# Patient Record
Sex: FEMALE | Race: WHITE | Employment: FULL TIME | URBAN - METROPOLITAN AREA
[De-identification: names, ages, dates, MRNs, and addresses within clinical notes are randomized per-mention and may not be internally consistent; named-entity substitution may affect disease eponyms.]

---

## 2017-01-03 ENCOUNTER — TELEPHONE (OUTPATIENT)
Dept: PRIMARY CARE CLINIC | Age: 32
End: 2017-01-03

## 2017-01-03 DIAGNOSIS — I89.0 LYMPHEDEMA: Primary | ICD-10-CM

## 2017-01-04 ENCOUNTER — OFFICE VISIT (OUTPATIENT)
Dept: OBGYN | Age: 32
End: 2017-01-04

## 2017-01-04 VITALS
WEIGHT: 293 LBS | DIASTOLIC BLOOD PRESSURE: 78 MMHG | HEIGHT: 65 IN | SYSTOLIC BLOOD PRESSURE: 124 MMHG | BODY MASS INDEX: 48.82 KG/M2

## 2017-01-04 DIAGNOSIS — Z01.419 NORMAL GYNECOLOGIC EXAMINATION: Primary | ICD-10-CM

## 2017-01-04 DIAGNOSIS — N92.1 MENORRHAGIA WITH IRREGULAR CYCLE: ICD-10-CM

## 2017-01-04 PROCEDURE — 99385 PREV VISIT NEW AGE 18-39: CPT | Performed by: NURSE PRACTITIONER

## 2017-01-04 RX ORDER — LEVONORGESTREL / ETHINYL ESTRADIOL AND ETHINYL ESTRADIOL 150-30(84)
1 KIT ORAL DAILY
Qty: 91 TABLET | Refills: 4 | Status: SHIPPED | OUTPATIENT
Start: 2017-01-04 | End: 2018-02-19 | Stop reason: SDUPTHER

## 2017-01-05 LAB — PAP SMEAR: NORMAL

## 2017-01-06 ENCOUNTER — OFFICE VISIT (OUTPATIENT)
Dept: PRIMARY CARE CLINIC | Age: 32
End: 2017-01-06

## 2017-01-06 VITALS
DIASTOLIC BLOOD PRESSURE: 82 MMHG | RESPIRATION RATE: 16 BRPM | TEMPERATURE: 97.8 F | HEART RATE: 57 BPM | SYSTOLIC BLOOD PRESSURE: 116 MMHG | OXYGEN SATURATION: 97 % | BODY MASS INDEX: 48.82 KG/M2 | HEIGHT: 65 IN | WEIGHT: 293 LBS

## 2017-01-06 DIAGNOSIS — Z13.220 NEED FOR LIPID SCREENING: ICD-10-CM

## 2017-01-06 DIAGNOSIS — N20.0 RENAL LITHIASIS: ICD-10-CM

## 2017-01-06 DIAGNOSIS — R53.83 FATIGUE, UNSPECIFIED TYPE: ICD-10-CM

## 2017-01-06 DIAGNOSIS — Z87.442 HISTORY OF KIDNEY STONES: Primary | ICD-10-CM

## 2017-01-06 DIAGNOSIS — N30.00 ACUTE CYSTITIS WITHOUT HEMATURIA: ICD-10-CM

## 2017-01-06 LAB
ALBUMIN SERPL-MCNC: 4.6 G/DL (ref 3.9–4.9)
ALP BLD-CCNC: 76 U/L (ref 40–130)
ALT SERPL-CCNC: 94 U/L (ref 0–33)
ANION GAP SERPL CALCULATED.3IONS-SCNC: 16 MEQ/L (ref 7–13)
AST SERPL-CCNC: 55 U/L (ref 0–35)
BASOPHILS ABSOLUTE: 0.1 K/UL (ref 0–0.2)
BASOPHILS RELATIVE PERCENT: 0.9 %
BILIRUB SERPL-MCNC: 0.8 MG/DL (ref 0–1.2)
BILIRUBIN, POC: NORMAL
BLOOD URINE, POC: NORMAL
BUN BLDV-MCNC: 12 MG/DL (ref 6–20)
CALCIUM SERPL-MCNC: 9.3 MG/DL (ref 8.6–10.2)
CHLORIDE BLD-SCNC: 100 MEQ/L (ref 98–107)
CHOLESTEROL, TOTAL: 279 MG/DL (ref 0–199)
CLARITY, POC: NORMAL
CO2: 22 MEQ/L (ref 22–29)
COLOR, POC: YELLOW
CREAT SERPL-MCNC: 0.53 MG/DL (ref 0.5–0.9)
EOSINOPHILS ABSOLUTE: 0.1 K/UL (ref 0–0.7)
EOSINOPHILS RELATIVE PERCENT: 1.8 %
GFR AFRICAN AMERICAN: >60
GFR NON-AFRICAN AMERICAN: >60
GLOBULIN: 2.6 G/DL (ref 2.3–3.5)
GLUCOSE BLD-MCNC: 78 MG/DL (ref 74–109)
GLUCOSE URINE, POC: NORMAL
HCT VFR BLD CALC: 47 % (ref 37–47)
HDLC SERPL-MCNC: 67 MG/DL (ref 40–59)
HEMOGLOBIN: 16.1 G/DL (ref 12–16)
KETONES, POC: NORMAL
LDL CHOLESTEROL CALCULATED: 185 MG/DL (ref 0–129)
LEUKOCYTE EST, POC: NORMAL
LYMPHOCYTES ABSOLUTE: 2.9 K/UL (ref 1–4.8)
LYMPHOCYTES RELATIVE PERCENT: 38.9 %
MCH RBC QN AUTO: 29.9 PG (ref 27–31.3)
MCHC RBC AUTO-ENTMCNC: 34.2 % (ref 33–37)
MCV RBC AUTO: 87.4 FL (ref 82–100)
MONOCYTES ABSOLUTE: 0.5 K/UL (ref 0.2–0.8)
MONOCYTES RELATIVE PERCENT: 7.3 %
NEUTROPHILS ABSOLUTE: 3.8 K/UL (ref 1.4–6.5)
NEUTROPHILS RELATIVE PERCENT: 51.1 %
NITRITE, POC: NORMAL
PDW BLD-RTO: 12.7 % (ref 11.5–14.5)
PH, POC: 6
PLATELET # BLD: 287 K/UL (ref 130–400)
POTASSIUM SERPL-SCNC: 4.1 MEQ/L (ref 3.5–5.1)
PROTEIN, POC: NORMAL
RBC # BLD: 5.38 M/UL (ref 4.2–5.4)
SODIUM BLD-SCNC: 138 MEQ/L (ref 132–144)
SPECIFIC GRAVITY, POC: 1.03
T4 FREE: 1.07 NG/DL (ref 0.93–1.7)
T4 TOTAL: 6.4 UG/DL (ref 4.5–11.7)
TOTAL PROTEIN: 7.2 G/DL (ref 6.4–8.1)
TRIGL SERPL-MCNC: 134 MG/DL (ref 0–200)
TSH SERPL DL<=0.05 MIU/L-ACNC: 5.23 UIU/ML (ref 0.27–4.2)
UROBILINOGEN, POC: NORMAL
WBC # BLD: 7.4 K/UL (ref 4.8–10.8)

## 2017-01-06 PROCEDURE — 81003 URINALYSIS AUTO W/O SCOPE: CPT | Performed by: FAMILY MEDICINE

## 2017-01-06 PROCEDURE — 96372 THER/PROPH/DIAG INJ SC/IM: CPT | Performed by: FAMILY MEDICINE

## 2017-01-06 PROCEDURE — 99214 OFFICE O/P EST MOD 30 MIN: CPT | Performed by: FAMILY MEDICINE

## 2017-01-06 RX ORDER — HYDROCODONE BITARTRATE AND ACETAMINOPHEN 5; 325 MG/1; MG/1
1 TABLET ORAL EVERY 6 HOURS PRN
Qty: 20 TABLET | Refills: 0 | Status: SHIPPED | OUTPATIENT
Start: 2017-01-06 | End: 2017-03-31

## 2017-01-06 RX ORDER — CIPROFLOXACIN 500 MG/1
TABLET, FILM COATED ORAL
Qty: 20 TABLET | Refills: 0 | Status: SHIPPED | OUTPATIENT
Start: 2017-01-06 | End: 2017-01-16

## 2017-01-06 RX ORDER — CEFTRIAXONE 1 G/1
1 INJECTION, POWDER, FOR SOLUTION INTRAMUSCULAR; INTRAVENOUS ONCE
Status: COMPLETED | OUTPATIENT
Start: 2017-01-06 | End: 2017-01-06

## 2017-01-06 RX ADMIN — CEFTRIAXONE 1 G: 1 INJECTION, POWDER, FOR SOLUTION INTRAMUSCULAR; INTRAVENOUS at 14:52

## 2017-01-06 ASSESSMENT — PATIENT HEALTH QUESTIONNAIRE - PHQ9
SUM OF ALL RESPONSES TO PHQ9 QUESTIONS 1 & 2: 2
1. LITTLE INTEREST OR PLEASURE IN DOING THINGS: 1
SUM OF ALL RESPONSES TO PHQ QUESTIONS 1-9: 2
2. FEELING DOWN, DEPRESSED OR HOPELESS: 1

## 2017-01-06 ASSESSMENT — ENCOUNTER SYMPTOMS
VOMITING: 0
NAUSEA: 1
ABDOMINAL PAIN: 1

## 2017-01-16 ENCOUNTER — OFFICE VISIT (OUTPATIENT)
Dept: PRIMARY CARE CLINIC | Age: 32
End: 2017-01-16

## 2017-01-16 VITALS
HEART RATE: 80 BPM | WEIGHT: 293 LBS | RESPIRATION RATE: 18 BRPM | HEIGHT: 65 IN | TEMPERATURE: 97.7 F | DIASTOLIC BLOOD PRESSURE: 88 MMHG | BODY MASS INDEX: 48.82 KG/M2 | SYSTOLIC BLOOD PRESSURE: 140 MMHG

## 2017-01-16 DIAGNOSIS — E03.9 HYPOTHYROIDISM, UNSPECIFIED TYPE: ICD-10-CM

## 2017-01-16 DIAGNOSIS — G47.00 INSOMNIA, UNSPECIFIED TYPE: ICD-10-CM

## 2017-01-16 DIAGNOSIS — F41.9 ANXIETY: Primary | ICD-10-CM

## 2017-01-16 DIAGNOSIS — F32.A DEPRESSION, UNSPECIFIED DEPRESSION TYPE: ICD-10-CM

## 2017-01-16 DIAGNOSIS — R53.83 FATIGUE, UNSPECIFIED TYPE: ICD-10-CM

## 2017-01-16 PROCEDURE — 99213 OFFICE O/P EST LOW 20 MIN: CPT | Performed by: FAMILY MEDICINE

## 2017-01-16 RX ORDER — DESVENLAFAXINE 50 MG/1
50 TABLET, EXTENDED RELEASE ORAL DAILY
Qty: 14 TABLET | Refills: 3 | Status: SHIPPED | OUTPATIENT
Start: 2017-01-16 | End: 2017-02-13 | Stop reason: SDUPTHER

## 2017-01-16 RX ORDER — DESVENLAFAXINE 50 MG/1
50 TABLET, EXTENDED RELEASE ORAL DAILY
Qty: 30 TABLET | Refills: 11 | Status: SHIPPED | OUTPATIENT
Start: 2017-01-16 | End: 2017-03-31 | Stop reason: SDUPTHER

## 2017-01-16 RX ORDER — LEVOTHYROXINE SODIUM 75 MCG
75 TABLET ORAL DAILY
Qty: 90 TABLET | Refills: 3 | Status: SHIPPED | OUTPATIENT
Start: 2017-01-16 | End: 2018-05-14 | Stop reason: SDUPTHER

## 2017-01-16 ASSESSMENT — ENCOUNTER SYMPTOMS
CONSTIPATION: 0
COLOR CHANGE: 0
STRIDOR: 0
DIARRHEA: 0
PHOTOPHOBIA: 0
ABDOMINAL PAIN: 0
EYE REDNESS: 0
CHOKING: 0
APNEA: 0
NAUSEA: 0
EYE PAIN: 0
EYE DISCHARGE: 0
CHEST TIGHTNESS: 0
WHEEZING: 0
FACIAL SWELLING: 0

## 2017-01-23 DIAGNOSIS — Z01.419 NORMAL GYNECOLOGIC EXAMINATION: ICD-10-CM

## 2017-02-13 ENCOUNTER — OFFICE VISIT (OUTPATIENT)
Dept: PRIMARY CARE CLINIC | Age: 32
End: 2017-02-13

## 2017-02-13 VITALS
TEMPERATURE: 97.9 F | HEIGHT: 65 IN | HEART RATE: 60 BPM | SYSTOLIC BLOOD PRESSURE: 132 MMHG | WEIGHT: 293 LBS | RESPIRATION RATE: 18 BRPM | BODY MASS INDEX: 48.82 KG/M2 | DIASTOLIC BLOOD PRESSURE: 88 MMHG

## 2017-02-13 DIAGNOSIS — F32.A DEPRESSION, UNSPECIFIED DEPRESSION TYPE: ICD-10-CM

## 2017-02-13 DIAGNOSIS — R53.83 FATIGUE, UNSPECIFIED TYPE: ICD-10-CM

## 2017-02-13 DIAGNOSIS — E03.9 HYPOTHYROIDISM, UNSPECIFIED TYPE: Primary | ICD-10-CM

## 2017-02-13 DIAGNOSIS — F41.9 ANXIETY: ICD-10-CM

## 2017-02-13 PROCEDURE — 99213 OFFICE O/P EST LOW 20 MIN: CPT | Performed by: FAMILY MEDICINE

## 2017-03-22 ENCOUNTER — NURSE ONLY (OUTPATIENT)
Dept: PRIMARY CARE CLINIC | Age: 32
End: 2017-03-22

## 2017-03-22 DIAGNOSIS — R11.0 NAUSEA: Primary | ICD-10-CM

## 2017-03-22 PROCEDURE — 96372 THER/PROPH/DIAG INJ SC/IM: CPT | Performed by: FAMILY MEDICINE

## 2017-03-22 RX ORDER — ELETRIPTAN HYDROBROMIDE 40 MG/1
40 TABLET, FILM COATED ORAL
Qty: 6 TABLET | Refills: 3 | Status: SHIPPED | OUTPATIENT
Start: 2017-03-22 | End: 2017-03-31 | Stop reason: ALTCHOICE

## 2017-03-22 RX ORDER — PROMETHAZINE HYDROCHLORIDE 25 MG/ML
25 INJECTION, SOLUTION INTRAMUSCULAR; INTRAVENOUS ONCE
Status: COMPLETED | OUTPATIENT
Start: 2017-03-22 | End: 2017-03-22

## 2017-03-22 RX ADMIN — PROMETHAZINE HYDROCHLORIDE 25 MG: 25 INJECTION, SOLUTION INTRAMUSCULAR; INTRAVENOUS at 11:09

## 2017-03-28 DIAGNOSIS — E03.9 HYPOTHYROIDISM, UNSPECIFIED TYPE: ICD-10-CM

## 2017-03-28 LAB
T4 FREE: 1.12 NG/DL (ref 0.93–1.7)
T4 TOTAL: 6.8 UG/DL (ref 4.5–11.7)
TSH SERPL DL<=0.05 MIU/L-ACNC: 3.44 UIU/ML (ref 0.27–4.2)

## 2017-03-31 ENCOUNTER — OFFICE VISIT (OUTPATIENT)
Dept: PRIMARY CARE CLINIC | Age: 32
End: 2017-03-31

## 2017-03-31 VITALS
HEIGHT: 65 IN | DIASTOLIC BLOOD PRESSURE: 88 MMHG | WEIGHT: 293 LBS | HEART RATE: 60 BPM | BODY MASS INDEX: 48.82 KG/M2 | TEMPERATURE: 97.8 F | RESPIRATION RATE: 12 BRPM | SYSTOLIC BLOOD PRESSURE: 120 MMHG

## 2017-03-31 DIAGNOSIS — E03.9 HYPOTHYROIDISM, UNSPECIFIED TYPE: Primary | ICD-10-CM

## 2017-03-31 DIAGNOSIS — G43.909 MIGRAINE WITHOUT STATUS MIGRAINOSUS, NOT INTRACTABLE, UNSPECIFIED MIGRAINE TYPE: ICD-10-CM

## 2017-03-31 DIAGNOSIS — F32.A DEPRESSION, UNSPECIFIED DEPRESSION TYPE: ICD-10-CM

## 2017-03-31 DIAGNOSIS — F41.9 ANXIETY: ICD-10-CM

## 2017-03-31 PROCEDURE — 99214 OFFICE O/P EST MOD 30 MIN: CPT | Performed by: FAMILY MEDICINE

## 2017-03-31 RX ORDER — DESVENLAFAXINE 50 MG/1
50 TABLET, EXTENDED RELEASE ORAL DAILY
Qty: 90 TABLET | Refills: 1 | Status: SHIPPED | OUTPATIENT
Start: 2017-03-31 | End: 2017-10-17

## 2017-03-31 RX ORDER — TOPIRAMATE 50 MG/1
50 TABLET, FILM COATED ORAL 2 TIMES DAILY
Qty: 60 TABLET | Refills: 3 | Status: SHIPPED | OUTPATIENT
Start: 2017-03-31 | End: 2018-04-03 | Stop reason: DRUGHIGH

## 2017-03-31 RX ORDER — SUMATRIPTAN 100 MG/1
100 TABLET, FILM COATED ORAL
Qty: 12 TABLET | Refills: 3 | Status: SHIPPED | OUTPATIENT
Start: 2017-03-31 | End: 2019-02-25

## 2017-03-31 RX ORDER — DESVENLAFAXINE 50 MG/1
50 TABLET, EXTENDED RELEASE ORAL DAILY
Qty: 30 TABLET | Refills: 11 | Status: SHIPPED | OUTPATIENT
Start: 2017-03-31 | End: 2017-04-27

## 2017-03-31 ASSESSMENT — ENCOUNTER SYMPTOMS
NAUSEA: 0
SWOLLEN GLANDS: 0
STRIDOR: 0
EYE DISCHARGE: 0
VISUAL CHANGE: 0
CHEST TIGHTNESS: 0
FACIAL SWEATING: 0
EYE WATERING: 0
COUGH: 0
RHINORRHEA: 0
EYE PAIN: 0
DIARRHEA: 0
CONSTIPATION: 0
WHEEZING: 0
BLURRED VISION: 0
CHOKING: 0
SINUS PRESSURE: 0
SORE THROAT: 0
ABDOMINAL PAIN: 0
VOMITING: 0
COLOR CHANGE: 0
BACK PAIN: 0
PHOTOPHOBIA: 0
APNEA: 0
EYE REDNESS: 0
FACIAL SWELLING: 0
SCALP TENDERNESS: 0

## 2017-04-27 ENCOUNTER — OFFICE VISIT (OUTPATIENT)
Dept: PRIMARY CARE CLINIC | Age: 32
End: 2017-04-27

## 2017-04-27 VITALS
BODY MASS INDEX: 48.82 KG/M2 | HEART RATE: 76 BPM | WEIGHT: 293 LBS | SYSTOLIC BLOOD PRESSURE: 124 MMHG | HEIGHT: 65 IN | DIASTOLIC BLOOD PRESSURE: 86 MMHG | TEMPERATURE: 98.1 F | RESPIRATION RATE: 16 BRPM

## 2017-04-27 DIAGNOSIS — F32.A DEPRESSION, UNSPECIFIED DEPRESSION TYPE: ICD-10-CM

## 2017-04-27 DIAGNOSIS — R53.83 FATIGUE, UNSPECIFIED TYPE: ICD-10-CM

## 2017-04-27 DIAGNOSIS — R51.9 HEADACHE, UNSPECIFIED HEADACHE TYPE: Primary | ICD-10-CM

## 2017-04-27 PROCEDURE — 99213 OFFICE O/P EST LOW 20 MIN: CPT | Performed by: FAMILY MEDICINE

## 2017-04-27 RX ORDER — TOPIRAMATE 25 MG/1
25 TABLET ORAL 2 TIMES DAILY
Qty: 60 TABLET | Refills: 11 | Status: SHIPPED | OUTPATIENT
Start: 2017-04-27 | End: 2018-04-03 | Stop reason: DRUGHIGH

## 2017-04-27 ASSESSMENT — ENCOUNTER SYMPTOMS
NAUSEA: 1
SHORTNESS OF BREATH: 0
ABDOMINAL PAIN: 0
RHINORRHEA: 0

## 2017-08-22 ENCOUNTER — OFFICE VISIT (OUTPATIENT)
Dept: PRIMARY CARE CLINIC | Age: 32
End: 2017-08-22

## 2017-08-22 VITALS
WEIGHT: 293 LBS | SYSTOLIC BLOOD PRESSURE: 126 MMHG | RESPIRATION RATE: 18 BRPM | DIASTOLIC BLOOD PRESSURE: 80 MMHG | BODY MASS INDEX: 48.82 KG/M2 | TEMPERATURE: 97.4 F | HEIGHT: 65 IN | HEART RATE: 64 BPM

## 2017-08-22 DIAGNOSIS — R51.9 HEADACHE, UNSPECIFIED HEADACHE TYPE: ICD-10-CM

## 2017-08-22 DIAGNOSIS — F32.A DEPRESSION, UNSPECIFIED DEPRESSION TYPE: Primary | ICD-10-CM

## 2017-08-22 DIAGNOSIS — R63.4 WEIGHT LOSS: ICD-10-CM

## 2017-08-22 DIAGNOSIS — E03.9 HYPOTHYROIDISM, UNSPECIFIED TYPE: ICD-10-CM

## 2017-08-22 PROCEDURE — 99214 OFFICE O/P EST MOD 30 MIN: CPT | Performed by: FAMILY MEDICINE

## 2017-08-22 RX ORDER — DESVENLAFAXINE 100 MG/1
100 TABLET, EXTENDED RELEASE ORAL DAILY
Qty: 30 TABLET | Refills: 3 | Status: SHIPPED | OUTPATIENT
Start: 2017-08-22 | End: 2017-08-22 | Stop reason: DRUGHIGH

## 2017-08-22 RX ORDER — PHENTERMINE HYDROCHLORIDE 37.5 MG/1
37.5 TABLET ORAL
Qty: 30 TABLET | Refills: 0 | Status: SHIPPED | OUTPATIENT
Start: 2017-08-22 | End: 2017-09-19 | Stop reason: SDUPTHER

## 2017-08-22 RX ORDER — DESVENLAFAXINE 100 MG/1
100 TABLET, EXTENDED RELEASE ORAL DAILY
Qty: 30 TABLET | Refills: 3 | Status: SHIPPED | OUTPATIENT
Start: 2017-08-22 | End: 2018-09-24 | Stop reason: SDUPTHER

## 2017-08-22 RX ORDER — DESVENLAFAXINE 100 MG/1
100 TABLET, EXTENDED RELEASE ORAL DAILY
Qty: 30 TABLET | Refills: 3 | Status: SHIPPED | OUTPATIENT
Start: 2017-08-22 | End: 2017-08-22 | Stop reason: SDUPTHER

## 2017-08-22 ASSESSMENT — ENCOUNTER SYMPTOMS
NAUSEA: 0
PHOTOPHOBIA: 0
COUGH: 0
SORE THROAT: 0
SCALP TENDERNESS: 0
FACIAL SWEATING: 0
RHINORRHEA: 0
VISUAL CHANGE: 0
VOMITING: 0
BACK PAIN: 0
SINUS PRESSURE: 0
BLURRED VISION: 0
EYE REDNESS: 0
EYE WATERING: 0
EYE PAIN: 0
SWOLLEN GLANDS: 0
ABDOMINAL PAIN: 0

## 2017-09-19 ENCOUNTER — OFFICE VISIT (OUTPATIENT)
Dept: PRIMARY CARE CLINIC | Age: 32
End: 2017-09-19

## 2017-09-19 VITALS
WEIGHT: 283 LBS | SYSTOLIC BLOOD PRESSURE: 130 MMHG | RESPIRATION RATE: 14 BRPM | DIASTOLIC BLOOD PRESSURE: 82 MMHG | BODY MASS INDEX: 47.15 KG/M2 | HEART RATE: 66 BPM | TEMPERATURE: 98 F | HEIGHT: 65 IN

## 2017-09-19 DIAGNOSIS — E03.9 HYPOTHYROIDISM, UNSPECIFIED TYPE: Primary | ICD-10-CM

## 2017-09-19 DIAGNOSIS — R63.4 WEIGHT LOSS: ICD-10-CM

## 2017-09-19 PROCEDURE — 99213 OFFICE O/P EST LOW 20 MIN: CPT | Performed by: FAMILY MEDICINE

## 2017-09-19 RX ORDER — PHENTERMINE HYDROCHLORIDE 37.5 MG/1
37.5 TABLET ORAL
Qty: 30 TABLET | Refills: 0 | Status: SHIPPED | OUTPATIENT
Start: 2017-09-19 | End: 2017-10-17 | Stop reason: SDUPTHER

## 2017-09-19 ASSESSMENT — ENCOUNTER SYMPTOMS
EYE DISCHARGE: 0
EYE PAIN: 0
EYE REDNESS: 0
APNEA: 0
WHEEZING: 0
CONSTIPATION: 0
PHOTOPHOBIA: 0
ABDOMINAL PAIN: 0
NAUSEA: 0
STRIDOR: 0
COLOR CHANGE: 0
FACIAL SWELLING: 0
CHOKING: 0
DIARRHEA: 0
CHEST TIGHTNESS: 0

## 2017-10-17 ENCOUNTER — OFFICE VISIT (OUTPATIENT)
Dept: PRIMARY CARE CLINIC | Age: 32
End: 2017-10-17

## 2017-10-17 VITALS
RESPIRATION RATE: 16 BRPM | HEIGHT: 65 IN | TEMPERATURE: 98.2 F | HEART RATE: 68 BPM | DIASTOLIC BLOOD PRESSURE: 72 MMHG | BODY MASS INDEX: 46.32 KG/M2 | SYSTOLIC BLOOD PRESSURE: 108 MMHG | WEIGHT: 278 LBS

## 2017-10-17 DIAGNOSIS — R53.83 FATIGUE, UNSPECIFIED TYPE: ICD-10-CM

## 2017-10-17 DIAGNOSIS — R63.4 WEIGHT LOSS: ICD-10-CM

## 2017-10-17 DIAGNOSIS — E03.9 HYPOTHYROIDISM, UNSPECIFIED TYPE: Primary | ICD-10-CM

## 2017-10-17 PROCEDURE — 99213 OFFICE O/P EST LOW 20 MIN: CPT | Performed by: FAMILY MEDICINE

## 2017-10-17 RX ORDER — PHENTERMINE HYDROCHLORIDE 37.5 MG/1
37.5 TABLET ORAL
Qty: 30 TABLET | Refills: 0 | Status: SHIPPED | OUTPATIENT
Start: 2017-10-17 | End: 2017-11-16

## 2017-10-17 ASSESSMENT — ENCOUNTER SYMPTOMS
STRIDOR: 0
CHOKING: 0
DIARRHEA: 0
EYE PAIN: 0
PHOTOPHOBIA: 0
ABDOMINAL PAIN: 0
WHEEZING: 0
APNEA: 0
COLOR CHANGE: 0
FACIAL SWELLING: 0
EYE DISCHARGE: 0
NAUSEA: 0
CONSTIPATION: 0
EYE REDNESS: 0
CHEST TIGHTNESS: 0

## 2017-10-17 NOTE — PROGRESS NOTES
Gastrointestinal: Negative for abdominal pain, constipation, diarrhea and nausea. Endocrine: Negative for cold intolerance, heat intolerance, polydipsia and polyphagia. Genitourinary: Negative for dysuria and frequency. Musculoskeletal: Negative for gait problem, joint swelling, neck pain and neck stiffness. Skin: Negative for color change, pallor, rash and wound. Allergic/Immunologic: Negative for immunocompromised state. Neurological: Negative for tremors, syncope, facial asymmetry, speech difficulty and headaches. Psychiatric/Behavioral: Negative for confusion and hallucinations. The patient is not nervous/anxious and is not hyperactive. Objective:   /72 (Site: Left Arm, Position: Sitting, Cuff Size: Large Adult) Comment: taken by Dr. Nani Segura  Pulse 68   Temp 98.2 °F (36.8 °C) (Oral)   Resp 16   Ht 5' 5\" (1.651 m)   Wt 278 lb (126.1 kg)   LMP 09/11/2017   BMI 46.26 kg/m²     Physical Exam   Constitutional: She is oriented to person, place, and time. She appears well-developed and well-nourished. HENT:   Head: Normocephalic and atraumatic. Nose: Nose normal.   Eyes: Conjunctivae and EOM are normal. Pupils are equal, round, and reactive to light. No scleral icterus. Neck: Normal range of motion. Neck supple. Cardiovascular: Normal rate, regular rhythm and normal heart sounds. Exam reveals no gallop and no friction rub. No murmur heard. Pulmonary/Chest: Effort normal and breath sounds normal. No respiratory distress. She has no wheezes. She has no rales. She exhibits no tenderness. Neurological: She is alert and oriented to person, place, and time. Skin: Skin is warm and dry. No rash noted. No erythema. No pallor. Psychiatric: She has a normal mood and affect. Her behavior is normal. Judgment normal.   Nursing note and vitals reviewed. Assessment:     1. Hypothyroidism, unspecified type     2. Weight loss     3.  Fatigue, improved         Plan:      No

## 2017-11-15 ENCOUNTER — OFFICE VISIT (OUTPATIENT)
Dept: PRIMARY CARE CLINIC | Age: 32
End: 2017-11-15

## 2017-11-15 VITALS
RESPIRATION RATE: 14 BRPM | WEIGHT: 273 LBS | SYSTOLIC BLOOD PRESSURE: 126 MMHG | BODY MASS INDEX: 45.48 KG/M2 | HEIGHT: 65 IN | DIASTOLIC BLOOD PRESSURE: 88 MMHG | HEART RATE: 64 BPM | TEMPERATURE: 97.8 F

## 2017-11-15 DIAGNOSIS — F41.9 ANXIETY: ICD-10-CM

## 2017-11-15 DIAGNOSIS — F43.29 STRESS AND ADJUSTMENT REACTION: ICD-10-CM

## 2017-11-15 DIAGNOSIS — R63.4 WEIGHT LOSS: ICD-10-CM

## 2017-11-15 DIAGNOSIS — R51.9 NONINTRACTABLE HEADACHE, UNSPECIFIED CHRONICITY PATTERN, UNSPECIFIED HEADACHE TYPE: Primary | ICD-10-CM

## 2017-11-15 PROCEDURE — 99213 OFFICE O/P EST LOW 20 MIN: CPT | Performed by: FAMILY MEDICINE

## 2017-11-15 RX ORDER — ALPRAZOLAM 0.25 MG/1
0.25 TABLET ORAL NIGHTLY PRN
Qty: 30 TABLET | Refills: 1 | Status: SHIPPED | OUTPATIENT
Start: 2017-11-15 | End: 2017-12-15

## 2017-11-15 ASSESSMENT — ENCOUNTER SYMPTOMS
DIARRHEA: 0
GASTROINTESTINAL NEGATIVE: 1
CONSTIPATION: 0
EYE REDNESS: 0
EYE ITCHING: 0
PHOTOPHOBIA: 0
BACK PAIN: 0
WHEEZING: 0
RESPIRATORY NEGATIVE: 1
EYES NEGATIVE: 1
SHORTNESS OF BREATH: 0
ABDOMINAL PAIN: 0

## 2017-11-15 NOTE — PROGRESS NOTES
Right 2013    SHOULDER ARTHROSCOPY Right 2016     Family History   Problem Relation Age of Onset    High Blood Pressure Father     High Cholesterol Father     Heart Disease Father     Cervical Cancer Sister      Social History     Social History    Marital status:      Spouse name: N/A    Number of children: N/A    Years of education: N/A     Social History Main Topics    Smoking status: Never Smoker    Smokeless tobacco: Never Used    Alcohol use Yes    Drug use: No    Sexual activity: Yes     Other Topics Concern    None     Social History Narrative    None     Allergies   Allergen Reactions    Bee Venom Shortness Of Breath    Diphenhydramine      arythmia    Nsaids Hives    Procaine Swelling       Review of Systems   Constitutional: Negative. Negative for activity change, appetite change and fatigue. HENT: Negative. Eyes: Negative. Negative for photophobia, redness and itching. Respiratory: Negative. Negative for shortness of breath and wheezing. Cardiovascular: Negative. Gastrointestinal: Negative. Negative for abdominal pain, constipation and diarrhea. Genitourinary: Negative. Negative for hematuria, pelvic pain and urgency. Musculoskeletal: Negative. Negative for back pain. Skin: Negative. Neurological: Positive for headaches. Psychiatric/Behavioral: Negative. Negative for agitation and behavioral problems. The patient is not nervous/anxious. Objective:  Vitals:    11/15/17 1042   BP: 126/88   Site: Right Arm   Position: Sitting   Cuff Size: Large Adult   Pulse: 64   Resp: 14   Temp: 97.8 °F (36.6 °C)   TempSrc: Oral   Weight: 273 lb (123.8 kg)   Height: 5' 5\" (1.651 m)       Physical Exam   Constitutional: She is oriented to person, place, and time. She appears well-developed and well-nourished. HENT:   Head: Normocephalic and atraumatic.    Right Ear: External ear normal.   Left Ear: External ear normal.   Nose: Nose normal.   Mouth/Throat: Oropharynx is clear and moist.   Eyes: Conjunctivae and EOM are normal. Pupils are equal, round, and reactive to light. Right eye exhibits no discharge. Left eye exhibits no discharge. No scleral icterus. Neck: Normal range of motion. Neck supple. No thyromegaly present. Cardiovascular: Normal rate, regular rhythm, normal heart sounds and intact distal pulses. Exam reveals no gallop and no friction rub. No murmur heard. Pulmonary/Chest: Effort normal and breath sounds normal. No respiratory distress. She has no wheezes. She has no rales. She exhibits no tenderness. Abdominal: Soft. Bowel sounds are normal. She exhibits no distension and no mass. There is no tenderness. There is no rebound and no guarding. Lymphadenopathy:     She has no cervical adenopathy. Neurological: She is alert and oriented to person, place, and time. Skin: Skin is warm and dry. Psychiatric: She has a normal mood and affect. Her behavior is normal. Judgment and thought content normal.   Nursing note and vitals reviewed. Assessment/Plan  Dante Maxwell was seen today for weight loss and follow-up from hospital.    Diagnoses and all orders for this visit:    Nonintractable headache, unspecified chronicity pattern, unspecified headache type    Weight loss    Anxiety, genesight being performed today    Stress and adjustment reaction    Other orders  -     ALPRAZolam (XANAX) 0.25 MG tablet; Take 1 tablet by mouth nightly as needed for Sleep or Anxiety  Use cautiously. [facility] Due   Topic Date Due    HIV screen  11/21/2002       Controlled Substances Monitoring: Attestation: The Prescription Monitoring Report for this patient was reviewed today. Blayne Smith DO)  Documentation: No signs of potential drug abuse or diversion identified. (Blayne Smith DO)    No Follow-up on file.     Cliff Mares DO

## 2018-01-09 RX ORDER — DESVENLAFAXINE 100 MG/1
100 TABLET, EXTENDED RELEASE ORAL DAILY
Qty: 30 TABLET | Refills: 5 | Status: SHIPPED | OUTPATIENT
Start: 2018-01-09 | End: 2018-06-19

## 2018-01-11 ENCOUNTER — OFFICE VISIT (OUTPATIENT)
Dept: PRIMARY CARE CLINIC | Age: 33
End: 2018-01-11

## 2018-01-11 VITALS
TEMPERATURE: 97.6 F | SYSTOLIC BLOOD PRESSURE: 130 MMHG | RESPIRATION RATE: 14 BRPM | OXYGEN SATURATION: 99 % | BODY MASS INDEX: 44.98 KG/M2 | DIASTOLIC BLOOD PRESSURE: 64 MMHG | HEIGHT: 65 IN | HEART RATE: 78 BPM | WEIGHT: 270 LBS

## 2018-01-11 DIAGNOSIS — R53.83 FATIGUE, UNSPECIFIED TYPE: ICD-10-CM

## 2018-01-11 DIAGNOSIS — R51.9 NONINTRACTABLE HEADACHE, UNSPECIFIED CHRONICITY PATTERN, UNSPECIFIED HEADACHE TYPE: ICD-10-CM

## 2018-01-11 DIAGNOSIS — R68.89 FLU-LIKE SYMPTOMS: Primary | ICD-10-CM

## 2018-01-11 DIAGNOSIS — M79.10 MYALGIA: ICD-10-CM

## 2018-01-11 LAB
INFLUENZA A ANTIBODY: NORMAL
INFLUENZA B ANTIBODY: NORMAL

## 2018-01-11 PROCEDURE — 99213 OFFICE O/P EST LOW 20 MIN: CPT | Performed by: FAMILY MEDICINE

## 2018-01-11 PROCEDURE — 87804 INFLUENZA ASSAY W/OPTIC: CPT | Performed by: FAMILY MEDICINE

## 2018-01-11 RX ORDER — FEXOFENADINE HCL 180 MG/1
180 TABLET ORAL
COMMUNITY
Start: 2015-05-21

## 2018-01-11 RX ORDER — AZITHROMYCIN 250 MG/1
TABLET, FILM COATED ORAL
Qty: 1 PACKET | Refills: 0 | Status: SHIPPED | OUTPATIENT
Start: 2018-01-11 | End: 2018-06-19

## 2018-01-11 ASSESSMENT — ENCOUNTER SYMPTOMS
VOMITING: 0
DIARRHEA: 0
FLU SYMPTOMS: 1
COUGH: 0
CHEST TIGHTNESS: 0
EYE REDNESS: 0
EYE DISCHARGE: 0
COLOR CHANGE: 0
VISUAL CHANGE: 0
SORE THROAT: 0
ABDOMINAL PAIN: 0
EYE PAIN: 0
RHINORRHEA: 1
APNEA: 0
CHOKING: 0
CONSTIPATION: 0
NAUSEA: 0
CHANGE IN BOWEL HABIT: 0
FACIAL SWELLING: 0
WHEEZING: 0
STRIDOR: 0
PHOTOPHOBIA: 0
SWOLLEN GLANDS: 0

## 2018-01-11 ASSESSMENT — PATIENT HEALTH QUESTIONNAIRE - PHQ9
1. LITTLE INTEREST OR PLEASURE IN DOING THINGS: 0
SUM OF ALL RESPONSES TO PHQ QUESTIONS 1-9: 0
SUM OF ALL RESPONSES TO PHQ9 QUESTIONS 1 & 2: 0
2. FEELING DOWN, DEPRESSED OR HOPELESS: 0

## 2018-01-11 NOTE — PROGRESS NOTES
swelling or tenderness. Tympanic membrane is not injected and not bulging. No hemotympanum. Left Ear: Hearing, tympanic membrane and external ear normal. No drainage, swelling or tenderness. Tympanic membrane is not injected and not bulging. No hemotympanum. Nose: No mucosal edema, rhinorrhea, sinus tenderness or nasal deformity. No epistaxis. Right sinus exhibits no maxillary sinus tenderness. Left sinus exhibits no maxillary sinus tenderness. Mouth/Throat: Uvula is midline and mucous membranes are normal. No oropharyngeal exudate or posterior oropharyngeal erythema. Eyes: Conjunctivae and EOM are normal. Pupils are equal, round, and reactive to light. Right eye exhibits no discharge. Left eye exhibits no discharge. Neck: Trachea normal. No JVD present. No thyroid mass and no thyromegaly present. Cardiovascular: Normal rate, regular rhythm, normal heart sounds and intact distal pulses. Exam reveals no gallop and no friction rub. No murmur heard. Pulmonary/Chest: Effort normal. No respiratory distress. She has no wheezes. She has no rales. She exhibits no tenderness. Lymphadenopathy:     She has no cervical adenopathy. Neurological: She is alert and oriented to person, place, and time. Coordination normal.   Skin: Skin is warm and dry. She is not diaphoretic. Psychiatric: She has a normal mood and affect. Her behavior is normal. Judgment and thought content normal.   Nursing note and vitals reviewed. Assessment:     1. Flu-like symptoms  POCT Influenza A/B   2. Myalgia     3. Nonintractable headache, unspecified chronicity pattern, unspecified headache type     4. Fatigue, unspecified type         Plan:      Orders Placed This Encounter   Procedures    POCT Influenza A/B     Orders Placed This Encounter   Medications    azithromycin (ZITHROMAX) 250 MG tablet     Sig: Take 2 tabs (500 mg) on Day 1, and take 1 tab (250 mg) on days 2 through 5.      Dispense:  1 packet     Refill:  0

## 2018-03-22 ENCOUNTER — OFFICE VISIT (OUTPATIENT)
Dept: PRIMARY CARE CLINIC | Age: 33
End: 2018-03-22
Payer: COMMERCIAL

## 2018-03-22 VITALS
HEART RATE: 59 BPM | BODY MASS INDEX: 45.48 KG/M2 | DIASTOLIC BLOOD PRESSURE: 78 MMHG | WEIGHT: 273 LBS | SYSTOLIC BLOOD PRESSURE: 122 MMHG | TEMPERATURE: 97.9 F | HEIGHT: 65 IN | RESPIRATION RATE: 16 BRPM | OXYGEN SATURATION: 99 %

## 2018-03-22 DIAGNOSIS — R11.0 NAUSEA: ICD-10-CM

## 2018-03-22 DIAGNOSIS — G43.809 OTHER MIGRAINE WITHOUT STATUS MIGRAINOSUS, NOT INTRACTABLE: Primary | ICD-10-CM

## 2018-03-22 PROBLEM — G43.909 MIGRAINE: Status: ACTIVE | Noted: 2018-03-22

## 2018-03-22 PROCEDURE — 96372 THER/PROPH/DIAG INJ SC/IM: CPT | Performed by: FAMILY MEDICINE

## 2018-03-22 PROCEDURE — 99213 OFFICE O/P EST LOW 20 MIN: CPT | Performed by: FAMILY MEDICINE

## 2018-03-22 RX ORDER — KETOROLAC TROMETHAMINE 30 MG/ML
60 INJECTION, SOLUTION INTRAMUSCULAR; INTRAVENOUS ONCE
Status: COMPLETED | OUTPATIENT
Start: 2018-03-22 | End: 2018-03-22

## 2018-03-22 RX ORDER — PROMETHAZINE HYDROCHLORIDE 25 MG/ML
6.25 INJECTION, SOLUTION INTRAMUSCULAR; INTRAVENOUS ONCE
Status: COMPLETED | OUTPATIENT
Start: 2018-03-22 | End: 2018-03-22

## 2018-03-22 RX ORDER — KETOROLAC TROMETHAMINE 10 MG/1
10 TABLET, FILM COATED ORAL EVERY 6 HOURS PRN
Qty: 20 TABLET | Refills: 0 | Status: ON HOLD | OUTPATIENT
Start: 2018-03-22 | End: 2019-01-28

## 2018-03-22 RX ORDER — ONDANSETRON 4 MG/1
4 TABLET, FILM COATED ORAL EVERY 4 HOURS PRN
Qty: 30 TABLET | Refills: 1 | Status: SHIPPED | OUTPATIENT
Start: 2018-03-22 | End: 2019-01-02

## 2018-03-22 RX ORDER — PROMETHAZINE HYDROCHLORIDE 25 MG/ML
6.25 INJECTION, SOLUTION INTRAMUSCULAR; INTRAVENOUS EVERY 6 HOURS PRN
Status: DISCONTINUED | OUTPATIENT
Start: 2018-03-22 | End: 2019-01-28 | Stop reason: HOSPADM

## 2018-03-22 RX ADMIN — KETOROLAC TROMETHAMINE 60 MG: 30 INJECTION, SOLUTION INTRAMUSCULAR; INTRAVENOUS at 12:25

## 2018-03-22 RX ADMIN — PROMETHAZINE HYDROCHLORIDE 6.25 MG: 25 INJECTION, SOLUTION INTRAMUSCULAR; INTRAVENOUS at 12:27

## 2018-03-22 ASSESSMENT — ENCOUNTER SYMPTOMS
EYE REDNESS: 0
EYE WATERING: 0
PHOTOPHOBIA: 0
SINUS PRESSURE: 0
FACIAL SWEATING: 0
SORE THROAT: 0
SWOLLEN GLANDS: 0
COUGH: 0
EYE PAIN: 0
BLURRED VISION: 0
VISUAL CHANGE: 0
SCALP TENDERNESS: 0
VOMITING: 0
BACK PAIN: 0
NAUSEA: 1
ABDOMINAL PAIN: 0
RHINORRHEA: 0

## 2018-03-22 NOTE — PROGRESS NOTES
Subjective:      Patient ID: Kenroy Murray is a 29 y.o. female who presents today for:  Chief Complaint   Patient presents with    Migraine     x 5 days. Pt is here for migraines, nausea, dizziness, sound sensitivity. , insomnia (4 hours), hot and cold sensitivity. Pt is using cold compresses, and OTC meds to give some relief. Migraine    This is a new problem. The current episode started in the past 7 days. The problem occurs constantly. The problem has been unchanged. The quality of the pain is described as aching. The pain is severe. Associated symptoms include dizziness, insomnia, nausea and phonophobia. Pertinent negatives include no abdominal pain, abnormal behavior, anorexia, back pain, blurred vision, coughing, drainage, ear pain, eye pain, eye redness, eye watering, facial sweating, fever, hearing loss, loss of balance, muscle aches, neck pain, numbness, photophobia, rhinorrhea, scalp tenderness, seizures, sinus pressure, sore throat, swollen glands, tingling, tinnitus, visual change, vomiting, weakness or weight loss. The symptoms are aggravated by bright light. She has tried cold packs and NSAIDs (Topamax, Imitrex) for the symptoms. The treatment provided no relief. Her past medical history is significant for migraine headaches.        Past Medical History:   Diagnosis Date    Anxiety 2010    Chronic urticaria 2013    Depression 2010    Depression 1/16/2017    Headache 4/27/2017    Hypothyroidism 1/16/2017    Lymphedema 1/3/2017    Migraine 3/22/2018     Past Surgical History:   Procedure Laterality Date    APPENDECTOMY  2007    CHOLECYSTECTOMY  2012    CYSTOSCOPY  2012    SHOULDER ARTHROSCOPY Right 2013    SHOULDER ARTHROSCOPY Right 2016     Family History   Problem Relation Age of Onset    High Blood Pressure Father     High Cholesterol Father     Heart Disease Father     Cervical Cancer Sister      Social History     Social History    Marital status:      Spouse name: EMILIA Kelly   , am scribing for and in the presence of [place] Agness Life, DO. Electronically signed by :  Stella Lopez DO, personally performed the services described in this documentation, as scribed by Sruthi Mcleod CMA   in my presence, and it is both accurate and complete.  Electronically signed by: Dacia Kennedy DO    3/22/18 11:44 AM    Dacia Kennedy DO

## 2018-04-03 ENCOUNTER — OFFICE VISIT (OUTPATIENT)
Dept: PRIMARY CARE CLINIC | Age: 33
End: 2018-04-03
Payer: COMMERCIAL

## 2018-04-03 VITALS
SYSTOLIC BLOOD PRESSURE: 102 MMHG | DIASTOLIC BLOOD PRESSURE: 60 MMHG | HEART RATE: 55 BPM | BODY MASS INDEX: 45.5 KG/M2 | RESPIRATION RATE: 16 BRPM | TEMPERATURE: 97.4 F | WEIGHT: 273.1 LBS | HEIGHT: 65 IN | OXYGEN SATURATION: 98 %

## 2018-04-03 DIAGNOSIS — R53.83 FATIGUE, UNSPECIFIED TYPE: ICD-10-CM

## 2018-04-03 DIAGNOSIS — G43.909 MIGRAINE WITHOUT STATUS MIGRAINOSUS, NOT INTRACTABLE, UNSPECIFIED MIGRAINE TYPE: ICD-10-CM

## 2018-04-03 DIAGNOSIS — G43.809 OTHER MIGRAINE WITHOUT STATUS MIGRAINOSUS, NOT INTRACTABLE: Primary | ICD-10-CM

## 2018-04-03 PROCEDURE — 99213 OFFICE O/P EST LOW 20 MIN: CPT | Performed by: FAMILY MEDICINE

## 2018-04-03 RX ORDER — TOPIRAMATE 100 MG/1
100 TABLET, FILM COATED ORAL 2 TIMES DAILY
Qty: 60 TABLET | Refills: 3 | Status: SHIPPED | OUTPATIENT
Start: 2018-04-03 | End: 2019-02-10 | Stop reason: SDUPTHER

## 2018-04-03 RX ORDER — BUTALBITAL, ACETAMINOPHEN AND CAFFEINE 50; 325; 40 MG/1; MG/1; MG/1
1 TABLET ORAL EVERY 6 HOURS PRN
Qty: 30 TABLET | Refills: 1 | Status: ON HOLD | OUTPATIENT
Start: 2018-04-03 | End: 2019-01-28 | Stop reason: HOSPADM

## 2018-04-03 ASSESSMENT — ENCOUNTER SYMPTOMS
BACK PAIN: 1
APNEA: 0
SINUS PRESSURE: 0
NAUSEA: 1
SWOLLEN GLANDS: 0
RHINORRHEA: 0
COUGH: 0
CHOKING: 0
SCALP TENDERNESS: 0
ABDOMINAL PAIN: 0
VOMITING: 0
BLURRED VISION: 0
EYE REDNESS: 0
DIARRHEA: 0
CONSTIPATION: 0
EYE DISCHARGE: 0
EYE WATERING: 0
WHEEZING: 0
CHEST TIGHTNESS: 0
COLOR CHANGE: 0
FACIAL SWELLING: 0
EYE PAIN: 0
STRIDOR: 0
VISUAL CHANGE: 0
SORE THROAT: 0
PHOTOPHOBIA: 1
FACIAL SWEATING: 0

## 2018-04-05 ENCOUNTER — PATIENT MESSAGE (OUTPATIENT)
Dept: PRIMARY CARE CLINIC | Age: 33
End: 2018-04-05

## 2018-04-06 RX ORDER — ZOLPIDEM TARTRATE 5 MG/1
5 TABLET ORAL NIGHTLY PRN
Qty: 6 TABLET | Refills: 0 | Status: SHIPPED | OUTPATIENT
Start: 2018-04-06 | End: 2018-04-13

## 2018-04-17 ENCOUNTER — OFFICE VISIT (OUTPATIENT)
Dept: OBGYN CLINIC | Age: 33
End: 2018-04-17
Payer: COMMERCIAL

## 2018-04-17 VITALS
SYSTOLIC BLOOD PRESSURE: 128 MMHG | BODY MASS INDEX: 45.98 KG/M2 | HEIGHT: 65 IN | WEIGHT: 276 LBS | DIASTOLIC BLOOD PRESSURE: 86 MMHG

## 2018-04-17 DIAGNOSIS — Z00.00 WELL WOMAN EXAM WITHOUT GYNECOLOGICAL EXAM: Primary | ICD-10-CM

## 2018-04-17 PROCEDURE — 99395 PREV VISIT EST AGE 18-39: CPT | Performed by: NURSE PRACTITIONER

## 2018-05-14 RX ORDER — LEVOTHYROXINE SODIUM 0.07 MG/1
TABLET ORAL
Qty: 90 TABLET | Refills: 1 | Status: SHIPPED | OUTPATIENT
Start: 2018-05-14 | End: 2018-12-13 | Stop reason: SDUPTHER

## 2018-06-15 ENCOUNTER — PATIENT MESSAGE (OUTPATIENT)
Dept: PRIMARY CARE CLINIC | Age: 33
End: 2018-06-15

## 2018-06-19 ENCOUNTER — OFFICE VISIT (OUTPATIENT)
Dept: PRIMARY CARE CLINIC | Age: 33
End: 2018-06-19
Payer: COMMERCIAL

## 2018-06-19 VITALS
DIASTOLIC BLOOD PRESSURE: 78 MMHG | HEART RATE: 56 BPM | TEMPERATURE: 98.2 F | BODY MASS INDEX: 42.8 KG/M2 | WEIGHT: 272.7 LBS | SYSTOLIC BLOOD PRESSURE: 122 MMHG | OXYGEN SATURATION: 98 % | HEIGHT: 67 IN

## 2018-06-19 DIAGNOSIS — F41.9 ANXIETY: ICD-10-CM

## 2018-06-19 DIAGNOSIS — F43.10 PTSD (POST-TRAUMATIC STRESS DISORDER): Primary | ICD-10-CM

## 2018-06-19 DIAGNOSIS — R44.3 HALLUCINATIONS: ICD-10-CM

## 2018-06-19 PROCEDURE — 99214 OFFICE O/P EST MOD 30 MIN: CPT | Performed by: FAMILY MEDICINE

## 2018-06-19 RX ORDER — LORAZEPAM 0.5 MG/1
0.5 TABLET ORAL EVERY 6 HOURS PRN
Qty: 20 TABLET | Refills: 0 | Status: SHIPPED | OUTPATIENT
Start: 2018-06-19 | End: 2018-06-25

## 2018-06-19 ASSESSMENT — ENCOUNTER SYMPTOMS
COLOR CHANGE: 0
APNEA: 0
CONSTIPATION: 0
EYE DISCHARGE: 0
PHOTOPHOBIA: 0
CHOKING: 0
EYE PAIN: 0
WHEEZING: 0
STRIDOR: 0
ABDOMINAL PAIN: 0
CHEST TIGHTNESS: 0
DIARRHEA: 0
NAUSEA: 0
FACIAL SWELLING: 0
EYE REDNESS: 0

## 2018-06-25 ENCOUNTER — OFFICE VISIT (OUTPATIENT)
Dept: PRIMARY CARE CLINIC | Age: 33
End: 2018-06-25
Payer: COMMERCIAL

## 2018-06-25 VITALS
HEART RATE: 64 BPM | TEMPERATURE: 98.5 F | BODY MASS INDEX: 42.06 KG/M2 | RESPIRATION RATE: 16 BRPM | HEIGHT: 67 IN | SYSTOLIC BLOOD PRESSURE: 110 MMHG | DIASTOLIC BLOOD PRESSURE: 68 MMHG | WEIGHT: 268 LBS

## 2018-06-25 DIAGNOSIS — R45.84 ANHEDONIA: ICD-10-CM

## 2018-06-25 DIAGNOSIS — G47.00 INSOMNIA, UNSPECIFIED TYPE: ICD-10-CM

## 2018-06-25 DIAGNOSIS — F43.10 PTSD (POST-TRAUMATIC STRESS DISORDER): Primary | ICD-10-CM

## 2018-06-25 DIAGNOSIS — F41.9 ANXIETY: ICD-10-CM

## 2018-06-25 DIAGNOSIS — R44.3 HALLUCINATIONS: ICD-10-CM

## 2018-06-25 PROCEDURE — 99213 OFFICE O/P EST LOW 20 MIN: CPT | Performed by: FAMILY MEDICINE

## 2018-06-25 ASSESSMENT — ENCOUNTER SYMPTOMS
COLOR CHANGE: 0
NAUSEA: 0
WHEEZING: 0
DIARRHEA: 0
PHOTOPHOBIA: 0
EYE REDNESS: 0
EYE DISCHARGE: 0
ABDOMINAL PAIN: 0
FACIAL SWELLING: 0
EYE PAIN: 0
CONSTIPATION: 0
CHEST TIGHTNESS: 0
STRIDOR: 0
CHOKING: 0
APNEA: 0

## 2018-06-28 ENCOUNTER — TELEPHONE (OUTPATIENT)
Dept: PRIMARY CARE CLINIC | Age: 33
End: 2018-06-28

## 2018-07-02 ENCOUNTER — TELEPHONE (OUTPATIENT)
Dept: PRIMARY CARE CLINIC | Age: 33
End: 2018-07-02

## 2018-07-02 NOTE — TELEPHONE ENCOUNTER
Patient called and confirmed that Stormy Russo did not receive the FMLA documents. Patient is requesting if she can be called back.

## 2018-09-24 RX ORDER — DESVENLAFAXINE 100 MG/1
100 TABLET, EXTENDED RELEASE ORAL DAILY
Qty: 30 TABLET | Refills: 3 | Status: SHIPPED | OUTPATIENT
Start: 2018-09-24 | End: 2019-02-10 | Stop reason: SDUPTHER

## 2018-09-24 NOTE — TELEPHONE ENCOUNTER
From: Hailey Estrella  Sent: 9/24/2018 5:58 PM EDT  Subject: Medication Renewal Request    Hailey Estrella would like a refill of the following medications:     desvenlafaxine succinate (PRISTIQ) 100 MG TB24 extended release tablet Rise Gone, DO]   [facility] Comment[facility]. Please let me know if I need to request from different provider.      Preferred pharmacy: [facility] [address], [place] [address]

## 2018-10-29 ENCOUNTER — OFFICE VISIT (OUTPATIENT)
Dept: PRIMARY CARE CLINIC | Age: 33
End: 2018-10-29
Payer: COMMERCIAL

## 2018-10-29 VITALS
SYSTOLIC BLOOD PRESSURE: 120 MMHG | HEART RATE: 58 BPM | RESPIRATION RATE: 14 BRPM | WEIGHT: 273.3 LBS | DIASTOLIC BLOOD PRESSURE: 62 MMHG | OXYGEN SATURATION: 98 % | BODY MASS INDEX: 42.9 KG/M2 | TEMPERATURE: 98.4 F | HEIGHT: 67 IN

## 2018-10-29 DIAGNOSIS — R19.7 DIARRHEA, UNSPECIFIED TYPE: ICD-10-CM

## 2018-10-29 DIAGNOSIS — R05.9 COUGH: ICD-10-CM

## 2018-10-29 DIAGNOSIS — J01.00 ACUTE NON-RECURRENT MAXILLARY SINUSITIS: Primary | ICD-10-CM

## 2018-10-29 DIAGNOSIS — H69.83 DYSFUNCTION OF BOTH EUSTACHIAN TUBES: ICD-10-CM

## 2018-10-29 DIAGNOSIS — J04.0 LARYNGITIS: ICD-10-CM

## 2018-10-29 LAB — S PYO AG THROAT QL: NORMAL

## 2018-10-29 PROCEDURE — 87880 STREP A ASSAY W/OPTIC: CPT | Performed by: FAMILY MEDICINE

## 2018-10-29 PROCEDURE — 99213 OFFICE O/P EST LOW 20 MIN: CPT | Performed by: FAMILY MEDICINE

## 2018-10-29 RX ORDER — BREXPIPRAZOLE 0.5 MG/1
0.5 TABLET ORAL EVERY OTHER DAY
COMMUNITY
Start: 2018-10-23

## 2018-10-29 RX ORDER — PRAZOSIN HYDROCHLORIDE 5 MG/1
CAPSULE ORAL
Refills: 1 | COMMUNITY
Start: 2018-08-02

## 2018-10-29 RX ORDER — LORAZEPAM 0.5 MG/1
TABLET ORAL
Refills: 1 | COMMUNITY
Start: 2018-09-18

## 2018-10-29 RX ORDER — CEFUROXIME AXETIL 500 MG/1
500 TABLET ORAL 2 TIMES DAILY
Qty: 20 TABLET | Refills: 0 | Status: SHIPPED | OUTPATIENT
Start: 2018-10-29 | End: 2018-11-08

## 2018-10-29 RX ORDER — PROMETHAZINE HYDROCHLORIDE AND CODEINE PHOSPHATE 6.25; 1 MG/5ML; MG/5ML
5 SYRUP ORAL 4 TIMES DAILY PRN
Qty: 120 ML | Refills: 1 | Status: SHIPPED | OUTPATIENT
Start: 2018-10-29 | End: 2018-10-29

## 2018-10-29 RX ORDER — BENZONATATE 100 MG/1
100 CAPSULE ORAL 3 TIMES DAILY PRN
Qty: 60 CAPSULE | Refills: 3 | Status: CANCELLED | OUTPATIENT
Start: 2018-10-29 | End: 2018-11-05

## 2018-10-29 RX ORDER — PROMETHAZINE HYDROCHLORIDE AND CODEINE PHOSPHATE 6.25; 1 MG/5ML; MG/5ML
5 SYRUP ORAL 4 TIMES DAILY PRN
Qty: 118 ML | Refills: 0 | Status: SHIPPED | OUTPATIENT
Start: 2018-10-29 | End: 2018-11-05

## 2018-10-29 RX ORDER — TRAZODONE HYDROCHLORIDE 50 MG/1
TABLET ORAL
Refills: 1 | COMMUNITY
Start: 2018-09-18

## 2018-10-29 ASSESSMENT — ENCOUNTER SYMPTOMS
COLOR CHANGE: 0
CRAMPS: 1
VOICE CHANGE: 1
STRIDOR: 0
SORE THROAT: 1
HEMOPTYSIS: 0
APNEA: 0
HEMATOCHEZIA: 0
COUGH: 1
BELCHING: 0
RHINORRHEA: 0
FLATUS: 0
SINUS PRESSURE: 1
EYE PAIN: 0
EYE REDNESS: 0
CHOKING: 0
VOMITING: 0
NAUSEA: 0
WHEEZING: 0
FACIAL SWELLING: 0
CHEST TIGHTNESS: 0
CONSTIPATION: 0
SHORTNESS OF BREATH: 0
ABDOMINAL PAIN: 0
PHOTOPHOBIA: 0
EYE DISCHARGE: 0
DIARRHEA: 1
HEARTBURN: 0

## 2018-10-29 NOTE — LETTER
[address] [address]  Phone: [phone]  Fax: [phone], DO        October 29, 2018     Patient: Jude Head   YOB: 1985   Date of Visit: 10/29/2018       To Whom It May Concern: It is my medical opinion that Jude Head may return to work on 10/31/18 and may be excused for missing 10/29-10/30 due to her being contagious. If you have any questions or concerns, please don't hesitate to call.     Sincerely,      [facility], DO

## 2018-11-15 ENCOUNTER — OFFICE VISIT (OUTPATIENT)
Dept: PRIMARY CARE CLINIC | Age: 33
End: 2018-11-15
Payer: COMMERCIAL

## 2018-11-15 VITALS
TEMPERATURE: 97.8 F | DIASTOLIC BLOOD PRESSURE: 62 MMHG | HEART RATE: 78 BPM | WEIGHT: 274 LBS | SYSTOLIC BLOOD PRESSURE: 120 MMHG | RESPIRATION RATE: 16 BRPM | BODY MASS INDEX: 43 KG/M2 | OXYGEN SATURATION: 98 % | HEIGHT: 67 IN

## 2018-11-15 DIAGNOSIS — N63.10 BREAST MASS, RIGHT: Primary | ICD-10-CM

## 2018-11-15 DIAGNOSIS — Z87.898: ICD-10-CM

## 2018-11-15 PROCEDURE — 99213 OFFICE O/P EST LOW 20 MIN: CPT | Performed by: FAMILY MEDICINE

## 2018-11-15 RX ORDER — CEPHALEXIN 500 MG/1
500 CAPSULE ORAL 3 TIMES DAILY
Qty: 30 CAPSULE | Refills: 0 | Status: SHIPPED | OUTPATIENT
Start: 2018-11-15 | End: 2018-11-25

## 2018-11-15 ASSESSMENT — ENCOUNTER SYMPTOMS
CHOKING: 0
CONSTIPATION: 0
DIARRHEA: 0
STRIDOR: 0
FACIAL SWELLING: 0
CHEST TIGHTNESS: 0
APNEA: 0
PHOTOPHOBIA: 0
COLOR CHANGE: 0
ABDOMINAL PAIN: 0
EYE PAIN: 0
WHEEZING: 0
NAUSEA: 0
EYE REDNESS: 0
EYE DISCHARGE: 0

## 2018-11-23 ENCOUNTER — HOSPITAL ENCOUNTER (OUTPATIENT)
Dept: ULTRASOUND IMAGING | Age: 33
Discharge: HOME | End: 2018-11-25
Payer: COMMERCIAL

## 2018-11-23 ENCOUNTER — HOSPITAL ENCOUNTER (OUTPATIENT)
Dept: WOMENS IMAGING | Age: 33
Discharge: HOME | End: 2018-11-25
Payer: COMMERCIAL

## 2018-11-23 DIAGNOSIS — N63.10 BREAST MASS, RIGHT: ICD-10-CM

## 2018-11-23 PROCEDURE — 76642 ULTRASOUND BREAST LIMITED: CPT

## 2018-11-23 PROCEDURE — 77066 DX MAMMO INCL CAD BI: CPT

## 2018-11-28 ENCOUNTER — OFFICE VISIT (OUTPATIENT)
Dept: PRIMARY CARE CLINIC | Age: 33
End: 2018-11-28
Payer: COMMERCIAL

## 2018-11-28 VITALS
RESPIRATION RATE: 16 BRPM | DIASTOLIC BLOOD PRESSURE: 68 MMHG | HEART RATE: 71 BPM | TEMPERATURE: 98.1 F | HEIGHT: 67 IN | SYSTOLIC BLOOD PRESSURE: 112 MMHG | BODY MASS INDEX: 43.63 KG/M2 | OXYGEN SATURATION: 99 % | WEIGHT: 278 LBS

## 2018-11-28 DIAGNOSIS — N63.10 BREAST MASS, RIGHT: ICD-10-CM

## 2018-11-28 DIAGNOSIS — M79.621 AXILLARY TENDERNESS, RIGHT: ICD-10-CM

## 2018-11-28 DIAGNOSIS — N61.0 MASTITIS: Primary | ICD-10-CM

## 2018-11-28 DIAGNOSIS — Z87.898: ICD-10-CM

## 2018-11-28 PROCEDURE — 99213 OFFICE O/P EST LOW 20 MIN: CPT | Performed by: FAMILY MEDICINE

## 2018-11-28 RX ORDER — AMOXICILLIN 875 MG/1
875 TABLET, COATED ORAL 2 TIMES DAILY
Qty: 28 TABLET | Refills: 0 | Status: SHIPPED | OUTPATIENT
Start: 2018-11-28 | End: 2018-12-12

## 2018-11-28 ASSESSMENT — ENCOUNTER SYMPTOMS
ABDOMINAL PAIN: 0
EYE REDNESS: 0
COLOR CHANGE: 0
CHOKING: 0
FACIAL SWELLING: 0
NAUSEA: 0
WHEEZING: 0
DIARRHEA: 0
APNEA: 0
EYE DISCHARGE: 0
CHEST TIGHTNESS: 0
STRIDOR: 0
EYE PAIN: 0
CONSTIPATION: 0
PHOTOPHOBIA: 0

## 2018-11-28 NOTE — PROGRESS NOTES
Systems   Constitutional: Negative for activity change, appetite change, chills, diaphoresis and fever. HENT: Negative for congestion, ear discharge, ear pain, facial swelling, hearing loss and mouth sores. Eyes: Negative for photophobia, pain, discharge and redness. Respiratory: Negative for apnea, choking, chest tightness, wheezing and stridor. Cardiovascular: Negative for chest pain, palpitations and leg swelling. Gastrointestinal: Negative for abdominal pain, constipation, diarrhea and nausea. Endocrine: Negative for cold intolerance, heat intolerance, polydipsia and polyphagia. Genitourinary: Negative for dysuria and frequency. Musculoskeletal: Negative for gait problem, joint swelling, neck pain and neck stiffness. Skin: Negative for color change, pallor, rash and wound. Allergic/Immunologic: Negative for immunocompromised state. Neurological: Negative for tremors, syncope, facial asymmetry, speech difficulty and headaches. Psychiatric/Behavioral: Negative for confusion and hallucinations. The patient is not nervous/anxious and is not hyperactive. Objective:   /68   Pulse 71   Temp 98.1 °F (36.7 °C) (Oral)   Resp 16   Ht 5' 7\" (1.702 m)   Wt 278 lb (126.1 kg)   LMP 09/16/2018   SpO2 99%   BMI 43.54 kg/m²     Physical Exam   Constitutional: She is oriented to person, place, and time. She appears well-developed and well-nourished. HENT:   Head: Normocephalic and atraumatic. Right Ear: External ear normal.   Left Ear: External ear normal.   Eyes: Pupils are equal, round, and reactive to light. Conjunctivae and EOM are normal.   Neck: Normal range of motion. Neck supple. Pulmonary/Chest: There is breast swelling. Genitourinary: There is breast tenderness. Lymphadenopathy:     She has no axillary adenopathy. Tenderness in right axilla   Neurological: She is alert and oriented to person, place, and time. Skin: Skin is warm and dry.    Psychiatric: She

## 2018-12-13 RX ORDER — LEVOTHYROXINE SODIUM 0.07 MG/1
TABLET ORAL
Qty: 90 TABLET | Refills: 1 | Status: SHIPPED | OUTPATIENT
Start: 2018-12-13

## 2018-12-20 ENCOUNTER — TELEPHONE (OUTPATIENT)
Dept: OBGYN CLINIC | Age: 33
End: 2018-12-20

## 2018-12-20 ENCOUNTER — OFFICE VISIT (OUTPATIENT)
Dept: PRIMARY CARE CLINIC | Age: 33
End: 2018-12-20
Payer: COMMERCIAL

## 2018-12-20 VITALS
WEIGHT: 278 LBS | HEART RATE: 64 BPM | BODY MASS INDEX: 43.63 KG/M2 | RESPIRATION RATE: 16 BRPM | DIASTOLIC BLOOD PRESSURE: 82 MMHG | TEMPERATURE: 98.1 F | OXYGEN SATURATION: 99 % | SYSTOLIC BLOOD PRESSURE: 122 MMHG | HEIGHT: 67 IN

## 2018-12-20 DIAGNOSIS — R10.31 RIGHT LOWER QUADRANT ABDOMINAL PAIN: ICD-10-CM

## 2018-12-20 DIAGNOSIS — R10.9 RIGHT FLANK PAIN: ICD-10-CM

## 2018-12-20 DIAGNOSIS — N83.201 RIGHT OVARIAN CYST: ICD-10-CM

## 2018-12-20 DIAGNOSIS — N39.0 URINARY TRACT INFECTION WITH HEMATURIA, SITE UNSPECIFIED: Primary | ICD-10-CM

## 2018-12-20 DIAGNOSIS — R31.9 URINARY TRACT INFECTION WITH HEMATURIA, SITE UNSPECIFIED: Primary | ICD-10-CM

## 2018-12-20 PROCEDURE — 99214 OFFICE O/P EST MOD 30 MIN: CPT | Performed by: FAMILY MEDICINE

## 2018-12-20 RX ORDER — PHENAZOPYRIDINE HYDROCHLORIDE 100 MG/1
100 TABLET, FILM COATED ORAL 3 TIMES DAILY PRN
Qty: 30 TABLET | Refills: 0 | Status: ON HOLD | OUTPATIENT
Start: 2018-12-20 | End: 2019-01-26

## 2018-12-20 RX ORDER — OXYCODONE HYDROCHLORIDE AND ACETAMINOPHEN 5; 325 MG/1; MG/1
1 TABLET ORAL EVERY 6 HOURS PRN
Qty: 20 TABLET | Refills: 0 | Status: SHIPPED | OUTPATIENT
Start: 2018-12-20 | End: 2018-12-24 | Stop reason: SDUPTHER

## 2018-12-20 RX ORDER — CIPROFLOXACIN 500 MG/1
500 TABLET, FILM COATED ORAL 2 TIMES DAILY
Qty: 20 TABLET | Refills: 0 | Status: SHIPPED | OUTPATIENT
Start: 2018-12-20 | End: 2018-12-30

## 2018-12-20 RX ORDER — TRAZODONE HYDROCHLORIDE 100 MG/1
100 TABLET ORAL
COMMUNITY
End: 2019-02-01 | Stop reason: DRUGHIGH

## 2018-12-20 ASSESSMENT — ENCOUNTER SYMPTOMS
EYE DISCHARGE: 0
DIARRHEA: 0
ABDOMINAL PAIN: 1
WHEEZING: 0
BACK PAIN: 1
STRIDOR: 0
FACIAL SWELLING: 0
NAUSEA: 0
COLOR CHANGE: 0
VOMITING: 0
PHOTOPHOBIA: 0
CONSTIPATION: 0
EYE REDNESS: 0
BELCHING: 0
CHEST TIGHTNESS: 0
APNEA: 0
HEMATOCHEZIA: 0
CHOKING: 0
EYE PAIN: 0
FLATUS: 0

## 2018-12-20 NOTE — PROGRESS NOTES
Subjective:      Patient ID: Carol Walsh is a 34 y.o. female who presents today for:  Chief Complaint   Patient presents with    Abdominal Pain     Pt was seen in the [address] ER last night. Pt states she was told she has a cyst on her right ovary and was prescribed Traamadol but has gotten no relief from it. Abdominal Pain   This is a new problem. The current episode started yesterday. The onset quality is sudden. The problem occurs constantly. The problem has been unchanged. The pain is located in the RLQ and right flank. The pain is at a severity of 5/10. The pain is moderate. The quality of the pain is aching. Pertinent negatives include no anorexia, arthralgias, belching, constipation, diarrhea, dysuria, fever, flatus, frequency, headaches, hematochezia, hematuria, melena, myalgias, nausea, vomiting or weight loss. Treatments tried: tramadol. The treatment provided no relief. States that she called Dr. Mendoza Webb office & they couldn't get her in until 1/7/19 which is why she scheduled to see you today. The urinalysis & radiology tests that were done at [address] [place] [address] yesterday 12/19/18 were reviewed today.     Past Medical History:   Diagnosis Date    Anxiety 2010    Chronic urticaria 2013    Depression 2010    Depression 1/16/2017    H/O fibrocystic disease of breast 11/15/2018    Headache 4/27/2017    Hypothyroidism 1/16/2017    Lymphedema 1/3/2017    Migraine 3/22/2018     Past Surgical History:   Procedure Laterality Date    APPENDECTOMY  2007    CHOLECYSTECTOMY  2012    CYSTOSCOPY  2012    SHOULDER ARTHROSCOPY Right 2013    SHOULDER ARTHROSCOPY Right 2016     Family History   Problem Relation Age of Onset    High Blood Pressure Father     High Cholesterol Father     Heart Disease Father     Cervical Cancer Sister      Social History     Social History    Marital status:      Spouse name: N/A    Number of children: N/A    Years of education: N/A     Occupational

## 2018-12-24 ENCOUNTER — OFFICE VISIT (OUTPATIENT)
Dept: PRIMARY CARE CLINIC | Age: 33
End: 2018-12-24
Payer: COMMERCIAL

## 2018-12-24 VITALS
DIASTOLIC BLOOD PRESSURE: 69 MMHG | WEIGHT: 279 LBS | RESPIRATION RATE: 14 BRPM | HEART RATE: 60 BPM | TEMPERATURE: 97.9 F | OXYGEN SATURATION: 99 % | BODY MASS INDEX: 43.79 KG/M2 | SYSTOLIC BLOOD PRESSURE: 110 MMHG | HEIGHT: 67 IN

## 2018-12-24 DIAGNOSIS — R31.9 URINARY TRACT INFECTION WITH HEMATURIA, SITE UNSPECIFIED: ICD-10-CM

## 2018-12-24 DIAGNOSIS — R10.9 RIGHT FLANK PAIN: ICD-10-CM

## 2018-12-24 DIAGNOSIS — M54.50 ACUTE RIGHT-SIDED LOW BACK PAIN WITHOUT SCIATICA: ICD-10-CM

## 2018-12-24 DIAGNOSIS — N39.0 URINARY TRACT INFECTION WITH HEMATURIA, SITE UNSPECIFIED: Primary | ICD-10-CM

## 2018-12-24 DIAGNOSIS — N39.0 URINARY TRACT INFECTION WITH HEMATURIA, SITE UNSPECIFIED: ICD-10-CM

## 2018-12-24 DIAGNOSIS — R10.31 RIGHT LOWER QUADRANT ABDOMINAL PAIN: ICD-10-CM

## 2018-12-24 DIAGNOSIS — R31.9 URINARY TRACT INFECTION WITH HEMATURIA, SITE UNSPECIFIED: Primary | ICD-10-CM

## 2018-12-24 LAB
BACTERIA: NEGATIVE /HPF
BILIRUBIN URINE: ABNORMAL
BLOOD, URINE: ABNORMAL
CLARITY: CLEAR
COLOR: ABNORMAL
EPITHELIAL CELLS, UA: NORMAL /HPF (ref 0–5)
GLUCOSE URINE: NEGATIVE MG/DL
HYALINE CASTS: NORMAL /HPF (ref 0–5)
KETONES, URINE: NEGATIVE MG/DL
LEUKOCYTE ESTERASE, URINE: ABNORMAL
NITRITE, URINE: POSITIVE
PH UA: 6 (ref 5–9)
PROTEIN UA: NEGATIVE MG/DL
RBC UA: NORMAL /HPF (ref 0–5)
SPECIFIC GRAVITY UA: 1.02 (ref 1–1.03)
UROBILINOGEN, URINE: 1 E.U./DL
WBC UA: NORMAL /HPF (ref 0–5)

## 2018-12-24 PROCEDURE — 99214 OFFICE O/P EST MOD 30 MIN: CPT | Performed by: INTERNAL MEDICINE

## 2018-12-24 RX ORDER — SULFAMETHOXAZOLE AND TRIMETHOPRIM 800; 160 MG/1; MG/1
1 TABLET ORAL 2 TIMES DAILY
Qty: 20 TABLET | Refills: 0 | Status: SHIPPED | OUTPATIENT
Start: 2018-12-24 | End: 2019-01-03

## 2018-12-24 RX ORDER — OXYCODONE HYDROCHLORIDE AND ACETAMINOPHEN 5; 325 MG/1; MG/1
1 TABLET ORAL EVERY 6 HOURS PRN
Qty: 20 TABLET | Refills: 0 | Status: SHIPPED | OUTPATIENT
Start: 2018-12-24 | End: 2018-12-29

## 2018-12-26 LAB — URINE CULTURE, ROUTINE: NORMAL

## 2018-12-29 ASSESSMENT — ENCOUNTER SYMPTOMS
FACIAL SWELLING: 0
BLOOD IN STOOL: 0
NAUSEA: 1
ABDOMINAL DISTENTION: 0
BACK PAIN: 1
APNEA: 0
PHOTOPHOBIA: 0
CHOKING: 0
ANAL BLEEDING: 0
CONSTIPATION: 0
ABDOMINAL PAIN: 1
DIARRHEA: 0

## 2019-01-02 ENCOUNTER — OFFICE VISIT (OUTPATIENT)
Dept: PRIMARY CARE CLINIC | Age: 34
End: 2019-01-02
Payer: COMMERCIAL

## 2019-01-02 ENCOUNTER — HOSPITAL ENCOUNTER (EMERGENCY)
Age: 34
Discharge: HOME | End: 2019-01-02
Attending: EMERGENCY MEDICINE
Payer: COMMERCIAL

## 2019-01-02 ENCOUNTER — APPOINTMENT (OUTPATIENT)
Dept: CT IMAGING | Age: 34
End: 2019-01-02
Payer: COMMERCIAL

## 2019-01-02 VITALS
RESPIRATION RATE: 18 BRPM | OXYGEN SATURATION: 99 % | HEART RATE: 85 BPM | SYSTOLIC BLOOD PRESSURE: 145 MMHG | TEMPERATURE: 98.4 F | DIASTOLIC BLOOD PRESSURE: 76 MMHG

## 2019-01-02 VITALS
DIASTOLIC BLOOD PRESSURE: 88 MMHG | OXYGEN SATURATION: 99 % | SYSTOLIC BLOOD PRESSURE: 120 MMHG | BODY MASS INDEX: 46.48 KG/M2 | HEART RATE: 67 BPM | RESPIRATION RATE: 14 BRPM | TEMPERATURE: 97.7 F | HEIGHT: 65 IN | WEIGHT: 279 LBS

## 2019-01-02 DIAGNOSIS — R10.9 FLANK PAIN: Primary | ICD-10-CM

## 2019-01-02 DIAGNOSIS — R10.9 FLANK PAIN: ICD-10-CM

## 2019-01-02 DIAGNOSIS — R31.9 HEMATURIA, UNSPECIFIED TYPE: ICD-10-CM

## 2019-01-02 DIAGNOSIS — R10.31 RIGHT LOWER QUADRANT ABDOMINAL PAIN: ICD-10-CM

## 2019-01-02 DIAGNOSIS — R10.9 RIGHT FLANK PAIN: Primary | ICD-10-CM

## 2019-01-02 DIAGNOSIS — M54.9 COSTOVERTEBRAL ANGLE TENDERNESS: ICD-10-CM

## 2019-01-02 LAB
ALBUMIN SERPL-MCNC: 3.8 G/DL (ref 3.9–4.9)
ALP BLD-CCNC: 61 U/L (ref 40–130)
ALT SERPL-CCNC: 59 U/L (ref 0–33)
ANION GAP SERPL CALCULATED.3IONS-SCNC: 11 MEQ/L (ref 7–13)
AST SERPL-CCNC: 28 U/L (ref 0–35)
BACTERIA: NORMAL /HPF
BASOPHILS ABSOLUTE: 0 K/UL (ref 0–0.2)
BASOPHILS RELATIVE PERCENT: 0.2 %
BILIRUB SERPL-MCNC: 0.4 MG/DL (ref 0–1.2)
BILIRUBIN URINE: ABNORMAL
BILIRUBIN, POC: ABNORMAL
BLOOD URINE, POC: ABNORMAL
BLOOD, URINE: ABNORMAL
BUN BLDV-MCNC: 10 MG/DL (ref 6–20)
CALCIUM SERPL-MCNC: 8.4 MG/DL (ref 8.6–10.2)
CHLORIDE BLD-SCNC: 107 MEQ/L (ref 98–107)
CLARITY, POC: CLEAR
CLARITY: CLEAR
CO2: 23 MEQ/L (ref 22–29)
COLOR, POC: ABNORMAL
COLOR: ABNORMAL
CREAT SERPL-MCNC: 0.75 MG/DL (ref 0.5–0.9)
EOSINOPHILS ABSOLUTE: 0 K/UL (ref 0–0.7)
EOSINOPHILS RELATIVE PERCENT: 0 %
EPITHELIAL CELLS, UA: NORMAL /HPF
GFR AFRICAN AMERICAN: >60
GFR NON-AFRICAN AMERICAN: >60
GLOBULIN: 3 G/DL (ref 2.3–3.5)
GLUCOSE BLD-MCNC: 119 MG/DL (ref 74–109)
GLUCOSE URINE, POC: ABNORMAL
GLUCOSE URINE: 100 MG/DL
HCT VFR BLD CALC: 41.6 % (ref 37–47)
HEMOGLOBIN: 14.2 G/DL (ref 12–16)
KETONES, POC: ABNORMAL
KETONES, URINE: ABNORMAL MG/DL
LEUKOCYTE EST, POC: ABNORMAL
LEUKOCYTE ESTERASE, URINE: ABNORMAL
LIPASE: 38 U/L (ref 13–60)
LYMPHOCYTES ABSOLUTE: 2.6 K/UL (ref 1–4.8)
LYMPHOCYTES RELATIVE PERCENT: 30.2 %
MAGNESIUM: 2.1 MG/DL (ref 1.7–2.3)
MCH RBC QN AUTO: 30.2 PG (ref 27–31.3)
MCHC RBC AUTO-ENTMCNC: 34.2 % (ref 33–37)
MCV RBC AUTO: 88.3 FL (ref 82–100)
MONOCYTES ABSOLUTE: 0.5 K/UL (ref 0.2–0.8)
MONOCYTES RELATIVE PERCENT: 5.9 %
NEUTROPHILS ABSOLUTE: 5.4 K/UL (ref 1.4–6.5)
NEUTROPHILS RELATIVE PERCENT: 63.7 %
NITRITE, POC: ABNORMAL
NITRITE, URINE: POSITIVE
PDW BLD-RTO: 12.5 % (ref 11.5–14.5)
PH UA: 7 (ref 5–9)
PH, POC: 8
PLATELET # BLD: 304 K/UL (ref 130–400)
POTASSIUM SERPL-SCNC: 3.7 MEQ/L (ref 3.5–5.1)
PROTEIN UA: 30 MG/DL
PROTEIN, POC: ABNORMAL
RBC # BLD: 4.71 M/UL (ref 4.2–5.4)
RBC UA: NORMAL /HPF (ref 0–2)
SODIUM BLD-SCNC: 141 MEQ/L (ref 132–144)
SPECIFIC GRAVITY UA: 1.02 (ref 1–1.03)
SPECIFIC GRAVITY, POC: 1.01
TOTAL PROTEIN: 6.8 G/DL (ref 6.4–8.1)
URINE REFLEX TO CULTURE: YES
UROBILINOGEN, POC: ABNORMAL
UROBILINOGEN, URINE: 2 E.U./DL
WBC # BLD: 8.5 K/UL (ref 4.8–10.8)
WBC UA: NORMAL /HPF (ref 0–5)

## 2019-01-02 PROCEDURE — 81001 URINALYSIS AUTO W/SCOPE: CPT

## 2019-01-02 PROCEDURE — 96366 THER/PROPH/DIAG IV INF ADDON: CPT

## 2019-01-02 PROCEDURE — 96365 THER/PROPH/DIAG IV INF INIT: CPT

## 2019-01-02 PROCEDURE — 2580000003 HC RX 258: Performed by: EMERGENCY MEDICINE

## 2019-01-02 PROCEDURE — 74176 CT ABD & PELVIS W/O CONTRAST: CPT

## 2019-01-02 PROCEDURE — 96376 TX/PRO/DX INJ SAME DRUG ADON: CPT

## 2019-01-02 PROCEDURE — 96375 TX/PRO/DX INJ NEW DRUG ADDON: CPT

## 2019-01-02 PROCEDURE — 83690 ASSAY OF LIPASE: CPT

## 2019-01-02 PROCEDURE — 83735 ASSAY OF MAGNESIUM: CPT

## 2019-01-02 PROCEDURE — 36415 COLL VENOUS BLD VENIPUNCTURE: CPT

## 2019-01-02 PROCEDURE — 80053 COMPREHEN METABOLIC PANEL: CPT

## 2019-01-02 PROCEDURE — 87086 URINE CULTURE/COLONY COUNT: CPT

## 2019-01-02 PROCEDURE — 6360000002 HC RX W HCPCS: Performed by: EMERGENCY MEDICINE

## 2019-01-02 PROCEDURE — 99284 EMERGENCY DEPT VISIT MOD MDM: CPT

## 2019-01-02 PROCEDURE — 99214 OFFICE O/P EST MOD 30 MIN: CPT | Performed by: FAMILY MEDICINE

## 2019-01-02 PROCEDURE — 81003 URINALYSIS AUTO W/O SCOPE: CPT | Performed by: FAMILY MEDICINE

## 2019-01-02 PROCEDURE — 85025 COMPLETE CBC W/AUTO DIFF WBC: CPT

## 2019-01-02 RX ORDER — LEVOFLOXACIN 500 MG/1
500 TABLET, FILM COATED ORAL DAILY
Qty: 10 TABLET | Refills: 0 | Status: SHIPPED | OUTPATIENT
Start: 2019-01-02 | End: 2019-01-12

## 2019-01-02 RX ORDER — 0.9 % SODIUM CHLORIDE 0.9 %
1000 INTRAVENOUS SOLUTION INTRAVENOUS ONCE
Status: COMPLETED | OUTPATIENT
Start: 2019-01-02 | End: 2019-01-02

## 2019-01-02 RX ORDER — LEVOFLOXACIN 5 MG/ML
750 INJECTION, SOLUTION INTRAVENOUS ONCE
Status: COMPLETED | OUTPATIENT
Start: 2019-01-02 | End: 2019-01-02

## 2019-01-02 RX ORDER — ONDANSETRON 2 MG/ML
4 INJECTION INTRAMUSCULAR; INTRAVENOUS ONCE
Status: COMPLETED | OUTPATIENT
Start: 2019-01-02 | End: 2019-01-02

## 2019-01-02 RX ORDER — ONDANSETRON 4 MG/1
4 TABLET, FILM COATED ORAL EVERY 8 HOURS PRN
Qty: 20 TABLET | Refills: 0 | Status: SHIPPED | OUTPATIENT
Start: 2019-01-02 | End: 2019-01-10

## 2019-01-02 RX ORDER — OXYCODONE HYDROCHLORIDE AND ACETAMINOPHEN 5; 325 MG/1; MG/1
1-2 TABLET ORAL EVERY 6 HOURS PRN
Qty: 10 TABLET | Refills: 0 | Status: SHIPPED | OUTPATIENT
Start: 2019-01-02 | End: 2019-01-05

## 2019-01-02 RX ADMIN — HYDROMORPHONE HYDROCHLORIDE 1 MG: 1 INJECTION, SOLUTION INTRAMUSCULAR; INTRAVENOUS; SUBCUTANEOUS at 05:23

## 2019-01-02 RX ADMIN — SODIUM CHLORIDE 1000 ML: 9 INJECTION, SOLUTION INTRAVENOUS at 03:34

## 2019-01-02 RX ADMIN — ONDANSETRON 4 MG: 2 INJECTION INTRAMUSCULAR; INTRAVENOUS at 03:34

## 2019-01-02 RX ADMIN — HYDROMORPHONE HYDROCHLORIDE 1 MG: 1 INJECTION, SOLUTION INTRAMUSCULAR; INTRAVENOUS; SUBCUTANEOUS at 03:34

## 2019-01-02 RX ADMIN — LEVOFLOXACIN 750 MG: 5 INJECTION, SOLUTION INTRAVENOUS at 03:36

## 2019-01-02 ASSESSMENT — PAIN DESCRIPTION - PAIN TYPE
TYPE: ACUTE PAIN
TYPE: ACUTE PAIN

## 2019-01-02 ASSESSMENT — ENCOUNTER SYMPTOMS
SINUS PRESSURE: 0
PHOTOPHOBIA: 0
BOWEL INCONTINENCE: 0
ABDOMINAL PAIN: 0
ABDOMINAL DISTENTION: 0
COUGH: 0
COLOR CHANGE: 0
VOMITING: 0
NAUSEA: 1
APNEA: 0
EYE PAIN: 0
BACK PAIN: 0
ABDOMINAL PAIN: 0
DIARRHEA: 0
RHINORRHEA: 0
SHORTNESS OF BREATH: 0
CONSTIPATION: 0
WHEEZING: 0
SORE THROAT: 0

## 2019-01-02 ASSESSMENT — PAIN DESCRIPTION - ONSET: ONSET: ON-GOING

## 2019-01-02 ASSESSMENT — PAIN SCALES - GENERAL
PAINLEVEL_OUTOF10: 6
PAINLEVEL_OUTOF10: 3
PAINLEVEL_OUTOF10: 7

## 2019-01-02 ASSESSMENT — PAIN DESCRIPTION - ORIENTATION: ORIENTATION: RIGHT

## 2019-01-02 ASSESSMENT — PAIN DESCRIPTION - PROGRESSION
CLINICAL_PROGRESSION: GRADUALLY IMPROVING
CLINICAL_PROGRESSION: GRADUALLY WORSENING

## 2019-01-02 ASSESSMENT — PAIN DESCRIPTION - LOCATION
LOCATION: FLANK
LOCATION: FLANK

## 2019-01-02 ASSESSMENT — PAIN DESCRIPTION - DESCRIPTORS: DESCRIPTORS: SHARP

## 2019-01-02 ASSESSMENT — PAIN DESCRIPTION - FREQUENCY: FREQUENCY: INTERMITTENT

## 2019-01-03 LAB — URINE CULTURE, ROUTINE: NORMAL

## 2019-01-04 LAB — URINE CULTURE, ROUTINE: NORMAL

## 2019-01-08 ENCOUNTER — HOSPITAL ENCOUNTER (EMERGENCY)
Age: 34
Discharge: HOME | End: 2019-01-09
Attending: EMERGENCY MEDICINE
Payer: COMMERCIAL

## 2019-01-08 ENCOUNTER — APPOINTMENT (OUTPATIENT)
Dept: CT IMAGING | Age: 34
End: 2019-01-08
Payer: COMMERCIAL

## 2019-01-08 DIAGNOSIS — N30.00 ACUTE CYSTITIS WITHOUT HEMATURIA: Primary | ICD-10-CM

## 2019-01-08 LAB
BACTERIA: ABNORMAL /HPF
BILIRUBIN URINE: NEGATIVE
BLOOD, URINE: NORMAL
CLARITY: CLEAR
COLOR: YELLOW
EPITHELIAL CELLS, UA: ABNORMAL /HPF
GLUCOSE URINE: NEGATIVE MG/DL
HCG(URINE) PREGNANCY TEST: NEGATIVE
KETONES, URINE: NEGATIVE MG/DL
LEUKOCYTE ESTERASE, URINE: NORMAL
NITRITE, URINE: NEGATIVE
PH UA: 7.5 (ref 5–9)
PROTEIN UA: NEGATIVE MG/DL
RBC UA: ABNORMAL /HPF (ref 0–2)
SPECIFIC GRAVITY UA: 1.02 (ref 1–1.03)
URINE REFLEX TO CULTURE: YES
UROBILINOGEN, URINE: 0.2 E.U./DL
WBC UA: ABNORMAL /HPF (ref 0–5)

## 2019-01-08 PROCEDURE — 74176 CT ABD & PELVIS W/O CONTRAST: CPT

## 2019-01-08 PROCEDURE — 81001 URINALYSIS AUTO W/SCOPE: CPT

## 2019-01-08 PROCEDURE — 6360000002 HC RX W HCPCS: Performed by: EMERGENCY MEDICINE

## 2019-01-08 PROCEDURE — 87086 URINE CULTURE/COLONY COUNT: CPT

## 2019-01-08 PROCEDURE — 84703 CHORIONIC GONADOTROPIN ASSAY: CPT

## 2019-01-08 PROCEDURE — 99284 EMERGENCY DEPT VISIT MOD MDM: CPT

## 2019-01-08 PROCEDURE — 96372 THER/PROPH/DIAG INJ SC/IM: CPT

## 2019-01-08 RX ORDER — KETOROLAC TROMETHAMINE 30 MG/ML
60 INJECTION, SOLUTION INTRAMUSCULAR; INTRAVENOUS ONCE
Status: COMPLETED | OUTPATIENT
Start: 2019-01-08 | End: 2019-01-08

## 2019-01-08 RX ORDER — ONDANSETRON 4 MG/1
4 TABLET, ORALLY DISINTEGRATING ORAL ONCE
Status: COMPLETED | OUTPATIENT
Start: 2019-01-08 | End: 2019-01-08

## 2019-01-08 RX ADMIN — ONDANSETRON 4 MG: 4 TABLET, ORALLY DISINTEGRATING ORAL at 23:49

## 2019-01-08 RX ADMIN — KETOROLAC TROMETHAMINE 60 MG: 30 INJECTION, SOLUTION INTRAMUSCULAR at 23:30

## 2019-01-08 ASSESSMENT — PAIN DESCRIPTION - FREQUENCY: FREQUENCY: CONTINUOUS

## 2019-01-08 ASSESSMENT — PAIN DESCRIPTION - ONSET: ONSET: ON-GOING

## 2019-01-08 ASSESSMENT — PAIN SCALES - GENERAL: PAINLEVEL_OUTOF10: 7

## 2019-01-08 ASSESSMENT — PAIN DESCRIPTION - PROGRESSION: CLINICAL_PROGRESSION: NOT CHANGED

## 2019-01-08 ASSESSMENT — PAIN DESCRIPTION - DESCRIPTORS: DESCRIPTORS: SHARP

## 2019-01-08 ASSESSMENT — PAIN DESCRIPTION - LOCATION: LOCATION: FLANK

## 2019-01-08 ASSESSMENT — PAIN DESCRIPTION - PAIN TYPE: TYPE: ACUTE PAIN

## 2019-01-08 ASSESSMENT — PAIN DESCRIPTION - ORIENTATION: ORIENTATION: RIGHT

## 2019-01-09 VITALS
HEART RATE: 70 BPM | WEIGHT: 260 LBS | RESPIRATION RATE: 17 BRPM | TEMPERATURE: 98.4 F | OXYGEN SATURATION: 97 % | BODY MASS INDEX: 43.32 KG/M2 | DIASTOLIC BLOOD PRESSURE: 69 MMHG | SYSTOLIC BLOOD PRESSURE: 145 MMHG | HEIGHT: 65 IN

## 2019-01-09 PROCEDURE — 96372 THER/PROPH/DIAG INJ SC/IM: CPT

## 2019-01-09 PROCEDURE — 6360000002 HC RX W HCPCS: Performed by: EMERGENCY MEDICINE

## 2019-01-09 RX ORDER — ONDANSETRON 4 MG/1
4 TABLET, FILM COATED ORAL EVERY 8 HOURS PRN
Qty: 20 TABLET | Refills: 0 | Status: SHIPPED | OUTPATIENT
Start: 2019-01-09

## 2019-01-09 RX ORDER — TAMSULOSIN HYDROCHLORIDE 0.4 MG/1
0.4 CAPSULE ORAL DAILY
Qty: 10 CAPSULE | Refills: 0 | Status: ON HOLD | OUTPATIENT
Start: 2019-01-09 | End: 2019-01-26

## 2019-01-09 RX ADMIN — HYDROMORPHONE HYDROCHLORIDE 1 MG: 1 INJECTION, SOLUTION INTRAMUSCULAR; INTRAVENOUS; SUBCUTANEOUS at 00:14

## 2019-01-09 ASSESSMENT — ENCOUNTER SYMPTOMS
ABDOMINAL DISTENTION: 0
SINUS PRESSURE: 0
DIARRHEA: 0
CONSTIPATION: 0
SORE THROAT: 0
NAUSEA: 1
PHOTOPHOBIA: 0
COUGH: 0
COLOR CHANGE: 0
BACK PAIN: 0
VOMITING: 0
SHORTNESS OF BREATH: 0
EYE PAIN: 0
APNEA: 0
ABDOMINAL PAIN: 0
RHINORRHEA: 0
WHEEZING: 0

## 2019-01-10 ENCOUNTER — OFFICE VISIT (OUTPATIENT)
Dept: UROLOGY | Age: 34
End: 2019-01-10
Payer: COMMERCIAL

## 2019-01-10 VITALS
HEIGHT: 65 IN | DIASTOLIC BLOOD PRESSURE: 70 MMHG | SYSTOLIC BLOOD PRESSURE: 120 MMHG | WEIGHT: 260 LBS | HEART RATE: 83 BPM | BODY MASS INDEX: 43.32 KG/M2

## 2019-01-10 DIAGNOSIS — R31.29 MICROHEMATURIA: ICD-10-CM

## 2019-01-10 DIAGNOSIS — R10.9 RIGHT FLANK PAIN: Primary | ICD-10-CM

## 2019-01-10 LAB
BILIRUBIN, POC: ABNORMAL
BLOOD URINE, POC: ABNORMAL
CLARITY, POC: CLEAR
COLOR, POC: YELLOW
GLUCOSE URINE, POC: ABNORMAL
KETONES, POC: ABNORMAL
LEUKOCYTE EST, POC: ABNORMAL
NITRITE, POC: ABNORMAL
PH, POC: 8.5
PROTEIN, POC: ABNORMAL
SPECIFIC GRAVITY, POC: 1.02
URINE CULTURE, ROUTINE: NORMAL
UROBILINOGEN, POC: 1

## 2019-01-10 PROCEDURE — 99243 OFF/OP CNSLTJ NEW/EST LOW 30: CPT | Performed by: UROLOGY

## 2019-01-10 PROCEDURE — 81003 URINALYSIS AUTO W/O SCOPE: CPT | Performed by: UROLOGY

## 2019-01-10 ASSESSMENT — ENCOUNTER SYMPTOMS
VOMITING: 0
SHORTNESS OF BREATH: 0
RESPIRATORY NEGATIVE: 1
ABDOMINAL DISTENTION: 0
ABDOMINAL PAIN: 0
NAUSEA: 1
BACK PAIN: 1
DIARRHEA: 0

## 2019-01-18 ENCOUNTER — HOSPITAL ENCOUNTER (OUTPATIENT)
Dept: CT IMAGING | Age: 34
Discharge: HOME | End: 2019-01-20
Payer: COMMERCIAL

## 2019-01-18 ENCOUNTER — TELEPHONE (OUTPATIENT)
Dept: UROLOGY | Age: 34
End: 2019-01-18

## 2019-01-18 ENCOUNTER — HOSPITAL ENCOUNTER (OUTPATIENT)
Dept: GENERAL RADIOLOGY | Age: 34
Discharge: HOME | End: 2019-01-20
Payer: COMMERCIAL

## 2019-01-18 VITALS — DIASTOLIC BLOOD PRESSURE: 78 MMHG | SYSTOLIC BLOOD PRESSURE: 147 MMHG | HEART RATE: 65 BPM

## 2019-01-18 DIAGNOSIS — R10.9 RIGHT FLANK PAIN: ICD-10-CM

## 2019-01-18 DIAGNOSIS — R31.29 MICROHEMATURIA: ICD-10-CM

## 2019-01-18 DIAGNOSIS — R10.9 FLANK PAIN: ICD-10-CM

## 2019-01-18 PROCEDURE — 74178 CT ABD&PLV WO CNTR FLWD CNTR: CPT

## 2019-01-18 PROCEDURE — 6360000004 HC RX CONTRAST MEDICATION: Performed by: UROLOGY

## 2019-01-18 PROCEDURE — 74018 RADEX ABDOMEN 1 VIEW: CPT

## 2019-01-18 RX ADMIN — IOPAMIDOL 100 ML: 612 INJECTION, SOLUTION INTRAVENOUS at 11:27

## 2019-01-23 ENCOUNTER — OFFICE VISIT (OUTPATIENT)
Dept: PRIMARY CARE CLINIC | Age: 34
End: 2019-01-23
Payer: COMMERCIAL

## 2019-01-23 VITALS
SYSTOLIC BLOOD PRESSURE: 112 MMHG | HEIGHT: 65 IN | DIASTOLIC BLOOD PRESSURE: 76 MMHG | HEART RATE: 65 BPM | RESPIRATION RATE: 14 BRPM | WEIGHT: 278.7 LBS | OXYGEN SATURATION: 97 % | TEMPERATURE: 97.1 F | BODY MASS INDEX: 46.44 KG/M2

## 2019-01-23 DIAGNOSIS — R82.81 PYURIA: ICD-10-CM

## 2019-01-23 DIAGNOSIS — R10.9 FLANK PAIN: Primary | ICD-10-CM

## 2019-01-23 DIAGNOSIS — R31.9 HEMATURIA, UNSPECIFIED TYPE: ICD-10-CM

## 2019-01-23 LAB
BILIRUBIN, POC: ABNORMAL
BLOOD URINE, POC: ABNORMAL
CLARITY, POC: CLEAR
COLOR, POC: ABNORMAL
GLUCOSE URINE, POC: ABNORMAL
KETONES, POC: ABNORMAL
LEUKOCYTE EST, POC: ABNORMAL
NITRITE, POC: ABNORMAL
PH, POC: 6
PROTEIN, POC: ABNORMAL
SPECIFIC GRAVITY, POC: 1.03
UROBILINOGEN, POC: ABNORMAL

## 2019-01-23 PROCEDURE — 81003 URINALYSIS AUTO W/O SCOPE: CPT | Performed by: FAMILY MEDICINE

## 2019-01-23 PROCEDURE — 99213 OFFICE O/P EST LOW 20 MIN: CPT | Performed by: FAMILY MEDICINE

## 2019-01-23 RX ORDER — SULFAMETHOXAZOLE AND TRIMETHOPRIM 800; 160 MG/1; MG/1
1 TABLET ORAL 2 TIMES DAILY
Qty: 20 TABLET | Refills: 0 | Status: ON HOLD | OUTPATIENT
Start: 2019-01-23 | End: 2019-01-28 | Stop reason: HOSPADM

## 2019-01-23 ASSESSMENT — ENCOUNTER SYMPTOMS
EYE DISCHARGE: 0
COLOR CHANGE: 0
NAUSEA: 0
CHEST TIGHTNESS: 0
STRIDOR: 0
PHOTOPHOBIA: 0
CHOKING: 0
DIARRHEA: 0
BOWEL INCONTINENCE: 0
APNEA: 0
WHEEZING: 0
EYE REDNESS: 0
ABDOMINAL PAIN: 0
FACIAL SWELLING: 0
EYE PAIN: 0
CONSTIPATION: 0

## 2019-01-25 ENCOUNTER — TELEPHONE (OUTPATIENT)
Dept: LAB | Age: 34
End: 2019-01-25

## 2019-01-25 LAB — URINE CULTURE, ROUTINE: NORMAL

## 2019-01-25 RX ORDER — PHENAZOPYRIDINE HYDROCHLORIDE 100 MG/1
100 TABLET, FILM COATED ORAL 3 TIMES DAILY PRN
Qty: 60 TABLET | Refills: 3 | Status: SHIPPED | OUTPATIENT
Start: 2019-01-25 | End: 2020-01-25

## 2019-01-26 ENCOUNTER — HOSPITAL ENCOUNTER (EMERGENCY)
Age: 34
Discharge: ANOTHER HOSPITAL | End: 2019-01-26
Attending: EMERGENCY MEDICINE
Payer: COMMERCIAL

## 2019-01-26 ENCOUNTER — HOSPITAL ENCOUNTER (INPATIENT)
Age: 34
LOS: 3 days | Discharge: HOME | DRG: 392 | End: 2019-01-29
Attending: INTERNAL MEDICINE | Admitting: INTERNAL MEDICINE
Payer: COMMERCIAL

## 2019-01-26 VITALS
DIASTOLIC BLOOD PRESSURE: 84 MMHG | SYSTOLIC BLOOD PRESSURE: 140 MMHG | RESPIRATION RATE: 16 BRPM | HEIGHT: 65 IN | OXYGEN SATURATION: 99 % | BODY MASS INDEX: 43.32 KG/M2 | HEART RATE: 90 BPM | WEIGHT: 260 LBS | TEMPERATURE: 98.6 F

## 2019-01-26 DIAGNOSIS — R10.9 FLANK PAIN: Primary | ICD-10-CM

## 2019-01-26 DIAGNOSIS — R10.31 RIGHT LOWER QUADRANT ABDOMINAL PAIN: ICD-10-CM

## 2019-01-26 DIAGNOSIS — R10.9 RIGHT FLANK PAIN: ICD-10-CM

## 2019-01-26 DIAGNOSIS — R52 VISCERAL PAIN: ICD-10-CM

## 2019-01-26 DIAGNOSIS — N39.0 URINARY TRACT INFECTION WITH HEMATURIA, SITE UNSPECIFIED: ICD-10-CM

## 2019-01-26 DIAGNOSIS — M79.10 MUSCLE PAIN: ICD-10-CM

## 2019-01-26 DIAGNOSIS — G43.809 OTHER MIGRAINE WITHOUT STATUS MIGRAINOSUS, NOT INTRACTABLE: ICD-10-CM

## 2019-01-26 DIAGNOSIS — R10.9 INTRACTABLE ABDOMINAL PAIN: ICD-10-CM

## 2019-01-26 DIAGNOSIS — R31.9 URINARY TRACT INFECTION WITH HEMATURIA, SITE UNSPECIFIED: ICD-10-CM

## 2019-01-26 LAB
ANION GAP SERPL CALCULATED.3IONS-SCNC: 13 MEQ/L (ref 7–13)
BACTERIA: ABNORMAL /HPF
BASOPHILS ABSOLUTE: 0 K/UL (ref 0–0.2)
BASOPHILS RELATIVE PERCENT: 0.3 %
BILIRUBIN URINE: ABNORMAL
BLOOD, URINE: ABNORMAL
BUN BLDV-MCNC: 11 MG/DL (ref 6–20)
CALCIUM SERPL-MCNC: 9 MG/DL (ref 8.6–10.2)
CHLORIDE BLD-SCNC: 104 MEQ/L (ref 98–107)
CLARITY: CLEAR
CO2: 24 MEQ/L (ref 22–29)
COLOR: ABNORMAL
CREAT SERPL-MCNC: 0.85 MG/DL (ref 0.5–0.9)
EOSINOPHILS ABSOLUTE: 0 K/UL (ref 0–0.7)
EOSINOPHILS RELATIVE PERCENT: 0 %
EPITHELIAL CELLS, UA: ABNORMAL /HPF
GFR AFRICAN AMERICAN: >60
GFR NON-AFRICAN AMERICAN: >60
GLUCOSE BLD-MCNC: 100 MG/DL (ref 74–109)
GLUCOSE URINE: ABNORMAL MG/DL
HCG(URINE) PREGNANCY TEST: NEGATIVE
HCT VFR BLD CALC: 43.9 % (ref 37–47)
HEMOGLOBIN: 15.1 G/DL (ref 12–16)
KETONES, URINE: ABNORMAL MG/DL
LEUKOCYTE ESTERASE, URINE: ABNORMAL
LIPASE: 34 U/L (ref 13–60)
LYMPHOCYTES ABSOLUTE: 3 K/UL (ref 1–4.8)
LYMPHOCYTES RELATIVE PERCENT: 38.4 %
MCH RBC QN AUTO: 30.2 PG (ref 27–31.3)
MCHC RBC AUTO-ENTMCNC: 34.3 % (ref 33–37)
MCV RBC AUTO: 87.8 FL (ref 82–100)
MONOCYTES ABSOLUTE: 0.4 K/UL (ref 0.2–0.8)
MONOCYTES RELATIVE PERCENT: 5.6 %
NEUTROPHILS ABSOLUTE: 4.4 K/UL (ref 1.4–6.5)
NEUTROPHILS RELATIVE PERCENT: 55.7 %
NITRITE, URINE: ABNORMAL
PDW BLD-RTO: 12.3 % (ref 11.5–14.5)
PH UA: ABNORMAL (ref 5–9)
PLATELET # BLD: 284 K/UL (ref 130–400)
POTASSIUM SERPL-SCNC: 3.7 MEQ/L (ref 3.5–5.1)
PROTEIN UA: ABNORMAL MG/DL
RBC # BLD: 5 M/UL (ref 4.2–5.4)
RBC UA: ABNORMAL /HPF (ref 0–2)
SODIUM BLD-SCNC: 141 MEQ/L (ref 132–144)
SPECIFIC GRAVITY UA: >=1.03 (ref 1–1.03)
URIC ACID, SERUM: 5.6 MG/DL (ref 2.4–5.7)
URINE REFLEX TO CULTURE: YES
UROBILINOGEN, URINE: ABNORMAL E.U./DL
WBC # BLD: 7.9 K/UL (ref 4.8–10.8)
WBC UA: ABNORMAL /HPF (ref 0–5)

## 2019-01-26 PROCEDURE — 6370000000 HC RX 637 (ALT 250 FOR IP): Performed by: INTERNAL MEDICINE

## 2019-01-26 PROCEDURE — 6370000000 HC RX 637 (ALT 250 FOR IP): Performed by: SPECIALIST

## 2019-01-26 PROCEDURE — 96375 TX/PRO/DX INJ NEW DRUG ADDON: CPT

## 2019-01-26 PROCEDURE — 6360000002 HC RX W HCPCS: Performed by: EMERGENCY MEDICINE

## 2019-01-26 PROCEDURE — 96376 TX/PRO/DX INJ SAME DRUG ADON: CPT

## 2019-01-26 PROCEDURE — 1210000000 HC MED SURG R&B

## 2019-01-26 PROCEDURE — 6370000000 HC RX 637 (ALT 250 FOR IP): Performed by: PHYSICIAN ASSISTANT

## 2019-01-26 PROCEDURE — 6370000000 HC RX 637 (ALT 250 FOR IP): Performed by: EMERGENCY MEDICINE

## 2019-01-26 PROCEDURE — 84703 CHORIONIC GONADOTROPIN ASSAY: CPT

## 2019-01-26 PROCEDURE — 99284 EMERGENCY DEPT VISIT MOD MDM: CPT

## 2019-01-26 PROCEDURE — 6360000002 HC RX W HCPCS: Performed by: INTERNAL MEDICINE

## 2019-01-26 PROCEDURE — 84550 ASSAY OF BLOOD/URIC ACID: CPT

## 2019-01-26 PROCEDURE — 80048 BASIC METABOLIC PNL TOTAL CA: CPT

## 2019-01-26 PROCEDURE — 2580000003 HC RX 258: Performed by: EMERGENCY MEDICINE

## 2019-01-26 PROCEDURE — 87086 URINE CULTURE/COLONY COUNT: CPT

## 2019-01-26 PROCEDURE — 85025 COMPLETE CBC W/AUTO DIFF WBC: CPT

## 2019-01-26 PROCEDURE — 83690 ASSAY OF LIPASE: CPT

## 2019-01-26 PROCEDURE — 96374 THER/PROPH/DIAG INJ IV PUSH: CPT

## 2019-01-26 PROCEDURE — 36415 COLL VENOUS BLD VENIPUNCTURE: CPT

## 2019-01-26 PROCEDURE — 81001 URINALYSIS AUTO W/SCOPE: CPT

## 2019-01-26 PROCEDURE — 2580000003 HC RX 258: Performed by: INTERNAL MEDICINE

## 2019-01-26 RX ORDER — CETIRIZINE HYDROCHLORIDE 10 MG/1
10 TABLET ORAL DAILY
Status: DISCONTINUED | OUTPATIENT
Start: 2019-01-26 | End: 2019-01-29 | Stop reason: HOSPADM

## 2019-01-26 RX ORDER — SODIUM CHLORIDE 0.9 % (FLUSH) 0.9 %
10 SYRINGE (ML) INJECTION PRN
Status: DISCONTINUED | OUTPATIENT
Start: 2019-01-26 | End: 2019-01-29 | Stop reason: HOSPADM

## 2019-01-26 RX ORDER — SULFAMETHOXAZOLE AND TRIMETHOPRIM 800; 160 MG/1; MG/1
1 TABLET ORAL 2 TIMES DAILY
Status: DISCONTINUED | OUTPATIENT
Start: 2019-01-26 | End: 2019-01-29 | Stop reason: HOSPADM

## 2019-01-26 RX ORDER — LEVOTHYROXINE SODIUM 0.07 MG/1
75 TABLET ORAL DAILY
Status: DISCONTINUED | OUTPATIENT
Start: 2019-01-26 | End: 2019-01-29 | Stop reason: HOSPADM

## 2019-01-26 RX ORDER — HYDROCODONE BITARTRATE AND ACETAMINOPHEN 7.5; 325 MG/1; MG/1
1 TABLET ORAL EVERY 6 HOURS PRN
Qty: 12 TABLET | Refills: 0 | Status: ON HOLD | OUTPATIENT
Start: 2019-01-26 | End: 2019-01-28 | Stop reason: HOSPADM

## 2019-01-26 RX ORDER — KETOROLAC TROMETHAMINE 30 MG/ML
30 INJECTION, SOLUTION INTRAMUSCULAR; INTRAVENOUS EVERY 6 HOURS PRN
Status: DISCONTINUED | OUTPATIENT
Start: 2019-01-26 | End: 2019-01-29 | Stop reason: HOSPADM

## 2019-01-26 RX ORDER — TOPIRAMATE 100 MG/1
100 TABLET, FILM COATED ORAL 2 TIMES DAILY
Status: DISCONTINUED | OUTPATIENT
Start: 2019-01-26 | End: 2019-01-29 | Stop reason: HOSPADM

## 2019-01-26 RX ORDER — OXYCODONE HYDROCHLORIDE AND ACETAMINOPHEN 5; 325 MG/1; MG/1
1 TABLET ORAL EVERY 4 HOURS PRN
Status: ON HOLD | COMMUNITY
End: 2019-01-28 | Stop reason: HOSPADM

## 2019-01-26 RX ORDER — GABAPENTIN 300 MG/1
300 CAPSULE ORAL 3 TIMES DAILY PRN
Status: DISCONTINUED | OUTPATIENT
Start: 2019-01-26 | End: 2019-01-29 | Stop reason: HOSPADM

## 2019-01-26 RX ORDER — HYDROCODONE BITARTRATE AND ACETAMINOPHEN 5; 325 MG/1; MG/1
2 TABLET ORAL ONCE
Status: COMPLETED | OUTPATIENT
Start: 2019-01-26 | End: 2019-01-26

## 2019-01-26 RX ORDER — OXYCODONE HYDROCHLORIDE AND ACETAMINOPHEN 5; 325 MG/1; MG/1
1 TABLET ORAL EVERY 6 HOURS PRN
Status: DISCONTINUED | OUTPATIENT
Start: 2019-01-26 | End: 2019-01-27

## 2019-01-26 RX ORDER — TIZANIDINE 4 MG/1
4 TABLET ORAL 3 TIMES DAILY PRN
Status: DISCONTINUED | OUTPATIENT
Start: 2019-01-26 | End: 2019-01-29 | Stop reason: HOSPADM

## 2019-01-26 RX ORDER — ONDANSETRON 2 MG/ML
4 INJECTION INTRAMUSCULAR; INTRAVENOUS EVERY 6 HOURS PRN
Status: DISCONTINUED | OUTPATIENT
Start: 2019-01-26 | End: 2019-01-29 | Stop reason: HOSPADM

## 2019-01-26 RX ORDER — SODIUM CHLORIDE 9 MG/ML
INJECTION, SOLUTION INTRAVENOUS CONTINUOUS
Status: DISCONTINUED | OUTPATIENT
Start: 2019-01-26 | End: 2019-01-29 | Stop reason: HOSPADM

## 2019-01-26 RX ORDER — 0.9 % SODIUM CHLORIDE 0.9 %
1000 INTRAVENOUS SOLUTION INTRAVENOUS ONCE
Status: COMPLETED | OUTPATIENT
Start: 2019-01-26 | End: 2019-01-26

## 2019-01-26 RX ORDER — DESVENLAFAXINE 50 MG/1
100 TABLET, EXTENDED RELEASE ORAL DAILY
Status: DISCONTINUED | OUTPATIENT
Start: 2019-01-26 | End: 2019-01-29 | Stop reason: HOSPADM

## 2019-01-26 RX ORDER — TRAZODONE HYDROCHLORIDE 50 MG/1
50 TABLET ORAL NIGHTLY
Status: DISCONTINUED | OUTPATIENT
Start: 2019-01-26 | End: 2019-01-29 | Stop reason: HOSPADM

## 2019-01-26 RX ORDER — MORPHINE SULFATE 4 MG/ML
4 INJECTION, SOLUTION INTRAMUSCULAR; INTRAVENOUS
Status: COMPLETED | OUTPATIENT
Start: 2019-01-26 | End: 2019-01-26

## 2019-01-26 RX ORDER — ACETAMINOPHEN 325 MG/1
650 TABLET ORAL EVERY 4 HOURS PRN
Status: DISCONTINUED | OUTPATIENT
Start: 2019-01-26 | End: 2019-01-29 | Stop reason: HOSPADM

## 2019-01-26 RX ORDER — KETOROLAC TROMETHAMINE 30 MG/ML
30 INJECTION, SOLUTION INTRAMUSCULAR; INTRAVENOUS ONCE
Status: COMPLETED | OUTPATIENT
Start: 2019-01-26 | End: 2019-01-26

## 2019-01-26 RX ORDER — SODIUM CHLORIDE 0.9 % (FLUSH) 0.9 %
10 SYRINGE (ML) INJECTION EVERY 12 HOURS SCHEDULED
Status: DISCONTINUED | OUTPATIENT
Start: 2019-01-26 | End: 2019-01-29 | Stop reason: HOSPADM

## 2019-01-26 RX ORDER — DOCUSATE SODIUM 100 MG/1
100 CAPSULE, LIQUID FILLED ORAL DAILY
Status: DISCONTINUED | OUTPATIENT
Start: 2019-01-26 | End: 2019-01-29 | Stop reason: HOSPADM

## 2019-01-26 RX ORDER — PRAZOSIN HYDROCHLORIDE 5 MG/1
5 CAPSULE ORAL NIGHTLY
Status: DISCONTINUED | OUTPATIENT
Start: 2019-01-26 | End: 2019-01-29 | Stop reason: HOSPADM

## 2019-01-26 RX ADMIN — OXYCODONE AND ACETAMINOPHEN 1 TABLET: 5; 325 TABLET ORAL at 17:14

## 2019-01-26 RX ADMIN — TOPIRAMATE 100 MG: 100 TABLET, FILM COATED ORAL at 20:20

## 2019-01-26 RX ADMIN — MORPHINE SULFATE 4 MG: 4 INJECTION INTRAVENOUS at 02:46

## 2019-01-26 RX ADMIN — KETOROLAC TROMETHAMINE 30 MG: 30 INJECTION, SOLUTION INTRAMUSCULAR at 02:02

## 2019-01-26 RX ADMIN — TIZANIDINE 4 MG: 4 TABLET ORAL at 17:55

## 2019-01-26 RX ADMIN — SODIUM CHLORIDE: 9 INJECTION, SOLUTION INTRAVENOUS at 09:54

## 2019-01-26 RX ADMIN — SODIUM CHLORIDE 1000 ML: 9 INJECTION, SOLUTION INTRAVENOUS at 02:02

## 2019-01-26 RX ADMIN — KETOROLAC TROMETHAMINE 30 MG: 30 INJECTION, SOLUTION INTRAMUSCULAR; INTRAVENOUS at 08:32

## 2019-01-26 RX ADMIN — ONDANSETRON 4 MG: 2 INJECTION INTRAMUSCULAR; INTRAVENOUS at 08:21

## 2019-01-26 RX ADMIN — DESVENLAFAXINE SUCCINATE 100 MG: 50 TABLET, FILM COATED, EXTENDED RELEASE ORAL at 15:10

## 2019-01-26 RX ADMIN — TIZANIDINE 4 MG: 4 TABLET ORAL at 09:54

## 2019-01-26 RX ADMIN — HYDROCODONE BITARTRATE AND ACETAMINOPHEN 2 TABLET: 5; 325 TABLET ORAL at 03:38

## 2019-01-26 RX ADMIN — DOCUSATE SODIUM 100 MG: 100 CAPSULE, LIQUID FILLED ORAL at 12:11

## 2019-01-26 RX ADMIN — BREXPIPRAZOLE 0.5 MG: 1 TABLET ORAL at 15:10

## 2019-01-26 RX ADMIN — TOPIRAMATE 100 MG: 100 TABLET, FILM COATED ORAL at 12:11

## 2019-01-26 RX ADMIN — OXYCODONE AND ACETAMINOPHEN 1 TABLET: 5; 325 TABLET ORAL at 23:43

## 2019-01-26 RX ADMIN — ACETAMINOPHEN 650 MG: 325 TABLET ORAL at 16:59

## 2019-01-26 RX ADMIN — GABAPENTIN 300 MG: 300 CAPSULE ORAL at 17:55

## 2019-01-26 RX ADMIN — KETOROLAC TROMETHAMINE 30 MG: 30 INJECTION, SOLUTION INTRAMUSCULAR; INTRAVENOUS at 14:42

## 2019-01-26 RX ADMIN — GABAPENTIN 300 MG: 300 CAPSULE ORAL at 09:54

## 2019-01-26 RX ADMIN — MORPHINE SULFATE 4 MG: 4 INJECTION INTRAVENOUS at 02:02

## 2019-01-26 RX ADMIN — ACETAMINOPHEN 650 MG: 325 TABLET ORAL at 08:21

## 2019-01-26 RX ADMIN — HYDROMORPHONE HYDROCHLORIDE 1 MG: 1 INJECTION, SOLUTION INTRAMUSCULAR; INTRAVENOUS; SUBCUTANEOUS at 04:15

## 2019-01-26 RX ADMIN — CETIRIZINE HYDROCHLORIDE 10 MG: 10 TABLET, FILM COATED ORAL at 12:11

## 2019-01-26 RX ADMIN — SULFAMETHOXAZOLE AND TRIMETHOPRIM 1 TABLET: 800; 160 TABLET ORAL at 12:12

## 2019-01-26 RX ADMIN — SULFAMETHOXAZOLE AND TRIMETHOPRIM 1 TABLET: 800; 160 TABLET ORAL at 20:20

## 2019-01-26 RX ADMIN — ONDANSETRON 4 MG: 2 INJECTION INTRAMUSCULAR; INTRAVENOUS at 19:13

## 2019-01-26 ASSESSMENT — PAIN SCALES - GENERAL
PAINLEVEL_OUTOF10: 6
PAINLEVEL_OUTOF10: 7
PAINLEVEL_OUTOF10: 5
PAINLEVEL_OUTOF10: 8
PAINLEVEL_OUTOF10: 8
PAINLEVEL_OUTOF10: 9
PAINLEVEL_OUTOF10: 8
PAINLEVEL_OUTOF10: 6
PAINLEVEL_OUTOF10: 8
PAINLEVEL_OUTOF10: 9
PAINLEVEL_OUTOF10: 8
PAINLEVEL_OUTOF10: 7
PAINLEVEL_OUTOF10: 8
PAINLEVEL_OUTOF10: 8

## 2019-01-26 ASSESSMENT — PAIN DESCRIPTION - PAIN TYPE
TYPE: ACUTE PAIN

## 2019-01-26 ASSESSMENT — PAIN DESCRIPTION - ORIENTATION
ORIENTATION: RIGHT

## 2019-01-26 ASSESSMENT — PAIN DESCRIPTION - ONSET
ONSET: ON-GOING

## 2019-01-26 ASSESSMENT — PAIN DESCRIPTION - DESCRIPTORS
DESCRIPTORS: DULL
DESCRIPTORS: ACHING;CONSTANT;STABBING
DESCRIPTORS: SHARP
DESCRIPTORS: CONSTANT;STABBING

## 2019-01-26 ASSESSMENT — PAIN DESCRIPTION - FREQUENCY
FREQUENCY: CONTINUOUS
FREQUENCY: INTERMITTENT
FREQUENCY: CONTINUOUS

## 2019-01-26 ASSESSMENT — PAIN DESCRIPTION - LOCATION
LOCATION: BACK;FLANK
LOCATION: FLANK

## 2019-01-26 ASSESSMENT — ENCOUNTER SYMPTOMS
ALLERGIC/IMMUNOLOGIC NEGATIVE: 1
EYES NEGATIVE: 1
RESPIRATORY NEGATIVE: 1
GASTROINTESTINAL NEGATIVE: 1

## 2019-01-26 ASSESSMENT — PAIN DESCRIPTION - PROGRESSION
CLINICAL_PROGRESSION: NOT CHANGED
CLINICAL_PROGRESSION: GRADUALLY IMPROVING
CLINICAL_PROGRESSION: NOT CHANGED

## 2019-01-27 LAB
ALBUMIN SERPL-MCNC: 3.2 G/DL (ref 3.9–4.9)
ALP BLD-CCNC: 55 U/L (ref 40–130)
ALT SERPL-CCNC: 47 U/L (ref 0–33)
ANION GAP SERPL CALCULATED.3IONS-SCNC: 10 MEQ/L (ref 7–13)
AST SERPL-CCNC: 25 U/L (ref 0–35)
BILIRUB SERPL-MCNC: 0.5 MG/DL (ref 0–1.2)
BUN BLDV-MCNC: 11 MG/DL (ref 6–20)
C-REACTIVE PROTEIN: 5.7 MG/L (ref 0–5)
CALCIUM SERPL-MCNC: 8 MG/DL (ref 8.6–10.2)
CHLORIDE BLD-SCNC: 108 MEQ/L (ref 98–107)
CO2: 20 MEQ/L (ref 22–29)
CREAT SERPL-MCNC: 1 MG/DL (ref 0.5–0.9)
GFR AFRICAN AMERICAN: >60
GFR NON-AFRICAN AMERICAN: >60
GLOBULIN: 2.5 G/DL (ref 2.3–3.5)
GLUCOSE BLD-MCNC: 78 MG/DL (ref 74–109)
HCT VFR BLD CALC: 38.4 % (ref 37–47)
HEMOGLOBIN: 13.2 G/DL (ref 12–16)
MCH RBC QN AUTO: 30.8 PG (ref 27–31.3)
MCHC RBC AUTO-ENTMCNC: 34.3 % (ref 33–37)
MCV RBC AUTO: 89.9 FL (ref 82–100)
PDW BLD-RTO: 12.7 % (ref 11.5–14.5)
PLATELET # BLD: 250 K/UL (ref 130–400)
POTASSIUM REFLEX MAGNESIUM: 4.1 MEQ/L (ref 3.5–5.1)
RBC # BLD: 4.28 M/UL (ref 4.2–5.4)
SEDIMENTATION RATE, ERYTHROCYTE: 8 MM (ref 0–20)
SODIUM BLD-SCNC: 138 MEQ/L (ref 132–144)
TOTAL PROTEIN: 5.7 G/DL (ref 6.4–8.1)
WBC # BLD: 7.6 K/UL (ref 4.8–10.8)

## 2019-01-27 PROCEDURE — 6360000002 HC RX W HCPCS

## 2019-01-27 PROCEDURE — 85027 COMPLETE CBC AUTOMATED: CPT

## 2019-01-27 PROCEDURE — 6360000002 HC RX W HCPCS: Performed by: INTERNAL MEDICINE

## 2019-01-27 PROCEDURE — 85652 RBC SED RATE AUTOMATED: CPT

## 2019-01-27 PROCEDURE — 6370000000 HC RX 637 (ALT 250 FOR IP): Performed by: SPECIALIST

## 2019-01-27 PROCEDURE — 86140 C-REACTIVE PROTEIN: CPT

## 2019-01-27 PROCEDURE — 6370000000 HC RX 637 (ALT 250 FOR IP): Performed by: PHYSICIAN ASSISTANT

## 2019-01-27 PROCEDURE — 36415 COLL VENOUS BLD VENIPUNCTURE: CPT

## 2019-01-27 PROCEDURE — 1210000000 HC MED SURG R&B

## 2019-01-27 PROCEDURE — 80053 COMPREHEN METABOLIC PANEL: CPT

## 2019-01-27 PROCEDURE — 6370000000 HC RX 637 (ALT 250 FOR IP): Performed by: INTERNAL MEDICINE

## 2019-01-27 RX ORDER — MORPHINE SULFATE 4 MG/ML
INJECTION, SOLUTION INTRAMUSCULAR; INTRAVENOUS
Status: COMPLETED
Start: 2019-01-27 | End: 2019-01-27

## 2019-01-27 RX ORDER — OXYCODONE HYDROCHLORIDE AND ACETAMINOPHEN 5; 325 MG/1; MG/1
1 TABLET ORAL EVERY 4 HOURS PRN
Status: DISCONTINUED | OUTPATIENT
Start: 2019-01-27 | End: 2019-01-29 | Stop reason: HOSPADM

## 2019-01-27 RX ORDER — MORPHINE SULFATE 4 MG/ML
4 INJECTION, SOLUTION INTRAMUSCULAR; INTRAVENOUS ONCE
Status: COMPLETED | OUTPATIENT
Start: 2019-01-27 | End: 2019-01-27

## 2019-01-27 RX ADMIN — DOCUSATE SODIUM 100 MG: 100 CAPSULE, LIQUID FILLED ORAL at 08:45

## 2019-01-27 RX ADMIN — ONDANSETRON 4 MG: 2 INJECTION INTRAMUSCULAR; INTRAVENOUS at 02:07

## 2019-01-27 RX ADMIN — SULFAMETHOXAZOLE AND TRIMETHOPRIM 1 TABLET: 800; 160 TABLET ORAL at 20:40

## 2019-01-27 RX ADMIN — CETIRIZINE HYDROCHLORIDE 10 MG: 10 TABLET, FILM COATED ORAL at 08:45

## 2019-01-27 RX ADMIN — MORPHINE SULFATE 4 MG: 4 INJECTION, SOLUTION INTRAMUSCULAR; INTRAVENOUS at 10:32

## 2019-01-27 RX ADMIN — GABAPENTIN 300 MG: 300 CAPSULE ORAL at 02:07

## 2019-01-27 RX ADMIN — LEVOTHYROXINE SODIUM 75 MCG: 75 TABLET ORAL at 08:45

## 2019-01-27 RX ADMIN — KETOROLAC TROMETHAMINE 30 MG: 30 INJECTION, SOLUTION INTRAMUSCULAR; INTRAVENOUS at 12:48

## 2019-01-27 RX ADMIN — TIZANIDINE 4 MG: 4 TABLET ORAL at 22:27

## 2019-01-27 RX ADMIN — PRAZOSIN HYDROCHLORIDE 5 MG: 5 CAPSULE ORAL at 20:40

## 2019-01-27 RX ADMIN — GABAPENTIN 300 MG: 300 CAPSULE ORAL at 22:27

## 2019-01-27 RX ADMIN — ACETAMINOPHEN 650 MG: 325 TABLET ORAL at 08:45

## 2019-01-27 RX ADMIN — TIZANIDINE 4 MG: 4 TABLET ORAL at 02:07

## 2019-01-27 RX ADMIN — TIZANIDINE 4 MG: 4 TABLET ORAL at 10:11

## 2019-01-27 RX ADMIN — TOPIRAMATE 100 MG: 100 TABLET, FILM COATED ORAL at 08:45

## 2019-01-27 RX ADMIN — ONDANSETRON 4 MG: 2 INJECTION INTRAMUSCULAR; INTRAVENOUS at 15:29

## 2019-01-27 RX ADMIN — ONDANSETRON 4 MG: 2 INJECTION INTRAMUSCULAR; INTRAVENOUS at 08:48

## 2019-01-27 RX ADMIN — GABAPENTIN 300 MG: 300 CAPSULE ORAL at 10:11

## 2019-01-27 RX ADMIN — KETOROLAC TROMETHAMINE 30 MG: 30 INJECTION, SOLUTION INTRAMUSCULAR; INTRAVENOUS at 19:17

## 2019-01-27 RX ADMIN — OXYCODONE AND ACETAMINOPHEN 1 TABLET: 5; 325 TABLET ORAL at 19:17

## 2019-01-27 RX ADMIN — DESVENLAFAXINE SUCCINATE 100 MG: 50 TABLET, FILM COATED, EXTENDED RELEASE ORAL at 08:45

## 2019-01-27 RX ADMIN — TOPIRAMATE 100 MG: 100 TABLET, FILM COATED ORAL at 20:40

## 2019-01-27 RX ADMIN — SULFAMETHOXAZOLE AND TRIMETHOPRIM 1 TABLET: 800; 160 TABLET ORAL at 08:45

## 2019-01-27 RX ADMIN — OXYCODONE AND ACETAMINOPHEN 1 TABLET: 5; 325 TABLET ORAL at 06:47

## 2019-01-27 RX ADMIN — KETOROLAC TROMETHAMINE 30 MG: 30 INJECTION, SOLUTION INTRAMUSCULAR; INTRAVENOUS at 00:10

## 2019-01-27 RX ADMIN — OXYCODONE AND ACETAMINOPHEN 1 TABLET: 5; 325 TABLET ORAL at 12:48

## 2019-01-27 RX ADMIN — KETOROLAC TROMETHAMINE 30 MG: 30 INJECTION, SOLUTION INTRAMUSCULAR; INTRAVENOUS at 06:47

## 2019-01-27 RX ADMIN — OXYCODONE AND ACETAMINOPHEN 1 TABLET: 5; 325 TABLET ORAL at 23:54

## 2019-01-27 ASSESSMENT — PAIN SCALES - GENERAL
PAINLEVEL_OUTOF10: 8
PAINLEVEL_OUTOF10: 9
PAINLEVEL_OUTOF10: 5
PAINLEVEL_OUTOF10: 9
PAINLEVEL_OUTOF10: 7
PAINLEVEL_OUTOF10: 8
PAINLEVEL_OUTOF10: 8

## 2019-01-27 ASSESSMENT — PAIN DESCRIPTION - FREQUENCY: FREQUENCY: INTERMITTENT

## 2019-01-27 ASSESSMENT — PAIN DESCRIPTION - LOCATION: LOCATION: FLANK

## 2019-01-27 ASSESSMENT — PAIN DESCRIPTION - DESCRIPTORS: DESCRIPTORS: STABBING

## 2019-01-27 ASSESSMENT — PAIN DESCRIPTION - PAIN TYPE: TYPE: ACUTE PAIN

## 2019-01-27 ASSESSMENT — PAIN DESCRIPTION - ORIENTATION: ORIENTATION: RIGHT

## 2019-01-28 ENCOUNTER — APPOINTMENT (OUTPATIENT)
Dept: ULTRASOUND IMAGING | Age: 34
DRG: 392 | End: 2019-01-28
Attending: INTERNAL MEDICINE
Payer: COMMERCIAL

## 2019-01-28 PROBLEM — R52: Status: ACTIVE | Noted: 2019-01-28

## 2019-01-28 PROBLEM — R10.9 FLANK PAIN: Status: ACTIVE | Noted: 2019-01-28

## 2019-01-28 PROBLEM — M79.10 MUSCLE PAIN: Status: ACTIVE | Noted: 2019-01-28

## 2019-01-28 PROBLEM — R10.9 RIGHT FLANK PAIN: Status: ACTIVE | Noted: 2019-01-28

## 2019-01-28 PROCEDURE — 6370000000 HC RX 637 (ALT 250 FOR IP): Performed by: PHYSICIAN ASSISTANT

## 2019-01-28 PROCEDURE — 6370000000 HC RX 637 (ALT 250 FOR IP): Performed by: SPECIALIST

## 2019-01-28 PROCEDURE — 6360000002 HC RX W HCPCS: Performed by: INTERNAL MEDICINE

## 2019-01-28 PROCEDURE — 76856 US EXAM PELVIC COMPLETE: CPT

## 2019-01-28 PROCEDURE — 76830 TRANSVAGINAL US NON-OB: CPT

## 2019-01-28 PROCEDURE — 2580000003 HC RX 258: Performed by: PHYSICIAN ASSISTANT

## 2019-01-28 PROCEDURE — 6360000002 HC RX W HCPCS: Performed by: ANESTHESIOLOGY

## 2019-01-28 PROCEDURE — 1210000000 HC MED SURG R&B

## 2019-01-28 RX ORDER — METHOCARBAMOL 750 MG/1
750 TABLET, FILM COATED ORAL 3 TIMES DAILY PRN
Qty: 30 TABLET | Refills: 0 | Status: SHIPPED | OUTPATIENT
Start: 2019-01-28 | End: 2019-02-01

## 2019-01-28 RX ORDER — OXYCODONE HYDROCHLORIDE AND ACETAMINOPHEN 5; 325 MG/1; MG/1
1 TABLET ORAL 2 TIMES DAILY PRN
Qty: 10 TABLET | Refills: 0 | Status: SHIPPED | OUTPATIENT
Start: 2019-01-28 | End: 2019-02-01

## 2019-01-28 RX ORDER — KETOROLAC TROMETHAMINE 10 MG/1
10 TABLET, FILM COATED ORAL EVERY 6 HOURS PRN
Qty: 20 TABLET | Refills: 0 | Status: SHIPPED | OUTPATIENT
Start: 2019-01-28 | End: 2019-02-13

## 2019-01-28 RX ORDER — ACETAMINOPHEN 325 MG/1
650 TABLET ORAL EVERY 4 HOURS PRN
Qty: 120 TABLET | Refills: 3 | Status: SHIPPED | OUTPATIENT
Start: 2019-01-28 | End: 2019-02-13

## 2019-01-28 RX ORDER — MORPHINE SULFATE 2 MG/ML
2 INJECTION, SOLUTION INTRAMUSCULAR; INTRAVENOUS ONCE
Status: COMPLETED | OUTPATIENT
Start: 2019-01-28 | End: 2019-01-28

## 2019-01-28 RX ADMIN — SULFAMETHOXAZOLE AND TRIMETHOPRIM 1 TABLET: 800; 160 TABLET ORAL at 08:36

## 2019-01-28 RX ADMIN — OXYCODONE AND ACETAMINOPHEN 1 TABLET: 5; 325 TABLET ORAL at 08:36

## 2019-01-28 RX ADMIN — DESVENLAFAXINE SUCCINATE 100 MG: 50 TABLET, FILM COATED, EXTENDED RELEASE ORAL at 08:36

## 2019-01-28 RX ADMIN — SULFAMETHOXAZOLE AND TRIMETHOPRIM 1 TABLET: 800; 160 TABLET ORAL at 20:21

## 2019-01-28 RX ADMIN — OXYCODONE AND ACETAMINOPHEN 1 TABLET: 5; 325 TABLET ORAL at 20:22

## 2019-01-28 RX ADMIN — OXYCODONE AND ACETAMINOPHEN 1 TABLET: 5; 325 TABLET ORAL at 13:18

## 2019-01-28 RX ADMIN — TOPIRAMATE 100 MG: 100 TABLET, FILM COATED ORAL at 20:21

## 2019-01-28 RX ADMIN — Medication 10 ML: at 10:48

## 2019-01-28 RX ADMIN — BREXPIPRAZOLE 0.5 MG: 1 TABLET ORAL at 08:35

## 2019-01-28 RX ADMIN — LEVOTHYROXINE SODIUM 75 MCG: 75 TABLET ORAL at 06:50

## 2019-01-28 RX ADMIN — TOPIRAMATE 100 MG: 100 TABLET, FILM COATED ORAL at 08:36

## 2019-01-28 RX ADMIN — ONDANSETRON 4 MG: 2 INJECTION INTRAMUSCULAR; INTRAVENOUS at 06:48

## 2019-01-28 RX ADMIN — MORPHINE SULFATE 2 MG: 2 INJECTION, SOLUTION INTRAMUSCULAR; INTRAVENOUS at 10:31

## 2019-01-28 RX ADMIN — KETOROLAC TROMETHAMINE 30 MG: 30 INJECTION, SOLUTION INTRAMUSCULAR; INTRAVENOUS at 03:27

## 2019-01-28 RX ADMIN — CETIRIZINE HYDROCHLORIDE 10 MG: 10 TABLET, FILM COATED ORAL at 08:36

## 2019-01-28 RX ADMIN — OXYCODONE AND ACETAMINOPHEN 1 TABLET: 5; 325 TABLET ORAL at 17:21

## 2019-01-28 RX ADMIN — Medication 10 ML: at 20:23

## 2019-01-28 RX ADMIN — TIZANIDINE 4 MG: 4 TABLET ORAL at 07:57

## 2019-01-28 RX ADMIN — OXYCODONE AND ACETAMINOPHEN 1 TABLET: 5; 325 TABLET ORAL at 04:38

## 2019-01-28 RX ADMIN — DOCUSATE SODIUM 100 MG: 100 CAPSULE, LIQUID FILLED ORAL at 08:37

## 2019-01-28 RX ADMIN — KETOROLAC TROMETHAMINE 30 MG: 30 INJECTION, SOLUTION INTRAMUSCULAR; INTRAVENOUS at 16:16

## 2019-01-28 RX ADMIN — ONDANSETRON 4 MG: 2 INJECTION INTRAMUSCULAR; INTRAVENOUS at 23:44

## 2019-01-28 RX ADMIN — PRAZOSIN HYDROCHLORIDE 5 MG: 5 CAPSULE ORAL at 22:58

## 2019-01-28 RX ADMIN — ONDANSETRON 4 MG: 2 INJECTION INTRAMUSCULAR; INTRAVENOUS at 14:47

## 2019-01-28 ASSESSMENT — PAIN SCALES - GENERAL
PAINLEVEL_OUTOF10: 7
PAINLEVEL_OUTOF10: 8
PAINLEVEL_OUTOF10: 6
PAINLEVEL_OUTOF10: 10
PAINLEVEL_OUTOF10: 8
PAINLEVEL_OUTOF10: 9
PAINLEVEL_OUTOF10: 10
PAINLEVEL_OUTOF10: 8

## 2019-01-28 ASSESSMENT — ENCOUNTER SYMPTOMS
BACK PAIN: 0
ALLERGIC/IMMUNOLOGIC NEGATIVE: 1
ANAL BLEEDING: 0
NAUSEA: 1
ABDOMINAL DISTENTION: 1
RESPIRATORY NEGATIVE: 1
EYES NEGATIVE: 1
DIARRHEA: 0
ABDOMINAL PAIN: 1
CONSTIPATION: 0
RECTAL PAIN: 0
BLOOD IN STOOL: 0
VOMITING: 0

## 2019-01-29 ENCOUNTER — TELEPHONE (OUTPATIENT)
Dept: OBGYN CLINIC | Age: 34
End: 2019-01-29

## 2019-01-29 VITALS
SYSTOLIC BLOOD PRESSURE: 118 MMHG | BODY MASS INDEX: 43.32 KG/M2 | WEIGHT: 260 LBS | HEART RATE: 76 BPM | DIASTOLIC BLOOD PRESSURE: 48 MMHG | TEMPERATURE: 97.7 F | HEIGHT: 65 IN | RESPIRATION RATE: 16 BRPM | OXYGEN SATURATION: 98 %

## 2019-01-29 LAB — URINE CULTURE, ROUTINE: NORMAL

## 2019-01-29 PROCEDURE — 6370000000 HC RX 637 (ALT 250 FOR IP): Performed by: SPECIALIST

## 2019-01-29 PROCEDURE — 6370000000 HC RX 637 (ALT 250 FOR IP): Performed by: PHYSICIAN ASSISTANT

## 2019-01-29 PROCEDURE — 6360000002 HC RX W HCPCS: Performed by: INTERNAL MEDICINE

## 2019-01-29 PROCEDURE — 2580000003 HC RX 258: Performed by: PHYSICIAN ASSISTANT

## 2019-01-29 RX ADMIN — LEVOTHYROXINE SODIUM 75 MCG: 75 TABLET ORAL at 06:23

## 2019-01-29 RX ADMIN — OXYCODONE AND ACETAMINOPHEN 1 TABLET: 5; 325 TABLET ORAL at 00:40

## 2019-01-29 RX ADMIN — KETOROLAC TROMETHAMINE 30 MG: 30 INJECTION, SOLUTION INTRAMUSCULAR; INTRAVENOUS at 02:59

## 2019-01-29 RX ADMIN — SULFAMETHOXAZOLE AND TRIMETHOPRIM 1 TABLET: 800; 160 TABLET ORAL at 09:21

## 2019-01-29 RX ADMIN — ONDANSETRON 4 MG: 2 INJECTION INTRAMUSCULAR; INTRAVENOUS at 09:29

## 2019-01-29 RX ADMIN — KETOROLAC TROMETHAMINE 30 MG: 30 INJECTION, SOLUTION INTRAMUSCULAR; INTRAVENOUS at 09:21

## 2019-01-29 RX ADMIN — DESVENLAFAXINE SUCCINATE 100 MG: 50 TABLET, FILM COATED, EXTENDED RELEASE ORAL at 09:21

## 2019-01-29 RX ADMIN — Medication 10 ML: at 09:22

## 2019-01-29 RX ADMIN — CETIRIZINE HYDROCHLORIDE 10 MG: 10 TABLET, FILM COATED ORAL at 09:22

## 2019-01-29 RX ADMIN — OXYCODONE AND ACETAMINOPHEN 1 TABLET: 5; 325 TABLET ORAL at 04:34

## 2019-01-29 RX ADMIN — OXYCODONE AND ACETAMINOPHEN 1 TABLET: 5; 325 TABLET ORAL at 09:22

## 2019-01-29 RX ADMIN — DOCUSATE SODIUM 100 MG: 100 CAPSULE, LIQUID FILLED ORAL at 09:21

## 2019-01-29 RX ADMIN — TOPIRAMATE 100 MG: 100 TABLET, FILM COATED ORAL at 09:21

## 2019-01-29 ASSESSMENT — PAIN SCALES - GENERAL
PAINLEVEL_OUTOF10: 9
PAINLEVEL_OUTOF10: 8
PAINLEVEL_OUTOF10: 9
PAINLEVEL_OUTOF10: 8
PAINLEVEL_OUTOF10: 9

## 2019-01-30 ENCOUNTER — OFFICE VISIT (OUTPATIENT)
Dept: OBGYN CLINIC | Age: 34
End: 2019-01-30
Payer: COMMERCIAL

## 2019-01-30 VITALS
WEIGHT: 279.2 LBS | SYSTOLIC BLOOD PRESSURE: 128 MMHG | TEMPERATURE: 97.7 F | DIASTOLIC BLOOD PRESSURE: 78 MMHG | BODY MASS INDEX: 46.46 KG/M2

## 2019-01-30 DIAGNOSIS — R10.9 RIGHT FLANK PAIN: Primary | ICD-10-CM

## 2019-01-30 DIAGNOSIS — R31.9 URINARY TRACT INFECTION WITH HEMATURIA, SITE UNSPECIFIED: ICD-10-CM

## 2019-01-30 DIAGNOSIS — N39.0 URINARY TRACT INFECTION WITH HEMATURIA, SITE UNSPECIFIED: ICD-10-CM

## 2019-01-30 DIAGNOSIS — R10.2 VAGINAL PAIN: ICD-10-CM

## 2019-01-30 DIAGNOSIS — R10.31 RLQ ABDOMINAL PAIN: ICD-10-CM

## 2019-01-30 PROCEDURE — 99214 OFFICE O/P EST MOD 30 MIN: CPT | Performed by: OBSTETRICS & GYNECOLOGY

## 2019-01-30 ASSESSMENT — ENCOUNTER SYMPTOMS
RECTAL PAIN: 0
EYES NEGATIVE: 1
BLOOD IN STOOL: 0
RESPIRATORY NEGATIVE: 1
VOMITING: 0
DIARRHEA: 0
NAUSEA: 0
CONSTIPATION: 0
ABDOMINAL PAIN: 0
ANAL BLEEDING: 0
ABDOMINAL DISTENTION: 0
ALLERGIC/IMMUNOLOGIC NEGATIVE: 1

## 2019-02-01 ENCOUNTER — OFFICE VISIT (OUTPATIENT)
Dept: PRIMARY CARE CLINIC | Age: 34
End: 2019-02-01
Payer: COMMERCIAL

## 2019-02-01 VITALS
OXYGEN SATURATION: 98 % | HEIGHT: 65 IN | BODY MASS INDEX: 46.15 KG/M2 | WEIGHT: 277 LBS | HEART RATE: 62 BPM | DIASTOLIC BLOOD PRESSURE: 84 MMHG | SYSTOLIC BLOOD PRESSURE: 118 MMHG | TEMPERATURE: 98.3 F

## 2019-02-01 DIAGNOSIS — R52 VISCERAL PAIN: ICD-10-CM

## 2019-02-01 DIAGNOSIS — R10.9 RIGHT FLANK PAIN: Primary | ICD-10-CM

## 2019-02-01 PROCEDURE — 99213 OFFICE O/P EST LOW 20 MIN: CPT | Performed by: FAMILY MEDICINE

## 2019-02-01 RX ORDER — TAMSULOSIN HYDROCHLORIDE 0.4 MG/1
0.4 CAPSULE ORAL NIGHTLY
Qty: 30 CAPSULE | Refills: 3 | Status: SHIPPED | OUTPATIENT
Start: 2019-02-01

## 2019-02-01 ASSESSMENT — ENCOUNTER SYMPTOMS
NAUSEA: 0
APNEA: 0
STRIDOR: 0
EYE REDNESS: 0
FACIAL SWELLING: 0
CHOKING: 0
EYE DISCHARGE: 0
ABDOMINAL PAIN: 0
COLOR CHANGE: 0
EYE PAIN: 0
DIARRHEA: 0
CHEST TIGHTNESS: 0
PHOTOPHOBIA: 0
WHEEZING: 0
CONSTIPATION: 0

## 2019-02-01 ASSESSMENT — PATIENT HEALTH QUESTIONNAIRE - PHQ9
SUM OF ALL RESPONSES TO PHQ QUESTIONS 1-9: 0
2. FEELING DOWN, DEPRESSED OR HOPELESS: 0
1. LITTLE INTEREST OR PLEASURE IN DOING THINGS: 0
SUM OF ALL RESPONSES TO PHQ QUESTIONS 1-9: 0
SUM OF ALL RESPONSES TO PHQ9 QUESTIONS 1 & 2: 0

## 2019-02-04 ENCOUNTER — HOSPITAL ENCOUNTER (OUTPATIENT)
Age: 34
Setting detail: OUTPATIENT SURGERY
Discharge: HOME | End: 2019-02-04
Attending: UROLOGY | Admitting: UROLOGY
Payer: COMMERCIAL

## 2019-02-04 ENCOUNTER — ANESTHESIA (OUTPATIENT)
Dept: OPERATING ROOM | Age: 34
End: 2019-02-04
Payer: COMMERCIAL

## 2019-02-04 ENCOUNTER — ANESTHESIA EVENT (OUTPATIENT)
Dept: OPERATING ROOM | Age: 34
End: 2019-02-04
Payer: COMMERCIAL

## 2019-02-04 VITALS
DIASTOLIC BLOOD PRESSURE: 56 MMHG | OXYGEN SATURATION: 97 % | RESPIRATION RATE: 22 BRPM | SYSTOLIC BLOOD PRESSURE: 115 MMHG

## 2019-02-04 VITALS
OXYGEN SATURATION: 99 % | TEMPERATURE: 97.9 F | BODY MASS INDEX: 43.32 KG/M2 | WEIGHT: 260 LBS | SYSTOLIC BLOOD PRESSURE: 118 MMHG | RESPIRATION RATE: 16 BRPM | HEIGHT: 65 IN | HEART RATE: 70 BPM | DIASTOLIC BLOOD PRESSURE: 59 MMHG

## 2019-02-04 LAB
HCG, URINE, POC: NORMAL
Lab: NORMAL
NEGATIVE QC PASS/FAIL: NORMAL
POSITIVE QC PASS/FAIL: NORMAL

## 2019-02-04 PROCEDURE — 52000 CYSTOURETHROSCOPY: CPT | Performed by: UROLOGY

## 2019-02-04 PROCEDURE — 2580000003 HC RX 258: Performed by: UROLOGY

## 2019-02-04 PROCEDURE — 6360000002 HC RX W HCPCS: Performed by: NURSE ANESTHETIST, CERTIFIED REGISTERED

## 2019-02-04 PROCEDURE — 3700000000 HC ANESTHESIA ATTENDED CARE: Performed by: UROLOGY

## 2019-02-04 PROCEDURE — 2580000003 HC RX 258: Performed by: NURSE PRACTITIONER

## 2019-02-04 PROCEDURE — 3700000001 HC ADD 15 MINUTES (ANESTHESIA): Performed by: UROLOGY

## 2019-02-04 PROCEDURE — 6370000000 HC RX 637 (ALT 250 FOR IP): Performed by: UROLOGY

## 2019-02-04 PROCEDURE — 2500000003 HC RX 250 WO HCPCS: Performed by: NURSE PRACTITIONER

## 2019-02-04 PROCEDURE — 2709999900 HC NON-CHARGEABLE SUPPLY: Performed by: UROLOGY

## 2019-02-04 PROCEDURE — 6370000000 HC RX 637 (ALT 250 FOR IP): Performed by: ANESTHESIOLOGY

## 2019-02-04 PROCEDURE — 3600000014 HC SURGERY LEVEL 4 ADDTL 15MIN: Performed by: UROLOGY

## 2019-02-04 PROCEDURE — 2500000003 HC RX 250 WO HCPCS: Performed by: NURSE ANESTHETIST, CERTIFIED REGISTERED

## 2019-02-04 PROCEDURE — 7100000010 HC PHASE II RECOVERY - FIRST 15 MIN: Performed by: UROLOGY

## 2019-02-04 PROCEDURE — 3600000004 HC SURGERY LEVEL 4 BASE: Performed by: UROLOGY

## 2019-02-04 PROCEDURE — 6360000002 HC RX W HCPCS: Performed by: UROLOGY

## 2019-02-04 RX ORDER — SODIUM CHLORIDE 0.9 % (FLUSH) 0.9 %
10 SYRINGE (ML) INJECTION PRN
Status: DISCONTINUED | OUTPATIENT
Start: 2019-02-04 | End: 2019-02-04 | Stop reason: HOSPADM

## 2019-02-04 RX ORDER — HYDROCODONE BITARTRATE AND ACETAMINOPHEN 5; 325 MG/1; MG/1
2 TABLET ORAL PRN
Status: COMPLETED | OUTPATIENT
Start: 2019-02-04 | End: 2019-02-04

## 2019-02-04 RX ORDER — ONDANSETRON 2 MG/ML
4 INJECTION INTRAMUSCULAR; INTRAVENOUS
Status: DISCONTINUED | OUTPATIENT
Start: 2019-02-04 | End: 2019-02-04 | Stop reason: HOSPADM

## 2019-02-04 RX ORDER — HYDROCODONE BITARTRATE AND ACETAMINOPHEN 5; 325 MG/1; MG/1
1 TABLET ORAL PRN
Status: COMPLETED | OUTPATIENT
Start: 2019-02-04 | End: 2019-02-04

## 2019-02-04 RX ORDER — METOCLOPRAMIDE HYDROCHLORIDE 5 MG/ML
10 INJECTION INTRAMUSCULAR; INTRAVENOUS
Status: DISCONTINUED | OUTPATIENT
Start: 2019-02-04 | End: 2019-02-04 | Stop reason: HOSPADM

## 2019-02-04 RX ORDER — MAGNESIUM HYDROXIDE 1200 MG/15ML
LIQUID ORAL PRN
Status: DISCONTINUED | OUTPATIENT
Start: 2019-02-04 | End: 2019-02-04 | Stop reason: HOSPADM

## 2019-02-04 RX ORDER — SODIUM CHLORIDE, SODIUM LACTATE, POTASSIUM CHLORIDE, CALCIUM CHLORIDE 600; 310; 30; 20 MG/100ML; MG/100ML; MG/100ML; MG/100ML
INJECTION, SOLUTION INTRAVENOUS CONTINUOUS
Status: DISCONTINUED | OUTPATIENT
Start: 2019-02-04 | End: 2019-02-04 | Stop reason: HOSPADM

## 2019-02-04 RX ORDER — LIDOCAINE HYDROCHLORIDE 10 MG/ML
1 INJECTION, SOLUTION EPIDURAL; INFILTRATION; INTRACAUDAL; PERINEURAL
Status: COMPLETED | OUTPATIENT
Start: 2019-02-04 | End: 2019-02-04

## 2019-02-04 RX ORDER — SODIUM CHLORIDE 0.9 % (FLUSH) 0.9 %
10 SYRINGE (ML) INJECTION EVERY 12 HOURS SCHEDULED
Status: DISCONTINUED | OUTPATIENT
Start: 2019-02-04 | End: 2019-02-04 | Stop reason: HOSPADM

## 2019-02-04 RX ORDER — PROPOFOL 10 MG/ML
INJECTION, EMULSION INTRAVENOUS PRN
Status: DISCONTINUED | OUTPATIENT
Start: 2019-02-04 | End: 2019-02-04 | Stop reason: SDUPTHER

## 2019-02-04 RX ORDER — ONDANSETRON 2 MG/ML
4 INJECTION INTRAMUSCULAR; INTRAVENOUS EVERY 6 HOURS PRN
Status: CANCELLED | OUTPATIENT
Start: 2019-02-04

## 2019-02-04 RX ORDER — MEPERIDINE HYDROCHLORIDE 25 MG/ML
12.5 INJECTION INTRAMUSCULAR; INTRAVENOUS; SUBCUTANEOUS EVERY 5 MIN PRN
Status: DISCONTINUED | OUTPATIENT
Start: 2019-02-04 | End: 2019-02-04 | Stop reason: HOSPADM

## 2019-02-04 RX ORDER — LIDOCAINE HYDROCHLORIDE 10 MG/ML
1 INJECTION, SOLUTION EPIDURAL; INFILTRATION; INTRACAUDAL; PERINEURAL
Status: DISCONTINUED | OUTPATIENT
Start: 2019-02-04 | End: 2019-02-04 | Stop reason: HOSPADM

## 2019-02-04 RX ORDER — ACETAMINOPHEN 325 MG/1
650 TABLET ORAL EVERY 4 HOURS PRN
Status: CANCELLED | OUTPATIENT
Start: 2019-02-04

## 2019-02-04 RX ORDER — CIPROFLOXACIN 2 MG/ML
400 INJECTION, SOLUTION INTRAVENOUS ONCE
Status: COMPLETED | OUTPATIENT
Start: 2019-02-04 | End: 2019-02-04

## 2019-02-04 RX ORDER — SODIUM CHLORIDE 0.9 % (FLUSH) 0.9 %
10 SYRINGE (ML) INJECTION EVERY 12 HOURS SCHEDULED
Status: CANCELLED | OUTPATIENT
Start: 2019-02-04

## 2019-02-04 RX ORDER — LIDOCAINE HYDROCHLORIDE 20 MG/ML
INJECTION, SOLUTION INFILTRATION; PERINEURAL PRN
Status: DISCONTINUED | OUTPATIENT
Start: 2019-02-04 | End: 2019-02-04 | Stop reason: SDUPTHER

## 2019-02-04 RX ORDER — SODIUM CHLORIDE 0.9 % (FLUSH) 0.9 %
10 SYRINGE (ML) INJECTION PRN
Status: CANCELLED | OUTPATIENT
Start: 2019-02-04

## 2019-02-04 RX ORDER — FENTANYL CITRATE 50 UG/ML
50 INJECTION, SOLUTION INTRAMUSCULAR; INTRAVENOUS EVERY 10 MIN PRN
Status: DISCONTINUED | OUTPATIENT
Start: 2019-02-04 | End: 2019-02-04 | Stop reason: HOSPADM

## 2019-02-04 RX ORDER — CHLORHEXIDINE GLUCONATE 4 G/100ML
SOLUTION TOPICAL PRN
Status: DISCONTINUED | OUTPATIENT
Start: 2019-02-04 | End: 2019-02-04 | Stop reason: HOSPADM

## 2019-02-04 RX ORDER — MIDAZOLAM HYDROCHLORIDE 1 MG/ML
INJECTION INTRAMUSCULAR; INTRAVENOUS PRN
Status: DISCONTINUED | OUTPATIENT
Start: 2019-02-04 | End: 2019-02-04 | Stop reason: SDUPTHER

## 2019-02-04 RX ADMIN — SODIUM CHLORIDE, POTASSIUM CHLORIDE, SODIUM LACTATE AND CALCIUM CHLORIDE: 600; 310; 30; 20 INJECTION, SOLUTION INTRAVENOUS at 06:36

## 2019-02-04 RX ADMIN — PROPOFOL 40 MG: 10 INJECTION, EMULSION INTRAVENOUS at 07:32

## 2019-02-04 RX ADMIN — LIDOCAINE HYDROCHLORIDE 1 ML: 10 INJECTION, SOLUTION EPIDURAL; INFILTRATION; INTRACAUDAL; PERINEURAL at 06:36

## 2019-02-04 RX ADMIN — LIDOCAINE HYDROCHLORIDE 60 MG: 20 INJECTION, SOLUTION INFILTRATION; PERINEURAL at 07:26

## 2019-02-04 RX ADMIN — CIPROFLOXACIN 400 MG: 2 INJECTION, SOLUTION INTRAVENOUS at 07:31

## 2019-02-04 RX ADMIN — MIDAZOLAM HYDROCHLORIDE 2 MG: 1 INJECTION, SOLUTION INTRAMUSCULAR; INTRAVENOUS at 07:26

## 2019-02-04 RX ADMIN — PROPOFOL 60 MG: 10 INJECTION, EMULSION INTRAVENOUS at 07:30

## 2019-02-04 RX ADMIN — HYDROCODONE BITARTRATE AND ACETAMINOPHEN 2 TABLET: 5; 325 TABLET ORAL at 08:01

## 2019-02-04 ASSESSMENT — PAIN SCALES - GENERAL
PAINLEVEL_OUTOF10: 4
PAINLEVEL_OUTOF10: 4

## 2019-02-04 ASSESSMENT — PULMONARY FUNCTION TESTS
PIF_VALUE: 1
PIF_VALUE: 1
PIF_VALUE: 0
PIF_VALUE: 1
PIF_VALUE: 0
PIF_VALUE: 1
PIF_VALUE: 0
PIF_VALUE: 1

## 2019-02-04 ASSESSMENT — PAIN DESCRIPTION - DESCRIPTORS: DESCRIPTORS: STABBING

## 2019-02-04 ASSESSMENT — PAIN DESCRIPTION - LOCATION: LOCATION: FLANK

## 2019-02-04 ASSESSMENT — PAIN - FUNCTIONAL ASSESSMENT: PAIN_FUNCTIONAL_ASSESSMENT: 0-10

## 2019-02-04 ASSESSMENT — PAIN DESCRIPTION - PAIN TYPE: TYPE: CHRONIC PAIN

## 2019-02-05 DIAGNOSIS — R10.31 RLQ ABDOMINAL PAIN: ICD-10-CM

## 2019-02-05 DIAGNOSIS — N39.0 URINARY TRACT INFECTION WITH HEMATURIA, SITE UNSPECIFIED: ICD-10-CM

## 2019-02-05 DIAGNOSIS — R31.9 URINARY TRACT INFECTION WITH HEMATURIA, SITE UNSPECIFIED: ICD-10-CM

## 2019-02-05 DIAGNOSIS — R10.9 RIGHT FLANK PAIN: ICD-10-CM

## 2019-02-10 ENCOUNTER — PATIENT MESSAGE (OUTPATIENT)
Dept: OBGYN CLINIC | Age: 34
End: 2019-02-10

## 2019-02-10 DIAGNOSIS — G43.809 OTHER MIGRAINE WITHOUT STATUS MIGRAINOSUS, NOT INTRACTABLE: ICD-10-CM

## 2019-02-10 DIAGNOSIS — G43.909 MIGRAINE WITHOUT STATUS MIGRAINOSUS, NOT INTRACTABLE, UNSPECIFIED MIGRAINE TYPE: ICD-10-CM

## 2019-02-12 RX ORDER — LEVONORGESTREL / ETHINYL ESTRADIOL AND ETHINYL ESTRADIOL 150-30(84)
KIT ORAL
Qty: 91 TABLET | Refills: 0 | Status: SHIPPED | OUTPATIENT
Start: 2019-02-12 | End: 2019-06-03 | Stop reason: SDUPTHER

## 2019-02-12 RX ORDER — TINIDAZOLE 500 MG/1
2 TABLET ORAL
Qty: 8 TABLET | Refills: 0 | OUTPATIENT
Start: 2019-02-12 | End: 2019-02-14

## 2019-02-13 ENCOUNTER — OFFICE VISIT (OUTPATIENT)
Dept: PRIMARY CARE CLINIC | Age: 34
End: 2019-02-13
Payer: COMMERCIAL

## 2019-02-13 VITALS
HEART RATE: 92 BPM | OXYGEN SATURATION: 98 % | DIASTOLIC BLOOD PRESSURE: 74 MMHG | BODY MASS INDEX: 46.65 KG/M2 | WEIGHT: 280 LBS | SYSTOLIC BLOOD PRESSURE: 124 MMHG | HEIGHT: 65 IN | RESPIRATION RATE: 18 BRPM | TEMPERATURE: 99 F

## 2019-02-13 DIAGNOSIS — R10.9 FLANK PAIN: Primary | ICD-10-CM

## 2019-02-13 DIAGNOSIS — R10.9 RIGHT FLANK PAIN: ICD-10-CM

## 2019-02-13 DIAGNOSIS — R10.31 RIGHT LOWER QUADRANT ABDOMINAL PAIN: ICD-10-CM

## 2019-02-13 DIAGNOSIS — K58.0 IRRITABLE BOWEL SYNDROME WITH DIARRHEA: ICD-10-CM

## 2019-02-13 LAB
BILIRUBIN, POC: NORMAL
BLOOD URINE, POC: NORMAL
CLARITY, POC: CLEAR
COLOR, POC: YELLOW
GLUCOSE URINE, POC: NORMAL
KETONES, POC: NORMAL
LEUKOCYTE EST, POC: NORMAL
NITRITE, POC: NORMAL
PH, POC: 6
PROTEIN, POC: NORMAL
SPECIFIC GRAVITY, POC: 1.02
UROBILINOGEN, POC: NORMAL

## 2019-02-13 PROCEDURE — 99214 OFFICE O/P EST MOD 30 MIN: CPT | Performed by: FAMILY MEDICINE

## 2019-02-13 PROCEDURE — 81003 URINALYSIS AUTO W/O SCOPE: CPT | Performed by: FAMILY MEDICINE

## 2019-02-13 RX ORDER — LIDOCAINE 50 MG/G
1 PATCH TOPICAL EVERY 24 HOURS
Qty: 15 PATCH | Refills: 3 | Status: SHIPPED | OUTPATIENT
Start: 2019-02-13

## 2019-02-13 RX ORDER — URINARY ANTISEPTIC ANTISPASMODIC 81.6; 40.8; 10.8; .12 MG/1; MG/1; MG/1; MG/1
TABLET ORAL
Refills: 0 | COMMUNITY
Start: 2019-02-04

## 2019-02-13 ASSESSMENT — ENCOUNTER SYMPTOMS
PHOTOPHOBIA: 0
NAUSEA: 1
WHEEZING: 0
CHEST TIGHTNESS: 0
STRIDOR: 0
DIARRHEA: 1
FACIAL SWELLING: 0
ABDOMINAL PAIN: 0
EYE REDNESS: 0
COLOR CHANGE: 0
VOMITING: 1
BOWEL INCONTINENCE: 0
APNEA: 0
EYE DISCHARGE: 0
CHOKING: 0
CONSTIPATION: 0
EYE PAIN: 0

## 2019-02-15 LAB — URINE CULTURE, ROUTINE: NORMAL

## 2019-02-18 DIAGNOSIS — G43.809 OTHER MIGRAINE WITHOUT STATUS MIGRAINOSUS, NOT INTRACTABLE: ICD-10-CM

## 2019-02-18 DIAGNOSIS — G43.909 MIGRAINE WITHOUT STATUS MIGRAINOSUS, NOT INTRACTABLE, UNSPECIFIED MIGRAINE TYPE: ICD-10-CM

## 2019-02-18 RX ORDER — TOPIRAMATE 100 MG/1
TABLET, FILM COATED ORAL
Qty: 60 TABLET | Refills: 5 | Status: SHIPPED | OUTPATIENT
Start: 2019-02-18

## 2019-02-18 RX ORDER — DESVENLAFAXINE 100 MG/1
100 TABLET, EXTENDED RELEASE ORAL DAILY
Qty: 30 TABLET | Refills: 5 | Status: SHIPPED | OUTPATIENT
Start: 2019-02-18

## 2019-02-18 RX ORDER — TOPIRAMATE 100 MG/1
100 TABLET, FILM COATED ORAL 2 TIMES DAILY
Qty: 60 TABLET | Refills: 5 | Status: SHIPPED | OUTPATIENT
Start: 2019-02-18

## 2019-02-25 ENCOUNTER — HOSPITAL ENCOUNTER (EMERGENCY)
Age: 34
Discharge: HOME | End: 2019-02-25
Attending: EMERGENCY MEDICINE
Payer: COMMERCIAL

## 2019-02-25 VITALS
SYSTOLIC BLOOD PRESSURE: 110 MMHG | HEART RATE: 60 BPM | DIASTOLIC BLOOD PRESSURE: 59 MMHG | OXYGEN SATURATION: 98 % | TEMPERATURE: 98 F | BODY MASS INDEX: 41.65 KG/M2 | HEIGHT: 65 IN | WEIGHT: 250 LBS | RESPIRATION RATE: 16 BRPM

## 2019-02-25 DIAGNOSIS — R10.9 FLANK PAIN: Primary | ICD-10-CM

## 2019-02-25 LAB
BACTERIA: ABNORMAL /HPF
BILIRUBIN URINE: ABNORMAL
BLOOD, URINE: NEGATIVE
CLARITY: ABNORMAL
COLOR: ABNORMAL
EPITHELIAL CELLS, UA: ABNORMAL /HPF
GLUCOSE URINE: ABNORMAL MG/DL
KETONES, URINE: ABNORMAL MG/DL
LEUKOCYTE ESTERASE, URINE: ABNORMAL
NITRITE, URINE: ABNORMAL
PH UA: 6 (ref 5–9)
PROTEIN UA: NEGATIVE MG/DL
RBC UA: ABNORMAL /HPF (ref 0–2)
SPECIFIC GRAVITY UA: 1.02 (ref 1–1.03)
URINE REFLEX TO CULTURE: YES
URINE TYPE: ABNORMAL
UROBILINOGEN, URINE: ABNORMAL E.U./DL
WBC UA: ABNORMAL /HPF (ref 0–5)

## 2019-02-25 PROCEDURE — 87086 URINE CULTURE/COLONY COUNT: CPT

## 2019-02-25 PROCEDURE — 81001 URINALYSIS AUTO W/SCOPE: CPT

## 2019-02-25 PROCEDURE — 99284 EMERGENCY DEPT VISIT MOD MDM: CPT

## 2019-02-25 PROCEDURE — 6370000000 HC RX 637 (ALT 250 FOR IP): Performed by: EMERGENCY MEDICINE

## 2019-02-25 RX ORDER — ONDANSETRON 4 MG/1
4 TABLET, ORALLY DISINTEGRATING ORAL ONCE
Status: COMPLETED | OUTPATIENT
Start: 2019-02-25 | End: 2019-02-25

## 2019-02-25 RX ORDER — OXYCODONE HCL 20 MG/1
20 TABLET, FILM COATED, EXTENDED RELEASE ORAL ONCE
Status: COMPLETED | OUTPATIENT
Start: 2019-02-25 | End: 2019-02-25

## 2019-02-25 RX ORDER — HYDROMORPHONE HYDROCHLORIDE 2 MG/1
1 TABLET ORAL ONCE
Status: DISCONTINUED | OUTPATIENT
Start: 2019-02-25 | End: 2019-02-25

## 2019-02-25 RX ADMIN — OXYCODONE HYDROCHLORIDE 20 MG: 20 TABLET, FILM COATED, EXTENDED RELEASE ORAL at 05:56

## 2019-02-25 RX ADMIN — ONDANSETRON 4 MG: 4 TABLET, ORALLY DISINTEGRATING ORAL at 06:01

## 2019-02-25 ASSESSMENT — PAIN DESCRIPTION - LOCATION
LOCATION: FLANK
LOCATION: FLANK

## 2019-02-25 ASSESSMENT — PAIN DESCRIPTION - PROGRESSION: CLINICAL_PROGRESSION: GRADUALLY IMPROVING

## 2019-02-25 ASSESSMENT — PAIN SCALES - GENERAL
PAINLEVEL_OUTOF10: 7
PAINLEVEL_OUTOF10: 8
PAINLEVEL_OUTOF10: 8

## 2019-02-25 ASSESSMENT — ENCOUNTER SYMPTOMS
VOMITING: 0
NAUSEA: 1

## 2019-02-25 ASSESSMENT — PAIN DESCRIPTION - DESCRIPTORS
DESCRIPTORS: SHARP;THROBBING
DESCRIPTORS: THROBBING

## 2019-02-25 ASSESSMENT — PAIN DESCRIPTION - PAIN TYPE
TYPE: ACUTE PAIN
TYPE: ACUTE PAIN

## 2019-02-25 ASSESSMENT — PAIN DESCRIPTION - ORIENTATION
ORIENTATION: RIGHT
ORIENTATION: RIGHT

## 2019-02-26 LAB — URINE CULTURE, ROUTINE: NORMAL

## 2019-02-27 ENCOUNTER — OFFICE VISIT (OUTPATIENT)
Dept: PRIMARY CARE CLINIC | Age: 34
End: 2019-02-27
Payer: COMMERCIAL

## 2019-02-27 VITALS
SYSTOLIC BLOOD PRESSURE: 122 MMHG | TEMPERATURE: 98.9 F | BODY MASS INDEX: 46.32 KG/M2 | HEIGHT: 65 IN | OXYGEN SATURATION: 98 % | HEART RATE: 67 BPM | WEIGHT: 278 LBS | DIASTOLIC BLOOD PRESSURE: 74 MMHG | RESPIRATION RATE: 18 BRPM

## 2019-02-27 DIAGNOSIS — R82.81 PYURIA: Primary | ICD-10-CM

## 2019-02-27 DIAGNOSIS — R10.9 RIGHT FLANK PAIN: ICD-10-CM

## 2019-02-27 PROCEDURE — 99214 OFFICE O/P EST MOD 30 MIN: CPT | Performed by: FAMILY MEDICINE

## 2019-02-27 RX ORDER — SULFAMETHOXAZOLE AND TRIMETHOPRIM 800; 160 MG/1; MG/1
1 TABLET ORAL 2 TIMES DAILY
Qty: 42 TABLET | Refills: 1 | Status: SHIPPED | OUTPATIENT
Start: 2019-02-27 | End: 2019-03-20

## 2019-02-27 RX ORDER — PRAZOSIN HYDROCHLORIDE 5 MG/1
CAPSULE ORAL
COMMUNITY
Start: 2018-08-02 | End: 2019-02-27

## 2019-02-27 RX ORDER — LIDOCAINE 50 MG/G
PATCH TOPICAL
COMMUNITY
Start: 2019-02-13 | End: 2019-02-27

## 2019-02-27 RX ORDER — OXYCODONE HYDROCHLORIDE AND ACETAMINOPHEN 5; 325 MG/1; MG/1
TABLET ORAL
COMMUNITY
Start: 2019-01-29

## 2019-02-27 RX ORDER — PHENAZOPYRIDINE HYDROCHLORIDE 100 MG/1
100 TABLET, FILM COATED ORAL
COMMUNITY
Start: 2019-01-25 | End: 2019-02-27

## 2019-02-27 ASSESSMENT — ENCOUNTER SYMPTOMS
COLOR CHANGE: 0
WHEEZING: 0
NAUSEA: 0
PHOTOPHOBIA: 0
CHEST TIGHTNESS: 0
EYE PAIN: 0
STRIDOR: 0
DIARRHEA: 0
EYE REDNESS: 0
APNEA: 0
CONSTIPATION: 0
FACIAL SWELLING: 0
CHOKING: 0
EYE DISCHARGE: 0
BOWEL INCONTINENCE: 0
ABDOMINAL PAIN: 0

## 2019-03-19 ENCOUNTER — TELEPHONE (OUTPATIENT)
Dept: PRIMARY CARE CLINIC | Age: 34
End: 2019-03-19

## 2019-05-28 RX ORDER — LEVONORGESTREL / ETHINYL ESTRADIOL AND ETHINYL ESTRADIOL 150-30(84)
KIT ORAL
Qty: 91 TABLET | Refills: 0 | OUTPATIENT
Start: 2019-05-28

## 2019-06-03 RX ORDER — LEVONORGESTREL / ETHINYL ESTRADIOL AND ETHINYL ESTRADIOL 150-30(84)
KIT ORAL
Qty: 91 TABLET | Refills: 0 | Status: SHIPPED | OUTPATIENT
Start: 2019-06-03

## 2019-06-03 NOTE — TELEPHONE ENCOUNTER
Pt had an appt 1-30-19 and said that she forgot to ask for a refill on her b/c  Ashlyna, to be sent to [place] [phone]

## 2019-12-28 LAB
FOLATE: 6.2 NG/ML
TSH SERPL DL<=0.05 MIU/L-ACNC: 0.42 MIU/L (ref 0.44–3.9)
VITAMIN B-12: 233 PG/ML (ref 211–911)
VITAMIN D 25-HYDROXY: 10 NG/ML

## 2020-03-25 PROBLEM — R52: Status: RESOLVED | Noted: 2019-01-28 | Resolved: 2020-03-24

## 2020-07-16 ENCOUNTER — HOSPITAL ENCOUNTER (EMERGENCY)
Age: 35
Discharge: HOME | End: 2020-07-16
Payer: COMMERCIAL

## 2020-07-16 VITALS
HEART RATE: 60 BPM | DIASTOLIC BLOOD PRESSURE: 75 MMHG | TEMPERATURE: 98.1 F | OXYGEN SATURATION: 98 % | SYSTOLIC BLOOD PRESSURE: 150 MMHG | RESPIRATION RATE: 16 BRPM

## 2020-07-16 VITALS — BODY MASS INDEX: 51.3 KG/M2 | BODY MASS INDEX: 43.2 KG/M2
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
## 2020-07-16 DIAGNOSIS — Z79.899: ICD-10-CM

## 2020-07-16 DIAGNOSIS — S20.211A: Primary | ICD-10-CM

## 2020-07-16 DIAGNOSIS — E66.9: ICD-10-CM

## 2020-07-16 DIAGNOSIS — F43.10: ICD-10-CM

## 2020-07-16 DIAGNOSIS — S46.212A: ICD-10-CM
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
## 2020-07-16 PROCEDURE — 71101 X-RAY EXAM UNILAT RIBS/CHEST: CPT

## 2020-07-16 PROCEDURE — 71120 X-RAY EXAM BREASTBONE 2/>VWS: CPT

## 2020-07-16 PROCEDURE — 99284 EMERGENCY DEPT VISIT MOD MDM: CPT

## 2023-05-30 ENCOUNTER — DOCUMENTATION (OUTPATIENT)
Dept: PRIMARY CARE | Facility: CLINIC | Age: 38
End: 2023-05-30
Payer: COMMERCIAL

## 2023-05-30 RX ORDER — ALBUTEROL SULFATE 90 UG/1
2 AEROSOL, METERED RESPIRATORY (INHALATION) EVERY 4 HOURS PRN
Status: ON HOLD | COMMUNITY
End: 2024-04-03 | Stop reason: ALTCHOICE

## 2023-05-30 RX ORDER — EPINEPHRINE 0.3 MG/.3ML
0.3 INJECTION SUBCUTANEOUS DAILY PRN
COMMUNITY
Start: 2022-08-03 | End: 2023-10-23 | Stop reason: SDUPTHER

## 2023-05-30 RX ORDER — ALBUTEROL SULFATE 0.83 MG/ML
3 SOLUTION RESPIRATORY (INHALATION) EVERY 4 HOURS PRN
COMMUNITY
Start: 2022-09-13

## 2023-05-30 RX ORDER — PREDNISONE 20 MG/1
TABLET ORAL
COMMUNITY
Start: 2023-05-26 | End: 2023-06-22 | Stop reason: ALTCHOICE

## 2023-05-30 RX ORDER — NAPROXEN SODIUM 220 MG/1
81 TABLET, FILM COATED ORAL DAILY
COMMUNITY

## 2023-05-30 RX ORDER — DESVENLAFAXINE 100 MG/1
1 TABLET, EXTENDED RELEASE ORAL DAILY
COMMUNITY
End: 2023-11-27

## 2023-05-30 RX ORDER — LEVOTHYROXINE SODIUM 100 UG/1
1 TABLET ORAL DAILY
COMMUNITY
Start: 2019-11-13

## 2023-05-30 RX ORDER — BUTALBITAL, ACETAMINOPHEN AND CAFFEINE 50; 325; 40 MG/1; MG/1; MG/1
1 TABLET ORAL DAILY
COMMUNITY
End: 2023-08-09 | Stop reason: SDUPTHER

## 2023-05-30 NOTE — PROGRESS NOTES
Discharge Facility: [facility]  Discharge Diagnosis: Asthma exacerbation; Elevated troponin secondary to demand ischemia from epinephrine and asthma exacerbation.  Admission Date: 24-May-2023  Discharge Date:  26-May-2023    PCP Appointment Date: 6/7/2023  Specialist Appointment Date: 6/5/2023 with cardiology Dr. Warren  [facility] Encounter and Summary: Linked   See discharge assessment below for further details  Engagement  Call Start Time: 1456 (5/30/2023  3:02 PM)    Medications  Medications reviewed with patient/caregiver?: Yes (5/30/2023  3:02 PM)  Is the patient having any side effects they believe may be caused by any medication additions or changes?: No (5/30/2023  3:02 PM)  Does the patient have all medications ordered at discharge?: Yes (5/30/2023  3:02 PM)  Care Management Interventions: No intervention needed (5/30/2023  3:02 PM)  Prescription Comments: see med list-new medication, prednisone (5/30/2023  3:02 PM)  Is the patient taking all medications as directed (includes completed medication regime)?: Yes (5/30/2023  3:02 PM)    Appointments  Does the patient have a primary care provider?: Yes (5/30/2023  3:02 PM)  Care Management Interventions: Verified appointment date/time/provider (assisted in arranging follow up with PCP) (5/30/2023  3:02 PM)  Has the patient kept scheduled appointments due by today?: Yes (5/30/2023  3:02 PM)  Care Management Interventions: Advised patient to keep appointment (5/30/2023  3:02 PM)    Self Management  What is the home health agency?: denies need (5/30/2023  3:02 PM)    Patient Teaching  Does the patient have access to their discharge instructions?: Yes (5/30/2023  3:02 PM)  Care Management Interventions: Reviewed instructions with patient (5/30/2023  3:02 PM)  What is the patient's perception of their health status since discharge?: Returned to baseline/stable (5/30/2023  3:02 PM)  Is the patient/caregiver able to teach back the hierarchy of who to  call/visit for symptoms/problems? PCP, Specialist, Home Health nurse, Urgent Care, ED, 911: Yes (5/30/2023  3:02 PM)

## 2023-06-02 DIAGNOSIS — J45.909 ASTHMA, UNSPECIFIED ASTHMA SEVERITY, UNSPECIFIED WHETHER COMPLICATED, UNSPECIFIED WHETHER PERSISTENT (HHS-HCC): Primary | ICD-10-CM

## 2023-06-08 ENCOUNTER — DOCUMENTATION (OUTPATIENT)
Dept: PRIMARY CARE | Facility: CLINIC | Age: 38
End: 2023-06-08
Payer: COMMERCIAL

## 2023-06-09 ENCOUNTER — PATIENT OUTREACH (OUTPATIENT)
Dept: PRIMARY CARE | Facility: CLINIC | Age: 38
End: 2023-06-09
Payer: COMMERCIAL

## 2023-06-09 NOTE — PROGRESS NOTES
Discharge Facility: [facility]  Discharge Diagnosis: Asthma exacerbation  Admission Date: 06-Jun-2023  Discharge Date:  07-Jun-2023    PCP Appointment Date: TBD  Specialist Appointment Date: TBD  Hospital Encounter and Summary: Linked  Unable to reach patient during two business days after discharge despite at least two attempts made.

## 2023-06-15 ENCOUNTER — OFFICE VISIT (OUTPATIENT)
Dept: PRIMARY CARE | Facility: CLINIC | Age: 38
End: 2023-06-15
Payer: COMMERCIAL

## 2023-06-15 VITALS
BODY MASS INDEX: 50.02 KG/M2 | HEART RATE: 80 BPM | OXYGEN SATURATION: 98 % | RESPIRATION RATE: 14 BRPM | SYSTOLIC BLOOD PRESSURE: 118 MMHG | DIASTOLIC BLOOD PRESSURE: 60 MMHG | WEIGHT: 293 LBS | HEIGHT: 64 IN

## 2023-06-15 DIAGNOSIS — J45.909 ASTHMA, UNSPECIFIED ASTHMA SEVERITY, UNSPECIFIED WHETHER COMPLICATED, UNSPECIFIED WHETHER PERSISTENT (HHS-HCC): ICD-10-CM

## 2023-06-15 PROCEDURE — 99495 TRANSJ CARE MGMT MOD F2F 14D: CPT | Performed by: FAMILY MEDICINE

## 2023-06-15 RX ORDER — PREDNISONE 10 MG/1
TABLET ORAL
Qty: 20 TABLET | Refills: 0 | Status: SHIPPED | OUTPATIENT
Start: 2023-06-15 | End: 2023-06-22 | Stop reason: ALTCHOICE

## 2023-06-15 RX ORDER — LEVONORGESTREL / ETHINYL ESTRADIOL AND ETHINYL ESTRADIOL 150-30(84)
1 KIT ORAL DAILY
COMMUNITY
End: 2023-12-07 | Stop reason: HOSPADM

## 2023-06-15 ASSESSMENT — ENCOUNTER SYMPTOMS
GASTROINTESTINAL NEGATIVE: 1
ENDOCRINE NEGATIVE: 1
CARDIOVASCULAR NEGATIVE: 1
ALLERGIC/IMMUNOLOGIC NEGATIVE: 1
NEUROLOGICAL NEGATIVE: 1
CONSTITUTIONAL NEGATIVE: 1
RESPIRATORY NEGATIVE: 1
MUSCULOSKELETAL NEGATIVE: 1
PSYCHIATRIC NEGATIVE: 1
HEMATOLOGIC/LYMPHATIC NEGATIVE: 1
EYES NEGATIVE: 1

## 2023-06-15 NOTE — PROGRESS NOTES
"Subjective   Patient ID: Sarahi Haley is a 37 y.o. female who presents for a follow up from the ER    [facility] follow up from [facility] she was there twice once at the end of May 2023 and June 2023 for exacerbation of asthma. Pt states she is feeling some better. Pt is currently on trelegy. Pt is currently seeing pulmonology     Review of Systems   Constitutional: Negative.    HENT: Negative.     Eyes: Negative.    Respiratory: Negative.     Cardiovascular: Negative.    Gastrointestinal: Negative.    Endocrine: Negative.    Genitourinary: Negative.    Musculoskeletal: Negative.    Skin: Negative.    Allergic/Immunologic: Negative.    Neurological: Negative.    Hematological: Negative.    Psychiatric/Behavioral: Negative.         Objective   /60 (BP Location: Left arm, Patient Position: Sitting, BP Cuff Size: Large adult)   Pulse 80   Resp 14   Ht 1.626 m (5' 4\")   Wt 145 kg (319 lb)   SpO2 98%   BMI 54.76 kg/m²     Physical Exam CONSTITUTIONAL- NAD, Pt is A & O x3, Vital signs reviewed per chart.  General Appearance- normal , good hygiene,talks easily  EYES-PERRLA conjunctiva and lids- normal  EARS/NOSE-TM's normal, head normocephalic and atraumatic, naso pharynx-no nasal discharge, no trismus,  oropharynx- normal without exudate  NECK- supple, FROM, without mass  thyroid- NT, normal size, no nodule noted  LYMPH- WNL  CV- RRR without murmur, gallop, or other abnormality.  PULM- CTA bilaterally  Respiratory effort- normal respiratory effort , no retractions or nasal flaring  ABDOMEN- normoactive BS's , soft , NT, no hepatosplenomegaly palpated, or masses. No pulsatile masses noted  extremities no edema,NT  SKIN- no abnormal skin lesions on inspection or palpation  neuro- no focal deficits  PSYCH- pleasant, normal judgement and insight     Assessment/Plan   Problem List Items Addressed This Visit    None       Scribe Attestation  By signing my name below, I, Mirta Reyes MA  , Scribe   attest that " this documentation has been prepared under the direction and in the presence of Paul Matthew DO.

## 2023-06-22 ENCOUNTER — TELEMEDICINE (OUTPATIENT)
Dept: PHARMACY | Facility: HOSPITAL | Age: 38
End: 2023-06-22
Payer: COMMERCIAL

## 2023-06-22 DIAGNOSIS — J45.909 ASTHMA, UNSPECIFIED ASTHMA SEVERITY, UNSPECIFIED WHETHER COMPLICATED, UNSPECIFIED WHETHER PERSISTENT (HHS-HCC): Primary | ICD-10-CM
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
## 2023-06-23 NOTE — PROGRESS NOTES
I reviewed the progress note and agree with the resident’s findings and plans as written. Case discussed with resident.    Devonte Dinero, DayanaD

## 2023-06-28 ENCOUNTER — DOCUMENTATION (OUTPATIENT)
Dept: PRIMARY CARE | Facility: CLINIC | Age: 38
End: 2023-06-28
Payer: COMMERCIAL

## 2023-06-28 ENCOUNTER — PATIENT OUTREACH (OUTPATIENT)
Dept: PRIMARY CARE | Facility: CLINIC | Age: 38
End: 2023-06-28
Payer: COMMERCIAL

## 2023-06-28 RX ORDER — MINERAL OIL
180 ENEMA (ML) RECTAL
COMMUNITY
Start: 2015-05-21

## 2023-06-28 RX ORDER — PREDNISONE 20 MG/1
2 TABLET ORAL DAILY
COMMUNITY
End: 2023-08-09 | Stop reason: ALTCHOICE

## 2023-06-28 NOTE — PROGRESS NOTES
"Discharge Facility: [facility]  Discharge Diagnosis: Asthma exacerbation  Admission Date: 25-Jun-2023  Discharge Date:  27-Jun-2023    PCP Appointment Date: TBD-patient states she will call to arrange an appt at a later time with PCP  Specialist Appointment Date: TBD-patient states she plans on calling pulmonologist this afternoon to arrange follow up appt  Hospital Encounter and Summary: Linked   See discharge assessment below for further details  Engagement  Call Start Time: 1125 (6/28/2023 11:28 AM)    Medications  Medications reviewed with patient/caregiver?: Yes (6/28/2023 11:28 AM)  Is the patient having any side effects they believe may be caused by any medication additions or changes?: No (6/28/2023 11:28 AM)  Does the patient have all medications ordered at discharge?: Yes (6/28/2023 11:28 AM)  Care Management Interventions: No intervention needed (6/28/2023 11:28 AM)  Prescription Comments: see med list (new med\" prednisone) (6/28/2023 11:28 AM)    Appointments  Does the patient have a primary care provider?: Yes (6/28/2023 11:28 AM)  Care Management Interventions: Verified appointment date/time/provider (6/28/2023 11:28 AM)  Care Management Interventions: Educated on importance of keeping appointment; Advised to reschedule appointment (6/28/2023 11:28 AM)    Self Management  What is the home health agency?: denies need (6/28/2023 11:28 AM)    Patient Teaching  Does the patient have access to their discharge instructions?: Yes (6/28/2023 11:28 AM)  Care Management Interventions: Reviewed instructions with patient (6/28/2023 11:28 AM)  What is the patient's perception of their health status since discharge?: Returned to baseline/stable (6/28/2023 11:28 AM)  Is the patient/caregiver able to teach back the hierarchy of who to call/visit for symptoms/problems? PCP, Specialist, Home Health nurse, Urgent Care, ED, 911: Yes (6/28/2023 11:28 AM)    Wrap Up  Wrap Up Additional Comments: Patient states " she thought the first few asthma exacerbations she has had the last few months were due to pollen count and the air quality. 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 States the patient had been a hoarder with mold/cat feces-hair/etc. (6/28/2023 11:28 AM)

## 2023-07-07 ENCOUNTER — PATIENT MESSAGE (OUTPATIENT)
Dept: PRIMARY CARE | Facility: CLINIC | Age: 38
End: 2023-07-07
Payer: COMMERCIAL

## 2023-07-07 DIAGNOSIS — R53.83 OTHER FATIGUE: ICD-10-CM

## 2023-07-07 DIAGNOSIS — Z00.00 ANNUAL PHYSICAL EXAM: ICD-10-CM

## 2023-07-07 NOTE — TELEPHONE ENCOUNTER
From: Sarahi Haley  To: Paul Matthew DO  Sent: 7/7/2023 10:02 AM EDT  Subject: Bloodwork     Hello,   I have an appointment scheduled on 7/26. Could I please have fasting bloodwork ordered to check my cholesterol and thyroid levels? Thank you!

## 2023-07-10 ENCOUNTER — LAB (OUTPATIENT)
Dept: LAB | Facility: LAB | Age: 38
End: 2023-07-10
Payer: COMMERCIAL

## 2023-07-10 DIAGNOSIS — Z00.00 ANNUAL PHYSICAL EXAM: ICD-10-CM

## 2023-07-10 DIAGNOSIS — R53.83 OTHER FATIGUE: ICD-10-CM

## 2023-07-10 LAB
ALANINE AMINOTRANSFERASE (SGPT) (U/L) IN SER/PLAS: 42 U/L (ref 7–45)
ALBUMIN (G/DL) IN SER/PLAS: 4.1 G/DL (ref 3.4–5)
ALKALINE PHOSPHATASE (U/L) IN SER/PLAS: 79 U/L (ref 33–110)
ANION GAP IN SER/PLAS: 13 MMOL/L (ref 10–20)
ASPARTATE AMINOTRANSFERASE (SGOT) (U/L) IN SER/PLAS: 30 U/L (ref 9–39)
BILIRUBIN TOTAL (MG/DL) IN SER/PLAS: 1.2 MG/DL (ref 0–1.2)
CALCIUM (MG/DL) IN SER/PLAS: 9 MG/DL (ref 8.6–10.3)
CARBON DIOXIDE, TOTAL (MMOL/L) IN SER/PLAS: 26 MMOL/L (ref 21–32)
CHLORIDE (MMOL/L) IN SER/PLAS: 104 MMOL/L (ref 98–107)
CHOLESTEROL (MG/DL) IN SER/PLAS: 299 MG/DL (ref 0–199)
CHOLESTEROL IN HDL (MG/DL) IN SER/PLAS: 49.4 MG/DL
CHOLESTEROL/HDL RATIO: 6.1
CREATININE (MG/DL) IN SER/PLAS: 0.86 MG/DL (ref 0.5–1.05)
ERYTHROCYTE DISTRIBUTION WIDTH (RATIO) BY AUTOMATED COUNT: 12.5 % (ref 11.5–14.5)
ERYTHROCYTE MEAN CORPUSCULAR HEMOGLOBIN CONCENTRATION (G/DL) BY AUTOMATED: 34.8 G/DL (ref 32–36)
ERYTHROCYTE MEAN CORPUSCULAR VOLUME (FL) BY AUTOMATED COUNT: 87 FL (ref 80–100)
ERYTHROCYTES (10*6/UL) IN BLOOD BY AUTOMATED COUNT: 4.72 X10E12/L (ref 4–5.2)
GFR FEMALE: 90 ML/MIN/1.73M2
GLUCOSE (MG/DL) IN SER/PLAS: 97 MG/DL (ref 74–99)
HEMATOCRIT (%) IN BLOOD BY AUTOMATED COUNT: 41.1 % (ref 36–46)
HEMOGLOBIN (G/DL) IN BLOOD: 14.3 G/DL (ref 12–16)
LDL: 215 MG/DL (ref 0–99)
LEUKOCYTES (10*3/UL) IN BLOOD BY AUTOMATED COUNT: 7 X10E9/L (ref 4.4–11.3)
PLATELETS (10*3/UL) IN BLOOD AUTOMATED COUNT: 345 X10E9/L (ref 150–450)
POTASSIUM (MMOL/L) IN SER/PLAS: 3.7 MMOL/L (ref 3.5–5.3)
PROTEIN TOTAL: 6.5 G/DL (ref 6.4–8.2)
SODIUM (MMOL/L) IN SER/PLAS: 139 MMOL/L (ref 136–145)
THYROTROPIN (MIU/L) IN SER/PLAS BY DETECTION LIMIT <= 0.05 MIU/L: 2.66 MIU/L (ref 0.44–3.98)
THYROXINE (T4) (UG/DL) IN SER/PLAS: 6.5 UG/DL (ref 4.5–11.1)
THYROXINE (T4) FREE (NG/DL) IN SER/PLAS: 0.68 NG/DL (ref 0.61–1.12)
TRIGLYCERIDE (MG/DL) IN SER/PLAS: 173 MG/DL (ref 0–149)
UREA NITROGEN (MG/DL) IN SER/PLAS: 8 MG/DL (ref 6–23)
VLDL: 35 MG/DL (ref 0–40)

## 2023-07-10 PROCEDURE — 80061 LIPID PANEL: CPT

## 2023-07-10 PROCEDURE — 80053 COMPREHEN METABOLIC PANEL: CPT

## 2023-07-10 PROCEDURE — 84443 ASSAY THYROID STIM HORMONE: CPT

## 2023-07-10 PROCEDURE — 84439 ASSAY OF FREE THYROXINE: CPT

## 2023-07-10 PROCEDURE — 85027 COMPLETE CBC AUTOMATED: CPT

## 2023-07-10 PROCEDURE — 36415 COLL VENOUS BLD VENIPUNCTURE: CPT

## 2023-07-13 ENCOUNTER — DOCUMENTATION (OUTPATIENT)
Dept: PRIMARY CARE | Facility: CLINIC | Age: 38
End: 2023-07-13
Payer: COMMERCIAL

## 2023-07-14 ENCOUNTER — PATIENT OUTREACH (OUTPATIENT)
Dept: PRIMARY CARE | Facility: CLINIC | Age: 38
End: 2023-07-14
Payer: COMMERCIAL

## 2023-07-14 NOTE — PROGRESS NOTES
Discharge Facility: [facility]  Discharge Diagnosis: Asthma exacerbation  Admission Date: 11-Jul-2023  Discharge Date:  12-Jul-2023    PCP Appointment Date: 7/26/2023  Specialist Appointment Date: TBD  Hospital Encounter and Summary:  not available at this time   Unable to reach patient during two business days after discharge despite at least two attempts made.

## 2023-07-28 ENCOUNTER — PATIENT OUTREACH (OUTPATIENT)
Dept: PRIMARY CARE | Facility: CLINIC | Age: 38
End: 2023-07-28
Payer: COMMERCIAL

## 2023-07-28 NOTE — PROGRESS NOTES
Call regarding appt. with PCP on 7/26/2023 after hospitalization.  Patient states she is unable to talk at this time. Verbalized that she would call the office if any issues arose.

## 2023-07-31 ENCOUNTER — DOCUMENTATION (OUTPATIENT)
Dept: PRIMARY CARE | Facility: CLINIC | Age: 38
End: 2023-07-31
Payer: COMMERCIAL

## 2023-07-31 RX ORDER — NORETHINDRONE 0.35 MG
1 KIT ORAL DAILY
COMMUNITY
Start: 2023-06-15 | End: 2024-01-01 | Stop reason: HOSPADM

## 2023-07-31 RX ORDER — BUDESONIDE 0.5 MG/2ML
0.5 INHALANT ORAL
COMMUNITY
Start: 2023-07-26

## 2023-07-31 NOTE — PROGRESS NOTES
Discharge Facility: [facility] ([place], [place])  Discharge Diagnosis: Asthma exacerbation  Admission Date: 7/29/2023  Discharge Date: 7/30/2023    PCP Appointment Date:   Specialist Appointment Date:   Hospital Encounter and Summary: not available at this time  See discharge assessment below for further details  Engagement  Call Start Time: 1449 (7/31/2023  2:56 PM)    Medications  Medications reviewed with patient/caregiver?: Yes (new meds discussed) (7/31/2023  2:56 PM)  Is the patient having any side effects they believe may be caused by any medication additions or changes?: No (7/31/2023  2:56 PM)  Does the patient have all medications ordered at discharge?: Yes (7/31/2023  2:56 PM)  Prescription Comments: see med list (prednisone) (7/31/2023  2:56 PM)  Is the patient taking all medications as directed (includes completed medication regime)?: Yes (7/31/2023  2:56 PM)  Care Management Interventions: Provided patient education (7/31/2023  2:56 PM)    Appointments  Does the patient have a primary care provider?: Yes (7/31/2023  2:56 PM)  Care Management Interventions: Verified appointment date/time/provider (7/31/2023  2:56 PM)  Has the patient kept scheduled appointments due by today?: Yes (7/31/2023  2:56 PM)  Care Management Interventions: Advised patient to keep appointment (7/31/2023  2:56 PM)    Self Management  What is the home health agency?: denies need (7/31/2023  2:56 PM)    Patient Teaching  Does the patient have access to their discharge instructions?: Yes (7/31/2023  2:56 PM)  Care Management Interventions: Reviewed instructions with patient (7/31/2023  2:56 PM)  What is the patient's perception of their health status since discharge?: Returned to baseline/stable (7/31/2023  2:56 PM)  Is the patient/caregiver able to teach back the hierarchy of who to call/visit for symptoms/problems? PCP, Specialist, Home Health nurse, Urgent Care, ED, 911: Yes (7/31/2023  2:56 PM)  Patient/Caregiver Education Comments:  Patient states she is feeling much better now. Had an appt with Dr. Juarez and had some medications changed around but was back in ER on Saturday before she could start taking them. States she will need a referral to an allergist as pulmonolgy told her that she would need to see one. Would also like a doctor recommendation from Dr. Matthew. (7/31/2023  2:56 PM)

## 2023-08-09 ENCOUNTER — OFFICE VISIT (OUTPATIENT)
Dept: PRIMARY CARE | Facility: CLINIC | Age: 38
End: 2023-08-09
Payer: COMMERCIAL

## 2023-08-09 VITALS
RESPIRATION RATE: 14 BRPM | WEIGHT: 293 LBS | HEART RATE: 78 BPM | DIASTOLIC BLOOD PRESSURE: 62 MMHG | OXYGEN SATURATION: 98 % | SYSTOLIC BLOOD PRESSURE: 124 MMHG | BODY MASS INDEX: 48.82 KG/M2 | HEIGHT: 65 IN

## 2023-08-09 DIAGNOSIS — J45.909 ASTHMA, UNSPECIFIED ASTHMA SEVERITY, UNSPECIFIED WHETHER COMPLICATED, UNSPECIFIED WHETHER PERSISTENT (HHS-HCC): Primary | ICD-10-CM

## 2023-08-09 DIAGNOSIS — G43.809 OTHER MIGRAINE WITHOUT STATUS MIGRAINOSUS, NOT INTRACTABLE: ICD-10-CM

## 2023-08-09 DIAGNOSIS — F41.9 ANXIETY: ICD-10-CM

## 2023-08-09 PROCEDURE — 99495 TRANSJ CARE MGMT MOD F2F 14D: CPT | Performed by: FAMILY MEDICINE

## 2023-08-09 RX ORDER — BUTALBITAL, ACETAMINOPHEN AND CAFFEINE 50; 325; 40 MG/1; MG/1; MG/1
1 TABLET ORAL DAILY
Qty: 90 TABLET | Refills: 3 | Status: SHIPPED | OUTPATIENT
Start: 2023-08-09

## 2023-08-09 RX ORDER — MONTELUKAST SODIUM 10 MG/1
10 TABLET ORAL NIGHTLY
Qty: 30 TABLET | Refills: 5 | Status: SHIPPED | OUTPATIENT
Start: 2023-08-09 | End: 2024-06-09

## 2023-08-09 RX ORDER — BUSPIRONE HYDROCHLORIDE 15 MG/1
7.5-15 TABLET ORAL 2 TIMES DAILY
Qty: 60 TABLET | Refills: 11 | Status: SHIPPED | OUTPATIENT
Start: 2023-08-09 | End: 2024-01-01 | Stop reason: HOSPADM

## 2023-08-09 NOTE — PROGRESS NOTES
Subjective   Patient ID: Sarahi Haley is a 37 y.o. female who presents for Hospital Follow-up.    HPI Pt was admitted at 07/29/-0731/2023 at [facility] for her Asthma. Pt was given breo and spiriva in the hospital. She is using pulmicort nebulizer that is helping. Pt states she is feeling better but not 100%. She has just finished sterids. Pt has PFT scheduled for next week with pulmonology as well as blood work.     12 Systems have been reviewed as follows.  Constitutional: Fever, weight gain, weight loss, appetite change, night sweats, fatigue, chills.  Eyes : blurry, double vision, vision, loss, tearing, redness, pain, sensitivity to light, glaucoma.  Ears: nose, mouth, and throat: Hearing loss, ringing in the ears, ear pain, nasal congestion, nasal drainage, nosebleeds, mouth, throat, irritation tooth problem.  Cardiovascular: chest pain, pressure, heart racing, palpitations, sweating, leg swelling, high or low blood pressure  Pulmonary: Cough, yellow or green sputum, blood and sputum, shortness of breath, wheezing  Gastrointestinal: Nausea, vomiting, diarrhea, constipation, pain, blood in stool, or vomitus, heartburn, difficulty swallowing  Genitourinary: incontinence, abnormal bleeding, abnormal discharge, urinary frequency, urinary hesitancy, pain, impotence sexual problem, infection, urinary retention  Musculoskeletal: Pain, stiffness, joint, redness or warmth, arthritis, back pain, weakness, muscle wasting, sprain or fracture  Neuro: Weight weakness, dizziness, change in voice, change in taste change in vision, change in hearing, loss, or change of sensation, trouble walking, balance problems coordination problems, shaking, speech problem  Endocrine: cold or heat intolerance, blood sugar problem, weight gain or loss missed periods hot flashes, sweats, change in body hair, change in libido, increased thirst, increased urination  Heme/lymph: Swelling, bleeding, problem anemia, bruising, enlarged lymph  "nodes  Allergic/immunologic: H. plus nasal drip, watery itchy eyes, nasal drainage, immunosuppressed  The above were reviewed and noted negative except as noted in HPI and Problem List.     Objective   /62 (BP Location: Left arm, Patient Position: Sitting, BP Cuff Size: Adult)   Pulse 78   Resp 14   Ht 1.651 m (5' 5\")   Wt 147 kg (323 lb)   LMP 07/18/2023   SpO2 98%   BMI 53.75 kg/m²     Physical Exam CONSTITUTIONAL- NAD, Pt is A & O x3, Vital signs reviewed per chart.  General Appearance- normal , good hygiene,talks easily  EYES-PERRLA conjunctiva and lids- normal  EARS/NOSE-TM's normal, head normocephalic and atraumatic, naso pharynx-no nasal discharge, no trismus,  oropharynx- normal without exudate  NECK- supple, FROM, without mass  thyroid- NT, normal size, no nodule noted  LYMPH- WNL  CV- RRR without murmur, gallop, or other abnormality.  PULM- CTA bilaterally  Respiratory effort- normal respiratory effort , no retractions or nasal flaring  ABDOMEN- normoactive BS's , soft , NT, no hepatosplenomegaly palpated, or masses. No pulsatile masses noted  extremities no edema,NT  SKIN- no abnormal skin lesions on inspection or palpation  neuro- no focal deficits  PSYCH- pleasant, normal judgement and insight     Assessment/Plan   Problem List Items Addressed This Visit       Asthma - Primary     Other Visit Diagnoses       Other migraine without status migrainosus, not intractable                 Scribe Attestation  By signing my name below, I, Mirta eRyes MA  , Scribe   attest that this documentation has been prepared under the direction and in the presence of Paul Matthew DO.  "

## 2023-08-11 ENCOUNTER — PATIENT OUTREACH (OUTPATIENT)
Dept: PRIMARY CARE | Facility: CLINIC | Age: 38
End: 2023-08-11
Payer: COMMERCIAL

## 2023-08-11 NOTE — PROGRESS NOTES
Unable to reach patient for call back after patient's follow up appointment with PCP.   ESTELITAM with call back number for patient to call if needed   If no voicemail available call attempts x 2 were made to contact the patient to assist with any questions or concerns patient may have.

## 2023-08-15 LAB
ALLERGEN ANIMAL: CAT DANDER IGE (KU/L): <0.1 KU/L
ALLERGEN ANIMAL: DOG DANDER IGE (KU/L): <0.1 KU/L
ALLERGEN GRASS: BERMUDA GRASS (CYNODON DACTYLON) IGE (KU/L): <0.1 KU/L
ALLERGEN GRASS: JOHNSON GRASS (SORGHUM HALEPENSE) IGE (KU/L): <0.1 KU/L
ALLERGEN GRASS: MEADOW GRASS, KENTUCKY BLUE (POA PRATENSIS )IGE (KU/L): <0.1 KU/L
ALLERGEN GRASS: TIMOTHY GRASS (PHLEUM PRATENSE) IGE (KU/L): <0.1 KU/L
ALLERGEN INSECT: COCKROACH IGE: 0.35 KU/L
ALLERGEN MICROORGANISM: ALTERNARIA ALTERNATA IGE (KU/L): <0.1 KU/L
ALLERGEN MICROORGANISM: ASPERGILLUS FUMIGATUS IGE (KU/L): <0.1 KU/L
ALLERGEN MICROORGANISM: CLADOSPORIUM HERBARUM IGE (KU/L): <0.1 KU/L
ALLERGEN MICROORGANISM: PENICILLIUM CHRYSOGENUM (P. NOTATUM) IGE (KU/L): <0.1 KU/L
ALLERGEN MITE: DERMATOPHAGOIDES FARINAE (HOUSE DUST MITE) IGE (KU/L): <0.1 KU/L
ALLERGEN MITE: DERMATOPHAGOIDES PTERONYSSINUS (HOUSE DUST MITE) IGE (KU/L): <0.1 KU/L
ALLERGEN TREE: BOX-ELDER (ACER NEGUNDO) IGE (KU/L): <0.1 KU/L
ALLERGEN TREE: COMMON SILVER BIRCH (BETULA VERRUCOSA) IGE (KU/L): <0.1 KU/L
ALLERGEN TREE: COTTONWOOD (POPULUS DELTOIDES) IGE (KU/L): <0.1 KU/L
ALLERGEN TREE: ELM (ULMUS AMERICANA) IGE (KU/L): <0.1 KU/L
ALLERGEN TREE: MAPLE LEAF SYCAMORE, LONDON PLANE IGE (KU/L): <0.1 KU/L
ALLERGEN TREE: MOUNTAIN JUNIPER (JUNIPERUS SABINOIDES) IGE (KU/L): <0.1 KU/L
ALLERGEN TREE: MULBERRY (MORUS ALBA) IGE (KU/L): <0.1 KU/L
ALLERGEN TREE: OAK (QUERCUS ALBA) IGE (KU/L): <0.1 KU/L
ALLERGEN TREE: PECAN, HICKORY (CARYA PECAN) IGE (KU/L): <0.1 KU/L
ALLERGEN TREE: WALNUT IGE: <0.1 KU/L
ALLERGEN TREE: WHITE ASH (FRAXINUS AMERICANA) IGE (KU/L): <0.1 KU/L
ALLERGEN WEED: COMMON PIGWEED (AMARANTHUS RETROFLEXUS) IGE (KU/L): <0.1 KU/L
ALLERGEN WEED: COMMON RAGWEED (AMB. ARTEMISIIFOLIA/A. ELATIOR) IGE (KU/L): <0.1 KU/L
ALLERGEN WEED: GOOSEFOOT, LAMB'S QUARTERS (CHENOPODIUM ALBUM) IGE (KU/L): <0.1 KU/L
ALLERGEN WEED: PLANTAIN (ENGLISH), RIBWORT (PLANTAGO LANCEOLATA) IGE (KU/L): <0.1 KU/L
ALLERGEN WEED: PRICKLY SALTWORT/RUSSIAN THISTLE (SALSOLA KALI) IGE (KU/L): <0.1 KU/L
ALLERGEN WEED: SHEEP SORREL (RUMEX ACETOSELLA) IGE (KU/L): <0.1 KU/L
IMMUNOCAP IGE: 67 KU/L (ref 0–214)
IMMUNOCAP INTERPRETATION: ABNORMAL

## 2023-08-22 ENCOUNTER — DOCUMENTATION (OUTPATIENT)
Dept: PRIMARY CARE | Facility: CLINIC | Age: 38
End: 2023-08-22
Payer: COMMERCIAL

## 2023-08-23 NOTE — PROGRESS NOTES
Discharge Facility: [facility]  Discharge Diagnosis: Asthma exacerbation  Admission Date: 8/20/2023  Discharge Date: 8/21/2023    PCP Appointment Date: TBD-office tasked to outreach patient   Specialist Appointment Date: TBD  Hospital Encounter and Summary: not available at this time   Unable to reach patient during two business days after discharge despite at least two attempts made.

## 2023-09-06 ENCOUNTER — PATIENT OUTREACH (OUTPATIENT)
Dept: PRIMARY CARE | Facility: CLINIC | Age: 38
End: 2023-09-06
Payer: COMMERCIAL

## 2023-09-06 NOTE — PROGRESS NOTES
Call regarding after hospitalization.  At time of outreach call the patient feels as if their condition has returned to baseline since her hospitalization. No c/o SOB or difficulty breathing. Does state she sent in a message to her PCP today regarding her Buspar as she is finding it to be ineffective in controlling her anxiety and she is having trouble sleeping on the medication. Confirmed that I do see patient's note in her chart for PCP to review.

## 2023-09-16 LAB
CHLAMYDIA TRACH., AMPLIFIED: NEGATIVE
N. GONORRHEA, AMPLIFIED: NEGATIVE
TRICHOMONAS VAGINALIS: NEGATIVE

## 2023-10-02 ENCOUNTER — APPOINTMENT (OUTPATIENT)
Dept: PRIMARY CARE | Facility: CLINIC | Age: 38
End: 2023-10-02
Payer: COMMERCIAL

## 2023-10-10 PROBLEM — J98.01 ACUTE BRONCHOSPASM: Status: ACTIVE | Noted: 2023-10-10

## 2023-10-10 PROBLEM — R63.4 WEIGHT LOSS: Status: ACTIVE | Noted: 2017-08-22

## 2023-10-10 PROBLEM — R31.9 HEMATURIA: Status: ACTIVE | Noted: 2023-10-10

## 2023-10-10 PROBLEM — R06.02 SHORTNESS OF BREATH ON EXERTION: Status: ACTIVE | Noted: 2023-10-10

## 2023-10-10 PROBLEM — R55 SYNCOPE: Status: ACTIVE | Noted: 2020-01-17

## 2023-10-10 PROBLEM — G47.8 UNREFRESHED BY SLEEP: Status: ACTIVE | Noted: 2023-10-10

## 2023-10-10 PROBLEM — R31.29 MICROHEMATURIA: Status: ACTIVE | Noted: 2023-10-10

## 2023-10-10 PROBLEM — J96.01 ACUTE RESPIRATORY FAILURE WITH HYPOXEMIA (MULTI): Status: ACTIVE | Noted: 2023-10-10

## 2023-10-10 PROBLEM — E03.9 HYPOTHYROIDISM, ADULT: Status: ACTIVE | Noted: 2023-10-10

## 2023-10-10 PROBLEM — R09.02 HYPOXIA: Status: ACTIVE | Noted: 2023-10-10

## 2023-10-10 PROBLEM — N94.6 DYSMENORRHEA: Status: ACTIVE | Noted: 2023-10-10

## 2023-10-10 PROBLEM — S93.609A FOOT SPRAIN: Status: ACTIVE | Noted: 2023-10-10

## 2023-10-10 PROBLEM — E87.6 HYPOKALEMIA: Status: ACTIVE | Noted: 2023-10-10

## 2023-10-10 PROBLEM — I89.0 LYMPHEDEMA: Status: ACTIVE | Noted: 2017-01-03

## 2023-10-10 PROBLEM — I80.292: Status: ACTIVE | Noted: 2023-10-10

## 2023-10-10 PROBLEM — G43.909 MIGRAINE: Status: ACTIVE | Noted: 2023-10-10

## 2023-10-10 PROBLEM — R42 VERTIGO: Status: ACTIVE | Noted: 2023-10-10

## 2023-10-10 PROBLEM — H10.9 CONJUNCTIVITIS: Status: ACTIVE | Noted: 2023-10-10

## 2023-10-10 PROBLEM — F41.9 ANXIETY AND DEPRESSION: Status: ACTIVE | Noted: 2023-10-10

## 2023-10-10 PROBLEM — R06.83 SNORING: Status: ACTIVE | Noted: 2023-10-10

## 2023-10-10 PROBLEM — H52.222 REGULAR ASTIGMATISM OF LEFT EYE: Status: ACTIVE | Noted: 2017-08-09

## 2023-10-10 PROBLEM — J44.9 COPD (CHRONIC OBSTRUCTIVE PULMONARY DISEASE) (MULTI): Status: ACTIVE | Noted: 2023-10-10

## 2023-10-10 PROBLEM — S39.012A LUMBOSACRAL STRAIN: Status: ACTIVE | Noted: 2023-10-10

## 2023-10-10 PROBLEM — R10.2 PELVIC PAIN IN FEMALE: Status: ACTIVE | Noted: 2023-10-10

## 2023-10-10 PROBLEM — F41.0 PANIC ATTACK: Status: ACTIVE | Noted: 2023-10-10

## 2023-10-10 PROBLEM — B37.0 ORAL THRUSH: Status: ACTIVE | Noted: 2023-10-10

## 2023-10-10 PROBLEM — G40.A09 ABSENCE SEIZURE (MULTI): Status: ACTIVE | Noted: 2023-10-10

## 2023-10-10 PROBLEM — I80.00 SUPERFICIAL THROMBOPHLEBITIS OF LOWER EXTREMITY: Status: ACTIVE | Noted: 2023-10-10

## 2023-10-10 PROBLEM — R00.2 PALPITATIONS: Status: ACTIVE | Noted: 2023-10-10

## 2023-10-10 PROBLEM — F43.10 PTSD (POST-TRAUMATIC STRESS DISORDER): Status: ACTIVE | Noted: 2023-10-10

## 2023-10-10 PROBLEM — R74.8 HIGH LEVEL OF CARDIAC MARKER: Status: ACTIVE | Noted: 2023-10-10

## 2023-10-10 PROBLEM — F32.A ANXIETY AND DEPRESSION: Status: ACTIVE | Noted: 2023-10-10

## 2023-10-10 PROBLEM — E55.9 VITAMIN D DEFICIENCY: Status: ACTIVE | Noted: 2023-10-10

## 2023-10-10 PROBLEM — K21.9 GERD (GASTROESOPHAGEAL REFLUX DISEASE): Status: ACTIVE | Noted: 2023-10-10

## 2023-10-10 PROBLEM — M84.30XA STRESS FRACTURE: Status: ACTIVE | Noted: 2023-10-10

## 2023-10-10 PROBLEM — G40.209: Status: ACTIVE | Noted: 2023-10-10

## 2023-10-10 PROBLEM — H69.93 DYSFUNCTION OF BOTH EUSTACHIAN TUBES: Status: ACTIVE | Noted: 2023-10-10

## 2023-10-10 PROBLEM — J45.901 ACUTE ASTHMA EXACERBATION (HHS-HCC): Status: ACTIVE | Noted: 2023-10-10

## 2023-10-10 PROBLEM — J45.909 RAD (REACTIVE AIRWAY DISEASE) (HHS-HCC): Status: ACTIVE | Noted: 2023-10-10

## 2023-10-10 PROBLEM — S89.92XA LEFT KNEE INJURY: Status: ACTIVE | Noted: 2023-10-10

## 2023-10-10 PROBLEM — G43.711 INTRACTABLE CHRONIC MIGRAINE WITHOUT AURA AND WITH STATUS MIGRAINOSUS: Status: ACTIVE | Noted: 2023-10-10

## 2023-10-10 PROBLEM — E66.01 MORBID OBESITY (MULTI): Status: ACTIVE | Noted: 2023-10-10

## 2023-10-10 PROBLEM — M79.10 MUSCLE PAIN: Status: ACTIVE | Noted: 2019-01-28

## 2023-10-10 PROBLEM — N92.6 MISSED MENSES: Status: ACTIVE | Noted: 2023-10-10

## 2023-10-10 PROBLEM — R53.83 FATIGUE: Status: ACTIVE | Noted: 2023-10-10

## 2023-10-10 PROBLEM — R87.612 LOW GRADE SQUAMOUS INTRAEPITHELIAL LESION (LGSIL) ON PAPANICOLAOU SMEAR OF CERVIX: Status: ACTIVE | Noted: 2023-10-10

## 2023-10-10 PROBLEM — M54.12 CERVICAL RADICULOPATHY, ACUTE: Status: ACTIVE | Noted: 2023-10-10

## 2023-10-10 PROBLEM — R51.9 CEPHALGIA: Status: ACTIVE | Noted: 2023-10-10

## 2023-10-10 PROBLEM — D72.829 LEUKOCYTOSIS: Status: ACTIVE | Noted: 2023-10-10

## 2023-10-10 PROBLEM — H65.00 ACUTE SEROUS OTITIS MEDIA: Status: ACTIVE | Noted: 2023-10-10

## 2023-10-10 PROBLEM — R10.31 RIGHT LOWER QUADRANT ABDOMINAL PAIN: Status: ACTIVE | Noted: 2018-12-20

## 2023-10-10 PROBLEM — N30.00 CYSTITIS, ACUTE: Status: ACTIVE | Noted: 2023-10-10

## 2023-10-10 PROBLEM — R52: Status: ACTIVE | Noted: 2019-01-28

## 2023-10-10 PROBLEM — R10.9 RIGHT FLANK PAIN: Status: ACTIVE | Noted: 2023-10-10

## 2023-10-10 PROBLEM — I51.7 LEFT VENTRICULAR HYPERTROPHY: Status: ACTIVE | Noted: 2023-10-10

## 2023-10-10 PROBLEM — I80.00 SUPERFICIAL PHLEBITIS OF LEG: Status: ACTIVE | Noted: 2023-10-10

## 2023-10-10 PROBLEM — R41.82 ALTERED MENTAL STATUS: Status: ACTIVE | Noted: 2023-10-10

## 2023-10-10 RX ORDER — RIZATRIPTAN BENZOATE 10 MG/1
10 TABLET ORAL AS NEEDED
COMMUNITY

## 2023-10-10 RX ORDER — SUMATRIPTAN SUCCINATE 100 MG/1
100 TABLET ORAL ONCE AS NEEDED
COMMUNITY

## 2023-10-10 RX ORDER — LEVOTHYROXINE SODIUM 75 UG/1
1 TABLET ORAL DAILY
Status: ON HOLD | COMMUNITY
Start: 2017-05-30 | End: 2023-10-15 | Stop reason: ALTCHOICE

## 2023-10-10 RX ORDER — DESVENLAFAXINE 50 MG/1
50 TABLET, EXTENDED RELEASE ORAL DAILY
COMMUNITY
End: 2024-04-29

## 2023-10-10 RX ORDER — PREDNISONE 20 MG/1
20 TABLET ORAL
Status: ON HOLD | COMMUNITY
Start: 2023-08-21 | End: 2023-10-15 | Stop reason: ALTCHOICE

## 2023-10-11 ENCOUNTER — APPOINTMENT (OUTPATIENT)
Dept: OBSTETRICS AND GYNECOLOGY | Facility: CLINIC | Age: 38
End: 2023-10-11
Payer: COMMERCIAL

## 2023-10-15 ENCOUNTER — APPOINTMENT (OUTPATIENT)
Dept: RADIOLOGY | Facility: HOSPITAL | Age: 38
End: 2023-10-15
Payer: COMMERCIAL

## 2023-10-15 ENCOUNTER — HOSPITAL ENCOUNTER (OUTPATIENT)
Facility: HOSPITAL | Age: 38
Setting detail: OBSERVATION
LOS: 1 days | Discharge: HOME | End: 2023-10-17
Attending: STUDENT IN AN ORGANIZED HEALTH CARE EDUCATION/TRAINING PROGRAM | Admitting: HOSPITALIST
Payer: COMMERCIAL

## 2023-10-15 DIAGNOSIS — J45.41 ASTHMA IN ADULT, MODERATE PERSISTENT, WITH ACUTE EXACERBATION (HHS-HCC): ICD-10-CM

## 2023-10-15 DIAGNOSIS — J45.41 MODERATE PERSISTENT ASTHMA WITH EXACERBATION (HHS-HCC): Primary | ICD-10-CM

## 2023-10-15 LAB
ALBUMIN SERPL BCP-MCNC: 4.1 G/DL (ref 3.4–5)
ALP SERPL-CCNC: 66 U/L (ref 33–110)
ALT SERPL W P-5'-P-CCNC: 39 U/L (ref 7–45)
ANION GAP SERPL CALC-SCNC: 15 MMOL/L (ref 10–20)
APTT PPP: 32 SECONDS (ref 27–38)
AST SERPL W P-5'-P-CCNC: 30 U/L (ref 9–39)
B-HCG SERPL-ACNC: <2 MIU/ML
BASOPHILS # BLD AUTO: 0.01 X10*3/UL (ref 0–0.1)
BASOPHILS NFR BLD AUTO: 0.1 %
BILIRUB SERPL-MCNC: 0.7 MG/DL (ref 0–1.2)
BNP SERPL-MCNC: 42 PG/ML (ref 0–99)
BUN SERPL-MCNC: 9 MG/DL (ref 6–23)
CALCIUM SERPL-MCNC: 8.9 MG/DL (ref 8.6–10.3)
CARDIAC TROPONIN I PNL SERPL HS: 3 NG/L (ref 0–13)
CARDIAC TROPONIN I PNL SERPL HS: <3 NG/L (ref 0–13)
CHLORIDE SERPL-SCNC: 103 MMOL/L (ref 98–107)
CO2 SERPL-SCNC: 22 MMOL/L (ref 21–32)
CREAT SERPL-MCNC: 0.89 MG/DL (ref 0.5–1.05)
EOSINOPHIL # BLD AUTO: 0 X10*3/UL (ref 0–0.7)
EOSINOPHIL NFR BLD AUTO: 0 %
ERYTHROCYTE [DISTWIDTH] IN BLOOD BY AUTOMATED COUNT: 12.1 % (ref 11.5–14.5)
EST. AVERAGE GLUCOSE BLD GHB EST-MCNC: 88 MG/DL
GFR SERPL CREATININE-BSD FRML MDRD: 87 ML/MIN/1.73M*2
GLUCOSE BLD MANUAL STRIP-MCNC: 185 MG/DL (ref 74–99)
GLUCOSE BLD MANUAL STRIP-MCNC: 190 MG/DL (ref 74–99)
GLUCOSE BLD MANUAL STRIP-MCNC: 194 MG/DL (ref 74–99)
GLUCOSE SERPL-MCNC: 113 MG/DL (ref 74–99)
HBA1C MFR BLD: 4.7 %
HCT VFR BLD AUTO: 41.1 % (ref 36–46)
HGB BLD-MCNC: 14.4 G/DL (ref 12–16)
IMM GRANULOCYTES # BLD AUTO: 0.03 X10*3/UL (ref 0–0.7)
IMM GRANULOCYTES NFR BLD AUTO: 0.3 % (ref 0–0.9)
INR PPP: 1 (ref 0.9–1.1)
LYMPHOCYTES # BLD AUTO: 3.42 X10*3/UL (ref 1.2–4.8)
LYMPHOCYTES NFR BLD AUTO: 30.8 %
MAGNESIUM SERPL-MCNC: 2.02 MG/DL (ref 1.6–2.4)
MCH RBC QN AUTO: 30.2 PG (ref 26–34)
MCHC RBC AUTO-ENTMCNC: 35 G/DL (ref 32–36)
MCV RBC AUTO: 86 FL (ref 80–100)
MONOCYTES # BLD AUTO: 0.91 X10*3/UL (ref 0.1–1)
MONOCYTES NFR BLD AUTO: 8.2 %
NEUTROPHILS # BLD AUTO: 6.73 X10*3/UL (ref 1.2–7.7)
NEUTROPHILS NFR BLD AUTO: 60.6 %
NRBC BLD-RTO: 0 /100 WBCS (ref 0–0)
PLATELET # BLD AUTO: 369 X10*3/UL (ref 150–450)
PMV BLD AUTO: 9.3 FL (ref 7.5–11.5)
POTASSIUM SERPL-SCNC: 3.3 MMOL/L (ref 3.5–5.3)
PROT SERPL-MCNC: 7 G/DL (ref 6.4–8.2)
PROTHROMBIN TIME: 11.3 SECONDS (ref 9.8–12.8)
RBC # BLD AUTO: 4.77 X10*6/UL (ref 4–5.2)
SARS-COV-2 RNA RESP QL NAA+PROBE: NOT DETECTED
SODIUM SERPL-SCNC: 137 MMOL/L (ref 136–145)
WBC # BLD AUTO: 11.1 X10*3/UL (ref 4.4–11.3)

## 2023-10-15 PROCEDURE — 84132 ASSAY OF SERUM POTASSIUM: CPT

## 2023-10-15 PROCEDURE — 96365 THER/PROPH/DIAG IV INF INIT: CPT | Mod: 59

## 2023-10-15 PROCEDURE — 2500000004 HC RX 250 GENERAL PHARMACY W/ HCPCS (ALT 636 FOR OP/ED)

## 2023-10-15 PROCEDURE — 99285 EMERGENCY DEPT VISIT HI MDM: CPT | Performed by: STUDENT IN AN ORGANIZED HEALTH CARE EDUCATION/TRAINING PROGRAM

## 2023-10-15 PROCEDURE — 87635 SARS-COV-2 COVID-19 AMP PRB: CPT | Performed by: PHYSICIAN ASSISTANT

## 2023-10-15 PROCEDURE — 94640 AIRWAY INHALATION TREATMENT: CPT

## 2023-10-15 PROCEDURE — S4993 CONTRACEPTIVE PILLS FOR BC: HCPCS | Performed by: HOSPITALIST

## 2023-10-15 PROCEDURE — 96376 TX/PRO/DX INJ SAME DRUG ADON: CPT

## 2023-10-15 PROCEDURE — 71275 CT ANGIOGRAPHY CHEST: CPT | Performed by: RADIOLOGY

## 2023-10-15 PROCEDURE — G0378 HOSPITAL OBSERVATION PER HR: HCPCS

## 2023-10-15 PROCEDURE — 96375 TX/PRO/DX INJ NEW DRUG ADDON: CPT | Mod: 59

## 2023-10-15 PROCEDURE — 80053 COMPREHEN METABOLIC PANEL: CPT

## 2023-10-15 PROCEDURE — 85025 COMPLETE CBC W/AUTO DIFF WBC: CPT

## 2023-10-15 PROCEDURE — 85730 THROMBOPLASTIN TIME PARTIAL: CPT

## 2023-10-15 PROCEDURE — 83735 ASSAY OF MAGNESIUM: CPT

## 2023-10-15 PROCEDURE — 83880 ASSAY OF NATRIURETIC PEPTIDE: CPT

## 2023-10-15 PROCEDURE — 2500000004 HC RX 250 GENERAL PHARMACY W/ HCPCS (ALT 636 FOR OP/ED): Performed by: PHYSICIAN ASSISTANT

## 2023-10-15 PROCEDURE — 2500000001 HC RX 250 WO HCPCS SELF ADMINISTERED DRUGS (ALT 637 FOR MEDICARE OP): Performed by: HOSPITALIST

## 2023-10-15 PROCEDURE — 76937 US GUIDE VASCULAR ACCESS: CPT

## 2023-10-15 PROCEDURE — 96372 THER/PROPH/DIAG INJ SC/IM: CPT | Performed by: HOSPITALIST

## 2023-10-15 PROCEDURE — 36415 COLL VENOUS BLD VENIPUNCTURE: CPT

## 2023-10-15 PROCEDURE — 2500000004 HC RX 250 GENERAL PHARMACY W/ HCPCS (ALT 636 FOR OP/ED): Performed by: HOSPITALIST

## 2023-10-15 PROCEDURE — 83036 HEMOGLOBIN GLYCOSYLATED A1C: CPT | Mod: CMCLAB,ELYLAB | Performed by: HOSPITALIST

## 2023-10-15 PROCEDURE — 2500000002 HC RX 250 W HCPCS SELF ADMINISTERED DRUGS (ALT 637 FOR MEDICARE OP, ALT 636 FOR OP/ED): Performed by: HOSPITALIST

## 2023-10-15 PROCEDURE — 2550000001 HC RX 255 CONTRASTS: Performed by: EMERGENCY MEDICINE

## 2023-10-15 PROCEDURE — 82947 ASSAY GLUCOSE BLOOD QUANT: CPT | Mod: 91

## 2023-10-15 PROCEDURE — 84702 CHORIONIC GONADOTROPIN TEST: CPT

## 2023-10-15 PROCEDURE — 84484 ASSAY OF TROPONIN QUANT: CPT

## 2023-10-15 PROCEDURE — 71275 CT ANGIOGRAPHY CHEST: CPT

## 2023-10-15 PROCEDURE — 36600 WITHDRAWAL OF ARTERIAL BLOOD: CPT

## 2023-10-15 RX ORDER — LEVOTHYROXINE SODIUM 100 UG/1
100 TABLET ORAL DAILY
Status: DISCONTINUED | OUTPATIENT
Start: 2023-10-15 | End: 2023-10-17 | Stop reason: HOSPADM

## 2023-10-15 RX ORDER — NORETHINDRONE 0.35 MG/1
1 TABLET ORAL DAILY
Status: DISCONTINUED | OUTPATIENT
Start: 2023-10-15 | End: 2023-10-17 | Stop reason: HOSPADM

## 2023-10-15 RX ORDER — BUTALBITAL, ACETAMINOPHEN AND CAFFEINE 50; 325; 40 MG/1; MG/1; MG/1
1 TABLET ORAL DAILY
Status: DISCONTINUED | OUTPATIENT
Start: 2023-10-15 | End: 2023-10-17 | Stop reason: HOSPADM

## 2023-10-15 RX ORDER — ONDANSETRON HYDROCHLORIDE 2 MG/ML
4 INJECTION, SOLUTION INTRAVENOUS EVERY 6 HOURS PRN
Status: DISCONTINUED | OUTPATIENT
Start: 2023-10-15 | End: 2023-10-17 | Stop reason: HOSPADM

## 2023-10-15 RX ORDER — MORPHINE SULFATE 4 MG/ML
4 INJECTION, SOLUTION INTRAMUSCULAR; INTRAVENOUS ONCE
Status: COMPLETED | OUTPATIENT
Start: 2023-10-15 | End: 2023-10-15

## 2023-10-15 RX ORDER — INSULIN LISPRO 100 [IU]/ML
0-5 INJECTION, SOLUTION INTRAVENOUS; SUBCUTANEOUS
Status: DISCONTINUED | OUTPATIENT
Start: 2023-10-15 | End: 2023-10-16

## 2023-10-15 RX ORDER — DESVENLAFAXINE 50 MG/1
50 TABLET, EXTENDED RELEASE ORAL DAILY
Status: DISCONTINUED | OUTPATIENT
Start: 2023-10-15 | End: 2023-10-17 | Stop reason: HOSPADM

## 2023-10-15 RX ORDER — LEVONORGESTREL / ETHINYL ESTRADIOL AND ETHINYL ESTRADIOL 150-30(84)
1 KIT ORAL DAILY
Status: DISCONTINUED | OUTPATIENT
Start: 2023-10-15 | End: 2023-10-15

## 2023-10-15 RX ORDER — NAPROXEN SODIUM 220 MG/1
81 TABLET, FILM COATED ORAL DAILY
Status: DISCONTINUED | OUTPATIENT
Start: 2023-10-15 | End: 2023-10-17 | Stop reason: HOSPADM

## 2023-10-15 RX ORDER — DEXTROSE MONOHYDRATE 100 MG/ML
0.3 INJECTION, SOLUTION INTRAVENOUS ONCE AS NEEDED
Status: DISCONTINUED | OUTPATIENT
Start: 2023-10-15 | End: 2023-10-17 | Stop reason: HOSPADM

## 2023-10-15 RX ORDER — MAGNESIUM SULFATE HEPTAHYDRATE 40 MG/ML
2 INJECTION, SOLUTION INTRAVENOUS ONCE
Status: COMPLETED | OUTPATIENT
Start: 2023-10-15 | End: 2023-10-15

## 2023-10-15 RX ORDER — POTASSIUM CHLORIDE 1.5 G/1.58G
40 POWDER, FOR SOLUTION ORAL ONCE
Status: COMPLETED | OUTPATIENT
Start: 2023-10-15 | End: 2023-10-15

## 2023-10-15 RX ORDER — DESVENLAFAXINE 50 MG/1
100 TABLET, EXTENDED RELEASE ORAL DAILY
Status: DISCONTINUED | OUTPATIENT
Start: 2023-10-15 | End: 2023-10-17 | Stop reason: HOSPADM

## 2023-10-15 RX ORDER — ONDANSETRON HYDROCHLORIDE 2 MG/ML
4 INJECTION, SOLUTION INTRAVENOUS ONCE
Status: COMPLETED | OUTPATIENT
Start: 2023-10-15 | End: 2023-10-15

## 2023-10-15 RX ORDER — BUDESONIDE 0.5 MG/2ML
0.5 INHALANT ORAL
Status: DISCONTINUED | OUTPATIENT
Start: 2023-10-15 | End: 2023-10-17 | Stop reason: HOSPADM

## 2023-10-15 RX ORDER — ENOXAPARIN SODIUM 100 MG/ML
40 INJECTION SUBCUTANEOUS EVERY 12 HOURS SCHEDULED
Status: DISCONTINUED | OUTPATIENT
Start: 2023-10-15 | End: 2023-10-17 | Stop reason: HOSPADM

## 2023-10-15 RX ORDER — DEXTROSE 50 % IN WATER (D50W) INTRAVENOUS SYRINGE
25
Status: DISCONTINUED | OUTPATIENT
Start: 2023-10-15 | End: 2023-10-17 | Stop reason: HOSPADM

## 2023-10-15 RX ORDER — BUTALBITAL, ACETAMINOPHEN AND CAFFEINE 50; 325; 40 MG/1; MG/1; MG/1
1 TABLET ORAL EVERY 4 HOURS PRN
Status: DISCONTINUED | OUTPATIENT
Start: 2023-10-15 | End: 2023-10-17 | Stop reason: HOSPADM

## 2023-10-15 RX ORDER — MONTELUKAST SODIUM 10 MG/1
10 TABLET ORAL NIGHTLY
Status: DISCONTINUED | OUTPATIENT
Start: 2023-10-15 | End: 2023-10-17 | Stop reason: HOSPADM

## 2023-10-15 RX ORDER — GALCANEZUMAB 120 MG/ML
120 INJECTION, SOLUTION SUBCUTANEOUS
COMMUNITY

## 2023-10-15 RX ORDER — SUMATRIPTAN SUCCINATE 25 MG/1
50 TABLET ORAL 3 TIMES DAILY PRN
Status: DISCONTINUED | OUTPATIENT
Start: 2023-10-15 | End: 2023-10-17 | Stop reason: HOSPADM

## 2023-10-15 RX ORDER — IPRATROPIUM BROMIDE AND ALBUTEROL SULFATE 2.5; .5 MG/3ML; MG/3ML
3 SOLUTION RESPIRATORY (INHALATION)
Status: DISCONTINUED | OUTPATIENT
Start: 2023-10-15 | End: 2023-10-16

## 2023-10-15 RX ORDER — BUTALBITAL, ACETAMINOPHEN AND CAFFEINE 300; 40; 50 MG/1; MG/1; MG/1
1 CAPSULE ORAL EVERY 4 HOURS PRN
Status: DISCONTINUED | OUTPATIENT
Start: 2023-10-15 | End: 2023-10-15

## 2023-10-15 RX ADMIN — BUDESONIDE 0.5 MG: 0.5 INHALANT ORAL at 21:24

## 2023-10-15 RX ADMIN — ONDANSETRON 4 MG: 2 INJECTION INTRAMUSCULAR; INTRAVENOUS at 11:52

## 2023-10-15 RX ADMIN — POTASSIUM CHLORIDE 40 MEQ: 1.5 POWDER, FOR SOLUTION ORAL at 15:25

## 2023-10-15 RX ADMIN — METHYLPREDNISOLONE SODIUM SUCCINATE 125 MG: 125 INJECTION, POWDER, FOR SOLUTION INTRAMUSCULAR; INTRAVENOUS at 06:32

## 2023-10-15 RX ADMIN — IPRATROPIUM BROMIDE AND ALBUTEROL SULFATE 3 ML: 2.5; .5 SOLUTION RESPIRATORY (INHALATION) at 16:35

## 2023-10-15 RX ADMIN — SODIUM CHLORIDE 500 ML: 9 INJECTION, SOLUTION INTRAVENOUS at 06:40

## 2023-10-15 RX ADMIN — ASPIRIN 81 MG: 81 TABLET, CHEWABLE ORAL at 13:19

## 2023-10-15 RX ADMIN — METHYLPREDNISOLONE SODIUM SUCCINATE 40 MG: 40 INJECTION, POWDER, FOR SOLUTION INTRAMUSCULAR; INTRAVENOUS at 12:26

## 2023-10-15 RX ADMIN — DESVENLAFAXINE 50 MG: 50 TABLET, FILM COATED, EXTENDED RELEASE ORAL at 14:04

## 2023-10-15 RX ADMIN — ONDANSETRON 4 MG: 2 INJECTION INTRAMUSCULAR; INTRAVENOUS at 07:13

## 2023-10-15 RX ADMIN — MORPHINE SULFATE 4 MG: 4 INJECTION, SOLUTION INTRAMUSCULAR; INTRAVENOUS at 07:12

## 2023-10-15 RX ADMIN — MONTELUKAST SODIUM 10 MG: 10 TABLET, FILM COATED ORAL at 21:09

## 2023-10-15 RX ADMIN — IPRATROPIUM BROMIDE AND ALBUTEROL SULFATE 3 ML: 2.5; .5 SOLUTION RESPIRATORY (INHALATION) at 21:24

## 2023-10-15 RX ADMIN — NORETHINDRONE 0.35 MG: KIT at 21:10

## 2023-10-15 RX ADMIN — METHYLPREDNISOLONE SODIUM SUCCINATE 40 MG: 40 INJECTION, POWDER, FOR SOLUTION INTRAMUSCULAR; INTRAVENOUS at 17:52

## 2023-10-15 RX ADMIN — BUTALBITAL, ACETAMINOPHEN, AND CAFFEINE 1 TABLET: 50; 325; 40 TABLET, COATED ORAL at 14:03

## 2023-10-15 RX ADMIN — ENOXAPARIN SODIUM 40 MG: 40 INJECTION SUBCUTANEOUS at 21:09

## 2023-10-15 RX ADMIN — MAGNESIUM SULFATE HEPTAHYDRATE 2 G: 40 INJECTION, SOLUTION INTRAVENOUS at 06:34

## 2023-10-15 RX ADMIN — BUTALBITAL, ACETAMINOPHEN, AND CAFFEINE 1 TABLET: 50; 325; 40 TABLET, COATED ORAL at 21:10

## 2023-10-15 RX ADMIN — IOHEXOL 75 ML: 350 INJECTION, SOLUTION INTRAVENOUS at 08:13

## 2023-10-15 RX ADMIN — SUMATRIPTAN SUCCINATE 50 MG: 25 TABLET ORAL at 12:25

## 2023-10-15 RX ADMIN — LEVOTHYROXINE SODIUM 100 MCG: 0.1 TABLET ORAL at 12:26

## 2023-10-15 RX ADMIN — ENOXAPARIN SODIUM 40 MG: 40 INJECTION SUBCUTANEOUS at 11:44

## 2023-10-15 RX ADMIN — DESVENLAFAXINE 100 MG: 50 TABLET, FILM COATED, EXTENDED RELEASE ORAL at 14:05

## 2023-10-15 SDOH — SOCIAL STABILITY: SOCIAL INSECURITY: ABUSE: ADULT

## 2023-10-15 SDOH — SOCIAL STABILITY: SOCIAL INSECURITY: DO YOU FEEL UNSAFE GOING BACK TO THE PLACE WHERE YOU ARE LIVING?: NO

## 2023-10-15 SDOH — SOCIAL STABILITY: SOCIAL INSECURITY: ARE YOU OR HAVE YOU BEEN THREATENED OR ABUSED PHYSICALLY, EMOTIONALLY, OR SEXUALLY BY ANYONE?: NO

## 2023-10-15 SDOH — SOCIAL STABILITY: SOCIAL INSECURITY: HAVE YOU HAD THOUGHTS OF HARMING ANYONE ELSE?: NO

## 2023-10-15 SDOH — SOCIAL STABILITY: SOCIAL INSECURITY: ARE THERE ANY APPARENT SIGNS OF INJURIES/BEHAVIORS THAT COULD BE RELATED TO ABUSE/NEGLECT?: NO

## 2023-10-15 SDOH — SOCIAL STABILITY: SOCIAL INSECURITY: HAS ANYONE EVER THREATENED TO HURT YOUR FAMILY OR YOUR PETS?: NO

## 2023-10-15 SDOH — SOCIAL STABILITY: SOCIAL INSECURITY: DO YOU FEEL ANYONE HAS EXPLOITED OR TAKEN ADVANTAGE OF YOU FINANCIALLY OR OF YOUR PERSONAL PROPERTY?: NO

## 2023-10-15 SDOH — SOCIAL STABILITY: SOCIAL INSECURITY: DOES ANYONE TRY TO KEEP YOU FROM HAVING/CONTACTING OTHER FRIENDS OR DOING THINGS OUTSIDE YOUR HOME?: NO

## 2023-10-15 ASSESSMENT — COGNITIVE AND FUNCTIONAL STATUS - GENERAL
DAILY ACTIVITIY SCORE: 24
PATIENT BASELINE BEDBOUND: NO
MOBILITY SCORE: 24
DAILY ACTIVITIY SCORE: 24
MOBILITY SCORE: 24
DAILY ACTIVITIY SCORE: 24
MOBILITY SCORE: 24

## 2023-10-15 ASSESSMENT — LIFESTYLE VARIABLES
HOW MANY STANDARD DRINKS CONTAINING ALCOHOL DO YOU HAVE ON A TYPICAL DAY: 1 OR 2
HAVE YOU EVER FELT YOU SHOULD CUT DOWN ON YOUR DRINKING: NO
REASON UNABLE TO ASSESS: NO
AUDIT TOTAL SCORE: 0
HOW OFTEN DURING THE LAST YEAR HAVE YOU HAD A FEELING OF GUILT OR REMORSE AFTER DRINKING: NEVER
EVER FELT BAD OR GUILTY ABOUT YOUR DRINKING: NO
HAVE YOU OR SOMEONE ELSE BEEN INJURED AS A RESULT OF YOUR DRINKING: NO
HOW OFTEN DURING THE LAST YEAR HAVE YOU BEEN UNABLE TO REMEMBER WHAT HAPPENED THE NIGHT BEFORE BECAUSE YOU HAD BEEN DRINKING: NEVER
HOW OFTEN DO YOU HAVE A DRINK CONTAINING ALCOHOL: 2-4 TIMES A MONTH
AUDIT-C TOTAL SCORE: 3
HOW OFTEN DO YOU HAVE 6 OR MORE DRINKS ON ONE OCCASION: LESS THAN MONTHLY
PRESCIPTION_ABUSE_PAST_12_MONTHS: NO
HOW OFTEN DURING THE LAST YEAR HAVE YOU FAILED TO DO WHAT WAS NORMALLY EXPECTED FROM YOU BECAUSE OF DRINKING: NEVER
EVER HAD A DRINK FIRST THING IN THE MORNING TO STEADY YOUR NERVES TO GET RID OF A HANGOVER: NO
HAVE PEOPLE ANNOYED YOU BY CRITICIZING YOUR DRINKING: NO
AUDIT-C TOTAL SCORE: 3
SKIP TO QUESTIONS 9-10: 0
HOW OFTEN DURING THE LAST YEAR HAVE YOU NEEDED AN ALCOHOLIC DRINK FIRST THING IN THE MORNING TO GET YOURSELF GOING AFTER A NIGHT OF HEAVY DRINKING: NEVER
HOW OFTEN DURING THE LAST YEAR HAVE YOU FOUND THAT YOU WERE NOT ABLE TO STOP DRINKING ONCE YOU HAD STARTED: NEVER
SUBSTANCE_ABUSE_PAST_12_MONTHS: NO

## 2023-10-15 ASSESSMENT — ACTIVITIES OF DAILY LIVING (ADL)
TOILETING: INDEPENDENT
GROOMING: INDEPENDENT
FEEDING YOURSELF: INDEPENDENT
PATIENT'S MEMORY ADEQUATE TO SAFELY COMPLETE DAILY ACTIVITIES?: YES
BATHING: INDEPENDENT
DRESSING YOURSELF: INDEPENDENT
HEARING - RIGHT EAR: FUNCTIONAL
LACK_OF_TRANSPORTATION: NO
HEARING - LEFT EAR: FUNCTIONAL
ADEQUATE_TO_COMPLETE_ADL: YES
WALKS IN HOME: INDEPENDENT
JUDGMENT_ADEQUATE_SAFELY_COMPLETE_DAILY_ACTIVITIES: YES

## 2023-10-15 ASSESSMENT — ENCOUNTER SYMPTOMS
SHORTNESS OF BREATH: 1
GASTROINTESTINAL NEGATIVE: 1
EYES NEGATIVE: 1
MUSCULOSKELETAL NEGATIVE: 1
CARDIOVASCULAR NEGATIVE: 1
HEMATOLOGIC/LYMPHATIC NEGATIVE: 1
ENDOCRINE NEGATIVE: 1
ALLERGIC/IMMUNOLOGIC NEGATIVE: 1
CONSTITUTIONAL NEGATIVE: 1
NEUROLOGICAL NEGATIVE: 1
PSYCHIATRIC NEGATIVE: 1

## 2023-10-15 ASSESSMENT — PAIN DESCRIPTION - FREQUENCY: FREQUENCY: CONSTANT/CONTINUOUS

## 2023-10-15 ASSESSMENT — PAIN DESCRIPTION - DESCRIPTORS
DESCRIPTORS: HEADACHE;THROBBING
DESCRIPTORS: HEADACHE
DESCRIPTORS: ACHING
DESCRIPTORS: HEADACHE

## 2023-10-15 ASSESSMENT — PAIN SCALES - GENERAL
PAINLEVEL_OUTOF10: 8
PAINLEVEL_OUTOF10: 8
PAINLEVEL_OUTOF10: 4
PAINLEVEL_OUTOF10: 6
PAINLEVEL_OUTOF10: 7
PAINLEVEL_OUTOF10: 7
PAINLEVEL_OUTOF10: 8

## 2023-10-15 ASSESSMENT — PAIN DESCRIPTION - LOCATION: LOCATION: HEAD

## 2023-10-15 ASSESSMENT — COLUMBIA-SUICIDE SEVERITY RATING SCALE - C-SSRS
2. HAVE YOU ACTUALLY HAD ANY THOUGHTS OF KILLING YOURSELF?: NO
6. HAVE YOU EVER DONE ANYTHING, STARTED TO DO ANYTHING, OR PREPARED TO DO ANYTHING TO END YOUR LIFE?: NO
6. HAVE YOU EVER DONE ANYTHING, STARTED TO DO ANYTHING, OR PREPARED TO DO ANYTHING TO END YOUR LIFE?: NO
1. IN THE PAST MONTH, HAVE YOU WISHED YOU WERE DEAD OR WISHED YOU COULD GO TO SLEEP AND NOT WAKE UP?: NO
1. IN THE PAST MONTH, HAVE YOU WISHED YOU WERE DEAD OR WISHED YOU COULD GO TO SLEEP AND NOT WAKE UP?: NO

## 2023-10-15 ASSESSMENT — PAIN - FUNCTIONAL ASSESSMENT
PAIN_FUNCTIONAL_ASSESSMENT: 0-10

## 2023-10-15 ASSESSMENT — PATIENT HEALTH QUESTIONNAIRE - PHQ9
SUM OF ALL RESPONSES TO PHQ9 QUESTIONS 1 & 2: 0
1. LITTLE INTEREST OR PLEASURE IN DOING THINGS: NOT AT ALL
2. FEELING DOWN, DEPRESSED OR HOPELESS: NOT AT ALL

## 2023-10-15 ASSESSMENT — PAIN DESCRIPTION - ONSET: ONSET: AWAKENED FROM SLEEP

## 2023-10-15 ASSESSMENT — PAIN DESCRIPTION - PAIN TYPE: TYPE: ACUTE PAIN

## 2023-10-15 NOTE — H&P
History Of Present Illness  Saraih Haley is a 37 y.o. female presenting with acute asthma exacerbation.  She initially presented to the emergency department complaining of chest tightness and shortness of breath.  She was given subcutaneous epinephrine and 2 DuoNeb treatments by EMS.  She was then given magnesium, Solu-Medrol and additional DuoNeb treatments in the emergency department.  CTA was negative for PE.  She had negative troponins and EKG revealed sinus tachycardia.  On exam, she denies any current chest pain or shortness of breath.  Lungs are clear to auscultation bilaterally.  She states that these episodes started back in May after moving to a new home and has been admitted to the hospital several times since May.  She is followed by pulmonology (Dr. Santiago at [facility]) and recently saw an allergist for IgE testing. She is admitted for observation.     Past Medical History  Past Medical History:   Diagnosis Date    Acute cystitis without hematuria 08/08/2019    Acute cystitis without hematuria    Encounter for initial prescription of contraceptive pills 11/01/2019    Encounter for initial prescription of contraceptive pills    Nausea 02/05/2021    Nausea in adult    Other general symptoms and signs 12/05/2019    Forgetfulness    Parageusia 04/06/2020    Taste perversion    Pelvic and perineal pain     Pelvic pain in female    Personal history of other diseases of the respiratory system 04/06/2020    History of sinusitis    Personal history of other specified conditions 07/01/2020    History of vomiting    Personal history of other specified conditions 01/26/2021    History of headache    Personal history of other specified conditions 02/01/2021    History of diarrhea    Personal history of thrombophlebitis 06/03/2019    History of phlebitis    Personal history of urinary (tract) infections 01/16/2020    History of urinary tract infection       Surgical History  Past Surgical History:   Procedure  Laterality Date    OTHER SURGICAL HISTORY  02/08/2019    Appendectomy    OTHER SURGICAL HISTORY  02/08/2019    Cholecystectomy    OTHER SURGICAL HISTORY  02/08/2019    Cystoscopy    OTHER SURGICAL HISTORY  02/08/2019    Ureteroscopy    OTHER SURGICAL HISTORY  02/08/2019    Shoulder surgery        Social History  She reports that she has never smoked. She has never used smokeless tobacco. She reports that she does not use drugs. No history on file for alcohol use.    Family History  Family History   Problem Relation Name Age of Onset    Hypertension Father      Other (Pulmonary Valve Stenosis) Sister      Heart disease Maternal Grandmother      Diabetes Other Grandparent     Lung cancer Other Grandparent     Anxiety disorder Sibling          Allergies  Bee venom protein (honey bee), Diphenhydramine, Nsaids (non-steroidal anti-inflammatory drug), Procaine, and Ketorolac    Review of Systems   Constitutional: Negative.    HENT: Negative.     Eyes: Negative.    Respiratory:  Positive for shortness of breath.    Cardiovascular: Negative.    Gastrointestinal: Negative.    Endocrine: Negative.    Genitourinary: Negative.    Musculoskeletal: Negative.    Skin: Negative.    Allergic/Immunologic: Negative.    Neurological: Negative.    Hematological: Negative.    Psychiatric/Behavioral: Negative.         OBJECTIVE:      Physical Exam  Constitutional:       Appearance: She is obese.   HENT:      Head: Normocephalic and atraumatic.      Mouth/Throat:      Mouth: Mucous membranes are moist.      Pharynx: Oropharynx is clear.   Eyes:      Extraocular Movements: Extraocular movements intact.      Pupils: Pupils are equal, round, and reactive to light.   Cardiovascular:      Rate and Rhythm: Normal rate and regular rhythm.      Pulses: Normal pulses.      Heart sounds: Normal heart sounds.   Pulmonary:      Effort: Pulmonary effort is normal.      Breath sounds: Normal breath sounds.   Abdominal:      General: Bowel sounds are  normal.      Palpations: Abdomen is soft.   Musculoskeletal:         General: Normal range of motion.      Cervical back: Normal range of motion and neck supple.   Skin:     General: Skin is warm and dry.      Capillary Refill: Capillary refill takes less than 2 seconds.   Neurological:      General: No focal deficit present.      Mental Status: She is alert and oriented to person, place, and time.   Psychiatric:         Mood and Affect: Mood normal.         Behavior: Behavior normal.            Last Recorded Vitals  Blood pressure 145/62, pulse 84, temperature 36.2 °C (97.2 °F), temperature source Temporal, resp. rate 16, last menstrual period 10/01/2023, SpO2 96 %.    Relevant Results  Scheduled medications  aspirin, 81 mg, oral, Daily  budesonide, 0.5 mg, nebulization, BID  butalbital-acetaminophen-caff, 1 tablet, oral, Daily  desvenlafaxine, 100 mg, oral, Daily  desvenlafaxine, 50 mg, oral, Daily  enoxaparin, 40 mg, subcutaneous, q12h SERGIO  ipratropium-albuteroL, 3 mL, nebulization, q4h  levothyroxine, 100 mcg, oral, Daily  methylPREDNISolone sodium succinate (PF), 40 mg, intravenous, q6h  montelukast, 10 mg, oral, Nightly  norethindrone, 1 tablet, oral, Daily      Continuous medications     PRN medications  PRN medications: butalbital-acetaminophen-caff, ondansetron, SUMAtriptan    Results for orders placed or performed during the hospital encounter of 10/15/23 (from the past 24 hour(s))   Blood Gas Arterial Full Panel   Result Value Ref Range    POCT pH, Arterial 7.51 (H) 7.38 - 7.42 pH    POCT pCO2, Arterial 29 (L) 38 - 42 mm Hg    POCT pO2, Arterial 144 (H) 85 - 95 mm Hg    POCT SO2, Arterial 100 94 - 100 %    POCT Oxy Hemoglobin, Arterial 97.7 94.0 - 98.0 %    POCT Hematocrit Calculated, Arterial 45.0 36.0 - 46.0 %    POCT Sodium, Arterial 135 (L) 136 - 145 mmol/L    POCT Potassium, Arterial 3.2 (L) 3.5 - 5.3 mmol/L    POCT Chloride, Arterial 104 98 - 107 mmol/L    POCT Ionized Calcium, Arterial 1.09 (L)  1.10 - 1.33 mmol/L    POCT Glucose, Arterial 130 (H) 74 - 99 mg/dL    POCT Lactate, Arterial 1.4 0.4 - 2.0 mmol/L    POCT Base Excess, Arterial 1.2 -2.0 - 3.0 mmol/L    POCT HCO3 Calculated, Arterial 23.1 22.0 - 26.0 mmol/L    POCT Hemoglobin, Arterial 15.0 12.0 - 16.0 g/dL    POCT Anion Gap, Arterial 11 10 - 25 mmo/L    Patient Temperature      FiO2 28 %   CBC and Auto Differential   Result Value Ref Range    WBC 11.1 4.4 - 11.3 x10*3/uL    nRBC 0.0 0.0 - 0.0 /100 WBCs    RBC 4.77 4.00 - 5.20 x10*6/uL    Hemoglobin 14.4 12.0 - 16.0 g/dL    Hematocrit 41.1 36.0 - 46.0 %    MCV 86 80 - 100 fL    MCH 30.2 26.0 - 34.0 pg    MCHC 35.0 32.0 - 36.0 g/dL    RDW 12.1 11.5 - 14.5 %    Platelets 369 150 - 450 x10*3/uL    MPV 9.3 7.5 - 11.5 fL    Neutrophils % 60.6 40.0 - 80.0 %    Immature Granulocytes %, Automated 0.3 0.0 - 0.9 %    Lymphocytes % 30.8 13.0 - 44.0 %    Monocytes % 8.2 2.0 - 10.0 %    Eosinophils % 0.0 0.0 - 6.0 %    Basophils % 0.1 0.0 - 2.0 %    Neutrophils Absolute 6.73 1.20 - 7.70 x10*3/uL    Immature Granulocytes Absolute, Automated 0.03 0.00 - 0.70 x10*3/uL    Lymphocytes Absolute 3.42 1.20 - 4.80 x10*3/uL    Monocytes Absolute 0.91 0.10 - 1.00 x10*3/uL    Eosinophils Absolute 0.00 0.00 - 0.70 x10*3/uL    Basophils Absolute 0.01 0.00 - 0.10 x10*3/uL   Comprehensive metabolic panel   Result Value Ref Range    Glucose 113 (H) 74 - 99 mg/dL    Sodium 137 136 - 145 mmol/L    Potassium 3.3 (L) 3.5 - 5.3 mmol/L    Chloride 103 98 - 107 mmol/L    Bicarbonate 22 21 - 32 mmol/L    Anion Gap 15 10 - 20 mmol/L    Urea Nitrogen 9 6 - 23 mg/dL    Creatinine 0.89 0.50 - 1.05 mg/dL    eGFR 87 >60 mL/min/1.73m*2    Calcium 8.9 8.6 - 10.3 mg/dL    Albumin 4.1 3.4 - 5.0 g/dL    Alkaline Phosphatase 66 33 - 110 U/L    Total Protein 7.0 6.4 - 8.2 g/dL    AST 30 9 - 39 U/L    Bilirubin, Total 0.7 0.0 - 1.2 mg/dL    ALT 39 7 - 45 U/L   B-Type Natriuretic Peptide   Result Value Ref Range    BNP 42 0 - 99 pg/mL   Coagulation  Screen   Result Value Ref Range    Protime 11.3 9.8 - 12.8 seconds    INR 1.0 0.9 - 1.1    aPTT 32 27 - 38 seconds   Troponin I, High Sensitivity, Initial   Result Value Ref Range    Troponin I, High Sensitivity 3 0 - 13 ng/L   hCG, quantitative, pregnancy   Result Value Ref Range    HCG, Beta-Quantitative <2 <5 mIU/mL   Magnesium   Result Value Ref Range    Magnesium 2.02 1.60 - 2.40 mg/dL   Troponin, High Sensitivity, 1 Hour   Result Value Ref Range    Troponin I, High Sensitivity <3 0 - 13 ng/L   SARS-CoV-2 RT PCR   Result Value Ref Range    Coronavirus 2019, PCR Not Detected Not Detected      CT angio chest for pulmonary embolism    Result Date: 10/15/2023  Interpreted By:  Torri Pablo, STUDY: CT ANGIO CHEST FOR PULMONARY EMBOLISM;  10/15/2023 8:12 am   INDICATION: Signs/Symptoms:r/o PE.   COMPARISON: Chest radiograph 09/27/2023, chest CT 05/24/2023   ACCESSION NUMBER(S): [id number]   ORDERING CLINICIAN: MARCUS FLORES   TECHNIQUE: Helical data acquisition of the chest was obtained 75 mL Omnipaque 350. Images were reformatted in axial, coronal, and sagittal planes. 3D MIP images were generated and reviewed.   FINDINGS: LUNGS and AIRWAYS: There is no infiltrate or pleural fluid.   There are benign calcified granulomas.   STERLING AND MEDIASTINUM: There is no hilar or mediastinal adenopathy.     HEART and VESSELS: There is no thoracic aortic aneurysm or dissection.   There is good opacification of the pulmonary arterial system with no embolism.   No coronary artery calcifications are seen. The study is not optimized for evaluation of coronary arteries.   The cardiac chambers are not enlarged.   No evidence of pericardial effusion.   UPPER ABDOMEN: The visualized subdiaphragmatic structures demonstrate no remarkable findings. Status post cholecystectomy.   CHEST WALL and OSSEOUS STRUCTURES: There are no suspicious osseous lesions. Multilevel degenerative changes are present       1. No pulmonary embolism. 2. The  lungs are clear.   MACRO: None   Signed by: Torri Pablo 10/15/2023 8:31 AM Dictation workstation:   [id number]    XR chest 1 view    Result Date: 9/27/2023  Interpreted By:  NATIVIDAD STARKEY  MRN: [id number] Patient Name: GABI ROJAS  STUDY: CHEST 1 VIEW;  9/27/2023 5:27 am  INDICATION: sob .  COMPARISON: 08/20/2023  ACCESSION NUMBER(S): [id number]  ORDERING CLINICIAN: KENNETH ROSALES  FINDINGS: AP radiograph of the chest was provided.    CARDIOMEDIASTINAL SILHOUETTE: Cardiomediastinal silhouette is normal in size and configuration.  LUNGS: No focal consolidation, pleural effusion or sizable pneumothorax seen.  ABDOMEN: No remarkable upper abdominal findings.  BONES: No acute osseous changes.      1.  No focal consolidation.    MACRO: None        ASSESSMENT & PLAN      Asthma exacerbation  -DuoNeb treatments every 4 hours  -40 mg IV Solu-Medrol every 6 hours  -Continue Singulair  -Peak flow testing    Obesity    Hypothyroidism  -Continue Synthroid    Depression  -Continue home meds    5.  Migraine headaches  -Continue Fioricet    DVT prophylaxis: UNRULY CampuzanoN, RN  Student Nurse Practitioner

## 2023-10-15 NOTE — CARE PLAN
The patient's goals for the shift include      The clinical goals for the shift include improved resp status, decreased sob .

## 2023-10-15 NOTE — PROGRESS NOTES
Emergency Medicine Transition of Care Note.    I received Sarahi Haley in signout from Dr. Johnson.  Please see the previous ED provider note for all HPI, PE and MDM up to the time of signout at 0 600. This is in addition to the primary record.    In brief Sarahi Haley is an 37 y.o. female presenting for   Chief Complaint   Patient presents with    Shortness of Breath     At the time of signout we were awaiting: Treatment and reevaluation    Diagnoses as of 10/15/23 0858   Moderate persistent asthma with exacerbation       Medical Decision Making  HPI:  37-year-old female asthmatic 2-day history of asthma exacerbation using nebs at home brought in by EMS after getting nebs and epinephrine with only minor improvement  Differential diagnosis: Asthma exacerbation, pulmonary embolus    Pertinent physical exam: Significant decrease in breath sounds bilaterally no accessory muscle use    Laboratory results:  Labs Reviewed   COMPREHENSIVE METABOLIC PANEL - Abnormal       Result Value    Glucose 113 (*)     Sodium 137      Potassium 3.3 (*)     Chloride 103      Bicarbonate 22      Anion Gap 15      Urea Nitrogen 9      Creatinine 0.89      eGFR 87      Calcium 8.9      Albumin 4.1      Alkaline Phosphatase 66      Total Protein 7.0      AST 30      Bilirubin, Total 0.7      ALT 39     BLOOD GAS ARTERIAL FULL PANEL - Abnormal    POCT pH, Arterial 7.51 (*)     POCT pCO2, Arterial 29 (*)     POCT pO2, Arterial 144 (*)     POCT SO2, Arterial 100      POCT Oxy Hemoglobin, Arterial 97.7      POCT Hematocrit Calculated, Arterial 45.0      POCT Sodium, Arterial 135 (*)     POCT Potassium, Arterial 3.2 (*)     POCT Chloride, Arterial 104      POCT Ionized Calcium, Arterial 1.09 (*)     POCT Glucose, Arterial 130 (*)     POCT Lactate, Arterial 1.4      POCT Base Excess, Arterial 1.2      POCT HCO3 Calculated, Arterial 23.1      POCT Hemoglobin, Arterial 15.0      POCT Anion Gap, Arterial 11      Patient Temperature        FiO2  28     B-TYPE NATRIURETIC PEPTIDE - Normal    BNP 42      Narrative:        <100 pg/mL - Heart failure unlikely  100-299 pg/mL - Intermediate probability of acute heart                  failure exacerbation. Correlate with clinical                  context and patient history.    >=300 pg/mL - Heart Failure likely. Correlate with clinical                  context and patient history.    BNP testing is performed using different testing methodology at [facility] than at other [facility]. Direct result comparisons should only be made within the same method.      COAGULATION SCREEN - Normal    Protime 11.3      INR 1.0      aPTT 32      Narrative:     The APTT is no longer used for monitoring Unfractionated Heparin Therapy. For monitoring Heparin Therapy, use the Heparin Assay.   SERIAL TROPONIN-INITIAL - Normal    Troponin I, High Sensitivity 3      Narrative:     Less than 99th percentile of normal range cutoff-  Female and children under 18 years old <14 ng/L; Male <21 ng/L: Negative  Repeat testing should be performed if clinically indicated.     Female and children under 18 years old 14-50 ng/L; Male 21-50 ng/L:  Consistent with possible cardiac damage and possible increased clinical   risk. Serial measurements may help to assess extent of myocardial damage.     >50 ng/L: Consistent with cardiac damage, increased clinical risk and  myocardial infarction. Serial measurements may help assess extent of   myocardial damage.      NOTE: Children less than 1 year old may have higher baseline troponin   levels and results should be interpreted in conjunction with the overall   clinical context.     NOTE: Troponin I testing is performed using a different   testing methodology at [facility] than at other   [facility]. Direct result comparisons should only   be made within the same method.   HUMAN CHORIONIC GONADOTROPIN, SERUM QUANTITATIVE - Normal    HCG, Beta-Quantitative <2       Narrative:      Total HCG measurement is performed using the [employer] Access   Immunoassay which detects intact HCG and free beta HCG subunit.    This test is not indicated for use as a tumor marker.   HCG testing is performed using a different test methodology at [facility] than other [facility]. Direct result comparison   should only be made within the same method.       MAGNESIUM - Normal    Magnesium 2.02     SERIAL TROPONIN, 1 HOUR - Normal    Troponin I, High Sensitivity <3      Narrative:     Less than 99th percentile of normal range cutoff-  Female and children under 18 years old <14 ng/L; Male <21 ng/L: Negative  Repeat testing should be performed if clinically indicated.     Female and children under 18 years old 14-50 ng/L; Male 21-50 ng/L:  Consistent with possible cardiac damage and possible increased clinical   risk. Serial measurements may help to assess extent of myocardial damage.     >50 ng/L: Consistent with cardiac damage, increased clinical risk and  myocardial infarction. Serial measurements may help assess extent of   myocardial damage.      NOTE: Children less than 1 year old may have higher baseline troponin   levels and results should be interpreted in conjunction with the overall   clinical context.     NOTE: Troponin I testing is performed using a different   testing methodology at [facility] than at other   [facility]. Direct result comparisons should only   be made within the same method.   TROPONIN SERIES- (INITIAL, 1 HR)    Narrative:     The following orders were created for panel order Troponin Series, (0, 1 HR).  Procedure                               Abnormality         Status                     ---------                               -----------         ------                     Troponin I, High Sensiti...[[id number]]  Normal              Final result               Troponin, High Sensitivi...[[id number]]  Normal              Final  result                 Please view results for these tests on the individual orders.   CBC WITH AUTO DIFFERENTIAL    WBC 11.1      nRBC 0.0      RBC 4.77      Hemoglobin 14.4      Hematocrit 41.1      MCV 86      MCH 30.2      MCHC 35.0      RDW 12.1      Platelets 369      MPV 9.3      Neutrophils % 60.6      Immature Granulocytes %, Automated 0.3      Lymphocytes % 30.8      Monocytes % 8.2      Eosinophils % 0.0      Basophils % 0.1      Neutrophils Absolute 6.73      Immature Granulocytes Absolute, Automated 0.03      Lymphocytes Absolute 3.42      Monocytes Absolute 0.91      Eosinophils Absolute 0.00      Basophils Absolute 0.01     SARS-COV-2 PCR, SYMPTOMATIC         Radiologic results:   CT angio chest for pulmonary embolism   Final Result   1. No pulmonary embolism.   2. The lungs are clear.        MACRO:   None        Signed by: Torri Pablo 10/15/2023 8:31 AM   Dictation workstation:   [id number]             EKG results: Please see procedure note    ED course and treatment: Following administration of Solu-Medrol and magnesium patient still demonstrating difficulty breathing requesting admission.  Patient will have COVID test done prior to admission    Diagnosis: Acute asthma exacerbation    Disposition: Medical admission    Social determinants of health: None    *This documentation is completed using the Dragon Dictation system. There may be spelling and/or grammatical errors that were not corrected prior to final submission. I apologize for any inconvenience.*          Final diagnoses:   None           Procedure  Procedures    Familia Young PA-C

## 2023-10-15 NOTE — ED PROVIDER NOTES
HPI   Chief Complaint   Patient presents with    Shortness of Breath       History provided by: Patient and EMS    Limitations to history: None    CC: Chest tightness, shortness of breath    HPI: 37-year-old female presents emergency department to be evaluated for chest tightness and shortness of breath.  She says that the shortness of breath woke her up from her sleep.  She has a history of CAD she has a history of substantial asthma.  She received multiple breathing treatments and subcutaneous epinephrine in route by EMS, states that is not helping.  She does report a dry cough.  She denies history of ACS, she is never required stent placement or bypass.  She denies history of heart failure.  Denies pain or swelling in the extremities.  She denies history of PEs and denies hemoptysis however she did return from a vacation that required a plane flight.  She denies fever and chills.  Denies weakness or fatigue.  Denies recent illnesses or exposure to sick contacts.  Denies all other systemic symptoms.    ROS: Negative unless mentioned in HPI    Social Hx: Denies tobacco, alcohol, drug use    Medical Hx: Medical history sent in for asthma.  Allergy to bees and NSAIDs.    Surgical HX: Denies                          No data recorded                Patient History   Past Medical History:   Diagnosis Date    Acute cystitis without hematuria 08/08/2019    Acute cystitis without hematuria    Encounter for initial prescription of contraceptive pills 11/01/2019    Encounter for initial prescription of contraceptive pills    Nausea 02/05/2021    Nausea in adult    Other general symptoms and signs 12/05/2019    Forgetfulness    Parageusia 04/06/2020    Taste perversion    Pelvic and perineal pain     Pelvic pain in female    Personal history of other diseases of the respiratory system 04/06/2020    History of sinusitis    Personal history of other specified conditions 07/01/2020    History of vomiting    Personal history of  other specified conditions 01/26/2021    History of headache    Personal history of other specified conditions 02/01/2021    History of diarrhea    Personal history of thrombophlebitis 06/03/2019    History of phlebitis    Personal history of urinary (tract) infections 01/16/2020    History of urinary tract infection     Past Surgical History:   Procedure Laterality Date    OTHER SURGICAL HISTORY  02/08/2019    Appendectomy    OTHER SURGICAL HISTORY  02/08/2019    Cholecystectomy    OTHER SURGICAL HISTORY  02/08/2019    Cystoscopy    OTHER SURGICAL HISTORY  02/08/2019    Ureteroscopy    OTHER SURGICAL HISTORY  02/08/2019    Shoulder surgery     Family History   Problem Relation Name Age of Onset    Hypertension Father      Other (Pulmonary Valve Stenosis) Sister      Heart disease Maternal Grandmother      Diabetes Other Grandparent     Lung cancer Other Grandparent     Anxiety disorder Sibling       Social History     Tobacco Use    Smoking status: Never    Smokeless tobacco: Never   Vaping Use    Vaping Use: Never used   Substance Use Topics    Alcohol use: Not on file     Comment: social    Drug use: Never       Physical Exam   ED Triage Vitals   Temp Pulse Resp BP   -- -- -- --      SpO2 Temp src Heart Rate Source Patient Position   -- -- -- --      BP Location FiO2 (%)     -- --       Physical Exam    ED Course & MDM      Physical exam:    Constitutional: Patient is obese, and well-developed.  In moderate respiratory distress.  Oriented to person, place, time, and situation.    HEENT: Head is normocephalic, atraumatic. Patient's airway is patent.  Tympanic membranes are clear bilaterally.  Nasal mucosa clear.  Mouth with normal mucosa.  Throat is not erythematous and there are no oropharyngeal exudates, uvula is midline.  No obvious facial deformities.    Eyes: Clear bilaterally.  Pupils are equal round and reactive to light and accommodation.  Extraocular movements intact.      Cardiac: Tachycardic, regular  rhythm.  Heart sounds S1, S2.  No murmurs, rubs, or gallops.  PMI nondisplaced.  No JVD.    Respiratory: Increased respiratory rate and effort.  Breath sounds are diminished, especially with expiration.  No obvious wheezing.  No stridor.  She is speaking in short sentences.    Gastrointestinal: Abdomen is soft, nondistended, and nontender.  There are no obvious deformities.  No rebound tenderness or guarding.  Bowel sounds are normal active.    Genitourinary: No CVA or flank tenderness.    Musculoskeletal: No reproducible tenderness.  No peripheral edema or erythema.  No calf tenderness.  Negative Homans' sign.  No obvious skin or bony deformities.  Patient has equal range of motion in all extremities and no strength deficiencies.  No muscle or joint tenderness. No back or neck tenderness.  Capillary refill less than 3 seconds.  Strong peripheral pulses.  No sensory deficits.    Neurological: Patient is alert and oriented.  No focal deficits.  5/5 strength in all extremities.  Cranial nerves II through XII intact. GCS15.     Skin: Skin is normal color for race and is warm, dry, and intact.  No evidence of trauma.  No lesions, rashes, bruising, jaundice, or masses.    Psych: Appropriate mood and affect.  No apparent risk to self or others.    Heme/lymph: No adenopathy, lymphadenopathy, or splenomegaly    Physical exam is otherwise negative unless stated above or in history of present illness.    Medical Decision Making    Patient updated on plan for lab testing, IV insertion, radiology imaging, and medications to be administered while in the ER (if indicated). Patient updated on expected wait times for testing and results. Patient provided my name and told to ask any staff member for questions or concerns if they should arise. Electronic medical record reviewed.     MDM    Upon initial assessment, patient appears to be uncomfortable and is in moderate respiratory distress.    Patient presented to the emergency  department with the chief complaint of shortness of breath with chest pain. Increased respiratory rate and effort.  Breath sounds are diminished, especially with expiration.  No obvious wheezing.  No stridor.  She is speaking in short sentences.  No muffled heart sounds, JVD, murmur.  No peripheral edema or erythema.  On arrival to the emergency department, vital signs were significant for tachycardia and tachypnea.    Work-up initially including basic blood work, EKG and troponins, CTA to rule out a PE given her respiratory distress with recent immobilization, magnesium, coagulation screen, BNP, and chest x-ray.  Patient will also have an ABG performed.  A stat this patient my attending, agreeable plan of care.  Patient's EKG was performed at 5:25 AM and interpreted by me and my attending.  Is a sinus tachycardia 102 bpm.  No ST elevation or depression.  No prolonged QT.  Patient was given IV Solu-Medrol IV magnesium, she is currently having a breathing treatment from EMS.  Also give her half liter of IV normal saline since there is no contraindications to fluids.      This patient was signed out to Александр Young PA-C at approximately 6 AM.  We discussed the patient's case including history and physical exam.  Discussed medications given as well as lab and imaging modality selected and respective results.  Agreeable plan of care.  Awaiting lab and imaging modality selected with disposition.    This note was dictated using a speech recognition program.  While an attempt was made at proof-reading to minimize errors, minor errors in transcription may be present    Procedure  Procedures     Chuck Johnson PA-C  10/15/23 0542

## 2023-10-15 NOTE — PROGRESS NOTES
Emergency Medicine Transition of Care Note.    I received Sarahi Haley in signout from Dr. Johnson.  Please see the previous ED provider note for all HPI, PE and MDM up to the time of signout at 6a. This is in addition to the primary record.    In brief Sarahi Haley is an 37 y.o. female presenting for   Chief Complaint   Patient presents with    Shortness of Breath     At the time of signout we were awaiting: Results from medication treatments and CAT scan    Diagnoses as of 10/15/23 0933   Moderate persistent asthma with exacerbation       Medical Decision Making      Final diagnoses:   [J45.41] Moderate persistent asthma with exacerbation           Procedure  Procedures    Familia Young PA-C Sarahi Haley is a 37 y.o. female on day 1 of admission presenting with Moderate persistent asthma with exacerbation.    Subjective   Acute asthma exacerbation       Objective     Physical Exam reexamination of this patient at 830 demonstrates patient still demonstrating some respiratory distress breath sounds are decreased bilaterally.  Plan is for medical admission    Last Recorded Vitals  Blood pressure 145/62, pulse 84, temperature 36.2 °C (97.2 °F), temperature source Temporal, resp. rate 16, last menstrual period 10/01/2023, SpO2 96 %.  Intake/Output last 3 Shifts:  No intake/output data recorded.    Relevant Results                             Assessment/Plan   Principal Problem:    Moderate persistent asthma with exacerbation  Active Problems:    Asthma in adult, moderate persistent, with acute exacerbation    Medical admission       I spent 30 minutes in the professional and overall care of this patient.      Familia Young PA-C

## 2023-10-15 NOTE — CARE PLAN
The patient's goals for the shift include a reduction in headache pain to a rating of 6 or less.    The clinical goals for the shift include improved resp status, decreased sob.    Over the shift, the patient did make progress toward the following goals.

## 2023-10-15 NOTE — CARE PLAN
"The patient's goals for the shift include  \"to not feel like crap\"    The clinical goals for the shift include improved resp status, decreased sob      "

## 2023-10-16 LAB
ANION GAP BLDA CALCULATED.4IONS-SCNC: 11 MMO/L (ref 10–25)
ANION GAP SERPL CALC-SCNC: 11 MMOL/L (ref 10–20)
BASE EXCESS BLDA CALC-SCNC: 1.2 MMOL/L (ref -2–3)
BODY TEMPERATURE: ABNORMAL
BUN SERPL-MCNC: 11 MG/DL (ref 6–23)
CA-I BLDA-SCNC: 1.09 MMOL/L (ref 1.1–1.33)
CALCIUM SERPL-MCNC: 9.1 MG/DL (ref 8.6–10.3)
CHLORIDE BLDA-SCNC: 104 MMOL/L (ref 98–107)
CHLORIDE SERPL-SCNC: 104 MMOL/L (ref 98–107)
CO2 SERPL-SCNC: 23 MMOL/L (ref 21–32)
CREAT SERPL-MCNC: 0.84 MG/DL (ref 0.5–1.05)
ERYTHROCYTE [DISTWIDTH] IN BLOOD BY AUTOMATED COUNT: 12.4 % (ref 11.5–14.5)
GFR SERPL CREATININE-BSD FRML MDRD: >90 ML/MIN/1.73M*2
GLUCOSE BLD MANUAL STRIP-MCNC: 124 MG/DL (ref 74–99)
GLUCOSE BLD MANUAL STRIP-MCNC: 147 MG/DL (ref 74–99)
GLUCOSE BLD MANUAL STRIP-MCNC: 157 MG/DL (ref 74–99)
GLUCOSE BLD MANUAL STRIP-MCNC: 195 MG/DL (ref 74–99)
GLUCOSE BLDA-MCNC: 130 MG/DL (ref 74–99)
GLUCOSE SERPL-MCNC: 136 MG/DL (ref 74–99)
HCO3 BLDA-SCNC: 23.1 MMOL/L (ref 22–26)
HCT VFR BLD AUTO: 38.4 % (ref 36–46)
HCT VFR BLD EST: 45 % (ref 36–46)
HGB BLD-MCNC: 13.4 G/DL (ref 12–16)
HGB BLDA-MCNC: 15 G/DL (ref 12–16)
HOLD SPECIMEN: NORMAL
INHALED O2 CONCENTRATION: 28 %
LACTATE BLDA-SCNC: 1.4 MMOL/L (ref 0.4–2)
MCH RBC QN AUTO: 30 PG (ref 26–34)
MCHC RBC AUTO-ENTMCNC: 34.9 G/DL (ref 32–36)
MCV RBC AUTO: 86 FL (ref 80–100)
NRBC BLD-RTO: 0 /100 WBCS (ref 0–0)
OXYHGB MFR BLDA: 97.7 % (ref 94–98)
PCO2 BLDA: 29 MM HG (ref 38–42)
PH BLDA: 7.51 PH (ref 7.38–7.42)
PLATELET # BLD AUTO: 334 X10*3/UL (ref 150–450)
PMV BLD AUTO: 9 FL (ref 7.5–11.5)
PO2 BLDA: 144 MM HG (ref 85–95)
POTASSIUM BLDA-SCNC: 3.2 MMOL/L (ref 3.5–5.3)
POTASSIUM SERPL-SCNC: 4.2 MMOL/L (ref 3.5–5.3)
RBC # BLD AUTO: 4.47 X10*6/UL (ref 4–5.2)
SAO2 % BLDA: 100 % (ref 94–100)
SODIUM BLDA-SCNC: 135 MMOL/L (ref 136–145)
SODIUM SERPL-SCNC: 134 MMOL/L (ref 136–145)
WBC # BLD AUTO: 18.3 X10*3/UL (ref 4.4–11.3)

## 2023-10-16 PROCEDURE — 85027 COMPLETE CBC AUTOMATED: CPT | Performed by: HOSPITALIST

## 2023-10-16 PROCEDURE — 2500000004 HC RX 250 GENERAL PHARMACY W/ HCPCS (ALT 636 FOR OP/ED): Performed by: INTERNAL MEDICINE

## 2023-10-16 PROCEDURE — S4993 CONTRACEPTIVE PILLS FOR BC: HCPCS | Performed by: HOSPITALIST

## 2023-10-16 PROCEDURE — 80048 BASIC METABOLIC PNL TOTAL CA: CPT | Performed by: HOSPITALIST

## 2023-10-16 PROCEDURE — 36415 COLL VENOUS BLD VENIPUNCTURE: CPT | Performed by: HOSPITALIST

## 2023-10-16 PROCEDURE — 96372 THER/PROPH/DIAG INJ SC/IM: CPT | Performed by: HOSPITALIST

## 2023-10-16 PROCEDURE — 2500000002 HC RX 250 W HCPCS SELF ADMINISTERED DRUGS (ALT 637 FOR MEDICARE OP, ALT 636 FOR OP/ED): Performed by: HOSPITALIST

## 2023-10-16 PROCEDURE — 96376 TX/PRO/DX INJ SAME DRUG ADON: CPT

## 2023-10-16 PROCEDURE — G0378 HOSPITAL OBSERVATION PER HR: HCPCS

## 2023-10-16 PROCEDURE — 99232 SBSQ HOSP IP/OBS MODERATE 35: CPT | Performed by: HOSPITALIST

## 2023-10-16 PROCEDURE — 82947 ASSAY GLUCOSE BLOOD QUANT: CPT

## 2023-10-16 PROCEDURE — 94640 AIRWAY INHALATION TREATMENT: CPT

## 2023-10-16 PROCEDURE — 2500000001 HC RX 250 WO HCPCS SELF ADMINISTERED DRUGS (ALT 637 FOR MEDICARE OP): Performed by: HOSPITALIST

## 2023-10-16 PROCEDURE — 2500000004 HC RX 250 GENERAL PHARMACY W/ HCPCS (ALT 636 FOR OP/ED): Performed by: HOSPITALIST

## 2023-10-16 RX ORDER — IPRATROPIUM BROMIDE AND ALBUTEROL SULFATE 2.5; .5 MG/3ML; MG/3ML
3 SOLUTION RESPIRATORY (INHALATION) 3 TIMES DAILY
Status: DISCONTINUED | OUTPATIENT
Start: 2023-10-16 | End: 2023-10-17 | Stop reason: HOSPADM

## 2023-10-16 RX ORDER — MORPHINE SULFATE 2 MG/ML
2 INJECTION, SOLUTION INTRAMUSCULAR; INTRAVENOUS ONCE
Status: COMPLETED | OUTPATIENT
Start: 2023-10-16 | End: 2023-10-16

## 2023-10-16 RX ORDER — PREDNISONE 20 MG/1
TABLET ORAL
Qty: 11 TABLET | Refills: 0 | Status: SHIPPED | OUTPATIENT
Start: 2023-10-16 | End: 2023-10-25

## 2023-10-16 RX ADMIN — BUTALBITAL, ACETAMINOPHEN, AND CAFFEINE 1 TABLET: 50; 325; 40 TABLET, COATED ORAL at 22:12

## 2023-10-16 RX ADMIN — METHYLPREDNISOLONE SODIUM SUCCINATE 40 MG: 40 INJECTION, POWDER, FOR SOLUTION INTRAMUSCULAR; INTRAVENOUS at 00:44

## 2023-10-16 RX ADMIN — METHYLPREDNISOLONE SODIUM SUCCINATE 40 MG: 40 INJECTION, POWDER, FOR SOLUTION INTRAMUSCULAR; INTRAVENOUS at 12:06

## 2023-10-16 RX ADMIN — MONTELUKAST SODIUM 10 MG: 10 TABLET, FILM COATED ORAL at 22:11

## 2023-10-16 RX ADMIN — MORPHINE SULFATE 2 MG: 2 INJECTION, SOLUTION INTRAMUSCULAR; INTRAVENOUS at 05:02

## 2023-10-16 RX ADMIN — ONDANSETRON 4 MG: 2 INJECTION INTRAMUSCULAR; INTRAVENOUS at 02:59

## 2023-10-16 RX ADMIN — BUDESONIDE 0.5 MG: 0.5 INHALANT ORAL at 19:49

## 2023-10-16 RX ADMIN — Medication 12.5 MG: at 05:02

## 2023-10-16 RX ADMIN — DESVENLAFAXINE 100 MG: 50 TABLET, FILM COATED, EXTENDED RELEASE ORAL at 09:07

## 2023-10-16 RX ADMIN — ONDANSETRON 4 MG: 2 INJECTION INTRAMUSCULAR; INTRAVENOUS at 22:12

## 2023-10-16 RX ADMIN — ASPIRIN 81 MG: 81 TABLET, CHEWABLE ORAL at 09:05

## 2023-10-16 RX ADMIN — ENOXAPARIN SODIUM 40 MG: 40 INJECTION SUBCUTANEOUS at 22:11

## 2023-10-16 RX ADMIN — BUDESONIDE 0.5 MG: 0.5 INHALANT ORAL at 08:27

## 2023-10-16 RX ADMIN — NORETHINDRONE 0.35 MG: KIT at 09:10

## 2023-10-16 RX ADMIN — METHYLPREDNISOLONE SODIUM SUCCINATE 40 MG: 40 INJECTION, POWDER, FOR SOLUTION INTRAMUSCULAR; INTRAVENOUS at 05:02

## 2023-10-16 RX ADMIN — METHYLPREDNISOLONE SODIUM SUCCINATE 40 MG: 40 INJECTION, POWDER, FOR SOLUTION INTRAMUSCULAR; INTRAVENOUS at 17:25

## 2023-10-16 RX ADMIN — BUTALBITAL, ACETAMINOPHEN, AND CAFFEINE 1 TABLET: 50; 325; 40 TABLET, COATED ORAL at 09:04

## 2023-10-16 RX ADMIN — DESVENLAFAXINE 50 MG: 50 TABLET, FILM COATED, EXTENDED RELEASE ORAL at 09:09

## 2023-10-16 RX ADMIN — ENOXAPARIN SODIUM 40 MG: 40 INJECTION SUBCUTANEOUS at 09:03

## 2023-10-16 RX ADMIN — IPRATROPIUM BROMIDE AND ALBUTEROL SULFATE 3 ML: .5; 3 SOLUTION RESPIRATORY (INHALATION) at 19:49

## 2023-10-16 RX ADMIN — BUTALBITAL, ACETAMINOPHEN, AND CAFFEINE 1 TABLET: 50; 325; 40 TABLET, COATED ORAL at 02:59

## 2023-10-16 RX ADMIN — IPRATROPIUM BROMIDE AND ALBUTEROL SULFATE 3 ML: .5; 3 SOLUTION RESPIRATORY (INHALATION) at 08:27

## 2023-10-16 RX ADMIN — LEVOTHYROXINE SODIUM 100 MCG: 0.1 TABLET ORAL at 05:02

## 2023-10-16 ASSESSMENT — COGNITIVE AND FUNCTIONAL STATUS - GENERAL
DAILY ACTIVITIY SCORE: 24
MOBILITY SCORE: 24

## 2023-10-16 ASSESSMENT — PAIN SCALES - GENERAL
PAINLEVEL_OUTOF10: 8

## 2023-10-16 NOTE — PROGRESS NOTES
"PROGRESS NOTE    ASSESSMENT AND PLAN:   Severe persistent asthma exacerbation  -P/W worsening shortness of breath, received epinephrine and multiple rounds of nebulizers  -Today overall feels better, shortness of breath has improved off oxygen  -Decrease Solu-Medrol to every 8 hour, continue nebulizer treatments  -Etiology of her persistent asthma exacerbations most likely related to environmental factors in her house    2.  Hyperglycemia  Most likely related to steroid, hemoglobin A1c 4.7    3.  Morbid obesity    4.  Anxiety  Continue home medications          SUBJECTIVE:   Admit Date: 10/15/2023    Interval History: Has been ambulating in the hallways, would like to wait till tomorrow to be discharged, family is changing all the filters in her house.      OBJECTIVE:   Vitals: /71   Pulse 78   Temp 36.2 °C (97.2 °F)   Resp 16   Ht 1.651 m (5' 5\")   Wt 145 kg (319 lb 14.2 oz)   LMP 10/01/2023 (Approximate)   SpO2 94%    L/min   BMI 53.23 kg/m²    Wt Readings from Last 3 Encounters:   10/15/23 145 kg (319 lb 14.2 oz)   08/09/23 147 kg (323 lb)   07/26/23 147 kg (325 lb)      24HR INTAKE/OUTPUT:    Intake/Output Summary (Last 24 hours) at 10/16/2023 1601  Last data filed at 10/15/2023 2228  Gross per 24 hour   Intake 400 ml   Output --   Net 400 ml       PHYSICAL EXAM:   GENERAL: Laying in bed, does not appear to be in any distress.   HEENT: HEAD: Normocephalic atraumatic.  Neck: Supple.  Eyes: Pupils are reactive to direct light.   CVS: S1, S2 heard. Regular rate and rhythm  LUNGS: Clear to auscultate bilaterally. No wheezing or rhonchi appreciated.  ABDOMEN: Soft, nontender to palpate. Positive bowel sounds. No guarding or rebound appreciated.  NEUROLOGICAL: No focal neurological deficits appreciated. Cranial nerves are grossly intact.  EXTREMITIES: No edema appreciated.  SKIN:  Grossly intact, warm and dry.      LABS/IMAGING AND MEDICATIONS:   Scheduled Meds:aspirin, 81 mg, oral, " "Daily  budesonide, 0.5 mg, nebulization, BID  butalbital-acetaminophen-caff, 1 tablet, oral, Daily  desvenlafaxine, 100 mg, oral, Daily  desvenlafaxine, 50 mg, oral, Daily  enoxaparin, 40 mg, subcutaneous, q12h SERGIO  ipratropium-albuteroL, 3 mL, nebulization, TID  levothyroxine, 100 mcg, oral, Daily  methylPREDNISolone sodium succinate (PF), 40 mg, intravenous, q6h  montelukast, 10 mg, oral, Nightly  norethindrone, 1 tablet, oral, Daily      PRN Meds:PRN medications: butalbital-acetaminophen-caff, dextrose 10 % in water (D10W), dextrose, glucagon, ondansetron, promethazine, SUMAtriptan    No lab exists for component: \"CBC\"   No lab exists for component: \"CMP\"   No lab exists for component: \"TROPONIN\"  Results from last 7 days   Lab Units 10/15/23  0644   BNP pg/mL 42     Results from last 7 days   Lab Units 10/15/23  0644   INR  1.0     No lab exists for component: \"LIPIDS\"       No lab exists for component: \"URINALYSIS\"          BMP:  Results from last 7 days   Lab Units 10/16/23  0610 10/15/23  0644   SODIUM mmol/L 134* 137   POTASSIUM mmol/L 4.2 3.3*   CHLORIDE mmol/L 104 103   CO2 mmol/L 23 22   BUN mg/dL 11 9   CREATININE mg/dL 0.84 0.89       CBC:  Results from last 7 days   Lab Units 10/16/23  0610 10/15/23  0644   WBC AUTO x10*3/uL 18.3* 11.1   RBC AUTO x10*6/uL 4.47 4.77   HEMOGLOBIN g/dL 13.4 14.4   HEMATOCRIT % 38.4 41.1   MCV fL 86 86   MCH pg 30.0 30.2   MCHC g/dL 34.9 35.0   RDW % 12.4 12.1   PLATELETS AUTO x10*3/uL 334 369   MPV fL 9.0 9.3       Cardiac Enzymes:   Results from last 7 days   Lab Units 10/15/23  0748 10/15/23  0644   TROPHS ng/L <3 3         Hepatic Function Panel:  Results from last 7 days   Lab Units 10/15/23  0644   ALK PHOS U/L 66   ALT U/L 39   AST U/L 30   PROTEIN TOTAL g/dL 7.0   BILIRUBIN TOTAL mg/dL 0.7       Magnesium:  Results from last 7 days   Lab Units 10/15/23  0644   MAGNESIUM mg/dL 2.02       INR:  Results from last 7 days   Lab Units 10/15/23  0644   PROTIME seconds " 11.3   INR  1.0         CMP:  Results from last 7 days   Lab Units 10/16/23  0610 10/15/23  0644   SODIUM mmol/L 134* 137   POTASSIUM mmol/L 4.2 3.3*   CHLORIDE mmol/L 104 103   CO2 mmol/L 23 22   BUN mg/dL 11 9   CREATININE mg/dL 0.84 0.89   GLUCOSE mg/dL 136* 113*   CALCIUM mg/dL 9.1 8.9   PROTEIN TOTAL g/dL  --  7.0   BILIRUBIN TOTAL mg/dL  --  0.7   ALK PHOS U/L  --  66   AST U/L  --  30   ALT U/L  --  39       Am

## 2023-10-17 VITALS
TEMPERATURE: 97.2 F | HEIGHT: 65 IN | SYSTOLIC BLOOD PRESSURE: 120 MMHG | WEIGHT: 293 LBS | OXYGEN SATURATION: 95 % | BODY MASS INDEX: 48.82 KG/M2 | RESPIRATION RATE: 16 BRPM | HEART RATE: 62 BPM | DIASTOLIC BLOOD PRESSURE: 63 MMHG

## 2023-10-17 LAB
ANION GAP SERPL CALC-SCNC: 11 MMOL/L (ref 10–20)
BUN SERPL-MCNC: 19 MG/DL (ref 6–23)
CALCIUM SERPL-MCNC: 9.1 MG/DL (ref 8.6–10.3)
CHLORIDE SERPL-SCNC: 103 MMOL/L (ref 98–107)
CO2 SERPL-SCNC: 25 MMOL/L (ref 21–32)
CREAT SERPL-MCNC: 0.96 MG/DL (ref 0.5–1.05)
ERYTHROCYTE [DISTWIDTH] IN BLOOD BY AUTOMATED COUNT: 12.3 % (ref 11.5–14.5)
GFR SERPL CREATININE-BSD FRML MDRD: 79 ML/MIN/1.73M*2
GLUCOSE BLD MANUAL STRIP-MCNC: 105 MG/DL (ref 74–99)
GLUCOSE BLD MANUAL STRIP-MCNC: 150 MG/DL (ref 74–99)
GLUCOSE SERPL-MCNC: 105 MG/DL (ref 74–99)
HCT VFR BLD AUTO: 39.1 % (ref 36–46)
HGB BLD-MCNC: 13.4 G/DL (ref 12–16)
HOLD SPECIMEN: NORMAL
MAGNESIUM SERPL-MCNC: 2.05 MG/DL (ref 1.6–2.4)
MCH RBC QN AUTO: 30 PG (ref 26–34)
MCHC RBC AUTO-ENTMCNC: 34.3 G/DL (ref 32–36)
MCV RBC AUTO: 88 FL (ref 80–100)
NRBC BLD-RTO: 0 /100 WBCS (ref 0–0)
PLATELET # BLD AUTO: 344 X10*3/UL (ref 150–450)
PMV BLD AUTO: 9.2 FL (ref 7.5–11.5)
POTASSIUM SERPL-SCNC: 4 MMOL/L (ref 3.5–5.3)
RBC # BLD AUTO: 4.47 X10*6/UL (ref 4–5.2)
SODIUM SERPL-SCNC: 135 MMOL/L (ref 136–145)
WBC # BLD AUTO: 18.9 X10*3/UL (ref 4.4–11.3)

## 2023-10-17 PROCEDURE — 83735 ASSAY OF MAGNESIUM: CPT | Performed by: HOSPITALIST

## 2023-10-17 PROCEDURE — 99239 HOSP IP/OBS DSCHRG MGMT >30: CPT | Performed by: INTERNAL MEDICINE

## 2023-10-17 PROCEDURE — 82947 ASSAY GLUCOSE BLOOD QUANT: CPT

## 2023-10-17 PROCEDURE — 96375 TX/PRO/DX INJ NEW DRUG ADDON: CPT

## 2023-10-17 PROCEDURE — G0378 HOSPITAL OBSERVATION PER HR: HCPCS

## 2023-10-17 PROCEDURE — 2500000004 HC RX 250 GENERAL PHARMACY W/ HCPCS (ALT 636 FOR OP/ED): Performed by: INTERNAL MEDICINE

## 2023-10-17 PROCEDURE — 85027 COMPLETE CBC AUTOMATED: CPT | Performed by: HOSPITALIST

## 2023-10-17 PROCEDURE — 2500000002 HC RX 250 W HCPCS SELF ADMINISTERED DRUGS (ALT 637 FOR MEDICARE OP, ALT 636 FOR OP/ED): Performed by: HOSPITALIST

## 2023-10-17 PROCEDURE — S4993 CONTRACEPTIVE PILLS FOR BC: HCPCS | Performed by: HOSPITALIST

## 2023-10-17 PROCEDURE — 2500000004 HC RX 250 GENERAL PHARMACY W/ HCPCS (ALT 636 FOR OP/ED): Performed by: HOSPITALIST

## 2023-10-17 PROCEDURE — 96376 TX/PRO/DX INJ SAME DRUG ADON: CPT

## 2023-10-17 PROCEDURE — 96372 THER/PROPH/DIAG INJ SC/IM: CPT | Performed by: HOSPITALIST

## 2023-10-17 PROCEDURE — 2500000001 HC RX 250 WO HCPCS SELF ADMINISTERED DRUGS (ALT 637 FOR MEDICARE OP): Performed by: HOSPITALIST

## 2023-10-17 PROCEDURE — 36415 COLL VENOUS BLD VENIPUNCTURE: CPT | Performed by: HOSPITALIST

## 2023-10-17 PROCEDURE — 82947 ASSAY GLUCOSE BLOOD QUANT: CPT | Mod: 91

## 2023-10-17 PROCEDURE — 94640 AIRWAY INHALATION TREATMENT: CPT

## 2023-10-17 PROCEDURE — 80048 BASIC METABOLIC PNL TOTAL CA: CPT | Performed by: HOSPITALIST

## 2023-10-17 RX ADMIN — METHYLPREDNISOLONE SODIUM SUCCINATE 40 MG: 40 INJECTION, POWDER, FOR SOLUTION INTRAMUSCULAR; INTRAVENOUS at 00:57

## 2023-10-17 RX ADMIN — ASPIRIN 81 MG: 81 TABLET, CHEWABLE ORAL at 08:59

## 2023-10-17 RX ADMIN — SUMATRIPTAN SUCCINATE 50 MG: 25 TABLET ORAL at 04:25

## 2023-10-17 RX ADMIN — Medication 12.5 MG: at 05:15

## 2023-10-17 RX ADMIN — BUTALBITAL, ACETAMINOPHEN, AND CAFFEINE 1 TABLET: 50; 325; 40 TABLET, COATED ORAL at 03:29

## 2023-10-17 RX ADMIN — METHYLPREDNISOLONE SODIUM SUCCINATE 40 MG: 40 INJECTION, POWDER, FOR SOLUTION INTRAMUSCULAR; INTRAVENOUS at 07:47

## 2023-10-17 RX ADMIN — ENOXAPARIN SODIUM 40 MG: 40 INJECTION SUBCUTANEOUS at 09:00

## 2023-10-17 RX ADMIN — DESVENLAFAXINE 50 MG: 50 TABLET, FILM COATED, EXTENDED RELEASE ORAL at 08:59

## 2023-10-17 RX ADMIN — BUDESONIDE 0.5 MG: 0.5 INHALANT ORAL at 07:01

## 2023-10-17 RX ADMIN — NORETHINDRONE 0.35 MG: KIT at 09:00

## 2023-10-17 RX ADMIN — DESVENLAFAXINE 100 MG: 50 TABLET, FILM COATED, EXTENDED RELEASE ORAL at 09:00

## 2023-10-17 RX ADMIN — ONDANSETRON 4 MG: 2 INJECTION INTRAMUSCULAR; INTRAVENOUS at 03:29

## 2023-10-17 RX ADMIN — IPRATROPIUM BROMIDE AND ALBUTEROL SULFATE 3 ML: .5; 3 SOLUTION RESPIRATORY (INHALATION) at 07:01

## 2023-10-17 RX ADMIN — PROMETHAZINE HYDROCHLORIDE 12.5 MG: 25 INJECTION INTRAMUSCULAR; INTRAVENOUS at 05:06

## 2023-10-17 ASSESSMENT — COGNITIVE AND FUNCTIONAL STATUS - GENERAL
MOBILITY SCORE: 24
DAILY ACTIVITIY SCORE: 24

## 2023-10-17 ASSESSMENT — PAIN SCALES - GENERAL: PAINLEVEL_OUTOF10: 8

## 2023-10-17 NOTE — DISCHARGE SUMMARY
Discharge Diagnosis  Moderate persistent asthma with exacerbation    Issues Requiring Follow-Up  Frequent asthma exacerbation recommend follow-up with pulmonology as an outpatient.    Discharge Meds     Your medication list        START taking these medications        Instructions Last Dose Given Next Dose Due   predniSONE 20 mg tablet  Commonly known as: Deltasone  Start taking on: October 16, 2023      Take 2 tablets (40 mg) by mouth once daily for 3 days, THEN 1 tablet (20 mg) once daily for 3 days, THEN 0.5 tablets (10 mg) once daily for 3 days.              CONTINUE taking these medications        Instructions Last Dose Given Next Dose Due   albuterol 2.5 mg /3 mL (0.083 %) nebulizer solution           albuterol 90 mcg/actuation inhaler           aspirin 81 mg chewable tablet           budesonide 0.5 mg/2 mL nebulizer solution  Commonly known as: Pulmicort           busPIRone 15 mg tablet  Commonly known as: Buspar      Take 0.5-1 tablets (7.5-15 mg) by mouth 2 times a day.       butalbital-acetaminophen-caff -40 mg tablet      Take 1 tablet by mouth early in the morning.. 1 tab(s) orally once a day, As Needed       Deblitane 0.35 mg tablet  Generic drug: norethindrone           desvenlafaxine 50 mg 24 hr tablet  Commonly known as: Pristiq           desvenlafaxine 100 mg 24 hr tablet  Commonly known as: Pristiq           Emgality Syringe 120 mg/mL prefilled syringe  Generic drug: galcanezumab           EPINEPHrine 0.3 mg/0.3 mL injection syringe  Commonly known as: Epipen           fexofenadine 180 mg tablet  Commonly known as: Allegra           L norgest/e.estradioL-e.estrad 0.15 mg-30 mcg (84)/10 mcg (7) tablets,dose pack,3 month tablet  Commonly known as: Seasonique           levothyroxine 100 mcg tablet  Commonly known as: Synthroid, Levoxyl           montelukast 10 mg tablet  Commonly known as: Singulair      Take 1 tablet (10 mg) by mouth once daily at bedtime.       rizatriptan 10 mg  tablet  Commonly known as: Maxalt           SUMAtriptan 100 mg tablet  Commonly known as: Imitrex           TRELEGY ELLIPTA INHL                     Where to Get Your Medications        These medications were sent to [id number] - [place], [place] - [address]  [address] [place] [address]      Phone: [phone]   predniSONE 20 mg tablet         Test Results Pending At Discharge  Pending Labs       Order Current Status    Extra Tubes Preliminary result    PST Top Preliminary result    SST TOP Preliminary result            Hospital Course   37 y.o. female presenting with acute asthma exacerbation.  She initially presented to the emergency department complaining of chest tightness and shortness of breath.  She was given subcutaneous epinephrine and 2 DuoNeb treatments by EMS.  She was then given magnesium, Solu-Medrol and additional DuoNeb treatments in the emergency department.  CTA was negative for PE.  Patient seen multiple rounds of nebulizers and with treatment she overall improved.  Solu-Medrol eventually decreased.  The patient now on room air and is feeling back to her baseline.  The patient stated that her  is replacing carpets at home.  Recommend outpatient follow-up with pulmonology given frequency of asthma exacerbations.  Patient is otherwise stable for discharge with outpatient follow-up    Pertinent Physical Exam At Time of Discharge  Physical Exam  Constitutional:       Appearance: Normal appearance.   HENT:      Head: Normocephalic and atraumatic.      Nose: Nose normal.      Mouth/Throat:      Mouth: Mucous membranes are dry.   Eyes:      Extraocular Movements: Extraocular movements intact.      Conjunctiva/sclera: Conjunctivae normal.   Cardiovascular:      Rate and Rhythm: Normal rate and regular rhythm.      Pulses: Normal pulses.      Heart sounds: Normal heart sounds.   Pulmonary:      Effort: Pulmonary effort is normal.      Breath sounds: Normal  breath sounds.   Abdominal:      General: Bowel sounds are normal.      Palpations: Abdomen is soft.   Musculoskeletal:         General: Normal range of motion.      Cervical back: Normal range of motion and neck supple.   Skin:     General: Skin is warm and dry.   Neurological:      General: No focal deficit present.      Mental Status: She is alert and oriented to person, place, and time. Mental status is at baseline.   Psychiatric:         Mood and Affect: Mood normal.         Behavior: Behavior normal.         Thought Content: Thought content normal.         Judgment: Judgment normal.         Outpatient Follow-Up  Future Appointments   Date Time Provider Department Center   11/29/2023 10:00 AM Edin Galdamez MD [id number] [place]         Guerrero Lebron MD

## 2023-10-17 NOTE — PROGRESS NOTES
Music Therapy Note    Sarahi Haley was referred by Pain rounds by MT    Therapy Session  Referral Type: Pain rounds  Visit Type: New visit  Session Start Time: 1350  Session End Time: 1353  Intervention Delivery: In-person  Conflict of Service: None  Family Present for Session: None     Pre-assessment  Unable to Assess Reason: Outcomes not assessed  Pain Score: 5 - Moderate pain  Stress Level (0-10): 6  Anxiety Level (0-10): 6  Mood/Affect: Calm (Pleasant, soft spoken)  Verbalized Emotional State: Anxiety         Treatment/Interventions  Music Therapy Interventions: Assessment    Post-assessment  Unable to Assess Reason: Did not provide expressive therapy intervention  Pain Score: 8  Total Session Time (min): 3 minutes    Narrative  Assessment Detail: PT sitting up in chair, soft spoken and pleasant with MT. PT shared they were having pain due to a headache but were managing well at this time and planned to go to sleep.  Intervention: MT provided education and assessment of MT services to help reduce pain perception and reduce stress and anxiety.  Follow-up: MT to f/u next day if applicable.    Education Documentation  Relaxation, taught by Suzanna Vigil at 10/17/2023  3:59 PM.  Learner: Patient  Readiness: Acceptance  Method: Explanation  Response: Verbalizes Understanding    Focal Points, taught by Suzanna Vigil at 10/17/2023  3:59 PM.  Learner: Patient  Readiness: Acceptance  Method: Explanation  Response: Verbalizes Understanding    Pain Management, taught by Suzanna Vigil at 10/17/2023  3:59 PM.  Learner: Patient  Readiness: Acceptance  Method: Explanation  Response: Verbalizes Understanding

## 2023-10-17 NOTE — PROGRESS NOTES
Music Therapy Note    Sarahi Haley was referred by Pain rounds by MT    Therapy Session  Referral Type: Pain rounds  Visit Type: New visit  Session Start Time: 1350  Session End Time: 1353  Intervention Delivery: In-person  Conflict of Service: None  Family Present for Session: None     Pre-assessment  Unable to Assess Reason: Outcomes not assessed  Pain Score: 5 - Moderate pain  Stress Level (0-10): 6  Anxiety Level (0-10): 6  Mood/Affect: Calm (Pleasant, soft spoken)  Verbalized Emotional State: Anxiety         Treatment/Interventions  Music Therapy Interventions: Assessment    Post-assessment  Unable to Assess Reason: Did not provide expressive therapy intervention  Pain Score: 8  Total Session Time (min): 3 minutes    Narrative  Assessment Detail: PT sitting up in chair, soft spoken and pleasant with MT. PT shared they were having pain due to a headache but were managing well at this time and planned to go to sleep.  Intervention: MT provided education and assessment of MT services to help reduce pain perception and reduce stress and anxiety.  Follow-up: MT to f/u next day if applicable.    Education Documentation  Relaxation, taught by Suzanan Vigil at 10/17/2023  3:59 PM.  Learner: Patient  Readiness: Acceptance  Method: Explanation  Response: Verbalizes Understanding    Focal Points, taught by Suzanna Vigil at 10/17/2023  3:59 PM.  Learner: Patient  Readiness: Acceptance  Method: Explanation  Response: Verbalizes Understanding    Pain Management, taught by Suzanna Vigil at 10/17/2023  3:59 PM.  Learner: Patient  Readiness: Acceptance  Method: Explanation  Response: Verbalizes Understanding          Expressive Therapies Note

## 2023-10-18 ENCOUNTER — PATIENT OUTREACH (OUTPATIENT)
Dept: PRIMARY CARE | Facility: CLINIC | Age: 38
End: 2023-10-18
Payer: COMMERCIAL

## 2023-10-18 NOTE — PROGRESS NOTES
Discharge Facility:  [facility]  Discharge Diagnosis:  asthma  Admission Date:  10/15/23  Discharge Date: 10/17/23    PCP Appointment Date:  10/23/23    Specialist Appointment Date:   Pulmonology TBD    Hospital Encounter and Summary: Linked   See discharge assessment below for further details     Engagement  Call Start Time: 1152 (10/18/2023 11:52 AM)    Medications  Medications reviewed with patient/caregiver?: Yes (10/18/2023 11:52 AM)  Is the patient having any side effects they believe may be caused by any medication additions or changes?: No (10/18/2023 11:52 AM)  Does the patient have all medications ordered at discharge?: Yes (10/18/2023 11:52 AM)  Is the patient taking all medications as directed (includes completed medication regime)?: Yes (10/18/2023 11:52 AM)  Medication Comments: reviewed new, changed, and discontinued med.  steroid is new (10/18/2023 11:52 AM)    Self Management  Has home health visited the patient within 72 hours of discharge?: Not applicable (10/18/2023 11:52 AM)    Patient Teaching  What is the patient's perception of their health status since discharge?: Same (10/18/2023 11:52 AM)  Is the patient/caregiver able to teach back the hierarchy of who to call/visit for symptoms/problems? PCP, Specialist, Home Health nurse, Urgent Care, ED, 911: Yes (10/18/2023 11:52 AM)  Patient/Caregiver Education Comments: spoke with rina.  states feels about the same.  has all meds.  PCP appt scheduled.  no questions or concerns at this time. (10/18/2023 11:52 AM)

## 2023-10-23 ENCOUNTER — OFFICE VISIT (OUTPATIENT)
Dept: PRIMARY CARE | Facility: CLINIC | Age: 38
End: 2023-10-23
Payer: COMMERCIAL

## 2023-10-23 VITALS
SYSTOLIC BLOOD PRESSURE: 124 MMHG | HEART RATE: 77 BPM | BODY MASS INDEX: 48.82 KG/M2 | HEIGHT: 65 IN | WEIGHT: 293 LBS | DIASTOLIC BLOOD PRESSURE: 68 MMHG

## 2023-10-23 DIAGNOSIS — J96.01 ACUTE RESPIRATORY FAILURE WITH HYPOXIA (MULTI): ICD-10-CM

## 2023-10-23 DIAGNOSIS — T78.2XXS ACUTE ANAPHYLAXIS, SEQUELA: ICD-10-CM

## 2023-10-23 DIAGNOSIS — G40.209 SEIZURE DISORDER, COMPLEX PARTIAL, WITHOUT INTRACTABLE EPILEPSY (MULTI): ICD-10-CM

## 2023-10-23 PROBLEM — B37.0 ORAL THRUSH: Status: RESOLVED | Noted: 2023-10-10 | Resolved: 2023-10-23

## 2023-10-23 PROBLEM — G43.711 INTRACTABLE CHRONIC MIGRAINE WITHOUT AURA AND WITH STATUS MIGRAINOSUS: Status: RESOLVED | Noted: 2023-10-10 | Resolved: 2023-10-23

## 2023-10-23 PROBLEM — S93.609A FOOT SPRAIN: Status: RESOLVED | Noted: 2023-10-10 | Resolved: 2023-10-23

## 2023-10-23 PROBLEM — R10.31 RIGHT LOWER QUADRANT ABDOMINAL PAIN: Status: RESOLVED | Noted: 2018-12-20 | Resolved: 2023-10-23

## 2023-10-23 PROBLEM — H65.00 ACUTE SEROUS OTITIS MEDIA: Status: RESOLVED | Noted: 2023-10-10 | Resolved: 2023-10-23

## 2023-10-23 PROBLEM — N92.6 MISSED MENSES: Status: RESOLVED | Noted: 2023-10-10 | Resolved: 2023-10-23

## 2023-10-23 PROBLEM — J44.9 COPD (CHRONIC OBSTRUCTIVE PULMONARY DISEASE) (MULTI): Status: RESOLVED | Noted: 2023-10-10 | Resolved: 2023-10-23

## 2023-10-23 PROBLEM — R10.2 PELVIC PAIN IN FEMALE: Status: RESOLVED | Noted: 2023-10-10 | Resolved: 2023-10-23

## 2023-10-23 PROBLEM — H10.9 CONJUNCTIVITIS: Status: RESOLVED | Noted: 2023-10-10 | Resolved: 2023-10-23

## 2023-10-23 PROBLEM — R10.9 RIGHT FLANK PAIN: Status: RESOLVED | Noted: 2023-10-10 | Resolved: 2023-10-23

## 2023-10-23 PROCEDURE — 99495 TRANSJ CARE MGMT MOD F2F 14D: CPT | Performed by: FAMILY MEDICINE

## 2023-10-23 RX ORDER — EPINEPHRINE 0.3 MG/.3ML
0.3 INJECTION SUBCUTANEOUS DAILY PRN
Qty: 2 EACH | Refills: 3 | Status: SHIPPED | OUTPATIENT
Start: 2023-10-23

## 2023-10-23 NOTE — PROGRESS NOTES
Subjective   Patient ID: Sarahi Haley is a 37 y.o. female who presents for hospitial follow up.    HPI   Pt was admitted at [facility] for asthma, she was admitted from 10/15/2023-10/17/2023. Pt had blood work done through . Pt also had a CT of her chest on 10/15/2023.     Review of Systems  12 Systems have been reviewed as follows.  Constitutional: Fever, weight gain, weight loss, appetite change, night sweats, fatigue, chills.  Eyes : blurry, double vision, vision, loss, tearing, redness, pain, sensitivity to light, glaucoma.  Ears: nose, mouth, and throat: Hearing loss, ringing in the ears, ear pain, nasal congestion, nasal drainage, nosebleeds, mouth, throat, irritation tooth problem.  Cardiovascular: chest pain, pressure, heart racing, palpitations, sweating, leg swelling, high or low blood pressure  Pulmonary: Cough, yellow or green sputum, blood and sputum, shortness of breath, wheezing  Gastrointestinal: Nausea, vomiting, diarrhea, constipation, pain, blood in stool, or vomitus, heartburn, difficulty swallowing  Genitourinary: incontinence, abnormal bleeding, abnormal discharge, urinary frequency, urinary hesitancy, pain, impotence sexual problem, infection, urinary retention  Musculoskeletal: Pain, stiffness, joint, redness or warmth, arthritis, back pain, weakness, muscle wasting, sprain or fracture  Neuro: Weight weakness, dizziness, change in voice, change in taste change in vision, change in hearing, loss, or change of sensation, trouble walking, balance problems coordination problems, shaking, speech problem  Endocrine: cold or heat intolerance, blood sugar problem, weight gain or loss missed periods hot flashes, sweats, change in body hair, change in libido, increased thirst, increased urination  Heme/lymph: Swelling, bleeding, problem anemia, bruising, enlarged lymph nodes  Allergic/immunologic: H. plus nasal drip, watery itchy eyes, nasal drainage, immunosuppressed  The above were reviewed and  "noted negative except as noted in HPI and Problem List.    Objective   /68 (BP Location: Left arm, Patient Position: Sitting, BP Cuff Size: Adult)   Pulse 77   Ht 1.651 m (5' 5\")   Wt 145 kg (319 lb)   LMP 10/01/2023 (Approximate)   BMI 53.08 kg/m²     Physical Exam  CONSTITUTIONAL- NAD, Pt is A & O x3, Vital signs reviewed per chart.  General Appearance- normal , good hygiene,talks easily  EYES-PERRLA conjunctiva and lids- normal  EARS/NOSE-TM's normal, head normocephalic and atraumatic, naso pharynx-no nasal discharge, no trismus,  oropharynx- normal without exudate  NECK- supple, FROM, without mass  thyroid- NT, normal size, no nodule noted  LYMPH- WNL  CV- RRR without murmur, gallop, or other abnormality.  PULM- CTA bilaterally  Respiratory effort- normal respiratory effort , no retractions or nasal flaring  ABDOMEN- normoactive BS's , soft , NT, no hepatosplenomegaly palpated, or masses. No pulsatile masses noted  extremities no edema,NT  SKIN- no abnormal skin lesions on inspection or palpation  neuro- no focal deficits  PSYCH- pleasant, normal judgement and insight    Assessment/Plan   Problem List Items Addressed This Visit             ICD-10-CM    Seizure disorder, complex partial, without intractable epilepsy (CMS/HCC) G40.209     Other Visit Diagnoses         Codes    Acute respiratory failure with hypoxia (CMS/HCC)     J96.01             Scribe Attestation  By signing my name below, ISania RMA , Alva   attest that this documentation has been prepared under the direction and in the presence of Paul Matthew DO.    Provider Attestation - Scribe documentation    All medical record entries made by the Scribe were at my direction and personally dictated by me. I have reviewed the chart and agree that the record accurately reflects my personal performance of the history, physical exam, discussion and plan.    "

## 2023-10-24 ENCOUNTER — PATIENT OUTREACH (OUTPATIENT)
Dept: PRIMARY CARE | Facility: CLINIC | Age: 38
End: 2023-10-24
Payer: COMMERCIAL

## 2023-11-09 ENCOUNTER — HOSPITAL ENCOUNTER (OUTPATIENT)
Dept: CARDIOLOGY | Facility: HOSPITAL | Age: 38
Discharge: HOME | End: 2023-11-09
Payer: COMMERCIAL

## 2023-11-09 PROCEDURE — 93005 ELECTROCARDIOGRAM TRACING: CPT

## 2023-11-10 ENCOUNTER — HOSPITAL ENCOUNTER (EMERGENCY)
Facility: HOSPITAL | Age: 38
Discharge: HOME | End: 2023-11-10
Attending: STUDENT IN AN ORGANIZED HEALTH CARE EDUCATION/TRAINING PROGRAM
Payer: COMMERCIAL

## 2023-11-10 VITALS
HEIGHT: 65 IN | RESPIRATION RATE: 18 BRPM | SYSTOLIC BLOOD PRESSURE: 183 MMHG | WEIGHT: 293 LBS | DIASTOLIC BLOOD PRESSURE: 72 MMHG | BODY MASS INDEX: 48.82 KG/M2 | HEART RATE: 88 BPM | OXYGEN SATURATION: 98 %

## 2023-11-10 DIAGNOSIS — U07.1 COVID-19: ICD-10-CM

## 2023-11-10 DIAGNOSIS — J45.901 SEVERE ASTHMA WITH EXACERBATION, UNSPECIFIED WHETHER PERSISTENT (HHS-HCC): Primary | ICD-10-CM

## 2023-11-10 LAB
ALBUMIN SERPL BCP-MCNC: 4.1 G/DL (ref 3.4–5)
ALP SERPL-CCNC: 83 U/L (ref 33–110)
ALT SERPL W P-5'-P-CCNC: 51 U/L (ref 7–45)
ANION GAP SERPL CALC-SCNC: 16 MMOL/L (ref 10–20)
AST SERPL W P-5'-P-CCNC: 30 U/L (ref 9–39)
BASOPHILS # BLD AUTO: 0.02 X10*3/UL (ref 0–0.1)
BASOPHILS NFR BLD AUTO: 0.2 %
BILIRUB SERPL-MCNC: 0.7 MG/DL (ref 0–1.2)
BUN SERPL-MCNC: 8 MG/DL (ref 6–23)
CALCIUM SERPL-MCNC: 8.6 MG/DL (ref 8.6–10.3)
CHLORIDE SERPL-SCNC: 102 MMOL/L (ref 98–107)
CO2 SERPL-SCNC: 23 MMOL/L (ref 21–32)
CREAT SERPL-MCNC: 0.94 MG/DL (ref 0.5–1.05)
EOSINOPHIL # BLD AUTO: 0.13 X10*3/UL (ref 0–0.7)
EOSINOPHIL NFR BLD AUTO: 1.5 %
ERYTHROCYTE [DISTWIDTH] IN BLOOD BY AUTOMATED COUNT: 12.3 % (ref 11.5–14.5)
FLUAV RNA RESP QL NAA+PROBE: NOT DETECTED
FLUBV RNA RESP QL NAA+PROBE: NOT DETECTED
GFR SERPL CREATININE-BSD FRML MDRD: 81 ML/MIN/1.73M*2
GLUCOSE SERPL-MCNC: 136 MG/DL (ref 74–99)
HCT VFR BLD AUTO: 42.6 % (ref 36–46)
HGB BLD-MCNC: 14.8 G/DL (ref 12–16)
IMM GRANULOCYTES # BLD AUTO: 0.02 X10*3/UL (ref 0–0.7)
IMM GRANULOCYTES NFR BLD AUTO: 0.2 % (ref 0–0.9)
LYMPHOCYTES # BLD AUTO: 4.52 X10*3/UL (ref 1.2–4.8)
LYMPHOCYTES NFR BLD AUTO: 52 %
MCH RBC QN AUTO: 29.7 PG (ref 26–34)
MCHC RBC AUTO-ENTMCNC: 34.7 G/DL (ref 32–36)
MCV RBC AUTO: 86 FL (ref 80–100)
MONOCYTES # BLD AUTO: 0.74 X10*3/UL (ref 0.1–1)
MONOCYTES NFR BLD AUTO: 8.5 %
NEUTROPHILS # BLD AUTO: 3.26 X10*3/UL (ref 1.2–7.7)
NEUTROPHILS NFR BLD AUTO: 37.6 %
NRBC BLD-RTO: 0 /100 WBCS (ref 0–0)
PLATELET # BLD AUTO: 424 X10*3/UL (ref 150–450)
POTASSIUM SERPL-SCNC: 2.8 MMOL/L (ref 3.5–5.3)
PROT SERPL-MCNC: 7.1 G/DL (ref 6.4–8.2)
RBC # BLD AUTO: 4.98 X10*6/UL (ref 4–5.2)
SARS-COV-2 RNA RESP QL NAA+PROBE: DETECTED
SODIUM SERPL-SCNC: 138 MMOL/L (ref 136–145)
WBC # BLD AUTO: 8.7 X10*3/UL (ref 4.4–11.3)

## 2023-11-10 PROCEDURE — 85025 COMPLETE CBC W/AUTO DIFF WBC: CPT | Performed by: NURSE PRACTITIONER

## 2023-11-10 PROCEDURE — 94640 AIRWAY INHALATION TREATMENT: CPT

## 2023-11-10 PROCEDURE — 96365 THER/PROPH/DIAG IV INF INIT: CPT | Performed by: STUDENT IN AN ORGANIZED HEALTH CARE EDUCATION/TRAINING PROGRAM

## 2023-11-10 PROCEDURE — 2500000004 HC RX 250 GENERAL PHARMACY W/ HCPCS (ALT 636 FOR OP/ED)

## 2023-11-10 PROCEDURE — 87636 SARSCOV2 & INF A&B AMP PRB: CPT | Performed by: NURSE PRACTITIONER

## 2023-11-10 PROCEDURE — 99285 EMERGENCY DEPT VISIT HI MDM: CPT | Mod: 25 | Performed by: STUDENT IN AN ORGANIZED HEALTH CARE EDUCATION/TRAINING PROGRAM

## 2023-11-10 PROCEDURE — 36415 COLL VENOUS BLD VENIPUNCTURE: CPT | Performed by: NURSE PRACTITIONER

## 2023-11-10 PROCEDURE — 2500000002 HC RX 250 W HCPCS SELF ADMINISTERED DRUGS (ALT 637 FOR MEDICARE OP, ALT 636 FOR OP/ED)

## 2023-11-10 PROCEDURE — 2500000004 HC RX 250 GENERAL PHARMACY W/ HCPCS (ALT 636 FOR OP/ED): Performed by: NURSE PRACTITIONER

## 2023-11-10 PROCEDURE — 80053 COMPREHEN METABOLIC PANEL: CPT | Performed by: NURSE PRACTITIONER

## 2023-11-10 PROCEDURE — 96375 TX/PRO/DX INJ NEW DRUG ADDON: CPT | Performed by: STUDENT IN AN ORGANIZED HEALTH CARE EDUCATION/TRAINING PROGRAM

## 2023-11-10 PROCEDURE — 2500000004 HC RX 250 GENERAL PHARMACY W/ HCPCS (ALT 636 FOR OP/ED): Performed by: STUDENT IN AN ORGANIZED HEALTH CARE EDUCATION/TRAINING PROGRAM

## 2023-11-10 PROCEDURE — 99284 EMERGENCY DEPT VISIT MOD MDM: CPT | Mod: 25 | Performed by: STUDENT IN AN ORGANIZED HEALTH CARE EDUCATION/TRAINING PROGRAM

## 2023-11-10 RX ORDER — POTASSIUM CHLORIDE 20 MEQ/1
40 TABLET, EXTENDED RELEASE ORAL ONCE
Status: COMPLETED | OUTPATIENT
Start: 2023-11-10 | End: 2023-11-10

## 2023-11-10 RX ORDER — ALBUTEROL SULFATE 0.83 MG/ML
SOLUTION RESPIRATORY (INHALATION)
Status: COMPLETED
Start: 2023-11-10 | End: 2023-11-10

## 2023-11-10 RX ORDER — ONDANSETRON HYDROCHLORIDE 2 MG/ML
4 INJECTION, SOLUTION INTRAVENOUS ONCE
Status: COMPLETED | OUTPATIENT
Start: 2023-11-10 | End: 2023-11-10

## 2023-11-10 RX ORDER — MAGNESIUM SULFATE HEPTAHYDRATE 40 MG/ML
INJECTION, SOLUTION INTRAVENOUS
Status: COMPLETED
Start: 2023-11-10 | End: 2023-11-10

## 2023-11-10 RX ORDER — IPRATROPIUM BROMIDE AND ALBUTEROL SULFATE 2.5; .5 MG/3ML; MG/3ML
3 SOLUTION RESPIRATORY (INHALATION) ONCE
Status: COMPLETED | OUTPATIENT
Start: 2023-11-10 | End: 2023-11-10

## 2023-11-10 RX ORDER — ALBUTEROL SULFATE 0.83 MG/ML
5 SOLUTION RESPIRATORY (INHALATION) ONCE
Status: COMPLETED | OUTPATIENT
Start: 2023-11-10 | End: 2023-11-10

## 2023-11-10 RX ORDER — POTASSIUM CHLORIDE 20 MEQ/1
40 TABLET, EXTENDED RELEASE ORAL DAILY
Status: DISCONTINUED | OUTPATIENT
Start: 2023-11-10 | End: 2023-11-10

## 2023-11-10 RX ORDER — IPRATROPIUM BROMIDE AND ALBUTEROL SULFATE 2.5; .5 MG/3ML; MG/3ML
SOLUTION RESPIRATORY (INHALATION)
Status: COMPLETED
Start: 2023-11-10 | End: 2023-11-10

## 2023-11-10 RX ORDER — MAGNESIUM SULFATE HEPTAHYDRATE 40 MG/ML
2 INJECTION, SOLUTION INTRAVENOUS ONCE
Status: COMPLETED | OUTPATIENT
Start: 2023-11-10 | End: 2023-11-10

## 2023-11-10 RX ORDER — MORPHINE SULFATE 4 MG/ML
4 INJECTION, SOLUTION INTRAMUSCULAR; INTRAVENOUS ONCE
Status: COMPLETED | OUTPATIENT
Start: 2023-11-10 | End: 2023-11-10

## 2023-11-10 RX ADMIN — ALBUTEROL SULFATE 5 MG: 2.5 SOLUTION RESPIRATORY (INHALATION) at 03:59

## 2023-11-10 RX ADMIN — MORPHINE SULFATE 4 MG: 4 INJECTION, SOLUTION INTRAMUSCULAR; INTRAVENOUS at 04:54

## 2023-11-10 RX ADMIN — ALBUTEROL SULFATE 5 MG: 0.83 SOLUTION RESPIRATORY (INHALATION) at 03:59

## 2023-11-10 RX ADMIN — IPRATROPIUM BROMIDE AND ALBUTEROL SULFATE 3 ML: 2.5; .5 SOLUTION RESPIRATORY (INHALATION) at 03:59

## 2023-11-10 RX ADMIN — MAGNESIUM SULFATE HEPTAHYDRATE 2 G: 40 INJECTION, SOLUTION INTRAVENOUS at 04:07

## 2023-11-10 RX ADMIN — ONDANSETRON 4 MG: 2 INJECTION INTRAMUSCULAR; INTRAVENOUS at 04:24

## 2023-11-10 RX ADMIN — POTASSIUM CHLORIDE 40 MEQ: 1500 TABLET, EXTENDED RELEASE ORAL at 05:42

## 2023-11-10 ASSESSMENT — COLUMBIA-SUICIDE SEVERITY RATING SCALE - C-SSRS
1. IN THE PAST MONTH, HAVE YOU WISHED YOU WERE DEAD OR WISHED YOU COULD GO TO SLEEP AND NOT WAKE UP?: NO
2. HAVE YOU ACTUALLY HAD ANY THOUGHTS OF KILLING YOURSELF?: NO
6. HAVE YOU EVER DONE ANYTHING, STARTED TO DO ANYTHING, OR PREPARED TO DO ANYTHING TO END YOUR LIFE?: NO

## 2023-11-10 ASSESSMENT — LIFESTYLE VARIABLES
HAVE PEOPLE ANNOYED YOU BY CRITICIZING YOUR DRINKING: NO
REASON UNABLE TO ASSESS: YES
EVER HAD A DRINK FIRST THING IN THE MORNING TO STEADY YOUR NERVES TO GET RID OF A HANGOVER: NO
EVER FELT BAD OR GUILTY ABOUT YOUR DRINKING: NO
HAVE YOU EVER FELT YOU SHOULD CUT DOWN ON YOUR DRINKING: NO

## 2023-11-10 NOTE — ED PROVIDER NOTES
HPI   No chief complaint on file.      37-year-old female presents emergency department, patient has history of asthma, states for the last few months she is having severe flareups of her asthma, pulmonologist suspected environmental.  Patient describes exacerbation of asthma that started this evening.  She did multiple breathing treatments at home of both DuoNebs and plain albuterol.  Called EMS for assistance, they also provided her DuoNeb treatment +125 mg of IV Solu-Medrol and 0.5 mg of IM epinephrine.  States some improvement of her shortness of breath after the interventions.    Patient denies any recent illness or known exposures      History provided by:  Patient and EMS personnel   used: No                        No data recorded                Patient History   Past Medical History:   Diagnosis Date    Acute cystitis without hematuria 08/08/2019    Acute cystitis without hematuria    Encounter for initial prescription of contraceptive pills 11/01/2019    Encounter for initial prescription of contraceptive pills    Nausea 02/05/2021    Nausea in adult    Other general symptoms and signs 12/05/2019    Forgetfulness    Parageusia 04/06/2020    Taste perversion    Pelvic and perineal pain     Pelvic pain in female    Personal history of other diseases of the respiratory system 04/06/2020    History of sinusitis    Personal history of other specified conditions 07/01/2020    History of vomiting    Personal history of other specified conditions 01/26/2021    History of headache    Personal history of other specified conditions 02/01/2021    History of diarrhea    Personal history of thrombophlebitis 06/03/2019    History of phlebitis    Personal history of urinary (tract) infections 01/16/2020    History of urinary tract infection     Past Surgical History:   Procedure Laterality Date    OTHER SURGICAL HISTORY  02/08/2019    Appendectomy    OTHER SURGICAL HISTORY  02/08/2019     Cholecystectomy    OTHER SURGICAL HISTORY  02/08/2019    Cystoscopy    OTHER SURGICAL HISTORY  02/08/2019    Ureteroscopy    OTHER SURGICAL HISTORY  02/08/2019    Shoulder surgery     Family History   Problem Relation Name Age of Onset    Hypertension Father      Other (Pulmonary Valve Stenosis) Sister      Heart disease Maternal Grandmother      Diabetes Other Grandparent     Lung cancer Other Grandparent     Anxiety disorder Sibling       Social History     Tobacco Use    Smoking status: Never    Smokeless tobacco: Never   Vaping Use    Vaping Use: Never used   Substance Use Topics    Alcohol use: Yes     Comment: social    Drug use: Never       Physical Exam   ED Triage Vitals   Temp Pulse Resp BP   -- -- -- --      SpO2 Temp src Heart Rate Source Patient Position   -- -- -- --      BP Location FiO2 (%)     -- --       Physical Exam  Physical Exam:  Constitutional: Vitals noted, no distress. Afebrile.   Cardiovascular: Regular, rate, rhythm, no murmur.   Pulmonary: Lungs moderately diminished throughout with increased respiratory effort, moderate distress  Gastrointestinal: Soft, nonsurgical. Nontender. No peritoneal signs. Normoactive bowel sounds.   Musculoskeletal: No peripheral edema. Negative Homans bilaterally, no cords.   Skin: No rash.   Neuro: No focal neurologic deficits, NIH score of 0.    ED Course & MDM   Diagnoses as of 11/10/23 0535   Severe asthma with exacerbation, unspecified whether persistent   COVID-19   EKG was obtained at 3:52 AM with ventricular rate of 106, as interpreted me, shows sinus tachycardia with normal axis normal interval, unremarkable ST and T wave patterns, no evidence of acute ischemia or other acute findings.    Patient was tested for COVID-19 and influenza, found to be COVID-19 positive.  CBC unremarkable metabolic panel did show hypokalemia, was repleted orally.    Had received Solu-Medrol and 0.5 mg of IM epinephrine by squad as well as breathing treatments.  Additionally  was given 2 g of magnesium here, continued on breathing treatments.  Patient did complain of a headache and requested morphine.    She was observed in the department, felt much improved.    Discussed continuing on prednisone at home, patient states she has plenty available to her.  I did recommend she take 60 mg x 2 days, 40 mg x 2 days, 20 mg x 2 days.  She will follow-up with primary care, return with any worsening symptoms or additional concerns.    Medical Decision Making      Procedure  Procedures     WENDI Tilley  11/10/23 0536       WENDI Tilley  11/10/23 0536

## 2023-11-15 ENCOUNTER — PATIENT OUTREACH (OUTPATIENT)
Dept: PRIMARY CARE | Facility: CLINIC | Age: 38
End: 2023-11-15
Payer: COMMERCIAL

## 2023-11-20 ENCOUNTER — E-VISIT (OUTPATIENT)
Dept: PRIMARY CARE | Facility: CLINIC | Age: 38
End: 2023-11-20
Payer: COMMERCIAL

## 2023-11-20 DIAGNOSIS — F41.9 ANXIETY: ICD-10-CM

## 2023-11-20 RX ORDER — HYDROXYZINE HYDROCHLORIDE 25 MG/1
25 TABLET, FILM COATED ORAL 3 TIMES DAILY PRN
Qty: 90 TABLET | Refills: 2 | Status: SHIPPED | OUTPATIENT
Start: 2023-11-20

## 2023-11-23 DIAGNOSIS — F32.9 MAJOR DEPRESSIVE DISORDER, SINGLE EPISODE, UNSPECIFIED: ICD-10-CM

## 2023-11-27 RX ORDER — DESVENLAFAXINE 100 MG/1
100 TABLET, EXTENDED RELEASE ORAL DAILY
Qty: 90 TABLET | Refills: 3 | Status: SHIPPED | OUTPATIENT
Start: 2023-11-27

## 2023-11-29 ENCOUNTER — APPOINTMENT (OUTPATIENT)
Dept: OBSTETRICS AND GYNECOLOGY | Facility: CLINIC | Age: 38
End: 2023-11-29
Payer: COMMERCIAL

## 2023-12-05 ENCOUNTER — APPOINTMENT (OUTPATIENT)
Dept: RADIOLOGY | Facility: HOSPITAL | Age: 38
DRG: 202 | End: 2023-12-05
Payer: COMMERCIAL

## 2023-12-05 ENCOUNTER — HOSPITAL ENCOUNTER (INPATIENT)
Facility: HOSPITAL | Age: 38
LOS: 2 days | Discharge: HOME | DRG: 202 | End: 2023-12-07
Attending: EMERGENCY MEDICINE | Admitting: STUDENT IN AN ORGANIZED HEALTH CARE EDUCATION/TRAINING PROGRAM
Payer: COMMERCIAL

## 2023-12-05 DIAGNOSIS — J45.901 MODERATE ASTHMA WITH ACUTE EXACERBATION, UNSPECIFIED WHETHER PERSISTENT (HHS-HCC): ICD-10-CM

## 2023-12-05 DIAGNOSIS — J45.41 MODERATE PERSISTENT ASTHMA WITH EXACERBATION (HHS-HCC): Primary | ICD-10-CM

## 2023-12-05 LAB
ALBUMIN SERPL BCP-MCNC: 4.2 G/DL (ref 3.4–5)
ALP SERPL-CCNC: 91 U/L (ref 33–110)
ALT SERPL W P-5'-P-CCNC: 62 U/L (ref 7–45)
ANION GAP BLDA CALCULATED.4IONS-SCNC: 17 MMO/L (ref 10–25)
ANION GAP SERPL CALC-SCNC: 14 MMOL/L (ref 10–20)
APPARATUS: ABNORMAL
ARTERIAL PATENCY WRIST A: POSITIVE
AST SERPL W P-5'-P-CCNC: 47 U/L (ref 9–39)
BASE EXCESS BLDA CALC-SCNC: -3.4 MMOL/L (ref -2–3)
BASE EXCESS BLDA CALC-SCNC: -6.2 MMOL/L (ref -2–3)
BASOPHILS # BLD AUTO: 0.02 X10*3/UL (ref 0–0.1)
BASOPHILS NFR BLD AUTO: 0.2 %
BILIRUB SERPL-MCNC: 0.5 MG/DL (ref 0–1.2)
BODY TEMPERATURE: ABNORMAL
BODY TEMPERATURE: ABNORMAL
BUN SERPL-MCNC: 11 MG/DL (ref 6–23)
CA-I BLDA-SCNC: 1.16 MMOL/L (ref 1.1–1.33)
CALCIUM SERPL-MCNC: 9 MG/DL (ref 8.6–10.3)
CHLORIDE BLDA-SCNC: 103 MMOL/L (ref 98–107)
CHLORIDE SERPL-SCNC: 102 MMOL/L (ref 98–107)
CO2 SERPL-SCNC: 23 MMOL/L (ref 21–32)
CREAT SERPL-MCNC: 0.9 MG/DL (ref 0.5–1.05)
CRITICAL CALL TIME: 1025
CRITICAL CALLED BY: ABNORMAL
CRITICAL CALLED TO: ABNORMAL
CRITICAL READ BACK: ABNORMAL
EOSINOPHIL # BLD AUTO: 0.01 X10*3/UL (ref 0–0.7)
EOSINOPHIL NFR BLD AUTO: 0.1 %
ERYTHROCYTE [DISTWIDTH] IN BLOOD BY AUTOMATED COUNT: 12.8 % (ref 11.5–14.5)
GFR SERPL CREATININE-BSD FRML MDRD: 85 ML/MIN/1.73M*2
GLUCOSE BLDA-MCNC: 289 MG/DL (ref 74–99)
GLUCOSE SERPL-MCNC: 177 MG/DL (ref 74–99)
HCO3 BLDA-SCNC: 18.1 MMOL/L (ref 22–26)
HCO3 BLDA-SCNC: 20.3 MMOL/L (ref 22–26)
HCT VFR BLD AUTO: 43.8 % (ref 36–46)
HCT VFR BLD EST: 45 % (ref 36–46)
HGB BLD-MCNC: 15.2 G/DL (ref 12–16)
HGB BLDA-MCNC: 14.9 G/DL (ref 12–16)
IMM GRANULOCYTES # BLD AUTO: 0.02 X10*3/UL (ref 0–0.7)
IMM GRANULOCYTES NFR BLD AUTO: 0.2 % (ref 0–0.9)
INHALED O2 CONCENTRATION: 21 %
INHALED O2 CONCENTRATION: 28 %
LACTATE BLDA-SCNC: 4.3 MMOL/L (ref 0.4–2)
LYMPHOCYTES # BLD AUTO: 3.57 X10*3/UL (ref 1.2–4.8)
LYMPHOCYTES NFR BLD AUTO: 40.3 %
MCH RBC QN AUTO: 30.2 PG (ref 26–34)
MCHC RBC AUTO-ENTMCNC: 34.7 G/DL (ref 32–36)
MCV RBC AUTO: 87 FL (ref 80–100)
MONOCYTES # BLD AUTO: 0.64 X10*3/UL (ref 0.1–1)
MONOCYTES NFR BLD AUTO: 7.2 %
NEUTROPHILS # BLD AUTO: 4.59 X10*3/UL (ref 1.2–7.7)
NEUTROPHILS NFR BLD AUTO: 52 %
NRBC BLD-RTO: 0 /100 WBCS (ref 0–0)
OXYHGB MFR BLDA: 96.2 % (ref 94–98)
OXYHGB MFR BLDA: 97.4 % (ref 94–98)
PCO2 BLDA: 32 MM HG (ref 38–42)
PCO2 BLDA: 32 MM HG (ref 38–42)
PH BLDA: 7.36 PH (ref 7.38–7.42)
PH BLDA: 7.41 PH (ref 7.38–7.42)
PLATELET # BLD AUTO: 384 X10*3/UL (ref 150–450)
PO2 BLDA: 101 MM HG (ref 85–95)
PO2 BLDA: 108 MM HG (ref 85–95)
POTASSIUM BLDA-SCNC: 3.6 MMOL/L (ref 3.5–5.3)
POTASSIUM SERPL-SCNC: 3.4 MMOL/L (ref 3.5–5.3)
PROT SERPL-MCNC: 7.2 G/DL (ref 6.4–8.2)
RBC # BLD AUTO: 5.04 X10*6/UL (ref 4–5.2)
SAO2 % BLDA: 100 % (ref 94–100)
SAO2 % BLDA: 100 % (ref 94–100)
SODIUM BLDA-SCNC: 134 MMOL/L (ref 136–145)
SODIUM SERPL-SCNC: 136 MMOL/L (ref 136–145)
SPECIMEN DRAWN FROM PATIENT: ABNORMAL
WBC # BLD AUTO: 8.9 X10*3/UL (ref 4.4–11.3)

## 2023-12-05 PROCEDURE — 94640 AIRWAY INHALATION TREATMENT: CPT

## 2023-12-05 PROCEDURE — 2500000002 HC RX 250 W HCPCS SELF ADMINISTERED DRUGS (ALT 637 FOR MEDICARE OP, ALT 636 FOR OP/ED): Performed by: PHYSICIAN ASSISTANT

## 2023-12-05 PROCEDURE — 2500000004 HC RX 250 GENERAL PHARMACY W/ HCPCS (ALT 636 FOR OP/ED)

## 2023-12-05 PROCEDURE — 2500000002 HC RX 250 W HCPCS SELF ADMINISTERED DRUGS (ALT 637 FOR MEDICARE OP, ALT 636 FOR OP/ED)

## 2023-12-05 PROCEDURE — 2500000002 HC RX 250 W HCPCS SELF ADMINISTERED DRUGS (ALT 637 FOR MEDICARE OP, ALT 636 FOR OP/ED): Performed by: STUDENT IN AN ORGANIZED HEALTH CARE EDUCATION/TRAINING PROGRAM

## 2023-12-05 PROCEDURE — 99222 1ST HOSP IP/OBS MODERATE 55: CPT | Performed by: NURSE PRACTITIONER

## 2023-12-05 PROCEDURE — 2500000004 HC RX 250 GENERAL PHARMACY W/ HCPCS (ALT 636 FOR OP/ED): Performed by: NURSE PRACTITIONER

## 2023-12-05 PROCEDURE — 36415 COLL VENOUS BLD VENIPUNCTURE: CPT | Performed by: PHYSICIAN ASSISTANT

## 2023-12-05 PROCEDURE — 71045 X-RAY EXAM CHEST 1 VIEW: CPT | Performed by: RADIOLOGY

## 2023-12-05 PROCEDURE — 2020000001 HC ICU ROOM DAILY

## 2023-12-05 PROCEDURE — 96375 TX/PRO/DX INJ NEW DRUG ADDON: CPT

## 2023-12-05 PROCEDURE — 2500000002 HC RX 250 W HCPCS SELF ADMINISTERED DRUGS (ALT 637 FOR MEDICARE OP, ALT 636 FOR OP/ED): Performed by: NURSE PRACTITIONER

## 2023-12-05 PROCEDURE — 71045 X-RAY EXAM CHEST 1 VIEW: CPT | Mod: FY

## 2023-12-05 PROCEDURE — 82805 BLOOD GASES W/O2 SATURATION: CPT | Performed by: PHYSICIAN ASSISTANT

## 2023-12-05 PROCEDURE — 96372 THER/PROPH/DIAG INJ SC/IM: CPT

## 2023-12-05 PROCEDURE — 2500000001 HC RX 250 WO HCPCS SELF ADMINISTERED DRUGS (ALT 637 FOR MEDICARE OP): Performed by: EMERGENCY MEDICINE

## 2023-12-05 PROCEDURE — 2500000002 HC RX 250 W HCPCS SELF ADMINISTERED DRUGS (ALT 637 FOR MEDICARE OP, ALT 636 FOR OP/ED): Performed by: EMERGENCY MEDICINE

## 2023-12-05 PROCEDURE — 99285 EMERGENCY DEPT VISIT HI MDM: CPT | Performed by: EMERGENCY MEDICINE

## 2023-12-05 PROCEDURE — 2500000001 HC RX 250 WO HCPCS SELF ADMINISTERED DRUGS (ALT 637 FOR MEDICARE OP): Performed by: STUDENT IN AN ORGANIZED HEALTH CARE EDUCATION/TRAINING PROGRAM

## 2023-12-05 PROCEDURE — 5A09357 ASSISTANCE WITH RESPIRATORY VENTILATION, LESS THAN 24 CONSECUTIVE HOURS, CONTINUOUS POSITIVE AIRWAY PRESSURE: ICD-10-PCS | Performed by: PHYSICIAN ASSISTANT

## 2023-12-05 PROCEDURE — 2500000004 HC RX 250 GENERAL PHARMACY W/ HCPCS (ALT 636 FOR OP/ED): Performed by: STUDENT IN AN ORGANIZED HEALTH CARE EDUCATION/TRAINING PROGRAM

## 2023-12-05 PROCEDURE — 96376 TX/PRO/DX INJ SAME DRUG ADON: CPT

## 2023-12-05 PROCEDURE — 94660 CPAP INITIATION&MGMT: CPT

## 2023-12-05 PROCEDURE — 2500000004 HC RX 250 GENERAL PHARMACY W/ HCPCS (ALT 636 FOR OP/ED): Performed by: PHYSICIAN ASSISTANT

## 2023-12-05 PROCEDURE — 85025 COMPLETE CBC W/AUTO DIFF WBC: CPT | Performed by: PHYSICIAN ASSISTANT

## 2023-12-05 PROCEDURE — 80053 COMPREHEN METABOLIC PANEL: CPT | Performed by: PHYSICIAN ASSISTANT

## 2023-12-05 PROCEDURE — 2500000004 HC RX 250 GENERAL PHARMACY W/ HCPCS (ALT 636 FOR OP/ED): Performed by: EMERGENCY MEDICINE

## 2023-12-05 PROCEDURE — 96365 THER/PROPH/DIAG IV INF INIT: CPT | Mod: 59

## 2023-12-05 PROCEDURE — 99291 CRITICAL CARE FIRST HOUR: CPT

## 2023-12-05 PROCEDURE — 94760 N-INVAS EAR/PLS OXIMETRY 1: CPT

## 2023-12-05 PROCEDURE — 2500000001 HC RX 250 WO HCPCS SELF ADMINISTERED DRUGS (ALT 637 FOR MEDICARE OP): Performed by: PHYSICIAN ASSISTANT

## 2023-12-05 PROCEDURE — 36600 WITHDRAWAL OF ARTERIAL BLOOD: CPT

## 2023-12-05 PROCEDURE — 2500000001 HC RX 250 WO HCPCS SELF ADMINISTERED DRUGS (ALT 637 FOR MEDICARE OP): Performed by: NURSE PRACTITIONER

## 2023-12-05 PROCEDURE — 84295 ASSAY OF SERUM SODIUM: CPT | Performed by: STUDENT IN AN ORGANIZED HEALTH CARE EDUCATION/TRAINING PROGRAM

## 2023-12-05 PROCEDURE — 96372 THER/PROPH/DIAG INJ SC/IM: CPT | Performed by: EMERGENCY MEDICINE

## 2023-12-05 RX ORDER — LEVOTHYROXINE SODIUM 100 UG/1
100 TABLET ORAL DAILY
Status: DISCONTINUED | OUTPATIENT
Start: 2023-12-05 | End: 2023-12-07 | Stop reason: HOSPADM

## 2023-12-05 RX ORDER — IPRATROPIUM BROMIDE AND ALBUTEROL SULFATE 2.5; .5 MG/3ML; MG/3ML
3 SOLUTION RESPIRATORY (INHALATION)
Status: DISCONTINUED | OUTPATIENT
Start: 2023-12-05 | End: 2023-12-07 | Stop reason: HOSPADM

## 2023-12-05 RX ORDER — SUMATRIPTAN SUCCINATE 25 MG/1
100 TABLET ORAL AS NEEDED
Status: DISCONTINUED | OUTPATIENT
Start: 2023-12-05 | End: 2023-12-07 | Stop reason: HOSPADM

## 2023-12-05 RX ORDER — ACETAMINOPHEN 325 MG/1
650 TABLET ORAL EVERY 4 HOURS PRN
Status: DISCONTINUED | OUTPATIENT
Start: 2023-12-05 | End: 2023-12-07 | Stop reason: HOSPADM

## 2023-12-05 RX ORDER — LORATADINE 10 MG/1
10 TABLET ORAL DAILY
Status: DISCONTINUED | OUTPATIENT
Start: 2023-12-05 | End: 2023-12-07 | Stop reason: HOSPADM

## 2023-12-05 RX ORDER — DESVENLAFAXINE 50 MG/1
100 TABLET, EXTENDED RELEASE ORAL DAILY
Status: DISCONTINUED | OUTPATIENT
Start: 2023-12-05 | End: 2023-12-07 | Stop reason: HOSPADM

## 2023-12-05 RX ORDER — ENOXAPARIN SODIUM 100 MG/ML
40 INJECTION SUBCUTANEOUS EVERY 12 HOURS SCHEDULED
Status: DISCONTINUED | OUTPATIENT
Start: 2023-12-05 | End: 2023-12-07 | Stop reason: HOSPADM

## 2023-12-05 RX ORDER — IPRATROPIUM BROMIDE AND ALBUTEROL SULFATE 2.5; .5 MG/3ML; MG/3ML
SOLUTION RESPIRATORY (INHALATION)
Status: DISPENSED
Start: 2023-12-05 | End: 2023-12-05

## 2023-12-05 RX ORDER — BUTALBITAL, ACETAMINOPHEN AND CAFFEINE 50; 325; 40 MG/1; MG/1; MG/1
1 TABLET ORAL DAILY
Status: DISCONTINUED | OUTPATIENT
Start: 2023-12-05 | End: 2023-12-05

## 2023-12-05 RX ORDER — ALBUTEROL SULFATE 90 UG/1
2 AEROSOL, METERED RESPIRATORY (INHALATION) EVERY 6 HOURS PRN
Status: DISCONTINUED | OUTPATIENT
Start: 2023-12-05 | End: 2023-12-05

## 2023-12-05 RX ORDER — HYDROXYZINE HYDROCHLORIDE 25 MG/1
25 TABLET, FILM COATED ORAL 3 TIMES DAILY PRN
Status: DISCONTINUED | OUTPATIENT
Start: 2023-12-05 | End: 2023-12-07 | Stop reason: HOSPADM

## 2023-12-05 RX ORDER — ACETAMINOPHEN 160 MG/5ML
650 SOLUTION ORAL EVERY 4 HOURS PRN
Status: DISCONTINUED | OUTPATIENT
Start: 2023-12-05 | End: 2023-12-07 | Stop reason: HOSPADM

## 2023-12-05 RX ORDER — ONDANSETRON HYDROCHLORIDE 2 MG/ML
4 INJECTION, SOLUTION INTRAVENOUS ONCE
Status: COMPLETED | OUTPATIENT
Start: 2023-12-05 | End: 2023-12-05

## 2023-12-05 RX ORDER — EPINEPHRINE 0.3 MG/.3ML
0.3 INJECTION SUBCUTANEOUS DAILY PRN
Status: DISCONTINUED | OUTPATIENT
Start: 2023-12-05 | End: 2023-12-05

## 2023-12-05 RX ORDER — MONTELUKAST SODIUM 10 MG/1
10 TABLET ORAL NIGHTLY
Status: DISCONTINUED | OUTPATIENT
Start: 2023-12-05 | End: 2023-12-07 | Stop reason: HOSPADM

## 2023-12-05 RX ORDER — DESVENLAFAXINE 50 MG/1
50 TABLET, EXTENDED RELEASE ORAL DAILY
Status: DISCONTINUED | OUTPATIENT
Start: 2023-12-05 | End: 2023-12-07 | Stop reason: HOSPADM

## 2023-12-05 RX ORDER — POTASSIUM CHLORIDE 20 MEQ/1
40 TABLET, EXTENDED RELEASE ORAL ONCE
Status: COMPLETED | OUTPATIENT
Start: 2023-12-05 | End: 2023-12-05

## 2023-12-05 RX ORDER — BUTALBITAL, ACETAMINOPHEN AND CAFFEINE 50; 325; 40 MG/1; MG/1; MG/1
1 TABLET ORAL ONCE
Status: COMPLETED | OUTPATIENT
Start: 2023-12-05 | End: 2023-12-05

## 2023-12-05 RX ORDER — FLUTICASONE FUROATE, UMECLIDINIUM BROMIDE AND VILANTEROL TRIFENATATE 100; 62.5; 25 UG/1; UG/1; UG/1
2 POWDER RESPIRATORY (INHALATION) DAILY
COMMUNITY

## 2023-12-05 RX ORDER — OXYCODONE HYDROCHLORIDE 5 MG/1
5 TABLET ORAL ONCE
Status: COMPLETED | OUTPATIENT
Start: 2023-12-05 | End: 2023-12-05

## 2023-12-05 RX ORDER — FLUTICASONE FUROATE AND VILANTEROL 100; 25 UG/1; UG/1
1 POWDER RESPIRATORY (INHALATION)
Status: DISCONTINUED | OUTPATIENT
Start: 2023-12-05 | End: 2023-12-07 | Stop reason: HOSPADM

## 2023-12-05 RX ORDER — ALBUTEROL SULFATE 0.83 MG/ML
3 SOLUTION RESPIRATORY (INHALATION) EVERY 4 HOURS PRN
Status: DISCONTINUED | OUTPATIENT
Start: 2023-12-05 | End: 2023-12-05

## 2023-12-05 RX ORDER — IPRATROPIUM BROMIDE AND ALBUTEROL SULFATE 2.5; .5 MG/3ML; MG/3ML
3 SOLUTION RESPIRATORY (INHALATION) ONCE
Status: COMPLETED | OUTPATIENT
Start: 2023-12-05 | End: 2023-12-05

## 2023-12-05 RX ORDER — ONDANSETRON 4 MG/1
4 TABLET, FILM COATED ORAL EVERY 8 HOURS PRN
Status: DISCONTINUED | OUTPATIENT
Start: 2023-12-05 | End: 2023-12-07 | Stop reason: HOSPADM

## 2023-12-05 RX ORDER — NAPROXEN SODIUM 220 MG/1
81 TABLET, FILM COATED ORAL DAILY
Status: DISCONTINUED | OUTPATIENT
Start: 2023-12-05 | End: 2023-12-07 | Stop reason: HOSPADM

## 2023-12-05 RX ORDER — TALC
3 POWDER (GRAM) TOPICAL DAILY
Status: DISCONTINUED | OUTPATIENT
Start: 2023-12-05 | End: 2023-12-07 | Stop reason: HOSPADM

## 2023-12-05 RX ORDER — LORAZEPAM 2 MG/ML
2 INJECTION INTRAMUSCULAR ONCE
Status: COMPLETED | OUTPATIENT
Start: 2023-12-05 | End: 2023-12-05

## 2023-12-05 RX ORDER — DESVENLAFAXINE 50 MG/1
100 TABLET, EXTENDED RELEASE ORAL DAILY
Status: DISCONTINUED | OUTPATIENT
Start: 2023-12-05 | End: 2023-12-05

## 2023-12-05 RX ORDER — NORETHINDRONE 0.35 MG/1
1 TABLET ORAL DAILY
Status: DISCONTINUED | OUTPATIENT
Start: 2023-12-05 | End: 2023-12-07 | Stop reason: HOSPADM

## 2023-12-05 RX ORDER — SUMATRIPTAN SUCCINATE 25 MG/1
50 TABLET ORAL ONCE
Status: COMPLETED | OUTPATIENT
Start: 2023-12-05 | End: 2023-12-05

## 2023-12-05 RX ORDER — IPRATROPIUM BROMIDE AND ALBUTEROL SULFATE 2.5; .5 MG/3ML; MG/3ML
SOLUTION RESPIRATORY (INHALATION)
Status: DISCONTINUED
Start: 2023-12-05 | End: 2023-12-05 | Stop reason: WASHOUT

## 2023-12-05 RX ORDER — BUDESONIDE 0.5 MG/2ML
0.5 INHALANT ORAL
Status: DISCONTINUED | OUTPATIENT
Start: 2023-12-05 | End: 2023-12-07 | Stop reason: HOSPADM

## 2023-12-05 RX ORDER — DESVENLAFAXINE 50 MG/1
50 TABLET, EXTENDED RELEASE ORAL DAILY
Status: DISCONTINUED | OUTPATIENT
Start: 2023-12-05 | End: 2023-12-05

## 2023-12-05 RX ORDER — ENOXAPARIN SODIUM 100 MG/ML
40 INJECTION SUBCUTANEOUS EVERY 12 HOURS SCHEDULED
Status: DISCONTINUED | OUTPATIENT
Start: 2023-12-05 | End: 2023-12-05

## 2023-12-05 RX ORDER — ALBUTEROL SULFATE 0.83 MG/ML
SOLUTION RESPIRATORY (INHALATION)
Status: COMPLETED
Start: 2023-12-05 | End: 2023-12-05

## 2023-12-05 RX ORDER — ONDANSETRON HYDROCHLORIDE 2 MG/ML
4 INJECTION, SOLUTION INTRAVENOUS EVERY 8 HOURS PRN
Status: DISCONTINUED | OUTPATIENT
Start: 2023-12-05 | End: 2023-12-07 | Stop reason: HOSPADM

## 2023-12-05 RX ORDER — ACETAMINOPHEN 325 MG/1
650 TABLET ORAL ONCE
Status: COMPLETED | OUTPATIENT
Start: 2023-12-05 | End: 2023-12-05

## 2023-12-05 RX ORDER — BUTALBITAL, ACETAMINOPHEN AND CAFFEINE 50; 325; 40 MG/1; MG/1; MG/1
1 TABLET ORAL DAILY PRN
Status: DISCONTINUED | OUTPATIENT
Start: 2023-12-05 | End: 2023-12-07 | Stop reason: HOSPADM

## 2023-12-05 RX ORDER — OXYCODONE AND ACETAMINOPHEN 5; 325 MG/1; MG/1
1 TABLET ORAL ONCE
Status: COMPLETED | OUTPATIENT
Start: 2023-12-05 | End: 2023-12-05

## 2023-12-05 RX ORDER — ALBUTEROL SULFATE 0.83 MG/ML
3 SOLUTION RESPIRATORY (INHALATION) EVERY 2 HOUR PRN
Status: DISCONTINUED | OUTPATIENT
Start: 2023-12-05 | End: 2023-12-07 | Stop reason: HOSPADM

## 2023-12-05 RX ORDER — METOCLOPRAMIDE 10 MG/1
1 TABLET ORAL EVERY 8 HOURS PRN
COMMUNITY
End: 2024-05-07 | Stop reason: SDUPTHER

## 2023-12-05 RX ORDER — DOCUSATE SODIUM 100 MG/1
100 CAPSULE, LIQUID FILLED ORAL 2 TIMES DAILY
Status: DISCONTINUED | OUTPATIENT
Start: 2023-12-05 | End: 2023-12-07 | Stop reason: HOSPADM

## 2023-12-05 RX ORDER — EPINEPHRINE 1 MG/ML
0.5 INJECTION INTRAMUSCULAR; INTRAVENOUS; SUBCUTANEOUS ONCE
Status: COMPLETED | OUTPATIENT
Start: 2023-12-05 | End: 2023-12-05

## 2023-12-05 RX ORDER — ALBUTEROL SULFATE 0.83 MG/ML
2.5 SOLUTION RESPIRATORY (INHALATION) ONCE
Status: COMPLETED | OUTPATIENT
Start: 2023-12-05 | End: 2023-12-05

## 2023-12-05 RX ORDER — BUSPIRONE HYDROCHLORIDE 5 MG/1
10 TABLET ORAL 2 TIMES DAILY
Status: DISCONTINUED | OUTPATIENT
Start: 2023-12-05 | End: 2023-12-07 | Stop reason: HOSPADM

## 2023-12-05 RX ORDER — ACETAMINOPHEN 650 MG/1
650 SUPPOSITORY RECTAL EVERY 4 HOURS PRN
Status: DISCONTINUED | OUTPATIENT
Start: 2023-12-05 | End: 2023-12-07 | Stop reason: HOSPADM

## 2023-12-05 RX ORDER — METOCLOPRAMIDE 10 MG/1
5 TABLET ORAL EVERY 8 HOURS PRN
Status: DISCONTINUED | OUTPATIENT
Start: 2023-12-05 | End: 2023-12-05

## 2023-12-05 RX ORDER — MAGNESIUM SULFATE HEPTAHYDRATE 40 MG/ML
2 INJECTION, SOLUTION INTRAVENOUS ONCE
Status: COMPLETED | OUTPATIENT
Start: 2023-12-05 | End: 2023-12-05

## 2023-12-05 RX ADMIN — HYDROXYZINE HYDROCHLORIDE 25 MG: 25 TABLET ORAL at 20:48

## 2023-12-05 RX ADMIN — FLUTICASONE FUROATE AND VILANTEROL TRIFENATATE 1 PUFF: 100; 25 POWDER RESPIRATORY (INHALATION) at 17:47

## 2023-12-05 RX ADMIN — LORAZEPAM 2 MG: 2 INJECTION INTRAMUSCULAR; INTRAVENOUS at 10:30

## 2023-12-05 RX ADMIN — ENOXAPARIN SODIUM 40 MG: 40 INJECTION SUBCUTANEOUS at 20:48

## 2023-12-05 RX ADMIN — OXYCODONE HYDROCHLORIDE AND ACETAMINOPHEN 1 TABLET: 5; 325 TABLET ORAL at 04:39

## 2023-12-05 RX ADMIN — ALBUTEROL SULFATE 2.5 MG: 0.83 SOLUTION RESPIRATORY (INHALATION) at 01:20

## 2023-12-05 RX ADMIN — BUTALBITAL, ACETAMINOPHEN, AND CAFFEINE 1 TABLET: 50; 325; 40 TABLET ORAL at 09:37

## 2023-12-05 RX ADMIN — METHYLPREDNISOLONE SODIUM SUCCINATE 60 MG: 125 INJECTION, POWDER, FOR SOLUTION INTRAMUSCULAR; INTRAVENOUS at 10:30

## 2023-12-05 RX ADMIN — POTASSIUM CHLORIDE 40 MEQ: 1500 TABLET, EXTENDED RELEASE ORAL at 20:48

## 2023-12-05 RX ADMIN — ONDANSETRON 4 MG: 2 INJECTION INTRAMUSCULAR; INTRAVENOUS at 02:38

## 2023-12-05 RX ADMIN — LORATADINE 10 MG: 10 TABLET ORAL at 15:56

## 2023-12-05 RX ADMIN — ONDANSETRON 4 MG: 2 INJECTION INTRAMUSCULAR; INTRAVENOUS at 15:54

## 2023-12-05 RX ADMIN — METHYLPREDNISOLONE SODIUM SUCCINATE 40 MG: 40 INJECTION, POWDER, FOR SOLUTION INTRAMUSCULAR; INTRAVENOUS at 22:31

## 2023-12-05 RX ADMIN — MAGNESIUM SULFATE HEPTAHYDRATE 2 G: 40 INJECTION, SOLUTION INTRAVENOUS at 01:32

## 2023-12-05 RX ADMIN — ACETAMINOPHEN 650 MG: 325 TABLET ORAL at 20:43

## 2023-12-05 RX ADMIN — ENOXAPARIN SODIUM 40 MG: 40 INJECTION SUBCUTANEOUS at 09:38

## 2023-12-05 RX ADMIN — BUTALBITAL, ACETAMINOPHEN, AND CAFFEINE 1 TABLET: 50; 325; 40 TABLET ORAL at 15:56

## 2023-12-05 RX ADMIN — OXYCODONE HYDROCHLORIDE 5 MG: 5 TABLET ORAL at 18:24

## 2023-12-05 RX ADMIN — ONDANSETRON 4 MG: 2 INJECTION INTRAMUSCULAR; INTRAVENOUS at 04:42

## 2023-12-05 RX ADMIN — BUDESONIDE 0.5 MG: 0.5 INHALANT ORAL at 19:55

## 2023-12-05 RX ADMIN — ACETAMINOPHEN 650 MG: 325 TABLET ORAL at 02:39

## 2023-12-05 RX ADMIN — IPRATROPIUM BROMIDE AND ALBUTEROL SULFATE 3 ML: 2.5; .5 SOLUTION RESPIRATORY (INHALATION) at 01:13

## 2023-12-05 RX ADMIN — IPRATROPIUM BROMIDE AND ALBUTEROL SULFATE 3 ML: 2.5; .5 SOLUTION RESPIRATORY (INHALATION) at 11:22

## 2023-12-05 RX ADMIN — IPRATROPIUM BROMIDE AND ALBUTEROL SULFATE 3 ML: 2.5; .5 SOLUTION RESPIRATORY (INHALATION) at 04:57

## 2023-12-05 RX ADMIN — IPRATROPIUM BROMIDE AND ALBUTEROL SULFATE 3 ML: 2.5; .5 SOLUTION RESPIRATORY (INHALATION) at 11:20

## 2023-12-05 RX ADMIN — TIOTROPIUM BROMIDE INHALATION SPRAY 2 PUFF: 3.12 SPRAY, METERED RESPIRATORY (INHALATION) at 17:47

## 2023-12-05 RX ADMIN — METHYLPREDNISOLONE SODIUM SUCCINATE 125 MG: 125 INJECTION, POWDER, FOR SOLUTION INTRAMUSCULAR; INTRAVENOUS at 01:32

## 2023-12-05 RX ADMIN — SUMATRIPTAN SUCCINATE 50 MG: 25 TABLET ORAL at 09:37

## 2023-12-05 RX ADMIN — MONTELUKAST SODIUM 10 MG: 10 TABLET, FILM COATED ORAL at 20:44

## 2023-12-05 RX ADMIN — ALBUTEROL SULFATE 2.5 MG: 2.5 SOLUTION RESPIRATORY (INHALATION) at 01:20

## 2023-12-05 RX ADMIN — IPRATROPIUM BROMIDE AND ALBUTEROL SULFATE 3 ML: 2.5; .5 SOLUTION RESPIRATORY (INHALATION) at 19:54

## 2023-12-05 RX ADMIN — IPRATROPIUM BROMIDE AND ALBUTEROL SULFATE 3 ML: 2.5; .5 SOLUTION RESPIRATORY (INHALATION) at 08:00

## 2023-12-05 RX ADMIN — EPINEPHRINE 0.5 MG: 1 INJECTION INTRAMUSCULAR; INTRAVENOUS; SUBCUTANEOUS at 10:35

## 2023-12-05 SDOH — SOCIAL STABILITY: SOCIAL INSECURITY: DO YOU FEEL ANYONE HAS EXPLOITED OR TAKEN ADVANTAGE OF YOU FINANCIALLY OR OF YOUR PERSONAL PROPERTY?: NO

## 2023-12-05 SDOH — SOCIAL STABILITY: SOCIAL INSECURITY: ABUSE: ADULT

## 2023-12-05 SDOH — SOCIAL STABILITY: SOCIAL INSECURITY: WERE YOU ABLE TO COMPLETE ALL THE BEHAVIORAL HEALTH SCREENINGS?: YES

## 2023-12-05 SDOH — SOCIAL STABILITY: SOCIAL INSECURITY: HAVE YOU HAD THOUGHTS OF HARMING ANYONE ELSE?: NO

## 2023-12-05 SDOH — SOCIAL STABILITY: SOCIAL INSECURITY: DOES ANYONE TRY TO KEEP YOU FROM HAVING/CONTACTING OTHER FRIENDS OR DOING THINGS OUTSIDE YOUR HOME?: NO

## 2023-12-05 SDOH — SOCIAL STABILITY: SOCIAL INSECURITY: DO YOU FEEL UNSAFE GOING BACK TO THE PLACE WHERE YOU ARE LIVING?: NO

## 2023-12-05 SDOH — SOCIAL STABILITY: SOCIAL INSECURITY: ARE YOU OR HAVE YOU BEEN THREATENED OR ABUSED PHYSICALLY, EMOTIONALLY, OR SEXUALLY BY ANYONE?: NO

## 2023-12-05 SDOH — SOCIAL STABILITY: SOCIAL INSECURITY: HAS ANYONE EVER THREATENED TO HURT YOUR FAMILY OR YOUR PETS?: NO

## 2023-12-05 SDOH — SOCIAL STABILITY: SOCIAL INSECURITY: ARE THERE ANY APPARENT SIGNS OF INJURIES/BEHAVIORS THAT COULD BE RELATED TO ABUSE/NEGLECT?: NO

## 2023-12-05 ASSESSMENT — COGNITIVE AND FUNCTIONAL STATUS - GENERAL
DAILY ACTIVITIY SCORE: 24
MOBILITY SCORE: 24
MOBILITY SCORE: 24
DAILY ACTIVITIY SCORE: 24
MOBILITY SCORE: 24
DAILY ACTIVITIY SCORE: 24
PATIENT BASELINE BEDBOUND: NO

## 2023-12-05 ASSESSMENT — PAIN - FUNCTIONAL ASSESSMENT
PAIN_FUNCTIONAL_ASSESSMENT: 0-10

## 2023-12-05 ASSESSMENT — PAIN SCALES - GENERAL
PAINLEVEL_OUTOF10: 7
PAINLEVEL_OUTOF10: 8
PAINLEVEL_OUTOF10: 3
PAINLEVEL_OUTOF10: 9
PAINLEVEL_OUTOF10: 9
PAINLEVEL_OUTOF10: 0 - NO PAIN
PAINLEVEL_OUTOF10: 5 - MODERATE PAIN
PAINLEVEL_OUTOF10: 0 - NO PAIN

## 2023-12-05 ASSESSMENT — LIFESTYLE VARIABLES
AUDIT-C TOTAL SCORE: 3
HAVE PEOPLE ANNOYED YOU BY CRITICIZING YOUR DRINKING: NO
AUDIT-C TOTAL SCORE: 3
HOW OFTEN DO YOU HAVE A DRINK CONTAINING ALCOHOL: 2-4 TIMES A MONTH
HOW MANY STANDARD DRINKS CONTAINING ALCOHOL DO YOU HAVE ON A TYPICAL DAY: 1 OR 2
HAVE YOU EVER FELT YOU SHOULD CUT DOWN ON YOUR DRINKING: NO
EVER HAD A DRINK FIRST THING IN THE MORNING TO STEADY YOUR NERVES TO GET RID OF A HANGOVER: NO
HOW OFTEN DO YOU HAVE 6 OR MORE DRINKS ON ONE OCCASION: LESS THAN MONTHLY
EVER FELT BAD OR GUILTY ABOUT YOUR DRINKING: NO
SKIP TO QUESTIONS 9-10: 0

## 2023-12-05 ASSESSMENT — ACTIVITIES OF DAILY LIVING (ADL)
GROOMING: INDEPENDENT
FEEDING YOURSELF: INDEPENDENT
HEARING - RIGHT EAR: FUNCTIONAL
JUDGMENT_ADEQUATE_SAFELY_COMPLETE_DAILY_ACTIVITIES: YES
ADEQUATE_TO_COMPLETE_ADL: YES
BATHING: INDEPENDENT
PATIENT'S MEMORY ADEQUATE TO SAFELY COMPLETE DAILY ACTIVITIES?: YES
ASSISTIVE_DEVICE: EYEGLASSES
HEARING - LEFT EAR: FUNCTIONAL
TOILETING: INDEPENDENT
DRESSING YOURSELF: INDEPENDENT
WALKS IN HOME: INDEPENDENT
LACK_OF_TRANSPORTATION: NO

## 2023-12-05 ASSESSMENT — PATIENT HEALTH QUESTIONNAIRE - PHQ9
1. LITTLE INTEREST OR PLEASURE IN DOING THINGS: NOT AT ALL
2. FEELING DOWN, DEPRESSED OR HOPELESS: NOT AT ALL
SUM OF ALL RESPONSES TO PHQ9 QUESTIONS 1 & 2: 0

## 2023-12-05 ASSESSMENT — PAIN DESCRIPTION - DESCRIPTORS
DESCRIPTORS: PRESSURE
DESCRIPTORS: ACHING;PRESSURE
DESCRIPTORS: PRESSURE
DESCRIPTORS: ACHING;PRESSURE

## 2023-12-05 ASSESSMENT — ENCOUNTER SYMPTOMS
SHORTNESS OF BREATH: 1
PALPITATIONS: 0
WHEEZING: 1

## 2023-12-05 ASSESSMENT — COLUMBIA-SUICIDE SEVERITY RATING SCALE - C-SSRS
1. IN THE PAST MONTH, HAVE YOU WISHED YOU WERE DEAD OR WISHED YOU COULD GO TO SLEEP AND NOT WAKE UP?: NO
6. HAVE YOU EVER DONE ANYTHING, STARTED TO DO ANYTHING, OR PREPARED TO DO ANYTHING TO END YOUR LIFE?: NO
2. HAVE YOU ACTUALLY HAD ANY THOUGHTS OF KILLING YOURSELF?: NO

## 2023-12-05 NOTE — H&P
[facility]       Date:  12/5/2023  Patient:  Sarahi Haley  YOB: 1985  MRN:  [id number]   Admit Date:  12/5/2023    Chief Complaint   Patient presents with    Shortness of Breath     Asthma attack.   Pt got IM epi and 2 duoneb en route by ems.         History of Present Illness:  Sarahi Haley is a 38 y.o. year old female patient with PMH asthma, anxiety, depression, PTSD, hypothyroidism, migraines who presented to the ED for shortness of breath.  Pt became acutely short of breath yesterday evening getting ready for bed.  Pt has associated chest tightness and wheezing.  Pt tried using her home nebulizer and rescue inhaler with  no relief. Pt called 911 for increasing shortness of breath at that time. Pt was given IM epinephrine by EMS prior to arrival.  Pt has had multiple previous hospitalizations for asthma exacerbations throughout the year with the last being on 10/15/23.  Pt denied headache, dizziness, syncope, cough, fever, or chills.  Pt denied chest pain or palpitations.  Pt denied nausea, vomiting, abdominal pain, diarrhea.     Pts vital signs on presenting to the ED include /72, , T 36.9, Resp 26, and SpO2 95% on room air.  ABG on presentation was pH 7.41, pCO2 32, pO2 108, Base excess -3.4, and HCO3 20.3.  CBC was unremarkable.  CMP with a mildly elevated glucose of 177, AST/ALT 47/62 and hypokalemia of 3.4.  CXR significant for probable mild central vascular congestion accentuated by bronchovascular crowding and low lung volumes.  Pt was started on solumedrol, q4h duonebs, 2x IM epinephrine, and magnesium. Pt requiring BiPAP for increased work of breathing. Pt to be admitted to the ICU.         Interval ICU Events:  12/5: Pt admitted under hospital medicine.  While boarding in the ED, pt experienced an increase in respiratory distress in which the patient was placed on BiPAP.  Pt will be transferred into the ICU for oxygenation requirements.      Medical  History:  Past Medical History:   Diagnosis Date    Acute cystitis without hematuria 08/08/2019    Acute cystitis without hematuria    Encounter for initial prescription of contraceptive pills 11/01/2019    Encounter for initial prescription of contraceptive pills    Nausea 02/05/2021    Nausea in adult    Other general symptoms and signs 12/05/2019    Forgetfulness    Parageusia 04/06/2020    Taste perversion    Pelvic and perineal pain     Pelvic pain in female    Personal history of other diseases of the respiratory system 04/06/2020    History of sinusitis    Personal history of other specified conditions 07/01/2020    History of vomiting    Personal history of other specified conditions 01/26/2021    History of headache    Personal history of other specified conditions 02/01/2021    History of diarrhea    Personal history of thrombophlebitis 06/03/2019    History of phlebitis    Personal history of urinary (tract) infections 01/16/2020    History of urinary tract infection     Past Surgical History:   Procedure Laterality Date    OTHER SURGICAL HISTORY  02/08/2019    Appendectomy    OTHER SURGICAL HISTORY  02/08/2019    Cholecystectomy    OTHER SURGICAL HISTORY  02/08/2019    Cystoscopy    OTHER SURGICAL HISTORY  02/08/2019    Ureteroscopy    OTHER SURGICAL HISTORY  02/08/2019    Shoulder surgery     (Not in a hospital admission)    Bee venom protein (honey bee), Diphenhydramine, Nsaids (non-steroidal anti-inflammatory drug), Procaine, and Ketorolac  Social History     Tobacco Use    Smoking status: Never    Smokeless tobacco: Never   Vaping Use    Vaping Use: Never used   Substance Use Topics    Alcohol use: Yes     Comment: social    Drug use: Never     Family History   Problem Relation Name Age of Onset    Hypertension Father      Other (Pulmonary Valve Stenosis) Sister      Heart disease Maternal Grandmother      Diabetes Other Grandparent     Lung cancer Other Grandparent     Anxiety disorder Sibling          Hospital Medications:           Current Facility-Administered Medications:     acetaminophen (Tylenol) tablet 650 mg, 650 mg, oral, q4h PRN **OR** acetaminophen (Tylenol) oral liquid 650 mg, 650 mg, oral, q4h PRN **OR** acetaminophen (Tylenol) suppository 650 mg, 650 mg, rectal, q4h PRN, WENDI Mann    albuterol 90 mcg/actuation inhaler 2 puff, 2 puff, inhalation, q6h PRN, WENDI Mann    docusate sodium (Colace) capsule 100 mg, 100 mg, oral, BID, WENDI Mann    enoxaparin (Lovenox) syringe 40 mg, 40 mg, subcutaneous, q12h SERGIO, WENDI Mann, 40 mg at 12/05/23 0938    ipratropium-albuteroL (Duo-Neb) 0.5-2.5 mg/3 mL nebulizer solution 3 mL, 3 mL, nebulization, q4h, WENDI Mann, 3 mL at 12/05/23 1122    ipratropium-albuteroL (Duo-Neb) nebulizer solution  - Omnicell Override Pull, , , ,     melatonin tablet 3 mg, 3 mg, oral, Daily, WENDI Mann    methylPREDNISolone sod succinate (PF) (SOLU-Medrol) 40 mg/mL injection 40 mg, 40 mg, intravenous, q24h, WENDI Mann    ondansetron (Zofran) tablet 4 mg, 4 mg, oral, q8h PRN **OR** ondansetron (Zofran) injection 4 mg, 4 mg, intravenous, q8h PRN, WENDI Mann    oxygen (O2) therapy, , inhalation, Continuous PRN - O2/gases, Nicholas Whitaker MD    Current Outpatient Medications:     albuterol 2.5 mg /3 mL (0.083 %) nebulizer solution, Take 3 mL by nebulization every 4 hours if needed for shortness of breath., Disp: , Rfl:     albuterol 90 mcg/actuation inhaler, Inhale 2 puffs every 4 hours if needed for shortness of breath., Disp: , Rfl:     aspirin 81 mg chewable tablet, Chew 1 tablet (81 mg) once daily., Disp: , Rfl:     budesonide (Pulmicort) 0.5 mg/2 mL nebulizer solution, Take 2 mL (0.5 mg) by nebulization 2 times a day., Disp: , Rfl:     busPIRone (Buspar) 15 mg tablet, Take 0.5-1 tablets (7.5-15 mg) by mouth 2 times a day. (Patient taking differently: Take 0.5-1  tablets (7.5-15 mg) by mouth 2 times a day as needed (anxiety).), Disp: 60 tablet, Rfl: 11    butalbital-acetaminophen-caff -40 mg tablet, Take 1 tablet by mouth early in the morning.. 1 tab(s) orally once a day, As Needed (Patient taking differently: Take 1 tablet by mouth once daily as needed for headaches or migraine. 1 tab(s) orally once a day, As Needed), Disp: 90 tablet, Rfl: 3    Deblitane 0.35 mg tablet, Take 1 tablet (0.35 mg) by mouth once daily., Disp: , Rfl:     desvenlafaxine 100 mg 24 hr tablet, TAKE 1 TABLET BY MOUTH EVERY DAY (Patient taking differently: Take 1 tablet (100 mg) by mouth once daily. Take with 50 mg = 150 mg dose), Disp: 90 tablet, Rfl: 3    desvenlafaxine 50 mg 24 hr tablet, Take 1 tablet (50 mg) by mouth once daily. Take with 100 mg = 150 mg dose, Disp: , Rfl:     EPINEPHrine 0.3 mg/0.3 mL injection syringe, Inject 0.3 mL (0.3 mg) into the muscle once daily as needed for anaphylaxis., Disp: 2 each, Rfl: 3    fexofenadine (Allegra) 180 mg tablet, Take 1 tablet (180 mg) by mouth once daily., Disp: , Rfl:     fluticasone-umeclidin-vilanter (Trelegy Ellipta) 100-62.5-25 mcg blister with device, Inhale 2 puffs once daily., Disp: , Rfl:     fluticasone/umeclidin/vilanter (TRELEGY ELLIPTA INHL), Inhale 1 puff early in the morning.., Disp: , Rfl:     galcanezumab (Emgality Syringe) 120 mg/mL prefilled syringe, Inject 1 Syringe (120 mg) under the skin every 28 (twenty-eight) days., Disp: , Rfl:     hydrOXYzine HCL (Atarax) 25 mg tablet, Take 1 tablet (25 mg) by mouth 3 times a day as needed for anxiety., Disp: 90 tablet, Rfl: 2    L norgest/e.estradioL-e.estrad (Seasonique) 0.15 mg-30 mcg (84)/10 mcg (7) tablets,dose pack,3 month tablet, Take 1 tablet by mouth once daily., Disp: , Rfl:     levothyroxine (Synthroid, Levoxyl) 100 mcg tablet, Take 1 tablet (100 mcg) by mouth once daily., Disp: , Rfl:     metoclopramide HCl (REGLAN ORAL), Take 1 tablet by mouth every 8 hours if needed  "(nausea)., Disp: , Rfl:     montelukast (Singulair) 10 mg tablet, Take 1 tablet (10 mg) by mouth once daily at bedtime., Disp: 30 tablet, Rfl: 5    rizatriptan (Maxalt) 10 mg tablet, Take 1 tablet (10 mg) by mouth if needed for migraine. May repeat in 2 hours if unresolved. Do not exceed 30 mg in 24 hours., Disp: , Rfl:     SUMAtriptan (Imitrex) 100 mg tablet, Take 1 tablet (100 mg) by mouth if needed., Disp: , Rfl:     Physical Exam:    Heart Rate:  []   Temp:  [36.9 °C (98.4 °F)]   Resp:  [20-28]   BP: (147-168)/(63-75)   Height:  [165.1 cm (5' 5\")]   Weight:  [132 kg (290 lb)]   SpO2:  [91 %-99 %]     FiO2 (%):  [21 %] 21 %    Physical Exam  Constitutional:       Appearance: Normal appearance. She is obese.   HENT:      Head: Normocephalic and atraumatic.      Mouth/Throat:      Mouth: Mucous membranes are dry.      Pharynx: Oropharynx is clear.   Eyes:      Extraocular Movements: Extraocular movements intact.      Conjunctiva/sclera: Conjunctivae normal.      Pupils: Pupils are equal, round, and reactive to light.   Cardiovascular:      Rate and Rhythm: Regular rhythm. Tachycardia present.      Pulses: Normal pulses.      Heart sounds: Normal heart sounds.   Pulmonary:      Effort: Respiratory distress present.      Comments: Pt on BiPAP with improving breath sounds bilaterally.   Abdominal:      General: Bowel sounds are normal.      Palpations: Abdomen is soft.   Musculoskeletal:         General: Normal range of motion.      Cervical back: Normal range of motion.   Skin:     General: Skin is warm and dry.      Capillary Refill: Capillary refill takes less than 2 seconds.   Neurological:      General: No focal deficit present.      Mental Status: She is alert and oriented to person, place, and time.   Psychiatric:         Mood and Affect: Mood normal.         Behavior: Behavior normal.         Objective:    No intake or output data in the 24 hours ending 12/05/23 1308    Results for orders placed or " performed during the hospital encounter of 12/05/23 (from the past 24 hour(s))   Blood Gas, Arterial   Result Value Ref Range    POCT pH, Arterial 7.41 7.38 - 7.42 pH    POCT pCO2, Arterial 32 (L) 38 - 42 mm Hg    POCT pO2, Arterial 108 (H) 85 - 95 mm Hg    POCT SO2, Arterial 100 94 - 100 %    POCT Oxy Hemoglobin, Arterial 96.2 94.0 - 98.0 %    POCT Base Excess, Arterial -3.4 (L) -2.0 - 3.0 mmol/L    POCT HCO3 Calculated, Arterial 20.3 (L) 22.0 - 26.0 mmol/L    Patient Temperature      FiO2 21 %   CBC and Auto Differential   Result Value Ref Range    WBC 8.9 4.4 - 11.3 x10*3/uL    nRBC 0.0 0.0 - 0.0 /100 WBCs    RBC 5.04 4.00 - 5.20 x10*6/uL    Hemoglobin 15.2 12.0 - 16.0 g/dL    Hematocrit 43.8 36.0 - 46.0 %    MCV 87 80 - 100 fL    MCH 30.2 26.0 - 34.0 pg    MCHC 34.7 32.0 - 36.0 g/dL    RDW 12.8 11.5 - 14.5 %    Platelets 384 150 - 450 x10*3/uL    Neutrophils % 52.0 40.0 - 80.0 %    Immature Granulocytes %, Automated 0.2 0.0 - 0.9 %    Lymphocytes % 40.3 13.0 - 44.0 %    Monocytes % 7.2 2.0 - 10.0 %    Eosinophils % 0.1 0.0 - 6.0 %    Basophils % 0.2 0.0 - 2.0 %    Neutrophils Absolute 4.59 1.20 - 7.70 x10*3/uL    Immature Granulocytes Absolute, Automated 0.02 0.00 - 0.70 x10*3/uL    Lymphocytes Absolute 3.57 1.20 - 4.80 x10*3/uL    Monocytes Absolute 0.64 0.10 - 1.00 x10*3/uL    Eosinophils Absolute 0.01 0.00 - 0.70 x10*3/uL    Basophils Absolute 0.02 0.00 - 0.10 x10*3/uL   Comprehensive metabolic panel   Result Value Ref Range    Glucose 177 (H) 74 - 99 mg/dL    Sodium 136 136 - 145 mmol/L    Potassium 3.4 (L) 3.5 - 5.3 mmol/L    Chloride 102 98 - 107 mmol/L    Bicarbonate 23 21 - 32 mmol/L    Anion Gap 14 10 - 20 mmol/L    Urea Nitrogen 11 6 - 23 mg/dL    Creatinine 0.90 0.50 - 1.05 mg/dL    eGFR 85 >60 mL/min/1.73m*2    Calcium 9.0 8.6 - 10.3 mg/dL    Albumin 4.2 3.4 - 5.0 g/dL    Alkaline Phosphatase 91 33 - 110 U/L    Total Protein 7.2 6.4 - 8.2 g/dL    AST 47 (H) 9 - 39 U/L    Bilirubin, Total 0.5 0.0 -  1.2 mg/dL    ALT 62 (H) 7 - 45 U/L   BLOOD GAS ARTERIAL FULL PANEL   Result Value Ref Range    POCT pH, Arterial 7.36 (L) 7.38 - 7.42 pH    POCT pCO2, Arterial 32 (L) 38 - 42 mm Hg    POCT pO2, Arterial 101 (H) 85 - 95 mm Hg    POCT SO2, Arterial 100 94 - 100 %    POCT Oxy Hemoglobin, Arterial 97.4 94.0 - 98.0 %    POCT Hematocrit Calculated, Arterial 45.0 36.0 - 46.0 %    POCT Sodium, Arterial 134 (L) 136 - 145 mmol/L    POCT Potassium, Arterial 3.6 3.5 - 5.3 mmol/L    POCT Chloride, Arterial 103 98 - 107 mmol/L    POCT Ionized Calcium, Arterial 1.16 1.10 - 1.33 mmol/L    POCT Glucose, Arterial 289 (H) 74 - 99 mg/dL    POCT Lactate, Arterial 4.3 (HH) 0.4 - 2.0 mmol/L    POCT Base Excess, Arterial -6.2 (L) -2.0 - 3.0 mmol/L    POCT HCO3 Calculated, Arterial 18.1 (L) 22.0 - 26.0 mmol/L    POCT Hemoglobin, Arterial 14.9 12.0 - 16.0 g/dL    POCT Anion Gap, Arterial 17 10 - 25 mmo/L    Patient Temperature      FiO2 28 %    Apparatus CANNULA     Critical Called By SENDY     Critical Called To ALISHA     Critical Call Time 1025.0000     Critical Read Back Y     Site of Arterial Puncture Radial Left     Vinay's Test Positive        Recent Imaging  XR chest 1 view   Final Result   1. Probable mild central vascular congestion, accentuated by   bronchovascular crowding and low lung volumes.             Signed by: Norberto Aragon 12/5/2023 1:48 AM   Dictation workstation:   [id number]            No echocardiogram results found for the past 14 days    Assessment/Plan:    I am currently managing this critically ill patient for:  #Acute asthma exacerbation   #Hx of hypothyroidism  #Hx of Migraines  #Hx of anxiety, depression, PTSD    Daily Plan:  Neuro/Psych/Pain Ctrl/Sedation:  -CAM ICU score q-shift  -Sleep/wake cycle hygiene  -Delirium precaution      -Melatonin @ night   #Hx of anxiety, depression, PTSD  -Continue home buspar, hydroxizine, pristiq   #Hx of Migraines  -Continue home meds      Respiratory/ENT:  #Acute asthma exacerbation   -duonebs q4 hours   -solumedrol q12 hours   -supplemental o2/BiPAP if needed to maintain sPO2 >92%  -continuous pulse ox monitoring   -magnesium given in the ED  -Epi given in EMS and 1x in the ED  -Peak flow q12 hours   -continue home singulair, claritin, spiriva respimat, breo ellipta, pulmicort     Cardiovascular:  -pt on continuous cardiac monitoring   -no acute issues at this time     GI:   -regular diet  -colace prn     Renal/Volume Status (Intra & Extravascular):  -Daily BMP monitoring   -Replace electrolytes per unit protocol     Endocrine:  #Hx of hypothyroidism  -Continue home levothyroxine     Infectious Disease:  -No acute issues     Heme/Onc:  -Daily CBC monitoring   -Transfuse for Hgb <7 or symptomatic anemia     MSK/OBGYN:  -No acute issues     Ethics/Code Status:  Full Code     :   DVT Prophylaxis: lovenox   GI Prophylaxis: n/a  Bowel Regimen: colace prn   Diet: regular   CVC: n/a  Jeanette: n/a  Strickland: n/a  Restraints: n/a  Code Status: Full Code  Dispo: patient admitted to the ICU      Critical Care Time:  50 minutes spent in preparing to see patient (I.e. review of medical records), evaluation of diagnostics (I.e. labs, imaging, etc.), documentation, discussing plan of care with patient/ family/ caregiver, and/ or coordination of care with multidisciplinary team. Time does not include completion of procedure time.          Pt discussed with Dr. Kamala Alvarado PA-C

## 2023-12-05 NOTE — CARE PLAN
Problem: Respiratory  Goal: Clear secretions with interventions this shift  12/5/2023 1854 by Arleen Patten RN  Outcome: Progressing  12/5/2023 1853 by Arleen Patten RN  Outcome: Progressing  Goal: Minimize anxiety/maximize coping throughout shift  12/5/2023 1854 by Arleen Patten RN  Outcome: Progressing  12/5/2023 1853 by Arleen Patten RN  Outcome: Progressing  Goal: Minimal/no exertional discomfort or dyspnea this shift  12/5/2023 1854 by Arleen Patten RN  Outcome: Progressing  12/5/2023 1853 by Arleen Patten RN  Outcome: Progressing  Goal: No signs of respiratory distress (eg. Use of accessory muscles. Peds grunting)  12/5/2023 1854 by Arleen Patten RN  Outcome: Progressing  12/5/2023 1853 by Arleen Patten RN  Outcome: Progressing  Goal: Patent airway maintained this shift  12/5/2023 1854 by Arleen Patten RN  Outcome: Progressing  12/5/2023 1853 by Arleen Patten RN  Outcome: Progressing  Goal: Tolerate mechanical ventilation evidenced by VS/agitation level this shift  12/5/2023 1854 by Arleen Patten RN  Outcome: Progressing  12/5/2023 1853 by Arleen Patten RN  Outcome: Progressing  Goal: Tolerate pulmonary toileting this shift  12/5/2023 1854 by Arleen Patten RN  Outcome: Progressing  12/5/2023 1853 by Arleen Patten RN  Outcome: Progressing  Goal: Verbalize decreased shortness of breath this shift  12/5/2023 1854 by Arleen Patten RN  Outcome: Progressing  12/5/2023 1853 by Arleen Patten RN  Outcome: Progressing  Goal: Wean oxygen to maintain O2 saturation per order/standard this shift  12/5/2023 1854 by Arleen Patten RN  Outcome: Progressing  12/5/2023 1853 by Arleen Patten RN  Outcome: Progressing  Goal: Increase self care and/or family involvement in next 24 hours  12/5/2023 1854 by Arleen Patten RN  Outcome: Progressing  12/5/2023 1853 by Arleen Patten RN  Outcome: Progressing

## 2023-12-05 NOTE — H&P
History Of Present Illness  Sarahi Haley is a 38 y.o. female with pmh of asthma who presenting with c/o shortness of breath. Patient was out to dinner with a friend when she returned home and became acutely short of breath while getting ready for bed. She used her home nebulizer and rescue inhaler with little relief.  She reports wheezing and occasional asthma exacerbations related to unknown allergens within her home. D/t her ongoing dyspnea, EMS was called who administered IM epinephrine and she presented for evaluation. Patient denies any dizziness, syncope, near syncope, cough, fever or chills. She denies any chest pain or palpitations.    Past Medical History  She has a past medical history of Acute cystitis without hematuria (08/08/2019), Encounter for initial prescription of contraceptive pills (11/01/2019), Nausea (02/05/2021), Other general symptoms and signs (12/05/2019), Parageusia (04/06/2020), Pelvic and perineal pain, Personal history of other diseases of the respiratory system (04/06/2020), Personal history of other specified conditions (07/01/2020), Personal history of other specified conditions (01/26/2021), Personal history of other specified conditions (02/01/2021), Personal history of thrombophlebitis (06/03/2019), and Personal history of urinary (tract) infections (01/16/2020).    Surgical History  She has a past surgical history that includes Other surgical history (02/08/2019); Other surgical history (02/08/2019); Other surgical history (02/08/2019); Other surgical history (02/08/2019); and Other surgical history (02/08/2019).     Social History  She reports that she has never smoked. She has never used smokeless tobacco. She reports current alcohol use. She reports that she does not use drugs.    Family History  Family History   Problem Relation Name Age of Onset    Hypertension Father      Other (Pulmonary Valve Stenosis) Sister      Heart disease Maternal Grandmother      Diabetes Other  Grandparent     Lung cancer Other Grandparent     Anxiety disorder Sibling          Allergies  Bee venom protein (honey bee), Diphenhydramine, Nsaids (non-steroidal anti-inflammatory drug), Procaine, and Ketorolac    Medications    Current Facility-Administered Medications:     ipratropium-albuteroL (Duo-Neb) nebulizer solution  - Omnicell Robsonide Pull, , , ,     Current Outpatient Medications:     albuterol 2.5 mg /3 mL (0.083 %) nebulizer solution, Inhale 3 mL every 4 hours if needed., Disp: , Rfl:     albuterol 90 mcg/actuation inhaler, Inhale every 6 hours if needed., Disp: , Rfl:     aspirin 81 mg chewable tablet, Chew 1 tablet (81 mg) once daily., Disp: , Rfl:     budesonide (Pulmicort) 0.5 mg/2 mL nebulizer solution, USE 1 UNIT DOSE VIA NEBULIZER TWO TIMES A DAY, Disp: , Rfl:     busPIRone (Buspar) 15 mg tablet, Take 0.5-1 tablets (7.5-15 mg) by mouth 2 times a day., Disp: 60 tablet, Rfl: 11    butalbital-acetaminophen-caff -40 mg tablet, Take 1 tablet by mouth early in the morning.. 1 tab(s) orally once a day, As Needed, Disp: 90 tablet, Rfl: 3    Deblitane 0.35 mg tablet, Take 1 tablet (0.35 mg) by mouth once daily., Disp: , Rfl:     desvenlafaxine 100 mg 24 hr tablet, TAKE 1 TABLET BY MOUTH EVERY DAY, Disp: 90 tablet, Rfl: 3    desvenlafaxine 50 mg 24 hr tablet, Take 1 tablet (50 mg) by mouth once daily., Disp: , Rfl:     EPINEPHrine 0.3 mg/0.3 mL injection syringe, Inject 0.3 mL (0.3 mg) into the muscle once daily as needed for anaphylaxis., Disp: 2 each, Rfl: 3    fexofenadine (Allegra) 180 mg tablet, Take 1 tablet (180 mg) by mouth once daily., Disp: , Rfl:     fluticasone/umeclidin/vilanter (TRELEGY ELLIPTA INHL), Inhale 1 puff early in the morning.., Disp: , Rfl:     galcanezumab (Emgality Syringe) 120 mg/mL prefilled syringe, Inject 1 Syringe (120 mg) under the skin every 28 (twenty-eight) days., Disp: , Rfl:     hydrOXYzine HCL (Atarax) 25 mg tablet, Take 1 tablet (25 mg) by mouth 3 times a  day as needed for anxiety., Disp: 90 tablet, Rfl: 2    L norgest/e.estradioL-e.estrad (Seasonique) 0.15 mg-30 mcg (84)/10 mcg (7) tablets,dose pack,3 month tablet, Take 1 tablet by mouth once daily., Disp: , Rfl:     levothyroxine (Synthroid, Levoxyl) 100 mcg tablet, Take 1 tablet (100 mcg) by mouth once daily., Disp: , Rfl:     montelukast (Singulair) 10 mg tablet, Take 1 tablet (10 mg) by mouth once daily at bedtime., Disp: 30 tablet, Rfl: 5    rizatriptan (Maxalt) 10 mg tablet, Take 1 tablet (10 mg) by mouth if needed for migraine. May repeat in 2 hours if unresolved. Do not exceed 30 mg in 24 hours., Disp: , Rfl:     SUMAtriptan (Imitrex) 100 mg tablet, Take 1 tablet (100 mg) by mouth if needed., Disp: , Rfl:     Review of Systems   Respiratory:  Positive for shortness of breath and wheezing.    Cardiovascular:  Negative for chest pain and palpitations.   All other systems reviewed and are negative.       Physical Exam  Constitutional:       General: She is not in acute distress.     Appearance: Normal appearance. She is not toxic-appearing.   HENT:      Head: Normocephalic and atraumatic.      Right Ear: Tympanic membrane normal.      Left Ear: Tympanic membrane normal.      Nose: Nose normal.      Mouth/Throat:      Mouth: Mucous membranes are dry.      Pharynx: Oropharynx is clear.   Eyes:      Extraocular Movements: Extraocular movements intact.      Pupils: Pupils are equal, round, and reactive to light.   Cardiovascular:      Rate and Rhythm: Normal rate and regular rhythm.      Pulses: Normal pulses.      Heart sounds: Normal heart sounds.   Pulmonary:      Effort: Pulmonary effort is normal. No respiratory distress.      Breath sounds: Examination of the left-upper field reveals decreased breath sounds. Examination of the left-lower field reveals decreased breath sounds. Decreased breath sounds present. No wheezing, rhonchi or rales.   Abdominal:      General: Abdomen is flat. Bowel sounds are normal.       Palpations: Abdomen is soft.   Musculoskeletal:         General: Normal range of motion.      Cervical back: Normal range of motion and neck supple.   Skin:     General: Skin is warm and dry.      Capillary Refill: Capillary refill takes less than 2 seconds.   Neurological:      General: No focal deficit present.      Mental Status: She is alert and oriented to person, place, and time. Mental status is at baseline.   Psychiatric:         Attention and Perception: Attention and perception normal.         Mood and Affect: Mood normal.         Speech: Speech normal.         Behavior: Behavior normal. Behavior is cooperative.         Thought Content: Thought content normal.         Cognition and Memory: Cognition and memory normal.       Last Recorded Vitals  /72   Pulse 89   Temp 36.9 °C (98.4 °F) (Temporal)   Resp 22   Wt 132 kg (290 lb)   SpO2 99%     Relevant Results  Results for orders placed or performed during the hospital encounter of 12/05/23 (from the past 24 hour(s))   CBC and Auto Differential   Result Value Ref Range    WBC 8.9 4.4 - 11.3 x10*3/uL    nRBC 0.0 0.0 - 0.0 /100 WBCs    RBC 5.04 4.00 - 5.20 x10*6/uL    Hemoglobin 15.2 12.0 - 16.0 g/dL    Hematocrit 43.8 36.0 - 46.0 %    MCV 87 80 - 100 fL    MCH 30.2 26.0 - 34.0 pg    MCHC 34.7 32.0 - 36.0 g/dL    RDW 12.8 11.5 - 14.5 %    Platelets 384 150 - 450 x10*3/uL    Neutrophils % 52.0 40.0 - 80.0 %    Immature Granulocytes %, Automated 0.2 0.0 - 0.9 %    Lymphocytes % 40.3 13.0 - 44.0 %    Monocytes % 7.2 2.0 - 10.0 %    Eosinophils % 0.1 0.0 - 6.0 %    Basophils % 0.2 0.0 - 2.0 %    Neutrophils Absolute 4.59 1.20 - 7.70 x10*3/uL    Immature Granulocytes Absolute, Automated 0.02 0.00 - 0.70 x10*3/uL    Lymphocytes Absolute 3.57 1.20 - 4.80 x10*3/uL    Monocytes Absolute 0.64 0.10 - 1.00 x10*3/uL    Eosinophils Absolute 0.01 0.00 - 0.70 x10*3/uL    Basophils Absolute 0.02 0.00 - 0.10 x10*3/uL   Comprehensive metabolic panel   Result Value  Ref Range    Glucose 177 (H) 74 - 99 mg/dL    Sodium 136 136 - 145 mmol/L    Potassium 3.4 (L) 3.5 - 5.3 mmol/L    Chloride 102 98 - 107 mmol/L    Bicarbonate 23 21 - 32 mmol/L    Anion Gap 14 10 - 20 mmol/L    Urea Nitrogen 11 6 - 23 mg/dL    Creatinine 0.90 0.50 - 1.05 mg/dL    eGFR 85 >60 mL/min/1.73m*2    Calcium 9.0 8.6 - 10.3 mg/dL    Albumin 4.2 3.4 - 5.0 g/dL    Alkaline Phosphatase 91 33 - 110 U/L    Total Protein 7.2 6.4 - 8.2 g/dL    AST 47 (H) 9 - 39 U/L    Bilirubin, Total 0.5 0.0 - 1.2 mg/dL    ALT 62 (H) 7 - 45 U/L     XR chest 1 view    Result Date: 12/5/2023  Interpreted By:  Norberto Aragon, STUDY: XR CHEST 1 VIEW;  12/5/2023 1:20 am   INDICATION: Signs/Symptoms:sob.   COMPARISON: None.   ACCESSION NUMBER(S): [id number]       1. Probable mild central vascular congestion, accentuated by bronchovascular crowding and low lung volumes.     Signed by: Norberto Aragon 12/5/2023 1:48 AM Dictation workstation:   [id number]     Assessment/Plan   Principal Problem:    Asthma exacerbation attacks    #Asthma exacerbation  Duoneb q 4 hours   Solumedrol q 24 hours   Supplemental oxygen if need to maintain SPO2 > 92%   Mag given in ED  Epi given by EMS   Peak Flow q 12 hours     #Hypothyroidism  Resume home medication     #Migraines  Resume home medication    #Depression  Resume home medication     #DVTp   Lovenox SQ    Yazmin Love, APRN-CNP

## 2023-12-05 NOTE — ED PROVIDER NOTES
HPI   Chief Complaint   Patient presents with    Shortness of Breath     Asthma attack.   Pt got IM epi and 2 duoneb en route by ems.       38-year-old female with a history of asthma presenting to the ED today with wheezing, difficulty breathing and shortness of breath that started prior to ER arrival.  Patient states that she has some known allergen in her house that does cause her to occasionally go into an asthma flare.  She states she started feeling short of breath this evening and she did take a breathing treatment at home without relief.  Her shortness of breath worsened so she called 911.  Patient states this is happened to her before and that she has had epinephrine with relief of her symptoms.  Patient states her chest feels tight and she is short of breath and wheezing.  She otherwise denies any further associated complaints.  She does not smoke.      History provided by:  Patient                      [place] Coma Scale Score: 15                  Patient History   Past Medical History:   Diagnosis Date    Acute cystitis without hematuria 08/08/2019    Acute cystitis without hematuria    Encounter for initial prescription of contraceptive pills 11/01/2019    Encounter for initial prescription of contraceptive pills    Nausea 02/05/2021    Nausea in adult    Other general symptoms and signs 12/05/2019    Forgetfulness    Parageusia 04/06/2020    Taste perversion    Pelvic and perineal pain     Pelvic pain in female    Personal history of other diseases of the respiratory system 04/06/2020    History of sinusitis    Personal history of other specified conditions 07/01/2020    History of vomiting    Personal history of other specified conditions 01/26/2021    History of headache    Personal history of other specified conditions 02/01/2021    History of diarrhea    Personal history of thrombophlebitis 06/03/2019    History of phlebitis    Personal history of urinary (tract) infections 01/16/2020    History of  urinary tract infection     Past Surgical History:   Procedure Laterality Date    OTHER SURGICAL HISTORY  02/08/2019    Appendectomy    OTHER SURGICAL HISTORY  02/08/2019    Cholecystectomy    OTHER SURGICAL HISTORY  02/08/2019    Cystoscopy    OTHER SURGICAL HISTORY  02/08/2019    Ureteroscopy    OTHER SURGICAL HISTORY  02/08/2019    Shoulder surgery     Family History   Problem Relation Name Age of Onset    Hypertension Father      Other (Pulmonary Valve Stenosis) Sister      Heart disease Maternal Grandmother      Diabetes Other Grandparent     Lung cancer Other Grandparent     Anxiety disorder Sibling       Social History     Tobacco Use    Smoking status: Never    Smokeless tobacco: Never   Vaping Use    Vaping Use: Never used   Substance Use Topics    Alcohol use: Yes     Comment: social    Drug use: Never       Physical Exam   ED Triage Vitals   Temp Heart Rate Resp BP   12/05/23 0059 12/05/23 0059 12/05/23 0059 12/05/23 0244   36.9 °C (98.4 °F) (!) 114 26 154/72      SpO2 Temp Source Heart Rate Source Patient Position   12/05/23 0116 12/05/23 0059 -- --   95 % Temporal        BP Location FiO2 (%)     -- 12/05/23 0119      21 %       Physical Exam  Constitutional:       General: She is in acute distress.      Appearance: She is not diaphoretic.   HENT:      Mouth/Throat:      Mouth: Mucous membranes are moist.      Pharynx: Oropharynx is clear.   Eyes:      Conjunctiva/sclera: Conjunctivae normal.   Cardiovascular:      Rate and Rhythm: Regular rhythm. Tachycardia present.      Pulses: Normal pulses.      Heart sounds: Normal heart sounds.   Pulmonary:      Effort: Respiratory distress present.      Breath sounds: Wheezing present. No rales.   Chest:      Chest wall: No tenderness.   Musculoskeletal:      Right lower leg: No edema.      Left lower leg: No edema.      Comments: Chest wall atraumatic.   Skin:     General: Skin is warm.      Capillary Refill: Capillary refill takes less than 2 seconds.    Neurological:      Mental Status: She is oriented to person, place, and time.         ED Course & MDM   Diagnoses as of 12/05/23 0314   Moderate persistent asthma with exacerbation       Medical Decision Making  38-year-old female with history of asthma presenting to the ED today with progressive shortness of breath that started this evening.  Patient states that this is apparently caused from some sort of allergen in her home that causes her asthma to flare.  She took 2 breathing treatments on her own before calling EMS and in route she was given epinephrine and 2 additional breathing treatments.  Patient states that this severe asthma flare and bronchospasm has happened to her previously.  On initial arrival patient is tachycardic with otherwise stable vital signs.  She is extremely dyspneic however, unable to speak in full sentences and she is audibly wheezing.  She is tachycardic but regular, perfusing well with good distal pulses.  Myself and the attending are at the bedside to evaluate the patient.  Additional breathing treatments are ordered as well as Solu-Medrol, magnesium, EKG and chest x-ray.    ECG per my interpretation is sinus tachycardia at 113 bpm with nonspecific changes but no acute ST elevations or depressions.  Chest x-ray today shows probable mild vascular congestion insinuated by bronchovascular crowding and low lung volumes.  We did obtain an ABG today and patient's pH is 7.41.  We will reassess after her breathing treatments, Solu-Medrol and magnesium.    Patient overall does look improved, her heart rate is coming down and she is currently satting 100% on room air.  She is able to speak more clearly in sentences but still feels a little dyspneic and states that she has had severe bronchospasm before requiring additional breathing treatments.  We do feel patient would benefit from observation admission at this time.        Procedure  Procedures     Cherise Roberts PA-C  12/05/23  [id number]

## 2023-12-06 LAB
ANION GAP SERPL CALC-SCNC: 11 MMOL/L (ref 10–20)
BUN SERPL-MCNC: 12 MG/DL (ref 6–23)
CALCIUM SERPL-MCNC: 9.6 MG/DL (ref 8.6–10.3)
CHLORIDE SERPL-SCNC: 105 MMOL/L (ref 98–107)
CO2 SERPL-SCNC: 22 MMOL/L (ref 21–32)
CREAT SERPL-MCNC: 0.9 MG/DL (ref 0.5–1.05)
ERYTHROCYTE [DISTWIDTH] IN BLOOD BY AUTOMATED COUNT: 13.2 % (ref 11.5–14.5)
GFR SERPL CREATININE-BSD FRML MDRD: 85 ML/MIN/1.73M*2
GLUCOSE SERPL-MCNC: 128 MG/DL (ref 74–99)
HCT VFR BLD AUTO: 41.9 % (ref 36–46)
HGB BLD-MCNC: 14.2 G/DL (ref 12–16)
MAGNESIUM SERPL-MCNC: 2.14 MG/DL (ref 1.6–2.4)
MCH RBC QN AUTO: 30 PG (ref 26–34)
MCHC RBC AUTO-ENTMCNC: 33.9 G/DL (ref 32–36)
MCV RBC AUTO: 89 FL (ref 80–100)
NRBC BLD-RTO: 0 /100 WBCS (ref 0–0)
PHOSPHATE SERPL-MCNC: 3 MG/DL (ref 2.5–4.9)
PLATELET # BLD AUTO: 355 X10*3/UL (ref 150–450)
POTASSIUM SERPL-SCNC: 4.1 MMOL/L (ref 3.5–5.3)
RBC # BLD AUTO: 4.73 X10*6/UL (ref 4–5.2)
SODIUM SERPL-SCNC: 134 MMOL/L (ref 136–145)
WBC # BLD AUTO: 25.8 X10*3/UL (ref 4.4–11.3)

## 2023-12-06 PROCEDURE — 2500000004 HC RX 250 GENERAL PHARMACY W/ HCPCS (ALT 636 FOR OP/ED): Performed by: NURSE PRACTITIONER

## 2023-12-06 PROCEDURE — 99291 CRITICAL CARE FIRST HOUR: CPT

## 2023-12-06 PROCEDURE — 94640 AIRWAY INHALATION TREATMENT: CPT

## 2023-12-06 PROCEDURE — 84100 ASSAY OF PHOSPHORUS: CPT

## 2023-12-06 PROCEDURE — 2500000001 HC RX 250 WO HCPCS SELF ADMINISTERED DRUGS (ALT 637 FOR MEDICARE OP): Performed by: EMERGENCY MEDICINE

## 2023-12-06 PROCEDURE — 80048 BASIC METABOLIC PNL TOTAL CA: CPT

## 2023-12-06 PROCEDURE — 2500000004 HC RX 250 GENERAL PHARMACY W/ HCPCS (ALT 636 FOR OP/ED)

## 2023-12-06 PROCEDURE — 83735 ASSAY OF MAGNESIUM: CPT

## 2023-12-06 PROCEDURE — 85027 COMPLETE CBC AUTOMATED: CPT

## 2023-12-06 PROCEDURE — 2500000002 HC RX 250 W HCPCS SELF ADMINISTERED DRUGS (ALT 637 FOR MEDICARE OP, ALT 636 FOR OP/ED): Performed by: EMERGENCY MEDICINE

## 2023-12-06 PROCEDURE — 36415 COLL VENOUS BLD VENIPUNCTURE: CPT

## 2023-12-06 PROCEDURE — 1210000001 HC SEMI-PRIVATE ROOM DAILY

## 2023-12-06 PROCEDURE — 2500000001 HC RX 250 WO HCPCS SELF ADMINISTERED DRUGS (ALT 637 FOR MEDICARE OP)

## 2023-12-06 PROCEDURE — 96372 THER/PROPH/DIAG INJ SC/IM: CPT | Performed by: EMERGENCY MEDICINE

## 2023-12-06 PROCEDURE — 2500000002 HC RX 250 W HCPCS SELF ADMINISTERED DRUGS (ALT 637 FOR MEDICARE OP, ALT 636 FOR OP/ED)

## 2023-12-06 PROCEDURE — 2500000004 HC RX 250 GENERAL PHARMACY W/ HCPCS (ALT 636 FOR OP/ED): Performed by: EMERGENCY MEDICINE

## 2023-12-06 PROCEDURE — 94660 CPAP INITIATION&MGMT: CPT

## 2023-12-06 RX ORDER — PROCHLORPERAZINE MALEATE 5 MG
10 TABLET ORAL EVERY 6 HOURS PRN
Status: DISCONTINUED | OUTPATIENT
Start: 2023-12-06 | End: 2023-12-07 | Stop reason: HOSPADM

## 2023-12-06 RX ORDER — OXYCODONE HYDROCHLORIDE 5 MG/1
5 TABLET ORAL ONCE
Status: COMPLETED | OUTPATIENT
Start: 2023-12-06 | End: 2023-12-06

## 2023-12-06 RX ORDER — PROCHLORPERAZINE EDISYLATE 5 MG/ML
10 INJECTION INTRAMUSCULAR; INTRAVENOUS EVERY 6 HOURS PRN
Status: DISCONTINUED | OUTPATIENT
Start: 2023-12-06 | End: 2023-12-07 | Stop reason: HOSPADM

## 2023-12-06 RX ORDER — PROCHLORPERAZINE 25 MG/1
25 SUPPOSITORY RECTAL EVERY 12 HOURS PRN
Status: DISCONTINUED | OUTPATIENT
Start: 2023-12-06 | End: 2023-12-07 | Stop reason: HOSPADM

## 2023-12-06 RX ADMIN — BUDESONIDE 0.5 MG: 0.5 INHALANT ORAL at 07:33

## 2023-12-06 RX ADMIN — LORATADINE 10 MG: 10 TABLET ORAL at 08:44

## 2023-12-06 RX ADMIN — IPRATROPIUM BROMIDE AND ALBUTEROL SULFATE 3 ML: 2.5; .5 SOLUTION RESPIRATORY (INHALATION) at 03:57

## 2023-12-06 RX ADMIN — DOCUSATE SODIUM 100 MG: 100 CAPSULE, LIQUID FILLED ORAL at 08:44

## 2023-12-06 RX ADMIN — LEVOTHYROXINE SODIUM 100 MCG: 0.1 TABLET ORAL at 08:44

## 2023-12-06 RX ADMIN — ONDANSETRON 4 MG: 2 INJECTION INTRAMUSCULAR; INTRAVENOUS at 11:56

## 2023-12-06 RX ADMIN — DESVENLAFAXINE 100 MG: 50 TABLET, FILM COATED, EXTENDED RELEASE ORAL at 06:23

## 2023-12-06 RX ADMIN — OXYCODONE HYDROCHLORIDE 5 MG: 5 TABLET ORAL at 11:06

## 2023-12-06 RX ADMIN — FLUTICASONE FUROATE AND VILANTEROL TRIFENATATE 1 PUFF: 100; 25 POWDER RESPIRATORY (INHALATION) at 06:23

## 2023-12-06 RX ADMIN — HYDROXYZINE HYDROCHLORIDE 25 MG: 25 TABLET ORAL at 04:09

## 2023-12-06 RX ADMIN — IPRATROPIUM BROMIDE AND ALBUTEROL SULFATE 3 ML: 2.5; .5 SOLUTION RESPIRATORY (INHALATION) at 16:23

## 2023-12-06 RX ADMIN — IPRATROPIUM BROMIDE AND ALBUTEROL SULFATE 3 ML: 2.5; .5 SOLUTION RESPIRATORY (INHALATION) at 07:32

## 2023-12-06 RX ADMIN — BUDESONIDE 0.5 MG: 0.5 INHALANT ORAL at 20:10

## 2023-12-06 RX ADMIN — ENOXAPARIN SODIUM 40 MG: 40 INJECTION SUBCUTANEOUS at 08:44

## 2023-12-06 RX ADMIN — ACETAMINOPHEN 650 MG: 325 TABLET ORAL at 08:44

## 2023-12-06 RX ADMIN — IPRATROPIUM BROMIDE AND ALBUTEROL SULFATE 3 ML: 2.5; .5 SOLUTION RESPIRATORY (INHALATION) at 20:10

## 2023-12-06 RX ADMIN — IPRATROPIUM BROMIDE AND ALBUTEROL SULFATE 3 ML: 2.5; .5 SOLUTION RESPIRATORY (INHALATION) at 11:44

## 2023-12-06 RX ADMIN — TIOTROPIUM BROMIDE INHALATION SPRAY 2 PUFF: 3.12 SPRAY, METERED RESPIRATORY (INHALATION) at 06:23

## 2023-12-06 RX ADMIN — MONTELUKAST SODIUM 10 MG: 10 TABLET, FILM COATED ORAL at 20:57

## 2023-12-06 RX ADMIN — BUSPIRONE HYDROCHLORIDE 10 MG: 5 TABLET ORAL at 20:57

## 2023-12-06 RX ADMIN — PROCHLORPERAZINE EDISYLATE 10 MG: 5 INJECTION INTRAMUSCULAR; INTRAVENOUS at 14:36

## 2023-12-06 RX ADMIN — METHYLPREDNISOLONE SODIUM SUCCINATE 40 MG: 40 INJECTION, POWDER, FOR SOLUTION INTRAMUSCULAR; INTRAVENOUS at 21:00

## 2023-12-06 RX ADMIN — METHYLPREDNISOLONE SODIUM SUCCINATE 40 MG: 40 INJECTION, POWDER, FOR SOLUTION INTRAMUSCULAR; INTRAVENOUS at 09:50

## 2023-12-06 RX ADMIN — ENOXAPARIN SODIUM 40 MG: 40 INJECTION SUBCUTANEOUS at 20:57

## 2023-12-06 RX ADMIN — DESVENLAFAXINE 50 MG: 50 TABLET, FILM COATED, EXTENDED RELEASE ORAL at 06:39

## 2023-12-06 ASSESSMENT — PAIN - FUNCTIONAL ASSESSMENT
PAIN_FUNCTIONAL_ASSESSMENT: 0-10

## 2023-12-06 ASSESSMENT — PAIN SCALES - GENERAL
PAINLEVEL_OUTOF10: 3
PAINLEVEL_OUTOF10: 3
PAINLEVEL_OUTOF10: 8
PAINLEVEL_OUTOF10: 0 - NO PAIN
PAINLEVEL_OUTOF10: 8
PAINLEVEL_OUTOF10: 3

## 2023-12-06 ASSESSMENT — PAIN DESCRIPTION - DESCRIPTORS
DESCRIPTORS: ACHING

## 2023-12-06 ASSESSMENT — PAIN DESCRIPTION - LOCATION: LOCATION: HEAD

## 2023-12-06 ASSESSMENT — ACTIVITIES OF DAILY LIVING (ADL): LACK_OF_TRANSPORTATION: NO

## 2023-12-06 NOTE — CARE PLAN
"  Problem: Respiratory  Pt status post asthmatic trigger at home with interventions of epinephrine IM x2, steroids, aggressive respiratory treatments, and bipap PRN.   Goal: Clear secretions with interventions this shift  Outcome: Progressing     Problem: Respiratory  Goal: Minimize anxiety/maximize coping throughout shift  Outcome: Progressing     Problem: Respiratory  Goal: Minimal/no exertional discomfort or dyspnea this shift  Outcome: Progressing     Problem: Respiratory  Goal: No signs of respiratory distress (eg. Use of accessory muscles. Peds grunting)  Outcome: Progressing     Problem: Respiratory  Goal: Patent airway maintained this shift  Outcome: Progressing     Problem: Respiratory  Goal: Verbalize decreased shortness of breath this shift  Outcome: Progressing     Problem: Respiratory  Goal: Increase self care and/or family involvement in next 24 hours  Outcome: Progressing     Problem: Fall/Injury  Falls prevention measures have been explained. Pt warned of bed alarm use if activated, use of nurse call bell system detailed and modifications made to devices if activation is difficult.   Purposeful hourly rounding program explained for assurance of the \"five Ps (pain, position, potty, possessions, privacy). Monitoring devices, IV tubing and SCD connecting tubes shown to pt to educate on their contribution to falling hazard and personal injury potential.   The above points reviewed with pts lacking retention of new information until evidence of learning is shown.     Goal: Not fall by end of shift  Outcome: Progressing     Problem: Fall/Injury  Goal: Be free from injury by end of the shift  Outcome: Progressing     Problem: Fall/Injury  Goal: Verbalize understanding of personal risk factors for fall in the hospital  Outcome: Progressing     Problem: Fall/Injury  Goal: Verbalize understanding of risk factor reduction measures to prevent injury from fall in the home  Outcome: Progressing     Problem: " Fall/Injury  Goal: Pace activities to prevent fatigue by end of the shift  Outcome: Progressing     Problem: Daily Care  Independent patient caring for self largely, safe in her transfers from bed to [facility]. Privacy maintained to extent practicable.   Goal: Daily care needs are met  Outcome: Progressing     Problem: Discharge Barriers  Pt information obtained at admission about typical living arrangement and level of community support that pt has access to. Changes in level of function evaluated by multidisciplinary team and plan made for optimal level of pt independence and function once discharged from this hospital facility. Family or significant social support personnel sought for corroborating input if pt unable to speak for self or appears to be less than forthcoming with factual report of situation.      Goal: My discharge needs are met  Outcome: Progressing

## 2023-12-06 NOTE — PROGRESS NOTES
12/06/23 1737   Discharge Planning   Living Arrangements Alone   Support Systems Family members   Assistance Needed none   Type of Residence Private residence   Patient expects to be discharged to: home   Does the patient need discharge transport arranged? No   Housing Stability   In the last 12 months, was there a time when you were not able to pay the mortgage or rent on time? N   In the last 12 months, was there a time when you did not have a steady place to sleep or slept in a shelter (including now)? N   Transportation Needs   In the past 12 months, has lack of transportation kept you from medical appointments or from getting medications? no   In the past 12 months, has lack of transportation kept you from meetings, work, or from getting things needed for daily living? No     Pt states she resides at home alone,  has family friends and relatives able to assist if needed.  Pt is independent, follows with Dr Matthew as primary care.  Uses [facility] in [employer] as pharmacy.  Pt denies any DME needs and does not use any assistive devices.  Pt drives.  Plan is home at time of discharge , care transitions will continue to follow if needs should arise.

## 2023-12-06 NOTE — PROGRESS NOTES
[facility] Critical Care Medicine       Date:  12/6/2023  Patient:  Sarahi Haley  YOB: 1985  MRN:  [id number]   Admit Date:  12/5/2023  ========================================================================================================    Chief Complaint   Patient presents with    Shortness of Breath     Asthma attack.   Pt got IM epi and 2 duoneb en route by ems.         History of Present Illness:  Sarahi Haley is a 38 y.o. year old female patient with Past Medical History of  asthma, anxiety, depression, PTSD, hypothyroidism, migraines who presented to the ED for shortness of breath.  Pt became acutely short of breath yesterday evening getting ready for bed.  Pt has associated chest tightness and wheezing.  Pt tried using her home nebulizer and rescue inhaler with  no relief. Pt called 911 for increasing shortness of breath at that time. Pt was given IM epinephrine by EMS prior to arrival.  Pt has had multiple previous hospitalizations for asthma exacerbations throughout the year with the last being on 10/15/23.  Pt denied headache, dizziness, syncope, cough, fever, or chills.  Pt denied chest pain or palpitations.  Pt denied nausea, vomiting, abdominal pain, diarrhea.      Pts vital signs on presenting to the ED include /72, , T 36.9, Resp 26, and SpO2 95% on room air.  ABG on presentation was pH 7.41, pCO2 32, pO2 108, Base excess -3.4, and HCO3 20.3.  CBC was unremarkable.  CMP with a mildly elevated glucose of 177, AST/ALT 47/62 and hypokalemia of 3.4.  CXR significant for probable mild central vascular congestion accentuated by bronchovascular crowding and low lung volumes.  Pt was started on solumedrol, q4h duonebs, 2x IM epinephrine, and magnesium. Pt requiring BiPAP for increased work of breathing. Pt to be admitted to the ICU.       Interval ICU Events:  12/5: Pt admitted under hospital medicine.  While boarding in the ED, pt experienced an increase in respiratory  distress in which the patient was placed on BiPAP.  Pt will be transferred into the ICU for oxygenation requirements.       12/6: Pt refused duoneb treatment overnight then had an episode of dyspnea with associated tachycardia and tachypnea in which patient requested BiPPAP.   Pt appeared well when seen this morning on room air stating at 97%.    Medical History:  Past Medical History:   Diagnosis Date    Acute cystitis without hematuria 08/08/2019    Acute cystitis without hematuria    Encounter for initial prescription of contraceptive pills 11/01/2019    Encounter for initial prescription of contraceptive pills    Nausea 02/05/2021    Nausea in adult    Other general symptoms and signs 12/05/2019    Forgetfulness    Parageusia 04/06/2020    Taste perversion    Pelvic and perineal pain     Pelvic pain in female    Personal history of other diseases of the respiratory system 04/06/2020    History of sinusitis    Personal history of other specified conditions 07/01/2020    History of vomiting    Personal history of other specified conditions 01/26/2021    History of headache    Personal history of other specified conditions 02/01/2021    History of diarrhea    Personal history of thrombophlebitis 06/03/2019    History of phlebitis    Personal history of urinary (tract) infections 01/16/2020    History of urinary tract infection     Past Surgical History:   Procedure Laterality Date    OTHER SURGICAL HISTORY  02/08/2019    Appendectomy    OTHER SURGICAL HISTORY  02/08/2019    Cholecystectomy    OTHER SURGICAL HISTORY  02/08/2019    Cystoscopy    OTHER SURGICAL HISTORY  02/08/2019    Ureteroscopy    OTHER SURGICAL HISTORY  02/08/2019    Shoulder surgery     Medications Prior to Admission   Medication Sig Dispense Refill Last Dose    albuterol 2.5 mg /3 mL (0.083 %) nebulizer solution Take 3 mL by nebulization every 4 hours if needed for shortness of breath.   Unknown    albuterol 90 mcg/actuation inhaler Inhale 2  puffs every 4 hours if needed for shortness of breath.   Unknown    aspirin 81 mg chewable tablet Chew 1 tablet (81 mg) once daily.   12/4/2023    budesonide (Pulmicort) 0.5 mg/2 mL nebulizer solution Take 2 mL (0.5 mg) by nebulization 2 times a day.   12/4/2023    busPIRone (Buspar) 15 mg tablet Take 0.5-1 tablets (7.5-15 mg) by mouth 2 times a day. (Patient taking differently: Take 0.5-1 tablets (7.5-15 mg) by mouth 2 times a day as needed (anxiety).) 60 tablet 11 Unknown    butalbital-acetaminophen-caff -40 mg tablet Take 1 tablet by mouth early in the morning.. 1 tab(s) orally once a day, As Needed (Patient taking differently: Take 1 tablet by mouth once daily as needed for headaches or migraine. 1 tab(s) orally once a day, As Needed) 90 tablet 3 Unknown    Deblitane 0.35 mg tablet Take 1 tablet (0.35 mg) by mouth once daily.   12/4/2023    desvenlafaxine 100 mg 24 hr tablet TAKE 1 TABLET BY MOUTH EVERY DAY (Patient taking differently: Take 1 tablet (100 mg) by mouth once daily. Take with 50 mg = 150 mg dose) 90 tablet 3 12/4/2023 at am    desvenlafaxine 50 mg 24 hr tablet Take 1 tablet (50 mg) by mouth once daily. Take with 100 mg = 150 mg dose   12/4/2023 at am    EPINEPHrine 0.3 mg/0.3 mL injection syringe Inject 0.3 mL (0.3 mg) into the muscle once daily as needed for anaphylaxis. 2 each 3 Unknown    fexofenadine (Allegra) 180 mg tablet Take 1 tablet (180 mg) by mouth once daily.   12/4/2023    fluticasone-umeclidin-vilanter (Trelegy Ellipta) 100-62.5-25 mcg blister with device Inhale 2 puffs once daily.   12/4/2023 at am    fluticasone/umeclidin/vilanter (TRELEGY ELLIPTA INHL) Inhale 1 puff early in the morning..       galcanezumab (Emgality Syringe) 120 mg/mL prefilled syringe Inject 1 Syringe (120 mg) under the skin every 28 (twenty-eight) days.   Unknown    hydrOXYzine HCL (Atarax) 25 mg tablet Take 1 tablet (25 mg) by mouth 3 times a day as needed for anxiety. 90 tablet 2 Unknown    L  norgest/e.estradioL-e.estrad (Seasonique) 0.15 mg-30 mcg (84)/10 mcg (7) tablets,dose pack,3 month tablet Take 1 tablet by mouth once daily.       levothyroxine (Synthroid, Levoxyl) 100 mcg tablet Take 1 tablet (100 mcg) by mouth once daily.   12/4/2023 at am    metoclopramide HCl (REGLAN ORAL) Take 1 tablet by mouth every 8 hours if needed (nausea).   Unknown    montelukast (Singulair) 10 mg tablet Take 1 tablet (10 mg) by mouth once daily at bedtime. 30 tablet 5 12/4/2023 at pm    rizatriptan (Maxalt) 10 mg tablet Take 1 tablet (10 mg) by mouth if needed for migraine. May repeat in 2 hours if unresolved. Do not exceed 30 mg in 24 hours.   Unknown    SUMAtriptan (Imitrex) 100 mg tablet Take 1 tablet (100 mg) by mouth if needed.   Unknown     Bee venom protein (honey bee), Diphenhydramine, Nsaids (non-steroidal anti-inflammatory drug), Procaine, and Ketorolac  Social History     Tobacco Use    Smoking status: Never    Smokeless tobacco: Never   Vaping Use    Vaping Use: Never used   Substance Use Topics    Alcohol use: Yes     Comment: social    Drug use: Never     Family History   Problem Relation Name Age of Onset    Hypertension Father      Other (Pulmonary Valve Stenosis) Sister      Heart disease Maternal Grandmother      Diabetes Other Grandparent     Lung cancer Other Grandparent     Anxiety disorder Sibling         Hospital Medications:           Current Facility-Administered Medications:     acetaminophen (Tylenol) tablet 650 mg, 650 mg, oral, q4h PRN, 650 mg at 12/06/23 0844 **OR** acetaminophen (Tylenol) oral liquid 650 mg, 650 mg, oral, q4h PRN **OR** acetaminophen (Tylenol) suppository 650 mg, 650 mg, rectal, q4h PRN, Arthur Alvarado PA-C    albuterol 2.5 mg /3 mL (0.083 %) nebulizer solution 3 mL, 3 mL, nebulization, q2h PRN, Levon Wray, DO    aspirin chewable tablet 81 mg, 81 mg, oral, Daily, Levon Wray, DO    budesonide (Pulmicort) 0.5 mg/2 mL nebulizer solution 0.5 mg, 0.5 mg,  nebulization, BID, Levon Wray DO, 0.5 mg at 12/06/23 0733    busPIRone (Buspar) tablet 10 mg, 10 mg, oral, BID, Levon Wray DO    butalbital-acetaminophen-caff -40 mg per tablet 1 tablet, 1 tablet, oral, Daily PRN, Arthur Alvarado, PA-C    desvenlafaxine (Pristiq) 24 hr tablet 100 mg, 100 mg, oral, Daily, Arthur E Hipps, PA-C, 100 mg at 12/06/23 0623    desvenlafaxine (Pristiq) 24 hr tablet 50 mg, 50 mg, oral, Daily, Arthur E Yazmins, PA-C, 50 mg at 12/06/23 0639    docusate sodium (Colace) capsule 100 mg, 100 mg, oral, BID, Arthur SANCHEZ Hipps, PA-C, 100 mg at 12/06/23 0844    enoxaparin (Lovenox) syringe 40 mg, 40 mg, subcutaneous, q12h SERGIO, Levon Wray DO, 40 mg at 12/06/23 0844    tiotropium (Spiriva Respimat) 2.5 mcg/actuation inhaler 2 puff, 2 Inhalation, inhalation, Daily, 2 puff at 12/06/23 0623 **AND** fluticasone furoate-vilanteroL (Breo Ellipta) 100-25 mcg/dose inhaler 1 puff, 1 puff, inhalation, Daily, Levon Wray DO, 1 puff at 12/06/23 0623    [Held by provider] galcanezumab (Emgality) injection 120 mg, 120 mg, subcutaneous, Once, Arthur Alvarado PA-C    hydrOXYzine HCL (Atarax) tablet 25 mg, 25 mg, oral, TID PRN, Levon Wray DO, 25 mg at 12/06/23 0409    ipratropium-albuteroL (Duo-Neb) 0.5-2.5 mg/3 mL nebulizer solution 3 mL, 3 mL, nebulization, q4h, Arthur Alvarado, PA-C, 3 mL at 12/06/23 0732    levothyroxine (Synthroid, Levoxyl) tablet 100 mcg, 100 mcg, oral, Daily, Levon Wray DO, 100 mcg at 12/06/23 0844    loratadine (Claritin) tablet 10 mg, 10 mg, oral, Daily, Levon Wray DO, 10 mg at 12/06/23 0844    melatonin tablet 3 mg, 3 mg, oral, Daily, Arthur Alvarado PA-C    methylPREDNISolone sod succinate (PF) (SOLU-Medrol) 40 mg/mL injection 40 mg, 40 mg, intravenous, q12h, Enrrique Keenan PA-C, 40 mg at 12/06/23 0950    montelukast (Singulair) tablet 10 mg, 10 mg, oral, Nightly, Levon Wray DO, 10 mg at 12/05/23 2044    norethindrone (Micronor) tablet 0.35 mg, 1  tablet, oral, Daily, Levon Wray DO    ondansetron (Zofran) tablet 4 mg, 4 mg, oral, q8h PRN **OR** ondansetron (Zofran) injection 4 mg, 4 mg, intravenous, q8h PRN, Arthur Alvarado PA-C, 4 mg at 12/05/23 1554    oxyCODONE (Roxicodone) immediate release tablet 5 mg, 5 mg, oral, Once, Arthur Alvarado PA-C    SUMAtriptan (Imitrex) tablet 100 mg, 100 mg, oral, PRN, Levon Wray DO    Review of Systems:  14 point review of systems was completed and negative except for those specially mention in my HPI    Physical Exam:    Heart Rate:  []   Temp:  [36.4 °C (97.5 °F)-37.4 °C (99.3 °F)]   Resp:  [17-43]   BP: (123-185)/(59-97)   Weight:  [147 kg (324 lb 8.3 oz)-148 kg (326 lb 1 oz)]   SpO2:  [90 %-100 %]     Physical Exam  Constitutional:       Appearance: Normal appearance. She is obese.   HENT:      Head: Normocephalic and atraumatic.      Nose: Nose normal.      Mouth/Throat:      Mouth: Mucous membranes are moist.      Pharynx: Oropharynx is clear.   Eyes:      Extraocular Movements: Extraocular movements intact.      Conjunctiva/sclera: Conjunctivae normal.      Pupils: Pupils are equal, round, and reactive to light.   Cardiovascular:      Rate and Rhythm: Normal rate and regular rhythm.      Pulses: Normal pulses.      Heart sounds: Normal heart sounds.   Pulmonary:      Effort: Pulmonary effort is normal.      Breath sounds: Normal breath sounds.   Abdominal:      General: Bowel sounds are normal.      Palpations: Abdomen is soft.   Musculoskeletal:         General: Normal range of motion.      Cervical back: Normal range of motion.   Skin:     General: Skin is warm and dry.      Capillary Refill: Capillary refill takes less than 2 seconds.   Neurological:      General: No focal deficit present.      Mental Status: She is alert and oriented to person, place, and time.   Psychiatric:         Mood and Affect: Mood normal.         Behavior: Behavior normal.         Objective:    I have reviewed all  medications, laboratory results, and imaging pertinent for today's encounter.    FiO2 (%):  [21 %-40 %] 21 %      Intake/Output Summary (Last 24 hours) at 12/6/2023 1045  Last data filed at 12/6/2023 0600  Gross per 24 hour   Intake --   Output 1225 ml   Net -1225 ml         Assessment/Plan:    I am currently managing this critically ill patient for the following problems:  #Acute asthma exacerbation   #Hx of hypothyroidism  #Hx of Migraines  #Hx of anxiety, depression, PTSD    Neuro/Psych/Pain Ctrl/Sedation:  -CAM ICU score q-shift  -Sleep/wake cycle hygiene  -Delirium precaution      -Melatonin @ night   #Hx of anxiety, depression, PTSD  -Continue home buspar, hydroxizine, pristiq   #Hx of Migraines  -Continue home meds     Respiratory/ENT:  #Acute asthma exacerbation   -duonebs q4 hours   -solumedrol q12 hours for 5 days, stop 12/10  -pt to maintain sPO2 >92%  -supplemental o2 as needed for sPO2 >92%   -continuous pulse ox monitoring   -magnesium given in the ED  -Epi given in EMS and 1x in the ED  -Peak flow q12 hours   -continue home singulair, claritin, spiriva respimat, breo ellipta, pulmicort     Cardiovascular:  -pt on continuous cardiac monitoring   -no acute issues at this time     GI:  -regular diet  -colace prn     Renal/Volume Status (Intra & Extravascular):  -Daily BMP monitoring   -Replace electrolytes per unit protocol     Endocrine  #Hx of hypothyroidism  -Continue home levothyroxine     Infectious Disease:  -No acute issues     Heme/Onc:  -Daily CBC monitoring   -Transfuse for Hgb <7 or symptomatic anemia     OBGYN/MSK:  -No acute issues     Ethics/Code Status:  Full Code     :  DVT Prophylaxis: lovenox  GI Prophylaxis: n/a  Bowel Regimen: colace prn   Diet: regular   CVC: n/a  [place]: n/a  [place]: n/a  Restraints: n/a  Dispo: ICU    Critical Care Time:  40 minutes spent in preparing to see patient (I.e. review of medical records), evaluation of diagnostics (I.e. labs, imaging, etc.),  documentation, discussing plan of care with patient/ family/ caregiver, and/ or coordination of care with multidisciplinary team. Time does not include completion of procedure time.          Pt discussed with Dr. Kamala Alvarado PA-C

## 2023-12-06 NOTE — CARE PLAN
Problem: Respiratory  Goal: Clear secretions with interventions this shift  Outcome: Progressing  Goal: Minimize anxiety/maximize coping throughout shift  Outcome: Progressing  Goal: Minimal/no exertional discomfort or dyspnea this shift  Outcome: Progressing  Goal: No signs of respiratory distress (eg. Use of accessory muscles. Peds grunting)  Outcome: Progressing  Goal: Patent airway maintained this shift  Outcome: Progressing  Goal: Tolerate mechanical ventilation evidenced by VS/agitation level this shift  Outcome: Progressing  Goal: Tolerate pulmonary toileting this shift  Outcome: Progressing  Goal: Verbalize decreased shortness of breath this shift  Outcome: Progressing  Goal: Wean oxygen to maintain O2 saturation per order/standard this shift  Outcome: Progressing  Goal: Increase self care and/or family involvement in next 24 hours  Outcome: Progressing     Problem: Fall/Injury  Goal: Not fall by end of shift  Outcome: Progressing  Goal: Be free from injury by end of the shift  Outcome: Progressing  Goal: Verbalize understanding of personal risk factors for fall in the hospital  Outcome: Progressing  Goal: Verbalize understanding of risk factor reduction measures to prevent injury from fall in the home  Outcome: Progressing  Goal: Use assistive devices by end of the shift  Outcome: Progressing  Goal: Pace activities to prevent fatigue by end of the shift  Outcome: Progressing     Problem: Daily Care  Goal: Daily care needs are met  Outcome: Progressing     Problem: Discharge Barriers  Goal: My discharge needs are met  Outcome: Progressing

## 2023-12-06 NOTE — SIGNIFICANT EVENT
Patient reports these problems started after moving into new house in may. Reports that this is a continuing problem although the time between admissions has increased with time. Patient is currently being seen by pulmonologist Farhad at [facility] and has follow up allergist appointment for 12/20.

## 2023-12-07 ENCOUNTER — APPOINTMENT (OUTPATIENT)
Dept: CARDIOLOGY | Facility: HOSPITAL | Age: 38
DRG: 202 | End: 2023-12-07
Payer: COMMERCIAL

## 2023-12-07 VITALS
OXYGEN SATURATION: 96 % | SYSTOLIC BLOOD PRESSURE: 158 MMHG | TEMPERATURE: 96.4 F | BODY MASS INDEX: 48.82 KG/M2 | DIASTOLIC BLOOD PRESSURE: 77 MMHG | HEIGHT: 65 IN | WEIGHT: 293 LBS | RESPIRATION RATE: 16 BRPM | HEART RATE: 59 BPM

## 2023-12-07 LAB
ANION GAP SERPL CALC-SCNC: 14 MMOL/L (ref 10–20)
ATRIAL RATE: 73 BPM
BUN SERPL-MCNC: 15 MG/DL (ref 6–23)
CALCIUM SERPL-MCNC: 9.1 MG/DL (ref 8.6–10.3)
CHLORIDE SERPL-SCNC: 104 MMOL/L (ref 98–107)
CO2 SERPL-SCNC: 23 MMOL/L (ref 21–32)
CREAT SERPL-MCNC: 0.84 MG/DL (ref 0.5–1.05)
ERYTHROCYTE [DISTWIDTH] IN BLOOD BY AUTOMATED COUNT: 13.2 % (ref 11.5–14.5)
GFR SERPL CREATININE-BSD FRML MDRD: >90 ML/MIN/1.73M*2
GLUCOSE SERPL-MCNC: 105 MG/DL (ref 74–99)
HCT VFR BLD AUTO: 38.4 % (ref 36–46)
HGB BLD-MCNC: 13.1 G/DL (ref 12–16)
HOLD SPECIMEN: NORMAL
MAGNESIUM SERPL-MCNC: 2.07 MG/DL (ref 1.6–2.4)
MCH RBC QN AUTO: 30 PG (ref 26–34)
MCHC RBC AUTO-ENTMCNC: 34.1 G/DL (ref 32–36)
MCV RBC AUTO: 88 FL (ref 80–100)
NRBC BLD-RTO: 0 /100 WBCS (ref 0–0)
P AXIS: 54 DEGREES
P OFFSET: 196 MS
P ONSET: 143 MS
PHOSPHATE SERPL-MCNC: 3.6 MG/DL (ref 2.5–4.9)
PLATELET # BLD AUTO: 347 X10*3/UL (ref 150–450)
POTASSIUM SERPL-SCNC: 3.9 MMOL/L (ref 3.5–5.3)
PR INTERVAL: 148 MS
Q ONSET: 217 MS
QRS COUNT: 12 BEATS
QRS DURATION: 80 MS
QT INTERVAL: 394 MS
QTC CALCULATION(BAZETT): 434 MS
QTC FREDERICIA: 420 MS
R AXIS: 12 DEGREES
RBC # BLD AUTO: 4.37 X10*6/UL (ref 4–5.2)
SODIUM SERPL-SCNC: 137 MMOL/L (ref 136–145)
T AXIS: -1 DEGREES
T OFFSET: 414 MS
VENTRICULAR RATE: 73 BPM
WBC # BLD AUTO: 18.9 X10*3/UL (ref 4.4–11.3)

## 2023-12-07 PROCEDURE — 80048 BASIC METABOLIC PNL TOTAL CA: CPT

## 2023-12-07 PROCEDURE — 85027 COMPLETE CBC AUTOMATED: CPT

## 2023-12-07 PROCEDURE — 84100 ASSAY OF PHOSPHORUS: CPT

## 2023-12-07 PROCEDURE — 2500000004 HC RX 250 GENERAL PHARMACY W/ HCPCS (ALT 636 FOR OP/ED): Performed by: STUDENT IN AN ORGANIZED HEALTH CARE EDUCATION/TRAINING PROGRAM

## 2023-12-07 PROCEDURE — 96372 THER/PROPH/DIAG INJ SC/IM: CPT

## 2023-12-07 PROCEDURE — 2500000002 HC RX 250 W HCPCS SELF ADMINISTERED DRUGS (ALT 637 FOR MEDICARE OP, ALT 636 FOR OP/ED): Performed by: STUDENT IN AN ORGANIZED HEALTH CARE EDUCATION/TRAINING PROGRAM

## 2023-12-07 PROCEDURE — 2500000002 HC RX 250 W HCPCS SELF ADMINISTERED DRUGS (ALT 637 FOR MEDICARE OP, ALT 636 FOR OP/ED)

## 2023-12-07 PROCEDURE — 36415 COLL VENOUS BLD VENIPUNCTURE: CPT

## 2023-12-07 PROCEDURE — 2500000004 HC RX 250 GENERAL PHARMACY W/ HCPCS (ALT 636 FOR OP/ED)

## 2023-12-07 PROCEDURE — 2500000001 HC RX 250 WO HCPCS SELF ADMINISTERED DRUGS (ALT 637 FOR MEDICARE OP)

## 2023-12-07 PROCEDURE — 99239 HOSP IP/OBS DSCHRG MGMT >30: CPT | Performed by: STUDENT IN AN ORGANIZED HEALTH CARE EDUCATION/TRAINING PROGRAM

## 2023-12-07 PROCEDURE — 94640 AIRWAY INHALATION TREATMENT: CPT

## 2023-12-07 PROCEDURE — 2500000001 HC RX 250 WO HCPCS SELF ADMINISTERED DRUGS (ALT 637 FOR MEDICARE OP): Performed by: STUDENT IN AN ORGANIZED HEALTH CARE EDUCATION/TRAINING PROGRAM

## 2023-12-07 PROCEDURE — 83735 ASSAY OF MAGNESIUM: CPT

## 2023-12-07 PROCEDURE — 93010 ELECTROCARDIOGRAM REPORT: CPT | Performed by: INTERNAL MEDICINE

## 2023-12-07 PROCEDURE — 93005 ELECTROCARDIOGRAM TRACING: CPT

## 2023-12-07 PROCEDURE — 94760 N-INVAS EAR/PLS OXIMETRY 1: CPT

## 2023-12-07 RX ORDER — PREDNISONE 10 MG/1
TABLET ORAL
Qty: 30 TABLET | Refills: 0 | Status: SHIPPED | OUTPATIENT
Start: 2023-12-07 | End: 2023-12-17

## 2023-12-07 RX ORDER — OXYCODONE HYDROCHLORIDE 5 MG/1
5 TABLET ORAL ONCE
Status: COMPLETED | OUTPATIENT
Start: 2023-12-07 | End: 2023-12-07

## 2023-12-07 RX ADMIN — ENOXAPARIN SODIUM 40 MG: 40 INJECTION SUBCUTANEOUS at 09:27

## 2023-12-07 RX ADMIN — ASPIRIN 81 MG: 81 TABLET, CHEWABLE ORAL at 09:28

## 2023-12-07 RX ADMIN — BUTALBITAL, ACETAMINOPHEN, AND CAFFEINE 1 TABLET: 50; 325; 40 TABLET ORAL at 03:12

## 2023-12-07 RX ADMIN — HYDROXYZINE HYDROCHLORIDE 25 MG: 25 TABLET ORAL at 03:13

## 2023-12-07 RX ADMIN — ONDANSETRON 4 MG: 2 INJECTION INTRAMUSCULAR; INTRAVENOUS at 13:26

## 2023-12-07 RX ADMIN — IPRATROPIUM BROMIDE AND ALBUTEROL SULFATE 3 ML: 2.5; .5 SOLUTION RESPIRATORY (INHALATION) at 15:55

## 2023-12-07 RX ADMIN — PROCHLORPERAZINE EDISYLATE 10 MG: 5 INJECTION INTRAMUSCULAR; INTRAVENOUS at 17:42

## 2023-12-07 RX ADMIN — ONDANSETRON 4 MG: 2 INJECTION INTRAMUSCULAR; INTRAVENOUS at 03:13

## 2023-12-07 RX ADMIN — FLUTICASONE FUROATE AND VILANTEROL TRIFENATATE 1 PUFF: 100; 25 POWDER RESPIRATORY (INHALATION) at 07:28

## 2023-12-07 RX ADMIN — LORATADINE 10 MG: 10 TABLET ORAL at 09:28

## 2023-12-07 RX ADMIN — TIOTROPIUM BROMIDE INHALATION SPRAY 2 PUFF: 3.12 SPRAY, METERED RESPIRATORY (INHALATION) at 07:28

## 2023-12-07 RX ADMIN — DESVENLAFAXINE 50 MG: 50 TABLET, FILM COATED, EXTENDED RELEASE ORAL at 06:02

## 2023-12-07 RX ADMIN — SUMATRIPTAN SUCCINATE 100 MG: 25 TABLET ORAL at 14:56

## 2023-12-07 RX ADMIN — DOCUSATE SODIUM 100 MG: 100 CAPSULE, LIQUID FILLED ORAL at 09:28

## 2023-12-07 RX ADMIN — LEVOTHYROXINE SODIUM 100 MCG: 0.1 TABLET ORAL at 09:28

## 2023-12-07 RX ADMIN — BUSPIRONE HYDROCHLORIDE 10 MG: 5 TABLET ORAL at 09:27

## 2023-12-07 RX ADMIN — OXYCODONE 5 MG: 5 TABLET ORAL at 13:25

## 2023-12-07 RX ADMIN — IPRATROPIUM BROMIDE AND ALBUTEROL SULFATE 3 ML: 2.5; .5 SOLUTION RESPIRATORY (INHALATION) at 11:32

## 2023-12-07 RX ADMIN — IPRATROPIUM BROMIDE AND ALBUTEROL SULFATE 3 ML: 2.5; .5 SOLUTION RESPIRATORY (INHALATION) at 01:28

## 2023-12-07 RX ADMIN — SUMATRIPTAN SUCCINATE 100 MG: 25 TABLET ORAL at 17:42

## 2023-12-07 RX ADMIN — DESVENLAFAXINE 100 MG: 50 TABLET, FILM COATED, EXTENDED RELEASE ORAL at 06:02

## 2023-12-07 RX ADMIN — IPRATROPIUM BROMIDE AND ALBUTEROL SULFATE 3 ML: 2.5; .5 SOLUTION RESPIRATORY (INHALATION) at 06:59

## 2023-12-07 RX ADMIN — METHYLPREDNISOLONE SODIUM SUCCINATE 40 MG: 40 INJECTION, POWDER, FOR SOLUTION INTRAMUSCULAR; INTRAVENOUS at 09:27

## 2023-12-07 RX ADMIN — BUDESONIDE 0.5 MG: 0.5 INHALANT ORAL at 07:00

## 2023-12-07 RX ADMIN — IPRATROPIUM BROMIDE AND ALBUTEROL SULFATE 3 ML: 2.5; .5 SOLUTION RESPIRATORY (INHALATION) at 06:58

## 2023-12-07 ASSESSMENT — COGNITIVE AND FUNCTIONAL STATUS - GENERAL
DAILY ACTIVITIY SCORE: 24
DAILY ACTIVITIY SCORE: 24
MOBILITY SCORE: 24
MOBILITY SCORE: 24

## 2023-12-07 ASSESSMENT — PAIN - FUNCTIONAL ASSESSMENT
PAIN_FUNCTIONAL_ASSESSMENT: 0-10

## 2023-12-07 ASSESSMENT — PAIN SCALES - GENERAL
PAINLEVEL_OUTOF10: 7
PAINLEVEL_OUTOF10: 9
PAINLEVEL_OUTOF10: 4
PAINLEVEL_OUTOF10: 9
PAINLEVEL_OUTOF10: 9
PAINLEVEL_OUTOF10: 7

## 2023-12-07 ASSESSMENT — PAIN DESCRIPTION - LOCATION: LOCATION: HEAD

## 2023-12-07 ASSESSMENT — PAIN DESCRIPTION - DESCRIPTORS: DESCRIPTORS: PRESSURE

## 2023-12-07 NOTE — CARE PLAN
The patient's goals for the shift include      The clinical goals for the shift include breathe easier    Over the shift, the patient did not make progress toward the following goals. Barriers to progression include current disease process. Recommendations to address these barriers include continue with current POC.

## 2023-12-07 NOTE — DISCHARGE INSTRUCTIONS
Please follow-up with your pulmonologist as scheduled  Please take steroids as prescribed  Try to avoid allergens if possible

## 2023-12-07 NOTE — DISCHARGE SUMMARY
Discharge Diagnosis  Asthma exacerbation attacks    Issues Requiring Follow-Up      Test Results Pending At Discharge  Pending Labs       No current pending labs.            Hospital Course   38-year-old morbidly obese female with past medical history of asthma, anxiety, depression, PTSD, hypothyroidism, migraines who presented to the ED for shortness of breath.  Was admitted with acute asthma exacerbation.  She was transferred to ICU for increased respiratory distress, was transferred out of ICU yesterday.  She has been on room air, appears comfortable, on examination there was no wheezing or rales.  No evidence of pneumonia, patient appeared comfortable.  I will discharge her on a prednisone taper.  She was made aware of the potential allergens that might be causing asthma exacerbation.  She will follow-up with primary care and lung doctor.    Pertinent Physical Exam At Time of Discharge  Physical Exam  Constitutional:       Appearance: She is obese.   HENT:      Head: Normocephalic and atraumatic.      Mouth/Throat:      Mouth: Mucous membranes are moist.   Eyes:      Extraocular Movements: Extraocular movements intact.      Conjunctiva/sclera: Conjunctivae normal.   Cardiovascular:      Rate and Rhythm: Normal rate and regular rhythm.   Pulmonary:      Effort: Pulmonary effort is normal.      Breath sounds: No wheezing or rales.   Abdominal:      General: There is distension.      Palpations: Abdomen is soft.   Musculoskeletal:         General: No deformity.   Neurological:      General: No focal deficit present.      Mental Status: She is alert.         Home Medications     Medication List      ASK your doctor about these medications     * albuterol 2.5 mg /3 mL (0.083 %) nebulizer solution   * albuterol 90 mcg/actuation inhaler   aspirin 81 mg chewable tablet   budesonide 0.5 mg/2 mL nebulizer solution; Commonly known as: Pulmicort   busPIRone 15 mg tablet; Commonly known as: Buspar; Take 0.5-1 tablets    (7.5-15 mg) by mouth 2 times a day.   butalbital-acetaminophen-caff -40 mg tablet; Take 1 tablet by   mouth early in the morning.. 1 tab(s) orally once a day, As Needed   Deblitane 0.35 mg tablet; Generic drug: norethindrone   * desvenlafaxine 50 mg 24 hr tablet; Commonly known as: Pristiq   * desvenlafaxine 100 mg 24 hr tablet; Commonly known as: Pristiq; TAKE 1   TABLET BY MOUTH EVERY DAY   Emgality Syringe 120 mg/mL prefilled syringe; Generic drug: galcanezumab   EPINEPHrine 0.3 mg/0.3 mL injection syringe; Commonly known as: Epipen;   Inject 0.3 mL (0.3 mg) into the muscle once daily as needed for   anaphylaxis.   fexofenadine 180 mg tablet; Commonly known as: Allegra   hydrOXYzine HCL 25 mg tablet; Commonly known as: Atarax; Take 1 tablet   (25 mg) by mouth 3 times a day as needed for anxiety.   L norgest/e.estradioL-e.estrad 0.15 mg-30 mcg (84)/10 mcg (7)   tablets,dose pack,3 month tablet; Commonly known as: Seasonique   levothyroxine 100 mcg tablet; Commonly known as: Synthroid, Levoxyl   montelukast 10 mg tablet; Commonly known as: Singulair; Take 1 tablet   (10 mg) by mouth once daily at bedtime.   REGLAN ORAL   rizatriptan 10 mg tablet; Commonly known as: Maxalt   SUMAtriptan 100 mg tablet; Commonly known as: Imitrex   * Trelegy Ellipta 100-62.5-25 mcg blister with device; Generic drug:   fluticasone-umeclidin-vilanter   * TRELEGY ELLIPTA INHL  * This list has 6 medication(s) that are the same as other medications   prescribed for you. Read the directions carefully, and ask your doctor or   other care provider to review them with you.       Outpatient Follow-Up  Future Appointments   Date Time Provider Department Center   12/20/2023  3:00 PM Paul Matthew DO [id number] [place]   2/1/2024  9:00 AM Edin Galdamez MD [id number] [place]       Lee Tay MD

## 2023-12-07 NOTE — CARE PLAN
Problem: Respiratory  Goal: Clear secretions with interventions this shift  Outcome: Progressing  Goal: Minimize anxiety/maximize coping throughout shift  Outcome: Progressing  Goal: Minimal/no exertional discomfort or dyspnea this shift  Outcome: Progressing  Goal: No signs of respiratory distress (eg. Use of accessory muscles. Peds grunting)  Outcome: Progressing  Goal: Patent airway maintained this shift  Outcome: Progressing  Goal: Tolerate mechanical ventilation evidenced by VS/agitation level this shift  Outcome: Progressing  Goal: Tolerate pulmonary toileting this shift  Outcome: Progressing  Goal: Verbalize decreased shortness of breath this shift  Outcome: Progressing  Goal: Wean oxygen to maintain O2 saturation per order/standard this shift  Outcome: Progressing  Goal: Increase self care and/or family involvement in next 24 hours  Outcome: Progressing   The patient's goals for the shift include      The clinical goals for the shift include NO SOB for this shift    Over the shift, the patient did not make progress toward the following goals. Barriers to progression include health problems. Recommendations to address these barriers include supportive care and education.

## 2023-12-08 ENCOUNTER — DOCUMENTATION (OUTPATIENT)
Dept: PRIMARY CARE | Facility: CLINIC | Age: 38
End: 2023-12-08
Payer: COMMERCIAL

## 2023-12-08 NOTE — PROGRESS NOTES
Discharge Facility:  [facility]  Discharge Diagnosis:  asthma exacerbation  Admission Date:  12/5/23  Discharge Date:   12/7/23    PCP Appointment Date:  12/20/23    Specialist Appointment Date:   Pulmonology TBD    Hospital Encounter and Summary: Linked

## 2023-12-10 NOTE — DOCUMENTATION CLARIFICATION NOTE
PATIENT:               GABI ROJAS  ACCT #:                  [id number]  MRN:                       [id number]  :                       1985  ADMIT DATE:       2023 12:58 AM  DISCH DATE:        2023 8:43 PM  RESPONDING PROVIDER #:        [id number]          PROVIDER RESPONSE TEXT:    Lactic acidosis is clinically significant and required treatment/monitoring    CDI QUERY TEXT:    [facility]_Abnormal Studies        Instruction:    Based on your assessment of the patient and the clinical information, please provide the requested documentation by clicking on the appropriate radio button and enter any additional information if prompted.    Question: Is there a diagnosis indicative of the lab values or image study    When answering this query, please exercise your independent professional judgment. The fact that a question is being asked, does not imply that any particular answer is desired or expected.    The patient's clinical indicators include:  Clinical Information: 38 yof with elevation in serum lactate, admit with exacerbation of asthma    Clinical Indicators:     arterial Lactate: 4.3    Treatment: BiPAP, oxygen via NC, ABG's, ICU monitoring, rest    Risk Factors: 38 yof admit with acute asthma exacerbation  Options provided:  -- Lactic acidosis is clinically significant and required treatment/monitoring  -- Elevation in Lactate not requiring treatment or evaluation  -- Other - I will add my own diagnosis  -- Refer to Clinical Documentation Reviewer    Query created by: Paula Germain on 2023 4:30 PM      Electronically signed by:  SAMANTA STARKEY MD 12/10/2023 9:21 AM

## 2023-12-10 NOTE — DOCUMENTATION CLARIFICATION NOTE
PATIENT:               GABI ROJAS  ACCT #:                  [id number]  MRN:                       [id number]  :                       1985  ADMIT DATE:       2023 12:58 AM  DISCH DATE:        2023 8:43 PM  RESPONDING PROVIDER #:        [id number]          PROVIDER RESPONSE TEXT:    Acute Hypoxemic Respiratory Failure    CDI QUERY TEXT:    [facility]_Respiratory Failure Acute        Instruction:    Based on your assessment of the patient and the clinical information, please provide the requested documentation by clicking on the appropriate radio button and enter any additional information if prompted.    Question: Is there a diagnosis indicative of the clinical information    When answering this query, please exercise your independent professional judgment. The fact that a question is being asked, does not imply that any particular answer is desired or expected.    The patient's clinical indicators include:  Clinical Information:  38 yof admit with asthma exacerbation requiring BiPAP for increased work of breathing    Clinical Indicators:    RR: //22//28...    Oxygen saturation 88/90 percent    ABG on presentation was pH 7.41, pCO2 32, pO2 108, Base excess -3.4, and HCO3 20.3    Treatment: BiPAP, oxygen per NC, ICU management, Prednisone, RT treatments    Risk Factors: 38 yof with asthma and obesity  Options provided:  -- Acute Hypoxemic Respiratory Failure  -- No acute respiratory failure  -- Other - I will add my own diagnosis  -- Refer to Clinical Documentation Reviewer    Query created by: Paula Germain on 2023 4:22 PM      Electronically signed by:  SAMANTA STARKEY MD 12/10/2023 9:21 AM

## 2023-12-13 LAB
HOLD SPECIMEN: NORMAL
HOLD SPECIMEN: NORMAL

## 2023-12-20 ENCOUNTER — APPOINTMENT (OUTPATIENT)
Dept: PRIMARY CARE | Facility: CLINIC | Age: 38
End: 2023-12-20
Payer: COMMERCIAL

## 2023-12-22 ENCOUNTER — PATIENT OUTREACH (OUTPATIENT)
Dept: PRIMARY CARE | Facility: CLINIC | Age: 38
End: 2023-12-22
Payer: COMMERCIAL

## 2023-12-27 DIAGNOSIS — Z30.011 ORAL CONTRACEPTIVE PRESCRIBED: Primary | ICD-10-CM

## 2023-12-27 RX ORDER — NORETHINDRONE 0.35 MG/1
1 TABLET ORAL DAILY
Qty: 84 TABLET | Refills: 4 | Status: SHIPPED | OUTPATIENT
Start: 2023-12-27 | End: 2024-04-03 | Stop reason: HOSPADM

## 2024-01-01 ENCOUNTER — APPOINTMENT (OUTPATIENT)
Dept: RADIOLOGY | Facility: HOSPITAL | Age: 39
End: 2024-01-01
Payer: COMMERCIAL

## 2024-01-01 ENCOUNTER — HOSPITAL ENCOUNTER (OUTPATIENT)
Facility: HOSPITAL | Age: 39
Setting detail: OBSERVATION
Discharge: HOME | End: 2024-01-01
Attending: STUDENT IN AN ORGANIZED HEALTH CARE EDUCATION/TRAINING PROGRAM | Admitting: INTERNAL MEDICINE
Payer: COMMERCIAL

## 2024-01-01 ENCOUNTER — APPOINTMENT (OUTPATIENT)
Dept: CARDIOLOGY | Facility: HOSPITAL | Age: 39
End: 2024-01-01
Payer: COMMERCIAL

## 2024-01-01 VITALS
SYSTOLIC BLOOD PRESSURE: 131 MMHG | HEART RATE: 95 BPM | TEMPERATURE: 97.2 F | BODY MASS INDEX: 48.82 KG/M2 | DIASTOLIC BLOOD PRESSURE: 61 MMHG | HEIGHT: 65 IN | WEIGHT: 293 LBS | OXYGEN SATURATION: 91 % | RESPIRATION RATE: 18 BRPM

## 2024-01-01 DIAGNOSIS — J45.51 SEVERE PERSISTENT ASTHMA WITH ACUTE EXACERBATION (MULTI): ICD-10-CM

## 2024-01-01 DIAGNOSIS — J96.00 ACUTE RESPIRATORY FAILURE, UNSPECIFIED WHETHER WITH HYPOXIA OR HYPERCAPNIA (MULTI): ICD-10-CM

## 2024-01-01 DIAGNOSIS — J45.901 SEVERE ASTHMA WITH EXACERBATION, UNSPECIFIED WHETHER PERSISTENT (HHS-HCC): Primary | ICD-10-CM

## 2024-01-01 LAB
ANION GAP SERPL CALC-SCNC: 11 MMOL/L (ref 10–20)
ATRIAL RATE: 100 BPM
BASOPHILS # BLD AUTO: 0.01 X10*3/UL (ref 0–0.1)
BASOPHILS NFR BLD AUTO: 0.1 %
BUN SERPL-MCNC: 15 MG/DL (ref 6–23)
CALCIUM SERPL-MCNC: 8.7 MG/DL (ref 8.6–10.3)
CARDIAC TROPONIN I PNL SERPL HS: <3 NG/L (ref 0–13)
CARDIAC TROPONIN I PNL SERPL HS: <3 NG/L (ref 0–13)
CHLORIDE SERPL-SCNC: 105 MMOL/L (ref 98–107)
CO2 SERPL-SCNC: 25 MMOL/L (ref 21–32)
CREAT SERPL-MCNC: 0.88 MG/DL (ref 0.5–1.05)
EOSINOPHIL # BLD AUTO: 0.13 X10*3/UL (ref 0–0.7)
EOSINOPHIL NFR BLD AUTO: 1.6 %
ERYTHROCYTE [DISTWIDTH] IN BLOOD BY AUTOMATED COUNT: 12.2 % (ref 11.5–14.5)
GFR SERPL CREATININE-BSD FRML MDRD: 87 ML/MIN/1.73M*2
GLUCOSE SERPL-MCNC: 121 MG/DL (ref 74–99)
HCT VFR BLD AUTO: 40.9 % (ref 36–46)
HGB BLD-MCNC: 14.3 G/DL (ref 12–16)
IMM GRANULOCYTES # BLD AUTO: 0.02 X10*3/UL (ref 0–0.7)
IMM GRANULOCYTES NFR BLD AUTO: 0.2 % (ref 0–0.9)
LYMPHOCYTES # BLD AUTO: 3.23 X10*3/UL (ref 1.2–4.8)
LYMPHOCYTES NFR BLD AUTO: 38.6 %
MAGNESIUM SERPL-MCNC: 1.88 MG/DL (ref 1.6–2.4)
MCH RBC QN AUTO: 30 PG (ref 26–34)
MCHC RBC AUTO-ENTMCNC: 35 G/DL (ref 32–36)
MCV RBC AUTO: 86 FL (ref 80–100)
MONOCYTES # BLD AUTO: 0.71 X10*3/UL (ref 0.1–1)
MONOCYTES NFR BLD AUTO: 8.5 %
NEUTROPHILS # BLD AUTO: 4.27 X10*3/UL (ref 1.2–7.7)
NEUTROPHILS NFR BLD AUTO: 51 %
NRBC BLD-RTO: 0 /100 WBCS (ref 0–0)
P AXIS: 39 DEGREES
P OFFSET: 191 MS
P ONSET: 136 MS
PLATELET # BLD AUTO: 370 X10*3/UL (ref 150–450)
POTASSIUM SERPL-SCNC: 3.5 MMOL/L (ref 3.5–5.3)
PR INTERVAL: 162 MS
Q ONSET: 217 MS
QRS COUNT: 16 BEATS
QRS DURATION: 88 MS
QT INTERVAL: 356 MS
QTC CALCULATION(BAZETT): 459 MS
QTC FREDERICIA: 422 MS
R AXIS: -2 DEGREES
RBC # BLD AUTO: 4.76 X10*6/UL (ref 4–5.2)
SODIUM SERPL-SCNC: 137 MMOL/L (ref 136–145)
T AXIS: -2 DEGREES
T OFFSET: 395 MS
VENTRICULAR RATE: 100 BPM
WBC # BLD AUTO: 8.4 X10*3/UL (ref 4.4–11.3)

## 2024-01-01 PROCEDURE — G0378 HOSPITAL OBSERVATION PER HR: HCPCS

## 2024-01-01 PROCEDURE — 94660 CPAP INITIATION&MGMT: CPT

## 2024-01-01 PROCEDURE — 84484 ASSAY OF TROPONIN QUANT: CPT | Performed by: STUDENT IN AN ORGANIZED HEALTH CARE EDUCATION/TRAINING PROGRAM

## 2024-01-01 PROCEDURE — 2500000002 HC RX 250 W HCPCS SELF ADMINISTERED DRUGS (ALT 637 FOR MEDICARE OP, ALT 636 FOR OP/ED)

## 2024-01-01 PROCEDURE — 96372 THER/PROPH/DIAG INJ SC/IM: CPT | Mod: 25

## 2024-01-01 PROCEDURE — 2500000001 HC RX 250 WO HCPCS SELF ADMINISTERED DRUGS (ALT 637 FOR MEDICARE OP): Performed by: INTERNAL MEDICINE

## 2024-01-01 PROCEDURE — 71045 X-RAY EXAM CHEST 1 VIEW: CPT | Performed by: SURGERY

## 2024-01-01 PROCEDURE — 96376 TX/PRO/DX INJ SAME DRUG ADON: CPT | Performed by: INTERNAL MEDICINE

## 2024-01-01 PROCEDURE — 94640 AIRWAY INHALATION TREATMENT: CPT

## 2024-01-01 PROCEDURE — 96372 THER/PROPH/DIAG INJ SC/IM: CPT | Performed by: INTERNAL MEDICINE

## 2024-01-01 PROCEDURE — 96375 TX/PRO/DX INJ NEW DRUG ADDON: CPT | Performed by: INTERNAL MEDICINE

## 2024-01-01 PROCEDURE — 82310 ASSAY OF CALCIUM: CPT | Performed by: STUDENT IN AN ORGANIZED HEALTH CARE EDUCATION/TRAINING PROGRAM

## 2024-01-01 PROCEDURE — 83735 ASSAY OF MAGNESIUM: CPT | Performed by: STUDENT IN AN ORGANIZED HEALTH CARE EDUCATION/TRAINING PROGRAM

## 2024-01-01 PROCEDURE — 36415 COLL VENOUS BLD VENIPUNCTURE: CPT | Performed by: STUDENT IN AN ORGANIZED HEALTH CARE EDUCATION/TRAINING PROGRAM

## 2024-01-01 PROCEDURE — 71045 X-RAY EXAM CHEST 1 VIEW: CPT

## 2024-01-01 PROCEDURE — 85025 COMPLETE CBC W/AUTO DIFF WBC: CPT | Performed by: STUDENT IN AN ORGANIZED HEALTH CARE EDUCATION/TRAINING PROGRAM

## 2024-01-01 PROCEDURE — 96365 THER/PROPH/DIAG IV INF INIT: CPT | Performed by: INTERNAL MEDICINE

## 2024-01-01 PROCEDURE — 99285 EMERGENCY DEPT VISIT HI MDM: CPT | Mod: 25 | Performed by: STUDENT IN AN ORGANIZED HEALTH CARE EDUCATION/TRAINING PROGRAM

## 2024-01-01 PROCEDURE — 99238 HOSP IP/OBS DSCHRG MGMT 30/<: CPT | Performed by: STUDENT IN AN ORGANIZED HEALTH CARE EDUCATION/TRAINING PROGRAM

## 2024-01-01 PROCEDURE — 2500000002 HC RX 250 W HCPCS SELF ADMINISTERED DRUGS (ALT 637 FOR MEDICARE OP, ALT 636 FOR OP/ED): Performed by: INTERNAL MEDICINE

## 2024-01-01 PROCEDURE — 99222 1ST HOSP IP/OBS MODERATE 55: CPT | Performed by: INTERNAL MEDICINE

## 2024-01-01 PROCEDURE — 2500000004 HC RX 250 GENERAL PHARMACY W/ HCPCS (ALT 636 FOR OP/ED): Performed by: INTERNAL MEDICINE

## 2024-01-01 PROCEDURE — 93005 ELECTROCARDIOGRAM TRACING: CPT

## 2024-01-01 PROCEDURE — 2500000004 HC RX 250 GENERAL PHARMACY W/ HCPCS (ALT 636 FOR OP/ED)

## 2024-01-01 PROCEDURE — 2500000004 HC RX 250 GENERAL PHARMACY W/ HCPCS (ALT 636 FOR OP/ED): Performed by: STUDENT IN AN ORGANIZED HEALTH CARE EDUCATION/TRAINING PROGRAM

## 2024-01-01 PROCEDURE — 2500000002 HC RX 250 W HCPCS SELF ADMINISTERED DRUGS (ALT 637 FOR MEDICARE OP, ALT 636 FOR OP/ED): Performed by: STUDENT IN AN ORGANIZED HEALTH CARE EDUCATION/TRAINING PROGRAM

## 2024-01-01 RX ORDER — BUSPIRONE HYDROCHLORIDE 5 MG/1
7.5 TABLET ORAL 2 TIMES DAILY
Status: DISCONTINUED | OUTPATIENT
Start: 2024-01-01 | End: 2024-01-01

## 2024-01-01 RX ORDER — ACETAMINOPHEN 160 MG/5ML
650 SOLUTION ORAL EVERY 4 HOURS PRN
Status: DISCONTINUED | OUTPATIENT
Start: 2024-01-01 | End: 2024-01-01 | Stop reason: HOSPADM

## 2024-01-01 RX ORDER — EPINEPHRINE 1 MG/ML
INJECTION INTRAMUSCULAR; INTRAVENOUS; SUBCUTANEOUS
Status: COMPLETED
Start: 2024-01-01 | End: 2024-01-01

## 2024-01-01 RX ORDER — IPRATROPIUM BROMIDE AND ALBUTEROL SULFATE 2.5; .5 MG/3ML; MG/3ML
3 SOLUTION RESPIRATORY (INHALATION)
Status: COMPLETED | OUTPATIENT
Start: 2024-01-01 | End: 2024-01-01

## 2024-01-01 RX ORDER — LORAZEPAM 2 MG/ML
INJECTION INTRAMUSCULAR
Status: COMPLETED
Start: 2024-01-01 | End: 2024-01-01

## 2024-01-01 RX ORDER — IPRATROPIUM BROMIDE AND ALBUTEROL SULFATE 2.5; .5 MG/3ML; MG/3ML
3 SOLUTION RESPIRATORY (INHALATION) EVERY 20 MIN
Status: COMPLETED | OUTPATIENT
Start: 2024-01-01 | End: 2024-01-01

## 2024-01-01 RX ORDER — PROCHLORPERAZINE EDISYLATE 5 MG/ML
10 INJECTION INTRAMUSCULAR; INTRAVENOUS ONCE
Status: COMPLETED | OUTPATIENT
Start: 2024-01-01 | End: 2024-01-01

## 2024-01-01 RX ORDER — EPINEPHRINE 1 MG/ML
0.5 INJECTION INTRAMUSCULAR; INTRAVENOUS; SUBCUTANEOUS ONCE
Status: COMPLETED | OUTPATIENT
Start: 2024-01-01 | End: 2024-01-01

## 2024-01-01 RX ORDER — BUDESONIDE 0.5 MG/2ML
0.5 INHALANT ORAL
Status: DISCONTINUED | OUTPATIENT
Start: 2024-01-01 | End: 2024-01-01 | Stop reason: HOSPADM

## 2024-01-01 RX ORDER — SUMATRIPTAN SUCCINATE 25 MG/1
100 TABLET ORAL ONCE AS NEEDED
Status: DISCONTINUED | OUTPATIENT
Start: 2024-01-01 | End: 2024-01-01 | Stop reason: HOSPADM

## 2024-01-01 RX ORDER — LORAZEPAM 2 MG/ML
0.5 INJECTION INTRAMUSCULAR ONCE
Status: COMPLETED | OUTPATIENT
Start: 2024-01-01 | End: 2024-01-01

## 2024-01-01 RX ORDER — FLUTICASONE FUROATE AND VILANTEROL 100; 25 UG/1; UG/1
1 POWDER RESPIRATORY (INHALATION)
Status: DISCONTINUED | OUTPATIENT
Start: 2024-01-01 | End: 2024-01-01 | Stop reason: HOSPADM

## 2024-01-01 RX ORDER — ONDANSETRON HYDROCHLORIDE 2 MG/ML
4 INJECTION, SOLUTION INTRAVENOUS EVERY 8 HOURS PRN
Status: DISCONTINUED | OUTPATIENT
Start: 2024-01-01 | End: 2024-01-01

## 2024-01-01 RX ORDER — DESVENLAFAXINE 50 MG/1
150 TABLET, EXTENDED RELEASE ORAL DAILY
Status: DISCONTINUED | OUTPATIENT
Start: 2024-01-01 | End: 2024-01-01 | Stop reason: HOSPADM

## 2024-01-01 RX ORDER — ACETAMINOPHEN 650 MG/1
650 SUPPOSITORY RECTAL EVERY 4 HOURS PRN
Status: DISCONTINUED | OUTPATIENT
Start: 2024-01-01 | End: 2024-01-01 | Stop reason: HOSPADM

## 2024-01-01 RX ORDER — ACETAMINOPHEN 325 MG/1
650 TABLET ORAL EVERY 4 HOURS PRN
Status: DISCONTINUED | OUTPATIENT
Start: 2024-01-01 | End: 2024-01-01 | Stop reason: HOSPADM

## 2024-01-01 RX ORDER — ALBUTEROL SULFATE 0.83 MG/ML
3 SOLUTION RESPIRATORY (INHALATION) EVERY 4 HOURS PRN
Status: DISCONTINUED | OUTPATIENT
Start: 2024-01-01 | End: 2024-01-01 | Stop reason: HOSPADM

## 2024-01-01 RX ORDER — LEVOTHYROXINE SODIUM 100 UG/1
100 TABLET ORAL DAILY
Status: DISCONTINUED | OUTPATIENT
Start: 2024-01-01 | End: 2024-01-01 | Stop reason: HOSPADM

## 2024-01-01 RX ORDER — MAGNESIUM SULFATE HEPTAHYDRATE 40 MG/ML
2 INJECTION, SOLUTION INTRAVENOUS ONCE
Status: COMPLETED | OUTPATIENT
Start: 2024-01-01 | End: 2024-01-01

## 2024-01-01 RX ORDER — SENNOSIDES 8.6 MG/1
2 TABLET ORAL 2 TIMES DAILY
Status: DISCONTINUED | OUTPATIENT
Start: 2024-01-01 | End: 2024-01-01 | Stop reason: HOSPADM

## 2024-01-01 RX ORDER — IPRATROPIUM BROMIDE AND ALBUTEROL SULFATE 2.5; .5 MG/3ML; MG/3ML
SOLUTION RESPIRATORY (INHALATION)
Status: COMPLETED
Start: 2024-01-01 | End: 2024-01-01

## 2024-01-01 RX ORDER — ACETAMINOPHEN 325 MG/1
975 TABLET ORAL ONCE
Status: COMPLETED | OUTPATIENT
Start: 2024-01-01 | End: 2024-01-01

## 2024-01-01 RX ORDER — PREDNISONE 20 MG/1
40 TABLET ORAL DAILY
Qty: 10 TABLET | Refills: 0 | Status: SHIPPED | OUTPATIENT
Start: 2024-01-01 | End: 2024-01-06

## 2024-01-01 RX ORDER — IPRATROPIUM BROMIDE AND ALBUTEROL SULFATE 2.5; .5 MG/3ML; MG/3ML
3 SOLUTION RESPIRATORY (INHALATION)
Status: DISCONTINUED | OUTPATIENT
Start: 2024-01-01 | End: 2024-01-01 | Stop reason: HOSPADM

## 2024-01-01 RX ORDER — IPRATROPIUM BROMIDE AND ALBUTEROL SULFATE 2.5; .5 MG/3ML; MG/3ML
3 SOLUTION RESPIRATORY (INHALATION) EVERY 20 MIN
Status: DISCONTINUED | OUTPATIENT
Start: 2024-01-01 | End: 2024-01-01

## 2024-01-01 RX ORDER — ENOXAPARIN SODIUM 100 MG/ML
60 INJECTION SUBCUTANEOUS EVERY 12 HOURS SCHEDULED
Status: DISCONTINUED | OUTPATIENT
Start: 2024-01-01 | End: 2024-01-01 | Stop reason: HOSPADM

## 2024-01-01 RX ORDER — MONTELUKAST SODIUM 10 MG/1
10 TABLET ORAL NIGHTLY
Status: DISCONTINUED | OUTPATIENT
Start: 2024-01-01 | End: 2024-01-01 | Stop reason: HOSPADM

## 2024-01-01 RX ADMIN — IPRATROPIUM BROMIDE AND ALBUTEROL SULFATE 3 ML: 2.5; .5 SOLUTION RESPIRATORY (INHALATION) at 03:20

## 2024-01-01 RX ADMIN — IPRATROPIUM BROMIDE AND ALBUTEROL SULFATE 3 ML: 2.5; .5 SOLUTION RESPIRATORY (INHALATION) at 03:04

## 2024-01-01 RX ADMIN — LORAZEPAM 0.5 MG: 2 INJECTION INTRAMUSCULAR; INTRAVENOUS at 02:17

## 2024-01-01 RX ADMIN — EPINEPHRINE 0.5 MG: 1 INJECTION INTRAMUSCULAR; INTRAVENOUS; SUBCUTANEOUS at 02:57

## 2024-01-01 RX ADMIN — ACETAMINOPHEN 975 MG: 325 TABLET ORAL at 03:33

## 2024-01-01 RX ADMIN — METHYLPREDNISOLONE SODIUM SUCCINATE 40 MG: 40 INJECTION, POWDER, FOR SOLUTION INTRAMUSCULAR; INTRAVENOUS at 11:04

## 2024-01-01 RX ADMIN — METHYLPREDNISOLONE SODIUM SUCCINATE 80 MG: 40 INJECTION, POWDER, FOR SOLUTION INTRAMUSCULAR; INTRAVENOUS at 02:31

## 2024-01-01 RX ADMIN — ENOXAPARIN SODIUM 60 MG: 60 INJECTION SUBCUTANEOUS at 08:54

## 2024-01-01 RX ADMIN — IPRATROPIUM BROMIDE AND ALBUTEROL SULFATE 3 ML: 2.5; .5 SOLUTION RESPIRATORY (INHALATION) at 02:23

## 2024-01-01 RX ADMIN — BUDESONIDE INHALATION 0.5 MG: 0.5 SUSPENSION RESPIRATORY (INHALATION) at 11:09

## 2024-01-01 RX ADMIN — PROCHLORPERAZINE EDISYLATE 10 MG: 5 INJECTION INTRAMUSCULAR; INTRAVENOUS at 04:27

## 2024-01-01 RX ADMIN — EPINEPHRINE 0.5 MG: 1 INJECTION INTRAMUSCULAR; INTRAVENOUS; SUBCUTANEOUS at 01:55

## 2024-01-01 RX ADMIN — SODIUM CHLORIDE, POTASSIUM CHLORIDE, SODIUM LACTATE AND CALCIUM CHLORIDE 500 ML: 600; 310; 30; 20 INJECTION, SOLUTION INTRAVENOUS at 03:06

## 2024-01-01 RX ADMIN — IPRATROPIUM BROMIDE AND ALBUTEROL SULFATE 3 ML: 2.5; .5 SOLUTION RESPIRATORY (INHALATION) at 02:21

## 2024-01-01 RX ADMIN — IPRATROPIUM BROMIDE AND ALBUTEROL SULFATE 3 ML: 2.5; .5 SOLUTION RESPIRATORY (INHALATION) at 03:11

## 2024-01-01 RX ADMIN — FLUTICASONE FUROATE AND VILANTEROL TRIFENATATE 1 PUFF: 100; 25 POWDER RESPIRATORY (INHALATION) at 08:54

## 2024-01-01 RX ADMIN — TIOTROPIUM BROMIDE INHALATION SPRAY 2 PUFF: 3.12 SPRAY, METERED RESPIRATORY (INHALATION) at 08:54

## 2024-01-01 RX ADMIN — HYDROMORPHONE HYDROCHLORIDE 0.5 MG: 1 INJECTION, SOLUTION INTRAMUSCULAR; INTRAVENOUS; SUBCUTANEOUS at 05:48

## 2024-01-01 RX ADMIN — LORAZEPAM 0.5 MG: 2 INJECTION INTRAMUSCULAR at 02:17

## 2024-01-01 RX ADMIN — ONDANSETRON 4 MG: 2 INJECTION INTRAMUSCULAR; INTRAVENOUS at 03:33

## 2024-01-01 RX ADMIN — MAGNESIUM SULFATE HEPTAHYDRATE 2 G: 40 INJECTION, SOLUTION INTRAVENOUS at 02:31

## 2024-01-01 RX ADMIN — DESVENLAFAXINE 150 MG: 50 TABLET, FILM COATED, EXTENDED RELEASE ORAL at 11:04

## 2024-01-01 RX ADMIN — IPRATROPIUM BROMIDE AND ALBUTEROL SULFATE 3 ML: 2.5; .5 SOLUTION RESPIRATORY (INHALATION) at 02:34

## 2024-01-01 RX ADMIN — ACETAMINOPHEN 650 MG: 325 TABLET ORAL at 15:26

## 2024-01-01 RX ADMIN — LEVOTHYROXINE SODIUM 100 MCG: 0.1 TABLET ORAL at 08:54

## 2024-01-01 SDOH — SOCIAL STABILITY: SOCIAL INSECURITY: ARE YOU OR HAVE YOU BEEN THREATENED OR ABUSED PHYSICALLY, EMOTIONALLY, OR SEXUALLY BY ANYONE?: NO

## 2024-01-01 SDOH — SOCIAL STABILITY: SOCIAL INSECURITY: ABUSE: ADULT

## 2024-01-01 SDOH — SOCIAL STABILITY: SOCIAL INSECURITY: HAVE YOU HAD THOUGHTS OF HARMING ANYONE ELSE?: NO

## 2024-01-01 SDOH — SOCIAL STABILITY: SOCIAL INSECURITY: DOES ANYONE TRY TO KEEP YOU FROM HAVING/CONTACTING OTHER FRIENDS OR DOING THINGS OUTSIDE YOUR HOME?: NO

## 2024-01-01 SDOH — SOCIAL STABILITY: SOCIAL INSECURITY: DO YOU FEEL UNSAFE GOING BACK TO THE PLACE WHERE YOU ARE LIVING?: NO

## 2024-01-01 SDOH — SOCIAL STABILITY: SOCIAL INSECURITY: ARE THERE ANY APPARENT SIGNS OF INJURIES/BEHAVIORS THAT COULD BE RELATED TO ABUSE/NEGLECT?: NO

## 2024-01-01 SDOH — SOCIAL STABILITY: SOCIAL INSECURITY: HAS ANYONE EVER THREATENED TO HURT YOUR FAMILY OR YOUR PETS?: NO

## 2024-01-01 SDOH — SOCIAL STABILITY: SOCIAL INSECURITY: DO YOU FEEL ANYONE HAS EXPLOITED OR TAKEN ADVANTAGE OF YOU FINANCIALLY OR OF YOUR PERSONAL PROPERTY?: NO

## 2024-01-01 SDOH — SOCIAL STABILITY: SOCIAL INSECURITY: WERE YOU ABLE TO COMPLETE ALL THE BEHAVIORAL HEALTH SCREENINGS?: YES

## 2024-01-01 ASSESSMENT — PAIN DESCRIPTION - FREQUENCY: FREQUENCY: CONSTANT/CONTINUOUS

## 2024-01-01 ASSESSMENT — LIFESTYLE VARIABLES
EVER HAD A DRINK FIRST THING IN THE MORNING TO STEADY YOUR NERVES TO GET RID OF A HANGOVER: NO
EVER FELT BAD OR GUILTY ABOUT YOUR DRINKING: NO
AUDIT-C TOTAL SCORE: 3
HOW OFTEN DO YOU HAVE 6 OR MORE DRINKS ON ONE OCCASION: MONTHLY
AUDIT-C TOTAL SCORE: 3
PRESCIPTION_ABUSE_PAST_12_MONTHS: NO
HOW MANY STANDARD DRINKS CONTAINING ALCOHOL DO YOU HAVE ON A TYPICAL DAY: 1 OR 2
HAVE YOU EVER FELT YOU SHOULD CUT DOWN ON YOUR DRINKING: NO
HAVE PEOPLE ANNOYED YOU BY CRITICIZING YOUR DRINKING: NO
SUBSTANCE_ABUSE_PAST_12_MONTHS: NO
HOW OFTEN DO YOU HAVE A DRINK CONTAINING ALCOHOL: MONTHLY OR LESS
SKIP TO QUESTIONS 9-10: 0
REASON UNABLE TO ASSESS: NO

## 2024-01-01 ASSESSMENT — COGNITIVE AND FUNCTIONAL STATUS - GENERAL
DAILY ACTIVITIY SCORE: 24
MOBILITY SCORE: 24
MOBILITY SCORE: 24
PATIENT BASELINE BEDBOUND: NO
DAILY ACTIVITIY SCORE: 24

## 2024-01-01 ASSESSMENT — PAIN SCALES - GENERAL
PAINLEVEL_OUTOF10: 0 - NO PAIN
PAINLEVEL_OUTOF10: 8

## 2024-01-01 ASSESSMENT — PATIENT HEALTH QUESTIONNAIRE - PHQ9
1. LITTLE INTEREST OR PLEASURE IN DOING THINGS: NOT AT ALL
SUM OF ALL RESPONSES TO PHQ9 QUESTIONS 1 & 2: 0
2. FEELING DOWN, DEPRESSED OR HOPELESS: NOT AT ALL

## 2024-01-01 ASSESSMENT — PAIN - FUNCTIONAL ASSESSMENT
PAIN_FUNCTIONAL_ASSESSMENT: 0-10
PAIN_FUNCTIONAL_ASSESSMENT: 0-10

## 2024-01-01 ASSESSMENT — ACTIVITIES OF DAILY LIVING (ADL)
TOILETING: INDEPENDENT
BATHING: INDEPENDENT
ADEQUATE_TO_COMPLETE_ADL: YES
LACK_OF_TRANSPORTATION: NO
PATIENT'S MEMORY ADEQUATE TO SAFELY COMPLETE DAILY ACTIVITIES?: YES
ASSISTIVE_DEVICE: EYEGLASSES
HEARING - RIGHT EAR: FUNCTIONAL
WALKS IN HOME: INDEPENDENT
GROOMING: INDEPENDENT
JUDGMENT_ADEQUATE_SAFELY_COMPLETE_DAILY_ACTIVITIES: YES
HEARING - LEFT EAR: FUNCTIONAL
DRESSING YOURSELF: INDEPENDENT
FEEDING YOURSELF: INDEPENDENT

## 2024-01-01 ASSESSMENT — PAIN DESCRIPTION - LOCATION: LOCATION: HEAD

## 2024-01-01 ASSESSMENT — PAIN DESCRIPTION - DESCRIPTORS: DESCRIPTORS: PRESSURE

## 2024-01-01 NOTE — DISCHARGE SUMMARY
Discharge Diagnosis  Acute asthma exacerbation  1. Severe asthma with exacerbation, unspecified whether persistent    2. Acute respiratory failure, unspecified whether with hypoxia or hypercapnia (CMS/HCC)    3. Severe persistent asthma with acute exacerbation       Issues Requiring Follow-Up    Test Results Pending At Discharge  Pending Labs       No current pending labs.            Hospital Course  Sarahi Haley is 38 y.o. female with past history of asthma, hypothyroidism, migraine headache, anxiety presented with shortness of breath that woke her up from sleep and wheezing.  Also had nonproductive cough and symptoms did not resolve with any other send nebulizers at home and decided to come to the ED  Upon presentation to the ED chest x-ray was done and showed no acute pulm cardiopulmonary pathology, Labs BMP unremarkable CBC unremarkable was started on Solu-Medrol and DuoNeb nebulization, patient improved significantly this morning on room air breathing comfortably no wheezing or distress.  Patient has frequent asthma exacerbations and waiting to follow-up with her pulmonologist and allergy specialist.  Otherwise patient is stable to be discharged home with outpatient follow-up.  Discharge plan was discussed with the patient and patient in agreement.  Total discharge time spent 20 minutes.    Pertinent Physical Exam At Time of Discharge  Physical Exam  Constitutional:       General: She is not in acute distress.     Appearance: Normal appearance. She is not ill-appearing, toxic-appearing or diaphoretic.   HENT:      Head: Normocephalic and atraumatic.      Mouth/Throat:      Mouth: Mucous membranes are moist.      Pharynx: No oropharyngeal exudate.   Eyes:      Extraocular Movements: Extraocular movements intact.      Pupils: Pupils are equal, round, and reactive to light.   Cardiovascular:      Rate and Rhythm: Normal rate and regular rhythm.      Heart sounds: No murmur heard.  Pulmonary:      Effort:  Pulmonary effort is normal. No respiratory distress.      Breath sounds: Normal breath sounds. No wheezing.   Abdominal:      General: Abdomen is flat. Bowel sounds are normal. There is no distension.      Tenderness: There is no abdominal tenderness. There is no guarding or rebound.   Musculoskeletal:         General: No swelling.      Right lower leg: No edema.      Left lower leg: No edema.   Skin:     Findings: No lesion or rash.   Neurological:      General: No focal deficit present.      Mental Status: She is alert and oriented to person, place, and time.      Cranial Nerves: No cranial nerve deficit.      Motor: No weakness.   Psychiatric:         Mood and Affect: Mood normal.         Behavior: Behavior normal.         Home Medications     Medication List      START taking these medications     predniSONE 20 mg tablet; Commonly known as: Deltasone; Take 2 tablets   (40 mg) by mouth once daily for 5 days.     CHANGE how you take these medications     norethindrone 0.35 mg tablet; Commonly known as: Deblitane; Take 1   tablet (0.35 mg) over 84 days by mouth once daily.; What changed: Another   medication with the same name was removed. Continue taking this   medication, and follow the directions you see here.     CONTINUE taking these medications     * albuterol 2.5 mg /3 mL (0.083 %) nebulizer solution   * albuterol 90 mcg/actuation inhaler   aspirin 81 mg chewable tablet   budesonide 0.5 mg/2 mL nebulizer solution; Commonly known as: Pulmicort   butalbital-acetaminophen-caff -40 mg tablet; Take 1 tablet by   mouth early in the morning.. 1 tab(s) orally once a day, As Needed   * desvenlafaxine 50 mg 24 hr tablet; Commonly known as: Pristiq   * desvenlafaxine 100 mg 24 hr tablet; Commonly known as: Pristiq; TAKE 1   TABLET BY MOUTH EVERY DAY   Emgality Syringe 120 mg/mL prefilled syringe; Generic drug: galcanezumab   EPINEPHrine 0.3 mg/0.3 mL injection syringe; Commonly known as: Epipen;   Inject 0.3 mL  (0.3 mg) into the muscle once daily as needed for   anaphylaxis.   fexofenadine 180 mg tablet; Commonly known as: Allegra   hydrOXYzine HCL 25 mg tablet; Commonly known as: Atarax; Take 1 tablet   (25 mg) by mouth 3 times a day as needed for anxiety.   levothyroxine 100 mcg tablet; Commonly known as: Synthroid, Levoxyl   montelukast 10 mg tablet; Commonly known as: Singulair; Take 1 tablet   (10 mg) by mouth once daily at bedtime.   REGLAN ORAL   rizatriptan 10 mg tablet; Commonly known as: Maxalt   SUMAtriptan 100 mg tablet; Commonly known as: Imitrex   * Trelegy Ellipta 100-62.5-25 mcg blister with device; Generic drug:   fluticasone-umeclidin-vilanter   * TRELEGY ELLIPTA INHL  * This list has 6 medication(s) that are the same as other medications   prescribed for you. Read the directions carefully, and ask your doctor or   other care provider to review them with you.     STOP taking these medications     busPIRone 15 mg tablet; Commonly known as: Buspar       Outpatient Follow-Up  Future Appointments   Date Time Provider Department Center   2/1/2024  9:00 AM Edin Galdamez MD [id number]   2/28/2024 10:40 AM Hilary Trinidad DO [id number]       Gem Sandoval MD

## 2024-01-01 NOTE — ED PROVIDER NOTES
HPI: The patient is a 38-year-old with history of severe asthma who presents to the Emergency Department with a chief complaint of shortness of breath.  Patient reports that she feels like she having a severe asthma exacerbation.  Reports chest tightness and shortness of breath.  No hemoptysis swelling of legs recent travel or trauma.  No black or bloody bowel movements.  Patient came in via EMS with her symptoms.     PAST MEDICAL HISTORY:  as per HPI  ALLERGIES:  as per HPI  MEDICATIONS:  as per HPI  FAMILY HISTORY: as per HPI  SURGICAL HISTORY: as per HPI  SOCIAL HISTORY: as per HPI     PHYSICAL EXAM:  VITAL SIGNS: Nursing notes reviewed.  GENERAL:  Alert and interactive  EYES:   Eyes track.  ENT:  Airway patent.  RESPIRATORY: Tachypnea, prolonged expiration, poor air movement.  Retractions  CARDIOVASCULAR: Tachycardia [Regular rhythm.]  GASTROINTESTINAL:  No distension.  MUSCULOSKELETAL:  No deformity.   NEUROLOGICAL:  Awake.  SKIN:  Dry.        MEDICAL DECISION MAKING (MDM):       DIAGNOSTIC STUDIES  Labs: BMP, CBC, magnesium, troponin  Radiology: Chest x-ray     EKG  Per my interpretation:  Electrocardiogram ECG  RATE: 100  RHYTHM: [Sinus]  AXIS: [Normal]  INTERVALS: [Normal]  ST-T WAVE CHANGES: Specific T wave inversions  ABNORMALITIES/COMPARISON: No signs of acute right heart strain.       Critical Care Time  Authorized and Performed by: Cresencio Michaud MD  Total critical care time: [32] minutes  Due to a high probability of clinically significant, life threatening deterioration, the patient required my highest level of preparedness to intervene emergently and I personally spent this critical care time directly and personally managing the patient. This critical care time included obtaining a history; examining the patient; pulse oximetry; ordering and review of studies; arranging urgent treatment with development of a management plan; evaluation of patient's response to treatment; frequent reassessment; and,  discussions with other providers and patient/family.  This critical care time was performed to assess and manage the high probability of imminent, life-threatening deterioration that could result in multi-organ failure. It was exclusive of separately billable procedures and treating other patients and teaching time.  Please see MDM section and the rest of the note for further information on patient assessment and treatment.     SUMMARY:  The patient is admitted to the Emergency Department for evaluation of above. Complete history and physical examination was performed by me.  Patient presents with severe respiratory failure in the setting of asthma exacerbation.  Lower suspicion for PE.  We immediately called respiratory to the bedside and gave her breathing treatments and put her on noninvasive positive pressure ventilation and gave her IM epinephrine as well as steroids.  Patient was given Ativan given there was a component of anxiety as well as magnesium.  After the treatment, the patient improved significantly and looked much better.  We are able to de-escalate her off of noninvasive positive pressure ventilation and she is now resting comfortably but still having wheezes.  Patient's blood work was reassuring.  EKG not consistent with occlusive MI.  Chest x-ray did not show any acute abnormality.  Patient will be admitted for further breathing treatments and monitoring.  At this time and I believe she needs the ICU.    Patient was reported that she developed a headache after she was given the IM epinephrine.  She reports gets this headache every single time she gets IM epinephrine and typically only goes away with opioids.  I did give her a small dose of Dilaudid to try and control her headache after the Compazine and Tylenol failed to control her headache.  She reports this is not the worst headache of her life and was not sudden onset which points away from intracranial bleeding.     DIAGNOSIS:    Respiratory  failure  Asthma exacerbation, severe     DISPOSITION:    1) admission     Cresencio Michaud MD  01/01/24 043

## 2024-01-01 NOTE — H&P
Medical Group History and Physical    ASSESSMENT/PLAN:     Asthma with acute exacerbation  -cont steroids, bronchodilators  -resume home trelegy  -wean o2 as able  -no evidence of ongoing infection/PNA  -plans to follow with allergy/pulm, consideration of biologic in the future to prevent exacerbations     Anxiety/depression  Hypothyroidism  Migraines  -resume home meds once verified     DVT PPx: lovenox          VTE Prophylaxis: Lovenox      HISTORY OF PRESENT ILLNESS:   Chief Complaint: shortness of breath    History Of Present Illness:    Sarahi Haley is a 38 y.o. female with a significant past medical history of asthma, hypothyroidism, migraines, anxiety, who is presenting to the emerged part with shortness of breath.  She says that she awoke from sleep with significant shortness of breath and wheezing.  She had a nonproductive cough.  Symptoms not resolved with her inhalers and nebulizers at home so she came to the emergency department.  Initially did require noninvasive ventilation but has been weaned off to nasal cannula.  She is resting comfortably currently and breathing significantly better.  She denies any chest pain.  No fevers or chills.  No abdominal pain, nausea vomiting.  Intake has been okay.  Denies any lower extremity swelling.       Review of systems: 10 point review of systems is otherwise negative except as mentioned above.    PAST HISTORIES:       Past Medical History:  She has a past medical history of Acute cystitis without hematuria (08/08/2019), Encounter for initial prescription of contraceptive pills (11/01/2019), Nausea (02/05/2021), Other general symptoms and signs (12/05/2019), Parage[place]ia (04/06/2020), Pelvic and perineal pain, Personal history of other diseases of the respiratory system (04/06/2020), Personal history of other specified conditions (07/01/2020), Personal history of other specified conditions (01/26/2021), Personal history of other specified conditions  (02/01/2021), Personal history of thrombophlebitis (06/03/2019), and Personal history of urinary (tract) infections (01/16/2020).    Past Surgical History:  She has a past surgical history that includes Other surgical history (02/08/2019); Other surgical history (02/08/2019); Other surgical history (02/08/2019); Other surgical history (02/08/2019); and Other surgical history (02/08/2019).      Social History:  She reports that she has never smoked. She has never used smokeless tobacco. She reports current alcohol use. She reports that she does not use drugs.    Family History:  Family History   Problem Relation Name Age of Onset    Hypertension Father      Other (Pulmonary Valve Stenosis) Sister      Heart disease Maternal Grandmother      Diabetes Other Grandparent     Lung cancer Other Grandparent     Anxiety disorder Sibling          Allergies:  Bee venom protein (honey bee), Diphenhydramine, Nsaids (non-steroidal anti-inflammatory drug), Procaine, and Ketorolac      OBJECTIVE:       Last Recorded Vitals:  Vitals:    01/01/24 0304 01/01/24 0311 01/01/24 0320 01/01/24 0355   BP:    126/52   Pulse:    101   Resp:    20   Temp:    36.4 °C (97.5 °F)   SpO2: 98% 98% 97% 94%       Last I/O:  No intake/output data recorded.    Physical Exam  Vitals reviewed.   Constitutional:       Appearance: Normal appearance.   HENT:      Head: Normocephalic and atraumatic.   Cardiovascular:      Rate and Rhythm: Regular rhythm.      Heart sounds: No murmur heard.     No gallop.   Pulmonary:      Effort: Pulmonary effort is normal. No respiratory distress.      Breath sounds: Normal breath sounds. No wheezing, rhonchi or rales.   Abdominal:      General: Abdomen is flat. There is no distension.      Palpations: Abdomen is soft.      Tenderness: There is no abdominal tenderness. There is no guarding or rebound.   Skin:     General: Skin is warm and dry.   Neurological:      General: No focal deficit present.      Mental Status: She is  alert and oriented to person, place, and time.      Cranial Nerves: No cranial nerve deficit.   Psychiatric:         Mood and Affect: Mood normal.         Behavior: Behavior normal.           Inpatient Medications:  ipratropium-albuteroL, 3 mL, nebulization, q6h      PRN medications: ondansetron, oxygen    Outpatient Medications:  Prior to Admission medications    Medication Sig Start Date End Date Taking? Authorizing Provider   albuterol 2.5 mg /3 mL (0.083 %) nebulizer solution Take 3 mL by nebulization every 4 hours if needed for shortness of breath. 9/13/22   Historical Provider, MD   albuterol 90 mcg/actuation inhaler Inhale 2 puffs every 4 hours if needed for shortness of breath.    Historical Provider, MD   aspirin 81 mg chewable tablet Chew 1 tablet (81 mg) once daily.    Historical Provider, MD   budesonide (Pulmicort) 0.5 mg/2 mL nebulizer solution Take 2 mL (0.5 mg) by nebulization 2 times a day. 7/26/23   Historical Provider, MD   busPIRone (Buspar) 15 mg tablet Take 0.5-1 tablets (7.5-15 mg) by mouth 2 times a day. 8/9/23 8/8/24  Paul Matthew DO   butalbital-acetaminophen-caff -40 mg tablet Take 1 tablet by mouth early in the morning.. 1 tab(s) orally once a day, As Needed 8/9/23   Paul Matthew DO   Deblitane 0.35 mg tablet Take 1 tablet (0.35 mg) by mouth once daily. 6/15/23   Historical Provider, MD   desvenlafaxine 100 mg 24 hr tablet TAKE 1 TABLET BY MOUTH EVERY DAY 11/27/23   Paul Matthew DO   desvenlafaxine 50 mg 24 hr tablet Take 1 tablet (50 mg) by mouth once daily. Take with 100 mg = 150 mg dose    Historical Provider, MD   EPINEPHrine 0.3 mg/0.3 mL injection syringe Inject 0.3 mL (0.3 mg) into the muscle once daily as needed for anaphylaxis. 10/23/23   Paul Matthew DO   fexofenadine (Allegra) 180 mg tablet Take 1 tablet (180 mg) by mouth once daily. 5/21/15   Historical Provider, MD   fluticasone-umeclidin-vilanter (Trelegy Ellipta) 100-62.5-25 mcg blister with device Inhale  2 puffs once daily.    Historical Provider, MD   fluticasone/umeclidin/vilanter (TRELEGY ELLIPTA INHL) Inhale 1 puff early in the morning..    Historical Provider, MD   galcanezumab (Emgality Syringe) 120 mg/mL prefilled syringe Inject 1 Syringe (120 mg) under the skin every 28 (twenty-eight) days.    Historical Provider, MD   hydrOXYzine HCL (Atarax) 25 mg tablet Take 1 tablet (25 mg) by mouth 3 times a day as needed for anxiety. 11/20/23   Paul Matthew DO   levothyroxine (Synthroid, Levoxyl) 100 mcg tablet Take 1 tablet (100 mcg) by mouth once daily. 11/13/19   Historical Provider, MD   metoclopramide HCl (REGLAN ORAL) Take 1 tablet by mouth every 8 hours if needed (nausea).    Historical Provider, MD   montelukast (Singulair) 10 mg tablet Take 1 tablet (10 mg) by mouth once daily at bedtime. 8/9/23 2/5/24  Paul Matthew DO   norethindrone (Deblitane) 0.35 mg tablet Take 1 tablet (0.35 mg) over 84 days by mouth once daily. 12/27/23   Zohaib Cueva DO   rizatriptan (Maxalt) 10 mg tablet Take 1 tablet (10 mg) by mouth if needed for migraine. May repeat in 2 hours if unresolved. Do not exceed 30 mg in 24 hours.    Historical Provider, MD   SUMAtriptan (Imitrex) 100 mg tablet Take 1 tablet (100 mg) by mouth if needed.    Historical Provider, MD       LABS AND IMAGING:     Last Labs:  CBC - 1/1/2024:  2:01 AM  8.4 14.3 370    40.9      CMP - 1/1/2024:  2:01 AM  8.7 7.2 47 --- 0.5   3.6 4.2 62 91      PTT - 10/15/2023:  6:44 AM  1.0   11.3 32     Troponin I, High Sensitivity   Date/Time Value Ref Range Status   01/01/2024 03:32 AM <3 0 - 13 ng/L Final   01/01/2024 02:01 AM <3 0 - 13 ng/L Final   10/15/2023 07:48 AM <3 0 - 13 ng/L Final     BNP   Date/Time Value Ref Range Status   10/15/2023 06:44 AM 42 0 - 99 pg/mL Final   08/20/2023 07:31 PM CANCELED       Comment:     .  <100 pg/mL - Heart failure unlikely  100-299 pg/mL - Intermediate probability of acute heart  .               failure exacerbation. Correlate  with clinical  .               context and patient history.    >=300 pg/mL - Heart Failure likely. Correlate with clinical  .               context and patient history.  BNP testing is performed using different testing   methodology at [facility]] than at other   system hospitals. Direct result comparisons should   only be made within the same method.    Result canceled by the ancillary.     08/20/2023 06:20 PM 12 0 - 99 pg/mL Final     Comment:     .  <100 pg/mL - Heart failure unlikely  100-299 pg/mL - Intermediate probability of acute heart  .               failure exacerbation. Correlate with clinical  .               context and patient history.    >=300 pg/mL - Heart Failure likely. Correlate with clinical  .               context and patient history.  BNP testing is performed using different testing   methodology at [facility]] than at other   system hospitals. Direct result comparisons should   only be made within the same method.       Hemoglobin A1C   Date/Time Value Ref Range Status   10/15/2023 02:23 PM 4.7 see below % Final     VLDL   Date/Time Value Ref Range Status   07/10/2023 02:34 PM 35 0 - 40 mg/dL Final   11/14/2019 09:59 AM 22 0 - 40 mg/dL Final

## 2024-01-01 NOTE — PROGRESS NOTES
"Daily progress note    Sarahi Haley is 38 y.o. female with past history of asthma, hypothyroidism, migraine headache, anxiety presented with shortness of breath that woke her up from sleep and wheezing.  Also had nonproductive cough and symptoms did not resolve with any other send nebulizers at home and decided to come to the ED  Upon presentation to the ED chest x-ray was done and showed no acute pulm cardiopulmonary pathology, Labs BMP unremarkable CBC unremarkable was started on Solu-Medrol and DuoNeb nebulization,    Subjective  Patient is on room air not in distress asking to go home  No wheezing or shortness of breath noted      Vital signs in last 24 hours:  Temp:  [36.4 °C (97.5 °F)-36.6 °C (97.9 °F)] 36.6 °C (97.9 °F)  Heart Rate:  [] 92  Resp:  [18-24] 18  BP: (126-173)/(52-94) 159/78  /78   Pulse 92   Temp 36.6 °C (97.9 °F)   Resp 18   Ht 1.651 m (5' 5\")   Wt 148 kg (325 lb 2.9 oz)   LMP 12/08/2023   SpO2 95%   BMI 54.11 kg/m²    Intake/Output last 3 shifts:  No intake/output data recorded.  Intake/Output this shift:  No intake/output data recorded.    Physical Exam  Constitutional:       General: She is not in acute distress.     Appearance: Normal appearance. She is not ill-appearing, toxic-appearing or diaphoretic.   HENT:      Head: Normocephalic and atraumatic.      Mouth/Throat:      Mouth: Mucous membranes are moist.      Pharynx: No oropharyngeal exudate.   Eyes:      Extraocular Movements: Extraocular movements intact.      Pupils: Pupils are equal, round, and reactive to light.   Cardiovascular:      Rate and Rhythm: Normal rate and regular rhythm.      Heart sounds: No murmur heard.  Pulmonary:      Effort: Pulmonary effort is normal. No respiratory distress.      Breath sounds: Normal breath sounds. No wheezing.   Abdominal:      General: Abdomen is flat. Bowel sounds are normal. There is no distension.      Tenderness: There is no abdominal tenderness. There is no " guarding or rebound.   Musculoskeletal:         General: No swelling.      Right lower leg: No edema.      Left lower leg: No edema.   Skin:     Findings: No lesion or rash.   Neurological:      General: No focal deficit present.      Mental Status: She is alert and oriented to person, place, and time.      Cranial Nerves: No cranial nerve deficit.      Motor: No weakness.   Psychiatric:         Mood and Affect: Mood normal.         Behavior: Behavior normal.         Current medication   Current Facility-Administered Medications   Medication Dose Route Frequency Provider Last Rate Last Admin    acetaminophen (Tylenol) tablet 650 mg  650 mg oral q4h PRN Cisco Cline MD        Or    acetaminophen (Tylenol) oral liquid 650 mg  650 mg oral q4h PRN Cisco Cline MD        Or    acetaminophen (Tylenol) suppository 650 mg  650 mg rectal q4h PRN Cisco Cline MD        albuterol 2.5 mg /3 mL (0.083 %) nebulizer solution 3 mL  3 mL nebulization q4h PRN Cisco Cline MD        budesonide (Pulmicort) 0.5 mg/2 mL nebulizer solution 0.5 mg  0.5 mg nebulization BID Cisco Cline MD   0.5 mg at 01/01/24 1109    desvenlafaxine (Pristiq) 24 hr tablet 150 mg  150 mg oral Daily Kem Bruno MD   150 mg at 01/01/24 1104    enoxaparin (Lovenox) syringe 60 mg  60 mg subcutaneous q12h [facility] Cisco Cline MD   60 mg at 01/01/24 0854    tiotropium (Spiriva Respimat) 2.5 mcg/actuation inhaler 2 puff  2 Inhalation inhalation Daily Cisco Cline MD   2 puff at 01/01/24 0854    And    fluticasone furoate-vilanteroL (Breo Ellipta) 100-25 mcg/dose inhaler 1 puff  1 puff inhalation Daily Cisco Cline MD   1 puff at 01/01/24 0854    ipratropium-albuteroL (Duo-Neb) 0.5-2.5 mg/3 mL nebulizer solution 3 mL  3 mL nebulization q6h Cisco Cline MD        levothyroxine (Synthroid, Levoxyl) tablet 100 mcg  100 mcg oral Daily Cisco Cline MD   100 mcg at 01/01/24 0854    methylPREDNISolone sod succinate (PF)  (SOLU-Medrol) 40 mg/mL injection 40 mg  40 mg intravenous q8h Cisco Cline MD   40 mg at 01/01/24 1104    montelukast (Singulair) tablet 10 mg  10 mg oral Nightly Kem Bruno MD        sennosides (Senokot) tablet 17.2 mg  2 tablet oral BID Cisco Cline MD        SUMAtriptan (Imitrex) tablet 100 mg  100 mg oral Once PRN Cisco Cline MD            Labs  Lab Results   Component Value Date    WBC 8.4 01/01/2024    HGB 14.3 01/01/2024    HCT 40.9 01/01/2024    MCV 86 01/01/2024     01/01/2024     Lab Results   Component Value Date    HGBA1C 4.7 10/15/2023     Lab Results   Component Value Date    GLUCOSE 121 (H) 01/01/2024    CALCIUM 8.7 01/01/2024     01/01/2024    K 3.5 01/01/2024    CO2 25 01/01/2024     01/01/2024    BUN 15 01/01/2024    CREATININE 0.88 01/01/2024           Principal Problem:    Acute asthma exacerbation      Assessment and Plan   #Acute asthma exacerbation    Continue Solu-Medrol and DuoNeb nebulization  Chest x-ray negative for cardiopulmonary pathology  No need for antibiotics  Currently on nasal cannula oxygen  Titrate down oxygen as tolerated  Follow-up with pulmonary as outpatient    #History of anxiety, hypothyroidism, migraines  Continue home medications    #DVT prophylaxis on Lovenox   LOS: 0 days

## 2024-01-02 ENCOUNTER — DOCUMENTATION (OUTPATIENT)
Dept: PRIMARY CARE | Facility: CLINIC | Age: 39
End: 2024-01-02
Payer: COMMERCIAL

## 2024-01-03 NOTE — PROGRESS NOTES
Discharge Facility:  [facility]  Discharge Diagnosis:  SOB, asthma exacerbation  Admission Date:  1/1/24  Discharge Date:  1/1/24    PCP Appointment Date: none noted.  Message to office.    Specialist Appointment Date:   Ortho 1/3/24  GYN 2/1/24    Hospital Encounter and Summary: Linked

## 2024-01-15 ENCOUNTER — PATIENT OUTREACH (OUTPATIENT)
Dept: PRIMARY CARE | Facility: CLINIC | Age: 39
End: 2024-01-15
Payer: COMMERCIAL

## 2024-01-15 NOTE — PROGRESS NOTES
Unable to reach patient for call back after patient not having a follow up appointment with PCP.   LVM with call back number for patient to call if needed

## 2024-01-28 ENCOUNTER — APPOINTMENT (OUTPATIENT)
Dept: RADIOLOGY | Facility: HOSPITAL | Age: 39
DRG: 202 | End: 2024-01-28
Payer: COMMERCIAL

## 2024-01-28 ENCOUNTER — APPOINTMENT (OUTPATIENT)
Dept: CARDIOLOGY | Facility: HOSPITAL | Age: 39
DRG: 202 | End: 2024-01-28
Payer: COMMERCIAL

## 2024-01-28 ENCOUNTER — HOSPITAL ENCOUNTER (INPATIENT)
Facility: HOSPITAL | Age: 39
LOS: 1 days | Discharge: HOME | DRG: 202 | End: 2024-01-29
Attending: STUDENT IN AN ORGANIZED HEALTH CARE EDUCATION/TRAINING PROGRAM | Admitting: INTERNAL MEDICINE
Payer: COMMERCIAL

## 2024-01-28 DIAGNOSIS — J11.1 INFLUENZA: ICD-10-CM

## 2024-01-28 DIAGNOSIS — J45.51 SEVERE PERSISTENT ASTHMA WITH EXACERBATION (MULTI): Primary | ICD-10-CM

## 2024-01-28 PROBLEM — J45.50: Status: ACTIVE | Noted: 2024-01-28

## 2024-01-28 LAB
ALBUMIN SERPL BCP-MCNC: 4.2 G/DL (ref 3.4–5)
ALP SERPL-CCNC: 86 U/L (ref 33–110)
ALT SERPL W P-5'-P-CCNC: 57 U/L (ref 7–45)
ANION GAP BLDA CALCULATED.4IONS-SCNC: 12 MMO/L (ref 10–25)
ANION GAP SERPL CALC-SCNC: 12 MMOL/L (ref 10–20)
ARTERIAL PATENCY WRIST A: POSITIVE
AST SERPL W P-5'-P-CCNC: 42 U/L (ref 9–39)
BASE EXCESS BLDA CALC-SCNC: 0.7 MMOL/L (ref -2–3)
BASOPHILS # BLD AUTO: 0.01 X10*3/UL (ref 0–0.1)
BASOPHILS NFR BLD AUTO: 0.1 %
BILIRUB SERPL-MCNC: 0.7 MG/DL (ref 0–1.2)
BODY TEMPERATURE: ABNORMAL
BUN SERPL-MCNC: 8 MG/DL (ref 6–23)
CA-I BLDA-SCNC: 1.13 MMOL/L (ref 1.1–1.33)
CALCIUM SERPL-MCNC: 8.9 MG/DL (ref 8.6–10.3)
CARDIAC TROPONIN I PNL SERPL HS: <3 NG/L (ref 0–13)
CARDIAC TROPONIN I PNL SERPL HS: <3 NG/L (ref 0–13)
CHLORIDE BLDA-SCNC: 103 MMOL/L (ref 98–107)
CHLORIDE SERPL-SCNC: 103 MMOL/L (ref 98–107)
CO2 SERPL-SCNC: 23 MMOL/L (ref 21–32)
CREAT SERPL-MCNC: 0.94 MG/DL (ref 0.5–1.05)
CRITICAL CALL TIME: 250
CRITICAL CALLED BY: ABNORMAL
CRITICAL CALLED TO: ABNORMAL
CRITICAL READ BACK: ABNORMAL
D DIMER PPP FEU-MCNC: 749 NG/ML FEU
EGFRCR SERPLBLD CKD-EPI 2021: 81 ML/MIN/1.73M*2
EOSINOPHIL # BLD AUTO: 0 X10*3/UL (ref 0–0.7)
EOSINOPHIL NFR BLD AUTO: 0 %
EPAP CMH2O: 8 CM H2O
ERYTHROCYTE [DISTWIDTH] IN BLOOD BY AUTOMATED COUNT: 11.9 % (ref 11.5–14.5)
FLUAV RNA RESP QL NAA+PROBE: DETECTED
FLUBV RNA RESP QL NAA+PROBE: NOT DETECTED
GLUCOSE BLDA-MCNC: 130 MG/DL (ref 74–99)
GLUCOSE SERPL-MCNC: 113 MG/DL (ref 74–99)
HCO3 BLDA-SCNC: 24.3 MMOL/L (ref 22–26)
HCT VFR BLD AUTO: 42.8 % (ref 36–46)
HCT VFR BLD EST: 42 % (ref 36–46)
HGB BLD-MCNC: 15 G/DL (ref 12–16)
HGB BLDA-MCNC: 14.1 G/DL (ref 12–16)
IMM GRANULOCYTES # BLD AUTO: 0.02 X10*3/UL (ref 0–0.7)
IMM GRANULOCYTES NFR BLD AUTO: 0.3 % (ref 0–0.9)
INHALED O2 CONCENTRATION: 30 %
IPAP CMH2O: 16 CM H2O
LACTATE BLDA-SCNC: 1.4 MMOL/L (ref 0.4–2)
LYMPHOCYTES # BLD AUTO: 1.03 X10*3/UL (ref 1.2–4.8)
LYMPHOCYTES NFR BLD AUTO: 14.3 %
MAGNESIUM SERPL-MCNC: 1.87 MG/DL (ref 1.6–2.4)
MAGNESIUM SERPL-MCNC: 2.12 MG/DL (ref 1.6–2.4)
MCH RBC QN AUTO: 30.3 PG (ref 26–34)
MCHC RBC AUTO-ENTMCNC: 35 G/DL (ref 32–36)
MCV RBC AUTO: 87 FL (ref 80–100)
MONOCYTES # BLD AUTO: 0.62 X10*3/UL (ref 0.1–1)
MONOCYTES NFR BLD AUTO: 8.6 %
NEUTROPHILS # BLD AUTO: 5.53 X10*3/UL (ref 1.2–7.7)
NEUTROPHILS NFR BLD AUTO: 76.7 %
NRBC BLD-RTO: 0 /100 WBCS (ref 0–0)
OXYHGB MFR BLDA: 97.7 % (ref 94–98)
PCO2 BLDA: 35 MM HG (ref 38–42)
PH BLDA: 7.45 PH (ref 7.38–7.42)
PLATELET # BLD AUTO: 338 X10*3/UL (ref 150–450)
PO2 BLDA: 116 MM HG (ref 85–95)
POTASSIUM BLDA-SCNC: 2.9 MMOL/L (ref 3.5–5.3)
POTASSIUM SERPL-SCNC: 3.4 MMOL/L (ref 3.5–5.3)
POTASSIUM SERPL-SCNC: 4.1 MMOL/L (ref 3.5–5.3)
PROT SERPL-MCNC: 7.5 G/DL (ref 6.4–8.2)
RBC # BLD AUTO: 4.95 X10*6/UL (ref 4–5.2)
RSV RNA RESP QL NAA+PROBE: DETECTED
SAO2 % BLDA: 100 % (ref 94–100)
SARS-COV-2 RNA RESP QL NAA+PROBE: NOT DETECTED
SODIUM BLDA-SCNC: 136 MMOL/L (ref 136–145)
SODIUM SERPL-SCNC: 135 MMOL/L (ref 136–145)
SPECIMEN DRAWN FROM PATIENT: ABNORMAL
WBC # BLD AUTO: 7.2 X10*3/UL (ref 4.4–11.3)

## 2024-01-28 PROCEDURE — 2500000004 HC RX 250 GENERAL PHARMACY W/ HCPCS (ALT 636 FOR OP/ED): Performed by: PHYSICIAN ASSISTANT

## 2024-01-28 PROCEDURE — 83735 ASSAY OF MAGNESIUM: CPT | Performed by: INTERNAL MEDICINE

## 2024-01-28 PROCEDURE — 84484 ASSAY OF TROPONIN QUANT: CPT | Performed by: PHYSICIAN ASSISTANT

## 2024-01-28 PROCEDURE — 71045 X-RAY EXAM CHEST 1 VIEW: CPT | Performed by: RADIOLOGY

## 2024-01-28 PROCEDURE — 99223 1ST HOSP IP/OBS HIGH 75: CPT | Performed by: INTERNAL MEDICINE

## 2024-01-28 PROCEDURE — 2550000001 HC RX 255 CONTRASTS: Performed by: STUDENT IN AN ORGANIZED HEALTH CARE EDUCATION/TRAINING PROGRAM

## 2024-01-28 PROCEDURE — 84145 PROCALCITONIN (PCT): CPT | Mod: ELYLAB | Performed by: INTERNAL MEDICINE

## 2024-01-28 PROCEDURE — 94660 CPAP INITIATION&MGMT: CPT

## 2024-01-28 PROCEDURE — 96376 TX/PRO/DX INJ SAME DRUG ADON: CPT

## 2024-01-28 PROCEDURE — 84132 ASSAY OF SERUM POTASSIUM: CPT | Performed by: PHYSICIAN ASSISTANT

## 2024-01-28 PROCEDURE — 96366 THER/PROPH/DIAG IV INF ADDON: CPT

## 2024-01-28 PROCEDURE — 85025 COMPLETE CBC W/AUTO DIFF WBC: CPT | Performed by: PHYSICIAN ASSISTANT

## 2024-01-28 PROCEDURE — 96375 TX/PRO/DX INJ NEW DRUG ADDON: CPT

## 2024-01-28 PROCEDURE — 83735 ASSAY OF MAGNESIUM: CPT | Performed by: PHYSICIAN ASSISTANT

## 2024-01-28 PROCEDURE — 85379 FIBRIN DEGRADATION QUANT: CPT | Performed by: PHYSICIAN ASSISTANT

## 2024-01-28 PROCEDURE — 71275 CT ANGIOGRAPHY CHEST: CPT

## 2024-01-28 PROCEDURE — 99291 CRITICAL CARE FIRST HOUR: CPT | Mod: 25 | Performed by: PHYSICIAN ASSISTANT

## 2024-01-28 PROCEDURE — 2500000002 HC RX 250 W HCPCS SELF ADMINISTERED DRUGS (ALT 637 FOR MEDICARE OP, ALT 636 FOR OP/ED): Performed by: INTERNAL MEDICINE

## 2024-01-28 PROCEDURE — 2500000001 HC RX 250 WO HCPCS SELF ADMINISTERED DRUGS (ALT 637 FOR MEDICARE OP): Performed by: INTERNAL MEDICINE

## 2024-01-28 PROCEDURE — 2500000004 HC RX 250 GENERAL PHARMACY W/ HCPCS (ALT 636 FOR OP/ED): Performed by: INTERNAL MEDICINE

## 2024-01-28 PROCEDURE — 1210000001 HC SEMI-PRIVATE ROOM DAILY

## 2024-01-28 PROCEDURE — 2500000005 HC RX 250 GENERAL PHARMACY W/O HCPCS: Performed by: INTERNAL MEDICINE

## 2024-01-28 PROCEDURE — 96368 THER/DIAG CONCURRENT INF: CPT

## 2024-01-28 PROCEDURE — 71275 CT ANGIOGRAPHY CHEST: CPT | Performed by: RADIOLOGY

## 2024-01-28 PROCEDURE — 36415 COLL VENOUS BLD VENIPUNCTURE: CPT | Performed by: PHYSICIAN ASSISTANT

## 2024-01-28 PROCEDURE — 93005 ELECTROCARDIOGRAM TRACING: CPT

## 2024-01-28 PROCEDURE — 87637 SARSCOV2&INF A&B&RSV AMP PRB: CPT | Performed by: INTERNAL MEDICINE

## 2024-01-28 PROCEDURE — 94640 AIRWAY INHALATION TREATMENT: CPT

## 2024-01-28 PROCEDURE — 84132 ASSAY OF SERUM POTASSIUM: CPT | Performed by: INTERNAL MEDICINE

## 2024-01-28 PROCEDURE — 96365 THER/PROPH/DIAG IV INF INIT: CPT

## 2024-01-28 PROCEDURE — 71045 X-RAY EXAM CHEST 1 VIEW: CPT

## 2024-01-28 RX ORDER — MAGNESIUM SULFATE HEPTAHYDRATE 40 MG/ML
2 INJECTION, SOLUTION INTRAVENOUS ONCE
Status: COMPLETED | OUTPATIENT
Start: 2024-01-28 | End: 2024-01-28

## 2024-01-28 RX ORDER — SUMATRIPTAN SUCCINATE 25 MG/1
50 TABLET ORAL EVERY 2 HOUR PRN
Status: DISCONTINUED | OUTPATIENT
Start: 2024-01-28 | End: 2024-01-29 | Stop reason: HOSPADM

## 2024-01-28 RX ORDER — DESVENLAFAXINE 50 MG/1
100 TABLET, EXTENDED RELEASE ORAL DAILY
Status: DISCONTINUED | OUTPATIENT
Start: 2024-01-28 | End: 2024-01-29 | Stop reason: HOSPADM

## 2024-01-28 RX ORDER — MONTELUKAST SODIUM 10 MG/1
10 TABLET ORAL NIGHTLY
Status: DISCONTINUED | OUTPATIENT
Start: 2024-01-28 | End: 2024-01-29 | Stop reason: HOSPADM

## 2024-01-28 RX ORDER — BUDESONIDE 0.5 MG/2ML
0.5 INHALANT ORAL
Status: DISCONTINUED | OUTPATIENT
Start: 2024-01-28 | End: 2024-01-29 | Stop reason: HOSPADM

## 2024-01-28 RX ORDER — PANTOPRAZOLE SODIUM 40 MG/1
40 TABLET, DELAYED RELEASE ORAL
Status: DISCONTINUED | OUTPATIENT
Start: 2024-01-28 | End: 2024-01-29 | Stop reason: HOSPADM

## 2024-01-28 RX ORDER — ACETAMINOPHEN 160 MG/5ML
650 SOLUTION ORAL EVERY 4 HOURS PRN
Status: DISCONTINUED | OUTPATIENT
Start: 2024-01-28 | End: 2024-01-29 | Stop reason: HOSPADM

## 2024-01-28 RX ORDER — IPRATROPIUM BROMIDE AND ALBUTEROL SULFATE 2.5; .5 MG/3ML; MG/3ML
3 SOLUTION RESPIRATORY (INHALATION) 4 TIMES DAILY PRN
Status: DISCONTINUED | OUTPATIENT
Start: 2024-01-28 | End: 2024-01-28

## 2024-01-28 RX ORDER — POTASSIUM CHLORIDE 20 MEQ/1
40 TABLET, EXTENDED RELEASE ORAL DAILY
Status: DISCONTINUED | OUTPATIENT
Start: 2024-01-28 | End: 2024-01-29 | Stop reason: HOSPADM

## 2024-01-28 RX ORDER — LORAZEPAM 2 MG/ML
0.5 INJECTION INTRAMUSCULAR ONCE
Status: COMPLETED | OUTPATIENT
Start: 2024-01-28 | End: 2024-01-28

## 2024-01-28 RX ORDER — ONDANSETRON 4 MG/1
4 TABLET, FILM COATED ORAL EVERY 8 HOURS PRN
Status: DISCONTINUED | OUTPATIENT
Start: 2024-01-28 | End: 2024-01-29 | Stop reason: HOSPADM

## 2024-01-28 RX ORDER — MORPHINE SULFATE 4 MG/ML
4 INJECTION, SOLUTION INTRAMUSCULAR; INTRAVENOUS ONCE
Status: COMPLETED | OUTPATIENT
Start: 2024-01-28 | End: 2024-01-28

## 2024-01-28 RX ORDER — OSELTAMIVIR PHOSPHATE 75 MG/1
75 CAPSULE ORAL 2 TIMES DAILY
Status: DISCONTINUED | OUTPATIENT
Start: 2024-01-28 | End: 2024-01-29 | Stop reason: HOSPADM

## 2024-01-28 RX ORDER — POTASSIUM CHLORIDE 14.9 MG/ML
20 INJECTION INTRAVENOUS ONCE
Status: COMPLETED | OUTPATIENT
Start: 2024-01-28 | End: 2024-01-28

## 2024-01-28 RX ORDER — IPRATROPIUM BROMIDE AND ALBUTEROL SULFATE 2.5; .5 MG/3ML; MG/3ML
3 SOLUTION RESPIRATORY (INHALATION) 3 TIMES DAILY
Status: DISCONTINUED | OUTPATIENT
Start: 2024-01-28 | End: 2024-01-29 | Stop reason: HOSPADM

## 2024-01-28 RX ORDER — ONDANSETRON HYDROCHLORIDE 2 MG/ML
4 INJECTION, SOLUTION INTRAVENOUS EVERY 8 HOURS PRN
Status: DISCONTINUED | OUTPATIENT
Start: 2024-01-28 | End: 2024-01-29 | Stop reason: HOSPADM

## 2024-01-28 RX ORDER — FLUTICASONE FUROATE AND VILANTEROL 100; 25 UG/1; UG/1
1 POWDER RESPIRATORY (INHALATION)
Status: DISCONTINUED | OUTPATIENT
Start: 2024-01-28 | End: 2024-01-29 | Stop reason: HOSPADM

## 2024-01-28 RX ORDER — ACETAMINOPHEN 650 MG/1
650 SUPPOSITORY RECTAL EVERY 4 HOURS PRN
Status: DISCONTINUED | OUTPATIENT
Start: 2024-01-28 | End: 2024-01-29 | Stop reason: HOSPADM

## 2024-01-28 RX ORDER — LEVOTHYROXINE SODIUM 100 UG/1
100 TABLET ORAL DAILY
Status: DISCONTINUED | OUTPATIENT
Start: 2024-01-28 | End: 2024-01-29 | Stop reason: HOSPADM

## 2024-01-28 RX ORDER — TALC
3 POWDER (GRAM) TOPICAL NIGHTLY PRN
Status: DISCONTINUED | OUTPATIENT
Start: 2024-01-28 | End: 2024-01-29 | Stop reason: HOSPADM

## 2024-01-28 RX ORDER — HYDROCODONE BITARTRATE AND ACETAMINOPHEN 5; 325 MG/1; MG/1
1 TABLET ORAL EVERY 4 HOURS PRN
Status: DISCONTINUED | OUTPATIENT
Start: 2024-01-28 | End: 2024-01-29 | Stop reason: HOSPADM

## 2024-01-28 RX ORDER — ONDANSETRON HYDROCHLORIDE 2 MG/ML
4 INJECTION, SOLUTION INTRAVENOUS ONCE
Status: COMPLETED | OUTPATIENT
Start: 2024-01-28 | End: 2024-01-28

## 2024-01-28 RX ORDER — PANTOPRAZOLE SODIUM 40 MG/10ML
40 INJECTION, POWDER, LYOPHILIZED, FOR SOLUTION INTRAVENOUS
Status: DISCONTINUED | OUTPATIENT
Start: 2024-01-28 | End: 2024-01-29 | Stop reason: HOSPADM

## 2024-01-28 RX ORDER — NAPROXEN SODIUM 220 MG/1
81 TABLET, FILM COATED ORAL DAILY
Status: DISCONTINUED | OUTPATIENT
Start: 2024-01-28 | End: 2024-01-29 | Stop reason: HOSPADM

## 2024-01-28 RX ORDER — POLYETHYLENE GLYCOL 3350 17 G/17G
17 POWDER, FOR SOLUTION ORAL DAILY PRN
Status: DISCONTINUED | OUTPATIENT
Start: 2024-01-28 | End: 2024-01-29 | Stop reason: HOSPADM

## 2024-01-28 RX ORDER — BUTALBITAL, ACETAMINOPHEN AND CAFFEINE 50; 325; 40 MG/1; MG/1; MG/1
1 TABLET ORAL DAILY
Status: DISCONTINUED | OUTPATIENT
Start: 2024-01-28 | End: 2024-01-29 | Stop reason: HOSPADM

## 2024-01-28 RX ORDER — ACETAMINOPHEN 325 MG/1
650 TABLET ORAL EVERY 4 HOURS PRN
Status: DISCONTINUED | OUTPATIENT
Start: 2024-01-28 | End: 2024-01-29 | Stop reason: HOSPADM

## 2024-01-28 RX ADMIN — DESVENLAFAXINE 100 MG: 50 TABLET, FILM COATED, EXTENDED RELEASE ORAL at 08:37

## 2024-01-28 RX ADMIN — HYDROMORPHONE HYDROCHLORIDE 0.4 MG: 1 INJECTION, SOLUTION INTRAMUSCULAR; INTRAVENOUS; SUBCUTANEOUS at 10:30

## 2024-01-28 RX ADMIN — IPRATROPIUM BROMIDE AND ALBUTEROL SULFATE 3 ML: 2.5; .5 SOLUTION RESPIRATORY (INHALATION) at 11:26

## 2024-01-28 RX ADMIN — LORAZEPAM 0.5 MG: 2 INJECTION INTRAMUSCULAR; INTRAVENOUS at 04:15

## 2024-01-28 RX ADMIN — POTASSIUM CHLORIDE 40 MEQ: 1500 TABLET, EXTENDED RELEASE ORAL at 08:33

## 2024-01-28 RX ADMIN — OSELTAMIVIR PHOSPHATE 75 MG: 75 CAPSULE ORAL at 20:26

## 2024-01-28 RX ADMIN — BUDESONIDE INHALATION 0.5 MG: 0.5 SUSPENSION RESPIRATORY (INHALATION) at 21:04

## 2024-01-28 RX ADMIN — IPRATROPIUM BROMIDE AND ALBUTEROL SULFATE 3 ML: 2.5; .5 SOLUTION RESPIRATORY (INHALATION) at 21:03

## 2024-01-28 RX ADMIN — ONDANSETRON 4 MG: 2 INJECTION INTRAMUSCULAR; INTRAVENOUS at 20:58

## 2024-01-28 RX ADMIN — MORPHINE SULFATE 4 MG: 4 INJECTION, SOLUTION INTRAMUSCULAR; INTRAVENOUS at 04:34

## 2024-01-28 RX ADMIN — ONDANSETRON 4 MG: 4 TABLET, FILM COATED ORAL at 08:33

## 2024-01-28 RX ADMIN — LEVOTHYROXINE SODIUM 100 MCG: 0.1 TABLET ORAL at 08:32

## 2024-01-28 RX ADMIN — METHYLPREDNISOLONE SODIUM SUCCINATE 40 MG: 40 INJECTION, POWDER, FOR SOLUTION INTRAMUSCULAR; INTRAVENOUS at 20:25

## 2024-01-28 RX ADMIN — ASPIRIN 81 MG: 81 TABLET, CHEWABLE ORAL at 08:33

## 2024-01-28 RX ADMIN — METHYLPREDNISOLONE SODIUM SUCCINATE 125 MG: 125 INJECTION, POWDER, FOR SOLUTION INTRAMUSCULAR; INTRAVENOUS at 02:33

## 2024-01-28 RX ADMIN — METHYLPREDNISOLONE SODIUM SUCCINATE 40 MG: 40 INJECTION, POWDER, FOR SOLUTION INTRAMUSCULAR; INTRAVENOUS at 08:37

## 2024-01-28 RX ADMIN — IOHEXOL 75 ML: 350 INJECTION, SOLUTION INTRAVENOUS at 04:04

## 2024-01-28 RX ADMIN — MAGNESIUM SULFATE HEPTAHYDRATE 2 G: 40 INJECTION, SOLUTION INTRAVENOUS at 02:33

## 2024-01-28 RX ADMIN — POTASSIUM CHLORIDE 20 MEQ: 14.9 INJECTION, SOLUTION INTRAVENOUS at 03:36

## 2024-01-28 RX ADMIN — ACETAMINOPHEN 650 MG: 325 TABLET ORAL at 20:26

## 2024-01-28 RX ADMIN — DOXYCYCLINE 100 MG: 100 INJECTION, POWDER, LYOPHILIZED, FOR SOLUTION INTRAVENOUS at 20:25

## 2024-01-28 RX ADMIN — BUTALBITAL, ACETAMINOPHEN, AND CAFFEINE 1 TABLET: 50; 325; 40 TABLET ORAL at 08:32

## 2024-01-28 RX ADMIN — MORPHINE SULFATE 4 MG: 4 INJECTION, SOLUTION INTRAMUSCULAR; INTRAVENOUS at 02:33

## 2024-01-28 RX ADMIN — TIOTROPIUM BROMIDE INHALATION SPRAY 2 PUFF: 3.12 SPRAY, METERED RESPIRATORY (INHALATION) at 11:01

## 2024-01-28 RX ADMIN — MONTELUKAST SODIUM 10 MG: 10 TABLET, FILM COATED ORAL at 20:26

## 2024-01-28 RX ADMIN — FLUTICASONE FUROATE AND VILANTEROL TRIFENATATE 1 PUFF: 100; 25 POWDER RESPIRATORY (INHALATION) at 11:01

## 2024-01-28 RX ADMIN — PANTOPRAZOLE SODIUM 40 MG: 40 TABLET, DELAYED RELEASE ORAL at 08:33

## 2024-01-28 RX ADMIN — ONDANSETRON 4 MG: 2 INJECTION INTRAMUSCULAR; INTRAVENOUS at 02:32

## 2024-01-28 RX ADMIN — IPRATROPIUM BROMIDE AND ALBUTEROL SULFATE 3 ML: 2.5; .5 SOLUTION RESPIRATORY (INHALATION) at 15:21

## 2024-01-28 RX ADMIN — OSELTAMIVIR PHOSPHATE 75 MG: 75 CAPSULE ORAL at 11:02

## 2024-01-28 RX ADMIN — DOXYCYCLINE 100 MG: 100 INJECTION, POWDER, LYOPHILIZED, FOR SOLUTION INTRAVENOUS at 08:37

## 2024-01-28 RX ADMIN — SODIUM CHLORIDE 1000 ML: 9 INJECTION, SOLUTION INTRAVENOUS at 02:33

## 2024-01-28 RX ADMIN — Medication 3 MG: at 20:26

## 2024-01-28 RX ADMIN — SUMATRIPTAN SUCCINATE 50 MG: 25 TABLET ORAL at 17:09

## 2024-01-28 SDOH — SOCIAL STABILITY: SOCIAL INSECURITY: WERE YOU ABLE TO COMPLETE ALL THE BEHAVIORAL HEALTH SCREENINGS?: YES

## 2024-01-28 SDOH — SOCIAL STABILITY: SOCIAL INSECURITY: ARE THERE ANY APPARENT SIGNS OF INJURIES/BEHAVIORS THAT COULD BE RELATED TO ABUSE/NEGLECT?: NO

## 2024-01-28 SDOH — SOCIAL STABILITY: SOCIAL INSECURITY: ARE YOU OR HAVE YOU BEEN THREATENED OR ABUSED PHYSICALLY, EMOTIONALLY, OR SEXUALLY BY ANYONE?: NO

## 2024-01-28 SDOH — SOCIAL STABILITY: SOCIAL INSECURITY: HAS ANYONE EVER THREATENED TO HURT YOUR FAMILY OR YOUR PETS?: NO

## 2024-01-28 SDOH — SOCIAL STABILITY: SOCIAL INSECURITY: HAVE YOU HAD THOUGHTS OF HARMING ANYONE ELSE?: NO

## 2024-01-28 SDOH — SOCIAL STABILITY: SOCIAL INSECURITY: ABUSE: ADULT

## 2024-01-28 SDOH — SOCIAL STABILITY: SOCIAL INSECURITY: DOES ANYONE TRY TO KEEP YOU FROM HAVING/CONTACTING OTHER FRIENDS OR DOING THINGS OUTSIDE YOUR HOME?: NO

## 2024-01-28 SDOH — SOCIAL STABILITY: SOCIAL INSECURITY: DO YOU FEEL UNSAFE GOING BACK TO THE PLACE WHERE YOU ARE LIVING?: NO

## 2024-01-28 SDOH — SOCIAL STABILITY: SOCIAL INSECURITY: DO YOU FEEL ANYONE HAS EXPLOITED OR TAKEN ADVANTAGE OF YOU FINANCIALLY OR OF YOUR PERSONAL PROPERTY?: NO

## 2024-01-28 ASSESSMENT — PAIN SCALES - GENERAL
PAINLEVEL_OUTOF10: 5 - MODERATE PAIN
PAINLEVEL_OUTOF10: 5 - MODERATE PAIN
PAINLEVEL_OUTOF10: 8
PAINLEVEL_OUTOF10: 7
PAINLEVEL_OUTOF10: 5 - MODERATE PAIN

## 2024-01-28 ASSESSMENT — PAIN DESCRIPTION - PAIN TYPE
TYPE: ACUTE PAIN
TYPE: ACUTE PAIN

## 2024-01-28 ASSESSMENT — LIFESTYLE VARIABLES
SUBSTANCE_ABUSE_PAST_12_MONTHS: NO
REASON UNABLE TO ASSESS: NO
HOW OFTEN DO YOU HAVE A DRINK CONTAINING ALCOHOL: NEVER
HOW MANY STANDARD DRINKS CONTAINING ALCOHOL DO YOU HAVE ON A TYPICAL DAY: PATIENT DOES NOT DRINK
HAVE PEOPLE ANNOYED YOU BY CRITICIZING YOUR DRINKING: NO
AUDIT-C TOTAL SCORE: 0
HAVE YOU EVER FELT YOU SHOULD CUT DOWN ON YOUR DRINKING: NO
AUDIT-C TOTAL SCORE: 0
EVER FELT BAD OR GUILTY ABOUT YOUR DRINKING: NO
SKIP TO QUESTIONS 9-10: 1
EVER HAD A DRINK FIRST THING IN THE MORNING TO STEADY YOUR NERVES TO GET RID OF A HANGOVER: NO
HOW OFTEN DO YOU HAVE 6 OR MORE DRINKS ON ONE OCCASION: NEVER
PRESCIPTION_ABUSE_PAST_12_MONTHS: NO

## 2024-01-28 ASSESSMENT — COGNITIVE AND FUNCTIONAL STATUS - GENERAL
DAILY ACTIVITIY SCORE: 24
PATIENT BASELINE BEDBOUND: NO
MOBILITY SCORE: 24

## 2024-01-28 ASSESSMENT — PAIN DESCRIPTION - DESCRIPTORS: DESCRIPTORS: ACHING

## 2024-01-28 ASSESSMENT — ACTIVITIES OF DAILY LIVING (ADL)
WALKS IN HOME: INDEPENDENT
ADEQUATE_TO_COMPLETE_ADL: YES
HEARING - LEFT EAR: FUNCTIONAL
GROOMING: INDEPENDENT
TOILETING: INDEPENDENT
HEARING - RIGHT EAR: FUNCTIONAL
LACK_OF_TRANSPORTATION: NO
DRESSING YOURSELF: INDEPENDENT
FEEDING YOURSELF: INDEPENDENT
PATIENT'S MEMORY ADEQUATE TO SAFELY COMPLETE DAILY ACTIVITIES?: YES
JUDGMENT_ADEQUATE_SAFELY_COMPLETE_DAILY_ACTIVITIES: YES
BATHING: INDEPENDENT

## 2024-01-28 ASSESSMENT — PAIN - FUNCTIONAL ASSESSMENT
PAIN_FUNCTIONAL_ASSESSMENT: 0-10
PAIN_FUNCTIONAL_ASSESSMENT: 0-10

## 2024-01-28 ASSESSMENT — PAIN DESCRIPTION - LOCATION
LOCATION: HEAD
LOCATION: HEAD

## 2024-01-28 NOTE — ED PROVIDER NOTES
HPI   Chief Complaint   Patient presents with    Shortness of Breath     SOB since 10 pm       38-year-old female presents from home with report of respiratory distress.  Patient has a history of asthma with previous hospitalizations for asthma exacerbation with hypoxia.  Patient reports that her symptoms began earlier this morning when she woke up.  She did use her MDI inhaler with very little relief of her symptoms.  Throughout the day she was having some mild shortness of breath.  She did have a dry cough denies any fevers, chills, diaphoresis, chest pain, hemoptysis.  Around 2200 hrs. this evening she was laying in bed watching TV she experienced a sudden onset of severe shortness of breath with wheezing and chest tightness.  In route to the hospital EMS gave her 3 DuoNeb's.  The patient has had multiple admissions to the hospital for similar symptoms.  Her last admission was to the ICU back on January 1, 2024.  She has a past medical history of asthma, hypothyroidism, migraine headaches, anxiety.  She is a non-smoker denies any alcohol or drug use.      History provided by:  Patient, medical records, EMS personnel and relative   used: No                        Boaz Coma Scale Score: 15                  Patient History   Past Medical History:   Diagnosis Date    Acute cystitis without hematuria 08/08/2019    Acute cystitis without hematuria    Asthma     Encounter for initial prescription of contraceptive pills 11/01/2019    Encounter for initial prescription of contraceptive pills    Nausea 02/05/2021    Nausea in adult    Other general symptoms and signs 12/05/2019    Forgetfulness    Parageusia 04/06/2020    Taste perversion    Pelvic and perineal pain     Pelvic pain in female    Personal history of other diseases of the respiratory system 04/06/2020    History of sinusitis    Personal history of other specified conditions 07/01/2020    History of vomiting    Personal history of other  specified conditions 01/26/2021    History of headache    Personal history of other specified conditions 02/01/2021    History of diarrhea    Personal history of thrombophlebitis 06/03/2019    History of phlebitis    Personal history of urinary (tract) infections 01/16/2020    History of urinary tract infection     Past Surgical History:   Procedure Laterality Date    OTHER SURGICAL HISTORY  02/08/2019    Appendectomy    OTHER SURGICAL HISTORY  02/08/2019    Cholecystectomy    OTHER SURGICAL HISTORY  02/08/2019    Cystoscopy    OTHER SURGICAL HISTORY  02/08/2019    Ureteroscopy    OTHER SURGICAL HISTORY  02/08/2019    Shoulder surgery     Family History   Problem Relation Name Age of Onset    Hypertension Father      Other (Pulmonary Valve Stenosis) Sister      Heart disease Maternal Grandmother      Diabetes Other Grandparent     Lung cancer Other Grandparent     Anxiety disorder Sibling       Social History     Tobacco Use    Smoking status: Never    Smokeless tobacco: Never   Vaping Use    Vaping Use: Never used   Substance Use Topics    Alcohol use: Yes     Comment: social    Drug use: Never       Physical Exam   ED Triage Vitals [01/28/24 0204]   Temperature Heart Rate Respirations BP   36.8 °C (98.2 °F) (!) 109 (!) 34 174/76      Pulse Ox Temp Source Heart Rate Source Patient Position   100 % Temporal Monitor Sitting      BP Location FiO2 (%)     Right arm --       Physical Exam  Vitals and nursing note reviewed. Exam conducted with a chaperone present.   Constitutional:       General: She is awake.      Appearance: Normal appearance. She is well-developed and well-groomed. She is obese. She is ill-appearing.   HENT:      Head: Normocephalic and atraumatic.      Right Ear: Tympanic membrane, ear canal and external ear normal.      Left Ear: Tympanic membrane, ear canal and external ear normal.      Nose: Nose normal.      Mouth/Throat:      Mouth: Mucous membranes are moist.      Pharynx: Oropharynx is clear.    Eyes:      General: No scleral icterus.     Conjunctiva/sclera: Conjunctivae normal.      Pupils: Pupils are equal, round, and reactive to light.   Neck:      Vascular: No carotid bruit.   Cardiovascular:      Rate and Rhythm: Regular rhythm. Tachycardia present.      Pulses: Normal pulses.      Heart sounds: Normal heart sounds.   Pulmonary:      Effort: Tachypnea present.      Breath sounds: Decreased air movement present. Examination of the right-upper field reveals decreased breath sounds. Examination of the left-upper field reveals decreased breath sounds. Examination of the right-middle field reveals decreased breath sounds. Examination of the left-middle field reveals decreased breath sounds. Examination of the right-lower field reveals decreased breath sounds. Examination of the left-lower field reveals decreased breath sounds. Decreased breath sounds present.   Abdominal:      General: Bowel sounds are normal.      Palpations: Abdomen is soft.      Tenderness: There is no abdominal tenderness. There is no rebound.      Hernia: No hernia is present.   Musculoskeletal:         General: No swelling or tenderness. Normal range of motion.      Cervical back: Normal range of motion and neck supple. No rigidity or tenderness.   Lymphadenopathy:      Cervical: No cervical adenopathy.   Skin:     General: Skin is warm and dry.      Capillary Refill: Capillary refill takes less than 2 seconds.   Neurological:      General: No focal deficit present.      Mental Status: She is alert and oriented to person, place, and time. Mental status is at baseline.   Psychiatric:         Mood and Affect: Mood normal.         Behavior: Behavior normal. Behavior is cooperative.         Thought Content: Thought content normal.         Judgment: Judgment normal.       ED Course & MDM   Diagnoses as of 01/28/24 0452   Severe persistent asthma with exacerbation       Medical Decision Making  ED course: Was placed on the monitor, she  was receiving her DuoNebs upon arrival.  She remains on a pulse ox.  Initial EKG shows sinus tachycardia rate 120 AZ was 132 QRS was 78  QTc was 443 no ischemic changes.  This was interpreted by me at 0 to 14 hours patient was started on IV fluids she was given morphine 4 mg IV push Zofran 4 mg IV push 2 g of magnesium sulfate IV piggyback and Solu-Medrol 125 mg IV push.  I have ordered lab work including a stat arterial blood gas.  I have ordered a chest x-ray    ED course: Patient initially started off on nasal cannula but due to her becoming fatigued she was placed on BiPAP.  Blood gas shows a pH 7.45 pCO2 of 35 pO2 116 potassium was 2.9.  She was given 40 mEq potassium orally and 20 mEq potassium IV piggyback.  CBC white count 7.2 hemoglobin 15 hematocrit 42.8 platelet count was 338 with lymphocytes 1.03, comprehensive glucose 113 sodium 135 potassium was 3.4 AST 42 ALT 57, troponin was negative less than 3 magnesium 1.87.  D-dimer was elevated at 749.  Patient's chest x-ray shows no evidence of any pleural effusion or airspace consolidation.  On repeat exam the patient still has diminished breath sounds but the wheezing has improved and there is some air movement the patient was taken off of BiPAP and placed on 40% Ventimask.  She is maintaining a pulse ox 98% on Ventimask.  The patient's IV potassium was discontinued due to the burning of the arm and the fact that her potassium is 3.4 without correction.    Patient remains on a Ventimask.  Patient CT of the chest shows no evidence of a PE.  Repeat exam she still has diminished breath sounds.  Plan is to admit her for asthma exacerbation I paged out to the hospitalist service.  Spoke with Dr. Hong and he will admit        Procedure  Critical Care    Performed by: Salo Perez PA-C  Authorized by: Juan Alberto Do MD    Critical care provider statement:     Critical care time (minutes):  35    Critical care time was exclusive of:  Separately billable  procedures and treating other patients    Critical care was necessary to treat or prevent imminent or life-threatening deterioration of the following conditions:  Respiratory failure    Critical care was time spent personally by me on the following activities:  Ordering and review of laboratory studies, ordering and review of radiographic studies, pulse oximetry, re-evaluation of patient's condition, review of old charts, obtaining history from patient or surrogate, evaluation of patient's response to treatment, examination of patient and development of treatment plan with patient or surrogate    I assumed direction of critical care for this patient from another provider in my specialty: no      Care discussed with: admitting provider         Salo Perez PA-C  01/28/24 0532

## 2024-01-28 NOTE — CONSULTS
History Of Present Illness  Sarahi Haley is a 38 y.o. female presenting with shortness of breath.  Patient had wheezing, difficulty in breathing feeling not able to move air, and started to feel fatigued.  The symptoms started somewhat sudden while she was laying down watching TV.  She did try MDI inhalers and nebulizer DuoNeb x 3 but did not help.       She has history of multiple ER visits for asthma exacerbation.  She reported was visiting ER every 4 to 6 weeks lately.  She denied fever, nausea, vomiting or diarrhea.  However she has migraine episode as well.     ER course: Patient was awake, alert, oriented, was in respiratory distress.  She had tachycardia 109, tachypnea up to 35.  She was placed on BiPAP initially due to increased work of breathing and respiratory distress.  Gradually improved and weaned off to Ventimask.  Her labs shows mild respiratory alkalosis likely from tachypnea.  There is no leukocytosis.  Troponin was negative.  CT angiogram of the chest was negative for pulmonary embolism or pneumonia.  There is fatty liver and enlarged spleen reported.  Patient was given multiple breathing treatment, was given IV Solu-Medrol and IV magnesium in the ER.  Her symptoms started to improve.  Admitted to observation for asthma exacerbation.     I was asked to evaluate and follow from a pulmonary perspective.  Patient is well-known to me from prior hospitalizations.  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  She has no specific triggers for asthma except for weather changes.  Her asthma appears to have gotten worse over last year and has had at least 11 admissions to the hospital including for ICU stays.  She has not been intubated however.  Lately she has been on Trelegy  Ellipta 200 mcg inhaler daily, Pulmicort nebulizer 0.5 mg twice daily, Allegra 180 mg twice daily, montelukast 10 mg nightly, albuterol MDI/nebulizer every 4  as needed.  She has allergic rhinitis as well as occasional GERD.  For GERD which she takes famotidine 20 mg on a as needed basis.  She denies nasal polyposis, aspirin sensitivity or eczema. She is however intolerant to NSAIDS.  She has never been on a biologic.  She used to follow-up with Dr. Rascon an allergist who has retired.  When she saw the allergist around 5 years ago, her asthma did not warrant a biologic.  She is to see a  allergist Dr. Castillo in about a month.  She is not a diabetic.  She has no cardiac disease.  She has been previously tested for sleep apnea and this was reportedly negative.  Currently denies any symptoms consistent sleep apnea or insomnia.  She is currently admitted with asthma exacerbation and noted to have both RSV and flu positive.  She apparently has had 3 shots against COVID-19 so far.  She has been regularly vaccinated against flu.  She does not recall having Pneumovax shingles shots in the past.    Past Medical History  She has a past medical history of Acute cystitis without hematuria (08/08/2019), Asthma, Encounter for initial prescription of contraceptive pills (11/01/2019), Nausea (02/05/2021), Other general symptoms and signs (12/05/2019), Parageusia (04/06/2020), Pelvic and perineal pain, Personal history of other diseases of the respiratory system (04/06/2020), Personal history of other specified conditions (07/01/2020), Personal history of other specified conditions (01/26/2021), Personal history of other specified conditions (02/01/2021), Personal history of thrombophlebitis (06/03/2019), and Personal history of urinary (tract) infections (01/16/2020).     Surgical History  She has a past surgical history that includes Other surgical history (02/08/2019); Other surgical history (02/08/2019); Other surgical history (02/08/2019); Other surgical history (02/08/2019); and Other surgical history (02/08/2019).     Social History  She reports that she has never smoked.  She has never used smokeless tobacco. She reports current alcohol use. She reports that she does not use drugs.     Family History  Family History          Family History   Problem Relation Name Age of Onset    Hypertension Father        Other (Pulmonary Valve Stenosis) Sister        Heart disease Maternal Grandmother        Diabetes Other Grandparent      Lung cancer Other Grandparent      Anxiety disorder Sibling                Allergies  Bee venom protein (honey bee), Diphenhydramine, Nsaids (non-steroidal anti-inflammatory drug), Procaine, and Ketorolac     Review of Systems  10/10 points review of system were conducted, negative except as above      Recorded Vitals:  Vitals          Vitals:     01/28/24 0417 01/28/24 0538 01/28/24 0648 01/28/24 0814   BP: (!) 182/87 150/82 141/82 140/67   BP Location:   Right arm       Patient Position:   Lying Sitting     Pulse: 88 91 96 95   Resp: (!) 22 20 20     Temp:     36.3 °C (97.3 °F) 36.7 °C (98.1 °F)   TempSrc:           SpO2: 100% 99% 99% 98%   Weight:           Height:                    Last I/O:  I/O last 3 completed shifts:  In: 75 (0.6 mL/kg) [I.V.:50 (0.4 mL/kg); IV Piggyback:25]  Out: - (0 mL/kg)   Weight: 136.1 kg      Physical Exam:  GEN: appears stated age, NAD  HEENT: NCAT  CV: RRR, no m/r/g, no LE edema  LUNGS: CTAB, no w/r/c  ABD: soft, NT, obese  SKIN: no rashes  MSK; no gross deformities, normal joints  NEURO: A+Ox3, no FND  PSYCH: appropriate mood, affect     Inpatient Medications:  aspirin, 81 mg, oral, Daily  budesonide, 0.5 mg, nebulization, BID  butalbital-acetaminophen-caff, 1 tablet, oral, Daily  desvenlafaxine, 100 mg, oral, Daily  doxycycline, 100 mg, intravenous, q12h  tiotropium, 2 Inhalation, inhalation, Daily   And  fluticasone furoate-vilanteroL, 1 puff, inhalation, Daily  levothyroxine, 100 mcg, oral, Daily  methylPREDNISolone sodium succinate (PF), 40 mg, intravenous, q12h  montelukast, 10 mg, oral, Nightly  oseltamivir, 75 mg, oral,  BID  pantoprazole, 40 mg, oral, Daily before breakfast   Or  pantoprazole, 40 mg, intravenous, Daily before breakfast  potassium chloride CR, 40 mEq, oral, Daily           Labs:        Results for orders placed or performed during the hospital encounter of 01/28/24 (from the past 24 hour(s))   CBC and Auto Differential   Result Value Ref Range     WBC 7.2 4.4 - 11.3 x10*3/uL     nRBC 0.0 0.0 - 0.0 /100 WBCs     RBC 4.95 4.00 - 5.20 x10*6/uL     Hemoglobin 15.0 12.0 - 16.0 g/dL     Hematocrit 42.8 36.0 - 46.0 %     MCV 87 80 - 100 fL     MCH 30.3 26.0 - 34.0 pg     MCHC 35.0 32.0 - 36.0 g/dL     RDW 11.9 11.5 - 14.5 %     Platelets 338 150 - 450 x10*3/uL     Neutrophils % 76.7 40.0 - 80.0 %     Immature Granulocytes %, Automated 0.3 0.0 - 0.9 %     Lymphocytes % 14.3 13.0 - 44.0 %     Monocytes % 8.6 2.0 - 10.0 %     Eosinophils % 0.0 0.0 - 6.0 %     Basophils % 0.1 0.0 - 2.0 %     Neutrophils Absolute 5.53 1.20 - 7.70 x10*3/uL     Immature Granulocytes Absolute, Automated 0.02 0.00 - 0.70 x10*3/uL     Lymphocytes Absolute 1.03 (L) 1.20 - 4.80 x10*3/uL     Monocytes Absolute 0.62 0.10 - 1.00 x10*3/uL     Eosinophils Absolute 0.00 0.00 - 0.70 x10*3/uL     Basophils Absolute 0.01 0.00 - 0.10 x10*3/uL   Comprehensive metabolic panel   Result Value Ref Range     Glucose 113 (H) 74 - 99 mg/dL     Sodium 135 (L) 136 - 145 mmol/L     Potassium 3.4 (L) 3.5 - 5.3 mmol/L     Chloride 103 98 - 107 mmol/L     Bicarbonate 23 21 - 32 mmol/L     Anion Gap 12 10 - 20 mmol/L     Urea Nitrogen 8 6 - 23 mg/dL     Creatinine 0.94 0.50 - 1.05 mg/dL     eGFR 81 >60 mL/min/1.73m*2     Calcium 8.9 8.6 - 10.3 mg/dL     Albumin 4.2 3.4 - 5.0 g/dL     Alkaline Phosphatase 86 33 - 110 U/L     Total Protein 7.5 6.4 - 8.2 g/dL     AST 42 (H) 9 - 39 U/L     Bilirubin, Total 0.7 0.0 - 1.2 mg/dL     ALT 57 (H) 7 - 45 U/L   Magnesium   Result Value Ref Range     Magnesium 1.87 1.60 - 2.40 mg/dL   D-dimer, quantitative   Result Value Ref Range      D-Dimer Non VTE, Quant (ng/mL FEU) 749 (H) <=500 ng/mL FEU   Troponin I, High Sensitivity, Initial   Result Value Ref Range     Troponin I, High Sensitivity <3 0 - 13 ng/L   BLOOD GAS ARTERIAL FULL PANEL   Result Value Ref Range     POCT pH, Arterial 7.45 (H) 7.38 - 7.42 pH     POCT pCO2, Arterial 35 (L) 38 - 42 mm Hg     POCT pO2, Arterial 116 (H) 85 - 95 mm Hg     POCT SO2, Arterial 100 94 - 100 %     POCT Oxy Hemoglobin, Arterial 97.7 94.0 - 98.0 %     POCT Hematocrit Calculated, Arterial 42.0 36.0 - 46.0 %     POCT Sodium, Arterial 136 136 - 145 mmol/L     POCT Potassium, Arterial 2.9 (LL) 3.5 - 5.3 mmol/L     POCT Chloride, Arterial 103 98 - 107 mmol/L     POCT Ionized Calcium, Arterial 1.13 1.10 - 1.33 mmol/L     POCT Glucose, Arterial 130 (H) 74 - 99 mg/dL     POCT Lactate, Arterial 1.4 0.4 - 2.0 mmol/L     POCT Base Excess, Arterial 0.7 -2.0 - 3.0 mmol/L     POCT HCO3 Calculated, Arterial 24.3 22.0 - 26.0 mmol/L     POCT Hemoglobin, Arterial 14.1 12.0 - 16.0 g/dL     POCT Anion Gap, Arterial 12 10 - 25 mmo/L     Patient Temperature         FiO2 30 %     Ipap CMH2O 16.0 cm H2O     Epap CMH2O 8.0 cm H2O     Critical Called By YURY PETERSON       Critical Called To DR. LOVELACE       Critical Call Time 250.0000       Critical Read Back Y       Site of Arterial Puncture Radial Left       Vinay's Test Positive     Troponin, High Sensitivity, 1 Hour   Result Value Ref Range     Troponin I, High Sensitivity <3 0 - 13 ng/L   RSV PCR   Result Value Ref Range     RSV PCR Detected (A) Not Detected   Sars-CoV-2 PCR, Symptomatic   Result Value Ref Range     Coronavirus 2019, PCR Not Detected Not Detected   Influenza A, and B PCR   Result Value Ref Range     Flu A Result Detected (A) Not Detected     Flu B Result Not Detected Not Detected         Imaging:  CT angio chest for pulmonary embolism  Narrative: Interpreted By:  Paul Aponte,   STUDY:  CT ANGIO CHEST FOR PULMONARY EMBOLISM;  1/28/2024 4:03 am       INDICATION:  Signs/Symptoms:cp elevated d dimer.      COMPARISON:  10/15/2023      ACCESSION NUMBER(S):  [id number]      ORDERING CLINICIAN:  TRACI STUBBS      TECHNIQUE:  Helical data acquisition of the chest was obtained after intravenous  administration of , as per PE protocol. Images were reformatted in  coronal and sagittal planes. Axial and coronal maximum intensity  projection (MIP) images were created and reviewed.      FINDINGS:  POTENTIAL LIMITATIONS OF THE STUDY: Body habitus limits sensitivity      HEART AND VESSELS:  There are no discrete filling defects within main pulmonary artery  and its branches to suggest acute pulmonary embolism. Main pulmonary  artery and its branches are normal in caliber.      The thoracic aorta normal in course and caliber.  No coronary artery calcifications are seen. Please note, the study is  not optimized for evaluation of coronary arteries.      The cardiac chambers are not enlarged.      There is no pericardial effusion seen.      MEDIASTINUM AND STERLING, LOWER NECK AND AXILLA:  The visualized thyroid gland is within normal limits.  No evidence of thoracic lymphadenopathy by CT criteria. Changes of  old healed granulomas disease. Esophagus appears within normal limits  as seen.      LUNGS AND AIRWAYS:  The trachea and central airways are patent. No endobronchial lesion  is seen.      The bilateral lungs are clear without evidence of focal  consolidation, pleural effusion, or pneumothorax.          UPPER ABDOMEN:  Spleen is enlarged to 14 cm. Fatty infiltration of the liver.  Gallbladder is been removed. Small sliding hiatal hernia      CHEST WALL AND OSSEOUS STRUCTURES:  Chest wall is within normal limits.  No acute osseous pathology.There are no suspicious osseous lesions.      Impression: 1. Limited assessment due to body habitus. No features suspicious for  pulmonary embolism. No focal infiltrate. Fatty infiltration of the  liver. Enlarged spleen           MACRO:  None      Signed by: Paul Aponte 1/28/2024 5:15 AM  Dictation workstation:   [id number]  XR chest 1 view  Narrative: Interpreted By:  Salbador Urbina,   STUDY:  XR CHEST 1 VIEW;  1/28/2024 2:54 am      INDICATION:  Signs/Symptoms:dyspnea.      COMPARISON:  1/1/2024      ACCESSION NUMBER(S):  [id number]      ORDERING CLINICIAN:  TRACI STUBBS      FINDINGS:  The cardiac silhouette is normal in size. No focal airspace  consolidation or pleural effusion. No pneumothorax.      Impression: No airspace consolidation or pleural effusion.    Acute asthma exacerbation  Acute hypoxemic respiratory failure  RSV infection  Influenza infection  -p/w worsening SOB, was hypoxemic in ED requiring BiPAP and then Ventimask  -CTA chest neg for PE, no focal consolidation c/f PNA  -likely exacerbated due to influenza and RSV infection  -multiple ED visits and admissions in the last year  Plan:  -IV steroid  -scheduled nebs  -ICS/LAMA/LABA inhaler as ordered  -pulmicort nebulizer  -singulair  -start Tamiflu  -cont empiric doxycycline, suspect this is all due to viral etiology, will await procal however prior to discontinuing  -wean O2 to nasal cannula today, cont to wean down as tolerated, baseline does not use oxygen  -keep outpt allergy follow ups     Hypokalemia  -replete, repeat level in PM     MDD  -home med     Hypothyroidism  -cont home synthroid     Migraines  -prn imitrex     GERD  -ppi     Morbid obesity    Pulmonary comments:-Agree with current treatment of acute exacerbation of asthma with IV Solu-Medrol and bronchodilator nebulizer regimen as ordered.  Patient definitely needs consideration for a biologic to prevent hospitalizations and improve her quality of life.  She is advised to keep up  Allergist appointment which she is upcoming.  Agree with Tamiflu for acute influenza infection.  Adult RSV infection is not treated with antivirals and treatment is isolation & supportive care.Will continue to monitor  this patient while hospitalized here.  Thank you for this referral.      Noah Ortiz MD

## 2024-01-28 NOTE — H&P
History Of Present Illness  Sarahi Haley is a 38 y.o. female presenting with shortness of breath.  Patient had wheezing, difficulty in breathing feeling not able to move air, and started to feel fatigued.  The symptoms started somewhat sudden while she was laying down watching TV.  She did try MDI inhalers and nebulizer DuoNeb x 3 but did not help.      She has history of multiple ER visits for asthma exacerbation.  She reported was visiting ER every 4 to 6 weeks lately.  She denied fever, nausea, vomiting or diarrhea.  However she has migraine episode as well.    ER course: Patient was awake, alert, oriented, was in respiratory distress.  She had tachycardia 109, tachypnea up to 35.  She was placed on BiPAP initially due to increased work of breathing and respiratory distress.  Gradually improved and weaned off to Ventimask.  Her labs shows mild respiratory alkalosis likely from tachypnea.  There is no leukocytosis.  Troponin was negative.  CT angiogram of the chest was negative for pulmonary embolism or pneumonia.  There is fatty liver and enlarged spleen reported.  Patient was given multiple breathing treatment, was given IV Solu-Medrol and IV magnesium in the ER.  Her symptoms started to improve.  Admitted to observation for asthma exacerbation.     Past Medical History  She has a past medical history of Acute cystitis without hematuria (08/08/2019), Asthma, Encounter for initial prescription of contraceptive pills (11/01/2019), Nausea (02/05/2021), Other general symptoms and signs (12/05/2019), Parageusia (04/06/2020), Pelvic and perineal pain, Personal history of other diseases of the respiratory system (04/06/2020), Personal history of other specified conditions (07/01/2020), Personal history of other specified conditions (01/26/2021), Personal history of other specified conditions (02/01/2021), Personal history of thrombophlebitis (06/03/2019), and Personal history of urinary (tract) infections  (01/16/2020).    Surgical History  She has a past surgical history that includes Other surgical history (02/08/2019); Other surgical history (02/08/2019); Other surgical history (02/08/2019); Other surgical history (02/08/2019); and Other surgical history (02/08/2019).     Social History  She reports that she has never smoked. She has never used smokeless tobacco. She reports current alcohol use. She reports that she does not use drugs.    Family History  Family History   Problem Relation Name Age of Onset    Hypertension Father      Other (Pulmonary Valve Stenosis) Sister      Heart disease Maternal Grandmother      Diabetes Other Grandparent     Lung cancer Other Grandparent     Anxiety disorder Sibling          Allergies  Bee venom protein (honey bee), Diphenhydramine, Nsaids (non-steroidal anti-inflammatory drug), Procaine, and Ketorolac    Review of Systems  10/10 points review of system were conducted, negative except as above    Physical Exam  Constitutional:       General: She is not in acute distress.     Appearance: She is obese. She is ill-appearing. She is not toxic-appearing or diaphoretic.   HENT:      Head: Normocephalic and atraumatic.      Nose: Nose normal. No congestion or rhinorrhea.      Mouth/Throat:      Mouth: Mucous membranes are moist.   Eyes:      General: No scleral icterus.     Extraocular Movements: Extraocular movements intact.      Conjunctiva/sclera: Conjunctivae normal.      Pupils: Pupils are equal, round, and reactive to light.   Cardiovascular:      Rate and Rhythm: Normal rate and regular rhythm.      Pulses: Normal pulses.      Heart sounds: Normal heart sounds. No murmur heard.     No friction rub. No gallop.   Pulmonary:      Breath sounds: No stridor. Wheezing and rales present. No rhonchi.   Chest:      Chest wall: No tenderness.   Abdominal:      Palpations: There is no mass.      Tenderness: There is no abdominal tenderness. There is no right CVA tenderness, left CVA  "tenderness, guarding or rebound.      Hernia: No hernia is present.   Musculoskeletal:         General: No swelling, tenderness, deformity or signs of injury. Normal range of motion.      Cervical back: Normal range of motion and neck supple.      Right lower leg: No edema.      Left lower leg: No edema.   Skin:     General: Skin is warm and dry.      Findings: No lesion or rash.   Neurological:      General: No focal deficit present.      Mental Status: She is alert and oriented to person, place, and time. Mental status is at baseline.   Psychiatric:         Mood and Affect: Mood normal.         Behavior: Behavior normal.          Last Recorded Vitals  Blood pressure 150/82, pulse 91, temperature 36.8 °C (98.2 °F), temperature source Temporal, resp. rate 20, height 1.651 m (5' 5\"), weight 136 kg (300 lb), last menstrual period 12/08/2023, SpO2 99 %.    Relevant Results  Scheduled medications  aspirin, 81 mg, oral, Daily  budesonide, 0.5 mg, nebulization, BID  butalbital-acetaminophen-caff, 1 tablet, oral, Daily  desvenlafaxine, 100 mg, oral, Daily  doxycycline, 100 mg, intravenous, q12h  fluticasone-umeclidin-vilanter, 2 puff, inhalation, Daily  HYDROmorphone, 0.4 mg, intravenous, Once  levothyroxine, 100 mcg, oral, Daily  methylPREDNISolone sodium succinate (PF), 80 mg, intravenous, q8h  montelukast, 10 mg, oral, Nightly  pantoprazole, 40 mg, oral, Daily before breakfast   Or  pantoprazole, 40 mg, intravenous, Daily before breakfast  potassium chloride CR, 40 mEq, oral, Daily      Continuous medications     PRN medications  PRN medications: acetaminophen **OR** acetaminophen **OR** acetaminophen, melatonin, ondansetron **OR** ondansetron, polyethylene glycol, SUMAtriptan      Results for orders placed or performed during the hospital encounter of 01/28/24 (from the past 24 hour(s))   CBC and Auto Differential   Result Value Ref Range    WBC 7.2 4.4 - 11.3 x10*3/uL    nRBC 0.0 0.0 - 0.0 /100 WBCs    RBC 4.95 4.00 - " 5.20 x10*6/uL    Hemoglobin 15.0 12.0 - 16.0 g/dL    Hematocrit 42.8 36.0 - 46.0 %    MCV 87 80 - 100 fL    MCH 30.3 26.0 - 34.0 pg    MCHC 35.0 32.0 - 36.0 g/dL    RDW 11.9 11.5 - 14.5 %    Platelets 338 150 - 450 x10*3/uL    Neutrophils % 76.7 40.0 - 80.0 %    Immature Granulocytes %, Automated 0.3 0.0 - 0.9 %    Lymphocytes % 14.3 13.0 - 44.0 %    Monocytes % 8.6 2.0 - 10.0 %    Eosinophils % 0.0 0.0 - 6.0 %    Basophils % 0.1 0.0 - 2.0 %    Neutrophils Absolute 5.53 1.20 - 7.70 x10*3/uL    Immature Granulocytes Absolute, Automated 0.02 0.00 - 0.70 x10*3/uL    Lymphocytes Absolute 1.03 (L) 1.20 - 4.80 x10*3/uL    Monocytes Absolute 0.62 0.10 - 1.00 x10*3/uL    Eosinophils Absolute 0.00 0.00 - 0.70 x10*3/uL    Basophils Absolute 0.01 0.00 - 0.10 x10*3/uL   Comprehensive metabolic panel   Result Value Ref Range    Glucose 113 (H) 74 - 99 mg/dL    Sodium 135 (L) 136 - 145 mmol/L    Potassium 3.4 (L) 3.5 - 5.3 mmol/L    Chloride 103 98 - 107 mmol/L    Bicarbonate 23 21 - 32 mmol/L    Anion Gap 12 10 - 20 mmol/L    Urea Nitrogen 8 6 - 23 mg/dL    Creatinine 0.94 0.50 - 1.05 mg/dL    eGFR 81 >60 mL/min/1.73m*2    Calcium 8.9 8.6 - 10.3 mg/dL    Albumin 4.2 3.4 - 5.0 g/dL    Alkaline Phosphatase 86 33 - 110 U/L    Total Protein 7.5 6.4 - 8.2 g/dL    AST 42 (H) 9 - 39 U/L    Bilirubin, Total 0.7 0.0 - 1.2 mg/dL    ALT 57 (H) 7 - 45 U/L   Magnesium   Result Value Ref Range    Magnesium 1.87 1.60 - 2.40 mg/dL   D-dimer, quantitative   Result Value Ref Range    D-Dimer Non VTE, Quant (ng/mL FEU) 749 (H) <=500 ng/mL FEU   Troponin I, High Sensitivity, Initial   Result Value Ref Range    Troponin I, High Sensitivity <3 0 - 13 ng/L   BLOOD GAS ARTERIAL FULL PANEL   Result Value Ref Range    POCT pH, Arterial 7.45 (H) 7.38 - 7.42 pH    POCT pCO2, Arterial 35 (L) 38 - 42 mm Hg    POCT pO2, Arterial 116 (H) 85 - 95 mm Hg    POCT SO2, Arterial 100 94 - 100 %    POCT Oxy Hemoglobin, Arterial 97.7 94.0 - 98.0 %    POCT Hematocrit  Calculated, Arterial 42.0 36.0 - 46.0 %    POCT Sodium, Arterial 136 136 - 145 mmol/L    POCT Potassium, Arterial 2.9 (LL) 3.5 - 5.3 mmol/L    POCT Chloride, Arterial 103 98 - 107 mmol/L    POCT Ionized Calcium, Arterial 1.13 1.10 - 1.33 mmol/L    POCT Glucose, Arterial 130 (H) 74 - 99 mg/dL    POCT Lactate, Arterial 1.4 0.4 - 2.0 mmol/L    POCT Base Excess, Arterial 0.7 -2.0 - 3.0 mmol/L    POCT HCO3 Calculated, Arterial 24.3 22.0 - 26.0 mmol/L    POCT Hemoglobin, Arterial 14.1 12.0 - 16.0 g/dL    POCT Anion Gap, Arterial 12 10 - 25 mmo/L    Patient Temperature      FiO2 30 %    Ipap CMH2O 16.0 cm H2O    Epap CMH2O 8.0 cm H2O    Critical Called By YURY PETERSON     Critical Called To DR. LOVELACE     Critical Call Time 250.0000     Critical Read Back Y     Site of Arterial Puncture Radial Left     Vinay's Test Positive         CT angio chest for pulmonary embolism    Result Date: 1/28/2024  Interpreted By:  Paul Aponte, STUDY: CT ANGIO CHEST FOR PULMONARY EMBOLISM;  1/28/2024 4:03 am   INDICATION: Signs/Symptoms:cp elevated d dimer.   COMPARISON: 10/15/2023   ACCESSION NUMBER(S): [id number]   ORDERING CLINICIAN: TRACI STUBBS   TECHNIQUE: Helical data acquisition of the chest was obtained after intravenous administration of , as per PE protocol. Images were reformatted in coronal and sagittal planes. Axial and coronal maximum intensity projection (MIP) images were created and reviewed.   FINDINGS: POTENTIAL LIMITATIONS OF THE STUDY: Body habitus limits sensitivity   HEART AND VESSELS: There are no discrete filling defects within main pulmonary artery and its branches to suggest acute pulmonary embolism. Main pulmonary artery and its branches are normal in caliber.   The thoracic aorta normal in course and caliber. No coronary artery calcifications are seen. Please note, the study is not optimized for evaluation of coronary arteries.   The cardiac chambers are not enlarged.   There is no pericardial effusion seen.    MEDIASTINUM AND STERLING, LOWER NECK AND AXILLA: The visualized thyroid gland is within normal limits. No evidence of thoracic lymphadenopathy by CT criteria. Changes of old healed granulomas disease. Esophagus appears within normal limits as seen.   LUNGS AND AIRWAYS: The trachea and central airways are patent. No endobronchial lesion is seen.   The bilateral lungs are clear without evidence of focal consolidation, pleural effusion, or pneumothorax.     UPPER ABDOMEN: Spleen is enlarged to 14 cm. Fatty infiltration of the liver. Gallbladder is been removed. Small sliding hiatal hernia   CHEST WALL AND OSSEOUS STRUCTURES: Chest wall is within normal limits. No acute osseous pathology.There are no suspicious osseous lesions.       1. Limited assessment due to body habitus. No features suspicious for pulmonary embolism. No focal infiltrate. Fatty infiltration of the liver. Enlarged spleen     MACRO: None   Signed by: Paul Aponte 1/28/2024 5:15 AM Dictation workstation:   [id number]    XR chest 1 view    Result Date: 1/28/2024  Interpreted By:  Salbador Urbina, STUDY: XR CHEST 1 VIEW;  1/28/2024 2:54 am   INDICATION: Signs/Symptoms:dyspnea.   COMPARISON: 1/1/2024   ACCESSION NUMBER(S): [id number]   ORDERING CLINICIAN: TRACI STUBBS   FINDINGS: The cardiac silhouette is normal in size. No focal airspace consolidation or pleural effusion. No pneumothorax.       No airspace consolidation or pleural effusion.   MACRO: None   Signed by: Salbador Urbina 1/28/2024 3:05 AM Dictation workstation:   [id number]    XR hip right with pelvis when performed 2 or 3 views    Result Date: 1/3/2024  Imaging Result: X-rays obtained AP pelvis lateral view right hip show superior lateral osteophyte formation with joint space narrowing both the right and left hip joint.     ECG 12 lead    Result Date: 1/1/2024  Normal sinus rhythm Cannot rule out Anterior infarct , age undetermined Abnormal ECG When compared with ECG of 07-DEC-2023  13:16, No significant change was found See ED provider note for full interpretation and clinical correlation Confirmed by Robert Brambila ([id number]) on 1/1/2024 2:46:19 PM    XR chest 1 view    Result Date: 1/1/2024  Interpreted By:  Danilo Delgado, STUDY: XR CHEST 1 VIEW;  1/1/2024 2:23 am   INDICATION: Signs/Symptoms:sob.   COMPARISON: 12/05/2023.   ACCESSION NUMBER(S): [id number]   ORDERING CLINICIAN: ANDREW SINGLETON   FINDINGS: AP radiograph of the chest was provided.   Limited by portable technique and patient soft tissue attenuation factors. Leads overlie the chest, partially obscuring the field-of-view. Limited also by suboptimal inspiration.   CARDIOMEDIASTINAL SILHOUETTE: Cardiomediastinal silhouette is normal in size and configuration.   LUNGS: Lungs are hypoinflated with bronchovascular crowding. No focal consolidation, pleural effusion, or pneumothorax.   ABDOMEN: No remarkable upper abdominal findings.   BONES: No acute osseous changes.       1.  No definite evidence of acute cardiopulmonary process.       MACRO: None   Signed by: Danilo Delgado 1/1/2024 2:26 AM Dictation workstation:   [id number]          Assessment/Plan   Principal Problem:    Severe persistent asthma not dependent on systemic steroids  Active Problems:    Migraine    Hypothyroidism, adult    GERD (gastroesophageal reflux disease)    Acute asthma exacerbation    Anxiety and depression    Acute respiratory failure with hypoxemia (CMS/[facility])    This patient has worsening asthma lately.  She was diagnosed with asthma at age 25.  She is working with pulmonary and allergy clinic to get tested for allergies.  -Had multiple recent ER visit for asthma exacerbation.  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  -No clear etiology why she had this current asthma exacerbation.  -Will continue with IV steroid with Solu-Medrol.  -DuoNeb for bronchodilation as needed.  -Resume home medication Trelegy, Singulair, and Pulmicort.  -Will use doxycycline  antibiotics.  -She has history of migraine.  Resume home meds.  -Pantoprazole for GI prophylaxis.  She has history of GERD likely contributing to more episodes of asthma.  -Resume levothyroxine for hypothyroidism  -She is currently on Ventimask for acute respiratory failure.  Will titrate down and wean off as tolerated.      Uli Hong MD

## 2024-01-28 NOTE — SIGNIFICANT EVENT
ASSESSMENT & PLAN:     Acute asthma exacerbation  Acute hypoxemic respiratory failure  RSV infection  Influenza infection  -p/w worsening SOB, was hypoxemic in ED requiring BiPAP and then Ventimask  -CTA chest neg for PE, no focal consolidation c/f PNA  -likely exacerbated due to influenza and RSV infection  -multiple ED visits and admissions in the last year  Plan:  -pulm consulted  -IV steroid  -scheduled nebs  -ICS/LAMA/LABA inhaler as ordered  -pulmicort nebulizer  -singulair  -start Tamiflu  -cont empiric doxycycline, suspect this is all due to viral etiology, will await procal however prior to discontinuing  -wean O2 to nasal cannula today, cont to wean down as tolerated, baseline does not use oxygen  -keep outpt pulm and allergy follow ups    Hypokalemia  -replete, repeat level in PM    MDD  -home med    Hypothyroidism  -cont home synthroid    Migraines  -prn imitrex    GERD  -ppi    Morbid obesity    Kem Bruno MD    SUBJECTIVE     NAEON. Feeling better today after treatment.     OBJECTIVE:       Last Recorded Vitals:  Vitals:    01/28/24 0417 01/28/24 0538 01/28/24 0648 01/28/24 0814   BP: (!) 182/87 150/82 141/82 140/67   BP Location:  Right arm     Patient Position:  Lying Sitting    Pulse: 88 91 96 95   Resp: (!) 22 20 20    Temp:   36.3 °C (97.3 °F) 36.7 °C (98.1 °F)   TempSrc:       SpO2: 100% 99% 99% 98%   Weight:       Height:           Last I/O:  I/O last 3 completed shifts:  In: 75 (0.6 mL/kg) [I.V.:50 (0.4 mL/kg); IV Piggyback:25]  Out: - (0 mL/kg)   Weight: 136.1 kg     Physical Exam:  GEN: appears stated age, NAD  HEENT: NCAT  CV: RRR, no m/r/g, no LE edema  LUNGS: CTAB, no w/r/c  ABD: soft, NT, obese  SKIN: no rashes  MSK; no gross deformities, normal joints  NEURO: A+Ox3, no FND  PSYCH: appropriate mood, affect    Inpatient Medications:  aspirin, 81 mg, oral, Daily  budesonide, 0.5 mg, nebulization, BID  butalbital-acetaminophen-caff, 1 tablet, oral, Daily  desvenlafaxine, 100 mg, oral,  Daily  doxycycline, 100 mg, intravenous, q12h  tiotropium, 2 Inhalation, inhalation, Daily   And  fluticasone furoate-vilanteroL, 1 puff, inhalation, Daily  levothyroxine, 100 mcg, oral, Daily  methylPREDNISolone sodium succinate (PF), 40 mg, intravenous, q12h  montelukast, 10 mg, oral, Nightly  oseltamivir, 75 mg, oral, BID  pantoprazole, 40 mg, oral, Daily before breakfast   Or  pantoprazole, 40 mg, intravenous, Daily before breakfast  potassium chloride CR, 40 mEq, oral, Daily        PRN Medications  PRN medications: acetaminophen **OR** acetaminophen **OR** acetaminophen, ipratropium-albuteroL, melatonin, ondansetron **OR** ondansetron, polyethylene glycol, SUMAtriptan    Continuous Medications:         LABS AND IMAGING:     Labs:  Results for orders placed or performed during the hospital encounter of 01/28/24 (from the past 24 hour(s))   CBC and Auto Differential   Result Value Ref Range    WBC 7.2 4.4 - 11.3 x10*3/uL    nRBC 0.0 0.0 - 0.0 /100 WBCs    RBC 4.95 4.00 - 5.20 x10*6/uL    Hemoglobin 15.0 12.0 - 16.0 g/dL    Hematocrit 42.8 36.0 - 46.0 %    MCV 87 80 - 100 fL    MCH 30.3 26.0 - 34.0 pg    MCHC 35.0 32.0 - 36.0 g/dL    RDW 11.9 11.5 - 14.5 %    Platelets 338 150 - 450 x10*3/uL    Neutrophils % 76.7 40.0 - 80.0 %    Immature Granulocytes %, Automated 0.3 0.0 - 0.9 %    Lymphocytes % 14.3 13.0 - 44.0 %    Monocytes % 8.6 2.0 - 10.0 %    Eosinophils % 0.0 0.0 - 6.0 %    Basophils % 0.1 0.0 - 2.0 %    Neutrophils Absolute 5.53 1.20 - 7.70 x10*3/uL    Immature Granulocytes Absolute, Automated 0.02 0.00 - 0.70 x10*3/uL    Lymphocytes Absolute 1.03 (L) 1.20 - 4.80 x10*3/uL    Monocytes Absolute 0.62 0.10 - 1.00 x10*3/uL    Eosinophils Absolute 0.00 0.00 - 0.70 x10*3/uL    Basophils Absolute 0.01 0.00 - 0.10 x10*3/uL   Comprehensive metabolic panel   Result Value Ref Range    Glucose 113 (H) 74 - 99 mg/dL    Sodium 135 (L) 136 - 145 mmol/L    Potassium 3.4 (L) 3.5 - 5.3 mmol/L    Chloride 103 98 - 107  mmol/L    Bicarbonate 23 21 - 32 mmol/L    Anion Gap 12 10 - 20 mmol/L    Urea Nitrogen 8 6 - 23 mg/dL    Creatinine 0.94 0.50 - 1.05 mg/dL    eGFR 81 >60 mL/min/1.73m*2    Calcium 8.9 8.6 - 10.3 mg/dL    Albumin 4.2 3.4 - 5.0 g/dL    Alkaline Phosphatase 86 33 - 110 U/L    Total Protein 7.5 6.4 - 8.2 g/dL    AST 42 (H) 9 - 39 U/L    Bilirubin, Total 0.7 0.0 - 1.2 mg/dL    ALT 57 (H) 7 - 45 U/L   Magnesium   Result Value Ref Range    Magnesium 1.87 1.60 - 2.40 mg/dL   D-dimer, quantitative   Result Value Ref Range    D-Dimer Non VTE, Quant (ng/mL FEU) 749 (H) <=500 ng/mL FEU   Troponin I, High Sensitivity, Initial   Result Value Ref Range    Troponin I, High Sensitivity <3 0 - 13 ng/L   BLOOD GAS ARTERIAL FULL PANEL   Result Value Ref Range    POCT pH, Arterial 7.45 (H) 7.38 - 7.42 pH    POCT pCO2, Arterial 35 (L) 38 - 42 mm Hg    POCT pO2, Arterial 116 (H) 85 - 95 mm Hg    POCT SO2, Arterial 100 94 - 100 %    POCT Oxy Hemoglobin, Arterial 97.7 94.0 - 98.0 %    POCT Hematocrit Calculated, Arterial 42.0 36.0 - 46.0 %    POCT Sodium, Arterial 136 136 - 145 mmol/L    POCT Potassium, Arterial 2.9 (LL) 3.5 - 5.3 mmol/L    POCT Chloride, Arterial 103 98 - 107 mmol/L    POCT Ionized Calcium, Arterial 1.13 1.10 - 1.33 mmol/L    POCT Glucose, Arterial 130 (H) 74 - 99 mg/dL    POCT Lactate, Arterial 1.4 0.4 - 2.0 mmol/L    POCT Base Excess, Arterial 0.7 -2.0 - 3.0 mmol/L    POCT HCO3 Calculated, Arterial 24.3 22.0 - 26.0 mmol/L    POCT Hemoglobin, Arterial 14.1 12.0 - 16.0 g/dL    POCT Anion Gap, Arterial 12 10 - 25 mmo/L    Patient Temperature      FiO2 30 %    Ipap CMH2O 16.0 cm H2O    Epap CMH2O 8.0 cm H2O    Critical Called By YURY PETERSON     Critical Called To DR. LOVELACE     Critical Call Time 250.0000     Critical Read Back Y     Site of Arterial Puncture Radial Left     Vinay's Test Positive    Troponin, High Sensitivity, 1 Hour   Result Value Ref Range    Troponin I, High Sensitivity <3 0 - 13 ng/L   RSV PCR   Result  Value Ref Range    RSV PCR Detected (A) Not Detected   Sars-CoV-2 PCR, Symptomatic   Result Value Ref Range    Coronavirus 2019, PCR Not Detected Not Detected   Influenza A, and B PCR   Result Value Ref Range    Flu A Result Detected (A) Not Detected    Flu B Result Not Detected Not Detected        Imaging:  CT angio chest for pulmonary embolism  Narrative: Interpreted By:  Paul Aponte,   STUDY:  CT ANGIO CHEST FOR PULMONARY EMBOLISM;  1/28/2024 4:03 am      INDICATION:  Signs/Symptoms:cp elevated d dimer.      COMPARISON:  10/15/2023      ACCESSION NUMBER(S):  [id number]      ORDERING CLINICIAN:  TRACI STUBBS      TECHNIQUE:  Helical data acquisition of the chest was obtained after intravenous  administration of , as per PE protocol. Images were reformatted in  coronal and sagittal planes. Axial and coronal maximum intensity  projection (MIP) images were created and reviewed.      FINDINGS:  POTENTIAL LIMITATIONS OF THE STUDY: Body habitus limits sensitivity      HEART AND VESSELS:  There are no discrete filling defects within main pulmonary artery  and its branches to suggest acute pulmonary embolism. Main pulmonary  artery and its branches are normal in caliber.      The thoracic aorta normal in course and caliber.  No coronary artery calcifications are seen. Please note, the study is  not optimized for evaluation of coronary arteries.      The cardiac chambers are not enlarged.      There is no pericardial effusion seen.      MEDIASTINUM AND STERLING, LOWER NECK AND AXILLA:  The visualized thyroid gland is within normal limits.  No evidence of thoracic lymphadenopathy by CT criteria. Changes of  old healed granulomas disease. Esophagus appears within normal limits  as seen.      LUNGS AND AIRWAYS:  The trachea and central airways are patent. No endobronchial lesion  is seen.      The bilateral lungs are clear without evidence of focal  consolidation, pleural effusion, or pneumothorax.          UPPER  ABDOMEN:  Spleen is enlarged to 14 cm. Fatty infiltration of the liver.  Gallbladder is been removed. Small sliding hiatal hernia      CHEST WALL AND OSSEOUS STRUCTURES:  Chest wall is within normal limits.  No acute osseous pathology.There are no suspicious osseous lesions.      Impression: 1. Limited assessment due to body habitus. No features suspicious for  pulmonary embolism. No focal infiltrate. Fatty infiltration of the  liver. Enlarged spleen          MACRO:  None      Signed by: Paul Aponte 1/28/2024 5:15 AM  Dictation workstation:   [id number]  XR chest 1 view  Narrative: Interpreted By:  Salbador Urbina,   STUDY:  XR CHEST 1 VIEW;  1/28/2024 2:54 am      INDICATION:  Signs/Symptoms:dyspnea.      COMPARISON:  1/1/2024      ACCESSION NUMBER(S):  [id number]      ORDERING CLINICIAN:  TRACI STUBBS      FINDINGS:  The cardiac silhouette is normal in size. No focal airspace  consolidation or pleural effusion. No pneumothorax.      Impression: No airspace consolidation or pleural effusion.      MACRO:  None      Signed by: Salbador Urbina 1/28/2024 3:05 AM  Dictation workstation:   [id number]

## 2024-01-29 VITALS
BODY MASS INDEX: 48.82 KG/M2 | HEIGHT: 65 IN | TEMPERATURE: 98.4 F | HEART RATE: 74 BPM | RESPIRATION RATE: 16 BRPM | DIASTOLIC BLOOD PRESSURE: 59 MMHG | OXYGEN SATURATION: 92 % | SYSTOLIC BLOOD PRESSURE: 120 MMHG | WEIGHT: 293 LBS

## 2024-01-29 LAB
ALBUMIN SERPL BCP-MCNC: 3.8 G/DL (ref 3.4–5)
ALP SERPL-CCNC: 78 U/L (ref 33–110)
ALT SERPL W P-5'-P-CCNC: 86 U/L (ref 7–45)
ANION GAP SERPL CALC-SCNC: 11 MMOL/L (ref 10–20)
AST SERPL W P-5'-P-CCNC: 46 U/L (ref 9–39)
BILIRUB SERPL-MCNC: 0.3 MG/DL (ref 0–1.2)
BUN SERPL-MCNC: 10 MG/DL (ref 6–23)
CALCIUM SERPL-MCNC: 8.8 MG/DL (ref 8.6–10.3)
CHLORIDE SERPL-SCNC: 105 MMOL/L (ref 98–107)
CO2 SERPL-SCNC: 26 MMOL/L (ref 21–32)
CREAT SERPL-MCNC: 0.89 MG/DL (ref 0.5–1.05)
EGFRCR SERPLBLD CKD-EPI 2021: 86 ML/MIN/1.73M*2
GLUCOSE SERPL-MCNC: 148 MG/DL (ref 74–99)
HOLD SPECIMEN: NORMAL
MAGNESIUM SERPL-MCNC: 2.17 MG/DL (ref 1.6–2.4)
POTASSIUM SERPL-SCNC: 4.5 MMOL/L (ref 3.5–5.3)
PROCALCITONIN SERPL-MCNC: 0.15 NG/ML
PROT SERPL-MCNC: 6.7 G/DL (ref 6.4–8.2)
SODIUM SERPL-SCNC: 137 MMOL/L (ref 136–145)

## 2024-01-29 PROCEDURE — 2500000002 HC RX 250 W HCPCS SELF ADMINISTERED DRUGS (ALT 637 FOR MEDICARE OP, ALT 636 FOR OP/ED): Performed by: INTERNAL MEDICINE

## 2024-01-29 PROCEDURE — 80053 COMPREHEN METABOLIC PANEL: CPT | Performed by: INTERNAL MEDICINE

## 2024-01-29 PROCEDURE — 2500000001 HC RX 250 WO HCPCS SELF ADMINISTERED DRUGS (ALT 637 FOR MEDICARE OP): Performed by: INTERNAL MEDICINE

## 2024-01-29 PROCEDURE — 2500000004 HC RX 250 GENERAL PHARMACY W/ HCPCS (ALT 636 FOR OP/ED): Performed by: INTERNAL MEDICINE

## 2024-01-29 PROCEDURE — 94640 AIRWAY INHALATION TREATMENT: CPT

## 2024-01-29 PROCEDURE — 83735 ASSAY OF MAGNESIUM: CPT | Performed by: INTERNAL MEDICINE

## 2024-01-29 PROCEDURE — 36415 COLL VENOUS BLD VENIPUNCTURE: CPT | Performed by: INTERNAL MEDICINE

## 2024-01-29 PROCEDURE — 2500000004 HC RX 250 GENERAL PHARMACY W/ HCPCS (ALT 636 FOR OP/ED): Performed by: PHYSICIAN ASSISTANT

## 2024-01-29 PROCEDURE — 2500000001 HC RX 250 WO HCPCS SELF ADMINISTERED DRUGS (ALT 637 FOR MEDICARE OP): Performed by: NURSE PRACTITIONER

## 2024-01-29 PROCEDURE — 99238 HOSP IP/OBS DSCHRG MGMT 30/<: CPT | Performed by: INTERNAL MEDICINE

## 2024-01-29 RX ORDER — PREDNISONE 20 MG/1
TABLET ORAL
Qty: 7 TABLET | Refills: 0 | OUTPATIENT
Start: 2024-01-29 | End: 2024-01-31

## 2024-01-29 RX ORDER — OSELTAMIVIR PHOSPHATE 75 MG/1
75 CAPSULE ORAL 2 TIMES DAILY
Qty: 7 CAPSULE | Refills: 0 | Status: SHIPPED | OUTPATIENT
Start: 2024-01-29 | End: 2024-02-02

## 2024-01-29 RX ADMIN — FLUTICASONE FUROATE AND VILANTEROL TRIFENATATE 1 PUFF: 100; 25 POWDER RESPIRATORY (INHALATION) at 09:13

## 2024-01-29 RX ADMIN — HYDROCODONE BITARTRATE AND ACETAMINOPHEN 1 TABLET: 5; 325 TABLET ORAL at 03:50

## 2024-01-29 RX ADMIN — BUDESONIDE INHALATION 0.5 MG: 0.5 SUSPENSION RESPIRATORY (INHALATION) at 07:23

## 2024-01-29 RX ADMIN — IPRATROPIUM BROMIDE AND ALBUTEROL SULFATE 3 ML: 2.5; .5 SOLUTION RESPIRATORY (INHALATION) at 07:23

## 2024-01-29 RX ADMIN — LEVOTHYROXINE SODIUM 100 MCG: 0.1 TABLET ORAL at 06:47

## 2024-01-29 RX ADMIN — TIOTROPIUM BROMIDE INHALATION SPRAY 2 PUFF: 3.12 SPRAY, METERED RESPIRATORY (INHALATION) at 09:12

## 2024-01-29 RX ADMIN — PANTOPRAZOLE SODIUM 40 MG: 40 TABLET, DELAYED RELEASE ORAL at 06:47

## 2024-01-29 RX ADMIN — DESVENLAFAXINE 100 MG: 50 TABLET, FILM COATED, EXTENDED RELEASE ORAL at 09:08

## 2024-01-29 RX ADMIN — METHYLPREDNISOLONE SODIUM SUCCINATE 40 MG: 40 INJECTION, POWDER, FOR SOLUTION INTRAMUSCULAR; INTRAVENOUS at 09:09

## 2024-01-29 RX ADMIN — OSELTAMIVIR PHOSPHATE 75 MG: 75 CAPSULE ORAL at 09:09

## 2024-01-29 RX ADMIN — HYDROCODONE BITARTRATE AND ACETAMINOPHEN 1 TABLET: 5; 325 TABLET ORAL at 10:40

## 2024-01-29 RX ADMIN — ASPIRIN 81 MG: 81 TABLET, CHEWABLE ORAL at 09:09

## 2024-01-29 RX ADMIN — POTASSIUM CHLORIDE 40 MEQ: 1500 TABLET, EXTENDED RELEASE ORAL at 09:09

## 2024-01-29 RX ADMIN — BUTALBITAL, ACETAMINOPHEN, AND CAFFEINE 1 TABLET: 50; 325; 40 TABLET ORAL at 06:47

## 2024-01-29 ASSESSMENT — PAIN SCALES - GENERAL: PAINLEVEL_OUTOF10: 7

## 2024-01-29 ASSESSMENT — PAIN - FUNCTIONAL ASSESSMENT: PAIN_FUNCTIONAL_ASSESSMENT: 0-10

## 2024-01-29 ASSESSMENT — PAIN DESCRIPTION - LOCATION: LOCATION: HEAD

## 2024-01-29 NOTE — DISCHARGE SUMMARY
DISCHARGE DIAGNOSIS     Acute asthma exacerbation  Acute hypoxemic respiratory failure  RSV infection  Influenza infection  Hypokalemia    HOSPITAL COURSE AND DETAILS     38F with PMH of severe asthma with multiple recent visits for exacerbations who presented with worsening SOB, wheezing, fatigue that did not improve with home inhalers and nebulizer treatments. She was hypoxemic in ED requiring BiPAP, and then Ventimask. Her CTA chest was neg for PE, no focal consolidation c/f PNA. She tested positive for influenza and RSV infections, likely etiology for her exacerbation. Pulm followed here. She was treated with supplemental O2, IV steroid, IV doxycycline, her home inhalers, Tamiflu. She was weaned to room air prior to discharge. Her doxycycline was discontinued with low procal and positive viral testing. She will continue on steroid taper and Tamiflu. She has pulm and allergy appts scheduled as outpt. She also had hypokalemia, improved with repletion, likely was due to her breathing treatments. Stable for dc to home.     DISCHARGE PHYSICAL EXAM     Last Recorded Vitals:  Vitals:    01/28/24 2010 01/28/24 2104 01/29/24 0723 01/29/24 0836   BP: 133/72   120/59   BP Location:    Left arm   Patient Position:    Sitting   Pulse: 82   74   Resp:    16   Temp: 35.9 °C (96.6 °F)   36.9 °C (98.4 °F)   TempSrc:    Temporal   SpO2: 95% 95% 96% 92%   Weight:       Height:           Physical Exam:  GEN: healthy appearing, appears stated age, NAD  HEENT: NCAT  CV: RRR, no m/r/g, no LE edema  LUNGS: CTAB, no w/r/c  ABD: soft, NT, ND, NBS  SKIN: no rashes  MSK; no gross deformities, normal joints  NEURO: A+Ox3, no FND  PSYCH: appropriate mood, affect      PERTINENT LABS AND IMAGING     Results for orders placed or performed during the hospital encounter of 01/28/24 (from the past 96 hour(s))   CBC and Auto Differential   Result Value Ref Range    WBC 7.2 4.4 - 11.3 x10*3/uL    nRBC 0.0 0.0 - 0.0 /100 WBCs    RBC 4.95 4.00 -  5.20 x10*6/uL    Hemoglobin 15.0 12.0 - 16.0 g/dL    Hematocrit 42.8 36.0 - 46.0 %    MCV 87 80 - 100 fL    MCH 30.3 26.0 - 34.0 pg    MCHC 35.0 32.0 - 36.0 g/dL    RDW 11.9 11.5 - 14.5 %    Platelets 338 150 - 450 x10*3/uL    Neutrophils % 76.7 40.0 - 80.0 %    Immature Granulocytes %, Automated 0.3 0.0 - 0.9 %    Lymphocytes % 14.3 13.0 - 44.0 %    Monocytes % 8.6 2.0 - 10.0 %    Eosinophils % 0.0 0.0 - 6.0 %    Basophils % 0.1 0.0 - 2.0 %    Neutrophils Absolute 5.53 1.20 - 7.70 x10*3/uL    Immature Granulocytes Absolute, Automated 0.02 0.00 - 0.70 x10*3/uL    Lymphocytes Absolute 1.03 (L) 1.20 - 4.80 x10*3/uL    Monocytes Absolute 0.62 0.10 - 1.00 x10*3/uL    Eosinophils Absolute 0.00 0.00 - 0.70 x10*3/uL    Basophils Absolute 0.01 0.00 - 0.10 x10*3/uL   Comprehensive metabolic panel   Result Value Ref Range    Glucose 113 (H) 74 - 99 mg/dL    Sodium 135 (L) 136 - 145 mmol/L    Potassium 3.4 (L) 3.5 - 5.3 mmol/L    Chloride 103 98 - 107 mmol/L    Bicarbonate 23 21 - 32 mmol/L    Anion Gap 12 10 - 20 mmol/L    Urea Nitrogen 8 6 - 23 mg/dL    Creatinine 0.94 0.50 - 1.05 mg/dL    eGFR 81 >60 mL/min/1.73m*2    Calcium 8.9 8.6 - 10.3 mg/dL    Albumin 4.2 3.4 - 5.0 g/dL    Alkaline Phosphatase 86 33 - 110 U/L    Total Protein 7.5 6.4 - 8.2 g/dL    AST 42 (H) 9 - 39 U/L    Bilirubin, Total 0.7 0.0 - 1.2 mg/dL    ALT 57 (H) 7 - 45 U/L   Magnesium   Result Value Ref Range    Magnesium 1.87 1.60 - 2.40 mg/dL   D-dimer, quantitative   Result Value Ref Range    D-Dimer Non VTE, Quant (ng/mL FEU) 749 (H) <=500 ng/mL FEU   Troponin I, High Sensitivity, Initial   Result Value Ref Range    Troponin I, High Sensitivity <3 0 - 13 ng/L   ECG 12 lead   Result Value Ref Range    Ventricular Rate 120 BPM    Atrial Rate 120 BPM    UT Interval 132 ms    QRS Duration 78 ms    QT Interval 314 ms    QTC Calculation(Bazett) 443 ms    P Axis 37 degrees    R Axis -17 degrees    T Axis 48 degrees    QRS Count 19 beats    Q Onset 213 ms    P  Onset 147 ms    P Offset 195 ms    T Offset 370 ms    QTC Fredericia 395 ms   BLOOD GAS ARTERIAL FULL PANEL   Result Value Ref Range    POCT pH, Arterial 7.45 (H) 7.38 - 7.42 pH    POCT pCO2, Arterial 35 (L) 38 - 42 mm Hg    POCT pO2, Arterial 116 (H) 85 - 95 mm Hg    POCT SO2, Arterial 100 94 - 100 %    POCT Oxy Hemoglobin, Arterial 97.7 94.0 - 98.0 %    POCT Hematocrit Calculated, Arterial 42.0 36.0 - 46.0 %    POCT Sodium, Arterial 136 136 - 145 mmol/L    POCT Potassium, Arterial 2.9 (LL) 3.5 - 5.3 mmol/L    POCT Chloride, Arterial 103 98 - 107 mmol/L    POCT Ionized Calcium, Arterial 1.13 1.10 - 1.33 mmol/L    POCT Glucose, Arterial 130 (H) 74 - 99 mg/dL    POCT Lactate, Arterial 1.4 0.4 - 2.0 mmol/L    POCT Base Excess, Arterial 0.7 -2.0 - 3.0 mmol/L    POCT HCO3 Calculated, Arterial 24.3 22.0 - 26.0 mmol/L    POCT Hemoglobin, Arterial 14.1 12.0 - 16.0 g/dL    POCT Anion Gap, Arterial 12 10 - 25 mmo/L    Patient Temperature      FiO2 30 %    Ipap CMH2O 16.0 cm H2O    Epap CMH2O 8.0 cm H2O    Critical Called By YURY PETERSON     Critical Called To DR. LOVELACE     Critical Call Time 250.0000     Critical Read Back Y     Site of Arterial Puncture Radial Left     Vinay's Test Positive    Troponin, High Sensitivity, 1 Hour   Result Value Ref Range    Troponin I, High Sensitivity <3 0 - 13 ng/L   RSV PCR   Result Value Ref Range    RSV PCR Detected (A) Not Detected   Sars-CoV-2 PCR, Symptomatic   Result Value Ref Range    Coronavirus 2019, PCR Not Detected Not Detected   Influenza A, and B PCR   Result Value Ref Range    Flu A Result Detected (A) Not Detected    Flu B Result Not Detected Not Detected   Potassium   Result Value Ref Range    Potassium 4.1 3.5 - 5.3 mmol/L   Magnesium   Result Value Ref Range    Magnesium 2.12 1.60 - 2.40 mg/dL   Procalcitonin   Result Value Ref Range    Procalcitonin 0.15 (H) <=0.07 ng/mL   Comprehensive metabolic panel   Result Value Ref Range    Glucose 148 (H) 74 - 99 mg/dL    Sodium 137  136 - 145 mmol/L    Potassium 4.5 3.5 - 5.3 mmol/L    Chloride 105 98 - 107 mmol/L    Bicarbonate 26 21 - 32 mmol/L    Anion Gap 11 10 - 20 mmol/L    Urea Nitrogen 10 6 - 23 mg/dL    Creatinine 0.89 0.50 - 1.05 mg/dL    eGFR 86 >60 mL/min/1.73m*2    Calcium 8.8 8.6 - 10.3 mg/dL    Albumin 3.8 3.4 - 5.0 g/dL    Alkaline Phosphatase 78 33 - 110 U/L    Total Protein 6.7 6.4 - 8.2 g/dL    AST 46 (H) 9 - 39 U/L    Bilirubin, Total 0.3 0.0 - 1.2 mg/dL    ALT 86 (H) 7 - 45 U/L   Magnesium   Result Value Ref Range    Magnesium 2.17 1.60 - 2.40 mg/dL   Light Blue Top   Result Value Ref Range    Extra Tube Hold for add-ons.    Lavender Top   Result Value Ref Range    Extra Tube Hold for add-ons.    SST TOP   Result Value Ref Range    Extra Tube Hold for add-ons.         CT angio chest for pulmonary embolism   Final Result   1. Limited assessment due to body habitus. No features suspicious for   pulmonary embolism. No focal infiltrate. Fatty infiltration of the   liver. Enlarged spleen             MACRO:   None        Signed by: Paul Aponte 1/28/2024 5:15 AM   Dictation workstation:   [id number]      XR chest 1 view   Final Result   No airspace consolidation or pleural effusion.        MACRO:   None        Signed by: Salbador Urbina 1/28/2024 3:05 AM   Dictation workstation:   [id number]          No echocardiogram results found for the past 14 days    DISCHARGE MEDICATIONS        Your medication list        START taking these medications        Instructions Last Dose Given Next Dose Due   oseltamivir 75 mg capsule  Commonly known as: Tamiflu      Take 1 capsule (75 mg) by mouth 2 times a day for 7 doses.       predniSONE 20 mg tablet  Commonly known as: Deltasone  Start taking on: January 29, 2024      Take 2 tablets (40 mg) by mouth once daily for 2 days, THEN 1 tablet (20 mg) once daily for 2 days, THEN 0.5 tablets (10 mg) once daily for 2 days.              CONTINUE taking these medications        Instructions Last  Dose Given Next Dose Due   albuterol 2.5 mg /3 mL (0.083 %) nebulizer solution           albuterol 90 mcg/actuation inhaler           aspirin 81 mg chewable tablet           budesonide 0.5 mg/2 mL nebulizer solution  Commonly known as: Pulmicort           butalbital-acetaminophen-caff -40 mg tablet      Take 1 tablet by mouth early in the morning.. 1 tab(s) orally once a day, As Needed       desvenlafaxine 50 mg 24 hr tablet  Commonly known as: Pristiq           desvenlafaxine 100 mg 24 hr tablet  Commonly known as: Pristiq      TAKE 1 TABLET BY MOUTH EVERY DAY       Emgality Syringe 120 mg/mL prefilled syringe  Generic drug: galcanezumab           EPINEPHrine 0.3 mg/0.3 mL injection syringe  Commonly known as: Epipen      Inject 0.3 mL (0.3 mg) into the muscle once daily as needed for anaphylaxis.       fexofenadine 180 mg tablet  Commonly known as: Allegra           hydrOXYzine HCL 25 mg tablet  Commonly known as: Atarax      Take 1 tablet (25 mg) by mouth 3 times a day as needed for anxiety.       levothyroxine 100 mcg tablet  Commonly known as: Synthroid, Levoxyl           montelukast 10 mg tablet  Commonly known as: Singulair      Take 1 tablet (10 mg) by mouth once daily at bedtime.       norethindrone 0.35 mg tablet  Commonly known as: Deblitane      Take 1 tablet (0.35 mg) over 84 days by mouth once daily.       REGLAN ORAL           rizatriptan 10 mg tablet  Commonly known as: Maxalt           SUMAtriptan 100 mg tablet  Commonly known as: Imitrex           Trelegy Ellipta 100-62.5-25 mcg blister with device  Generic drug: fluticasone-umeclidin-vilanter           TRELEGY ELLIPTA INHL                     Where to Get Your Medications        These medications were sent to [id number] - [place], [place] - [address]  [address], [place] [address]      Phone: [phone]   oseltamivir 75 mg capsule  predniSONE 20 mg tablet         OUTPATIENT FOLLOW-UP     Future  Appointments   Date Time Provider Department Center   2/1/2024  9:00 AM Edin Galdamez MD [id number] [place]   2/28/2024 10:40 AM DO Zulema Le [place]

## 2024-01-29 NOTE — CARE PLAN
The patient's goals for the shift include      The clinical goals for the shift include to seek treatment

## 2024-01-30 ENCOUNTER — DOCUMENTATION (OUTPATIENT)
Dept: PRIMARY CARE | Facility: CLINIC | Age: 39
End: 2024-01-30
Payer: COMMERCIAL

## 2024-01-30 ENCOUNTER — TELEPHONE (OUTPATIENT)
Dept: CARDIOLOGY | Facility: HOSPITAL | Age: 39
End: 2024-01-30
Payer: COMMERCIAL

## 2024-01-30 LAB
ATRIAL RATE: 120 BPM
P AXIS: 37 DEGREES
P OFFSET: 195 MS
P ONSET: 147 MS
PR INTERVAL: 132 MS
Q ONSET: 213 MS
QRS COUNT: 19 BEATS
QRS DURATION: 78 MS
QT INTERVAL: 314 MS
QTC CALCULATION(BAZETT): 443 MS
QTC FREDERICIA: 395 MS
R AXIS: -17 DEGREES
T AXIS: 48 DEGREES
T OFFSET: 370 MS
VENTRICULAR RATE: 120 BPM

## 2024-01-30 NOTE — LETTER
Sarahi Haley  1985   MRN- [id number]        Antelmo Mcclain,      I am reaching out from Dr. Patrice Warren MD [facility] office. We received a preop clearance form for you pending possible future Bariatric procedure and need to schedule a visit with our office for clearance.   We have left a few voicemails, but have been unsuccessful in reaching you.      Please call our office to schedule. [phone]     Ziyad ISLAS RN

## 2024-01-30 NOTE — TELEPHONE ENCOUNTER
Received preop clearance form for patient pending Bariatric procedure with [facility] in near future.     Patient was seen with Dr. Patrice Warren MD [facility] in July 2023 after an ER episode in which EKG was abnormal and Troponins were mildly elevated.   GXT ordered in follow up, this was completed and is abnormal.     Patient has not been seen since.   Will arrange for OV with MD JONATHAN Hallman for clearance.     Form with this writer- placed in wire basket.

## 2024-01-31 ENCOUNTER — APPOINTMENT (OUTPATIENT)
Dept: RADIOLOGY | Facility: HOSPITAL | Age: 39
End: 2024-01-31
Payer: COMMERCIAL

## 2024-01-31 ENCOUNTER — HOSPITAL ENCOUNTER (EMERGENCY)
Facility: HOSPITAL | Age: 39
Discharge: HOME | End: 2024-02-01
Attending: STUDENT IN AN ORGANIZED HEALTH CARE EDUCATION/TRAINING PROGRAM
Payer: COMMERCIAL

## 2024-01-31 DIAGNOSIS — J45.901 MODERATE ASTHMA WITH EXACERBATION, UNSPECIFIED WHETHER PERSISTENT (HHS-HCC): ICD-10-CM

## 2024-01-31 DIAGNOSIS — R06.02 SHORTNESS OF BREATH: Primary | ICD-10-CM

## 2024-01-31 PROCEDURE — 99285 EMERGENCY DEPT VISIT HI MDM: CPT | Performed by: STUDENT IN AN ORGANIZED HEALTH CARE EDUCATION/TRAINING PROGRAM

## 2024-01-31 PROCEDURE — 2500000002 HC RX 250 W HCPCS SELF ADMINISTERED DRUGS (ALT 637 FOR MEDICARE OP, ALT 636 FOR OP/ED): Performed by: STUDENT IN AN ORGANIZED HEALTH CARE EDUCATION/TRAINING PROGRAM

## 2024-01-31 PROCEDURE — 96375 TX/PRO/DX INJ NEW DRUG ADDON: CPT

## 2024-01-31 PROCEDURE — 96361 HYDRATE IV INFUSION ADD-ON: CPT

## 2024-01-31 PROCEDURE — 96372 THER/PROPH/DIAG INJ SC/IM: CPT

## 2024-01-31 PROCEDURE — 2500000004 HC RX 250 GENERAL PHARMACY W/ HCPCS (ALT 636 FOR OP/ED): Performed by: STUDENT IN AN ORGANIZED HEALTH CARE EDUCATION/TRAINING PROGRAM

## 2024-01-31 PROCEDURE — 71045 X-RAY EXAM CHEST 1 VIEW: CPT | Performed by: RADIOLOGY

## 2024-01-31 PROCEDURE — 96365 THER/PROPH/DIAG IV INF INIT: CPT | Mod: 59

## 2024-01-31 PROCEDURE — 94640 AIRWAY INHALATION TREATMENT: CPT

## 2024-01-31 PROCEDURE — 71045 X-RAY EXAM CHEST 1 VIEW: CPT

## 2024-01-31 PROCEDURE — 99284 EMERGENCY DEPT VISIT MOD MDM: CPT | Mod: 25

## 2024-01-31 RX ORDER — EPINEPHRINE 1 MG/ML
0.3 INJECTION INTRAMUSCULAR; INTRAVENOUS; SUBCUTANEOUS ONCE
Status: COMPLETED | OUTPATIENT
Start: 2024-01-31 | End: 2024-01-31

## 2024-01-31 RX ORDER — IPRATROPIUM BROMIDE AND ALBUTEROL SULFATE 2.5; .5 MG/3ML; MG/3ML
3 SOLUTION RESPIRATORY (INHALATION) EVERY 20 MIN
Status: COMPLETED | OUTPATIENT
Start: 2024-01-31 | End: 2024-01-31

## 2024-01-31 RX ORDER — PREDNISONE 20 MG/1
40 TABLET ORAL DAILY
Qty: 14 TABLET | Refills: 0 | Status: SHIPPED | OUTPATIENT
Start: 2024-01-31 | End: 2024-02-07

## 2024-01-31 RX ORDER — PROCHLORPERAZINE EDISYLATE 5 MG/ML
10 INJECTION INTRAMUSCULAR; INTRAVENOUS ONCE
Status: COMPLETED | OUTPATIENT
Start: 2024-01-31 | End: 2024-02-01

## 2024-01-31 RX ORDER — ACETAMINOPHEN 325 MG/1
975 TABLET ORAL ONCE
Status: COMPLETED | OUTPATIENT
Start: 2024-01-31 | End: 2024-02-01

## 2024-01-31 RX ORDER — MAGNESIUM SULFATE HEPTAHYDRATE 40 MG/ML
2 INJECTION, SOLUTION INTRAVENOUS ONCE
Status: COMPLETED | OUTPATIENT
Start: 2024-01-31 | End: 2024-01-31

## 2024-01-31 RX ORDER — ONDANSETRON HYDROCHLORIDE 2 MG/ML
4 INJECTION, SOLUTION INTRAVENOUS ONCE
Status: COMPLETED | OUTPATIENT
Start: 2024-01-31 | End: 2024-01-31

## 2024-01-31 RX ADMIN — IPRATROPIUM BROMIDE AND ALBUTEROL SULFATE 3 ML: 2.5; .5 SOLUTION RESPIRATORY (INHALATION) at 22:48

## 2024-01-31 RX ADMIN — METHYLPREDNISOLONE SODIUM SUCCINATE 125 MG: 125 INJECTION, POWDER, FOR SOLUTION INTRAMUSCULAR; INTRAVENOUS at 22:20

## 2024-01-31 RX ADMIN — EPINEPHRINE 0.3 MG: 1 INJECTION INTRAMUSCULAR; INTRAVENOUS; SUBCUTANEOUS at 23:20

## 2024-01-31 RX ADMIN — IPRATROPIUM BROMIDE AND ALBUTEROL SULFATE 3 ML: 2.5; .5 SOLUTION RESPIRATORY (INHALATION) at 22:26

## 2024-01-31 RX ADMIN — MAGNESIUM SULFATE HEPTAHYDRATE 2 G: 40 INJECTION, SOLUTION INTRAVENOUS at 22:19

## 2024-01-31 RX ADMIN — ONDANSETRON 4 MG: 2 INJECTION INTRAMUSCULAR; INTRAVENOUS at 22:20

## 2024-01-31 ASSESSMENT — LIFESTYLE VARIABLES
EVER HAD A DRINK FIRST THING IN THE MORNING TO STEADY YOUR NERVES TO GET RID OF A HANGOVER: NO
HAVE PEOPLE ANNOYED YOU BY CRITICIZING YOUR DRINKING: NO
EVER FELT BAD OR GUILTY ABOUT YOUR DRINKING: NO
HAVE YOU EVER FELT YOU SHOULD CUT DOWN ON YOUR DRINKING: NO

## 2024-01-31 ASSESSMENT — PAIN - FUNCTIONAL ASSESSMENT: PAIN_FUNCTIONAL_ASSESSMENT: 0-10

## 2024-01-31 ASSESSMENT — PAIN SCALES - GENERAL: PAINLEVEL_OUTOF10: 0 - NO PAIN

## 2024-01-31 NOTE — PROGRESS NOTES
Discharge Facility:  [facility]  Discharge Diagnosis:  asthma exacerbation  Admission Date:  1/28/24  Discharge Date: 1/29/24    PCP Appointment Date:  none noted.  Message to office    Specialist Appointment Date:   2/28/24 allergy and immunology    Hospital Encounter and Summary: Linked

## 2024-02-01 VITALS
HEIGHT: 65 IN | DIASTOLIC BLOOD PRESSURE: 60 MMHG | OXYGEN SATURATION: 94 % | BODY MASS INDEX: 48.82 KG/M2 | TEMPERATURE: 98.4 F | SYSTOLIC BLOOD PRESSURE: 135 MMHG | HEART RATE: 98 BPM | WEIGHT: 293 LBS | RESPIRATION RATE: 18 BRPM

## 2024-02-01 PROCEDURE — 2500000004 HC RX 250 GENERAL PHARMACY W/ HCPCS (ALT 636 FOR OP/ED): Performed by: STUDENT IN AN ORGANIZED HEALTH CARE EDUCATION/TRAINING PROGRAM

## 2024-02-01 RX ADMIN — SODIUM CHLORIDE, POTASSIUM CHLORIDE, SODIUM LACTATE AND CALCIUM CHLORIDE 1000 ML: 600; 310; 30; 20 INJECTION, SOLUTION INTRAVENOUS at 00:06

## 2024-02-01 RX ADMIN — PROCHLORPERAZINE EDISYLATE 10 MG: 5 INJECTION INTRAMUSCULAR; INTRAVENOUS at 00:06

## 2024-02-01 RX ADMIN — ACETAMINOPHEN 975 MG: 325 TABLET ORAL at 00:06

## 2024-02-01 ASSESSMENT — PAIN SCALES - GENERAL: PAINLEVEL_OUTOF10: 0 - NO PAIN

## 2024-02-01 ASSESSMENT — PAIN - FUNCTIONAL ASSESSMENT: PAIN_FUNCTIONAL_ASSESSMENT: 0-10

## 2024-02-01 NOTE — ED PROVIDER NOTES
This is a patient that has a longstanding history of chronic respiratory exacerbations.  Was recently admitted and discharged from the hospital several days ago after suffering acute exacerbation likely secondary to influenza and RSV.  Was sent home on Tamiflu and steroid taper.  Was seen by my colleague earlier tonight and treated with typical epinephrine concoction including epinephrine, Solu-Medrol and breathing treatments.    Patient has been observed for 2 hours from initial epinephrine dose and has not required further.  De-escalated oxygen therapy as well.  Back to baseline respiratory status.  Patient states that she wants to be discharged at this time.  The previous provider had wrote another taper for steroids, prednisone.  Repeat blood pressure 135/60, heart rate 98  And saturating 94 to 95% on room air.  The tachypnea has also improved/resolved.     Juan Alberto Do MD  02/01/24 0129

## 2024-02-01 NOTE — ED PROVIDER NOTES
HPI   No chief complaint on file.      Patient is a 38-year-old female with history of asthma, migraines, anxiety, depression who presents for shortness of breath.  Patient was recent diagnosed with flu and RSV, was sent home on Tamiflu and a steroid taper.  States has been taking her medications.  Had increasing shortness of breath earlier today.  Tried her inhalers at home, no relief.  Was given 1 DuoNeb's by EMS.  Endorses nausea with her shortness of breath.  No chest pain, fevers, chills, vomiting or diarrhea.  No history of intubation for asthma.                          No data recorded                Patient History   Past Medical History:   Diagnosis Date    Acute cystitis without hematuria 08/08/2019    Acute cystitis without hematuria    Asthma     Encounter for initial prescription of contraceptive pills 11/01/2019    Encounter for initial prescription of contraceptive pills    Nausea 02/05/2021    Nausea in adult    Other general symptoms and signs 12/05/2019    Forgetfulness    Parageusia 04/06/2020    Taste perversion    Pelvic and perineal pain     Pelvic pain in female    Personal history of other diseases of the respiratory system 04/06/2020    History of sinusitis    Personal history of other specified conditions 07/01/2020    History of vomiting    Personal history of other specified conditions 01/26/2021    History of headache    Personal history of other specified conditions 02/01/2021    History of diarrhea    Personal history of thrombophlebitis 06/03/2019    History of phlebitis    Personal history of urinary (tract) infections 01/16/2020    History of urinary tract infection     Past Surgical History:   Procedure Laterality Date    OTHER SURGICAL HISTORY  02/08/2019    Appendectomy    OTHER SURGICAL HISTORY  02/08/2019    Cholecystectomy    OTHER SURGICAL HISTORY  02/08/2019    Cystoscopy    OTHER SURGICAL HISTORY  02/08/2019    Ureteroscopy    OTHER SURGICAL HISTORY  02/08/2019    Shoulder  surgery     Family History   Problem Relation Name Age of Onset    Hypertension Father      Other (Pulmonary Valve Stenosis) Sister      Heart disease Maternal Grandmother      Diabetes Other Grandparent     Lung cancer Other Grandparent     Anxiety disorder Sibling       Social History     Tobacco Use    Smoking status: Never    Smokeless tobacco: Never   Vaping Use    Vaping Use: Never used   Substance Use Topics    Alcohol use: Yes     Comment: social    Drug use: Never       Physical Exam   ED Triage Vitals   Temp Pulse Resp BP   -- -- -- --      SpO2 Temp src Heart Rate Source Patient Position   -- -- -- --      BP Location FiO2 (%)     -- --       Physical Exam  Constitutional:       General: She is not in acute distress.  HENT:      Head: Normocephalic.   Eyes:      Extraocular Movements: Extraocular movements intact.      Conjunctiva/sclera: Conjunctivae normal.      Pupils: Pupils are equal, round, and reactive to light.   Cardiovascular:      Rate and Rhythm: Normal rate and regular rhythm.      Pulses: Normal pulses.      Heart sounds: Normal heart sounds.   Pulmonary:      Breath sounds: Wheezing present.      Comments: Increased work of breathing  Abdominal:      General: There is no distension.      Palpations: Abdomen is soft. There is no mass.      Tenderness: There is no abdominal tenderness. There is no guarding.   Musculoskeletal:         General: No deformity.      Cervical back: Normal range of motion and neck supple.      Right lower leg: No edema.      Left lower leg: No edema.   Skin:     General: Skin is warm and dry.      Findings: No lesion or rash.   Neurological:      General: No focal deficit present.      Mental Status: She is alert and oriented to person, place, and time. Mental status is at baseline.      Cranial Nerves: No cranial nerve deficit.      Sensory: No sensory deficit.      Motor: No weakness.   Psychiatric:         Mood and Affect: Mood normal.         ED Course & MDM    Diagnoses as of 02/01/24 1021   Shortness of breath   Moderate asthma with exacerbation, unspecified whether persistent       Medical Decision Making  Patient is a 38-year-old female with above-stated past medical history presents for shortness of breath.  Patient's history and physical exam are consistent with asthma exacerbation, likely secondary to her RSV and influenza status.  Chest x-ray on my interpretation shows no pneumothorax, consolidation, this was confirmed by radiology. There was interstitial edema which I suspect is due to her viral illnesses.  Low suspicion for a bacterial superinfection.  Patient was treated with magnesium, DuoNebs, steroids and had some improvement, however did require epinephrine and high flow nasal cannula.  On reevaluation, patient states she feels improved, though does have a headache which she tends to get after receiving epinephrine.  Headache is not the worst of her life and not sudden onset, low suspicion for subarachnoid hemorrhage or intracranial bleed.  Patient was given fluids, Tylenol, Compazine.  She was handed off in stable condition pending reevaluation and disposition.    Disclaimer: This note was dictated using speech recognition software. Minor errors in transcription may be present. Please call if questions.     Sundeep Will MD  [facility]  Contact on [employer]        Problems Addressed:  Moderate asthma with exacerbation, unspecified whether persistent: acute illness or injury  Shortness of breath: acute illness or injury    Amount and/or Complexity of Data Reviewed  Radiology: ordered and independent interpretation performed.        Procedure  Procedures     Sundeep Will MD  02/01/24 1023

## 2024-02-06 NOTE — TELEPHONE ENCOUNTER
Office has been unsuccessful in reaching patient to schedule POC visit.     Will send mychart to patient today to ask for her to return our call.

## 2024-02-12 ENCOUNTER — PATIENT OUTREACH (OUTPATIENT)
Dept: PRIMARY CARE | Facility: CLINIC | Age: 39
End: 2024-02-12
Payer: COMMERCIAL

## 2024-02-13 NOTE — TELEPHONE ENCOUNTER
Patient read mychart that I sent last week.   No response in terms of scheduling.     Will discard clearance forms.

## 2024-02-28 ENCOUNTER — APPOINTMENT (OUTPATIENT)
Dept: CARDIOLOGY | Facility: HOSPITAL | Age: 39
DRG: 202 | End: 2024-02-28
Payer: COMMERCIAL

## 2024-02-28 ENCOUNTER — APPOINTMENT (OUTPATIENT)
Dept: RADIOLOGY | Facility: HOSPITAL | Age: 39
DRG: 202 | End: 2024-02-28
Payer: COMMERCIAL

## 2024-02-28 ENCOUNTER — HOSPITAL ENCOUNTER (INPATIENT)
Facility: HOSPITAL | Age: 39
LOS: 2 days | Discharge: HOME | DRG: 202 | End: 2024-03-01
Attending: STUDENT IN AN ORGANIZED HEALTH CARE EDUCATION/TRAINING PROGRAM | Admitting: STUDENT IN AN ORGANIZED HEALTH CARE EDUCATION/TRAINING PROGRAM
Payer: COMMERCIAL

## 2024-02-28 ENCOUNTER — CONSULT (OUTPATIENT)
Dept: ALLERGY | Facility: CLINIC | Age: 39
End: 2024-02-28
Payer: COMMERCIAL

## 2024-02-28 VITALS — HEIGHT: 65 IN | BODY MASS INDEX: 48.82 KG/M2 | WEIGHT: 293 LBS | RESPIRATION RATE: 20 BRPM

## 2024-02-28 DIAGNOSIS — J45.51 SEVERE PERSISTENT ASTHMA WITH ACUTE EXACERBATION (MULTI): ICD-10-CM

## 2024-02-28 DIAGNOSIS — J96.01 ACUTE RESPIRATORY FAILURE WITH HYPOXIA (MULTI): Primary | ICD-10-CM

## 2024-02-28 DIAGNOSIS — J45.50 SEVERE PERSISTENT ASTHMA WITHOUT COMPLICATION (MULTI): Primary | ICD-10-CM

## 2024-02-28 DIAGNOSIS — T38.0X5A STEROID SIDE EFFECTS, INITIAL ENCOUNTER: ICD-10-CM

## 2024-02-28 DIAGNOSIS — R06.02 SHORTNESS OF BREATH: ICD-10-CM

## 2024-02-28 DIAGNOSIS — Z88.6 ALLERGY TO NONSTEROIDAL ANTI-INFLAMMATORY DRUG (NSAID): ICD-10-CM

## 2024-02-28 PROBLEM — R31.29 MICROHEMATURIA: Status: RESOLVED | Noted: 2023-10-10 | Resolved: 2024-02-28

## 2024-02-28 PROBLEM — R09.02 HYPOXIA: Status: RESOLVED | Noted: 2023-10-10 | Resolved: 2024-02-28

## 2024-02-28 PROBLEM — R53.83 FATIGUE: Status: RESOLVED | Noted: 2023-10-10 | Resolved: 2024-02-28

## 2024-02-28 PROBLEM — S89.92XA LEFT KNEE INJURY: Status: RESOLVED | Noted: 2023-10-10 | Resolved: 2024-02-28

## 2024-02-28 LAB
ALBUMIN SERPL BCP-MCNC: 4 G/DL (ref 3.4–5)
ALP SERPL-CCNC: 96 U/L (ref 33–110)
ALT SERPL W P-5'-P-CCNC: 105 U/L (ref 7–45)
ANION GAP BLDA CALCULATED.4IONS-SCNC: 7 MMO/L (ref 10–25)
ANION GAP SERPL CALC-SCNC: 12 MMOL/L (ref 10–20)
AST SERPL W P-5'-P-CCNC: 45 U/L (ref 9–39)
BASE EXCESS BLDA CALC-SCNC: 2 MMOL/L (ref -2–3)
BASOPHILS # BLD AUTO: 0.02 X10*3/UL (ref 0–0.1)
BASOPHILS NFR BLD AUTO: 0.2 %
BILIRUB SERPL-MCNC: 0.5 MG/DL (ref 0–1.2)
BNP SERPL-MCNC: 32 PG/ML (ref 0–99)
BODY TEMPERATURE: ABNORMAL
BUN SERPL-MCNC: 11 MG/DL (ref 6–23)
CA-I BLDA-SCNC: 1.15 MMOL/L (ref 1.1–1.33)
CALCIUM SERPL-MCNC: 9 MG/DL (ref 8.6–10.3)
CARDIAC TROPONIN I PNL SERPL HS: <3 NG/L (ref 0–13)
CHLORIDE BLDA-SCNC: 107 MMOL/L (ref 98–107)
CHLORIDE SERPL-SCNC: 105 MMOL/L (ref 98–107)
CO2 SERPL-SCNC: 24 MMOL/L (ref 21–32)
CREAT SERPL-MCNC: 0.86 MG/DL (ref 0.5–1.05)
EGFRCR SERPLBLD CKD-EPI 2021: 90 ML/MIN/1.73M*2
EOSINOPHIL # BLD AUTO: 0.01 X10*3/UL (ref 0–0.7)
EOSINOPHIL NFR BLD AUTO: 0.1 %
EPAP CMH2O: 5 CM H2O
ERYTHROCYTE [DISTWIDTH] IN BLOOD BY AUTOMATED COUNT: 12.3 % (ref 11.5–14.5)
FLUAV RNA RESP QL NAA+PROBE: NOT DETECTED
FLUBV RNA RESP QL NAA+PROBE: NOT DETECTED
FREQUENCY (BPM): 12 BPM
GLUCOSE BLDA-MCNC: 108 MG/DL (ref 74–99)
GLUCOSE SERPL-MCNC: 95 MG/DL (ref 74–99)
HCO3 BLDA-SCNC: 26.5 MMOL/L (ref 22–26)
HCT VFR BLD AUTO: 42.5 % (ref 36–46)
HCT VFR BLD EST: 44 % (ref 36–46)
HGB BLD-MCNC: 15 G/DL (ref 12–16)
HGB BLDA-MCNC: 14.5 G/DL (ref 12–16)
IMM GRANULOCYTES # BLD AUTO: 0.02 X10*3/UL (ref 0–0.7)
IMM GRANULOCYTES NFR BLD AUTO: 0.2 % (ref 0–0.9)
INHALED O2 CONCENTRATION: 40 %
INSPIRATORY TIME: 0.9
IPAP CMH2O: 10 CM H2O
LACTATE BLDA-SCNC: 1.7 MMOL/L (ref 0.4–2)
LYMPHOCYTES # BLD AUTO: 4.1 X10*3/UL (ref 1.2–4.8)
LYMPHOCYTES NFR BLD AUTO: 50.1 %
MCH RBC QN AUTO: 30.3 PG (ref 26–34)
MCHC RBC AUTO-ENTMCNC: 35.3 G/DL (ref 32–36)
MCV RBC AUTO: 86 FL (ref 80–100)
MONOCYTES # BLD AUTO: 0.66 X10*3/UL (ref 0.1–1)
MONOCYTES NFR BLD AUTO: 8.1 %
NEUTROPHILS # BLD AUTO: 3.37 X10*3/UL (ref 1.2–7.7)
NEUTROPHILS NFR BLD AUTO: 41.3 %
NRBC BLD-RTO: 0 /100 WBCS (ref 0–0)
OXYHGB MFR BLDA: 98.1 % (ref 94–98)
PCO2 BLDA: 40 MM HG (ref 38–42)
PH BLDA: 7.43 PH (ref 7.38–7.42)
PLATELET # BLD AUTO: 332 X10*3/UL (ref 150–450)
PO2 BLDA: 186 MM HG (ref 85–95)
POTASSIUM BLDA-SCNC: 3 MMOL/L (ref 3.5–5.3)
POTASSIUM SERPL-SCNC: 3.2 MMOL/L (ref 3.5–5.3)
PROT SERPL-MCNC: 6.8 G/DL (ref 6.4–8.2)
RBC # BLD AUTO: 4.95 X10*6/UL (ref 4–5.2)
RSV RNA RESP QL NAA+PROBE: NOT DETECTED
SAO2 % BLDA: 100 % (ref 94–100)
SARS-COV-2 RNA RESP QL NAA+PROBE: NOT DETECTED
SODIUM BLDA-SCNC: 137 MMOL/L (ref 136–145)
SODIUM SERPL-SCNC: 138 MMOL/L (ref 136–145)
SPECIMEN DRAWN FROM PATIENT: ABNORMAL
SPONTANEOUS TIDAL VOLUME: ABNORMAL
TOTAL MINUTE VOLUME: 10.5 LITER
VENTILATOR MODE: ABNORMAL
VENTILATOR RATE: 12 BPM
WBC # BLD AUTO: 8.2 X10*3/UL (ref 4.4–11.3)

## 2024-02-28 PROCEDURE — 1210000001 HC SEMI-PRIVATE ROOM DAILY

## 2024-02-28 PROCEDURE — 36415 COLL VENOUS BLD VENIPUNCTURE: CPT | Performed by: PHYSICIAN ASSISTANT

## 2024-02-28 PROCEDURE — 94660 CPAP INITIATION&MGMT: CPT

## 2024-02-28 PROCEDURE — 71045 X-RAY EXAM CHEST 1 VIEW: CPT | Performed by: STUDENT IN AN ORGANIZED HEALTH CARE EDUCATION/TRAINING PROGRAM

## 2024-02-28 PROCEDURE — 93005 ELECTROCARDIOGRAM TRACING: CPT

## 2024-02-28 PROCEDURE — 83880 ASSAY OF NATRIURETIC PEPTIDE: CPT | Performed by: PHYSICIAN ASSISTANT

## 2024-02-28 PROCEDURE — 2500000004 HC RX 250 GENERAL PHARMACY W/ HCPCS (ALT 636 FOR OP/ED): Performed by: STUDENT IN AN ORGANIZED HEALTH CARE EDUCATION/TRAINING PROGRAM

## 2024-02-28 PROCEDURE — 99291 CRITICAL CARE FIRST HOUR: CPT

## 2024-02-28 PROCEDURE — 87637 SARSCOV2&INF A&B&RSV AMP PRB: CPT | Performed by: PHYSICIAN ASSISTANT

## 2024-02-28 PROCEDURE — 2500000004 HC RX 250 GENERAL PHARMACY W/ HCPCS (ALT 636 FOR OP/ED): Performed by: PHYSICIAN ASSISTANT

## 2024-02-28 PROCEDURE — 84484 ASSAY OF TROPONIN QUANT: CPT | Performed by: PHYSICIAN ASSISTANT

## 2024-02-28 PROCEDURE — 96365 THER/PROPH/DIAG IV INF INIT: CPT

## 2024-02-28 PROCEDURE — 99204 OFFICE O/P NEW MOD 45 MIN: CPT | Performed by: ALLERGY & IMMUNOLOGY

## 2024-02-28 PROCEDURE — 80053 COMPREHEN METABOLIC PANEL: CPT | Performed by: PHYSICIAN ASSISTANT

## 2024-02-28 PROCEDURE — 71045 X-RAY EXAM CHEST 1 VIEW: CPT

## 2024-02-28 PROCEDURE — 96375 TX/PRO/DX INJ NEW DRUG ADDON: CPT

## 2024-02-28 PROCEDURE — 85025 COMPLETE CBC W/AUTO DIFF WBC: CPT | Performed by: PHYSICIAN ASSISTANT

## 2024-02-28 PROCEDURE — 84132 ASSAY OF SERUM POTASSIUM: CPT | Performed by: PHYSICIAN ASSISTANT

## 2024-02-28 PROCEDURE — 96372 THER/PROPH/DIAG INJ SC/IM: CPT | Performed by: ALLERGY & IMMUNOLOGY

## 2024-02-28 PROCEDURE — 2500000002 HC RX 250 W HCPCS SELF ADMINISTERED DRUGS (ALT 637 FOR MEDICARE OP, ALT 636 FOR OP/ED): Performed by: PHYSICIAN ASSISTANT

## 2024-02-28 RX ORDER — ACETAMINOPHEN 325 MG/1
650 TABLET ORAL ONCE
Status: COMPLETED | OUTPATIENT
Start: 2024-02-28 | End: 2024-02-28

## 2024-02-28 RX ORDER — MAGNESIUM SULFATE HEPTAHYDRATE 40 MG/ML
2 INJECTION, SOLUTION INTRAVENOUS ONCE
Status: COMPLETED | OUTPATIENT
Start: 2024-02-28 | End: 2024-02-28

## 2024-02-28 RX ORDER — POTASSIUM CHLORIDE 20 MEQ/1
40 TABLET, EXTENDED RELEASE ORAL DAILY
Status: DISCONTINUED | OUTPATIENT
Start: 2024-02-29 | End: 2024-02-28

## 2024-02-28 RX ORDER — ALBUTEROL SULFATE AND BUDESONIDE 90; 80 UG/1; UG/1
2 AEROSOL, METERED RESPIRATORY (INHALATION) AS NEEDED
Qty: 18 G | Refills: 1 | Status: SHIPPED | OUTPATIENT
Start: 2024-02-28 | End: 2024-04-03

## 2024-02-28 RX ORDER — POTASSIUM CHLORIDE 20 MEQ/1
40 TABLET, EXTENDED RELEASE ORAL ONCE
Status: COMPLETED | OUTPATIENT
Start: 2024-02-28 | End: 2024-02-28

## 2024-02-28 RX ORDER — ONDANSETRON HYDROCHLORIDE 2 MG/ML
4 INJECTION, SOLUTION INTRAVENOUS ONCE
Status: COMPLETED | OUTPATIENT
Start: 2024-02-28 | End: 2024-02-28

## 2024-02-28 RX ORDER — SODIUM CHLORIDE 9 MG/ML
100 INJECTION, SOLUTION INTRAVENOUS CONTINUOUS
Status: DISCONTINUED | OUTPATIENT
Start: 2024-02-28 | End: 2024-02-29

## 2024-02-28 RX ADMIN — ACETAMINOPHEN 650 MG: 325 TABLET ORAL at 21:45

## 2024-02-28 RX ADMIN — ONDANSETRON 4 MG: 2 INJECTION INTRAMUSCULAR; INTRAVENOUS at 21:45

## 2024-02-28 RX ADMIN — MAGNESIUM SULFATE HEPTAHYDRATE 2 G: 40 INJECTION, SOLUTION INTRAVENOUS at 20:38

## 2024-02-28 RX ADMIN — SODIUM CHLORIDE 100 ML/HR: 9 INJECTION, SOLUTION INTRAVENOUS at 20:38

## 2024-02-28 RX ADMIN — POTASSIUM CHLORIDE 40 MEQ: 1500 TABLET, EXTENDED RELEASE ORAL at 21:55

## 2024-02-28 ASSESSMENT — LIFESTYLE VARIABLES
EVER HAD A DRINK FIRST THING IN THE MORNING TO STEADY YOUR NERVES TO GET RID OF A HANGOVER: NO
HAVE YOU EVER FELT YOU SHOULD CUT DOWN ON YOUR DRINKING: NO
HAVE PEOPLE ANNOYED YOU BY CRITICIZING YOUR DRINKING: NO
EVER FELT BAD OR GUILTY ABOUT YOUR DRINKING: NO

## 2024-02-28 ASSESSMENT — PAIN SCALES - GENERAL
PAINLEVEL_OUTOF10: 0 - NO PAIN
PAINLEVEL_OUTOF10: 0 - NO PAIN

## 2024-02-28 ASSESSMENT — COLUMBIA-SUICIDE SEVERITY RATING SCALE - C-SSRS
6. HAVE YOU EVER DONE ANYTHING, STARTED TO DO ANYTHING, OR PREPARED TO DO ANYTHING TO END YOUR LIFE?: NO
1. IN THE PAST MONTH, HAVE YOU WISHED YOU WERE DEAD OR WISHED YOU COULD GO TO SLEEP AND NOT WAKE UP?: NO
2. HAVE YOU ACTUALLY HAD ANY THOUGHTS OF KILLING YOURSELF?: NO

## 2024-02-28 ASSESSMENT — PAIN - FUNCTIONAL ASSESSMENT: PAIN_FUNCTIONAL_ASSESSMENT: 0-10

## 2024-02-28 NOTE — PATIENT INSTRUCTIONS
First Tezspire today.  Follow up one month for next.    Daily medications: Trelegy 200, Singulair, Budesonide nebulized twice a day. OK to wean prednisone to 30 mg over the next 2 wks.  New rescue inhaler is AirSupra  Will try to wean over the next few months with our goal to be no daily prednisone.    Pneumovax evaluation prior to next fall, but would not recommend while on steroids.

## 2024-02-28 NOTE — PROGRESS NOTES
Patient ID: Sarahi Haley is a 38 y.o. female.     Chief Complaint: NPV referred by Dr. Santiago  History Of Present Illness  Sarahi Haley is a 38 y.o. female with PMx asthma presenting for allergy and asthma evaluation.  She does have a history of Chronic urticaria for which she was treated with Xolair, but stopped this medication and has not had hives.    Patient here for Tezspire injection    \   Area cleansed with alcohol.  Subcutaneous injection performed in clean fashion. Patient tolerated well without adverse reaction.       Asthma:  Age at diagnosis: 36 yo    Current medication:Trelegy 200, Pulmicort BID nebulized, 40mg of steroid daily since November. She takes Singulair at bedtime. She uses her albuterol rescue every day at least twice a day.  Hospitalizations/ER visits in the last year: Was hospitalized in November. Has been having asthma attacks monthly since May 2022.  Oral steroid/systemic steroids in the last year: Now daily 40 mg  Triggers: not sure.  ? Sleep apnea, occurs in middle of night, wakes from sleep. Has had 3 sleep studies. SHE DOES NOT HAVE MYNOR.  Bought a house last year, so not sure if it was the carpet. Has removed and added hepa filters.  Smoker or Smoke exposure: Her mom smoked her whole life. Patient was exposed until the age of 22.  Patient is using her albuterol rescue daily at least twice a day and nebulized albuterol at least twice a week.  Family history:    Family History   Problem Relation Name Age of Onset    Hypertension Father      Other (Pulmonary Valve Stenosis) Sister      Heart disease Maternal Grandmother      Diabetes Other Grandparent     Lung cancer Other Grandparent     Anxiety disorder Sibling          GP's lung cancer and COPD from smoking.    Food Allergy  No    Eczema/ Atopic Dermatitis  No    Asthma  Yes as noted above.    Rhinoconjunctivitis  Has AR/AC symptoms.  Usually in the spring and fall.  Immunocap was negative for environmental allergy.    Drug  Allergy   NSAIDS-angioedema without progressive symptoms when was on Aleve and Ibuprofen.  Benadryl-large dose cause dysrhythmia. Was having reaction to NSAID, so was taking a lot of Benadryl to counteract the angioedema symptoms, so went to hospital.  Procaine at a dental procedure caused angioedema.    Had a non ST elevation MI-on daily ASA for this. Tolerates without angioedema or hive    No known history of nasal polyps    Insect Allergy   Bee sting-hives, difficulty breathing  Has Epi pen.    Infections  No history of frequent or recurrent infections    Urticaria-history of chronic and was on Xolair - last was about 2014. Was on for 18 months. Hives stopped and she was able to stop Xolair.    Review of Systems    Pertinent positives and negatives have been assessed in the HPI. All other systems have been reviewed and are negative except as noted in the HPI.    Allergies  Bee venom protein (honey bee), Diphenhydramine, Nsaids (non-steroidal anti-inflammatory drug), Procaine, and Ketorolac Tolerates low dose ASA daily.    Past Medical History  She has a past medical history of Acute cystitis without hematuria (08/08/2019), Asthma, Encounter for initial prescription of contraceptive pills (11/01/2019), Nausea (02/05/2021), Other general symptoms and signs (12/05/2019), Parageusia (04/06/2020), Pelvic and perineal pain, Personal history of other diseases of the respiratory system (04/06/2020), Personal history of other specified conditions (07/01/2020), Personal history of other specified conditions (01/26/2021), Personal history of other specified conditions (02/01/2021), Personal history of thrombophlebitis (06/03/2019), and Personal history of urinary (tract) infections (01/16/2020).    Family History  Family History   Problem Relation Name Age of Onset    Hypertension Father      Other (Pulmonary Valve Stenosis) Sister      Heart disease Maternal Grandmother      Diabetes Other Grandparent     Lung cancer Other  Grandparent     Anxiety disorder Sibling         No history of food allergy or atopic disease in the family.    Surgical History  She has a past surgical history that includes Other surgical history (02/08/2019); Other surgical history (02/08/2019); Other surgical history (02/08/2019); Other surgical history (02/08/2019); and Other surgical history (02/08/2019).    Social/Environmental History  She reports that she has never smoked. She has never used smokeless tobacco. She reports current alcohol use. She reports that she does not use drugs.    Home: Lives in a house   Floors: laminate, tile. One room in the office has carpet.  Air Conditioning: Central  Smoker: No  Pets: No  Infestations: No  Molds: No  Occupation: paramedic    MEDICATIONS  Current Outpatient Medications on File Prior to Visit   Medication Sig Dispense Refill    albuterol 2.5 mg /3 mL (0.083 %) nebulizer solution Take 3 mL by nebulization every 4 hours if needed for shortness of breath.      albuterol 90 mcg/actuation inhaler Inhale 2 puffs every 4 hours if needed for shortness of breath.      aspirin 81 mg chewable tablet Chew 1 tablet (81 mg) once daily.      budesonide (Pulmicort) 0.5 mg/2 mL nebulizer solution Take 2 mL (0.5 mg) by nebulization 2 times a day.      butalbital-acetaminophen-caff -40 mg tablet Take 1 tablet by mouth early in the morning.. 1 tab(s) orally once a day, As Needed 90 tablet 3    desvenlafaxine 100 mg 24 hr tablet TAKE 1 TABLET BY MOUTH EVERY DAY 90 tablet 3    desvenlafaxine 50 mg 24 hr tablet Take 1 tablet (50 mg) by mouth once daily. Take with 100 mg = 150 mg dose      EPINEPHrine 0.3 mg/0.3 mL injection syringe Inject 0.3 mL (0.3 mg) into the muscle once daily as needed for anaphylaxis. 2 each 3    fexofenadine (Allegra) 180 mg tablet Take 1 tablet (180 mg) by mouth once daily.      fluticasone-umeclidin-vilanter (Trelegy Ellipta) 100-62.5-25 mcg blister with device Inhale 2 puffs once daily.       "fluticasone/umeclidin/vilanter (TRELEGY ELLIPTA INHL) Inhale 1 puff early in the morning..      galcanezumab (Emgality Syringe) 120 mg/mL prefilled syringe Inject 1 Syringe (120 mg) under the skin every 28 (twenty-eight) days.      hydrOXYzine HCL (Atarax) 25 mg tablet Take 1 tablet (25 mg) by mouth 3 times a day as needed for anxiety. 90 tablet 2    levothyroxine (Synthroid, Levoxyl) 100 mcg tablet Take 1 tablet (100 mcg) by mouth once daily.      metoclopramide HCl (REGLAN ORAL) Take 1 tablet by mouth every 8 hours if needed (nausea).      montelukast (Singulair) 10 mg tablet Take 1 tablet (10 mg) by mouth once daily at bedtime. 30 tablet 5    norethindrone (Deblitane) 0.35 mg tablet Take 1 tablet (0.35 mg) over 84 days by mouth once daily. 84 tablet 4    rizatriptan (Maxalt) 10 mg tablet Take 1 tablet (10 mg) by mouth if needed for migraine. May repeat in 2 hours if unresolved. Do not exceed 30 mg in 24 hours.      SUMAtriptan (Imitrex) 100 mg tablet Take 1 tablet (100 mg) by mouth if needed.       No current facility-administered medications on file prior to visit.         Physical Exam  Visit Vitals  Resp 20   Ht 1.651 m (5' 5\")   Wt 142 kg (314 lb)   BMI 52.25 kg/m²   OB Status Having periods   Smoking Status Never   BSA 2.55 m²       Wt Readings from Last 1 Encounters:   02/28/24 142 kg (314 lb)       Physical Exam    General: Well appearing, no acute distress. BMI 52  Head: Normocephalic, atraumatic, neck supple without lymphadenopathy  Eyes: EOMI, non-injected  Ears: normal and visible landmarks  Nose: No nasal crease, nares patent,  normal nasal turbinates without drainage  Throat: Normal dentition, no erythema  Heart: Regular rate and rhythm  Lungs: Clear to auscultation bilaterally, effort normal  Abdomen: Soft, non-tender, normal bowel sounds  Extremities: Moves all extremities symmetrically, no edema  Skin: No rashes/lesions  Psych: normal mood and affect    PFT reviewed from August and there is a plan " to repeat.    LAB RESULTS:  CBC:  Recent Labs     01/28/24 0228 01/01/24  0201 12/07/23  0543 12/06/23  0342 12/05/23  0259   WBC 7.2 8.4 18.9*   < > 8.9   HGB 15.0 14.3 13.1   < > 15.2   HCT 42.8 40.9 38.4   < > 43.8    370 347   < > 384   MCV 87 86 88   < > 87   EOSABS 0.00 0.13  --   --  0.01    < > = values in this interval not displayed.       CMP:  Recent Labs     01/29/24  0544 01/28/24  1157 01/28/24 0228 01/01/24  0201     --  135* 137   K 4.5 4.1 3.4* 3.5     --  103 105   CO2 26  --  23 25   ANIONGAP 11  --  12 11   BUN 10  --  8 15   CREATININE 0.89  --  0.94 0.88   EGFR 86  --  81 87   MG 2.17 2.12 1.87 1.88     Recent Labs     01/29/24  0544 01/28/24 0228 12/05/23  0259 09/14/22  0200 09/13/22  0735 08/03/22  1851 07/28/22  1827 01/22/22  0407 09/13/21  0050   ALBUMIN 3.8 4.2 4.2   < > 3.9   < > 4.2   < > 4.0   ALKPHOS 78 86 91   < > 73   < > 74   < > 74   ALT 86* 57* 62*   < > 38   < > 46*   < > 41   AST 46* 42* 47*   < > 23   < > 33   < > 23   BILITOT 0.3 0.7 0.5   < > 0.3   < > 0.8   < > 0.5   LIPASE  --   --   --   --  34  --  35  --  55    < > = values in this interval not displayed.       ALLERGY:   Lab Results   Component Value Date    ICIGE 67.0 08/14/2023    WHITEASH <0.10 08/14/2023    SILVERBIRCH <0.10 08/14/2023    BOXELDER <0.10 08/14/2023    MOUNTJUNIPER <0.10 08/14/2023    COTTONWOOD <0.10 08/14/2023    ELM <0.10 08/14/2023    MULBERRY <0.10 08/14/2023    PECANHICKORY <0.10 08/14/2023    MAPLESYCAMOR <0.10 08/14/2023    OAK <0.10 08/14/2023    BERMUDAGR <0.10 08/14/2023    JOHNSONGR <0.10 08/14/2023    BLUEGRASS <0.10 08/14/2023    TIMOTHYGRASS <0.10 08/14/2023     Lab Results   Component Value Date    LAMBQUART <0.10 08/14/2023    PIGWEED <0.10 08/14/2023    COMRAGWEED <0.10 08/14/2023    SHEEPSOR <0.10 08/14/2023    PLANTAIN <0.10 08/14/2023    CATEPI <0.10 08/14/2023    DOGEPI <0.10 08/14/2023    ALTERNA <0.10 08/14/2023    CLADHERB <0.10 08/14/2023    ICA04  <0.10 08/14/2023    DERMFAR <0.10 08/14/2023    DERMPTE <0.10 08/14/2023    COCKR 0.35 (A) 08/14/2023     Lab Results   Component Value Date    SATHYA <0.10 08/14/2023     Recent Labs     08/14/23  1548   ICIGE 67.0     Recent Labs     01/28/24  0228 01/01/24  0201 12/05/23  0259   EOSABS 0.00 0.13 0.01     AES prior to steroids daily ranged 100-200.    COAG:   Recent Labs     10/15/23  0644 06/06/23  0042 10/07/22  2350   INR 1.0 0.9 1.0         HEME/ENDO:  Recent Labs     10/15/23  1423 08/21/23  0120 07/10/23  1434 05/24/23  0917 09/14/22  0200   TSH  --  2.23 2.66 1.48  --    HGBA1C 4.7  --   --   --   --    FERRITIN  --   --   --   --  183*         Assessment/Plan   Assessment:  Sarahi is a 37 yo woman with severe persistent and steroid dependent asthma without allergy to environmental trigger. She does have a history of drug allergy to NSAIDS (angioedema), but is tolerating low dose daily ASA. There is no history of nasal polyps or recurrent/chronic sinus disease.  NSAID allergy to ibuprofen and Aleve are noted and need further investigation once asthma is controlled.    Plan:  We reviewed her asthma plan and made a couple of changes today.  Based on her asthma history, I feel Tezspire is a good option for her and we gave her a sample dose today. Will submit for insurance approval.  I updated her rescue inhaler to AirSupra and reviewed how and when to use this as her rescue inhaler. We will start a steroid wean as long as she is comfortable with a decrease to 30 mg sometime in the next 2 wks.  She is instructed to call with any concerns. We discussed our goals as maintenance of good lung function and asthma control at the lowest dose possible oral steroid and reduce her need for medications as well as eliminate ER visits and hospitalizations.  There are several other pathologies that need to be addressed as we wean steroids including weight, GERD/gut protection from prolonged steroids, and other possible  underlying and complicating pathologies that have led to the severity of her symptoms in the last several years.  Patient will follow up monthly for now so that we can monitor closely.      Hilary Trinidad, DO

## 2024-02-29 ENCOUNTER — PATIENT OUTREACH (OUTPATIENT)
Dept: PRIMARY CARE | Facility: CLINIC | Age: 39
End: 2024-02-29
Payer: COMMERCIAL

## 2024-02-29 LAB
ALBUMIN SERPL BCP-MCNC: 4 G/DL (ref 3.4–5)
ALP SERPL-CCNC: 95 U/L (ref 33–110)
ALT SERPL W P-5'-P-CCNC: 109 U/L (ref 7–45)
ANION GAP SERPL CALC-SCNC: 13 MMOL/L (ref 10–20)
AST SERPL W P-5'-P-CCNC: 48 U/L (ref 9–39)
BASOPHILS # BLD AUTO: 0 X10*3/UL (ref 0–0.1)
BASOPHILS NFR BLD AUTO: 0 %
BILIRUB SERPL-MCNC: 0.5 MG/DL (ref 0–1.2)
BUN SERPL-MCNC: 10 MG/DL (ref 6–23)
CALCIUM SERPL-MCNC: 9.1 MG/DL (ref 8.6–10.3)
CARDIAC TROPONIN I PNL SERPL HS: <3 NG/L (ref 0–13)
CHLORIDE SERPL-SCNC: 104 MMOL/L (ref 98–107)
CO2 SERPL-SCNC: 23 MMOL/L (ref 21–32)
CREAT SERPL-MCNC: 0.83 MG/DL (ref 0.5–1.05)
EGFRCR SERPLBLD CKD-EPI 2021: >90 ML/MIN/1.73M*2
EOSINOPHIL # BLD AUTO: 0 X10*3/UL (ref 0–0.7)
EOSINOPHIL NFR BLD AUTO: 0 %
ERYTHROCYTE [DISTWIDTH] IN BLOOD BY AUTOMATED COUNT: 12.4 % (ref 11.5–14.5)
GLUCOSE SERPL-MCNC: 157 MG/DL (ref 74–99)
HCT VFR BLD AUTO: 42 % (ref 36–46)
HGB BLD-MCNC: 14.5 G/DL (ref 12–16)
IMM GRANULOCYTES # BLD AUTO: 0.02 X10*3/UL (ref 0–0.7)
IMM GRANULOCYTES NFR BLD AUTO: 0.3 % (ref 0–0.9)
LYMPHOCYTES # BLD AUTO: 0.65 X10*3/UL (ref 1.2–4.8)
LYMPHOCYTES NFR BLD AUTO: 8.5 %
MAGNESIUM SERPL-MCNC: 2.1 MG/DL (ref 1.6–2.4)
MCH RBC QN AUTO: 30 PG (ref 26–34)
MCHC RBC AUTO-ENTMCNC: 34.5 G/DL (ref 32–36)
MCV RBC AUTO: 87 FL (ref 80–100)
MONOCYTES # BLD AUTO: 0.05 X10*3/UL (ref 0.1–1)
MONOCYTES NFR BLD AUTO: 0.7 %
NEUTROPHILS # BLD AUTO: 6.95 X10*3/UL (ref 1.2–7.7)
NEUTROPHILS NFR BLD AUTO: 90.5 %
NRBC BLD-RTO: 0 /100 WBCS (ref 0–0)
PLATELET # BLD AUTO: 300 X10*3/UL (ref 150–450)
POTASSIUM SERPL-SCNC: 4.4 MMOL/L (ref 3.5–5.3)
PROT SERPL-MCNC: 6.8 G/DL (ref 6.4–8.2)
RBC # BLD AUTO: 4.83 X10*6/UL (ref 4–5.2)
SODIUM SERPL-SCNC: 136 MMOL/L (ref 136–145)
WBC # BLD AUTO: 7.7 X10*3/UL (ref 4.4–11.3)

## 2024-02-29 PROCEDURE — 80053 COMPREHEN METABOLIC PANEL: CPT | Performed by: NURSE PRACTITIONER

## 2024-02-29 PROCEDURE — 2500000001 HC RX 250 WO HCPCS SELF ADMINISTERED DRUGS (ALT 637 FOR MEDICARE OP): Performed by: STUDENT IN AN ORGANIZED HEALTH CARE EDUCATION/TRAINING PROGRAM

## 2024-02-29 PROCEDURE — 94640 AIRWAY INHALATION TREATMENT: CPT

## 2024-02-29 PROCEDURE — 36415 COLL VENOUS BLD VENIPUNCTURE: CPT | Performed by: PHYSICIAN ASSISTANT

## 2024-02-29 PROCEDURE — 2500000002 HC RX 250 W HCPCS SELF ADMINISTERED DRUGS (ALT 637 FOR MEDICARE OP, ALT 636 FOR OP/ED): Performed by: STUDENT IN AN ORGANIZED HEALTH CARE EDUCATION/TRAINING PROGRAM

## 2024-02-29 PROCEDURE — 2500000002 HC RX 250 W HCPCS SELF ADMINISTERED DRUGS (ALT 637 FOR MEDICARE OP, ALT 636 FOR OP/ED): Performed by: NURSE PRACTITIONER

## 2024-02-29 PROCEDURE — 84484 ASSAY OF TROPONIN QUANT: CPT | Performed by: PHYSICIAN ASSISTANT

## 2024-02-29 PROCEDURE — 83735 ASSAY OF MAGNESIUM: CPT | Performed by: NURSE PRACTITIONER

## 2024-02-29 PROCEDURE — 85025 COMPLETE CBC W/AUTO DIFF WBC: CPT | Performed by: NURSE PRACTITIONER

## 2024-02-29 PROCEDURE — 99232 SBSQ HOSP IP/OBS MODERATE 35: CPT | Performed by: STUDENT IN AN ORGANIZED HEALTH CARE EDUCATION/TRAINING PROGRAM

## 2024-02-29 PROCEDURE — 2500000001 HC RX 250 WO HCPCS SELF ADMINISTERED DRUGS (ALT 637 FOR MEDICARE OP): Performed by: NURSE PRACTITIONER

## 2024-02-29 PROCEDURE — 1200000002 HC GENERAL ROOM WITH TELEMETRY DAILY

## 2024-02-29 PROCEDURE — 2500000004 HC RX 250 GENERAL PHARMACY W/ HCPCS (ALT 636 FOR OP/ED): Performed by: NURSE PRACTITIONER

## 2024-02-29 PROCEDURE — 36415 COLL VENOUS BLD VENIPUNCTURE: CPT | Performed by: NURSE PRACTITIONER

## 2024-02-29 PROCEDURE — 2500000004 HC RX 250 GENERAL PHARMACY W/ HCPCS (ALT 636 FOR OP/ED): Performed by: STUDENT IN AN ORGANIZED HEALTH CARE EDUCATION/TRAINING PROGRAM

## 2024-02-29 RX ORDER — GUAIFENESIN 600 MG/1
600 TABLET, EXTENDED RELEASE ORAL EVERY 12 HOURS PRN
Status: DISCONTINUED | OUTPATIENT
Start: 2024-02-29 | End: 2024-03-01 | Stop reason: HOSPADM

## 2024-02-29 RX ORDER — DESVENLAFAXINE 50 MG/1
100 TABLET, EXTENDED RELEASE ORAL DAILY
Status: DISCONTINUED | OUTPATIENT
Start: 2024-02-29 | End: 2024-03-01 | Stop reason: HOSPADM

## 2024-02-29 RX ORDER — LORATADINE 10 MG/1
10 TABLET ORAL DAILY
Status: DISCONTINUED | OUTPATIENT
Start: 2024-02-29 | End: 2024-03-01 | Stop reason: HOSPADM

## 2024-02-29 RX ORDER — PANTOPRAZOLE SODIUM 40 MG/10ML
40 INJECTION, POWDER, LYOPHILIZED, FOR SOLUTION INTRAVENOUS DAILY
Status: DISCONTINUED | OUTPATIENT
Start: 2024-02-29 | End: 2024-03-01 | Stop reason: HOSPADM

## 2024-02-29 RX ORDER — LEVOTHYROXINE SODIUM 100 UG/1
100 TABLET ORAL DAILY
Status: DISCONTINUED | OUTPATIENT
Start: 2024-02-29 | End: 2024-03-01 | Stop reason: HOSPADM

## 2024-02-29 RX ORDER — NAPROXEN SODIUM 220 MG/1
81 TABLET, FILM COATED ORAL DAILY
Status: DISCONTINUED | OUTPATIENT
Start: 2024-02-29 | End: 2024-03-01 | Stop reason: HOSPADM

## 2024-02-29 RX ORDER — ACETAMINOPHEN 160 MG/5ML
650 SOLUTION ORAL EVERY 4 HOURS PRN
Status: DISCONTINUED | OUTPATIENT
Start: 2024-02-29 | End: 2024-03-01 | Stop reason: HOSPADM

## 2024-02-29 RX ORDER — TALC
3 POWDER (GRAM) TOPICAL DAILY PRN
Status: DISCONTINUED | OUTPATIENT
Start: 2024-02-29 | End: 2024-03-01 | Stop reason: HOSPADM

## 2024-02-29 RX ORDER — ACETAMINOPHEN 325 MG/1
650 TABLET ORAL EVERY 4 HOURS PRN
Status: DISCONTINUED | OUTPATIENT
Start: 2024-02-29 | End: 2024-03-01 | Stop reason: HOSPADM

## 2024-02-29 RX ORDER — PANTOPRAZOLE SODIUM 40 MG/1
40 TABLET, DELAYED RELEASE ORAL DAILY
Status: DISCONTINUED | OUTPATIENT
Start: 2024-02-29 | End: 2024-03-01 | Stop reason: HOSPADM

## 2024-02-29 RX ORDER — ENOXAPARIN SODIUM 100 MG/ML
60 INJECTION SUBCUTANEOUS EVERY 12 HOURS SCHEDULED
Status: DISCONTINUED | OUTPATIENT
Start: 2024-02-29 | End: 2024-03-01 | Stop reason: HOSPADM

## 2024-02-29 RX ORDER — ONDANSETRON 4 MG/1
4 TABLET, FILM COATED ORAL EVERY 8 HOURS PRN
Status: DISCONTINUED | OUTPATIENT
Start: 2024-02-29 | End: 2024-03-01 | Stop reason: HOSPADM

## 2024-02-29 RX ORDER — DESVENLAFAXINE 50 MG/1
50 TABLET, EXTENDED RELEASE ORAL DAILY
Status: DISCONTINUED | OUTPATIENT
Start: 2024-02-29 | End: 2024-03-01 | Stop reason: HOSPADM

## 2024-02-29 RX ORDER — ONDANSETRON HYDROCHLORIDE 2 MG/ML
4 INJECTION, SOLUTION INTRAVENOUS EVERY 8 HOURS PRN
Status: DISCONTINUED | OUTPATIENT
Start: 2024-02-29 | End: 2024-03-01 | Stop reason: HOSPADM

## 2024-02-29 RX ORDER — DOCUSATE SODIUM 100 MG/1
100 CAPSULE, LIQUID FILLED ORAL 2 TIMES DAILY
Status: DISCONTINUED | OUTPATIENT
Start: 2024-02-29 | End: 2024-03-01 | Stop reason: HOSPADM

## 2024-02-29 RX ORDER — FLUTICASONE FUROATE AND VILANTEROL 200; 25 UG/1; UG/1
1 POWDER RESPIRATORY (INHALATION) DAILY
Status: DISCONTINUED | OUTPATIENT
Start: 2024-02-29 | End: 2024-03-01 | Stop reason: HOSPADM

## 2024-02-29 RX ORDER — ACETAMINOPHEN 650 MG/1
650 SUPPOSITORY RECTAL EVERY 4 HOURS PRN
Status: DISCONTINUED | OUTPATIENT
Start: 2024-02-29 | End: 2024-03-01 | Stop reason: HOSPADM

## 2024-02-29 RX ORDER — BUDESONIDE 0.5 MG/2ML
0.5 INHALANT ORAL
Status: DISCONTINUED | OUTPATIENT
Start: 2024-02-29 | End: 2024-03-01 | Stop reason: HOSPADM

## 2024-02-29 RX ORDER — IPRATROPIUM BROMIDE AND ALBUTEROL SULFATE 2.5; .5 MG/3ML; MG/3ML
3 SOLUTION RESPIRATORY (INHALATION) EVERY 4 HOURS PRN
Status: DISCONTINUED | OUTPATIENT
Start: 2024-02-29 | End: 2024-03-01 | Stop reason: HOSPADM

## 2024-02-29 RX ORDER — IPRATROPIUM BROMIDE AND ALBUTEROL SULFATE 2.5; .5 MG/3ML; MG/3ML
3 SOLUTION RESPIRATORY (INHALATION)
Status: DISCONTINUED | OUTPATIENT
Start: 2024-02-29 | End: 2024-03-01 | Stop reason: HOSPADM

## 2024-02-29 RX ADMIN — ACETAMINOPHEN 650 MG: 325 TABLET ORAL at 20:01

## 2024-02-29 RX ADMIN — METHYLPREDNISOLONE SODIUM SUCCINATE 40 MG: 40 INJECTION, POWDER, FOR SOLUTION INTRAMUSCULAR; INTRAVENOUS at 08:32

## 2024-02-29 RX ADMIN — ACETAMINOPHEN 650 MG: 325 TABLET ORAL at 12:19

## 2024-02-29 RX ADMIN — LORATADINE 10 MG: 10 TABLET ORAL at 08:32

## 2024-02-29 RX ADMIN — DESVENLAFAXINE 50 MG: 50 TABLET, FILM COATED, EXTENDED RELEASE ORAL at 13:31

## 2024-02-29 RX ADMIN — PANTOPRAZOLE SODIUM 40 MG: 40 TABLET, DELAYED RELEASE ORAL at 06:54

## 2024-02-29 RX ADMIN — BUDESONIDE INHALATION 0.5 MG: 0.5 SUSPENSION RESPIRATORY (INHALATION) at 20:20

## 2024-02-29 RX ADMIN — DOCUSATE SODIUM 100 MG: 100 CAPSULE, LIQUID FILLED ORAL at 08:32

## 2024-02-29 RX ADMIN — METHYLPREDNISOLONE SODIUM SUCCINATE 40 MG: 40 INJECTION, POWDER, FOR SOLUTION INTRAMUSCULAR; INTRAVENOUS at 16:36

## 2024-02-29 RX ADMIN — DOCUSATE SODIUM 100 MG: 100 CAPSULE, LIQUID FILLED ORAL at 20:00

## 2024-02-29 RX ADMIN — DESVENLAFAXINE 100 MG: 50 TABLET, FILM COATED, EXTENDED RELEASE ORAL at 13:30

## 2024-02-29 RX ADMIN — FLUTICASONE FUROATE AND VILANTEROL TRIFENATATE 1 PUFF: 200; 25 POWDER RESPIRATORY (INHALATION) at 08:32

## 2024-02-29 RX ADMIN — ENOXAPARIN SODIUM 60 MG: 60 INJECTION SUBCUTANEOUS at 20:00

## 2024-02-29 RX ADMIN — ENOXAPARIN SODIUM 60 MG: 60 INJECTION SUBCUTANEOUS at 08:31

## 2024-02-29 RX ADMIN — ASPIRIN 81 MG: 81 TABLET, CHEWABLE ORAL at 12:18

## 2024-02-29 RX ADMIN — ONDANSETRON 4 MG: 2 INJECTION INTRAMUSCULAR; INTRAVENOUS at 04:44

## 2024-02-29 RX ADMIN — LEVOTHYROXINE SODIUM 100 MCG: 0.1 TABLET ORAL at 12:18

## 2024-02-29 RX ADMIN — PSYLLIUM HUSK 1 PACKET: 3.4 POWDER ORAL at 08:32

## 2024-02-29 RX ADMIN — IPRATROPIUM BROMIDE AND ALBUTEROL SULFATE 3 ML: 2.5; .5 SOLUTION RESPIRATORY (INHALATION) at 20:20

## 2024-02-29 RX ADMIN — ACETAMINOPHEN 650 MG: 325 TABLET ORAL at 04:44

## 2024-02-29 SDOH — SOCIAL STABILITY: SOCIAL INSECURITY: DOES ANYONE TRY TO KEEP YOU FROM HAVING/CONTACTING OTHER FRIENDS OR DOING THINGS OUTSIDE YOUR HOME?: NO

## 2024-02-29 SDOH — ECONOMIC STABILITY: INCOME INSECURITY
IN THE PAST 12 MONTHS, HAS THE ELECTRIC, GAS, OIL, OR WATER COMPANY THREATENED TO SHUT OFF SERVICE IN YOUR HOME?: PATIENT DECLINED

## 2024-02-29 SDOH — ECONOMIC STABILITY: INCOME INSECURITY: HOW HARD IS IT FOR YOU TO PAY FOR THE VERY BASICS LIKE FOOD, HOUSING, MEDICAL CARE, AND HEATING?: PATIENT DECLINED

## 2024-02-29 SDOH — SOCIAL STABILITY: SOCIAL INSECURITY: HAVE YOU HAD THOUGHTS OF HARMING ANYONE ELSE?: NO

## 2024-02-29 SDOH — SOCIAL STABILITY: SOCIAL INSECURITY: HAS ANYONE EVER THREATENED TO HURT YOUR FAMILY OR YOUR PETS?: NO

## 2024-02-29 SDOH — ECONOMIC STABILITY: HOUSING INSECURITY: IN THE LAST 12 MONTHS, HOW MANY PLACES HAVE YOU LIVED?: 1

## 2024-02-29 SDOH — SOCIAL STABILITY: SOCIAL INSECURITY: ARE THERE ANY APPARENT SIGNS OF INJURIES/BEHAVIORS THAT COULD BE RELATED TO ABUSE/NEGLECT?: NO

## 2024-02-29 SDOH — ECONOMIC STABILITY: INCOME INSECURITY: IN THE LAST 12 MONTHS, WAS THERE A TIME WHEN YOU WERE NOT ABLE TO PAY THE MORTGAGE OR RENT ON TIME?: PATIENT DECLINED

## 2024-02-29 SDOH — SOCIAL STABILITY: SOCIAL INSECURITY: DO YOU FEEL ANYONE HAS EXPLOITED OR TAKEN ADVANTAGE OF YOU FINANCIALLY OR OF YOUR PERSONAL PROPERTY?: NO

## 2024-02-29 SDOH — ECONOMIC STABILITY: TRANSPORTATION INSECURITY
IN THE PAST 12 MONTHS, HAS THE LACK OF TRANSPORTATION KEPT YOU FROM MEDICAL APPOINTMENTS OR FROM GETTING MEDICATIONS?: PATIENT DECLINED

## 2024-02-29 SDOH — SOCIAL STABILITY: SOCIAL INSECURITY: WERE YOU ABLE TO COMPLETE ALL THE BEHAVIORAL HEALTH SCREENINGS?: YES

## 2024-02-29 SDOH — SOCIAL STABILITY: SOCIAL INSECURITY: DO YOU FEEL UNSAFE GOING BACK TO THE PLACE WHERE YOU ARE LIVING?: NO

## 2024-02-29 SDOH — ECONOMIC STABILITY: HOUSING INSECURITY
IN THE LAST 12 MONTHS, WAS THERE A TIME WHEN YOU DID NOT HAVE A STEADY PLACE TO SLEEP OR SLEPT IN A SHELTER (INCLUDING NOW)?: PATIENT DECLINED

## 2024-02-29 SDOH — SOCIAL STABILITY: SOCIAL INSECURITY: ABUSE: ADULT

## 2024-02-29 SDOH — ECONOMIC STABILITY: TRANSPORTATION INSECURITY
IN THE PAST 12 MONTHS, HAS LACK OF TRANSPORTATION KEPT YOU FROM MEETINGS, WORK, OR FROM GETTING THINGS NEEDED FOR DAILY LIVING?: PATIENT DECLINED

## 2024-02-29 SDOH — SOCIAL STABILITY: SOCIAL INSECURITY: ARE YOU OR HAVE YOU BEEN THREATENED OR ABUSED PHYSICALLY, EMOTIONALLY, OR SEXUALLY BY ANYONE?: NO

## 2024-02-29 ASSESSMENT — COGNITIVE AND FUNCTIONAL STATUS - GENERAL
DAILY ACTIVITIY SCORE: 24
MOBILITY SCORE: 24
DAILY ACTIVITIY SCORE: 24
PATIENT BASELINE BEDBOUND: NO
MOBILITY SCORE: 24

## 2024-02-29 ASSESSMENT — LIFESTYLE VARIABLES
AUDIT-C TOTAL SCORE: 0
HOW OFTEN DO YOU HAVE A DRINK CONTAINING ALCOHOL: NEVER
HOW MANY STANDARD DRINKS CONTAINING ALCOHOL DO YOU HAVE ON A TYPICAL DAY: PATIENT DOES NOT DRINK
PRESCIPTION_ABUSE_PAST_12_MONTHS: NO
HOW OFTEN DO YOU HAVE 6 OR MORE DRINKS ON ONE OCCASION: NEVER
SKIP TO QUESTIONS 9-10: 1
AUDIT-C TOTAL SCORE: 0
SUBSTANCE_ABUSE_PAST_12_MONTHS: NO

## 2024-02-29 ASSESSMENT — ACTIVITIES OF DAILY LIVING (ADL)
DRESSING YOURSELF: INDEPENDENT
FEEDING YOURSELF: INDEPENDENT
LACK_OF_TRANSPORTATION: NO
HEARING - RIGHT EAR: FUNCTIONAL
JUDGMENT_ADEQUATE_SAFELY_COMPLETE_DAILY_ACTIVITIES: YES
GROOMING: INDEPENDENT
ASSISTIVE_DEVICE: EYEGLASSES
TOILETING: INDEPENDENT
WALKS IN HOME: INDEPENDENT
LACK_OF_TRANSPORTATION: PATIENT DECLINED
BATHING: INDEPENDENT
LACK_OF_TRANSPORTATION: PATIENT DECLINED
PATIENT'S MEMORY ADEQUATE TO SAFELY COMPLETE DAILY ACTIVITIES?: YES
ADEQUATE_TO_COMPLETE_ADL: YES
HEARING - LEFT EAR: FUNCTIONAL

## 2024-02-29 ASSESSMENT — PAIN - FUNCTIONAL ASSESSMENT
PAIN_FUNCTIONAL_ASSESSMENT: 0-10

## 2024-02-29 ASSESSMENT — PAIN SCALES - GENERAL
PAINLEVEL_OUTOF10: 0 - NO PAIN
PAINLEVEL_OUTOF10: 0 - NO PAIN
PAINLEVEL_OUTOF10: 6
PAINLEVEL_OUTOF10: 0 - NO PAIN
PAINLEVEL_OUTOF10: 3
PAINLEVEL_OUTOF10: 4
PAINLEVEL_OUTOF10: 0 - NO PAIN

## 2024-02-29 ASSESSMENT — PAIN DESCRIPTION - LOCATION: LOCATION: HEAD

## 2024-02-29 NOTE — ED PROVIDER NOTES
HPI   Chief Complaint   Patient presents with   • Shortness of Breath     Pt woke up around 4pm feeling short of breath. Pt states she ook breathing treatments and a new inhaler called Airsupra 2 puffs, without relief.       38-year-old female presents emergency room with chief complaint of acute shortness of breath.  The patient has a longstanding history of chronic respiratory exacerbations.  Patient states she took a nap and woke up around 1600 hrs. and noticed that she was feeling short of breath with chest tightness.  She denies any fevers, chills, chest pain, palpitations, abdominal pain, nausea vomiting or diarrhea.  The patient was transported in by EMS where she received Solu-Medrol 125 mg IV push and 3 aerosol treatments back-to-back prior to arrival.  Patient has a known history of asthma with frequent exacerbations.  She was seen by Dr. Trinidad from allergy and immunology earlier today and was given prescription for Tezspire.  Patient states this is her typical asthma exacerbation.  Patient states she is compliant with her medications.  Her last admission was in January 20, 2024 where her diastolic diagnosis was asthma exacerbation acute hypoxemic respiratory failure, RSV and flu infection as well as hypokalemia.      History provided by:  Medical records, patient, EMS personnel and parent                      [place] Coma Scale Score: 15                     Patient History   Past Medical History:   Diagnosis Date   • Acute cystitis without hematuria 08/08/2019    Acute cystitis without hematuria   • Asthma    • Encounter for initial prescription of contraceptive pills 11/01/2019    Encounter for initial prescription of contraceptive pills   • Nausea 02/05/2021    Nausea in adult   • Other general symptoms and signs 12/05/2019    Forgetfulness   • Parageusia 04/06/2020    Taste perversion   • Pelvic and perineal pain     Pelvic pain in female   • Personal history of other diseases of the respiratory  system 04/06/2020    History of sinusitis   • Personal history of other specified conditions 07/01/2020    History of vomiting   • Personal history of other specified conditions 01/26/2021    History of headache   • Personal history of other specified conditions 02/01/2021    History of diarrhea   • Personal history of thrombophlebitis 06/03/2019    History of phlebitis   • Personal history of urinary (tract) infections 01/16/2020    History of urinary tract infection     Past Surgical History:   Procedure Laterality Date   • OTHER SURGICAL HISTORY  02/08/2019    Appendectomy   • OTHER SURGICAL HISTORY  02/08/2019    Cholecystectomy   • OTHER SURGICAL HISTORY  02/08/2019    Cystoscopy   • OTHER SURGICAL HISTORY  02/08/2019    Ureteroscopy   • OTHER SURGICAL HISTORY  02/08/2019    Shoulder surgery     Family History   Problem Relation Name Age of Onset   • Hypertension Father     • Other (Pulmonary Valve Stenosis) Sister     • Heart disease Maternal Grandmother     • Diabetes Other Grandparent    • Lung cancer Other Grandparent    • Anxiety disorder Sibling       Social History     Tobacco Use   • Smoking status: Never   • Smokeless tobacco: Never   Vaping Use   • Vaping Use: Never used   Substance Use Topics   • Alcohol use: Yes     Comment: social   • Drug use: Never       Physical Exam   ED Triage Vitals [02/28/24 2021]   Temp Heart Rate Respirations BP   -- 86 (!) 29 (!) 119/106      Pulse Ox Temp src Heart Rate Source Patient Position   95 % -- Monitor Sitting      BP Location FiO2 (%)     Left arm --       Physical Exam  Vitals and nursing note reviewed. Exam conducted with a chaperone present.   Constitutional:       General: She is awake.      Appearance: Normal appearance. She is well-developed, well-groomed and normal weight. She is ill-appearing.   HENT:      Head: Normocephalic and atraumatic.      Right Ear: Tympanic membrane, ear canal and external ear normal.      Left Ear: Tympanic membrane, ear canal  and external ear normal.      Nose: Nose normal.      Mouth/Throat:      Mouth: Mucous membranes are moist.      Pharynx: Oropharynx is clear.   Eyes:      Extraocular Movements: Extraocular movements intact.      Conjunctiva/sclera: Conjunctivae normal.      Pupils: Pupils are equal, round, and reactive to light.   Cardiovascular:      Rate and Rhythm: Normal rate and regular rhythm.      Pulses: Normal pulses.      Heart sounds: Normal heart sounds.   Pulmonary:      Effort: Tachypnea present.      Breath sounds: Examination of the right-upper field reveals decreased breath sounds. Examination of the left-upper field reveals decreased breath sounds. Examination of the right-middle field reveals decreased breath sounds. Examination of the left-middle field reveals decreased breath sounds. Examination of the right-lower field reveals decreased breath sounds and wheezing. Examination of the left-lower field reveals decreased breath sounds and wheezing. Decreased breath sounds and wheezing present. No rhonchi or rales.   Abdominal:      General: Abdomen is flat. Bowel sounds are normal.      Palpations: Abdomen is soft. There is no mass.      Tenderness: There is no abdominal tenderness. There is no guarding.   Musculoskeletal:         General: No swelling or tenderness. Normal range of motion.      Cervical back: Normal range of motion and neck supple.   Skin:     General: Skin is warm and dry.      Capillary Refill: Capillary refill takes less than 2 seconds.      Findings: No rash.   Neurological:      General: No focal deficit present.      Mental Status: She is alert and oriented to person, place, and time. Mental status is at baseline.   Psychiatric:         Mood and Affect: Mood normal.         Behavior: Behavior normal. Behavior is cooperative.         Thought Content: Thought content normal.         Judgment: Judgment normal.         ED Course & MDM   Diagnoses as of 02/28/24 2300   Shortness of breath    Severe persistent asthma with acute exacerbation       Medical Decision Making  ED course: Patient remains on the monitor, we have ordered BiPAP, heart rate 86 respirations 29 /106, 95% on room air fever patient was given normal saline infusion at 100-hour, she was given 2 g magnesium sulfate IV piggyback and remained on oxygen.    EKG shows normal sinus rhythm rate 78 MI was 162 QRS was 76  QTc was 417 low voltage QRS nonspecific T wave normalities this was interpreted by me at 2058 hrs.  Repeat vital signs blood pressure 111/73 heart rate 74 respirations 19 she is 99% on BiPAP.  The patient was requesting something for her headache, she was given Tylenol 650 mg p.o. and 4 mg of Zofran IV push.  Lab results were reviewed CBC shows a white count of 8.2 hemoglobin 15 hematocrit 42.5 platelet count was 332.  Metabolic showed  AST of 45, potassium 3.2.  The rest of her metabolic was within normal limits.  The patient received 40 mEq of potassium orally.  Her first troponin was negative less than 3, flu swabs negative, COVID swab negative, RSV negative.  Blood gas pH 7.43 pO2 186 pCO2 of 40, potassium of 3 glucose 108 anion gap of 7.  Her BNP is 32.    Patient was weaned off of BiPAP and is currently on 2 L nasal cannula.  I took her off the nasal cannula and she stayed around 96% on room air however when we ambulated her to the bathroom on a pulse ox she dropped to the low 90s and became symptomatic with feeling very short of breath and lightheaded.  She was placed back on the monitor remains on 2 L nasal cannula.  I spoke with the hospitalist service patient meets inpatient criteria.        Procedure  Procedures      ----------------------------------------------------    Shared MARIA DEL ROSARIO Attestation:     This patient was seen by the advanced practice provider.  I personally saw the patient and made/approved the management plan and take responsibility for the patient management.     History: Patient is  a 38-year-old woman with severe asthma who presents with progressively worsening shortness of breath throughout the day.  She has been using her inhaler at home without relief and received breathing treatments and steroids and route to the hospital.     Exam: Mild wheezes but decent air movement in the apices with decreased in the bases.  No rhonchi.     MDM: Patient presents symptoms consistent with a moderate asthma exacerbation but there may be a component of anxiety as well.  Lower suspicion for PE and infectious etiologies.  We gave her breathing treatments but are noninvasive positive pressure ventilation.  Patient signed out to my colleague pending repeat assessment after the breathing treatments.       I have seen and examined the patient, agree with the workup, evaluation, medical decision making, management and diagnosis.  The care plan has been discussed.     Cresencio Michaud MD    ----------------------------------------------------     Salo Perez PA-C  02/28/24 [id number]

## 2024-02-29 NOTE — PROGRESS NOTES
02/29/24 1105   Discharge Planning   Living Arrangements Alone   Support Systems Spouse/significant other;Parent   Assistance Needed none- pta independent   Type of Residence Private residence   Number of Stairs to Enter Residence 1   Number of Stairs Within Residence 1   Do you have animals or pets at home? No   Who is requesting discharge planning? Patient   Home or Post Acute Services None   Patient expects to be discharged to: Home - declines any needs   Does the patient need discharge transport arranged? No   RoundTrip coordination needed? No   Has discharge transport been arranged? No   Transportation Needs   In the past 12 months, has lack of transportation kept you from medical appointments or from getting medications? no   In the past 12 months, has lack of transportation kept you from meetings, work, or from getting things needed for daily living? No     0944 Met with patient at bedside to discuss dc planning needs and concerns.  Pt resides alone.  Is  but ,  spouse is listed as emergency contact and also pt's father.   Follows with Dr Matthew as pcp, and has prescriptions filled at [facility]. Pt currently declines any home going needs and plan is home when medically able.  Pt likely to transfer to medical floor this afternoon.  Will continue to follow if needs should arise.

## 2024-02-29 NOTE — PROGRESS NOTES
Medical Group Progress Note  ASSESSMENT & PLAN:     Acute asthma exacerbation  - Saw her new allergist yesterday and did receive her first injection of Trezspire earlier on 2/28  - On prednisone 40 mg daily since her last 3 to 4 months  - No documented hypoxia, weaned off oxygen this morning  - Increase Solu-Medrol IV  - Continue bronchodilators as ordered  - Will resume prednisone 40 mg daily on discharge    Hypokalemia  - Replace as needed    Hypothyroid  Generalized anxiety  - Continue home medications as reconciled    VTE prophylaxis: Enoxaparin subcutaneous      ---Of note, this documentation is completed using the Dragon Dictation system (voice recognition software). There may be spelling and/or grammatical errors that were not corrected prior to final submission.---    Jason Bill MD    SUBJECTIVE     Patient was seen and examined bedside in the ICU this morning.  States that shortness of breath is improved from admission however still not back to her baseline.  Will like another night in the hospital.    Discussed how she did see her new allergist yesterday and was provided with a new monthly injection of Tezspire for her first dose yesterday.  Has been on prednisone 40 mg daily for the past 3 months.  Was told to start weaning off the prednisone in 2 weeks by her allergist during her visit yesterday.    OBJECTIVE:     Last Recorded Vitals:  Vitals:    02/29/24 1000 02/29/24 1030 02/29/24 1147 02/29/24 1356   BP:  137/69 146/83 125/64   BP Location:  Right arm     Patient Position:  Lying     Pulse: 80 72 86 78   Resp: 10 20     Temp:  36.5 °C (97.7 °F) 37.1 °C (98.8 °F) 36.3 °C (97.3 °F)   TempSrc:  Temporal     SpO2: 98% 97% 93% 95%   Weight:       Height:         Last I/O:  I/O last 3 completed shifts:  In: 441.7 (3.1 mL/kg) [I.V.:441.7 (3.1 mL/kg)]  Out: - (0 mL/kg)   Weight: 142.4 kg     Physical Exam  Constitutional:       General: She is not in acute distress.     Appearance: Normal  appearance. She is not toxic-appearing.   HENT:      Head: Normocephalic and atraumatic.   Eyes:      Extraocular Movements: Extraocular movements intact.      Conjunctiva/sclera: Conjunctivae normal.   Cardiovascular:      Rate and Rhythm: Normal rate and regular rhythm.      Pulses: Normal pulses.      Heart sounds: Normal heart sounds.   Pulmonary:      Effort: Pulmonary effort is normal.      Breath sounds: Wheezing present.      Comments: No acute respiratory distress.  Breath sounds diminished auscultation bilaterally with scattered expiratory wheezes.  Abdominal:      General: Bowel sounds are normal.      Palpations: Abdomen is soft.      Tenderness: There is no abdominal tenderness. There is no guarding.   Musculoskeletal:         General: Normal range of motion.      Cervical back: Normal range of motion and neck supple.   Neurological:      General: No focal deficit present.      Mental Status: She is alert and oriented to person, place, and time. Mental status is at baseline.   Psychiatric:         Mood and Affect: Mood normal.         Behavior: Behavior normal.         Thought Content: Thought content normal.     Inpatient Medications:  aspirin, 81 mg, oral, Daily  budesonide, 0.5 mg, nebulization, BID  desvenlafaxine, 100 mg, oral, Daily  desvenlafaxine, 50 mg, oral, Daily  docusate sodium, 100 mg, oral, BID  enoxaparin, 60 mg, subcutaneous, q12h SERGIO  fluticasone furoate-vilanteroL, 1 puff, inhalation, Daily  levothyroxine, 100 mcg, oral, Daily  loratadine, 10 mg, oral, Daily  loratadine, 10 mg, oral, Daily  methylPREDNISolone sodium succinate (PF), 40 mg, intravenous, q8h  pantoprazole, 40 mg, oral, Daily   Or  pantoprazole, 40 mg, intravenous, Daily  psyllium, 1 packet, oral, Daily    PRN Medications  PRN medications: acetaminophen **OR** acetaminophen **OR** acetaminophen, guaiFENesin, ipratropium-albuteroL, melatonin, ondansetron **OR** ondansetron, oxygen  Continuous Medications:     LABS AND  IMAGING:     Labs:  Results from last 7 days   Lab Units 02/29/24  0415 02/28/24 2032   WBC AUTO x10*3/uL 7.7 8.2   RBC AUTO x10*6/uL 4.83 4.95   HEMOGLOBIN g/dL 14.5 15.0   HEMATOCRIT % 42.0 42.5   MCV fL 87 86   MCH pg 30.0 30.3   MCHC g/dL 34.5 35.3   RDW % 12.4 12.3   PLATELETS AUTO x10*3/uL 300 332     Results from last 7 days   Lab Units 02/29/24  0415 02/28/24 2032   SODIUM mmol/L 136 138   POTASSIUM mmol/L 4.4 3.2*   CHLORIDE mmol/L 104 105   CO2 mmol/L 23 24   BUN mg/dL 10 11   CREATININE mg/dL 0.83 0.86   GLUCOSE mg/dL 157* 95   PROTEIN TOTAL g/dL 6.8 6.8   CALCIUM mg/dL 9.1 9.0   BILIRUBIN TOTAL mg/dL 0.5 0.5   ALK PHOS U/L 95 96   AST U/L 48* 45*   ALT U/L 109* 105*     Results from last 7 days   Lab Units 02/29/24  0415   MAGNESIUM mg/dL 2.10     Results from last 7 days   Lab Units 02/29/24  0116 02/28/24 2032   TROPHS ng/L <3 <3     Imaging:  ECG 12 lead  Normal sinus rhythm  Low voltage QRS  Nonspecific T wave abnormality  Abnormal ECG  When compared with ECG of 28-JAN-2024 02:14,  Vent. rate has decreased BY  42 BPM  Nonspecific T wave abnormality, worse in Lateral leads  See ED provider note for full interpretation and clinical correlation

## 2024-02-29 NOTE — ED NOTES
Pt taken off of oxygen and walked down the ramirez. Pulse ox quickly dropped from 99 to 91 off of oxygen and with activity. Pt placed back on oxygen per provider.     Bryson Thompson RN  02/28/24 [id number]

## 2024-02-29 NOTE — ED PROCEDURE NOTE
Procedure  Procedures        Critical Care Time  Authorized and Performed by: Cresencio Michaud MD  Total critical care time: [32] minutes  Due to a high probability of clinically significant, life threatening deterioration, the patient required my highest level of preparedness to intervene emergently and I personally spent this critical care time directly and personally managing the patient. This critical care time included obtaining a history; examining the patient; pulse oximetry; ordering and review of studies; arranging urgent treatment with development of a management plan; evaluation of patient's response to treatment; frequent reassessment; and, discussions with other providers and patient/family.  This critical care time was performed to assess and manage the high probability of imminent, life-threatening deterioration that could result in multi-organ failure. It was exclusive of separately billable procedures and treating other patients and teaching time.  Please see MDM section and the rest of the note for further information on patient assessment and treatment.     Cresencio Michaud MD  02/28/24 [id number]

## 2024-02-29 NOTE — PROGRESS NOTES
Unable to reach patient for one month post discharge follow up call.     If no voicemail available call attempts x 2 were made to contact the patient to assist with any questions or concerns patient may have.

## 2024-02-29 NOTE — H&P
Medical Group History and Physical    ASSESSMENT & PLAN:     Asthma Exacerbation  Acute Respiratory failure with hypoxia  RSV and Flu A infections - recovering  - resume home RT treatments and allergy meds  - failed walking pOx  - supplemental O2  - steroids?? Consider adding  - PT/OT eval and consider TCC consult for possible home o2.     Hypokalemia  - Replace and recheck    Hypothyroid  - resume synthroid  - Check TSH with reflex T4    VTE Prophylaxis:     Danelle Roach, APRN-CNP    HISTORY OF PRESENT ILLNESS:   Chief Complaint: dyspnea on exertion    History Of Present Illness:    Sarahi Haley is a 38 y.o. female with a significant past medical history of moderate Asthma, anxiety, PTSD, Syncope, hypothyroidism presenting to [facility] ER with c/o worsening shortness of breath with exertion and rest. Likely Asthma exacerbation.     Last admitted 1/28 to 1/29 for Severe persistent asthma with exacerbation, RSV resolving.  treated with IV steroid, IV doxycycline, home inhalers and Tamiflu. Denies any fevers, dizziness, syncope.  Assessed in emergency room but patient failed walking pulse ox; o2 saturation down below 89%, requiring supplemental O2 at this time will have PT/OT eval in a.m. with TCC to follow for home O2 if needed.     vital signs are stable and patient is now ready for admission under general medicine for the management of moderate asthma exacerbation, and acute respiratory failure with hypoxia.     Review of systems: 10 point review of systems is otherwise negative except as mentioned above.    PAST HISTORIES:       Past Medical History:  Medical Problems       Problem List       Asthma    RAD (reactive airway disease)    Weight loss    Vitamin D deficiency    Syncope    Seizure disorder, complex partial, without intractable epilepsy (CMS/HCC)    Regular astigmatism of left eye    Panic attack    Palpitations    NAFLD (nonalcoholic fatty liver disease)    Myopia    Muscle pain    Morbid  obesity (CMS/HCC)    Migraine    Low grade squamous intraepithelial lesion (LGSIL) on Papanicolaou smear of cervix    Leukocytosis    Left ventricular hypertrophy    Kidney stones    Hypothyroidism, adult    Hypokalemia    Hematuria    GERD (gastroesophageal reflux disease)    High level of cardiac marker    Dysmenorrhea    Dysfunction of both eustachian tubes    Vertigo    Snoring    Chronic idiopathic urticaria    Overview Signed 10/10/2023  1:44 PM by Ange Jansen     Formatting of this note might be different from the original. Continue home meds         Cervical radiculopathy, acute    Visceral pain    Cephalgia    Acute asthma exacerbation    PTSD (post-traumatic stress disorder)    Anxiety and depression    Cystitis, acute    Acute respiratory failure with hypoxemia (CMS/HCC)    Acute bronchospasm    Absence seizure (CMS/HCC)    Altered mental status    Overview Signed 10/10/2023  1:44 PM by Ange Jansen     ALTERED MENTAL STATUS         Moderate persistent asthma with exacerbation    Asthma in adult, moderate persistent, with acute exacerbation    Asthma exacerbation attacks    Severe persistent asthma not dependent on systemic steroids    Severe persistent asthma with exacerbation    Allergy to nonsteroidal anti-inflammatory drug (NSAID)    Steroid side effects, initial encounter           Past Surgical History:  Past Surgical History:   Procedure Laterality Date    OTHER SURGICAL HISTORY  02/08/2019    Appendectomy    OTHER SURGICAL HISTORY  02/08/2019    Cholecystectomy    OTHER SURGICAL HISTORY  02/08/2019    Cystoscopy    OTHER SURGICAL HISTORY  02/08/2019    Ureteroscopy    OTHER SURGICAL HISTORY  02/08/2019    Shoulder surgery          Social History:  She reports that she has never smoked. She has never used smokeless tobacco. She reports current alcohol use. She reports that she does not use drugs.    Family History:  Family History   Problem Relation Name Age of Onset    Hypertension  Father      Other (Pulmonary Valve Stenosis) Sister      Heart disease Maternal Grandmother      Diabetes Other Grandparent     Lung cancer Other Grandparent     Anxiety disorder Sibling          Allergies:  Bee venom protein (honey bee), Diphenhydramine, Nsaids (non-steroidal anti-inflammatory drug), Procaine, and Ketorolac    OBJECTIVE:       Last Recorded Vitals:  Vitals:    02/28/24 2041 02/28/24 2045 02/28/24 2100 02/28/24 2115   BP: 110/57 110/57 109/73    BP Location: Right arm      Patient Position: Sitting      Pulse: 72 74 74 72   Resp: 15 14 19 18   Temp: 36.5 °C (97.7 °F)      TempSrc: Temporal      SpO2: 100% 99% 99% 100%   Weight:       Height:           Last I/O:  No intake/output data recorded.    Physical Exam  Constitutional:       Appearance: She is obese.   HENT:      Head: Normocephalic and atraumatic.      Nose: Nose normal.      Mouth/Throat:      Mouth: Mucous membranes are moist.      Pharynx: Oropharynx is clear.   Eyes:      Extraocular Movements: Extraocular movements intact.      Conjunctiva/sclera: Conjunctivae normal.      Pupils: Pupils are equal, round, and reactive to light.   Cardiovascular:      Rate and Rhythm: Normal rate and regular rhythm.      Pulses: Normal pulses.      Heart sounds: Normal heart sounds.   Pulmonary:      Effort: Pulmonary effort is normal.      Breath sounds: Stridor present. Wheezing present.      Comments: Poor inspiratory effort    Abdominal:      General: Abdomen is flat. Bowel sounds are normal.      Palpations: Abdomen is soft.   Musculoskeletal:         General: Normal range of motion.      Cervical back: Normal range of motion.   Skin:     General: Skin is warm and dry.      Capillary Refill: Capillary refill takes 2 to 3 seconds.   Neurological:      General: No focal deficit present.      Mental Status: She is alert and oriented to person, place, and time. Mental status is at baseline.   Psychiatric:         Mood and Affect: Mood normal.          Behavior: Behavior normal.         Thought Content: Thought content normal.         Judgment: Judgment normal.           Scheduled Medications     PRN Medications  PRN medications: oxygen  Continuous Medications  sodium chloride 0.9%, 100 mL/hr, Last Rate: 100 mL/hr (02/28/24 2038)        Outpatient Medications:  Prior to Admission medications    Medication Sig Start Date End Date Taking? Authorizing Provider   albuterol 2.5 mg /3 mL (0.083 %) nebulizer solution Take 3 mL by nebulization every 4 hours if needed for shortness of breath. 9/13/22   Historical Provider, MD   albuterol 90 mcg/actuation inhaler Inhale 2 puffs every 4 hours if needed for shortness of breath.    Historical Provider, MD   albuterol-budesonide (Airsupra) 90-80 mcg/actuation inhaler Inhale 2 puffs if needed (every 4 hours as needed for cough, wheeze, short of breath not to exceed 6 doses in a 24 hour period of time.). Patient has a coupon 2/28/24 3/29/24  Hilary Garcia [place], DO   aspirin 81 mg chewable tablet Chew 1 tablet (81 mg) once daily.    Historical Provider, MD   budesonide (Pulmicort) 0.5 mg/2 mL nebulizer solution Take 2 mL (0.5 mg) by nebulization 2 times a day. 7/26/23   Historical Provider, MD   butalbital-acetaminophen-caff -40 mg tablet Take 1 tablet by mouth early in the morning.. 1 tab(s) orally once a day, As Needed 8/9/23   Paul Matthew DO   desvenlafaxine 100 mg 24 hr tablet TAKE 1 TABLET BY MOUTH EVERY DAY 11/27/23   Paul Matthew DO   desvenlafaxine 50 mg 24 hr tablet Take 1 tablet (50 mg) by mouth once daily. Take with 100 mg = 150 mg dose    Historical Provider, MD   EPINEPHrine 0.3 mg/0.3 mL injection syringe Inject 0.3 mL (0.3 mg) into the muscle once daily as needed for anaphylaxis. 10/23/23   Paul Matthew DO   fexofenadine (Allegra) 180 mg tablet Take 1 tablet (180 mg) by mouth once daily. 5/21/15   Historical Provider, MD   fluticasone-umeclidin-vilanter (Trelegy Ellipta) 100-62.5-25 mcg  blister with device Inhale 2 puffs once daily.    Historical Provider, MD   fluticasone/umeclidin/vilanter (TRELEGY ELLIPTA INHL) Inhale 1 puff early in the morning..    Historical Provider, MD   galcanezumab (Emgality Syringe) 120 mg/mL prefilled syringe Inject 1 Syringe (120 mg) under the skin every 28 (twenty-eight) days.    Historical Provider, MD   hydrOXYzine HCL (Atarax) 25 mg tablet Take 1 tablet (25 mg) by mouth 3 times a day as needed for anxiety. 11/20/23   Paul Matthew DO   levothyroxine (Synthroid, Levoxyl) 100 mcg tablet Take 1 tablet (100 mcg) by mouth once daily. 11/13/19   Historical Provider, MD   metoclopramide HCl (REGLAN ORAL) Take 1 tablet by mouth every 8 hours if needed (nausea).    Historical Provider, MD   montelukast (Singulair) 10 mg tablet Take 1 tablet (10 mg) by mouth once daily at bedtime. 8/9/23 2/5/24  Paul Matthew DO   norethindrone (Deblitane) 0.35 mg tablet Take 1 tablet (0.35 mg) over 84 days by mouth once daily. 12/27/23   Zohaib Cueva DO   rizatriptan (Maxalt) 10 mg tablet Take 1 tablet (10 mg) by mouth if needed for migraine. May repeat in 2 hours if unresolved. Do not exceed 30 mg in 24 hours.    Historical Provider, MD   SUMAtriptan (Imitrex) 100 mg tablet Take 1 tablet (100 mg) by mouth if needed.    Historical Provider, MD       LABS AND IMAGING:     Labs:  Results for orders placed or performed during the hospital encounter of 02/28/24 (from the past 24 hour(s))   CBC and Auto Differential   Result Value Ref Range    WBC 8.2 4.4 - 11.3 x10*3/uL    nRBC 0.0 0.0 - 0.0 /100 WBCs    RBC 4.95 4.00 - 5.20 x10*6/uL    Hemoglobin 15.0 12.0 - 16.0 g/dL    Hematocrit 42.5 36.0 - 46.0 %    MCV 86 80 - 100 fL    MCH 30.3 26.0 - 34.0 pg    MCHC 35.3 32.0 - 36.0 g/dL    RDW 12.3 11.5 - 14.5 %    Platelets 332 150 - 450 x10*3/uL    Neutrophils % 41.3 40.0 - 80.0 %    Immature Granulocytes %, Automated 0.2 0.0 - 0.9 %    Lymphocytes % 50.1 13.0 - 44.0 %    Monocytes % 8.1 2.0 -  10.0 %    Eosinophils % 0.1 0.0 - 6.0 %    Basophils % 0.2 0.0 - 2.0 %    Neutrophils Absolute 3.37 1.20 - 7.70 x10*3/uL    Immature Granulocytes Absolute, Automated 0.02 0.00 - 0.70 x10*3/uL    Lymphocytes Absolute 4.10 1.20 - 4.80 x10*3/uL    Monocytes Absolute 0.66 0.10 - 1.00 x10*3/uL    Eosinophils Absolute 0.01 0.00 - 0.70 x10*3/uL    Basophils Absolute 0.02 0.00 - 0.10 x10*3/uL   Comprehensive metabolic panel   Result Value Ref Range    Glucose 95 74 - 99 mg/dL    Sodium 138 136 - 145 mmol/L    Potassium 3.2 (L) 3.5 - 5.3 mmol/L    Chloride 105 98 - 107 mmol/L    Bicarbonate 24 21 - 32 mmol/L    Anion Gap 12 10 - 20 mmol/L    Urea Nitrogen 11 6 - 23 mg/dL    Creatinine 0.86 0.50 - 1.05 mg/dL    eGFR 90 >60 mL/min/1.73m*2    Calcium 9.0 8.6 - 10.3 mg/dL    Albumin 4.0 3.4 - 5.0 g/dL    Alkaline Phosphatase 96 33 - 110 U/L    Total Protein 6.8 6.4 - 8.2 g/dL    AST 45 (H) 9 - 39 U/L    Bilirubin, Total 0.5 0.0 - 1.2 mg/dL     (H) 7 - 45 U/L   B-Type Natriuretic Peptide   Result Value Ref Range    BNP 32 0 - 99 pg/mL   Troponin I, High Sensitivity, Initial   Result Value Ref Range    Troponin I, High Sensitivity <3 0 - 13 ng/L   Sars-CoV-2 and Influenza A/B PCR   Result Value Ref Range    Flu A Result Not Detected Not Detected    Flu B Result Not Detected Not Detected    Coronavirus 2019, PCR Not Detected Not Detected   RSV PCR   Result Value Ref Range    RSV PCR Not Detected Not Detected   BLOOD GAS ARTERIAL FULL PANEL   Result Value Ref Range    POCT pH, Arterial 7.43 (H) 7.38 - 7.42 pH    POCT pCO2, Arterial 40 38 - 42 mm Hg    POCT pO2, Arterial 186 (H) 85 - 95 mm Hg    POCT SO2, Arterial 100 94 - 100 %    POCT Oxy Hemoglobin, Arterial 98.1 (H) 94.0 - 98.0 %    POCT Hematocrit Calculated, Arterial 44.0 36.0 - 46.0 %    POCT Sodium, Arterial 137 136 - 145 mmol/L    POCT Potassium, Arterial 3.0 (L) 3.5 - 5.3 mmol/L    POCT Chloride, Arterial 107 98 - 107 mmol/L    POCT Ionized Calcium, Arterial 1.15  1.10 - 1.33 mmol/L    POCT Glucose, Arterial 108 (H) 74 - 99 mg/dL    POCT Lactate, Arterial 1.7 0.4 - 2.0 mmol/L    POCT Base Excess, Arterial 2.0 -2.0 - 3.0 mmol/L    POCT HCO3 Calculated, Arterial 26.5 (H) 22.0 - 26.0 mmol/L    POCT Hemoglobin, Arterial 14.5 12.0 - 16.0 g/dL    POCT Anion Gap, Arterial 7 (L) 10 - 25 mmo/L    Patient Temperature      FiO2 40 %    Ventilator Mode BiPAP     Ventilator Rate 12 bpm    Spontaneous Tidal Volume      Total Minute Volume 10.5 Liter    Frequency (BPM) 12 bpm    Inspiratory Time 0.9     Ipap CMH2O 10.0 cm H2O    Epap CMH2O 5.0 cm H2O    Site of Arterial Puncture Brachial Right         Imaging:  XR chest 1 view   Final Result   1.  Low lung volumes with mild pulmonary vascular congestion. No   consolidation or pleural effusion is evident.                  MACRO:   None        Signed by: Tami De Dios 2/28/2024 9:38 PM   Dictation workstation:   [id number]

## 2024-03-01 VITALS
RESPIRATION RATE: 20 BRPM | TEMPERATURE: 97 F | SYSTOLIC BLOOD PRESSURE: 130 MMHG | HEART RATE: 67 BPM | WEIGHT: 293 LBS | OXYGEN SATURATION: 97 % | HEIGHT: 65 IN | BODY MASS INDEX: 48.82 KG/M2 | DIASTOLIC BLOOD PRESSURE: 73 MMHG

## 2024-03-01 LAB
ALBUMIN SERPL BCP-MCNC: 3.8 G/DL (ref 3.4–5)
ALP SERPL-CCNC: 93 U/L (ref 33–110)
ALT SERPL W P-5'-P-CCNC: 123 U/L (ref 7–45)
ANION GAP SERPL CALC-SCNC: 13 MMOL/L (ref 10–20)
AST SERPL W P-5'-P-CCNC: 46 U/L (ref 9–39)
BASOPHILS # BLD AUTO: 0.02 X10*3/UL (ref 0–0.1)
BASOPHILS NFR BLD AUTO: 0.1 %
BILIRUB SERPL-MCNC: 0.3 MG/DL (ref 0–1.2)
BUN SERPL-MCNC: 13 MG/DL (ref 6–23)
CALCIUM SERPL-MCNC: 9.2 MG/DL (ref 8.6–10.3)
CHLORIDE SERPL-SCNC: 103 MMOL/L (ref 98–107)
CO2 SERPL-SCNC: 23 MMOL/L (ref 21–32)
CREAT SERPL-MCNC: 0.81 MG/DL (ref 0.5–1.05)
EGFRCR SERPLBLD CKD-EPI 2021: >90 ML/MIN/1.73M*2
EOSINOPHIL # BLD AUTO: 0 X10*3/UL (ref 0–0.7)
EOSINOPHIL NFR BLD AUTO: 0 %
ERYTHROCYTE [DISTWIDTH] IN BLOOD BY AUTOMATED COUNT: 12.6 % (ref 11.5–14.5)
GLUCOSE SERPL-MCNC: 213 MG/DL (ref 74–99)
HCT VFR BLD AUTO: 40.4 % (ref 36–46)
HGB BLD-MCNC: 13.4 G/DL (ref 12–16)
IMM GRANULOCYTES # BLD AUTO: 0.1 X10*3/UL (ref 0–0.7)
IMM GRANULOCYTES NFR BLD AUTO: 0.6 % (ref 0–0.9)
LYMPHOCYTES # BLD AUTO: 0.93 X10*3/UL (ref 1.2–4.8)
LYMPHOCYTES NFR BLD AUTO: 5.5 %
MAGNESIUM SERPL-MCNC: 1.97 MG/DL (ref 1.6–2.4)
MCH RBC QN AUTO: 29.6 PG (ref 26–34)
MCHC RBC AUTO-ENTMCNC: 33.2 G/DL (ref 32–36)
MCV RBC AUTO: 89 FL (ref 80–100)
MONOCYTES # BLD AUTO: 0.55 X10*3/UL (ref 0.1–1)
MONOCYTES NFR BLD AUTO: 3.3 %
NEUTROPHILS # BLD AUTO: 15.25 X10*3/UL (ref 1.2–7.7)
NEUTROPHILS NFR BLD AUTO: 90.5 %
NRBC BLD-RTO: 0 /100 WBCS (ref 0–0)
PLATELET # BLD AUTO: 310 X10*3/UL (ref 150–450)
POTASSIUM SERPL-SCNC: 3.8 MMOL/L (ref 3.5–5.3)
PROT SERPL-MCNC: 6.5 G/DL (ref 6.4–8.2)
RBC # BLD AUTO: 4.53 X10*6/UL (ref 4–5.2)
SODIUM SERPL-SCNC: 135 MMOL/L (ref 136–145)
WBC # BLD AUTO: 16.9 X10*3/UL (ref 4.4–11.3)

## 2024-03-01 PROCEDURE — 94640 AIRWAY INHALATION TREATMENT: CPT

## 2024-03-01 PROCEDURE — 36415 COLL VENOUS BLD VENIPUNCTURE: CPT | Performed by: NURSE PRACTITIONER

## 2024-03-01 PROCEDURE — 2500000001 HC RX 250 WO HCPCS SELF ADMINISTERED DRUGS (ALT 637 FOR MEDICARE OP): Performed by: STUDENT IN AN ORGANIZED HEALTH CARE EDUCATION/TRAINING PROGRAM

## 2024-03-01 PROCEDURE — 2500000002 HC RX 250 W HCPCS SELF ADMINISTERED DRUGS (ALT 637 FOR MEDICARE OP, ALT 636 FOR OP/ED): Performed by: STUDENT IN AN ORGANIZED HEALTH CARE EDUCATION/TRAINING PROGRAM

## 2024-03-01 PROCEDURE — 2500000004 HC RX 250 GENERAL PHARMACY W/ HCPCS (ALT 636 FOR OP/ED): Performed by: STUDENT IN AN ORGANIZED HEALTH CARE EDUCATION/TRAINING PROGRAM

## 2024-03-01 PROCEDURE — 2500000004 HC RX 250 GENERAL PHARMACY W/ HCPCS (ALT 636 FOR OP/ED): Performed by: NURSE PRACTITIONER

## 2024-03-01 PROCEDURE — 80053 COMPREHEN METABOLIC PANEL: CPT | Performed by: NURSE PRACTITIONER

## 2024-03-01 PROCEDURE — 85025 COMPLETE CBC W/AUTO DIFF WBC: CPT | Performed by: NURSE PRACTITIONER

## 2024-03-01 PROCEDURE — 99239 HOSP IP/OBS DSCHRG MGMT >30: CPT | Performed by: STUDENT IN AN ORGANIZED HEALTH CARE EDUCATION/TRAINING PROGRAM

## 2024-03-01 PROCEDURE — 83735 ASSAY OF MAGNESIUM: CPT | Performed by: NURSE PRACTITIONER

## 2024-03-01 PROCEDURE — 2500000002 HC RX 250 W HCPCS SELF ADMINISTERED DRUGS (ALT 637 FOR MEDICARE OP, ALT 636 FOR OP/ED): Performed by: NURSE PRACTITIONER

## 2024-03-01 RX ADMIN — PANTOPRAZOLE SODIUM 40 MG: 40 TABLET, DELAYED RELEASE ORAL at 05:40

## 2024-03-01 RX ADMIN — LORATADINE 10 MG: 10 TABLET ORAL at 08:53

## 2024-03-01 RX ADMIN — DESVENLAFAXINE 100 MG: 50 TABLET, FILM COATED, EXTENDED RELEASE ORAL at 08:53

## 2024-03-01 RX ADMIN — IPRATROPIUM BROMIDE AND ALBUTEROL SULFATE 3 ML: 2.5; .5 SOLUTION RESPIRATORY (INHALATION) at 01:35

## 2024-03-01 RX ADMIN — DESVENLAFAXINE 50 MG: 50 TABLET, FILM COATED, EXTENDED RELEASE ORAL at 08:55

## 2024-03-01 RX ADMIN — BUDESONIDE INHALATION 0.5 MG: 0.5 SUSPENSION RESPIRATORY (INHALATION) at 06:59

## 2024-03-01 RX ADMIN — METHYLPREDNISOLONE SODIUM SUCCINATE 40 MG: 40 INJECTION, POWDER, FOR SOLUTION INTRAMUSCULAR; INTRAVENOUS at 00:10

## 2024-03-01 RX ADMIN — ENOXAPARIN SODIUM 60 MG: 60 INJECTION SUBCUTANEOUS at 08:56

## 2024-03-01 RX ADMIN — ASPIRIN 81 MG: 81 TABLET, CHEWABLE ORAL at 08:53

## 2024-03-01 RX ADMIN — METHYLPREDNISOLONE SODIUM SUCCINATE 40 MG: 40 INJECTION, POWDER, FOR SOLUTION INTRAMUSCULAR; INTRAVENOUS at 09:03

## 2024-03-01 RX ADMIN — LEVOTHYROXINE SODIUM 100 MCG: 0.1 TABLET ORAL at 08:53

## 2024-03-01 RX ADMIN — IPRATROPIUM BROMIDE AND ALBUTEROL SULFATE 3 ML: 2.5; .5 SOLUTION RESPIRATORY (INHALATION) at 06:57

## 2024-03-01 RX ADMIN — FLUTICASONE FUROATE AND VILANTEROL TRIFENATATE 1 PUFF: 200; 25 POWDER RESPIRATORY (INHALATION) at 08:53

## 2024-03-01 ASSESSMENT — PAIN - FUNCTIONAL ASSESSMENT: PAIN_FUNCTIONAL_ASSESSMENT: 0-10

## 2024-03-01 ASSESSMENT — PAIN SCALES - GENERAL: PAINLEVEL_OUTOF10: 0 - NO PAIN

## 2024-03-01 NOTE — CARE PLAN
Problem: Pain  Goal: My pain/discomfort is manageable  Outcome: Met     Problem: Safety  Goal: Patient will be injury free during hospitalization  Outcome: Met  Goal: I will remain free of falls  Outcome: Met     Problem: Daily Care  Goal: Daily care needs are met  Outcome: Met     Problem: Psychosocial Needs  Goal: Demonstrates ability to cope with hospitalization/illness  Outcome: Met  Goal: Collaborate with me, my family, and caregiver to identify my specific goals  Outcome: Met     Problem: Discharge Barriers  Goal: My discharge needs are met  Outcome: Met

## 2024-03-01 NOTE — DISCHARGE INSTRUCTIONS
Follow-up with allergist as previously scheduled in 1 month for your monthly injection.    As you have previously instructed, start your prednisone wean in approximately 2 weeks

## 2024-03-01 NOTE — DISCHARGE SUMMARY
Medical Group Discharge Summary  DISCHARGE DIAGNOSIS     Severe persistent asthma exacerbation  Hypothyroidism  Hypokalemia  Generalized anxiety  Morbid obesity    HOSPITAL COURSE AND DETAILS     This is a 38-year-old female with a significant past medical history of severe persistent asthma, hypothyroidism that presented from home with sudden onset of worsening progressive shortness of breath.  Patient earlier on the day of admission had seen her allergist for the first time and was started on Tezspire monthly injections for the first time.  Few hours after she went home patient had a sudden onset of shortness of breath which is similar to her asthma exacerbations.    Patient was admitted and treated for an acute asthma exacerbation.  Was weaned off oxygen after initial ambulatory pulse ox did show hypoxia.  Was started on Solu-Medrol IV and will transition back to her oral regimen of 40 mg of prednisone daily.    Patient is being discharged today after significant improvement in her shortness of breath, able to ambulate with nursing staff without difficulty in breathing.  She was instructed to continue prednisone 40 mg daily and start to wean in 2 weeks as instructed by her allergist (10 mg/week).  Also follow-up with her PCP as previously scheduled.    Total time spent on discharge: >30min      ---Of note, this documentation is completed using the Dragon Dictation system (voice recognition software). There may be spelling and/or grammatical errors that were not corrected prior to final submission.---    Jason Bill MD    DISCHARGE PHYSICAL EXAM     Last Recorded Vitals:  Vitals:    03/01/24 0016 03/01/24 0135 03/01/24 0408 03/01/24 0733   BP: 147/76  137/64 130/73   BP Location:   Right arm    Patient Position:   Lying    Pulse: 83  70 67   Resp: 18  20    Temp: 36.1 °C (97 °F)  36.2 °C (97.2 °F) 36.1 °C (97 °F)   TempSrc:   Temporal    SpO2: 95% 96% 96% 97%   Weight:       Height:         Physical  Exam  Constitutional:       General: She is not in acute distress.     Appearance: Normal appearance. She is not toxic-appearing.   HENT:      Head: Normocephalic and atraumatic.   Eyes:      Extraocular Movements: Extraocular movements intact.      Conjunctiva/sclera: Conjunctivae normal.   Cardiovascular:      Rate and Rhythm: Normal rate and regular rhythm.      Pulses: Normal pulses.      Heart sounds: Normal heart sounds.   Pulmonary:      Effort: Pulmonary effort is normal. No respiratory distress.      Breath sounds: No wheezing.      Comments: No acute respiratory distress.  No wheezes auscultated today.  Breath sounds diminished but clear to auscultation bilaterally.  Abdominal:      General: Bowel sounds are normal.      Palpations: Abdomen is soft.      Tenderness: There is no abdominal tenderness. There is no guarding.   Musculoskeletal:         General: Normal range of motion.      Cervical back: Normal range of motion and neck supple.   Neurological:      General: No focal deficit present.      Mental Status: She is alert and oriented to person, place, and time. Mental status is at baseline.   Psychiatric:         Mood and Affect: Mood normal.         Behavior: Behavior normal.         Thought Content: Thought content normal.       DISCHARGE MEDICATIONS        Your medication list        CONTINUE taking these medications        Instructions Last Dose Given Next Dose Due   Airsupra 90-80 mcg/actuation inhaler  Generic drug: albuterol-budesonide      Inhale 2 puffs if needed (every 4 hours as needed for cough, wheeze, short of breath not to exceed 6 doses in a 24 hour period of time.). Patient has a coupon       albuterol 2.5 mg /3 mL (0.083 %) nebulizer solution           albuterol 90 mcg/actuation inhaler           aspirin 81 mg chewable tablet           budesonide 0.5 mg/2 mL nebulizer solution  Commonly known as: Pulmicort           butalbital-acetaminophen-caff -40 mg tablet      Take 1 tablet  by mouth early in the morning.. 1 tab(s) orally once a day, As Needed       desvenlafaxine 50 mg 24 hr tablet  Commonly known as: Pristiq           desvenlafaxine 100 mg 24 hr tablet  Commonly known as: Pristiq      TAKE 1 TABLET BY MOUTH EVERY DAY       Emgality Syringe 120 mg/mL prefilled syringe  Generic drug: galcanezumab           fexofenadine 180 mg tablet  Commonly known as: Allegra           hydrOXYzine HCL 25 mg tablet  Commonly known as: Atarax      Take 1 tablet (25 mg) by mouth 3 times a day as needed for anxiety.       levothyroxine 100 mcg tablet  Commonly known as: Synthroid, Levoxyl           montelukast 10 mg tablet  Commonly known as: Singulair      Take 1 tablet (10 mg) by mouth once daily at bedtime.       norethindrone 0.35 mg tablet  Commonly known as: Deblitane      Take 1 tablet (0.35 mg) over 84 days by mouth once daily.       REGLAN ORAL           rizatriptan 10 mg tablet  Commonly known as: Maxalt           SUMAtriptan 100 mg tablet  Commonly known as: Imitrex           Trelegy Ellipta 100-62.5-25 mcg blister with device  Generic drug: fluticasone-umeclidin-vilanter           TRELEGY ELLIPTA INHL                  ASK your doctor about these medications        Instructions Last Dose Given Next Dose Due   EPINEPHrine 0.3 mg/0.3 mL injection syringe  Commonly known as: Epipen      Inject 0.3 mL (0.3 mg) into the muscle once daily as needed for anaphylaxis.              OUTPATIENT FOLLOW-UP     Future Appointments   Date Time Provider Department Center   3/20/2024  2:00 PM Edin Galdamez MD [id number] [place]   3/26/2024 11:20 AM Hilary Trinidad DO [id number] [place]

## 2024-03-01 NOTE — CARE PLAN
The patient's goals for the shift include      The clinical goals for the shift include Decrease in SOB throughout the shift.     Over the shift, the patient did make progress toward the following goals.  Decrease SOB.

## 2024-03-03 LAB
ATRIAL RATE: 78 BPM
P AXIS: 59 DEGREES
P OFFSET: 196 MS
P ONSET: 142 MS
PR INTERVAL: 162 MS
Q ONSET: 223 MS
QRS COUNT: 12 BEATS
QRS DURATION: 76 MS
QT INTERVAL: 366 MS
QTC CALCULATION(BAZETT): 417 MS
QTC FREDERICIA: 399 MS
R AXIS: -13 DEGREES
T AXIS: 56 DEGREES
T OFFSET: 406 MS
VENTRICULAR RATE: 78 BPM

## 2024-03-04 ENCOUNTER — PATIENT OUTREACH (OUTPATIENT)
Dept: PRIMARY CARE | Facility: CLINIC | Age: 39
End: 2024-03-04
Payer: COMMERCIAL

## 2024-03-11 DIAGNOSIS — J45.909 ASTHMA, UNSPECIFIED ASTHMA SEVERITY, UNSPECIFIED WHETHER COMPLICATED, UNSPECIFIED WHETHER PERSISTENT (HHS-HCC): Primary | ICD-10-CM

## 2024-03-15 ENCOUNTER — PATIENT OUTREACH (OUTPATIENT)
Dept: PRIMARY CARE | Facility: CLINIC | Age: 39
End: 2024-03-15
Payer: COMMERCIAL

## 2024-03-15 ENCOUNTER — DOCUMENTATION (OUTPATIENT)
Dept: CARE COORDINATION | Facility: CLINIC | Age: 39
End: 2024-03-15
Payer: COMMERCIAL

## 2024-03-15 NOTE — PROGRESS NOTES
Referral received for care management services. Will outreach to patient to assess needs and provide support.      Sri Zamora, RN/CM  [facility]  [phone]

## 2024-03-19 ENCOUNTER — PATIENT OUTREACH (OUTPATIENT)
Dept: CARE COORDINATION | Facility: CLINIC | Age: 39
End: 2024-03-19
Payer: COMMERCIAL

## 2024-03-19 NOTE — PROGRESS NOTES
Referral received for care management services.  Outreach call to introduce self, discuss available services, and assess needs, and to see if the patient would be interested our complex care management program. Voicemail message left with my contact.    Sri Zamora RN/CM  [facility]  [phone]

## 2024-03-20 ENCOUNTER — PROCEDURE VISIT (OUTPATIENT)
Dept: OBSTETRICS AND GYNECOLOGY | Facility: CLINIC | Age: 39
End: 2024-03-20
Payer: COMMERCIAL

## 2024-03-20 ENCOUNTER — PATIENT OUTREACH (OUTPATIENT)
Dept: CARE COORDINATION | Facility: CLINIC | Age: 39
End: 2024-03-20

## 2024-03-20 VITALS — DIASTOLIC BLOOD PRESSURE: 60 MMHG | SYSTOLIC BLOOD PRESSURE: 124 MMHG

## 2024-03-20 DIAGNOSIS — R10.2 PELVIC PAIN: ICD-10-CM

## 2024-03-20 DIAGNOSIS — N93.9 ABNORMAL UTERINE BLEEDING (AUB): ICD-10-CM

## 2024-03-20 LAB — PREGNANCY TEST URINE, POC: NEGATIVE

## 2024-03-20 PROCEDURE — 57454 BX/CURETT OF CERVIX W/SCOPE: CPT | Performed by: OBSTETRICS & GYNECOLOGY

## 2024-03-20 PROCEDURE — 81025 URINE PREGNANCY TEST: CPT | Performed by: OBSTETRICS & GYNECOLOGY

## 2024-03-20 PROCEDURE — 87800 DETECT AGNT MULT DNA DIREC: CPT

## 2024-03-20 PROCEDURE — 58300 INSERT INTRAUTERINE DEVICE: CPT | Performed by: OBSTETRICS & GYNECOLOGY

## 2024-03-20 PROCEDURE — 88305 TISSUE EXAM BY PATHOLOGIST: CPT

## 2024-03-20 PROCEDURE — 87661 TRICHOMONAS VAGINALIS AMPLIF: CPT

## 2024-03-20 PROCEDURE — 88305 TISSUE EXAM BY PATHOLOGIST: CPT | Performed by: STUDENT IN AN ORGANIZED HEALTH CARE EDUCATION/TRAINING PROGRAM

## 2024-03-20 RX ORDER — BUPIVACAINE HYDROCHLORIDE 2.5 MG/ML
10 INJECTION, SOLUTION INFILTRATION; PERINEURAL ONCE
Status: COMPLETED | OUTPATIENT
Start: 2024-03-20 | End: 2024-03-20

## 2024-03-20 RX ORDER — TRIAZOLAM 0.25 MG/1
TABLET ORAL
Status: ON HOLD | COMMUNITY
Start: 2023-08-15 | End: 2024-04-03 | Stop reason: ALTCHOICE

## 2024-03-20 RX ADMIN — BUPIVACAINE HYDROCHLORIDE 25 MG: 2.5 INJECTION, SOLUTION INFILTRATION; PERINEURAL at 14:51

## 2024-03-20 ASSESSMENT — PAIN SCALES - GENERAL: PAINLEVEL: 0-NO PAIN

## 2024-03-20 NOTE — PROGRESS NOTES
GYN PROGRESS NOTE          Chief complaint: Colpo and IUD placement    HPI:  Patient answers are not available for this visit.  HPI    Sarahi is a 37 year old established patient here today for a colposcopy.     Her last pap smear was 9/13/23. LSIL HPV OTHER POS  Today, the procedure was explained by both the RN and MD.  Time Out was completed and verified; (2 patient identifiers, Correct procedure, Correct site, Correct position, Correct equipment)  I/O HCG was completed, see results.    Pathology obtained.  The patients questions were answered to her satisfaction.  She has no concerns at this time.  Discharge instructions were given.  The patient was encouraged to call the office with any questions or concerns.  Is a year old established patient of the practice.  She is here today for an Intrauterine Device Placement; Elis  NDC  [id number]  EXP03/2026  Consent obtained.  Time Out Verification Completed:  2 patient identifiers  Correct procedure  Correct site  Correct position  Correct equipment  Procedure explained by both RN and MD at the bedside. The patient's questions were answered to her satisfaction at this time.   Home going Instructions were given and the patient states understanding at this time.   Chaperone: LELA Garcia      Chaperone: LELA Garcia/Medical Student      Last edited by Angeles Mac RN on 3/20/2024  2:35 PM.          Reviewed case with patient, reviewed plans.    ROS:  GEN - no fevers or chills  RESP - no SOB or cough  GYN - see HPI      HISTORY:  Past Medical History:   Diagnosis Date    Acute cystitis without hematuria 08/08/2019    Acute cystitis without hematuria    Asthma     Encounter for initial prescription of contraceptive pills 11/01/2019    Encounter for initial prescription of contraceptive pills    Nausea 02/05/2021    Nausea in adult    Other general symptoms and signs 12/05/2019    Forgetfulness    Parageusia 04/06/2020    Taste perversion    Pelvic and perineal pain      Pelvic pain in female    Personal history of other diseases of the respiratory system 04/06/2020    History of sinusitis    Personal history of other specified conditions 07/01/2020    History of vomiting    Personal history of other specified conditions 01/26/2021    History of headache    Personal history of other specified conditions 02/01/2021    History of diarrhea    Personal history of thrombophlebitis 06/03/2019    History of phlebitis    Personal history of urinary (tract) infections 01/16/2020    History of urinary tract infection     Past Surgical History:   Procedure Laterality Date    OTHER SURGICAL HISTORY  02/08/2019    Appendectomy    OTHER SURGICAL HISTORY  02/08/2019    Cholecystectomy    OTHER SURGICAL HISTORY  02/08/2019    Cystoscopy    OTHER SURGICAL HISTORY  02/08/2019    Ureteroscopy    OTHER SURGICAL HISTORY  02/08/2019    Shoulder surgery     Social History     Socioeconomic History    Marital status:      Spouse name: Not on file    Number of children: Not on file    Years of education: Not on file    Highest education level: Not on file   Occupational History    Not on file   Tobacco Use    Smoking status: Never    Smokeless tobacco: Never   Vaping Use    Vaping Use: Never used   Substance and Sexual Activity    Alcohol use: Yes     Comment: social    Drug use: Never    Sexual activity: Defer   Other Topics Concern    Not on file   Social History Narrative    Not on file     Social Determinants of Health     Financial Resource Strain: Patient Declined (2/29/2024)    Overall Financial Resource Strain (CARDIA)     Difficulty of Paying Living Expenses: Patient declined   Food Insecurity: Not on file   Transportation Needs: No Transportation Needs (2/29/2024)    PRAPARE - Transportation     Lack of Transportation (Medical): No     Lack of Transportation (Non-Medical): No   Physical Activity: Not on file   Stress: Not on file   Social Connections: Not on file   Intimate Partner  Violence: Not on file   Housing Stability: Patient Declined (2/29/2024)    Housing Stability Vital Sign     Unable to Pay for Housing in the Last Year: Patient declined     Number of Places Lived in the Last Year: 1     Unstable Housing in the Last Year: Patient declined     Cancer-related family history includes Lung cancer in an other family member.       PHYSICAL EXAM:  /60 (BP Location: Right arm, Patient Position: Sitting, BP Cuff Size: Adult long)   LMP 03/10/2024   Physical examination:  General: No distress  Neck: No masses  Respiratory: No respiratory distress  Abdomen: soft nontender no hernias  GYN: Normal vulvar skin normal clitoral cortes and clitoris labia majora and minora normal vaginal mucosa no lesions cervix normal uterine body not enlarged no enlargement or pain in bilateral adnexa   perianal area: without lesions    Procedure colposcopy with ECC  Patient was consented, timeout was performed. Patient was placed in lithotomy position.  Cervix was fully visualized visual inspection was performed squamocolumnar junction was fully visualized acetic acid placed findings include minimal change acetowhite no atypical lesions.  Strong iodine solution placed no nonstaining in atypical areas. Biopsy performed at 3 6 and 9 ECC performed cytology collected with Cytobrush.  No hemostasis required patient tolerated the procedure well no complications    Procedure IUD insertion    After written form consent was obtained a timeout was performed. The patient was placed in lithotomy position.  Bimanual exam was performed.  The speculum was placed identifying the cervix.  A paracervical block was performed at the 3 and 9 o'clock position with 10 mL of 0.25% Marcaine without epinephrine.  Cervix was stabilized with tenaculum.  Dilation was performed to the endocervix.  IUD placed to the fundus strings trimmed to 3 cm.  Patient tolerated the procedure well all instrumentation was removed.  Minimal bleeding no  complications.      IMPRESSION/PLAN:    38-year-old positive HPV L ALEXANDER status post colposcopy Liletta IUD insertion    Discuss results in 2 weeks   plan Pap in 1 year        Edin Galdamez MD

## 2024-03-20 NOTE — PROGRESS NOTES
Outreach call to introduce self, discuss available services, and assess needs, and to see if the patient would be interested our complex care management program. Mailbox is full and unable to leave a message.    Sri Zamora RN/CM  [facility]  [phone]

## 2024-03-21 LAB
C TRACH RRNA SPEC QL NAA+PROBE: NEGATIVE
N GONORRHOEA DNA SPEC QL PROBE+SIG AMP: NEGATIVE
T VAGINALIS RRNA SPEC QL NAA+PROBE: NEGATIVE

## 2024-03-25 ENCOUNTER — HOSPITAL ENCOUNTER (EMERGENCY)
Facility: HOSPITAL | Age: 39
Discharge: HOME | End: 2024-03-26
Attending: EMERGENCY MEDICINE
Payer: COMMERCIAL

## 2024-03-25 DIAGNOSIS — J98.01 BRONCHOSPASM: Primary | ICD-10-CM

## 2024-03-25 LAB
BASOPHILS # BLD AUTO: 0.02 X10*3/UL (ref 0–0.1)
BASOPHILS NFR BLD AUTO: 0.3 %
EOSINOPHIL # BLD AUTO: 0 X10*3/UL (ref 0–0.7)
EOSINOPHIL NFR BLD AUTO: 0 %
ERYTHROCYTE [DISTWIDTH] IN BLOOD BY AUTOMATED COUNT: 12.3 % (ref 11.5–14.5)
HCT VFR BLD AUTO: 41.8 % (ref 36–46)
HGB BLD-MCNC: 14.9 G/DL (ref 12–16)
IMM GRANULOCYTES # BLD AUTO: 0.01 X10*3/UL (ref 0–0.7)
IMM GRANULOCYTES NFR BLD AUTO: 0.2 % (ref 0–0.9)
LYMPHOCYTES # BLD AUTO: 3.26 X10*3/UL (ref 1.2–4.8)
LYMPHOCYTES NFR BLD AUTO: 49.1 %
MCH RBC QN AUTO: 30.2 PG (ref 26–34)
MCHC RBC AUTO-ENTMCNC: 35.6 G/DL (ref 32–36)
MCV RBC AUTO: 85 FL (ref 80–100)
MONOCYTES # BLD AUTO: 0.54 X10*3/UL (ref 0.1–1)
MONOCYTES NFR BLD AUTO: 8.1 %
NEUTROPHILS # BLD AUTO: 2.81 X10*3/UL (ref 1.2–7.7)
NEUTROPHILS NFR BLD AUTO: 42.3 %
NRBC BLD-RTO: 0 /100 WBCS (ref 0–0)
PLATELET # BLD AUTO: 438 X10*3/UL (ref 150–450)
RBC # BLD AUTO: 4.93 X10*6/UL (ref 4–5.2)
WBC # BLD AUTO: 6.6 X10*3/UL (ref 4.4–11.3)

## 2024-03-25 PROCEDURE — 96375 TX/PRO/DX INJ NEW DRUG ADDON: CPT

## 2024-03-25 PROCEDURE — 96376 TX/PRO/DX INJ SAME DRUG ADON: CPT

## 2024-03-25 PROCEDURE — 80048 BASIC METABOLIC PNL TOTAL CA: CPT | Performed by: EMERGENCY MEDICINE

## 2024-03-25 PROCEDURE — 99284 EMERGENCY DEPT VISIT MOD MDM: CPT | Mod: 25

## 2024-03-25 PROCEDURE — 96365 THER/PROPH/DIAG IV INF INIT: CPT

## 2024-03-25 PROCEDURE — 85025 COMPLETE CBC W/AUTO DIFF WBC: CPT | Performed by: EMERGENCY MEDICINE

## 2024-03-25 RX ORDER — MAGNESIUM SULFATE HEPTAHYDRATE 40 MG/ML
INJECTION, SOLUTION INTRAVENOUS
Status: COMPLETED
Start: 2024-03-25 | End: 2024-03-26

## 2024-03-25 RX ORDER — MAGNESIUM SULFATE HEPTAHYDRATE 40 MG/ML
2 INJECTION, SOLUTION INTRAVENOUS ONCE
Status: COMPLETED | OUTPATIENT
Start: 2024-03-25 | End: 2024-03-26

## 2024-03-25 ASSESSMENT — LIFESTYLE VARIABLES
TOTAL SCORE: 0
EVER HAD A DRINK FIRST THING IN THE MORNING TO STEADY YOUR NERVES TO GET RID OF A HANGOVER: NO
HAVE YOU EVER FELT YOU SHOULD CUT DOWN ON YOUR DRINKING: NO
EVER FELT BAD OR GUILTY ABOUT YOUR DRINKING: NO
HAVE PEOPLE ANNOYED YOU BY CRITICIZING YOUR DRINKING: NO

## 2024-03-25 ASSESSMENT — COLUMBIA-SUICIDE SEVERITY RATING SCALE - C-SSRS
6. HAVE YOU EVER DONE ANYTHING, STARTED TO DO ANYTHING, OR PREPARED TO DO ANYTHING TO END YOUR LIFE?: NO
2. HAVE YOU ACTUALLY HAD ANY THOUGHTS OF KILLING YOURSELF?: NO
1. IN THE PAST MONTH, HAVE YOU WISHED YOU WERE DEAD OR WISHED YOU COULD GO TO SLEEP AND NOT WAKE UP?: NO

## 2024-03-26 ENCOUNTER — APPOINTMENT (OUTPATIENT)
Dept: PHARMACY | Facility: HOSPITAL | Age: 39
End: 2024-03-26
Payer: COMMERCIAL

## 2024-03-26 ENCOUNTER — APPOINTMENT (OUTPATIENT)
Dept: RADIOLOGY | Facility: HOSPITAL | Age: 39
End: 2024-03-26
Payer: COMMERCIAL

## 2024-03-26 VITALS
WEIGHT: 293 LBS | BODY MASS INDEX: 51.91 KG/M2 | RESPIRATION RATE: 17 BRPM | OXYGEN SATURATION: 94 % | DIASTOLIC BLOOD PRESSURE: 70 MMHG | HEIGHT: 63 IN | TEMPERATURE: 98.2 F | HEART RATE: 72 BPM | SYSTOLIC BLOOD PRESSURE: 135 MMHG

## 2024-03-26 LAB
ANION GAP SERPL CALC-SCNC: 13 MMOL/L (ref 10–20)
BUN SERPL-MCNC: 8 MG/DL (ref 6–23)
CALCIUM SERPL-MCNC: 9 MG/DL (ref 8.6–10.3)
CHLORIDE SERPL-SCNC: 104 MMOL/L (ref 98–107)
CO2 SERPL-SCNC: 24 MMOL/L (ref 21–32)
CREAT SERPL-MCNC: 0.89 MG/DL (ref 0.5–1.05)
EGFRCR SERPLBLD CKD-EPI 2021: 86 ML/MIN/1.73M*2
GLUCOSE SERPL-MCNC: 149 MG/DL (ref 74–99)
POTASSIUM SERPL-SCNC: 3.1 MMOL/L (ref 3.5–5.3)
SODIUM SERPL-SCNC: 138 MMOL/L (ref 136–145)

## 2024-03-26 PROCEDURE — 2500000004 HC RX 250 GENERAL PHARMACY W/ HCPCS (ALT 636 FOR OP/ED)

## 2024-03-26 PROCEDURE — 71045 X-RAY EXAM CHEST 1 VIEW: CPT

## 2024-03-26 PROCEDURE — 2500000004 HC RX 250 GENERAL PHARMACY W/ HCPCS (ALT 636 FOR OP/ED): Performed by: EMERGENCY MEDICINE

## 2024-03-26 PROCEDURE — 2500000004 HC RX 250 GENERAL PHARMACY W/ HCPCS (ALT 636 FOR OP/ED): Performed by: PHYSICIAN ASSISTANT

## 2024-03-26 PROCEDURE — 71045 X-RAY EXAM CHEST 1 VIEW: CPT | Performed by: RADIOLOGY

## 2024-03-26 RX ORDER — MORPHINE SULFATE 4 MG/ML
4 INJECTION, SOLUTION INTRAMUSCULAR; INTRAVENOUS ONCE
Status: COMPLETED | OUTPATIENT
Start: 2024-03-26 | End: 2024-03-26

## 2024-03-26 RX ORDER — ONDANSETRON HYDROCHLORIDE 2 MG/ML
4 INJECTION, SOLUTION INTRAVENOUS ONCE
Status: COMPLETED | OUTPATIENT
Start: 2024-03-26 | End: 2024-03-26

## 2024-03-26 RX ORDER — ACETAMINOPHEN 325 MG/1
650 TABLET ORAL ONCE
Status: COMPLETED | OUTPATIENT
Start: 2024-03-26 | End: 2024-03-26

## 2024-03-26 RX ADMIN — MAGNESIUM SULFATE HEPTAHYDRATE 2 G: 40 INJECTION, SOLUTION INTRAVENOUS at 00:27

## 2024-03-26 RX ADMIN — ONDANSETRON 4 MG: 2 INJECTION INTRAMUSCULAR; INTRAVENOUS at 00:27

## 2024-03-26 RX ADMIN — ACETAMINOPHEN 650 MG: 325 TABLET ORAL at 00:32

## 2024-03-26 RX ADMIN — ONDANSETRON 4 MG: 2 INJECTION INTRAMUSCULAR; INTRAVENOUS at 01:29

## 2024-03-26 RX ADMIN — MORPHINE SULFATE 4 MG: 4 INJECTION, SOLUTION INTRAMUSCULAR; INTRAVENOUS at 01:29

## 2024-03-26 RX ADMIN — MORPHINE SULFATE 4 MG: 4 INJECTION, SOLUTION INTRAMUSCULAR; INTRAVENOUS at 02:44

## 2024-03-26 ASSESSMENT — PAIN SCALES - GENERAL
PAINLEVEL_OUTOF10: 6
PAINLEVEL_OUTOF10: 5 - MODERATE PAIN
PAINLEVEL_OUTOF10: 8
PAINLEVEL_OUTOF10: 6

## 2024-03-26 ASSESSMENT — PAIN - FUNCTIONAL ASSESSMENT
PAIN_FUNCTIONAL_ASSESSMENT: 0-10

## 2024-03-26 ASSESSMENT — PAIN DESCRIPTION - LOCATION
LOCATION: HEAD

## 2024-03-26 ASSESSMENT — PAIN DESCRIPTION - PAIN TYPE
TYPE: ACUTE PAIN
TYPE: ACUTE PAIN

## 2024-03-26 NOTE — ED PROVIDER NOTES
20 HPI   Chief Complaint   Patient presents with    Shortness of Breath     Pt arrived by squad with sob. Hx of asthma. 125 solumedrol and IM epi given by squad       Chief complaint shortness of breath  History of present illness 38-year-old female who has a history of asthma she will open shortness of breath chest tightness requiring her to be brought by squad today she is here for assessment blood pressure 144/62 temp 36 respirate 16 heart rate of 82 O2 saturation 97% was given Solu-Medrol and aerosols on arrival she is more improved                          [place] Coma Scale Score: 15                     Patient History   Past Medical History:   Diagnosis Date    Acute cystitis without hematuria 08/08/2019    Acute cystitis without hematuria    Asthma     Encounter for initial prescription of contraceptive pills 11/01/2019    Encounter for initial prescription of contraceptive pills    Nausea 02/05/2021    Nausea in adult    Other general symptoms and signs 12/05/2019    Forgetfulness    Parageusia 04/06/2020    Taste perversion    Pelvic and perineal pain     Pelvic pain in female    Personal history of other diseases of the respiratory system 04/06/2020    History of sinusitis    Personal history of other specified conditions 07/01/2020    History of vomiting    Personal history of other specified conditions 01/26/2021    History of headache    Personal history of other specified conditions 02/01/2021    History of diarrhea    Personal history of thrombophlebitis 06/03/2019    History of phlebitis    Personal history of urinary (tract) infections 01/16/2020    History of urinary tract infection     Past Surgical History:   Procedure Laterality Date    OTHER SURGICAL HISTORY  02/08/2019    Appendectomy    OTHER SURGICAL HISTORY  02/08/2019    Cholecystectomy    OTHER SURGICAL HISTORY  02/08/2019    Cystoscopy    OTHER SURGICAL HISTORY  02/08/2019    Ureteroscopy    OTHER SURGICAL HISTORY  02/08/2019     Shoulder surgery     Family History   Problem Relation Name Age of Onset    Hypertension Father      Other (Pulmonary Valve Stenosis) Sister      Heart disease Maternal Grandmother      Diabetes Other Grandparent     Lung cancer Other Grandparent     Anxiety disorder Sibling       Social History     Tobacco Use    Smoking status: Never    Smokeless tobacco: Never   Vaping Use    Vaping Use: Never used   Substance Use Topics    Alcohol use: Yes     Comment: social    Drug use: Never       Physical Exam   ED Triage Vitals [03/25/24 2335]   Temperature Heart Rate Respirations BP   36.8 °C (98.2 °F) (!) 101 (!) 22 (!) 157/97      Pulse Ox Temp Source Heart Rate Source Patient Position   97 % Temporal Monitor --      BP Location FiO2 (%)     Right arm --       Physical Exam  Vitals and nursing note reviewed.   Constitutional:       General: She is in acute distress.      Appearance: She is normal weight.   HENT:      Head: Normocephalic and atraumatic.      Mouth/Throat:      Mouth: Mucous membranes are moist.      Pharynx: Oropharynx is clear.   Eyes:      Extraocular Movements: Extraocular movements intact.      Pupils: Pupils are equal, round, and reactive to light.   Cardiovascular:      Rate and Rhythm: Normal rate.   Pulmonary:      Breath sounds: Examination of the right-upper field reveals wheezing. Examination of the left-upper field reveals wheezing. Wheezing present.   Musculoskeletal:      Cervical back: Normal range of motion and neck supple.   Skin:     General: Skin is warm.      Capillary Refill: Capillary refill takes less than 2 seconds.   Neurological:      Mental Status: She is alert.         ED Course & MDM   Diagnoses as of 03/26/24 0247   Bronchospasm       Medical Decision Making  Differential diagnosis  #1 acute asthma exacerbation  #2 bronco spasm  #3 tracheitis  Considering the above differential diagnosis following test and treatments were considered in order with shared decision making IV  cardiac monitor CBC electrolytes chest x-ray magnesium    Amount and/or Complexity of Data Reviewed  Labs: ordered. Decision-making details documented in ED Course.     Details: Hemoglobin of 14 hematocrit 41 glucose 149 sodium 138 potassium 3 1 chloride 104 bicarb 24 BUN 8 creatinine 0.8 glucose 149 sodium 138 potassium 3 1 chloride 94  Radiology: ordered and independent interpretation performed.     Details: Chest x-ray clear      Labs Reviewed   BASIC METABOLIC PANEL - Abnormal       Result Value    Glucose 149 (*)     Sodium 138      Potassium 3.1 (*)     Chloride 104      Bicarbonate 24      Anion Gap 13      Urea Nitrogen 8      Creatinine 0.89      eGFR 86      Calcium 9.0     CBC WITH AUTO DIFFERENTIAL    WBC 6.6      nRBC 0.0      RBC 4.93      Hemoglobin 14.9      Hematocrit 41.8      MCV 85      MCH 30.2      MCHC 35.6      RDW 12.3      Platelets 438      Neutrophils % 42.3      Immature Granulocytes %, Automated 0.2      Lymphocytes % 49.1      Monocytes % 8.1      Eosinophils % 0.0      Basophils % 0.3      Neutrophils Absolute 2.81      Immature Granulocytes Absolute, Automated 0.01      Lymphocytes Absolute 3.26      Monocytes Absolute 0.54      Eosinophils Absolute 0.00      Basophils Absolute 0.02          XR chest 1 view   Final Result   No airspace consolidation or pleural effusion.        MACRO:   None        Signed by: Salbador Urbina 3/26/2024 2:12 AM   Dictation workstation:   [id number]           Procedure  Procedures     Greg Napoles MD  03/26/24 0247

## 2024-03-27 LAB
LABORATORY COMMENT REPORT: NORMAL
PATH REPORT.FINAL DX SPEC: NORMAL
PATH REPORT.GROSS SPEC: NORMAL
PATH REPORT.TOTAL CANCER: NORMAL

## 2024-04-02 ENCOUNTER — OFFICE VISIT (OUTPATIENT)
Dept: ALLERGY | Facility: CLINIC | Age: 39
End: 2024-04-02
Payer: COMMERCIAL

## 2024-04-02 VITALS
WEIGHT: 293 LBS | HEIGHT: 65 IN | RESPIRATION RATE: 18 BRPM | TEMPERATURE: 98.2 F | DIASTOLIC BLOOD PRESSURE: 78 MMHG | OXYGEN SATURATION: 98 % | SYSTOLIC BLOOD PRESSURE: 108 MMHG | BODY MASS INDEX: 48.82 KG/M2 | HEART RATE: 67 BPM

## 2024-04-02 DIAGNOSIS — E66.01 MORBID OBESITY (MULTI): Primary | ICD-10-CM

## 2024-04-02 DIAGNOSIS — J45.50 SEVERE PERSISTENT ASTHMA WITHOUT COMPLICATION (MULTI): ICD-10-CM

## 2024-04-02 PROCEDURE — 96372 THER/PROPH/DIAG INJ SC/IM: CPT | Performed by: ALLERGY & IMMUNOLOGY

## 2024-04-02 PROCEDURE — 3008F BODY MASS INDEX DOCD: CPT | Performed by: ALLERGY & IMMUNOLOGY

## 2024-04-02 PROCEDURE — 99214 OFFICE O/P EST MOD 30 MIN: CPT | Performed by: ALLERGY & IMMUNOLOGY

## 2024-04-02 RX ORDER — PREDNISONE 10 MG/1
30 TABLET ORAL DAILY
COMMUNITY

## 2024-04-02 NOTE — PROGRESS NOTES
Patient ID: Sarahi Haley is a 38 y.o. female.     Chief Complaint: follow up  History Of Present Illness  Sarahi Haley is a 38 y.o. female with PMx severe asthma presenting for follow up.     Was seen in the ER 3/26/24 for SOB. She was stable on arrival with nml VS, pox and clear cxr.  Patient was given epinephrine and solumedrol by EMS.    Previous appointment was 2/28/24 at initial consultation referred by Dr. Santiago, her pulmonary physician.    At that time we discussed Tezspire injections and she did receive her first injection at that time.    Food Allergy  No    Eczema/ Atopic Dermatitis  No    Asthma  Trelegy and nebulized pulmicort.  Still on Singulair at bedtime.  Using Airsupra about 2 times a wek.  Currently on 30 mg of prednisone, decreased from 40 mg.  ACT 18      Rhinoconjunctivitis  No history of AR.  She does think her last exacerbation may have been due to cat exposure because she has a family of cats living under her home.    Drug Allergy   Reviewed in chart    Insect Allergy   Hives and difficulty breathing with insect sting and has epi pen.      Infections  No history of frequent or recurrent infections      Review of Systems    Pertinent positives and negatives have been assessed in the HPI. All other systems have been reviewed and are negative except as noted in the HPI.    Allergies  Bee venom protein (honey bee), Diphenhydramine, Nsaids (non-steroidal anti-inflammatory drug), Procaine, and Ketorolac    Past Medical History  She has a past medical history of Acute cystitis without hematuria (08/08/2019), Asthma, Encounter for initial prescription of contraceptive pills (11/01/2019), Nausea (02/05/2021), Other general symptoms and signs (12/05/2019), Parageusia (04/06/2020), Pelvic and perineal pain, Personal history of other diseases of the respiratory system (04/06/2020), Personal history of other specified conditions (07/01/2020), Personal history of other specified conditions  (01/26/2021), Personal history of other specified conditions (02/01/2021), Personal history of thrombophlebitis (06/03/2019), and Personal history of urinary (tract) infections (01/16/2020).    Family History  Family History   Problem Relation Name Age of Onset    Hypertension Father      Other (Pulmonary Valve Stenosis) Sister      Heart disease Maternal Grandmother      Diabetes Other Grandparent     Lung cancer Other Grandparent     Anxiety disorder Sibling         No history of food allergy or atopic disease in the family.    Surgical History  She has a past surgical history that includes Other surgical history (02/08/2019); Other surgical history (02/08/2019); Other surgical history (02/08/2019); Other surgical history (02/08/2019); and Other surgical history (02/08/2019).    Social/Environmental History  She reports that she has never smoked. She has never used smokeless tobacco. She reports current alcohol use. She reports that she does not use drugs.        MEDICATIONS  Current Outpatient Medications on File Prior to Visit   Medication Sig Dispense Refill    albuterol 90 mcg/actuation inhaler Inhale 2 puffs every 4 hours if needed for shortness of breath.      aspirin 81 mg chewable tablet Chew 1 tablet (81 mg) once daily.      budesonide (Pulmicort) 0.5 mg/2 mL nebulizer solution Take 2 mL (0.5 mg) by nebulization 2 times a day.      butalbital-acetaminophen-caff -40 mg tablet Take 1 tablet by mouth early in the morning.. 1 tab(s) orally once a day, As Needed 90 tablet 3    desvenlafaxine 100 mg 24 hr tablet TAKE 1 TABLET BY MOUTH EVERY DAY 90 tablet 3    desvenlafaxine 50 mg 24 hr tablet Take 1 tablet (50 mg) by mouth once daily. Take with 100 mg = 150 mg dose      EPINEPHrine 0.3 mg/0.3 mL injection syringe Inject 0.3 mL (0.3 mg) into the muscle once daily as needed for anaphylaxis. 2 each 3    fexofenadine (Allegra) 180 mg tablet Take 1 tablet (180 mg) by mouth once daily.       "fluticasone-umeclidin-vilanter (Trelegy Ellipta) 100-62.5-25 mcg blister with device Inhale 2 puffs once daily.      galcanezumab (Emgality Syringe) 120 mg/mL prefilled syringe Inject 1 Syringe (120 mg) under the skin every 28 (twenty-eight) days.      hydrOXYzine HCL (Atarax) 25 mg tablet Take 1 tablet (25 mg) by mouth 3 times a day as needed for anxiety. 90 tablet 2    levothyroxine (Synthroid, Levoxyl) 100 mcg tablet Take 1 tablet (100 mcg) by mouth once daily.      multivitamin-Ca-iron-minerals (Tab-A-Vu Womens) 27-0.4 mg tablet Take by mouth.      norethindrone (Deblitane) 0.35 mg tablet Take 1 tablet (0.35 mg) over 84 days by mouth once daily. 84 tablet 4    predniSONE (Deltasone) 10 mg tablet Take 3 tablets (30 mg) by mouth once daily.      rizatriptan (Maxalt) 10 mg tablet Take 1 tablet (10 mg) by mouth if needed for migraine. May repeat in 2 hours if unresolved. Do not exceed 30 mg in 24 hours.      SUMAtriptan (Imitrex) 100 mg tablet Take 1 tablet (100 mg) by mouth if needed.      triazolam (Halcion) 0.25 mg tablet TAKE 1 TABLET BY MOUTH 1 HOUR BEFORE PROCEDURE      albuterol 2.5 mg /3 mL (0.083 %) nebulizer solution Take 3 mL by nebulization every 4 hours if needed for shortness of breath.      [] albuterol-budesonide (Airsupra) 90-80 mcg/actuation inhaler Inhale 2 puffs if needed (every 4 hours as needed for cough, wheeze, short of breath not to exceed 6 doses in a 24 hour period of time.). Patient has a coupon 18 g 1    metoclopramide HCl (REGLAN ORAL) Take 1 tablet by mouth every 8 hours if needed (nausea).      montelukast (Singulair) 10 mg tablet Take 1 tablet (10 mg) by mouth once daily at bedtime. 30 tablet 5     No current facility-administered medications on file prior to visit.         Physical Exam  Visit Vitals  /78   Pulse 67   Temp 36.8 °C (98.2 °F) (Temporal)   Resp 18   Ht 1.651 m (5' 5\")   Wt 143 kg (315 lb)   LMP 03/10/2024   SpO2 98%   BMI 52.42 kg/m²   OB Status Having " periods   Smoking Status Never   BSA 2.56 m²       Wt Readings from Last 1 Encounters:   04/02/24 143 kg (315 lb)       Physical Exam    General: Well appearing, no acute distress BMI52  Head: Normocephalic, atraumatic, neck supple without lymphadenopathy  Eyes: EOMI, non-injected  Ears: tm's pale  Nose: No nasal crease, nares patent, slightly boggy turbinates, minimal discharge  Throat: Normal dentition, no erythema  Heart: Regular rate and rhythm  Lungs: Clear to auscultation bilaterally, effort normal  Abdomen: Soft, non-tender, normal bowel sounds  Extremities: Moves all extremities symmetrically, no edema  Skin: No rashes/lesions  Psych: normal mood and affect    LAB RESULTS:  CBC:  Recent Labs     03/25/24 2345 03/01/24 0404 02/29/24 0415   WBC 6.6 16.9* 7.7   HGB 14.9 13.4 14.5   HCT 41.8 40.4 42.0    310 300   MCV 85 89 87   EOSABS 0.00 0.00 0.00       CMP:  Recent Labs     03/25/24 2345 03/01/24 0404 02/29/24 0415 02/28/24 2032 01/29/24  0544    135* 136   < > 137   K 3.1* 3.8 4.4   < > 4.5    103 104   < > 105   CO2 24 23 23   < > 26   ANIONGAP 13 13 13   < > 11   BUN 8 13 10   < > 10   CREATININE 0.89 0.81 0.83   < > 0.89   EGFR 86 >90 >90   < > 86   MG  --  1.97 2.10  --  2.17    < > = values in this interval not displayed.     Recent Labs     03/01/24 0404 02/29/24 0415 02/28/24 2032 09/14/22  0200 09/13/22  0735 08/03/22  1851 07/28/22  1827 01/22/22  0407 09/13/21  0050   ALBUMIN 3.8 4.0 4.0   < > 3.9   < > 4.2   < > 4.0   ALKPHOS 93 95 96   < > 73   < > 74   < > 74   * 109* 105*   < > 38   < > 46*   < > 41   AST 46* 48* 45*   < > 23   < > 33   < > 23   BILITOT 0.3 0.5 0.5   < > 0.3   < > 0.8   < > 0.5   LIPASE  --   --   --   --  34  --  35  --  55    < > = values in this interval not displayed.       ALLERGY:   Lab Results   Component Value Date    ICIGE 67.0 08/14/2023    WHITEASH <0.10 08/14/2023    SILVERBIRCH <0.10 08/14/2023    BOXELDER <0.10 08/14/2023     MOUNTJUNIPER <0.10 08/14/2023    COTTONWOOD <0.10 08/14/2023    ELM <0.10 08/14/2023    MULBERRY <0.10 08/14/2023    PECANHICKORY <0.10 08/14/2023    MAPLESYCAMOR <0.10 08/14/2023    OAK <0.10 08/14/2023    BERMUDAGR <0.10 08/14/2023    JOHNSONGR <0.10 08/14/2023    BLUEGRASS <0.10 08/14/2023    TIMOTHYGRASS <0.10 08/14/2023     Lab Results   Component Value Date    LAMBQUART <0.10 08/14/2023    PIGWEED <0.10 08/14/2023    COMRAGWEED <0.10 08/14/2023    SHEEPSOR <0.10 08/14/2023    PLANTAIN <0.10 08/14/2023    CATEPI <0.10 08/14/2023    DOGEPI <0.10 08/14/2023    ALTERNA <0.10 08/14/2023    CLADHERB <0.10 08/14/2023    ICA04 <0.10 08/14/2023    DERMFAR <0.10 08/14/2023    DERMPTE <0.10 08/14/2023    COCKR 0.35 (A) 08/14/2023     Lab Results   Component Value Date    WALNUT <0.10 08/14/2023     Recent Labs     08/14/23  1548   ICIGE 67.0     Recent Labs     03/25/24  2345 03/01/24  0404 02/29/24  0415   EOSABS 0.00 0.00 0.00       COAG:   Recent Labs     10/15/23  0644 06/06/23  0042 10/07/22  2350   INR 1.0 0.9 1.0         HEME/ENDO:  Recent Labs     10/15/23  1423 08/21/23  0120 07/10/23  1434 05/24/23  0917 09/14/22  0200   TSH  --  2.23 2.66 1.48  --    HGBA1C 4.7  --   --   --   --    FERRITIN  --   --   --   --  183*     Tezspire administered today without complication    Assessment/Plan   Sarahi is a 39 yo woman with a history of severe persistent asthma, steroid dependent without significant allergy (other than sensitization to cockroach). She reports a couple of exacerbation episodes, but feels they are less severe than previous. She has had only one dose of Tezspire.  Her BMI is 52.     She is able to get a second dose and will try to follow up monthly for regular dosing and continue her inhalers. Will also try to wean her steroids. She will also continue her follow up with Dr. Santiago in the pulmonary clinic.        Hilary Trinidad DO

## 2024-04-03 ENCOUNTER — APPOINTMENT (OUTPATIENT)
Dept: RADIOLOGY | Facility: HOSPITAL | Age: 39
End: 2024-04-03
Payer: COMMERCIAL

## 2024-04-03 ENCOUNTER — HOSPITAL ENCOUNTER (OUTPATIENT)
Facility: HOSPITAL | Age: 39
Setting detail: OBSERVATION
Discharge: HOME | End: 2024-04-03
Attending: EMERGENCY MEDICINE | Admitting: INTERNAL MEDICINE
Payer: COMMERCIAL

## 2024-04-03 VITALS
HEART RATE: 90 BPM | WEIGHT: 293 LBS | TEMPERATURE: 97 F | SYSTOLIC BLOOD PRESSURE: 167 MMHG | DIASTOLIC BLOOD PRESSURE: 84 MMHG | HEIGHT: 65 IN | RESPIRATION RATE: 18 BRPM | BODY MASS INDEX: 48.82 KG/M2 | OXYGEN SATURATION: 93 %

## 2024-04-03 DIAGNOSIS — J45.50 SEVERE PERSISTENT ASTHMA WITHOUT COMPLICATION (MULTI): ICD-10-CM

## 2024-04-03 DIAGNOSIS — J45.51 SEVERE PERSISTENT ASTHMA WITH EXACERBATION (MULTI): Primary | ICD-10-CM

## 2024-04-03 LAB
ANION GAP SERPL CALC-SCNC: 12 MMOL/L (ref 10–20)
BASOPHILS # BLD AUTO: 0.06 X10*3/UL (ref 0–0.1)
BASOPHILS NFR BLD AUTO: 0.5 %
BUN SERPL-MCNC: 15 MG/DL (ref 6–23)
CALCIUM SERPL-MCNC: 9.2 MG/DL (ref 8.6–10.3)
CHLORIDE SERPL-SCNC: 105 MMOL/L (ref 98–107)
CO2 SERPL-SCNC: 24 MMOL/L (ref 21–32)
CREAT SERPL-MCNC: 0.88 MG/DL (ref 0.5–1.05)
EGFRCR SERPLBLD CKD-EPI 2021: 87 ML/MIN/1.73M*2
EOSINOPHIL # BLD AUTO: 0 X10*3/UL (ref 0–0.7)
EOSINOPHIL NFR BLD AUTO: 0 %
ERYTHROCYTE [DISTWIDTH] IN BLOOD BY AUTOMATED COUNT: 12 % (ref 11.5–14.5)
GLUCOSE SERPL-MCNC: 128 MG/DL (ref 74–99)
HCT VFR BLD AUTO: 41.2 % (ref 36–46)
HGB BLD-MCNC: 14.7 G/DL (ref 12–16)
IMM GRANULOCYTES # BLD AUTO: 0.06 X10*3/UL (ref 0–0.7)
IMM GRANULOCYTES NFR BLD AUTO: 0.5 % (ref 0–0.9)
LYMPHOCYTES # BLD AUTO: 5.17 X10*3/UL (ref 1.2–4.8)
LYMPHOCYTES NFR BLD AUTO: 44.4 %
MCH RBC QN AUTO: 30.2 PG (ref 26–34)
MCHC RBC AUTO-ENTMCNC: 35.7 G/DL (ref 32–36)
MCV RBC AUTO: 85 FL (ref 80–100)
MONOCYTES # BLD AUTO: 1.06 X10*3/UL (ref 0.1–1)
MONOCYTES NFR BLD AUTO: 9.1 %
NEUTROPHILS # BLD AUTO: 5.3 X10*3/UL (ref 1.2–7.7)
NEUTROPHILS NFR BLD AUTO: 45.5 %
NRBC BLD-RTO: 0 /100 WBCS (ref 0–0)
PLATELET # BLD AUTO: 382 X10*3/UL (ref 150–450)
POTASSIUM SERPL-SCNC: 3 MMOL/L (ref 3.5–5.3)
RBC # BLD AUTO: 4.86 X10*6/UL (ref 4–5.2)
SODIUM SERPL-SCNC: 138 MMOL/L (ref 136–145)
T4 FREE SERPL-MCNC: 0.83 NG/DL (ref 0.61–1.12)
TSH SERPL-ACNC: 4.56 MIU/L (ref 0.44–3.98)
WBC # BLD AUTO: 11.7 X10*3/UL (ref 4.4–11.3)

## 2024-04-03 PROCEDURE — 96374 THER/PROPH/DIAG INJ IV PUSH: CPT | Mod: 59

## 2024-04-03 PROCEDURE — 2500000001 HC RX 250 WO HCPCS SELF ADMINISTERED DRUGS (ALT 637 FOR MEDICARE OP): Performed by: HOSPITALIST

## 2024-04-03 PROCEDURE — 2500000004 HC RX 250 GENERAL PHARMACY W/ HCPCS (ALT 636 FOR OP/ED): Performed by: EMERGENCY MEDICINE

## 2024-04-03 PROCEDURE — G0378 HOSPITAL OBSERVATION PER HR: HCPCS

## 2024-04-03 PROCEDURE — 96361 HYDRATE IV INFUSION ADD-ON: CPT | Mod: 59

## 2024-04-03 PROCEDURE — 99285 EMERGENCY DEPT VISIT HI MDM: CPT | Mod: 25

## 2024-04-03 PROCEDURE — 2500000004 HC RX 250 GENERAL PHARMACY W/ HCPCS (ALT 636 FOR OP/ED)

## 2024-04-03 PROCEDURE — 99222 1ST HOSP IP/OBS MODERATE 55: CPT | Performed by: NURSE PRACTITIONER

## 2024-04-03 PROCEDURE — 2500000004 HC RX 250 GENERAL PHARMACY W/ HCPCS (ALT 636 FOR OP/ED): Performed by: HOSPITALIST

## 2024-04-03 PROCEDURE — 2500000002 HC RX 250 W HCPCS SELF ADMINISTERED DRUGS (ALT 637 FOR MEDICARE OP, ALT 636 FOR OP/ED): Performed by: NURSE PRACTITIONER

## 2024-04-03 PROCEDURE — 80048 BASIC METABOLIC PNL TOTAL CA: CPT | Performed by: EMERGENCY MEDICINE

## 2024-04-03 PROCEDURE — 96375 TX/PRO/DX INJ NEW DRUG ADDON: CPT | Mod: 59

## 2024-04-03 PROCEDURE — 84439 ASSAY OF FREE THYROXINE: CPT | Performed by: NURSE PRACTITIONER

## 2024-04-03 PROCEDURE — 2500000001 HC RX 250 WO HCPCS SELF ADMINISTERED DRUGS (ALT 637 FOR MEDICARE OP): Performed by: NURSE PRACTITIONER

## 2024-04-03 PROCEDURE — 84443 ASSAY THYROID STIM HORMONE: CPT | Performed by: NURSE PRACTITIONER

## 2024-04-03 PROCEDURE — 99239 HOSP IP/OBS DSCHRG MGMT >30: CPT | Performed by: HOSPITALIST

## 2024-04-03 PROCEDURE — 96376 TX/PRO/DX INJ SAME DRUG ADON: CPT | Mod: 59

## 2024-04-03 PROCEDURE — 2500000004 HC RX 250 GENERAL PHARMACY W/ HCPCS (ALT 636 FOR OP/ED): Performed by: NURSE PRACTITIONER

## 2024-04-03 PROCEDURE — 94640 AIRWAY INHALATION TREATMENT: CPT

## 2024-04-03 PROCEDURE — 71045 X-RAY EXAM CHEST 1 VIEW: CPT | Performed by: RADIOLOGY

## 2024-04-03 PROCEDURE — 85025 COMPLETE CBC W/AUTO DIFF WBC: CPT | Performed by: EMERGENCY MEDICINE

## 2024-04-03 PROCEDURE — 71045 X-RAY EXAM CHEST 1 VIEW: CPT

## 2024-04-03 PROCEDURE — 2500000002 HC RX 250 W HCPCS SELF ADMINISTERED DRUGS (ALT 637 FOR MEDICARE OP, ALT 636 FOR OP/ED): Performed by: HOSPITALIST

## 2024-04-03 RX ORDER — ONDANSETRON HYDROCHLORIDE 2 MG/ML
4 INJECTION, SOLUTION INTRAVENOUS ONCE
Status: COMPLETED | OUTPATIENT
Start: 2024-04-03 | End: 2024-04-03

## 2024-04-03 RX ORDER — METOCLOPRAMIDE HYDROCHLORIDE 5 MG/ML
10 INJECTION INTRAMUSCULAR; INTRAVENOUS EVERY 6 HOURS PRN
Status: DISCONTINUED | OUTPATIENT
Start: 2024-04-03 | End: 2024-04-03

## 2024-04-03 RX ORDER — ONDANSETRON 4 MG/1
4 TABLET, FILM COATED ORAL EVERY 8 HOURS PRN
Status: DISCONTINUED | OUTPATIENT
Start: 2024-04-03 | End: 2024-04-03 | Stop reason: HOSPADM

## 2024-04-03 RX ORDER — ACETAMINOPHEN 650 MG/1
650 SUPPOSITORY RECTAL EVERY 4 HOURS PRN
Status: DISCONTINUED | OUTPATIENT
Start: 2024-04-03 | End: 2024-04-03 | Stop reason: HOSPADM

## 2024-04-03 RX ORDER — LABETALOL HYDROCHLORIDE 5 MG/ML
INJECTION, SOLUTION INTRAVENOUS
Status: COMPLETED
Start: 2024-04-03 | End: 2024-04-03

## 2024-04-03 RX ORDER — MORPHINE SULFATE 2 MG/ML
2 INJECTION, SOLUTION INTRAMUSCULAR; INTRAVENOUS EVERY 4 HOURS PRN
Status: DISCONTINUED | OUTPATIENT
Start: 2024-04-03 | End: 2024-04-03

## 2024-04-03 RX ORDER — HYDROMORPHONE HYDROCHLORIDE 1 MG/ML
1 INJECTION, SOLUTION INTRAMUSCULAR; INTRAVENOUS; SUBCUTANEOUS ONCE
Status: COMPLETED | OUTPATIENT
Start: 2024-04-03 | End: 2024-04-03

## 2024-04-03 RX ORDER — FLUTICASONE FUROATE AND VILANTEROL 200; 25 UG/1; UG/1
1 POWDER RESPIRATORY (INHALATION) DAILY
Status: CANCELLED | OUTPATIENT
Start: 2024-04-03

## 2024-04-03 RX ORDER — PROCHLORPERAZINE EDISYLATE 5 MG/ML
5 INJECTION INTRAMUSCULAR; INTRAVENOUS EVERY 6 HOURS PRN
Status: DISCONTINUED | OUTPATIENT
Start: 2024-04-03 | End: 2024-04-03 | Stop reason: HOSPADM

## 2024-04-03 RX ORDER — BUDESONIDE 0.5 MG/2ML
0.5 INHALANT ORAL
Status: DISCONTINUED | OUTPATIENT
Start: 2024-04-03 | End: 2024-04-03 | Stop reason: HOSPADM

## 2024-04-03 RX ORDER — LABETALOL HYDROCHLORIDE 5 MG/ML
20 INJECTION, SOLUTION INTRAVENOUS ONCE
Status: COMPLETED | OUTPATIENT
Start: 2024-04-03 | End: 2024-04-03

## 2024-04-03 RX ORDER — ACETAMINOPHEN 325 MG/1
650 TABLET ORAL EVERY 4 HOURS PRN
Status: DISCONTINUED | OUTPATIENT
Start: 2024-04-03 | End: 2024-04-03 | Stop reason: HOSPADM

## 2024-04-03 RX ORDER — ONDANSETRON HYDROCHLORIDE 2 MG/ML
4 INJECTION, SOLUTION INTRAVENOUS EVERY 8 HOURS PRN
Status: DISCONTINUED | OUTPATIENT
Start: 2024-04-03 | End: 2024-04-03 | Stop reason: HOSPADM

## 2024-04-03 RX ORDER — FLUTICASONE FUROATE AND VILANTEROL 100; 25 UG/1; UG/1
1 POWDER RESPIRATORY (INHALATION)
Status: DISCONTINUED | OUTPATIENT
Start: 2024-04-03 | End: 2024-04-03 | Stop reason: HOSPADM

## 2024-04-03 RX ORDER — POTASSIUM CHLORIDE 20 MEQ/1
40 TABLET, EXTENDED RELEASE ORAL ONCE
Status: COMPLETED | OUTPATIENT
Start: 2024-04-03 | End: 2024-04-03

## 2024-04-03 RX ORDER — BUTALBITAL, ACETAMINOPHEN AND CAFFEINE 50; 325; 40 MG/1; MG/1; MG/1
1 TABLET ORAL 2 TIMES DAILY PRN
Status: DISCONTINUED | OUTPATIENT
Start: 2024-04-03 | End: 2024-04-03 | Stop reason: HOSPADM

## 2024-04-03 RX ORDER — ACETAMINOPHEN 160 MG/5ML
650 SOLUTION ORAL EVERY 4 HOURS PRN
Status: DISCONTINUED | OUTPATIENT
Start: 2024-04-03 | End: 2024-04-03 | Stop reason: HOSPADM

## 2024-04-03 RX ORDER — IPRATROPIUM BROMIDE AND ALBUTEROL SULFATE 2.5; .5 MG/3ML; MG/3ML
3 SOLUTION RESPIRATORY (INHALATION) EVERY 4 HOURS PRN
Status: DISCONTINUED | OUTPATIENT
Start: 2024-04-03 | End: 2024-04-03 | Stop reason: HOSPADM

## 2024-04-03 RX ADMIN — POTASSIUM CHLORIDE 40 MEQ: 1500 TABLET, EXTENDED RELEASE ORAL at 11:31

## 2024-04-03 RX ADMIN — LABETALOL HYDROCHLORIDE 20 MG: 5 INJECTION INTRAVENOUS at 04:53

## 2024-04-03 RX ADMIN — HYDROMORPHONE HYDROCHLORIDE 1 MG: 1 INJECTION, SOLUTION INTRAMUSCULAR; INTRAVENOUS; SUBCUTANEOUS at 02:05

## 2024-04-03 RX ADMIN — HYDROMORPHONE HYDROCHLORIDE 1 MG: 1 INJECTION, SOLUTION INTRAMUSCULAR; INTRAVENOUS; SUBCUTANEOUS at 00:31

## 2024-04-03 RX ADMIN — TIOTROPIUM BROMIDE INHALATION SPRAY 2 PUFF: 3.12 SPRAY, METERED RESPIRATORY (INHALATION) at 11:35

## 2024-04-03 RX ADMIN — METHYLPREDNISOLONE SODIUM SUCCINATE 125 MG: 125 INJECTION, POWDER, FOR SOLUTION INTRAMUSCULAR; INTRAVENOUS at 04:53

## 2024-04-03 RX ADMIN — ONDANSETRON 4 MG: 2 INJECTION INTRAMUSCULAR; INTRAVENOUS at 00:31

## 2024-04-03 RX ADMIN — BUTALBITAL, ACETAMINOPHEN, AND CAFFEINE 1 TABLET: 50; 325; 40 TABLET ORAL at 11:31

## 2024-04-03 RX ADMIN — ONDANSETRON 4 MG: 2 INJECTION INTRAMUSCULAR; INTRAVENOUS at 01:49

## 2024-04-03 RX ADMIN — LABETALOL HYDROCHLORIDE 20 MG: 5 INJECTION, SOLUTION INTRAVENOUS at 04:53

## 2024-04-03 RX ADMIN — PROCHLORPERAZINE EDISYLATE 5 MG: 5 INJECTION INTRAMUSCULAR; INTRAVENOUS at 11:31

## 2024-04-03 RX ADMIN — BUDESONIDE 0.5 MG: 0.5 INHALANT RESPIRATORY (INHALATION) at 11:46

## 2024-04-03 RX ADMIN — MORPHINE SULFATE 2 MG: 2 INJECTION, SOLUTION INTRAMUSCULAR; INTRAVENOUS at 08:12

## 2024-04-03 RX ADMIN — PREDNISONE 30 MG: 20 TABLET ORAL at 08:12

## 2024-04-03 RX ADMIN — ONDANSETRON 4 MG: 2 INJECTION INTRAMUSCULAR; INTRAVENOUS at 08:12

## 2024-04-03 RX ADMIN — IPRATROPIUM BROMIDE AND ALBUTEROL SULFATE 3 ML: 2.5; .5 SOLUTION RESPIRATORY (INHALATION) at 11:45

## 2024-04-03 RX ADMIN — FLUTICASONE FUROATE AND VILANTEROL TRIFENATATE 1 PUFF: 100; 25 POWDER RESPIRATORY (INHALATION) at 11:35

## 2024-04-03 RX ADMIN — SODIUM CHLORIDE 1000 ML: 9 INJECTION, SOLUTION INTRAVENOUS at 00:30

## 2024-04-03 SDOH — SOCIAL STABILITY: SOCIAL INSECURITY: HAVE YOU HAD THOUGHTS OF HARMING ANYONE ELSE?: NO

## 2024-04-03 SDOH — SOCIAL STABILITY: SOCIAL INSECURITY: ARE YOU OR HAVE YOU BEEN THREATENED OR ABUSED PHYSICALLY, EMOTIONALLY, OR SEXUALLY BY ANYONE?: NO

## 2024-04-03 SDOH — SOCIAL STABILITY: SOCIAL INSECURITY: DO YOU FEEL UNSAFE GOING BACK TO THE PLACE WHERE YOU ARE LIVING?: NO

## 2024-04-03 SDOH — SOCIAL STABILITY: SOCIAL INSECURITY: DO YOU FEEL ANYONE HAS EXPLOITED OR TAKEN ADVANTAGE OF YOU FINANCIALLY OR OF YOUR PERSONAL PROPERTY?: NO

## 2024-04-03 SDOH — SOCIAL STABILITY: SOCIAL INSECURITY: ARE THERE ANY APPARENT SIGNS OF INJURIES/BEHAVIORS THAT COULD BE RELATED TO ABUSE/NEGLECT?: NO

## 2024-04-03 SDOH — SOCIAL STABILITY: SOCIAL INSECURITY: HAS ANYONE EVER THREATENED TO HURT YOUR FAMILY OR YOUR PETS?: NO

## 2024-04-03 SDOH — SOCIAL STABILITY: SOCIAL INSECURITY: DOES ANYONE TRY TO KEEP YOU FROM HAVING/CONTACTING OTHER FRIENDS OR DOING THINGS OUTSIDE YOUR HOME?: NO

## 2024-04-03 ASSESSMENT — LIFESTYLE VARIABLES
HOW OFTEN DO YOU HAVE A DRINK CONTAINING ALCOHOL: 2-4 TIMES A MONTH
SKIP TO QUESTIONS 9-10: 1
AUDIT-C TOTAL SCORE: 2
HOW OFTEN DO YOU HAVE 6 OR MORE DRINKS ON ONE OCCASION: NEVER
HOW MANY STANDARD DRINKS CONTAINING ALCOHOL DO YOU HAVE ON A TYPICAL DAY: 1 OR 2
AUDIT-C TOTAL SCORE: 2

## 2024-04-03 ASSESSMENT — ACTIVITIES OF DAILY LIVING (ADL)
GROOMING: INDEPENDENT
ADEQUATE_TO_COMPLETE_ADL: YES
ASSISTIVE_DEVICE: EYEGLASSES
HEARING - LEFT EAR: FUNCTIONAL
PATIENT'S MEMORY ADEQUATE TO SAFELY COMPLETE DAILY ACTIVITIES?: YES
LACK_OF_TRANSPORTATION: NO
FEEDING YOURSELF: INDEPENDENT
TOILETING: INDEPENDENT
WALKS IN HOME: INDEPENDENT
DRESSING YOURSELF: INDEPENDENT
JUDGMENT_ADEQUATE_SAFELY_COMPLETE_DAILY_ACTIVITIES: YES
LACK_OF_TRANSPORTATION: NO
BATHING: INDEPENDENT
HEARING - RIGHT EAR: FUNCTIONAL

## 2024-04-03 ASSESSMENT — COGNITIVE AND FUNCTIONAL STATUS - GENERAL
MOBILITY SCORE: 24
MOBILITY SCORE: 24
PATIENT BASELINE BEDBOUND: NO
DAILY ACTIVITIY SCORE: 24
DAILY ACTIVITIY SCORE: 24

## 2024-04-03 ASSESSMENT — PAIN - FUNCTIONAL ASSESSMENT
PAIN_FUNCTIONAL_ASSESSMENT: 0-10

## 2024-04-03 ASSESSMENT — PAIN DESCRIPTION - ONSET: ONSET: ONGOING

## 2024-04-03 ASSESSMENT — PAIN SCALES - GENERAL
PAINLEVEL_OUTOF10: 6
PAINLEVEL_OUTOF10: 8
PAINLEVEL_OUTOF10: 6
PAINLEVEL_OUTOF10: 8
PAINLEVEL_OUTOF10: 8

## 2024-04-03 ASSESSMENT — COLUMBIA-SUICIDE SEVERITY RATING SCALE - C-SSRS
2. HAVE YOU ACTUALLY HAD ANY THOUGHTS OF KILLING YOURSELF?: NO
6. HAVE YOU EVER DONE ANYTHING, STARTED TO DO ANYTHING, OR PREPARED TO DO ANYTHING TO END YOUR LIFE?: NO
1. IN THE PAST MONTH, HAVE YOU WISHED YOU WERE DEAD OR WISHED YOU COULD GO TO SLEEP AND NOT WAKE UP?: NO

## 2024-04-03 ASSESSMENT — PAIN DESCRIPTION - FREQUENCY: FREQUENCY: CONSTANT/CONTINUOUS

## 2024-04-03 ASSESSMENT — PAIN DESCRIPTION - DESCRIPTORS
DESCRIPTORS: ACHING
DESCRIPTORS: ACHING

## 2024-04-03 ASSESSMENT — PAIN DESCRIPTION - PROGRESSION: CLINICAL_PROGRESSION: GRADUALLY IMPROVING

## 2024-04-03 ASSESSMENT — PAIN DESCRIPTION - LOCATION
LOCATION: HEAD

## 2024-04-03 ASSESSMENT — PAIN DESCRIPTION - ORIENTATION: ORIENTATION: POSTERIOR

## 2024-04-03 ASSESSMENT — PATIENT HEALTH QUESTIONNAIRE - PHQ9
2. FEELING DOWN, DEPRESSED OR HOPELESS: SEVERAL DAYS
1. LITTLE INTEREST OR PLEASURE IN DOING THINGS: SEVERAL DAYS
SUM OF ALL RESPONSES TO PHQ9 QUESTIONS 1 & 2: 2

## 2024-04-03 NOTE — PROGRESS NOTES
04/03/24 1200   Discharge Planning   Living Arrangements Alone   Support Systems Family members   Assistance Needed None   Type of Residence Private residence   Number of Stairs to Enter Residence 3   Number of Stairs Within Residence 0   Do you have animals or pets at home? No   Home or Post Acute Services None   Patient expects to be discharged to: Home   Does the patient need discharge transport arranged? No   Financial Resource Strain   How hard is it for you to pay for the very basics like food, housing, medical care, and heating? Not hard   Housing Stability   In the last 12 months, was there a time when you were not able to pay the mortgage or rent on time? N   In the last 12 months, how many places have you lived? 1   In the last 12 months, was there a time when you did not have a steady place to sleep or slept in a shelter (including now)? N   Transportation Needs   In the past 12 months, has lack of transportation kept you from medical appointments or from getting medications? no   In the past 12 months, has lack of transportation kept you from meetings, work, or from getting things needed for daily living? No     SW met with pt at bedside and introduced self and role. Pt resides in ranch-style home, no assistance devices needed. Pt has nebulizer at home due to asthma. Pt follows with PCP Dr. Matthew. Pt anticipates DC home, no needs. CT to follow as needed.

## 2024-04-03 NOTE — DISCHARGE SUMMARY
Discharge Diagnosis  Acute asthma exacerbation    Discharge Meds     Your medication list        CONTINUE taking these medications        Instructions Last Dose Given Next Dose Due   Airsupra 90-80 mcg/actuation inhaler  Generic drug: albuterol-budesonide      Inhale 2 puffs if needed (every 4 hours as needed for cough, wheeze, short of breath not to exceed 6 doses in a 24 hour period of time.). Patient has a coupon       albuterol 2.5 mg /3 mL (0.083 %) nebulizer solution           aspirin 81 mg chewable tablet           budesonide 0.5 mg/2 mL nebulizer solution  Commonly known as: Pulmicort           butalbital-acetaminophen-caff -40 mg tablet      Take 1 tablet by mouth early in the morning.. 1 tab(s) orally once a day, As Needed       desvenlafaxine 50 mg 24 hr tablet  Commonly known as: Pristiq           desvenlafaxine 100 mg 24 hr tablet  Commonly known as: Pristiq      TAKE 1 TABLET BY MOUTH EVERY DAY       Emgality Syringe 120 mg/mL prefilled syringe  Generic drug: galcanezumab           EPINEPHrine 0.3 mg/0.3 mL injection syringe  Commonly known as: Epipen      Inject 0.3 mL (0.3 mg) into the muscle once daily as needed for anaphylaxis.       fexofenadine 180 mg tablet  Commonly known as: Allegra           hydrOXYzine HCL 25 mg tablet  Commonly known as: Atarax      Take 1 tablet (25 mg) by mouth 3 times a day as needed for anxiety.       levothyroxine 100 mcg tablet  Commonly known as: Synthroid, Levoxyl           metoclopramide 10 mg tablet  Commonly known as: Reglan           montelukast 10 mg tablet  Commonly known as: Singulair      Take 1 tablet (10 mg) by mouth once daily at bedtime.       multivitamin-Ca-iron-minerals 27-0.4 mg tablet  Commonly known as: Tab-A-Vu Womens           predniSONE 10 mg tablet  Commonly known as: Deltasone           rizatriptan 10 mg tablet  Commonly known as: Maxalt           SUMAtriptan 100 mg tablet  Commonly known as: Imitrex           Trelegy Ellipta  100-62.5-25 mcg blister with device  Generic drug: fluticasone-umeclidin-vilanter                  STOP taking these medications      norethindrone 0.35 mg tablet  Commonly known as: Deblitane                 Test Results Pending At Discharge  Pending Labs       No current pending labs.            Hospital Course 38 year old female admitted with acute asthma exacerbation, HA and nausea. Improve overnight with pain meds, nebs and antiemetics. On RA tolerating, PO and ambulating in room without difficulty. Continue home meds as prescribed.    Discharge home in stable condition. Greater than 30 minutes of clinical time spent caring for this patient.       Pertinent Physical Exam At Time of Discharge  Gen: NAD  HEENT: EOM, MMM  CV: RRR, no murmurs rubs or gallops  Resp: Clear to auscultation bilaterally  Abdomen: soft, NT,+BS  LE: No edema      Outpatient Follow-Up  Future Appointments   Date Time Provider Department Center   4/11/2024 11:15 AM Edin Galdamez MD [id number] [place]   5/1/2024 10:20 AM DO Cori [id number] [place]         Salomón Montgomery MD

## 2024-04-03 NOTE — H&P
Medical Group History and Physical    ASSESSMENT & PLAN:     Acute asthma exacerbation  Treated with epi, RT tx, and steroids in route to ER, lungs clear, SpO2 greater than 95% on room air, no wheezing, no rhonchi -follows with allergist and pulmonary recently started on Trespire has received total of 2 doses; well known to this provider  - last hospital encounter:   2/28 and again on 3/25 - frequency is tapering down   - CXR ordered - results pending  - Continue prednisone 30 mg daily [last taper date greater than 3 weeks ago]  - Accu checks d/t steroid use  - resume home RT meds after med rec complete  - Consult Pulmonary - last seen by Dr Bustos; follows with Dr Llamas outpatient  - Carb controlled diet  - Telemetry and EKG    Hypertension  Headache  - Accuracy of BP is questionable,  inappropriate BP cuff size - producing falsely elevated BP with SBP 200s; May also be d/t the epi dose and steroids given as treatment   - recommend manual BP at pt Forearm or use of Extra Large/Extra long Cuff by automatic machine.  - tylenol/morphine    Hx anxiety, depression  - controlled    GERD  - protonix - deferred may add later    VTE Prophylaxis: defer, ambulation and SCDs    HEMA Das-CNP    HISTORY OF PRESENT ILLNESS:   Chief Complaint: Shortness of breath, asthma exacerbation    History Of Present Illness:    Sarahi Haley is a 38 y.o. female with a significant past medical history of Severe uncontrolled Asthma on chronic steroids and immunologic therapy, presenting to [facility] with acute asthma exacerbation.  This patient is well-known to this provider.    Patient has been following with allergist and pulmonary services outpatient on a regular basis; started on trezspire - has taken 2 doses in total, and on 30 mg prednisone for the past 3 weeks - slow taper; she denies any precipitating factors for today's exacerbation, no evidence of fever, leukocytosis is present but mild and likely reactionary to  steroid use, no chills, or changes in patient's routine.  She is warm to touch and complaining of hot flashes.  Never been intubated for exacerbations, recommended CXR for comparison to prior visits, even though lungs are clear without evidence of increased work of breathing, hypoxia or pulmonary congestion.  Consult to pulmonary ordered.    Patient is somewhat anxious regarding the high blood pressure she sees on the monitor, accuracy of BP is questionable d/t use of an inappropriate sized BP cuff as this will produce falsely elevated BP measurements, in addition the combination of tachycardia and hypertension may also be reactionary to the epi dose she was given en route for treatment, goal SBP<150, HR <80 and reported improvement from patient.  Treat headache with Tylenol and morphine -review allergies.      Patient is ready for admission under general medicine for the management of acute asthma exacerbation, HTN, and headache.    Review of systems: 10 point review of systems is otherwise negative except as mentioned above.    PAST HISTORIES:       Past Medical History:  Medical Problems       Problem List       * (Principal) Acute asthma exacerbation    Asthma    RAD (reactive airway disease)    Weight loss    Vitamin D deficiency    Seizure disorder, complex partial, without intractable epilepsy (CMS/HCC)    Panic attack    NAFLD (nonalcoholic fatty liver disease)    Morbid obesity (CMS/HCC)    Migraine    Kidney stones    Hypothyroidism, adult    GERD (gastroesophageal reflux disease)    Vertigo    PTSD (post-traumatic stress disorder)    Anxiety and depression    Absence seizure (CMS/HCC)    Severe persistent asthma with exacerbation    Allergy to nonsteroidal anti-inflammatory drug (NSAID)    Acute respiratory failure with hypoxia (CMS/HCC)           Past Surgical History:  Past Surgical History:   Procedure Laterality Date    OTHER SURGICAL HISTORY  02/08/2019    Appendectomy    OTHER SURGICAL HISTORY   02/08/2019    Cholecystectomy    OTHER SURGICAL HISTORY  02/08/2019    Cystoscopy    OTHER SURGICAL HISTORY  02/08/2019    Ureteroscopy    OTHER SURGICAL HISTORY  02/08/2019    Shoulder surgery          Social History:  She reports that she has never smoked. She has never used smokeless tobacco. She reports current alcohol use. She reports that she does not use drugs.    Family History:  Family History   Problem Relation Name Age of Onset    Hypertension Father      Other (Pulmonary Valve Stenosis) Sister      Heart disease Maternal Grandmother      Diabetes Other Grandparent     Lung cancer Other Grandparent     Anxiety disorder Sibling          Allergies:  Bee venom protein (honey bee), Diphenhydramine, Nsaids (non-steroidal anti-inflammatory drug), Procaine, and Ketorolac    OBJECTIVE:       Last Recorded Vitals:  Vitals:    04/03/24 0210 04/03/24 0317 04/03/24 0346 04/03/24 0447   BP:  (!) 215/109 (!) 193/106 (!) 212/115   BP Location:       Patient Position:       Pulse: 91 89 97 96   Resp: 18 18 18 18   Temp:       TempSrc:       SpO2: 94% 94% 97% 96%   Weight:       Height:           Last I/O:  No intake/output data recorded.    Physical Exam  Vitals and nursing note reviewed.   Constitutional:       Appearance: Normal appearance. She is obese.   HENT:      Head: Normocephalic and atraumatic.      Nose: Nose normal.      Mouth/Throat:      Mouth: Mucous membranes are moist.      Pharynx: Oropharynx is clear.   Eyes:      Extraocular Movements: Extraocular movements intact.      Conjunctiva/sclera: Conjunctivae normal.      Pupils: Pupils are equal, round, and reactive to light.   Cardiovascular:      Rate and Rhythm: Regular rhythm. Tachycardia present.      Pulses: Normal pulses.      Heart sounds: Normal heart sounds.      Comments: Sinus tachycardia low 100s-90s,   Pulmonary:      Effort: Pulmonary effort is normal.      Breath sounds: Normal breath sounds.   Abdominal:      General: Abdomen is flat. Bowel  sounds are normal.      Palpations: Abdomen is soft.   Musculoskeletal:         General: Normal range of motion.      Cervical back: Normal range of motion and neck supple.   Skin:     General: Skin is warm and dry.      Capillary Refill: Capillary refill takes less than 2 seconds.   Neurological:      General: No focal deficit present.      Mental Status: She is alert and oriented to person, place, and time. Mental status is at baseline.   Psychiatric:         Mood and Affect: Mood normal.         Behavior: Behavior normal.         Thought Content: Thought content normal.         Judgment: Judgment normal.           Scheduled Medications    PRN Medications    Continuous Medications      Outpatient Medications:  Prior to Admission medications    Medication Sig Start Date End Date Taking? Authorizing Provider   albuterol 2.5 mg /3 mL (0.083 %) nebulizer solution Take 3 mL by nebulization every 4 hours if needed for shortness of breath. 9/13/22   Historical Provider, MD   albuterol 90 mcg/actuation inhaler Inhale 2 puffs every 4 hours if needed for shortness of breath.    Historical Provider, MD   albuterol-budesonide (Airsupra) 90-80 mcg/actuation inhaler Inhale 2 puffs if needed (every 4 hours as needed for cough, wheeze, short of breath not to exceed 6 doses in a 24 hour period of time.). Patient has a coupon 2/28/24 3/29/24  Hilary Trinidad, DO   aspirin 81 mg chewable tablet Chew 1 tablet (81 mg) once daily.    Historical Provider, MD   budesonide (Pulmicort) 0.5 mg/2 mL nebulizer solution Take 2 mL (0.5 mg) by nebulization 2 times a day. 7/26/23   Historical Provider, MD   butalbital-acetaminophen-caff -40 mg tablet Take 1 tablet by mouth early in the morning.. 1 tab(s) orally once a day, As Needed 8/9/23   Paul Matthew,    desvenlafaxine 100 mg 24 hr tablet TAKE 1 TABLET BY MOUTH EVERY DAY 11/27/23   Paul Matthew DO   desvenlafaxine 50 mg 24 hr tablet Take 1 tablet (50 mg) by mouth once  daily. Take with 100 mg = 150 mg dose    Historical Provider, MD           fexofenadine (Allegra) 180 mg tablet Take 1 tablet (180 mg) by mouth once daily. 5/21/15   Historical Provider, MD   fluticasone-umeclidin-vilanter (Trelegy Ellipta) 100-62.5-25 mcg blister with device Inhale 2 puffs once daily.    Historical Provider, MD   galcanezumab (Emgality Syringe) 120 mg/mL prefilled syringe Inject 1 Syringe (120 mg) under the skin every 28 (twenty-eight) days.    Historical Provider, MD   hydrOXYzine HCL (Atarax) 25 mg tablet Take 1 tablet (25 mg) by mouth 3 times a day as needed for anxiety. 11/20/23   Paul Matthew DO   levothyroxine (Synthroid, Levoxyl) 100 mcg tablet Take 1 tablet (100 mcg) by mouth once daily. 11/13/19   Historical Provider, MD   metoclopramide HCl (REGLAN ORAL) Take 1 tablet by mouth every 8 hours if needed (nausea).    Historical Provider, MD   montelukast (Singulair) 10 mg tablet Take 1 tablet (10 mg) by mouth once daily at bedtime. 8/9/23 2/5/24  Paul Matthew DO   multivitamin-Ca-iron-minerals (Tab-A-Vu Womens) 27-0.4 mg tablet Take by mouth.    Historical Provider, MD   norethindrone (Deblitane) 0.35 mg tablet Take 1 tablet (0.35 mg) over 84 days by mouth once daily. 12/27/23   Zohaib Cueva DO   predniSONE (Deltasone) 10 mg tablet Take 3 tablets (30 mg) by mouth once daily.    Historical Provider, MD   rizatriptan (Maxalt) 10 mg tablet Take 1 tablet (10 mg) by mouth if needed for migraine. May repeat in 2 hours if unresolved. Do not exceed 30 mg in 24 hours.    Historical Provider, MD   SUMAtriptan (Imitrex) 100 mg tablet Take 1 tablet (100 mg) by mouth if needed.    Historical Provider, MD   triazolam (Halcion) 0.25 mg tablet TAKE 1 TABLET BY MOUTH 1 HOUR BEFORE PROCEDURE 8/15/23   Historical Provider, MD       LABS AND IMAGING:     Labs:  Results for orders placed or performed during the hospital encounter of 04/03/24 (from the past 24 hour(s))   CBC and Auto Differential    Result Value Ref Range    WBC 11.7 (H) 4.4 - 11.3 x10*3/uL    nRBC 0.0 0.0 - 0.0 /100 WBCs    RBC 4.86 4.00 - 5.20 x10*6/uL    Hemoglobin 14.7 12.0 - 16.0 g/dL    Hematocrit 41.2 36.0 - 46.0 %    MCV 85 80 - 100 fL    MCH 30.2 26.0 - 34.0 pg    MCHC 35.7 32.0 - 36.0 g/dL    RDW 12.0 11.5 - 14.5 %    Platelets 382 150 - 450 x10*3/uL    Neutrophils % 45.5 40.0 - 80.0 %    Immature Granulocytes %, Automated 0.5 0.0 - 0.9 %    Lymphocytes % 44.4 13.0 - 44.0 %    Monocytes % 9.1 2.0 - 10.0 %    Eosinophils % 0.0 0.0 - 6.0 %    Basophils % 0.5 0.0 - 2.0 %    Neutrophils Absolute 5.30 1.20 - 7.70 x10*3/uL    Immature Granulocytes Absolute, Automated 0.06 0.00 - 0.70 x10*3/uL    Lymphocytes Absolute 5.17 (H) 1.20 - 4.80 x10*3/uL    Monocytes Absolute 1.06 (H) 0.10 - 1.00 x10*3/uL    Eosinophils Absolute 0.00 0.00 - 0.70 x10*3/uL    Basophils Absolute 0.06 0.00 - 0.10 x10*3/uL   Basic metabolic panel   Result Value Ref Range    Glucose 128 (H) 74 - 99 mg/dL    Sodium 138 136 - 145 mmol/L    Potassium 3.0 (L) 3.5 - 5.3 mmol/L    Chloride 105 98 - 107 mmol/L    Bicarbonate 24 21 - 32 mmol/L    Anion Gap 12 10 - 20 mmol/L    Urea Nitrogen 15 6 - 23 mg/dL    Creatinine 0.88 0.50 - 1.05 mg/dL    eGFR 87 >60 mL/min/1.73m*2    Calcium 9.2 8.6 - 10.3 mg/dL        Imaging:  XR chest 1 view    (Results Pending)

## 2024-04-03 NOTE — ED PROVIDER NOTES
HPI   Chief Complaint   Patient presents with    Shortness of Breath     Pt bib ambulance d/t increased work of breathing       Chief complaint shortness of breath  History of present illness this 38-year-old female is well-known to our department she has history of asthma she is on biologic now she gets abrupt acute bronchospasm and requires to be brought here she was given Solu-Medrol magnesium aerosol treatments epinephrine.  And was brought here currently having headache shortness of breath has gotten better.  She is here with blood pressure 193/106 temp of 36 pulse of 97 respirate is 18 O2 saturation 97%                          No data recorded                   Patient History   Past Medical History:   Diagnosis Date    Acute cystitis without hematuria 08/08/2019    Acute cystitis without hematuria    Asthma     Encounter for initial prescription of contraceptive pills 11/01/2019    Encounter for initial prescription of contraceptive pills    Nausea 02/05/2021    Nausea in adult    Other general symptoms and signs 12/05/2019    Forgetfulness    Parageusia 04/06/2020    Taste perversion    Pelvic and perineal pain     Pelvic pain in female    Personal history of other diseases of the respiratory system 04/06/2020    History of sinusitis    Personal history of other specified conditions 07/01/2020    History of vomiting    Personal history of other specified conditions 01/26/2021    History of headache    Personal history of other specified conditions 02/01/2021    History of diarrhea    Personal history of thrombophlebitis 06/03/2019    History of phlebitis    Personal history of urinary (tract) infections 01/16/2020    History of urinary tract infection     Past Surgical History:   Procedure Laterality Date    OTHER SURGICAL HISTORY  02/08/2019    Appendectomy    OTHER SURGICAL HISTORY  02/08/2019    Cholecystectomy    OTHER SURGICAL HISTORY  02/08/2019    Cystoscopy    OTHER SURGICAL HISTORY  02/08/2019     Ureteroscopy    OTHER SURGICAL HISTORY  02/08/2019    Shoulder surgery     Family History   Problem Relation Name Age of Onset    Hypertension Father      Other (Pulmonary Valve Stenosis) Sister      Heart disease Maternal Grandmother      Diabetes Other Grandparent     Lung cancer Other Grandparent     Anxiety disorder Sibling       Social History     Tobacco Use    Smoking status: Never    Smokeless tobacco: Never   Vaping Use    Vaping Use: Never used   Substance Use Topics    Alcohol use: Yes     Comment: social    Drug use: Never       Physical Exam   ED Triage Vitals   Temperature Heart Rate Respirations BP   04/03/24 0020 04/03/24 0020 04/03/24 0020 04/03/24 0020   36.4 °C (97.5 °F) (!) 103 (!) 30 (!) 117/96      Pulse Ox Temp Source Heart Rate Source Patient Position   04/03/24 0020 04/03/24 0020 04/03/24 0136 04/03/24 0136   99 % Temporal Monitor Sitting      BP Location FiO2 (%)     04/03/24 0136 --     Right arm        Physical Exam  Vitals and nursing note reviewed.   Constitutional:       Appearance: She is normal weight. She is ill-appearing.   HENT:      Head: Normocephalic and atraumatic.      Mouth/Throat:      Mouth: Mucous membranes are moist.      Pharynx: Oropharynx is clear.   Eyes:      Extraocular Movements: Extraocular movements intact.      Pupils: Pupils are equal, round, and reactive to light.   Cardiovascular:      Rate and Rhythm: Normal rate and regular rhythm.   Pulmonary:      Effort: Pulmonary effort is normal.      Breath sounds: Examination of the right-lower field reveals wheezing. Examination of the left-lower field reveals wheezing. Decreased breath sounds and wheezing present.   Musculoskeletal:         General: Normal range of motion.   Skin:     General: Skin is warm.      Capillary Refill: Capillary refill takes less than 2 seconds.   Neurological:      Mental Status: She is alert.         ED Course & MDM   Diagnoses as of 04/03/24 0620   Severe persistent asthma with  exacerbation       Medical Decision Making  Differential diagnosis  Asthma exacerbation  Bronchospasm  Hypertension  Headache  Considering the above differential diagnosis following test and treatments were rendered here chest x-ray labetalol Solu-Medrol cool air mist Dilaudid x 2 Zofran.  CBC electrolytes    Amount and/or Complexity of Data Reviewed  Labs: ordered. Decision-making details documented in ED Course.     Details: White count of 11 hemoglobin 14 hematocrit 41, glucose 128 sodium 138 potassium 3 BUN of 15 creatinine 0.8 calcium 9 2  Radiology: ordered and independent interpretation performed.     Details: Chest x-ray is clear      Labs Reviewed   CBC WITH AUTO DIFFERENTIAL - Abnormal       Result Value    WBC 11.7 (*)     nRBC 0.0      RBC 4.86      Hemoglobin 14.7      Hematocrit 41.2      MCV 85      MCH 30.2      MCHC 35.7      RDW 12.0      Platelets 382      Neutrophils % 45.5      Immature Granulocytes %, Automated 0.5      Lymphocytes % 44.4      Monocytes % 9.1      Eosinophils % 0.0      Basophils % 0.5      Neutrophils Absolute 5.30      Immature Granulocytes Absolute, Automated 0.06      Lymphocytes Absolute 5.17 (*)     Monocytes Absolute 1.06 (*)     Eosinophils Absolute 0.00      Basophils Absolute 0.06     BASIC METABOLIC PANEL - Abnormal    Glucose 128 (*)     Sodium 138      Potassium 3.0 (*)     Chloride 105      Bicarbonate 24      Anion Gap 12      Urea Nitrogen 15      Creatinine 0.88      eGFR 87      Calcium 9.2          XR chest 1 view   Final Result   No acute cardiopulmonary process.        MACRO:   None        Signed by: Riana Rushing 4/3/2024 5:28 AM   Dictation workstation:   [id number]           Procedure  Procedures     Gerg Napoles MD  04/03/24 0620

## 2024-04-05 ENCOUNTER — PATIENT OUTREACH (OUTPATIENT)
Dept: CARE COORDINATION | Facility: CLINIC | Age: 39
End: 2024-04-05
Payer: COMMERCIAL

## 2024-04-05 NOTE — PROGRESS NOTES
Discharge Facility:   [facility]  Discharge Diagnosis:  Severe persistent asthma  Admission Date: 4/3/2024    Discharge Date: 4/3/2024    PCP Appointment Date:   TBD  Specialist Appointment Date:   OB/GYN Dr Galdamez 4/11/2024  Hospital Encounter and Summary: Link    Unable to reach the patient x2 attempts. Voicemail left with call back number.

## 2024-04-11 ENCOUNTER — TELEMEDICINE (OUTPATIENT)
Dept: OBSTETRICS AND GYNECOLOGY | Facility: CLINIC | Age: 39
End: 2024-04-11
Payer: COMMERCIAL

## 2024-04-11 DIAGNOSIS — R87.7 LOW GRADE SQUAMOUS INTRAEPITHELIAL LESION (LGSIL) ON BIOPSY OF CERVIX: Primary | ICD-10-CM

## 2024-04-11 PROCEDURE — 99442 PR PHYS/QHP TELEPHONE EVALUATION 11-20 MIN: CPT | Performed by: OBSTETRICS & GYNECOLOGY

## 2024-04-11 PROCEDURE — 3008F BODY MASS INDEX DOCD: CPT | Performed by: OBSTETRICS & GYNECOLOGY

## 2024-04-12 NOTE — PROGRESS NOTES
GYN PROGRESS NOTE    Virtual or Telephone Consent    A telephone visit (audio only) between the patient (at the originating site) and the provider (at the distant site) was utilized to provide this telehealth service.   Verbal consent was requested and obtained from Sarahi Sukel on this date, 04/11/24 for a telehealth visit.   11 minutes      Chief complaint: follow up    HPI:  Patient answers are not available for this visit.      Reviewed case with patient, reviewed plans.    Discussed biopsy results which indicated a benign low grade intraepithelial lesion.  Recommend follow up pap in 1 year.    HISTORY:  Past Medical History:   Diagnosis Date    Acute cystitis without hematuria 08/08/2019    Acute cystitis without hematuria    Asthma     Encounter for initial prescription of contraceptive pills 11/01/2019    Encounter for initial prescription of contraceptive pills    Nausea 02/05/2021    Nausea in adult    Other general symptoms and signs 12/05/2019    Forgetfulness    Parageusia 04/06/2020    Taste perversion    Pelvic and perineal pain     Pelvic pain in female    Personal history of other diseases of the respiratory system 04/06/2020    History of sinusitis    Personal history of other specified conditions 07/01/2020    History of vomiting    Personal history of other specified conditions 01/26/2021    History of headache    Personal history of other specified conditions 02/01/2021    History of diarrhea    Personal history of thrombophlebitis 06/03/2019    History of phlebitis    Personal history of urinary (tract) infections 01/16/2020    History of urinary tract infection     Past Surgical History:   Procedure Laterality Date    OTHER SURGICAL HISTORY  02/08/2019    Appendectomy    OTHER SURGICAL HISTORY  02/08/2019    Cholecystectomy    OTHER SURGICAL HISTORY  02/08/2019    Cystoscopy    OTHER SURGICAL HISTORY  02/08/2019    Ureteroscopy    OTHER SURGICAL HISTORY  02/08/2019    Shoulder surgery      Social History     Socioeconomic History    Marital status:      Spouse name: Not on file    Number of children: Not on file    Years of education: Not on file    Highest education level: Not on file   Occupational History    Not on file   Tobacco Use    Smoking status: Never    Smokeless tobacco: Never   Vaping Use    Vaping status: Never Used   Substance and Sexual Activity    Alcohol use: Yes     Comment: social    Drug use: Never    Sexual activity: Defer   Other Topics Concern    Not on file   Social History Narrative    Not on file     Social Determinants of Health     Financial Resource Strain: Low Risk  (4/3/2024)    Overall Financial Resource Strain (CARDIA)     Difficulty of Paying Living Expenses: Not hard at all   Food Insecurity: Not on file   Transportation Needs: No Transportation Needs (4/3/2024)    PRAPARE - Transportation     Lack of Transportation (Medical): No     Lack of Transportation (Non-Medical): No   Physical Activity: Not on file   Stress: Not on file   Social Connections: Not on file   Intimate Partner Violence: Not on file   Housing Stability: Low Risk  (4/3/2024)    Housing Stability Vital Sign     Unable to Pay for Housing in the Last Year: No     Number of Places Lived in the Last Year: 1     Unstable Housing in the Last Year: No     Cancer-related family history includes Lung cancer in an other family member.       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    - Follow up pap in 1 year    Follow up in 1 year    Noy JUAREZ am scribing for virtually, and in the presence of Dr. Edin Galdamez on  04/11/24 at 11:27 PM  Edin Galdamez MD

## 2024-04-21 ENCOUNTER — APPOINTMENT (OUTPATIENT)
Dept: RADIOLOGY | Facility: HOSPITAL | Age: 39
End: 2024-04-21
Payer: COMMERCIAL

## 2024-04-21 ENCOUNTER — HOSPITAL ENCOUNTER (OUTPATIENT)
Facility: HOSPITAL | Age: 39
Setting detail: OBSERVATION
Discharge: HOME | End: 2024-04-23
Attending: EMERGENCY MEDICINE | Admitting: STUDENT IN AN ORGANIZED HEALTH CARE EDUCATION/TRAINING PROGRAM
Payer: COMMERCIAL

## 2024-04-21 DIAGNOSIS — J96.01 ACUTE RESPIRATORY FAILURE WITH HYPOXIA (MULTI): ICD-10-CM

## 2024-04-21 DIAGNOSIS — J45.901 EXACERBATION OF ASTHMA, UNSPECIFIED ASTHMA SEVERITY, UNSPECIFIED WHETHER PERSISTENT (HHS-HCC): Primary | ICD-10-CM

## 2024-04-21 LAB
ALBUMIN SERPL BCP-MCNC: 4.1 G/DL (ref 3.4–5)
ALP SERPL-CCNC: 82 U/L (ref 33–110)
ALT SERPL W P-5'-P-CCNC: 37 U/L (ref 7–45)
ANION GAP SERPL CALC-SCNC: 12 MMOL/L (ref 10–20)
AST SERPL W P-5'-P-CCNC: 27 U/L (ref 9–39)
B-HCG SERPL-ACNC: <2 MIU/ML
BASOPHILS # BLD AUTO: 0.02 X10*3/UL (ref 0–0.1)
BASOPHILS NFR BLD AUTO: 0.3 %
BILIRUB SERPL-MCNC: 0.9 MG/DL (ref 0–1.2)
BNP SERPL-MCNC: 42 PG/ML (ref 0–99)
BUN SERPL-MCNC: 9 MG/DL (ref 6–23)
CALCIUM SERPL-MCNC: 8.9 MG/DL (ref 8.6–10.3)
CARDIAC TROPONIN I PNL SERPL HS: <3 NG/L (ref 0–13)
CHLORIDE SERPL-SCNC: 106 MMOL/L (ref 98–107)
CO2 SERPL-SCNC: 23 MMOL/L (ref 21–32)
CREAT SERPL-MCNC: 0.8 MG/DL (ref 0.5–1.05)
D DIMER PPP FEU-MCNC: 933 NG/ML FEU
EGFRCR SERPLBLD CKD-EPI 2021: >90 ML/MIN/1.73M*2
EOSINOPHIL # BLD AUTO: 0 X10*3/UL (ref 0–0.7)
EOSINOPHIL NFR BLD AUTO: 0 %
ERYTHROCYTE [DISTWIDTH] IN BLOOD BY AUTOMATED COUNT: 12.4 % (ref 11.5–14.5)
GLUCOSE SERPL-MCNC: 88 MG/DL (ref 74–99)
HCT VFR BLD AUTO: 40.1 % (ref 36–46)
HGB BLD-MCNC: 14.2 G/DL (ref 12–16)
IMM GRANULOCYTES # BLD AUTO: 0.02 X10*3/UL (ref 0–0.7)
IMM GRANULOCYTES NFR BLD AUTO: 0.3 % (ref 0–0.9)
LYMPHOCYTES # BLD AUTO: 2.16 X10*3/UL (ref 1.2–4.8)
LYMPHOCYTES NFR BLD AUTO: 34.3 %
MCH RBC QN AUTO: 29.8 PG (ref 26–34)
MCHC RBC AUTO-ENTMCNC: 35.4 G/DL (ref 32–36)
MCV RBC AUTO: 84 FL (ref 80–100)
MONOCYTES # BLD AUTO: 0.55 X10*3/UL (ref 0.1–1)
MONOCYTES NFR BLD AUTO: 8.7 %
NEUTROPHILS # BLD AUTO: 3.55 X10*3/UL (ref 1.2–7.7)
NEUTROPHILS NFR BLD AUTO: 56.4 %
NRBC BLD-RTO: 0 /100 WBCS (ref 0–0)
PLATELET # BLD AUTO: 357 X10*3/UL (ref 150–450)
POTASSIUM SERPL-SCNC: 3.6 MMOL/L (ref 3.5–5.3)
PROT SERPL-MCNC: 6.7 G/DL (ref 6.4–8.2)
RBC # BLD AUTO: 4.77 X10*6/UL (ref 4–5.2)
SODIUM SERPL-SCNC: 137 MMOL/L (ref 136–145)
WBC # BLD AUTO: 6.3 X10*3/UL (ref 4.4–11.3)

## 2024-04-21 PROCEDURE — 85379 FIBRIN DEGRADATION QUANT: CPT | Performed by: EMERGENCY MEDICINE

## 2024-04-21 PROCEDURE — 99285 EMERGENCY DEPT VISIT HI MDM: CPT | Mod: 25

## 2024-04-21 PROCEDURE — 71045 X-RAY EXAM CHEST 1 VIEW: CPT | Performed by: STUDENT IN AN ORGANIZED HEALTH CARE EDUCATION/TRAINING PROGRAM

## 2024-04-21 PROCEDURE — 2500000002 HC RX 250 W HCPCS SELF ADMINISTERED DRUGS (ALT 637 FOR MEDICARE OP, ALT 636 FOR OP/ED): Performed by: EMERGENCY MEDICINE

## 2024-04-21 PROCEDURE — 84484 ASSAY OF TROPONIN QUANT: CPT | Performed by: EMERGENCY MEDICINE

## 2024-04-21 PROCEDURE — 83880 ASSAY OF NATRIURETIC PEPTIDE: CPT | Performed by: EMERGENCY MEDICINE

## 2024-04-21 PROCEDURE — 85025 COMPLETE CBC W/AUTO DIFF WBC: CPT | Performed by: EMERGENCY MEDICINE

## 2024-04-21 PROCEDURE — 96375 TX/PRO/DX INJ NEW DRUG ADDON: CPT

## 2024-04-21 PROCEDURE — 71045 X-RAY EXAM CHEST 1 VIEW: CPT

## 2024-04-21 PROCEDURE — 80053 COMPREHEN METABOLIC PANEL: CPT | Performed by: EMERGENCY MEDICINE

## 2024-04-21 PROCEDURE — 87636 SARSCOV2 & INF A&B AMP PRB: CPT | Performed by: EMERGENCY MEDICINE

## 2024-04-21 PROCEDURE — 36415 COLL VENOUS BLD VENIPUNCTURE: CPT | Performed by: EMERGENCY MEDICINE

## 2024-04-21 PROCEDURE — 96365 THER/PROPH/DIAG IV INF INIT: CPT

## 2024-04-21 PROCEDURE — 84702 CHORIONIC GONADOTROPIN TEST: CPT | Performed by: EMERGENCY MEDICINE

## 2024-04-21 PROCEDURE — 71275 CT ANGIOGRAPHY CHEST: CPT

## 2024-04-21 PROCEDURE — 2500000004 HC RX 250 GENERAL PHARMACY W/ HCPCS (ALT 636 FOR OP/ED): Performed by: EMERGENCY MEDICINE

## 2024-04-21 PROCEDURE — 96361 HYDRATE IV INFUSION ADD-ON: CPT

## 2024-04-21 PROCEDURE — 94640 AIRWAY INHALATION TREATMENT: CPT

## 2024-04-21 RX ORDER — MORPHINE SULFATE 4 MG/ML
4 INJECTION, SOLUTION INTRAMUSCULAR; INTRAVENOUS ONCE
Status: COMPLETED | OUTPATIENT
Start: 2024-04-21 | End: 2024-04-22

## 2024-04-21 RX ORDER — IPRATROPIUM BROMIDE AND ALBUTEROL SULFATE 2.5; .5 MG/3ML; MG/3ML
3 SOLUTION RESPIRATORY (INHALATION) ONCE
Status: COMPLETED | OUTPATIENT
Start: 2024-04-21 | End: 2024-04-21

## 2024-04-21 RX ORDER — ALBUTEROL SULFATE 0.83 MG/ML
5 SOLUTION RESPIRATORY (INHALATION) ONCE
Status: COMPLETED | OUTPATIENT
Start: 2024-04-21 | End: 2024-04-21

## 2024-04-21 RX ORDER — MAGNESIUM SULFATE HEPTAHYDRATE 40 MG/ML
2 INJECTION, SOLUTION INTRAVENOUS ONCE
Status: COMPLETED | OUTPATIENT
Start: 2024-04-21 | End: 2024-04-22

## 2024-04-21 RX ORDER — ONDANSETRON HYDROCHLORIDE 2 MG/ML
4 INJECTION, SOLUTION INTRAVENOUS ONCE
Status: COMPLETED | OUTPATIENT
Start: 2024-04-21 | End: 2024-04-22

## 2024-04-21 RX ADMIN — SODIUM CHLORIDE 500 ML: 9 INJECTION, SOLUTION INTRAVENOUS at 22:50

## 2024-04-21 RX ADMIN — ALBUTEROL SULFATE 5 MG: 2.5 SOLUTION RESPIRATORY (INHALATION) at 22:37

## 2024-04-21 RX ADMIN — METHYLPREDNISOLONE SODIUM SUCCINATE 125 MG: 125 INJECTION, POWDER, FOR SOLUTION INTRAMUSCULAR; INTRAVENOUS at 22:51

## 2024-04-21 RX ADMIN — MAGNESIUM SULFATE HEPTAHYDRATE 2 G: 40 INJECTION, SOLUTION INTRAVENOUS at 23:46

## 2024-04-21 RX ADMIN — IPRATROPIUM BROMIDE AND ALBUTEROL SULFATE 3 ML: 2.5; .5 SOLUTION RESPIRATORY (INHALATION) at 22:41

## 2024-04-21 ASSESSMENT — PAIN - FUNCTIONAL ASSESSMENT: PAIN_FUNCTIONAL_ASSESSMENT: 0-10

## 2024-04-21 ASSESSMENT — PAIN SCALES - GENERAL: PAINLEVEL_OUTOF10: 5 - MODERATE PAIN

## 2024-04-21 ASSESSMENT — LIFESTYLE VARIABLES
TOTAL SCORE: 0
EVER HAD A DRINK FIRST THING IN THE MORNING TO STEADY YOUR NERVES TO GET RID OF A HANGOVER: NO
HAVE YOU EVER FELT YOU SHOULD CUT DOWN ON YOUR DRINKING: NO
HAVE PEOPLE ANNOYED YOU BY CRITICIZING YOUR DRINKING: NO
EVER FELT BAD OR GUILTY ABOUT YOUR DRINKING: NO

## 2024-04-22 ENCOUNTER — APPOINTMENT (OUTPATIENT)
Dept: CARDIOLOGY | Facility: HOSPITAL | Age: 39
End: 2024-04-22
Payer: COMMERCIAL

## 2024-04-22 LAB
ANION GAP SERPL CALC-SCNC: 12 MMOL/L (ref 10–20)
BUN SERPL-MCNC: 11 MG/DL (ref 6–23)
CALCIUM SERPL-MCNC: 8.8 MG/DL (ref 8.6–10.3)
CARDIAC TROPONIN I PNL SERPL HS: <3 NG/L (ref 0–13)
CHLORIDE SERPL-SCNC: 104 MMOL/L (ref 98–107)
CO2 SERPL-SCNC: 21 MMOL/L (ref 21–32)
CREAT SERPL-MCNC: 0.87 MG/DL (ref 0.5–1.05)
EGFRCR SERPLBLD CKD-EPI 2021: 89 ML/MIN/1.73M*2
ERYTHROCYTE [DISTWIDTH] IN BLOOD BY AUTOMATED COUNT: 12.4 % (ref 11.5–14.5)
FLUAV RNA RESP QL NAA+PROBE: NOT DETECTED
FLUBV RNA RESP QL NAA+PROBE: NOT DETECTED
GLUCOSE SERPL-MCNC: 207 MG/DL (ref 74–99)
HCT VFR BLD AUTO: 39 % (ref 36–46)
HGB BLD-MCNC: 13.7 G/DL (ref 12–16)
HOLD SPECIMEN: NORMAL
MCH RBC QN AUTO: 29.7 PG (ref 26–34)
MCHC RBC AUTO-ENTMCNC: 35.1 G/DL (ref 32–36)
MCV RBC AUTO: 84 FL (ref 80–100)
NRBC BLD-RTO: 0 /100 WBCS (ref 0–0)
PLATELET # BLD AUTO: 331 X10*3/UL (ref 150–450)
POTASSIUM SERPL-SCNC: 3.4 MMOL/L (ref 3.5–5.3)
RBC # BLD AUTO: 4.62 X10*6/UL (ref 4–5.2)
SARS-COV-2 RNA RESP QL NAA+PROBE: NOT DETECTED
SODIUM SERPL-SCNC: 134 MMOL/L (ref 136–145)
WBC # BLD AUTO: 6.7 X10*3/UL (ref 4.4–11.3)

## 2024-04-22 PROCEDURE — 80048 BASIC METABOLIC PNL TOTAL CA: CPT | Performed by: STUDENT IN AN ORGANIZED HEALTH CARE EDUCATION/TRAINING PROGRAM

## 2024-04-22 PROCEDURE — 2500000001 HC RX 250 WO HCPCS SELF ADMINISTERED DRUGS (ALT 637 FOR MEDICARE OP): Performed by: STUDENT IN AN ORGANIZED HEALTH CARE EDUCATION/TRAINING PROGRAM

## 2024-04-22 PROCEDURE — 96375 TX/PRO/DX INJ NEW DRUG ADDON: CPT

## 2024-04-22 PROCEDURE — 71275 CT ANGIOGRAPHY CHEST: CPT | Performed by: RADIOLOGY

## 2024-04-22 PROCEDURE — 2550000001 HC RX 255 CONTRASTS: Performed by: EMERGENCY MEDICINE

## 2024-04-22 PROCEDURE — 36415 COLL VENOUS BLD VENIPUNCTURE: CPT | Performed by: STUDENT IN AN ORGANIZED HEALTH CARE EDUCATION/TRAINING PROGRAM

## 2024-04-22 PROCEDURE — 2500000004 HC RX 250 GENERAL PHARMACY W/ HCPCS (ALT 636 FOR OP/ED): Performed by: EMERGENCY MEDICINE

## 2024-04-22 PROCEDURE — 84484 ASSAY OF TROPONIN QUANT: CPT | Performed by: EMERGENCY MEDICINE

## 2024-04-22 PROCEDURE — 96376 TX/PRO/DX INJ SAME DRUG ADON: CPT

## 2024-04-22 PROCEDURE — 2500000004 HC RX 250 GENERAL PHARMACY W/ HCPCS (ALT 636 FOR OP/ED): Performed by: STUDENT IN AN ORGANIZED HEALTH CARE EDUCATION/TRAINING PROGRAM

## 2024-04-22 PROCEDURE — 2500000002 HC RX 250 W HCPCS SELF ADMINISTERED DRUGS (ALT 637 FOR MEDICARE OP, ALT 636 FOR OP/ED): Performed by: STUDENT IN AN ORGANIZED HEALTH CARE EDUCATION/TRAINING PROGRAM

## 2024-04-22 PROCEDURE — 36415 COLL VENOUS BLD VENIPUNCTURE: CPT | Performed by: EMERGENCY MEDICINE

## 2024-04-22 PROCEDURE — 93005 ELECTROCARDIOGRAM TRACING: CPT

## 2024-04-22 PROCEDURE — 2500000001 HC RX 250 WO HCPCS SELF ADMINISTERED DRUGS (ALT 637 FOR MEDICARE OP): Performed by: HOSPITALIST

## 2024-04-22 PROCEDURE — G0378 HOSPITAL OBSERVATION PER HR: HCPCS

## 2024-04-22 PROCEDURE — 99232 SBSQ HOSP IP/OBS MODERATE 35: CPT | Performed by: HOSPITALIST

## 2024-04-22 PROCEDURE — 99223 1ST HOSP IP/OBS HIGH 75: CPT | Performed by: STUDENT IN AN ORGANIZED HEALTH CARE EDUCATION/TRAINING PROGRAM

## 2024-04-22 PROCEDURE — 85027 COMPLETE CBC AUTOMATED: CPT | Performed by: STUDENT IN AN ORGANIZED HEALTH CARE EDUCATION/TRAINING PROGRAM

## 2024-04-22 PROCEDURE — 94640 AIRWAY INHALATION TREATMENT: CPT

## 2024-04-22 PROCEDURE — 96374 THER/PROPH/DIAG INJ IV PUSH: CPT | Mod: 59

## 2024-04-22 RX ORDER — IPRATROPIUM BROMIDE AND ALBUTEROL SULFATE 2.5; .5 MG/3ML; MG/3ML
3 SOLUTION RESPIRATORY (INHALATION)
Status: DISCONTINUED | OUTPATIENT
Start: 2024-04-22 | End: 2024-04-23 | Stop reason: HOSPADM

## 2024-04-22 RX ORDER — FLUTICASONE FUROATE AND VILANTEROL 100; 25 UG/1; UG/1
1 POWDER RESPIRATORY (INHALATION)
Status: DISCONTINUED | OUTPATIENT
Start: 2024-04-22 | End: 2024-04-23 | Stop reason: HOSPADM

## 2024-04-22 RX ORDER — NAPROXEN SODIUM 220 MG/1
81 TABLET, FILM COATED ORAL DAILY
Status: DISCONTINUED | OUTPATIENT
Start: 2024-04-22 | End: 2024-04-23 | Stop reason: HOSPADM

## 2024-04-22 RX ORDER — SUMATRIPTAN SUCCINATE 25 MG/1
100 TABLET ORAL ONCE AS NEEDED
Status: DISCONTINUED | OUTPATIENT
Start: 2024-04-22 | End: 2024-04-23 | Stop reason: HOSPADM

## 2024-04-22 RX ORDER — DESVENLAFAXINE 50 MG/1
100 TABLET, EXTENDED RELEASE ORAL DAILY
Status: DISCONTINUED | OUTPATIENT
Start: 2024-04-22 | End: 2024-04-23 | Stop reason: HOSPADM

## 2024-04-22 RX ORDER — ONDANSETRON HYDROCHLORIDE 2 MG/ML
4 INJECTION, SOLUTION INTRAVENOUS ONCE
Status: COMPLETED | OUTPATIENT
Start: 2024-04-22 | End: 2024-04-22

## 2024-04-22 RX ORDER — BUDESONIDE 0.5 MG/2ML
0.5 INHALANT ORAL
Status: DISCONTINUED | OUTPATIENT
Start: 2024-04-22 | End: 2024-04-23 | Stop reason: HOSPADM

## 2024-04-22 RX ORDER — ACETAMINOPHEN 160 MG/5ML
650 SOLUTION ORAL EVERY 4 HOURS PRN
Status: DISCONTINUED | OUTPATIENT
Start: 2024-04-22 | End: 2024-04-23 | Stop reason: HOSPADM

## 2024-04-22 RX ORDER — POTASSIUM CHLORIDE 1.5 G/1.58G
20 POWDER, FOR SOLUTION ORAL ONCE
Status: COMPLETED | OUTPATIENT
Start: 2024-04-22 | End: 2024-04-22

## 2024-04-22 RX ORDER — MORPHINE SULFATE 4 MG/ML
4 INJECTION, SOLUTION INTRAMUSCULAR; INTRAVENOUS ONCE
Status: COMPLETED | OUTPATIENT
Start: 2024-04-22 | End: 2024-04-22

## 2024-04-22 RX ORDER — LORATADINE 10 MG/1
10 TABLET ORAL DAILY
Status: DISCONTINUED | OUTPATIENT
Start: 2024-04-22 | End: 2024-04-23 | Stop reason: HOSPADM

## 2024-04-22 RX ORDER — ACETAMINOPHEN 325 MG/1
650 TABLET ORAL EVERY 4 HOURS PRN
Status: DISCONTINUED | OUTPATIENT
Start: 2024-04-22 | End: 2024-04-23 | Stop reason: HOSPADM

## 2024-04-22 RX ORDER — ACETAMINOPHEN 325 MG/1
1000 TABLET ORAL ONCE
Status: COMPLETED | OUTPATIENT
Start: 2024-04-22 | End: 2024-04-22

## 2024-04-22 RX ORDER — LEVOTHYROXINE SODIUM 100 UG/1
100 TABLET ORAL DAILY
Status: DISCONTINUED | OUTPATIENT
Start: 2024-04-22 | End: 2024-04-23 | Stop reason: HOSPADM

## 2024-04-22 RX ORDER — OXYCODONE AND ACETAMINOPHEN 5; 325 MG/1; MG/1
1 TABLET ORAL EVERY 6 HOURS PRN
Status: DISCONTINUED | OUTPATIENT
Start: 2024-04-22 | End: 2024-04-23 | Stop reason: HOSPADM

## 2024-04-22 RX ORDER — HYDROXYZINE HYDROCHLORIDE 25 MG/1
25 TABLET, FILM COATED ORAL 3 TIMES DAILY PRN
Status: DISCONTINUED | OUTPATIENT
Start: 2024-04-22 | End: 2024-04-23 | Stop reason: HOSPADM

## 2024-04-22 RX ORDER — ALBUTEROL SULFATE 0.83 MG/ML
3 SOLUTION RESPIRATORY (INHALATION) EVERY 4 HOURS PRN
Status: DISCONTINUED | OUTPATIENT
Start: 2024-04-22 | End: 2024-04-23 | Stop reason: HOSPADM

## 2024-04-22 RX ORDER — MINERAL OIL
180 ENEMA (ML) RECTAL
Status: DISCONTINUED | OUTPATIENT
Start: 2024-04-22 | End: 2024-04-22

## 2024-04-22 RX ORDER — METOCLOPRAMIDE 10 MG/1
10 TABLET ORAL EVERY 8 HOURS PRN
Status: DISCONTINUED | OUTPATIENT
Start: 2024-04-22 | End: 2024-04-23 | Stop reason: HOSPADM

## 2024-04-22 RX ADMIN — MORPHINE SULFATE 4 MG: 4 INJECTION, SOLUTION INTRAMUSCULAR; INTRAVENOUS at 01:14

## 2024-04-22 RX ADMIN — OXYCODONE HYDROCHLORIDE AND ACETAMINOPHEN 1 TABLET: 5; 325 TABLET ORAL at 15:42

## 2024-04-22 RX ADMIN — IPRATROPIUM BROMIDE AND ALBUTEROL SULFATE 3 ML: 2.5; .5 SOLUTION RESPIRATORY (INHALATION) at 03:34

## 2024-04-22 RX ADMIN — DESVENLAFAXINE 100 MG: 50 TABLET, FILM COATED, EXTENDED RELEASE ORAL at 08:12

## 2024-04-22 RX ADMIN — ASPIRIN 81 MG: 81 TABLET, CHEWABLE ORAL at 08:12

## 2024-04-22 RX ADMIN — ONDANSETRON 4 MG: 2 INJECTION INTRAMUSCULAR; INTRAVENOUS at 00:19

## 2024-04-22 RX ADMIN — METHYLPREDNISOLONE SODIUM SUCCINATE 40 MG: 40 INJECTION, POWDER, FOR SOLUTION INTRAMUSCULAR; INTRAVENOUS at 21:24

## 2024-04-22 RX ADMIN — BUDESONIDE INHALATION 0.5 MG: 0.5 SUSPENSION RESPIRATORY (INHALATION) at 07:00

## 2024-04-22 RX ADMIN — ACETAMINOPHEN 650 MG: 325 TABLET ORAL at 11:13

## 2024-04-22 RX ADMIN — FLUTICASONE FUROATE AND VILANTEROL TRIFENATATE 1 PUFF: 100; 25 POWDER RESPIRATORY (INHALATION) at 06:25

## 2024-04-22 RX ADMIN — LORATADINE 10 MG: 10 TABLET ORAL at 08:12

## 2024-04-22 RX ADMIN — ACETAMINOPHEN 650 MG: 325 TABLET ORAL at 21:27

## 2024-04-22 RX ADMIN — IOHEXOL 75 ML: 350 INJECTION, SOLUTION INTRAVENOUS at 00:02

## 2024-04-22 RX ADMIN — LEVOTHYROXINE SODIUM 100 MCG: 0.1 TABLET ORAL at 06:23

## 2024-04-22 RX ADMIN — ONDANSETRON 4 MG: 2 INJECTION INTRAMUSCULAR; INTRAVENOUS at 01:14

## 2024-04-22 RX ADMIN — METHYLPREDNISOLONE SODIUM SUCCINATE 40 MG: 40 INJECTION, POWDER, FOR SOLUTION INTRAMUSCULAR; INTRAVENOUS at 11:12

## 2024-04-22 RX ADMIN — OXYCODONE HYDROCHLORIDE AND ACETAMINOPHEN 1 TABLET: 5; 325 TABLET ORAL at 23:27

## 2024-04-22 RX ADMIN — MORPHINE SULFATE 4 MG: 4 INJECTION, SOLUTION INTRAMUSCULAR; INTRAVENOUS at 00:13

## 2024-04-22 RX ADMIN — IPRATROPIUM BROMIDE AND ALBUTEROL SULFATE 3 ML: 2.5; .5 SOLUTION RESPIRATORY (INHALATION) at 12:44

## 2024-04-22 RX ADMIN — ONDANSETRON 4 MG: 2 INJECTION INTRAMUSCULAR; INTRAVENOUS at 04:52

## 2024-04-22 RX ADMIN — POTASSIUM CHLORIDE 20 MEQ: 1.5 POWDER, FOR SOLUTION ORAL at 15:38

## 2024-04-22 RX ADMIN — OXYCODONE HYDROCHLORIDE AND ACETAMINOPHEN 1 TABLET: 5; 325 TABLET ORAL at 04:52

## 2024-04-22 RX ADMIN — ACETAMINOPHEN 975 MG: 325 TABLET ORAL at 02:40

## 2024-04-22 RX ADMIN — IPRATROPIUM BROMIDE AND ALBUTEROL SULFATE 3 ML: 2.5; .5 SOLUTION RESPIRATORY (INHALATION) at 07:00

## 2024-04-22 RX ADMIN — TIOTROPIUM BROMIDE INHALATION SPRAY 2 PUFF: 3.12 SPRAY, METERED RESPIRATORY (INHALATION) at 06:23

## 2024-04-22 RX ADMIN — SUMATRIPTAN SUCCINATE 100 MG: 25 TABLET ORAL at 13:22

## 2024-04-22 RX ADMIN — BUDESONIDE INHALATION 0.5 MG: 0.5 SUSPENSION RESPIRATORY (INHALATION) at 19:52

## 2024-04-22 RX ADMIN — IPRATROPIUM BROMIDE AND ALBUTEROL SULFATE 3 ML: 2.5; .5 SOLUTION RESPIRATORY (INHALATION) at 19:52

## 2024-04-22 SDOH — HEALTH STABILITY: MENTAL HEALTH: HOW OFTEN DO YOU HAVE 6 OR MORE DRINKS ON ONE OCCASION?: LESS THAN MONTHLY

## 2024-04-22 SDOH — SOCIAL STABILITY: SOCIAL INSECURITY
WITHIN THE LAST YEAR, HAVE TO BEEN RAPED OR FORCED TO HAVE ANY KIND OF SEXUAL ACTIVITY BY YOUR PARTNER OR EX-PARTNER?: NO

## 2024-04-22 SDOH — SOCIAL STABILITY: SOCIAL INSECURITY
WITHIN THE LAST YEAR, HAVE YOU BEEN KICKED, HIT, SLAPPED, OR OTHERWISE PHYSICALLY HURT BY YOUR PARTNER OR EX-PARTNER?: NO

## 2024-04-22 SDOH — ECONOMIC STABILITY: FOOD INSECURITY: WITHIN THE PAST 12 MONTHS, YOU WORRIED THAT YOUR FOOD WOULD RUN OUT BEFORE YOU GOT MONEY TO BUY MORE.: NEVER TRUE

## 2024-04-22 SDOH — SOCIAL STABILITY: SOCIAL INSECURITY: DOES ANYONE TRY TO KEEP YOU FROM HAVING/CONTACTING OTHER FRIENDS OR DOING THINGS OUTSIDE YOUR HOME?: NO

## 2024-04-22 SDOH — SOCIAL STABILITY: SOCIAL INSECURITY: HAVE YOU HAD THOUGHTS OF HARMING ANYONE ELSE?: NO

## 2024-04-22 SDOH — SOCIAL STABILITY: SOCIAL NETWORK: ARE YOU MARRIED, WIDOWED, DIVORCED, SEPARATED, NEVER MARRIED, OR LIVING WITH A PARTNER?: MARRIED

## 2024-04-22 SDOH — SOCIAL STABILITY: SOCIAL NETWORK
DO YOU BELONG TO ANY CLUBS OR ORGANIZATIONS SUCH AS CHURCH GROUPS UNIONS, FRATERNAL OR ATHLETIC GROUPS, OR SCHOOL GROUPS?: YES

## 2024-04-22 SDOH — SOCIAL STABILITY: SOCIAL INSECURITY: ABUSE: ADULT

## 2024-04-22 SDOH — SOCIAL STABILITY: SOCIAL INSECURITY: WERE YOU ABLE TO COMPLETE ALL THE BEHAVIORAL HEALTH SCREENINGS?: YES

## 2024-04-22 SDOH — SOCIAL STABILITY: SOCIAL INSECURITY: WITHIN THE LAST YEAR, HAVE YOU BEEN AFRAID OF YOUR PARTNER OR EX-PARTNER?: NO

## 2024-04-22 SDOH — HEALTH STABILITY: MENTAL HEALTH
STRESS IS WHEN SOMEONE FEELS TENSE, NERVOUS, ANXIOUS, OR CAN'T SLEEP AT NIGHT BECAUSE THEIR MIND IS TROUBLED. HOW STRESSED ARE YOU?: NOT AT ALL

## 2024-04-22 SDOH — HEALTH STABILITY: MENTAL HEALTH
HOW OFTEN DO YOU NEED TO HAVE SOMEONE HELP YOU WHEN YOU READ INSTRUCTIONS, PAMPHLETS, OR OTHER WRITTEN MATERIAL FROM YOUR DOCTOR OR PHARMACY?: NEVER

## 2024-04-22 SDOH — SOCIAL STABILITY: SOCIAL INSECURITY: ARE YOU OR HAVE YOU BEEN THREATENED OR ABUSED PHYSICALLY, EMOTIONALLY, OR SEXUALLY BY ANYONE?: NO

## 2024-04-22 SDOH — SOCIAL STABILITY: SOCIAL NETWORK: HOW OFTEN DO YOU ATTENT MEETINGS OF THE CLUB OR ORGANIZATION YOU BELONG TO?: MORE THAN 4 TIMES PER YEAR

## 2024-04-22 SDOH — SOCIAL STABILITY: SOCIAL INSECURITY: WITHIN THE LAST YEAR, HAVE YOU BEEN HUMILIATED OR EMOTIONALLY ABUSED IN OTHER WAYS BY YOUR PARTNER OR EX-PARTNER?: NO

## 2024-04-22 SDOH — SOCIAL STABILITY: SOCIAL INSECURITY: DO YOU FEEL UNSAFE GOING BACK TO THE PLACE WHERE YOU ARE LIVING?: NO

## 2024-04-22 SDOH — SOCIAL STABILITY: SOCIAL INSECURITY: DO YOU FEEL ANYONE HAS EXPLOITED OR TAKEN ADVANTAGE OF YOU FINANCIALLY OR OF YOUR PERSONAL PROPERTY?: NO

## 2024-04-22 SDOH — ECONOMIC STABILITY: INCOME INSECURITY: IN THE PAST 12 MONTHS, HAS THE ELECTRIC, GAS, OIL, OR WATER COMPANY THREATENED TO SHUT OFF SERVICE IN YOUR HOME?: NO

## 2024-04-22 SDOH — SOCIAL STABILITY: SOCIAL NETWORK: HOW OFTEN DO YOU ATTEND CHURCH OR RELIGIOUS SERVICES?: NEVER

## 2024-04-22 SDOH — SOCIAL STABILITY: SOCIAL NETWORK
IN A TYPICAL WEEK, HOW MANY TIMES DO YOU TALK ON THE PHONE WITH FAMILY, FRIENDS, OR NEIGHBORS?: MORE THAN THREE TIMES A WEEK

## 2024-04-22 SDOH — ECONOMIC STABILITY: FOOD INSECURITY: WITHIN THE PAST 12 MONTHS, THE FOOD YOU BOUGHT JUST DIDN'T LAST AND YOU DIDN'T HAVE MONEY TO GET MORE.: NEVER TRUE

## 2024-04-22 SDOH — HEALTH STABILITY: PHYSICAL HEALTH: ON AVERAGE, HOW MANY MINUTES DO YOU ENGAGE IN EXERCISE AT THIS LEVEL?: 30 MIN

## 2024-04-22 SDOH — SOCIAL STABILITY: SOCIAL INSECURITY: HAS ANYONE EVER THREATENED TO HURT YOUR FAMILY OR YOUR PETS?: NO

## 2024-04-22 SDOH — SOCIAL STABILITY: SOCIAL NETWORK: HOW OFTEN DO YOU GET TOGETHER WITH FRIENDS OR RELATIVES?: MORE THAN THREE TIMES A WEEK

## 2024-04-22 SDOH — HEALTH STABILITY: PHYSICAL HEALTH: ON AVERAGE, HOW MANY DAYS PER WEEK DO YOU ENGAGE IN MODERATE TO STRENUOUS EXERCISE (LIKE A BRISK WALK)?: 1 DAY

## 2024-04-22 SDOH — SOCIAL STABILITY: SOCIAL INSECURITY: HAVE YOU HAD ANY THOUGHTS OF HARMING ANYONE ELSE?: NO

## 2024-04-22 SDOH — SOCIAL STABILITY: SOCIAL INSECURITY: ARE THERE ANY APPARENT SIGNS OF INJURIES/BEHAVIORS THAT COULD BE RELATED TO ABUSE/NEGLECT?: NO

## 2024-04-22 ASSESSMENT — LIFESTYLE VARIABLES
PRESCIPTION_ABUSE_PAST_12_MONTHS: NO
HOW OFTEN DO YOU HAVE SIX OR MORE DRINKS ON ONE OCCASION: LESS THAN MONTHLY
HOW OFTEN DO YOU HAVE A DRINK CONTAINING ALCOHOL: 2-4 TIMES A MONTH
HOW OFTEN DURING THE LAST YEAR HAVE YOU FOUND THAT YOU WERE NOT ABLE TO STOP DRINKING ONCE YOU HAD STARTED: NEVER
HOW MANY STANDARD DRINKS CONTAINING ALCOHOL DO YOU HAVE ON A TYPICAL DAY: 1 OR 2
HOW OFTEN DURING THE LAST YEAR HAVE YOU BEEN UNABLE TO REMEMBER WHAT HAPPENED THE NIGHT BEFORE BECAUSE YOU HAD BEEN DRINKING: NEVER
AUDIT TOTAL SCORE: 3
HAS A RELATIVE, FRIEND, DOCTOR, OR ANOTHER HEALTH PROFESSIONAL EXPRESSED CONCERN ABOUT YOUR DRINKING OR SUGGESTED YOU CUT DOWN: NO
SUBSTANCE_ABUSE_PAST_12_MONTHS: NO
AUDIT-C TOTAL SCORE: 3
HOW OFTEN DURING THE LAST YEAR HAVE YOU FAILED TO DO WHAT WAS NORMALLY EXPECTED FROM YOU BECAUSE OF DRINKING: NEVER
HOW OFTEN DURING THE LAST YEAR HAVE YOU NEEDED AN ALCOHOLIC DRINK FIRST THING IN THE MORNING TO GET YOURSELF GOING AFTER A NIGHT OF HEAVY DRINKING: NEVER
HOW OFTEN DURING THE LAST YEAR HAVE YOU HAD A FEELING OF GUILT OR REMORSE AFTER DRINKING: NEVER
SKIP TO QUESTIONS 9-10: 0
HAVE YOU OR SOMEONE ELSE BEEN INJURED AS A RESULT OF YOUR DRINKING: NO

## 2024-04-22 ASSESSMENT — COGNITIVE AND FUNCTIONAL STATUS - GENERAL
DAILY ACTIVITIY SCORE: 24
MOBILITY SCORE: 24
MOBILITY SCORE: 24
PATIENT BASELINE BEDBOUND: NO
MOBILITY SCORE: 24

## 2024-04-22 ASSESSMENT — ACTIVITIES OF DAILY LIVING (ADL)
LACK_OF_TRANSPORTATION: NO
WALKS IN HOME: INDEPENDENT
JUDGMENT_ADEQUATE_SAFELY_COMPLETE_DAILY_ACTIVITIES: YES
GROOMING: INDEPENDENT
TOILETING: INDEPENDENT
BATHING: INDEPENDENT
HEARING - LEFT EAR: FUNCTIONAL
PATIENT'S MEMORY ADEQUATE TO SAFELY COMPLETE DAILY ACTIVITIES?: YES
ADEQUATE_TO_COMPLETE_ADL: YES
ASSISTIVE_DEVICE: EYEGLASSES
DRESSING YOURSELF: INDEPENDENT
HEARING - RIGHT EAR: FUNCTIONAL
FEEDING YOURSELF: INDEPENDENT

## 2024-04-22 ASSESSMENT — PAIN DESCRIPTION - LOCATION
LOCATION: HEAD

## 2024-04-22 ASSESSMENT — PAIN SCALES - GENERAL
PAINLEVEL_OUTOF10: 7
PAINLEVEL_OUTOF10: 2
PAINLEVEL_OUTOF10: 7
PAINLEVEL_OUTOF10: 4
PAINLEVEL_OUTOF10: 9
PAINLEVEL_OUTOF10: 4
PAINLEVEL_OUTOF10: 4
PAINLEVEL_OUTOF10: 3
PAINLEVEL_OUTOF10: 8
PAINLEVEL_OUTOF10: 5 - MODERATE PAIN
PAINLEVEL_OUTOF10: 3

## 2024-04-22 ASSESSMENT — PAIN - FUNCTIONAL ASSESSMENT
PAIN_FUNCTIONAL_ASSESSMENT: 0-10

## 2024-04-22 ASSESSMENT — PATIENT HEALTH QUESTIONNAIRE - PHQ9
2. FEELING DOWN, DEPRESSED OR HOPELESS: SEVERAL DAYS
SUM OF ALL RESPONSES TO PHQ9 QUESTIONS 1 & 2: 2
1. LITTLE INTEREST OR PLEASURE IN DOING THINGS: SEVERAL DAYS

## 2024-04-22 ASSESSMENT — PAIN DESCRIPTION - ORIENTATION: ORIENTATION: ANTERIOR;UPPER

## 2024-04-22 ASSESSMENT — PAIN SCALES - WONG BAKER: WONGBAKER_NUMERICALRESPONSE: NO HURT

## 2024-04-22 ASSESSMENT — PAIN DESCRIPTION - DESCRIPTORS
DESCRIPTORS: ACHING
DESCRIPTORS: THROBBING

## 2024-04-22 NOTE — H&P
History Of Present Illness  Sarahi Haley is a 38 y.o. female from home with a PMH of morbid obesity, asthma, HTN, Migraine headache, anxiety, depression, GERD, vitamin D deficiency, seizure disorder, panic attack, PTSD, who presented with acute asthma exacerbation.  Patient has a history of multiple hospital admissions due to acute asthma exacerbation for the past couple months.  Patient has been on breathing treatment at home and biologic meds.  She was at work when her symptoms started.  She got her inhaler and got a little bit of relief however when she was back home her symptoms started again and despite the breathing treatment and inhalers and steroid it got worse.  No history of intubation.  Patient denies chest pain, abdominal pain, nausea, vomiting, diarrhea, constipation, fever, chills, bleeding, tingling, numbness, sick contact    Pt is from home and ambulates independently   Social: none  PSH: Appendectomy, cholecystectomy, cystoscopy, ureteroscopy, shoulder surgery  FH: Heart disease, HTN, colon cancer, anxiety    ED course: /100, HR 85, RR 24, afebrile, O2 sat 97% on RA  Labs: pretty much unremarkable, D-dimer 933, covid and flu negative   CXR: clear  EKG: non ischemic   CTA chest negative for any acute findings     Pt was given solumedrol, magnesium, morphine, Zofran, IVF bolus and breathing treatment in the ER and was admitted for the mgmt of asthma exacerbation       Past Medical History  She has a past medical history of Acute cystitis without hematuria (08/08/2019), Asthma ([facility]), Encounter for initial prescription of contraceptive pills (11/01/2019), Nausea (02/05/2021), Other general symptoms and signs (12/05/2019), Parageusia (04/06/2020), Pelvic and perineal pain, Personal history of other diseases of the respiratory system (04/06/2020), Personal history of other specified conditions (07/01/2020), Personal history of other specified conditions (01/26/2021), Personal history of other  specified conditions (02/01/2021), Personal history of thrombophlebitis (06/03/2019), and Personal history of urinary (tract) infections (01/16/2020).    Surgical History  She has a past surgical history that includes Other surgical history (02/08/2019); Other surgical history (02/08/2019); Other surgical history (02/08/2019); Other surgical history (02/08/2019); and Other surgical history (02/08/2019).     Social History  She reports that she has never smoked. She has never used smokeless tobacco. She reports current alcohol use. She reports that she does not use drugs.    Family History  Family History   Problem Relation Name Age of Onset    Hypertension Father      Other (Pulmonary Valve Stenosis) Sister      Heart disease Maternal Grandmother      Diabetes Other Grandparent     Lung cancer Other Grandparent     Anxiety disorder Sibling          Allergies  Bee venom protein (honey bee), Diphenhydramine, Nsaids (non-steroidal anti-inflammatory drug), Procaine, and Ketorolac    Review of Systems    10 points ROS was negative except as mentioned per HPI     Physical Exam    General: Well-developed obese female, in no acute distress  HEENT: AT, NC, no JVD, no lymphadenopathy, neck supple  Lungs: Left-sided wheezing, no crackles  Cardiac: Normal S1-S2, no murmur, no gallop  Abdomen: Soft, nontender, no distention, positive bowel sound  Extremities: No deformity, no edema, pulses intact, ROM intact  Neurological: Alert awake oriented x3, sensation intact, clear speech       Last Recorded Vitals  /73   Pulse 76   Temp 36.7 °C (98.1 °F) (Temporal)   Resp 20   Wt 136 kg (300 lb)   SpO2 97%        Assessment/Plan   Principal Problem:    Asthma exacerbation attacks ([facility])      Sarahi Haley is a 38 y.o. female from home with a PMH of morbid obesity, asthma, HTN, Migraine headache, anxiety, depression, GERD, vitamin D deficiency, seizure disorder, panic attack, PTSD, who was admitted for the mgmt of acute  asthma exacerbation  Elevated D-dimer on arrival in the setting of asthma  CTA chest negative for PE    Oxygen therapy, Solu-Medrol, DuoNeb, breathing treatment, Tylenol, pain management all were considered  Regular diet started  Continue with home meds once reconciled  Patient follows up with a pulmonologist at [facility], should receive 2 sessions of immunotherapy  VTE prophylaxis: SCDs  Disposition: Home once hemodynamically stable    Criss Lawrence MD

## 2024-04-22 NOTE — ED PROVIDER NOTES
38-year-old female presents emergency department with chief complaint of shortness of breath.  She reports that the shortness of breath has been progressively worsening since yesterday evening.  She does admit to history of asthma and states this feels consistent with an exacerbation.  She reports she is on steroids at baseline 30 mg daily.  She reports she has been using her home aerosols without much relief.  No fevers, coughing, or congestion.  Denies any significant chest pain.  No abdominal pain, nausea, vomiting, dysuria, diarrhea, constipation, black or bloody stools.  No calf pain or swelling.  Denies history of DVT or PE. Chart review shows significant past medical history of asthma/reactive airway disease, thrombophlebitis, seizure, anxiety, elevated BMI, lymphedema, hypothyroidism, GERD, PTSD, and hypoxic respiratory failure.  No tobacco use, illicit drug use, or regular alcohol use.      History provided by:  Patient   used: No           ------------------------------------------------------------------------------------------------------------------------------------------    VS: As documented in the triage note and EMR flowsheet from this visit were reviewed.    Review of Systems  Constitutional: no fever, chills reports malaise  Eyes: no redness, discharge, pain  HENT: no sore throat, nose bleeds, congestion, rhinorrhea   Cardiovascular: no chest pain, leg edema, palpitations  Respiratory: Admits to shortness of breath and wheezing  GI: no nausea, diarrhea, pain, vomiting, constipation, BRBPR, melena  : no dysuria, frequency, hematuria  Musculoskeletal: no neck pain, stiffness,  no joint deformity, swelling  Skin: no rash, erythema, wounds  Neurological: no headache, dizziness, lightheadedness, weakness, numbness, or tingling  Psychiatric: no suicidal thoughts, confusion, agitation  Metabolic: no polyuria or polydipsia  Hematologic: no increased bleeding or bruising  Immunology:  No immunocompromise state    Critical access hospital  Nursing notes reviewed and confirmed by me.  Chart review performed including medications, allergies, and medical, surgical, and family history  Visit Vitals  /70   Pulse 87   Temp 36 °C (96.8 °F)   Resp 20     Physical Exam  Vitals and nursing note reviewed.   Constitutional:       General: She is in acute distress (Patient appears to be in mild respiratory distress with increased work of breathing.).      Appearance: She is not ill-appearing.   HENT:      Head: Normocephalic and atraumatic.      Right Ear: External ear normal.      Left Ear: External ear normal.      Nose: Nose normal. No congestion or rhinorrhea.      Mouth/Throat:      Mouth: Mucous membranes are moist.      Pharynx: No oropharyngeal exudate or posterior oropharyngeal erythema.   Eyes:      Extraocular Movements: Extraocular movements intact.      Conjunctiva/sclera: Conjunctivae normal.      Pupils: Pupils are equal, round, and reactive to light.   Cardiovascular:      Rate and Rhythm: Normal rate and regular rhythm.      Pulses: Normal pulses.      Heart sounds: Normal heart sounds.   Pulmonary:      Effort: Respiratory distress present.      Breath sounds: No stridor. Wheezing present. No rhonchi or rales.      Comments: Patient is in mild respiratory distress with tachypnea.  She has some conversational dyspnea.  Breath sounds are diminished bilaterally with expiratory wheezing.  Abdominal:      General: There is no distension.      Palpations: Abdomen is soft.      Tenderness: There is no abdominal tenderness. There is no guarding or rebound.   Musculoskeletal:         General: No swelling or deformity. Normal range of motion.      Cervical back: Normal range of motion and neck supple. No rigidity.      Comments: No calf tenderness   Patient lower extremity edema bilaterally.   Skin:     General: Skin is warm and dry.      Capillary Refill: Capillary refill takes less than 2 seconds.       Coloration: Skin is not jaundiced.      Findings: No rash.   Neurological:      General: No focal deficit present.      Mental Status: She is alert and oriented to person, place, and time.      Sensory: No sensory deficit.      Motor: No weakness.   Psychiatric:         Mood and Affect: Mood normal.         Behavior: Behavior normal.        Past Medical History:   Diagnosis Date    Acute cystitis without hematuria 08/08/2019    Acute cystitis without hematuria    Asthma ([facility])     Disease of thyroid gland     Encounter for initial prescription of contraceptive pills 11/01/2019    Encounter for initial prescription of contraceptive pills    Nausea 02/05/2021    Nausea in adult    Other general symptoms and signs 12/05/2019    Forgetfulness    Parageusia 04/06/2020    Taste perversion    Pelvic and perineal pain     Pelvic pain in female    Personal history of other diseases of the respiratory system 04/06/2020    History of sinusitis    Personal history of other specified conditions 07/01/2020    History of vomiting    Personal history of other specified conditions 01/26/2021    History of headache    Personal history of other specified conditions 02/01/2021    History of diarrhea    Personal history of thrombophlebitis 06/03/2019    History of phlebitis    Personal history of urinary (tract) infections 01/16/2020    History of urinary tract infection    PTSD (post-traumatic stress disorder)       Past Surgical History:   Procedure Laterality Date    OTHER SURGICAL HISTORY  02/08/2019    Appendectomy    OTHER SURGICAL HISTORY  02/08/2019    Cholecystectomy    OTHER SURGICAL HISTORY  02/08/2019    Cystoscopy    OTHER SURGICAL HISTORY  02/08/2019    Ureteroscopy    OTHER SURGICAL HISTORY  02/08/2019    Shoulder surgery      Social History     Socioeconomic History    Marital status:      Spouse name: None    Number of children: None    Years of education: None    Highest education level: None   Occupational  History    None   Tobacco Use    Smoking status: Never     Passive exposure: Current    Smokeless tobacco: Never   Vaping Use    Vaping status: Never Used   Substance and Sexual Activity    Alcohol use: Yes     Alcohol/week: 1.0 - 2.0 standard drink of alcohol     Types: 1 - 2 Glasses of wine per week     Comment: social    Drug use: Never    Sexual activity: Yes   Other Topics Concern    None   Social History Narrative    None     Social Determinants of Health     Financial Resource Strain: Low Risk  (4/22/2024)    Overall Financial Resource Strain (CARDIA)     Difficulty of Paying Living Expenses: Not hard at all   Food Insecurity: No Food Insecurity (4/22/2024)    Hunger Vital Sign     Worried About Running Out of Food in the Last Year: Never true     Ran Out of Food in the Last Year: Never true   Transportation Needs: No Transportation Needs (4/22/2024)    PRAPARE - Transportation     Lack of Transportation (Medical): No     Lack of Transportation (Non-Medical): No   Physical Activity: Insufficiently Active (4/22/2024)    Exercise Vital Sign     Days of Exercise per Week: 1 day     Minutes of Exercise per Session: 30 min   Stress: No Stress Concern Present (4/22/2024)    [employer] - Occupational Stress Questionnaire     Feeling of Stress : Not at all   Social Connections: Moderately Integrated (4/22/2024)    Social Connection and Isolation Panel [NHANES]     Frequency of Communication with Friends and Family: More than three times a week     Frequency of Social Gatherings with Friends and Family: More than three times a week     Attends Taoism Services: Never     Active Member of Clubs or Organizations: Yes     Attends Club or Organization Meetings: More than 4 times per year     Marital Status:    Intimate Partner Violence: Not At Risk (4/22/2024)    Humiliation, Afraid, Rape, and Kick questionnaire     Fear of Current or Ex-Partner: No     Emotionally Abused: No      Physically Abused: No     Sexually Abused: No   Housing Stability: Low Risk  (4/22/2024)    Housing Stability Vital Sign     Unable to Pay for Housing in the Last Year: No     Number of Places Lived in the Last Year: 1     Unstable Housing in the Last Year: No      ------------------------------------------------------------------------------------------------------------------------------------------  CT angio chest for pulmonary embolism   Final Result   No evidence of acute pulmonary embolism.        No evidence of pneumonia.        MACRO:   None        Signed by: Salbador Urbina 4/22/2024 12:35 AM   Dictation workstation:   [id number]      XR chest 1 view   Final Result   1.  No evidence of acute cardiopulmonary process.             Signed by: Scott Yu 4/21/2024 11:04 PM   Dictation workstation:   [id number]         Labs Reviewed   D-DIMER, VTE EXCLUSION - Abnormal       Result Value    D-Dimer, Quantitative VTE Exclusion 933 (*)     Narrative:     The VTE Exclusion D-Dimer assay is reported in ng/mL Fibrinogen Equivalent Units (FEU).    Per 's instructions for use, a value of less than 500 ng/mL (FEU) may help to exclude DVT or PE in outpatients when the assay is used with a clinical pretest probability assessment.(AE must utilize and document eCalc 'Wells Score Deep Vein Thrombosis Risk' for DVT exclusion only. Emergency Department should utilize  Guidelines for Emergency Department Use of the VTE Exclusion D-Dimer and Clinical Pretest probability assessment model for DVT or PE exclusion.)   COMPREHENSIVE METABOLIC PANEL - Normal    Glucose 88      Sodium 137      Potassium 3.6      Chloride 106      Bicarbonate 23      Anion Gap 12      Urea Nitrogen 9      Creatinine 0.80      eGFR >90      Calcium 8.9      Albumin 4.1      Alkaline Phosphatase 82      Total Protein 6.7      AST 27      Bilirubin, Total 0.9      ALT 37     B-TYPE NATRIURETIC PEPTIDE - Normal    BNP 42      Narrative:         <100 pg/mL - Heart failure unlikely  100-299 pg/mL - Intermediate probability of acute heart                  failure exacerbation. Correlate with clinical                  context and patient history.    >=300 pg/mL - Heart Failure likely. Correlate with clinical                  context and patient history.    BNP testing is performed using different testing methodology at [facility] than at other [facility]. Direct result comparisons should only be made within the same method.      SARS-COV-2 AND INFLUENZA A/B PCR - Normal    Flu A Result Not Detected      Flu B Result Not Detected      Coronavirus 2019, PCR Not Detected      Narrative:     This assay has received FDA Emergency Use Authorization (EUA) and  is only authorized for the duration of time that circumstances exist to justify the authorization of the emergency use of in vitro diagnostic tests for the detection of SARS-CoV-2 virus and/or diagnosis of COVID-19 infection under section 564(b)(1) of the Act, 21 U.S.C. 360bbb-3(b)(1). Testing for SARS-CoV-2 is only recommended for patients who meet current clinical and/or epidemiological criteria as defined by federal, state, or local public health directives. This assay is an in vitro diagnostic nucleic acid amplification test for the qualitative detection of SARS-CoV-2, Influenza A, and Influenza B from nasopharyngeal specimens and has been validated for use at [facility]. Negative results do not preclude COVID-19 infections or Influenza A/B infections, and should not be used as the sole basis for diagnosis, treatment, or other management decisions. If Influenza A/B and RSV PCR results are negative, testing for Parainfluenza virus, Adenovirus and Metapneumovirus is routinely performed for [facility] pediatric oncology and intensive care inpatients, and is available on other patients by placing an add-on request.    HUMAN CHORIONIC GONADOTROPIN, SERUM QUANTITATIVE -  Normal    HCG, Beta-Quantitative <2      Narrative:      Total HCG measurement is performed using the [employer] Access   Immunoassay which detects intact HCG and free beta HCG subunit.    This test is not indicated for use as a tumor marker.   HCG testing is performed using a different test methodology at [facility] than other [facility]. Direct result comparison   should only be made within the same method.       SERIAL TROPONIN-INITIAL - Normal    Troponin I, High Sensitivity <3      Narrative:     Less than 99th percentile of normal range cutoff-  Female and children under 18 years old <14 ng/L; Male <21 ng/L: Negative  Repeat testing should be performed if clinically indicated.     Female and children under 18 years old 14-50 ng/L; Male 21-50 ng/L:  Consistent with possible cardiac damage and possible increased clinical   risk. Serial measurements may help to assess extent of myocardial damage.     >50 ng/L: Consistent with cardiac damage, increased clinical risk and  myocardial infarction. Serial measurements may help assess extent of   myocardial damage.      NOTE: Children less than 1 year old may have higher baseline troponin   levels and results should be interpreted in conjunction with the overall   clinical context.     NOTE: Troponin I testing is performed using a different   testing methodology at [facility] than at other   [facility]. Direct result comparisons should only   be made within the same method.   SERIAL TROPONIN, 1 HOUR - Normal    Troponin I, High Sensitivity <3      Narrative:     Less than 99th percentile of normal range cutoff-  Female and children under 18 years old <14 ng/L; Male <21 ng/L: Negative  Repeat testing should be performed if clinically indicated.     Female and children under 18 years old 14-50 ng/L; Male 21-50 ng/L:  Consistent with possible cardiac damage and possible increased clinical   risk. Serial measurements may help  to assess extent of myocardial damage.     >50 ng/L: Consistent with cardiac damage, increased clinical risk and  myocardial infarction. Serial measurements may help assess extent of   myocardial damage.      NOTE: Children less than 1 year old may have higher baseline troponin   levels and results should be interpreted in conjunction with the overall   clinical context.     NOTE: Troponin I testing is performed using a different   testing methodology at [facility] than at other   [facility]. Direct result comparisons should only   be made within the same method.   TROPONIN SERIES- (INITIAL, 1 HR)    Narrative:     The following orders were created for panel order Troponin I Series, High Sensitivity (0, 1 HR).  Procedure                               Abnormality         Status                     ---------                               -----------         ------                     Troponin I, High Sensiti...[[id number]]  Normal              Final result               Troponin, High Sensitivi...[[id number]]  Normal              Final result                 Please view results for these tests on the individual orders.   CBC WITH AUTO DIFFERENTIAL    WBC 6.3      nRBC 0.0      RBC 4.77      Hemoglobin 14.2      Hematocrit 40.1      MCV 84      MCH 29.8      MCHC 35.4      RDW 12.4      Platelets 357      Neutrophils % 56.4      Immature Granulocytes %, Automated 0.3      Lymphocytes % 34.3      Monocytes % 8.7      Eosinophils % 0.0      Basophils % 0.3      Neutrophils Absolute 3.55      Immature Granulocytes Absolute, Automated 0.02      Lymphocytes Absolute 2.16      Monocytes Absolute 0.55      Eosinophils Absolute 0.00      Basophils Absolute 0.02     CBC   BASIC METABOLIC PANEL        Medical Decision Making  EKG interpreted by ED physician: Normal sinus rhythm rate of 79.  TN, QRS, QTc intervals all within normal limits.  No significant ST elevations or depressions.  No significant Q waves.   Poor R wave progression.  Nonspecific T wave inversions in inferior and lateral leads.    38-year-old female presents emergency department with chief complaint of shortness of breath and asthma exacerbation.  On my exam initially she is in mild respiratory distress with increased work of breathing diminished breath sounds bilaterally and wheezing.  Patient is given multiple aerosol treatments, magnesium, and Solu-Medrol.  Repeat lung exam is slightly improved for wheezing, but continues to be somewhat diminished.  She does develop a headache and nausea which she states she usually does with the treatments.  She states that this headache is consistent with a usual headache for her.  She has no focal neurologic deficits.  Patient treated with morphine and Zofran for the headache.  Flu and COVID testing are negative.  Cardiac enzymes x 2 and EKG showed no findings of acute ACS.  CBC does not show significant leukocytosis or anemia.  CMP shows no significant metabolic abnormalities.  Pregnancy is negative.  BNP is within normal range I do not suspect heart failure.  D-dimer is significantly elevated, but subsequent CT PE study is negative for pulmonary embolus or pneumonia.  Chest x-ray also does not show acute cardiopulmonary process such as pneumonia, pleural effusion, or pulmonary edema.  Patient is observed in the department after treatments and she states that she still is not feeling much better.  She reports was not comfortable returning home.  Therefore, we will admit patient for further treatment of asthma exacerbation.  Case discussed with hospitalist on-call.       Diagnoses as of 04/22/24 0457   Exacerbation of asthma, unspecified asthma severity, unspecified whether persistent ([facility])      1. Exacerbation of asthma, unspecified asthma severity, unspecified whether persistent ([facility])           Procedures     This note was dictated using dragon software and may contain errors related to dictation  interpretation errors.      Edson Mejia,   04/22/24 0459

## 2024-04-22 NOTE — CARE PLAN
The patient's goals for the shift include      The clinical goals for the shift include patient will report decrease on SOB.

## 2024-04-22 NOTE — PROGRESS NOTES
"PROGRESS NOTE    ASSESSMENT AND PLAN:     Acute asthma exacerbation  -Presented to the emergency room with worsening shortness of breath  -Known history of multiple asthma exacerbation  -Continue Solu-Medrol, neb treatments.    Morbid obesity    History of migraine  -Continue home medications    4.  Hypothyroidism  -Continue Synthroid        SUBJECTIVE:   Admit Date: 4/21/2024    Interval History: Feels okay, has no active complaints.  -      OBJECTIVE:   Vitals: /67   Pulse 85   Temp 36.5 °C (97.7 °F)   Resp 20   Ht 1.651 m (5' 5\")   Wt 143 kg (315 lb 4.1 oz)   SpO2 92%   BMI 52.46 kg/m²    Wt Readings from Last 3 Encounters:   04/22/24 143 kg (315 lb 4.1 oz)   04/03/24 138 kg (305 lb)   04/02/24 143 kg (315 lb)      24HR INTAKE/OUTPUT:    Intake/Output Summary (Last 24 hours) at 4/22/2024 1505  Last data filed at 4/22/2024 0906  Gross per 24 hour   Intake 50 ml   Output --   Net 50 ml       PHYSICAL EXAM:   GENERAL: Laying in bed, does not appear to be in any distress.   HEENT: HEAD: Normocephalic atraumatic.  Neck: Supple.  Eyes: Pupils are reactive to direct light.   CVS: S1, S2 heard. Regular rate and rhythm  LUNGS: Clear to auscultate bilaterally. No wheezing or rhonchi appreciated.  ABDOMEN: Soft, nontender to palpate. Positive bowel sounds. No guarding or rebound appreciated.  NEUROLOGICAL: No focal neurological deficits appreciated. Cranial nerves are grossly intact.  EXTREMITIES: No edema appreciated.  SKIN:  Grossly intact, warm and dry.      LABS/IMAGING AND MEDICATIONS:   Scheduled Meds:aspirin, 81 mg, oral, Daily  budesonide, 0.5 mg, nebulization, BID  desvenlafaxine, 100 mg, oral, Daily  tiotropium, 2 puff, inhalation, Daily   And  fluticasone furoate-vilanteroL, 1 puff, inhalation, Daily  ipratropium-albuteroL, 3 mL, nebulization, q6h  levothyroxine, 100 mcg, oral, Daily  loratadine, 10 mg, oral, Daily  methylPREDNISolone sodium succinate (PF), 40 mg, intravenous, q12h      PRN Meds:PRN " "medications: acetaminophen **OR** acetaminophen, albuterol, hydrOXYzine HCL, metoclopramide, oxyCODONE-acetaminophen, oxygen, SUMAtriptan    No lab exists for component: \"CBC\"   No lab exists for component: \"CMP\"   No lab exists for component: \"TROPONIN\"  Results from last 7 days   Lab Units 04/21/24  2300   BNP pg/mL 42         No lab exists for component: \"LIPIDS\"       No lab exists for component: \"URINALYSIS\"          BMP:  Results from last 7 days   Lab Units 04/22/24  0547 04/21/24  2300   SODIUM mmol/L 134* 137   POTASSIUM mmol/L 3.4* 3.6   CHLORIDE mmol/L 104 106   CO2 mmol/L 21 23   BUN mg/dL 11 9   CREATININE mg/dL 0.87 0.80       CBC:  Results from last 7 days   Lab Units 04/22/24  0547 04/21/24  2300   WBC AUTO x10*3/uL 6.7 6.3   RBC AUTO x10*6/uL 4.62 4.77   HEMOGLOBIN g/dL 13.7 14.2   HEMATOCRIT % 39.0 40.1   MCV fL 84 84   MCH pg 29.7 29.8   MCHC g/dL 35.1 35.4   RDW % 12.4 12.4   PLATELETS AUTO x10*3/uL 331 357       Cardiac Enzymes:   Results from last 7 days   Lab Units 04/22/24  0034 04/21/24  2300   TROPHS ng/L <3 <3         Hepatic Function Panel:  Results from last 7 days   Lab Units 04/21/24  2300   ALK PHOS U/L 82   ALT U/L 37   AST U/L 27   PROTEIN TOTAL g/dL 6.7   BILIRUBIN TOTAL mg/dL 0.9       Magnesium:        Pro-BNP:  No results found for: \"PROBNP\"    INR:        TSH:  No results found for: \"TSH\"    Lipid Profile:        No lab exists for component: \"LABVLDL\"    HgbA1C:        Lactate Level:  No lab exists for component: \"LACTA\"    CMP:  Results from last 7 days   Lab Units 04/22/24  0547 04/21/24  2300   SODIUM mmol/L 134* 137   POTASSIUM mmol/L 3.4* 3.6   CHLORIDE mmol/L 104 106   CO2 mmol/L 21 23   BUN mg/dL 11 9   CREATININE mg/dL 0.87 0.80   GLUCOSE mg/dL 207* 88   CALCIUM mg/dL 8.8 8.9   PROTEIN TOTAL g/dL  --  6.7   BILIRUBIN TOTAL mg/dL  --  0.9   ALK PHOS U/L  --  82   AST U/L  --  27   ALT U/L  --  37     "

## 2024-04-22 NOTE — PROGRESS NOTES
04/22/24 1618   Discharge Planning   Living Arrangements Alone   Support Systems Family members   Assistance Needed independent drives, works   Type of Residence Private residence;Other (Comment)  (ranch style)   Number of Stairs to Enter Residence 3   Do you have animals or pets at home? No   Patient expects to be discharged to: home     Pt has a home nebulizer. Follows w/ dr reddy as pcp. Works, drives. Plan is home.

## 2024-04-23 VITALS
RESPIRATION RATE: 15 BRPM | WEIGHT: 293 LBS | TEMPERATURE: 98.6 F | BODY MASS INDEX: 48.82 KG/M2 | SYSTOLIC BLOOD PRESSURE: 133 MMHG | DIASTOLIC BLOOD PRESSURE: 60 MMHG | HEIGHT: 65 IN | OXYGEN SATURATION: 96 % | HEART RATE: 67 BPM

## 2024-04-23 LAB
ANION GAP SERPL CALC-SCNC: 11 MMOL/L (ref 10–20)
BUN SERPL-MCNC: 15 MG/DL (ref 6–23)
CALCIUM SERPL-MCNC: 8.9 MG/DL (ref 8.6–10.3)
CHLORIDE SERPL-SCNC: 105 MMOL/L (ref 98–107)
CO2 SERPL-SCNC: 23 MMOL/L (ref 21–32)
CREAT SERPL-MCNC: 0.94 MG/DL (ref 0.5–1.05)
EGFRCR SERPLBLD CKD-EPI 2021: 81 ML/MIN/1.73M*2
ERYTHROCYTE [DISTWIDTH] IN BLOOD BY AUTOMATED COUNT: 12.7 % (ref 11.5–14.5)
GLUCOSE SERPL-MCNC: 148 MG/DL (ref 74–99)
HCT VFR BLD AUTO: 37.8 % (ref 36–46)
HGB BLD-MCNC: 12.9 G/DL (ref 12–16)
HOLD SPECIMEN: NORMAL
MAGNESIUM SERPL-MCNC: 2.08 MG/DL (ref 1.6–2.4)
MCH RBC QN AUTO: 29.7 PG (ref 26–34)
MCHC RBC AUTO-ENTMCNC: 34.1 G/DL (ref 32–36)
MCV RBC AUTO: 87 FL (ref 80–100)
NRBC BLD-RTO: 0 /100 WBCS (ref 0–0)
PLATELET # BLD AUTO: 354 X10*3/UL (ref 150–450)
POTASSIUM SERPL-SCNC: 4.2 MMOL/L (ref 3.5–5.3)
RBC # BLD AUTO: 4.34 X10*6/UL (ref 4–5.2)
SODIUM SERPL-SCNC: 135 MMOL/L (ref 136–145)
WBC # BLD AUTO: 19.2 X10*3/UL (ref 4.4–11.3)

## 2024-04-23 PROCEDURE — 2500000001 HC RX 250 WO HCPCS SELF ADMINISTERED DRUGS (ALT 637 FOR MEDICARE OP): Performed by: STUDENT IN AN ORGANIZED HEALTH CARE EDUCATION/TRAINING PROGRAM

## 2024-04-23 PROCEDURE — G0378 HOSPITAL OBSERVATION PER HR: HCPCS

## 2024-04-23 PROCEDURE — 2500000002 HC RX 250 W HCPCS SELF ADMINISTERED DRUGS (ALT 637 FOR MEDICARE OP, ALT 636 FOR OP/ED): Performed by: STUDENT IN AN ORGANIZED HEALTH CARE EDUCATION/TRAINING PROGRAM

## 2024-04-23 PROCEDURE — 80048 BASIC METABOLIC PNL TOTAL CA: CPT | Performed by: HOSPITALIST

## 2024-04-23 PROCEDURE — 99239 HOSP IP/OBS DSCHRG MGMT >30: CPT | Performed by: HOSPITALIST

## 2024-04-23 PROCEDURE — 36415 COLL VENOUS BLD VENIPUNCTURE: CPT | Performed by: HOSPITALIST

## 2024-04-23 PROCEDURE — 83735 ASSAY OF MAGNESIUM: CPT | Performed by: HOSPITALIST

## 2024-04-23 PROCEDURE — 94640 AIRWAY INHALATION TREATMENT: CPT

## 2024-04-23 PROCEDURE — 2500000004 HC RX 250 GENERAL PHARMACY W/ HCPCS (ALT 636 FOR OP/ED): Performed by: STUDENT IN AN ORGANIZED HEALTH CARE EDUCATION/TRAINING PROGRAM

## 2024-04-23 PROCEDURE — 85027 COMPLETE CBC AUTOMATED: CPT | Performed by: HOSPITALIST

## 2024-04-23 PROCEDURE — 96376 TX/PRO/DX INJ SAME DRUG ADON: CPT

## 2024-04-23 RX ORDER — PREDNISONE 10 MG/1
TABLET ORAL DAILY
Qty: 30 TABLET | Refills: 0 | Status: SHIPPED | OUTPATIENT
Start: 2024-04-23 | End: 2024-05-03

## 2024-04-23 RX ADMIN — LORATADINE 10 MG: 10 TABLET ORAL at 08:51

## 2024-04-23 RX ADMIN — DESVENLAFAXINE 100 MG: 50 TABLET, FILM COATED, EXTENDED RELEASE ORAL at 08:51

## 2024-04-23 RX ADMIN — IPRATROPIUM BROMIDE AND ALBUTEROL SULFATE 3 ML: 2.5; .5 SOLUTION RESPIRATORY (INHALATION) at 08:24

## 2024-04-23 RX ADMIN — METOCLOPRAMIDE 10 MG: 10 TABLET ORAL at 05:21

## 2024-04-23 RX ADMIN — METHYLPREDNISOLONE SODIUM SUCCINATE 40 MG: 40 INJECTION, POWDER, FOR SOLUTION INTRAMUSCULAR; INTRAVENOUS at 11:39

## 2024-04-23 RX ADMIN — OXYCODONE HYDROCHLORIDE AND ACETAMINOPHEN 1 TABLET: 5; 325 TABLET ORAL at 06:23

## 2024-04-23 RX ADMIN — ASPIRIN 81 MG: 81 TABLET, CHEWABLE ORAL at 08:51

## 2024-04-23 RX ADMIN — LEVOTHYROXINE SODIUM 100 MCG: 0.1 TABLET ORAL at 05:21

## 2024-04-23 RX ADMIN — TIOTROPIUM BROMIDE INHALATION SPRAY 2 PUFF: 3.12 SPRAY, METERED RESPIRATORY (INHALATION) at 06:23

## 2024-04-23 RX ADMIN — FLUTICASONE FUROATE AND VILANTEROL TRIFENATATE 1 PUFF: 100; 25 POWDER RESPIRATORY (INHALATION) at 06:23

## 2024-04-23 RX ADMIN — BUDESONIDE INHALATION 0.5 MG: 0.5 SUSPENSION RESPIRATORY (INHALATION) at 08:25

## 2024-04-23 RX ADMIN — SUMATRIPTAN SUCCINATE 100 MG: 25 TABLET ORAL at 05:21

## 2024-04-23 RX ADMIN — ACETAMINOPHEN 650 MG: 325 TABLET ORAL at 04:13

## 2024-04-23 RX ADMIN — IPRATROPIUM BROMIDE AND ALBUTEROL SULFATE 3 ML: 2.5; .5 SOLUTION RESPIRATORY (INHALATION) at 01:34

## 2024-04-23 ASSESSMENT — PAIN SCALES - GENERAL
PAINLEVEL_OUTOF10: 9
PAINLEVEL_OUTOF10: 4
PAINLEVEL_OUTOF10: 9
PAINLEVEL_OUTOF10: 9
PAINLEVEL_OUTOF10: 0 - NO PAIN
PAINLEVEL_OUTOF10: 0 - NO PAIN

## 2024-04-23 ASSESSMENT — PAIN - FUNCTIONAL ASSESSMENT
PAIN_FUNCTIONAL_ASSESSMENT: 0-10

## 2024-04-23 ASSESSMENT — PAIN DESCRIPTION - LOCATION
LOCATION: HEAD
LOCATION: HEAD

## 2024-04-23 NOTE — CARE PLAN
The patient's goals for the shift include      The clinical goals for the shift include Pt will report decrease in SOB by end of this shift

## 2024-04-23 NOTE — PROGRESS NOTES
Met w/ pt. Confirmed demo's ins and pcp. Pt has home nebulizer. Lives alone. Parents live close by. Pd home no needs .

## 2024-04-23 NOTE — DISCHARGE SUMMARY
DISCHARGE DIAGNOSIS     Asthma exacerbation  Morbid obesity  History of migraine  Hypothyroidism    HOSPITAL COURSE AND DETAILS     darvin Haley is a 38 y.o. female from home with a PMH of morbid obesity, asthma, HTN, Migraine headache, anxiety, depression, GERD, vitamin D deficiency, seizure disorder, panic attack, PTSD, who presented with acute asthma exacerbation.     She has a history of multiple asthma exacerbations.  On admission, patient was started on Solu-Medrol, nebulizer treatment.  She remained stable, she was monitored for over 24 hours, on discharge saturating well on room air.  Was discharged on a prednisone taper.  Advised outpatient follow-up with her primary care    * Some of these notes were completed using Dragon voice recognition technology and may include unintended areas with respect to translation of word, typographical errors or primary areas which may not have been identified prior to finalization of the document.          DISCHARGE PHYSICAL EXAM     Last Recorded Vitals:  Vitals:    04/23/24 0417 04/23/24 0732 04/23/24 0825 04/23/24 1130   BP: 180/80 138/70  133/60   Patient Position:       Pulse: 68 63  67   Resp:  15     Temp: 36.2 °C (97.2 °F) 36.4 °C (97.5 °F)  37 °C (98.6 °F)   TempSrc:       SpO2: 95% 95% 97% 96%   Weight:       Height:           Physical Exam  PHYSICAL EXAM:   GENERAL: Laying in bed, does not appear to be in any distress.   HEENT: HEAD: Normocephalic atraumatic.  Neck: Supple.  Eyes: Pupils are reactive to direct light.   CVS: S1, S2 heard. Regular rate and rhythm  LUNGS: Clear to auscultate bilaterally. No wheezing or rhonchi appreciated.  ABDOMEN: Soft, nontender to palpate. Positive bowel sounds. No guarding or rebound appreciated.  NEUROLOGICAL: No focal neurological deficits appreciated. Cranial nerves are grossly intact.  EXTREMITIES: Dorsalis pedis pulses can be appreciated.   SKIN:  Grossly intact, warm and dry.    DISCHARGE MEDICATIONS        Your  medication list        CHANGE how you take these medications        Instructions Last Dose Given Next Dose Due   predniSONE 10 mg tablet  Commonly known as: Deltasone  What changed: Another medication with the same name was added. Make sure you understand how and when to take each.           predniSONE 10 mg tablet  Commonly known as: Deltasone  Start taking on: April 23, 2024  What changed: You were already taking a medication with the same name, and this prescription was added. Make sure you understand how and when to take each.      Take 5 tablets (50 mg) by mouth once daily for 2 days, THEN 4 tablets (40 mg) once daily for 2 days, THEN 3 tablets (30 mg) once daily for 2 days, THEN 2 tablets (20 mg) once daily for 2 days, THEN 1 tablet (10 mg) once daily for 2 days.              CONTINUE taking these medications        Instructions Last Dose Given Next Dose Due   albuterol 2.5 mg /3 mL (0.083 %) nebulizer solution           aspirin 81 mg chewable tablet           budesonide 0.5 mg/2 mL nebulizer solution  Commonly known as: Pulmicort           butalbital-acetaminophen-caff -40 mg tablet      Take 1 tablet by mouth early in the morning.. 1 tab(s) orally once a day, As Needed       desvenlafaxine 50 mg 24 hr tablet  Commonly known as: Pristiq           desvenlafaxine 100 mg 24 hr tablet  Commonly known as: Pristiq      TAKE 1 TABLET BY MOUTH EVERY DAY       Emgality Syringe 120 mg/mL prefilled syringe  Generic drug: galcanezumab           fexofenadine 180 mg tablet  Commonly known as: Allegra           hydrOXYzine HCL 25 mg tablet  Commonly known as: Atarax      Take 1 tablet (25 mg) by mouth 3 times a day as needed for anxiety.       levothyroxine 100 mcg tablet  Commonly known as: Synthroid, Levoxyl           metoclopramide 10 mg tablet  Commonly known as: Reglan           montelukast 10 mg tablet  Commonly known as: Singulair      Take 1 tablet (10 mg) by mouth once daily at bedtime.        multivitamin-Ca-iron-minerals 27-0.4 mg tablet  Commonly known as: Tab-A-Vu Womens           rizatriptan 10 mg tablet  Commonly known as: Maxalt           SUMAtriptan 100 mg tablet  Commonly known as: Imitrex           Trelegy Ellipta 100-62.5-25 mcg blister with device  Generic drug: fluticasone-umeclidin-vilanter                  ASK your doctor about these medications        Instructions Last Dose Given Next Dose Due   EPINEPHrine 0.3 mg/0.3 mL injection syringe  Commonly known as: Epipen      Inject 0.3 mL (0.3 mg) into the muscle once daily as needed for anaphylaxis.                 Where to Get Your Medications        These medications were sent to "[id number] - [place], [place] - [address]  [address], [place] [address]      Phone: [phone]   predniSONE 10 mg tablet           OUTPATIENT FOLLOW-UP     Future Appointments   Date Time Provider Department Center   5/1/2024 10:20 AM Hilary Trinidad DO [id number] [place]   5/22/2024  4:30 PM Paul Matthew DO [id number] [place]

## 2024-04-23 NOTE — CARE PLAN
The patient's goals for the shift include  decreased work of breathing.    The clinical goals for the shift include Pt will report decrease in SOB by end of this shift

## 2024-04-24 LAB
ATRIAL RATE: 79 BPM
P AXIS: 39 DEGREES
P OFFSET: 186 MS
P ONSET: 134 MS
PR INTERVAL: 170 MS
Q ONSET: 219 MS
QRS COUNT: 13 BEATS
QRS DURATION: 84 MS
QT INTERVAL: 390 MS
QTC CALCULATION(BAZETT): 447 MS
QTC FREDERICIA: 427 MS
R AXIS: -5 DEGREES
T AXIS: 14 DEGREES
T OFFSET: 414 MS
VENTRICULAR RATE: 79 BPM

## 2024-04-28 ENCOUNTER — APPOINTMENT (OUTPATIENT)
Dept: RADIOLOGY | Facility: HOSPITAL | Age: 39
End: 2024-04-28
Payer: COMMERCIAL

## 2024-04-28 ENCOUNTER — HOSPITAL ENCOUNTER (OUTPATIENT)
Facility: HOSPITAL | Age: 39
Setting detail: OBSERVATION
Discharge: HOME | End: 2024-04-28
Attending: STUDENT IN AN ORGANIZED HEALTH CARE EDUCATION/TRAINING PROGRAM | Admitting: STUDENT IN AN ORGANIZED HEALTH CARE EDUCATION/TRAINING PROGRAM
Payer: COMMERCIAL

## 2024-04-28 VITALS
HEIGHT: 63 IN | DIASTOLIC BLOOD PRESSURE: 69 MMHG | BODY MASS INDEX: 51.91 KG/M2 | RESPIRATION RATE: 20 BRPM | SYSTOLIC BLOOD PRESSURE: 138 MMHG | TEMPERATURE: 96.3 F | WEIGHT: 293 LBS | HEART RATE: 86 BPM | OXYGEN SATURATION: 95 %

## 2024-04-28 DIAGNOSIS — J45.901 EXACERBATION OF ASTHMA, UNSPECIFIED ASTHMA SEVERITY, UNSPECIFIED WHETHER PERSISTENT (HHS-HCC): Primary | ICD-10-CM

## 2024-04-28 LAB
ANION GAP SERPL CALC-SCNC: 12 MMOL/L (ref 10–20)
BASE EXCESS BLDV CALC-SCNC: -0.7 MMOL/L (ref -2–3)
BASOPHILS # BLD AUTO: 0.04 X10*3/UL (ref 0–0.1)
BASOPHILS NFR BLD AUTO: 0.4 %
BODY TEMPERATURE: ABNORMAL
BUN SERPL-MCNC: 13 MG/DL (ref 6–23)
CALCIUM SERPL-MCNC: 9.2 MG/DL (ref 8.6–10.3)
CHLORIDE SERPL-SCNC: 102 MMOL/L (ref 98–107)
CO2 SERPL-SCNC: 24 MMOL/L (ref 21–32)
CREAT SERPL-MCNC: 0.93 MG/DL (ref 0.5–1.05)
EGFRCR SERPLBLD CKD-EPI 2021: 82 ML/MIN/1.73M*2
EOSINOPHIL # BLD AUTO: 0 X10*3/UL (ref 0–0.7)
EOSINOPHIL NFR BLD AUTO: 0 %
ERYTHROCYTE [DISTWIDTH] IN BLOOD BY AUTOMATED COUNT: 12.2 % (ref 11.5–14.5)
GLUCOSE BLD MANUAL STRIP-MCNC: 183 MG/DL (ref 74–99)
GLUCOSE SERPL-MCNC: 103 MG/DL (ref 74–99)
HCO3 BLDV-SCNC: 23.4 MMOL/L (ref 22–26)
HCT VFR BLD AUTO: 44.5 % (ref 36–46)
HGB BLD-MCNC: 15.8 G/DL (ref 12–16)
IMM GRANULOCYTES # BLD AUTO: 0.12 X10*3/UL (ref 0–0.7)
IMM GRANULOCYTES NFR BLD AUTO: 1.1 % (ref 0–0.9)
INHALED O2 CONCENTRATION: 21 %
LYMPHOCYTES # BLD AUTO: 3.41 X10*3/UL (ref 1.2–4.8)
LYMPHOCYTES NFR BLD AUTO: 30.6 %
MCH RBC QN AUTO: 29.6 PG (ref 26–34)
MCHC RBC AUTO-ENTMCNC: 35.5 G/DL (ref 32–36)
MCV RBC AUTO: 84 FL (ref 80–100)
MONOCYTES # BLD AUTO: 0.91 X10*3/UL (ref 0.1–1)
MONOCYTES NFR BLD AUTO: 8.2 %
NEUTROPHILS # BLD AUTO: 6.68 X10*3/UL (ref 1.2–7.7)
NEUTROPHILS NFR BLD AUTO: 59.7 %
NRBC BLD-RTO: 0 /100 WBCS (ref 0–0)
OXYHGB MFR BLDV: 87.3 % (ref 45–75)
PCO2 BLDV: 36 MM HG (ref 41–51)
PH BLDV: 7.42 PH (ref 7.33–7.43)
PLATELET # BLD AUTO: 440 X10*3/UL (ref 150–450)
PO2 BLDV: 55 MM HG (ref 35–45)
POTASSIUM SERPL-SCNC: 4.2 MMOL/L (ref 3.5–5.3)
RBC # BLD AUTO: 5.33 X10*6/UL (ref 4–5.2)
SAO2 % BLDV: 89 % (ref 45–75)
SODIUM SERPL-SCNC: 134 MMOL/L (ref 136–145)
WBC # BLD AUTO: 11.2 X10*3/UL (ref 4.4–11.3)

## 2024-04-28 PROCEDURE — 2500000002 HC RX 250 W HCPCS SELF ADMINISTERED DRUGS (ALT 637 FOR MEDICARE OP, ALT 636 FOR OP/ED)

## 2024-04-28 PROCEDURE — 85025 COMPLETE CBC W/AUTO DIFF WBC: CPT | Performed by: STUDENT IN AN ORGANIZED HEALTH CARE EDUCATION/TRAINING PROGRAM

## 2024-04-28 PROCEDURE — 2500000004 HC RX 250 GENERAL PHARMACY W/ HCPCS (ALT 636 FOR OP/ED)

## 2024-04-28 PROCEDURE — 80048 BASIC METABOLIC PNL TOTAL CA: CPT | Performed by: STUDENT IN AN ORGANIZED HEALTH CARE EDUCATION/TRAINING PROGRAM

## 2024-04-28 PROCEDURE — 96361 HYDRATE IV INFUSION ADD-ON: CPT

## 2024-04-28 PROCEDURE — 96375 TX/PRO/DX INJ NEW DRUG ADDON: CPT

## 2024-04-28 PROCEDURE — 94640 AIRWAY INHALATION TREATMENT: CPT

## 2024-04-28 PROCEDURE — 82810 BLOOD GASES O2 SAT ONLY: CPT | Performed by: STUDENT IN AN ORGANIZED HEALTH CARE EDUCATION/TRAINING PROGRAM

## 2024-04-28 PROCEDURE — 71045 X-RAY EXAM CHEST 1 VIEW: CPT

## 2024-04-28 PROCEDURE — 36415 COLL VENOUS BLD VENIPUNCTURE: CPT | Performed by: STUDENT IN AN ORGANIZED HEALTH CARE EDUCATION/TRAINING PROGRAM

## 2024-04-28 PROCEDURE — 96372 THER/PROPH/DIAG INJ SC/IM: CPT | Performed by: STUDENT IN AN ORGANIZED HEALTH CARE EDUCATION/TRAINING PROGRAM

## 2024-04-28 PROCEDURE — 2500000004 HC RX 250 GENERAL PHARMACY W/ HCPCS (ALT 636 FOR OP/ED): Performed by: STUDENT IN AN ORGANIZED HEALTH CARE EDUCATION/TRAINING PROGRAM

## 2024-04-28 PROCEDURE — G0378 HOSPITAL OBSERVATION PER HR: HCPCS

## 2024-04-28 PROCEDURE — 2500000002 HC RX 250 W HCPCS SELF ADMINISTERED DRUGS (ALT 637 FOR MEDICARE OP, ALT 636 FOR OP/ED): Performed by: STUDENT IN AN ORGANIZED HEALTH CARE EDUCATION/TRAINING PROGRAM

## 2024-04-28 PROCEDURE — 99285 EMERGENCY DEPT VISIT HI MDM: CPT | Mod: 25

## 2024-04-28 PROCEDURE — 82947 ASSAY GLUCOSE BLOOD QUANT: CPT | Mod: 59

## 2024-04-28 PROCEDURE — 96365 THER/PROPH/DIAG IV INF INIT: CPT

## 2024-04-28 PROCEDURE — 2500000001 HC RX 250 WO HCPCS SELF ADMINISTERED DRUGS (ALT 637 FOR MEDICARE OP): Performed by: STUDENT IN AN ORGANIZED HEALTH CARE EDUCATION/TRAINING PROGRAM

## 2024-04-28 PROCEDURE — 99234 HOSP IP/OBS SM DT SF/LOW 45: CPT | Performed by: STUDENT IN AN ORGANIZED HEALTH CARE EDUCATION/TRAINING PROGRAM

## 2024-04-28 PROCEDURE — 71045 X-RAY EXAM CHEST 1 VIEW: CPT | Performed by: RADIOLOGY

## 2024-04-28 RX ORDER — NAPROXEN SODIUM 220 MG/1
81 TABLET, FILM COATED ORAL DAILY
Status: DISCONTINUED | OUTPATIENT
Start: 2024-04-28 | End: 2024-04-28 | Stop reason: HOSPADM

## 2024-04-28 RX ORDER — LORATADINE 10 MG/1
10 TABLET ORAL DAILY
Status: DISCONTINUED | OUTPATIENT
Start: 2024-04-28 | End: 2024-04-28 | Stop reason: HOSPADM

## 2024-04-28 RX ORDER — IPRATROPIUM BROMIDE AND ALBUTEROL SULFATE 2.5; .5 MG/3ML; MG/3ML
3 SOLUTION RESPIRATORY (INHALATION) ONCE
Status: COMPLETED | OUTPATIENT
Start: 2024-04-28 | End: 2024-04-28

## 2024-04-28 RX ORDER — DESVENLAFAXINE 50 MG/1
50 TABLET, EXTENDED RELEASE ORAL DAILY
Status: DISCONTINUED | OUTPATIENT
Start: 2024-04-28 | End: 2024-04-28 | Stop reason: HOSPADM

## 2024-04-28 RX ORDER — ACETAMINOPHEN 160 MG/5ML
650 SOLUTION ORAL EVERY 4 HOURS PRN
Status: DISCONTINUED | OUTPATIENT
Start: 2024-04-28 | End: 2024-04-28 | Stop reason: HOSPADM

## 2024-04-28 RX ORDER — FLUTICASONE FUROATE AND VILANTEROL 100; 25 UG/1; UG/1
1 POWDER RESPIRATORY (INHALATION)
Status: DISCONTINUED | OUTPATIENT
Start: 2024-04-28 | End: 2024-04-28 | Stop reason: HOSPADM

## 2024-04-28 RX ORDER — ACETAMINOPHEN 325 MG/1
650 TABLET ORAL EVERY 4 HOURS PRN
Status: DISCONTINUED | OUTPATIENT
Start: 2024-04-28 | End: 2024-04-28 | Stop reason: HOSPADM

## 2024-04-28 RX ORDER — LEVOTHYROXINE SODIUM 100 UG/1
100 TABLET ORAL DAILY
Status: DISCONTINUED | OUTPATIENT
Start: 2024-04-28 | End: 2024-04-28 | Stop reason: HOSPADM

## 2024-04-28 RX ORDER — HYDROXYZINE HYDROCHLORIDE 25 MG/1
25 TABLET, FILM COATED ORAL 3 TIMES DAILY PRN
Status: DISCONTINUED | OUTPATIENT
Start: 2024-04-28 | End: 2024-04-28 | Stop reason: HOSPADM

## 2024-04-28 RX ORDER — MONTELUKAST SODIUM 10 MG/1
10 TABLET ORAL NIGHTLY
Status: DISCONTINUED | OUTPATIENT
Start: 2024-04-28 | End: 2024-04-28 | Stop reason: HOSPADM

## 2024-04-28 RX ORDER — ONDANSETRON HYDROCHLORIDE 2 MG/ML
4 INJECTION, SOLUTION INTRAVENOUS ONCE
Status: COMPLETED | OUTPATIENT
Start: 2024-04-28 | End: 2024-04-28

## 2024-04-28 RX ORDER — MAGNESIUM SULFATE HEPTAHYDRATE 40 MG/ML
INJECTION, SOLUTION INTRAVENOUS
Status: COMPLETED
Start: 2024-04-28 | End: 2024-04-28

## 2024-04-28 RX ORDER — ONDANSETRON HYDROCHLORIDE 2 MG/ML
INJECTION, SOLUTION INTRAVENOUS
Status: COMPLETED
Start: 2024-04-28 | End: 2024-04-28

## 2024-04-28 RX ORDER — INSULIN LISPRO 100 [IU]/ML
0-10 INJECTION, SOLUTION INTRAVENOUS; SUBCUTANEOUS
Status: DISCONTINUED | OUTPATIENT
Start: 2024-04-28 | End: 2024-04-28 | Stop reason: HOSPADM

## 2024-04-28 RX ORDER — DESVENLAFAXINE 50 MG/1
100 TABLET, EXTENDED RELEASE ORAL DAILY
Status: DISCONTINUED | OUTPATIENT
Start: 2024-04-28 | End: 2024-04-28 | Stop reason: HOSPADM

## 2024-04-28 RX ORDER — PROCHLORPERAZINE EDISYLATE 5 MG/ML
5 INJECTION INTRAMUSCULAR; INTRAVENOUS EVERY 6 HOURS PRN
Status: DISCONTINUED | OUTPATIENT
Start: 2024-04-28 | End: 2024-04-28 | Stop reason: HOSPADM

## 2024-04-28 RX ORDER — ACETAMINOPHEN 650 MG/1
650 SUPPOSITORY RECTAL EVERY 4 HOURS PRN
Status: DISCONTINUED | OUTPATIENT
Start: 2024-04-28 | End: 2024-04-28 | Stop reason: HOSPADM

## 2024-04-28 RX ORDER — IPRATROPIUM BROMIDE AND ALBUTEROL SULFATE 2.5; .5 MG/3ML; MG/3ML
3 SOLUTION RESPIRATORY (INHALATION) EVERY 2 HOUR PRN
Status: DISCONTINUED | OUTPATIENT
Start: 2024-04-28 | End: 2024-04-28 | Stop reason: HOSPADM

## 2024-04-28 RX ORDER — PROCHLORPERAZINE EDISYLATE 5 MG/ML
10 INJECTION INTRAMUSCULAR; INTRAVENOUS ONCE
Status: COMPLETED | OUTPATIENT
Start: 2024-04-28 | End: 2024-04-28

## 2024-04-28 RX ORDER — DEXTROSE 50 % IN WATER (D50W) INTRAVENOUS SYRINGE
25
Status: DISCONTINUED | OUTPATIENT
Start: 2024-04-28 | End: 2024-04-28 | Stop reason: HOSPADM

## 2024-04-28 RX ORDER — BUDESONIDE 0.5 MG/2ML
0.5 INHALANT ORAL
Status: DISCONTINUED | OUTPATIENT
Start: 2024-04-28 | End: 2024-04-28 | Stop reason: HOSPADM

## 2024-04-28 RX ORDER — ACETAMINOPHEN 325 MG/1
975 TABLET ORAL ONCE
Status: COMPLETED | OUTPATIENT
Start: 2024-04-28 | End: 2024-04-28

## 2024-04-28 RX ORDER — IPRATROPIUM BROMIDE AND ALBUTEROL SULFATE 2.5; .5 MG/3ML; MG/3ML
SOLUTION RESPIRATORY (INHALATION)
Status: COMPLETED
Start: 2024-04-28 | End: 2024-04-28

## 2024-04-28 RX ORDER — ENOXAPARIN SODIUM 100 MG/ML
60 INJECTION SUBCUTANEOUS EVERY 12 HOURS SCHEDULED
Status: DISCONTINUED | OUTPATIENT
Start: 2024-04-28 | End: 2024-04-28 | Stop reason: HOSPADM

## 2024-04-28 RX ORDER — POLYETHYLENE GLYCOL 3350 17 G/17G
17 POWDER, FOR SOLUTION ORAL DAILY PRN
Status: DISCONTINUED | OUTPATIENT
Start: 2024-04-28 | End: 2024-04-28 | Stop reason: HOSPADM

## 2024-04-28 RX ORDER — IPRATROPIUM BROMIDE AND ALBUTEROL SULFATE 2.5; .5 MG/3ML; MG/3ML
3 SOLUTION RESPIRATORY (INHALATION)
Status: DISCONTINUED | OUTPATIENT
Start: 2024-04-28 | End: 2024-04-28 | Stop reason: HOSPADM

## 2024-04-28 RX ORDER — MAGNESIUM SULFATE HEPTAHYDRATE 40 MG/ML
2 INJECTION, SOLUTION INTRAVENOUS ONCE
Status: COMPLETED | OUTPATIENT
Start: 2024-04-28 | End: 2024-04-28

## 2024-04-28 RX ORDER — DEXTROSE 50 % IN WATER (D50W) INTRAVENOUS SYRINGE
12.5
Status: DISCONTINUED | OUTPATIENT
Start: 2024-04-28 | End: 2024-04-28 | Stop reason: HOSPADM

## 2024-04-28 RX ORDER — ONDANSETRON HYDROCHLORIDE 2 MG/ML
4 INJECTION, SOLUTION INTRAVENOUS EVERY 6 HOURS PRN
Status: DISCONTINUED | OUTPATIENT
Start: 2024-04-28 | End: 2024-04-28 | Stop reason: HOSPADM

## 2024-04-28 RX ORDER — PANTOPRAZOLE SODIUM 40 MG/1
40 TABLET, DELAYED RELEASE ORAL
Status: DISCONTINUED | OUTPATIENT
Start: 2024-04-29 | End: 2024-04-28 | Stop reason: HOSPADM

## 2024-04-28 RX ADMIN — IPRATROPIUM BROMIDE AND ALBUTEROL SULFATE 3 ML: .5; 3 SOLUTION RESPIRATORY (INHALATION) at 12:49

## 2024-04-28 RX ADMIN — ONDANSETRON 4 MG: 2 INJECTION INTRAMUSCULAR; INTRAVENOUS at 06:43

## 2024-04-28 RX ADMIN — SODIUM CHLORIDE 500 ML: 9 INJECTION, SOLUTION INTRAVENOUS at 05:47

## 2024-04-28 RX ADMIN — MAGNESIUM SULFATE HEPTAHYDRATE 2 G: 40 INJECTION, SOLUTION INTRAVENOUS at 05:31

## 2024-04-28 RX ADMIN — ASPIRIN 81 MG: 81 TABLET, CHEWABLE ORAL at 12:23

## 2024-04-28 RX ADMIN — LORATADINE 10 MG: 10 TABLET ORAL at 12:22

## 2024-04-28 RX ADMIN — DESVENLAFAXINE 100 MG: 50 TABLET, FILM COATED, EXTENDED RELEASE ORAL at 12:23

## 2024-04-28 RX ADMIN — FLUTICASONE FUROATE AND VILANTEROL TRIFENATATE 1 PUFF: 100; 25 POWDER RESPIRATORY (INHALATION) at 12:23

## 2024-04-28 RX ADMIN — METHYLPREDNISOLONE SODIUM SUCCINATE 40 MG: 40 INJECTION, POWDER, FOR SOLUTION INTRAMUSCULAR; INTRAVENOUS at 13:53

## 2024-04-28 RX ADMIN — ONDANSETRON HYDROCHLORIDE 4 MG: 2 INJECTION, SOLUTION INTRAVENOUS at 06:43

## 2024-04-28 RX ADMIN — IPRATROPIUM BROMIDE AND ALBUTEROL SULFATE 3 ML: 2.5; .5 SOLUTION RESPIRATORY (INHALATION) at 08:19

## 2024-04-28 RX ADMIN — IPRATROPIUM BROMIDE AND ALBUTEROL SULFATE 3 ML: 2.5; .5 SOLUTION RESPIRATORY (INHALATION) at 05:22

## 2024-04-28 RX ADMIN — ACETAMINOPHEN 975 MG: 325 TABLET ORAL at 07:23

## 2024-04-28 RX ADMIN — ENOXAPARIN SODIUM 60 MG: 60 INJECTION SUBCUTANEOUS at 10:40

## 2024-04-28 RX ADMIN — PROCHLORPERAZINE EDISYLATE 10 MG: 5 INJECTION INTRAMUSCULAR; INTRAVENOUS at 08:08

## 2024-04-28 RX ADMIN — BUDESONIDE 0.5 MG: 0.5 SUSPENSION RESPIRATORY (INHALATION) at 12:49

## 2024-04-28 SDOH — SOCIAL STABILITY: SOCIAL INSECURITY: HAVE YOU HAD THOUGHTS OF HARMING ANYONE ELSE?: NO

## 2024-04-28 SDOH — SOCIAL STABILITY: SOCIAL INSECURITY: DOES ANYONE TRY TO KEEP YOU FROM HAVING/CONTACTING OTHER FRIENDS OR DOING THINGS OUTSIDE YOUR HOME?: NO

## 2024-04-28 SDOH — SOCIAL STABILITY: SOCIAL INSECURITY: HAVE YOU HAD ANY THOUGHTS OF HARMING ANYONE ELSE?: NO

## 2024-04-28 SDOH — SOCIAL STABILITY: SOCIAL INSECURITY: ABUSE: ADULT

## 2024-04-28 SDOH — SOCIAL STABILITY: SOCIAL INSECURITY: HAS ANYONE EVER THREATENED TO HURT YOUR FAMILY OR YOUR PETS?: NO

## 2024-04-28 SDOH — SOCIAL STABILITY: SOCIAL INSECURITY: DO YOU FEEL UNSAFE GOING BACK TO THE PLACE WHERE YOU ARE LIVING?: NO

## 2024-04-28 SDOH — SOCIAL STABILITY: SOCIAL INSECURITY: ARE THERE ANY APPARENT SIGNS OF INJURIES/BEHAVIORS THAT COULD BE RELATED TO ABUSE/NEGLECT?: NO

## 2024-04-28 SDOH — SOCIAL STABILITY: SOCIAL INSECURITY: ARE YOU OR HAVE YOU BEEN THREATENED OR ABUSED PHYSICALLY, EMOTIONALLY, OR SEXUALLY BY ANYONE?: NO

## 2024-04-28 SDOH — SOCIAL STABILITY: SOCIAL INSECURITY: DO YOU FEEL ANYONE HAS EXPLOITED OR TAKEN ADVANTAGE OF YOU FINANCIALLY OR OF YOUR PERSONAL PROPERTY?: NO

## 2024-04-28 ASSESSMENT — ACTIVITIES OF DAILY LIVING (ADL)
BATHING: INDEPENDENT
TOILETING: INDEPENDENT
HEARING - LEFT EAR: FUNCTIONAL
DRESSING YOURSELF: INDEPENDENT
LACK_OF_TRANSPORTATION: NO
JUDGMENT_ADEQUATE_SAFELY_COMPLETE_DAILY_ACTIVITIES: YES
HEARING - RIGHT EAR: FUNCTIONAL
GROOMING: INDEPENDENT
PATIENT'S MEMORY ADEQUATE TO SAFELY COMPLETE DAILY ACTIVITIES?: YES
ADEQUATE_TO_COMPLETE_ADL: YES
FEEDING YOURSELF: INDEPENDENT
WALKS IN HOME: INDEPENDENT

## 2024-04-28 ASSESSMENT — PATIENT HEALTH QUESTIONNAIRE - PHQ9
SUM OF ALL RESPONSES TO PHQ9 QUESTIONS 1 & 2: 0
2. FEELING DOWN, DEPRESSED OR HOPELESS: NOT AT ALL
1. LITTLE INTEREST OR PLEASURE IN DOING THINGS: NOT AT ALL

## 2024-04-28 ASSESSMENT — PAIN SCALES - GENERAL
PAINLEVEL_OUTOF10: 0 - NO PAIN
PAINLEVEL_OUTOF10: 0 - NO PAIN

## 2024-04-28 ASSESSMENT — COGNITIVE AND FUNCTIONAL STATUS - GENERAL
DAILY ACTIVITIY SCORE: 24
PATIENT BASELINE BEDBOUND: NO
MOBILITY SCORE: 24

## 2024-04-28 ASSESSMENT — LIFESTYLE VARIABLES
HOW OFTEN DO YOU HAVE 6 OR MORE DRINKS ON ONE OCCASION: NEVER
EVER HAD A DRINK FIRST THING IN THE MORNING TO STEADY YOUR NERVES TO GET RID OF A HANGOVER: NO
HOW MANY STANDARD DRINKS CONTAINING ALCOHOL DO YOU HAVE ON A TYPICAL DAY: PATIENT DOES NOT DRINK
HOW OFTEN DO YOU HAVE A DRINK CONTAINING ALCOHOL: NEVER
AUDIT-C TOTAL SCORE: 0
TOTAL SCORE: 0
HAVE PEOPLE ANNOYED YOU BY CRITICIZING YOUR DRINKING: NO
AUDIT-C TOTAL SCORE: 0
HAVE YOU EVER FELT YOU SHOULD CUT DOWN ON YOUR DRINKING: NO
SKIP TO QUESTIONS 9-10: 1
EVER FELT BAD OR GUILTY ABOUT YOUR DRINKING: NO

## 2024-04-28 ASSESSMENT — PAIN - FUNCTIONAL ASSESSMENT
PAIN_FUNCTIONAL_ASSESSMENT: 0-10
PAIN_FUNCTIONAL_ASSESSMENT: 0-10

## 2024-04-28 NOTE — DISCHARGE SUMMARY
Medical Group Discharge Summary  DISCHARGE DIAGNOSIS     Severe persistent asthma with exacerbation  Generalized anxiety  Hypothyroidism  Morbid obesity    HOSPITAL COURSE AND DETAILS     This is a 38-year-old female with a significant past medical history of severe persistent asthma, hypothyroidism, generalized anxiety, morbid obesity that was admitted this morning for shortness of breath in setting of an asthma exacerbation after recent admission for similar symptoms.    Patient is requesting discharge home this afternoon as she is feeling significantly better from a respiratory standpoint.      Refer to my history and physical from earlier today for details regarding admission as well as physical exam.    She will continue prednisone 40 mg for the next 10 days and resume her taper that she was completing as an outpatient with her allergy/immunologist.       ---Of note, this documentation is completed using the Dragon Dictation system (voice recognition software). There may be spelling and/or grammatical errors that were not corrected prior to final submission.---    Jason Bill MD    DISCHARGE PHYSICAL EXAM     Last Recorded Vitals:  Vitals:    04/28/24 0654 04/28/24 0845 04/28/24 0931 04/28/24 1223   BP: 154/73  138/69    Pulse: 82 86 86    Resp: 18 20     Temp:   35.7 °C (96.3 °F)    TempSrc:   Temporal    SpO2: 99% 95% 94% 95%   Weight:       Height:         Physical Exam: Refer to my history and physical from today 4/28 for examination    DISCHARGE MEDICATIONS        Your medication list        CONTINUE taking these medications        Instructions Last Dose Given Next Dose Due   albuterol 2.5 mg /3 mL (0.083 %) nebulizer solution           aspirin 81 mg chewable tablet           budesonide 0.5 mg/2 mL nebulizer solution  Commonly known as: Pulmicort           butalbital-acetaminophen-caff -40 mg tablet      Take 1 tablet by mouth early in the morning.. 1 tab(s) orally once a day, As Needed        desvenlafaxine 50 mg 24 hr tablet  Commonly known as: Pristiq           desvenlafaxine 100 mg 24 hr tablet  Commonly known as: Pristiq      TAKE 1 TABLET BY MOUTH EVERY DAY       Emgality Syringe 120 mg/mL prefilled syringe  Generic drug: galcanezumab           fexofenadine 180 mg tablet  Commonly known as: Allegra           hydrOXYzine HCL 25 mg tablet  Commonly known as: Atarax      Take 1 tablet (25 mg) by mouth 3 times a day as needed for anxiety.       levothyroxine 100 mcg tablet  Commonly known as: Synthroid, Levoxyl           metoclopramide 10 mg tablet  Commonly known as: Reglan           montelukast 10 mg tablet  Commonly known as: Singulair      Take 1 tablet (10 mg) by mouth once daily at bedtime.       multivitamin-Ca-iron-minerals 27-0.4 mg tablet  Commonly known as: Tab-A-Vu Womens           predniSONE 10 mg tablet  Commonly known as: Deltasone           predniSONE 10 mg tablet  Commonly known as: Deltasone  Start taking on: April 23, 2024      Take 5 tablets (50 mg) by mouth once daily for 2 days, THEN 4 tablets (40 mg) once daily for 2 days, THEN 3 tablets (30 mg) once daily for 2 days, THEN 2 tablets (20 mg) once daily for 2 days, THEN 1 tablet (10 mg) once daily for 2 days.       rizatriptan 10 mg tablet  Commonly known as: Maxalt           SUMAtriptan 100 mg tablet  Commonly known as: Imitrex           Trelegy Ellipta 100-62.5-25 mcg blister with device  Generic drug: fluticasone-umeclidin-vilanter                  ASK your doctor about these medications        Instructions Last Dose Given Next Dose Due   EPINEPHrine 0.3 mg/0.3 mL injection syringe  Commonly known as: Epipen      Inject 0.3 mL (0.3 mg) into the muscle once daily as needed for anaphylaxis.              OUTPATIENT FOLLOW-UP     Future Appointments   Date Time Provider Department Center   5/1/2024 10:20 AM Hilary Trinidad DO [id number] [place]   5/22/2024  4:30 PM Paul Matthew DO [id number] [place]

## 2024-04-28 NOTE — NURSING NOTE
Pt transferred from ER via WC, ambulated to room. Vital signs obtained. Assessment and admission completed. Oriented to surrounding. Call bell within reach.

## 2024-04-28 NOTE — ED PROVIDER NOTES
HPI   Chief Complaint   Patient presents with    Shortness of Breath       38-year-old female history of asthma.  Shortness of breath x 3 days.  Using DuoNebs at home without relief.  Brought in by EMS for worsening shortness of breath.  Received DuoNeb treatment and Solu-Medrol IV prior to arrival with some improvement in symptoms.  No fever.      History provided by:  Patient and EMS personnel                      [place] Coma Scale Score: 15                     Patient History   Past Medical History:   Diagnosis Date    Acute cystitis without hematuria 08/08/2019    Acute cystitis without hematuria    Asthma ([facility])     Disease of thyroid gland     Encounter for initial prescription of contraceptive pills 11/01/2019    Encounter for initial prescription of contraceptive pills    Nausea 02/05/2021    Nausea in adult    Other general symptoms and signs 12/05/2019    Forgetfulness    Parageusia 04/06/2020    Taste perversion    Pelvic and perineal pain     Pelvic pain in female    Personal history of other diseases of the respiratory system 04/06/2020    History of sinusitis    Personal history of other specified conditions 07/01/2020    History of vomiting    Personal history of other specified conditions 01/26/2021    History of headache    Personal history of other specified conditions 02/01/2021    History of diarrhea    Personal history of thrombophlebitis 06/03/2019    History of phlebitis    Personal history of urinary (tract) infections 01/16/2020    History of urinary tract infection    PTSD (post-traumatic stress disorder)      Past Surgical History:   Procedure Laterality Date    OTHER SURGICAL HISTORY  02/08/2019    Appendectomy    OTHER SURGICAL HISTORY  02/08/2019    Cholecystectomy    OTHER SURGICAL HISTORY  02/08/2019    Cystoscopy    OTHER SURGICAL HISTORY  02/08/2019    Ureteroscopy    OTHER SURGICAL HISTORY  02/08/2019    Shoulder surgery     Family History   Problem Relation Name Age of Onset     Hypertension Father      Other (Pulmonary Valve Stenosis) Sister      Heart disease Maternal Grandmother      Diabetes Other Grandparent     Lung cancer Other Grandparent     Anxiety disorder Sibling       Social History     Tobacco Use    Smoking status: Never     Passive exposure: Current    Smokeless tobacco: Never   Vaping Use    Vaping status: Never Used   Substance Use Topics    Alcohol use: Yes     Alcohol/week: 1.0 - 2.0 standard drink of alcohol     Types: 1 - 2 Glasses of wine per week     Comment: social    Drug use: Never       Physical Exam   ED Triage Vitals   Temperature Heart Rate Respirations BP   04/28/24 0523 04/28/24 0523 04/28/24 0523 04/28/24 0523   36.6 °C (97.9 °F) 92 (!) 25 (!) 148/98      Pulse Ox Temp Source Heart Rate Source Patient Position   04/28/24 0522 04/28/24 0523 04/28/24 0523 --   98 % Temporal Monitor       BP Location FiO2 (%)     -- 04/28/24 0522      21 %       Physical Exam  Vitals and nursing note reviewed.   Constitutional:       General: She is not in acute distress.  HENT:      Head: Atraumatic.      Mouth/Throat:      Mouth: Mucous membranes are moist.      Pharynx: Oropharynx is clear.   Eyes:      Conjunctiva/sclera: Conjunctivae normal.   Cardiovascular:      Rate and Rhythm: Normal rate and regular rhythm.      Pulses: Normal pulses.   Pulmonary:      Effort: Tachypnea present. No respiratory distress.      Breath sounds: Normal breath sounds.   Musculoskeletal:         General: No deformity.      Cervical back: Neck supple.   Skin:     General: Skin is warm and dry.   Neurological:      Mental Status: She is alert and oriented to person, place, and time. Mental status is at baseline.   Psychiatric:         Mood and Affect: Mood normal.         Behavior: Behavior normal.         ED Course & MDM   Diagnoses as of 04/28/24 0745   Exacerbation of asthma, unspecified asthma severity, unspecified whether persistent ([facility])       Labs Reviewed   CBC WITH AUTO  DIFFERENTIAL - Abnormal       Result Value    WBC 11.2      nRBC 0.0      RBC 5.33 (*)     Hemoglobin 15.8      Hematocrit 44.5      MCV 84      MCH 29.6      MCHC 35.5      RDW 12.2      Platelets 440      Neutrophils % 59.7      Immature Granulocytes %, Automated 1.1 (*)     Lymphocytes % 30.6      Monocytes % 8.2      Eosinophils % 0.0      Basophils % 0.4      Neutrophils Absolute 6.68      Immature Granulocytes Absolute, Automated 0.12      Lymphocytes Absolute 3.41      Monocytes Absolute 0.91      Eosinophils Absolute 0.00      Basophils Absolute 0.04     BASIC METABOLIC PANEL - Abnormal    Glucose 103 (*)     Sodium 134 (*)     Potassium 4.2      Chloride 102      Bicarbonate 24      Anion Gap 12      Urea Nitrogen 13      Creatinine 0.93      eGFR 82      Calcium 9.2     BLOOD GAS VENOUS - Abnormal    POCT pH, Venous 7.42      POCT pCO2, Venous 36 (*)     POCT pO2, Venous 55 (*)     POCT SO2, Venous 89 (*)     POCT Oxy Hemoglobin, Venous 87.3 (*)     POCT Base Excess, Venous -0.7      POCT HCO3 Calculated, Venous 23.4      Patient Temperature        FiO2 21       XR chest 1 view   Final Result   1.  Cardiomegaly. Otherwise, no acute radiographic finding at the   chest.                  MACRO:   None.        Signed by: Lauren Perrin 4/28/2024 6:26 AM   Dictation workstation:   [id number]            Medical Decision Making  38-year-old female with history of asthma presenting with worsening shortness of breath.  Concerns for acute asthma exacerbation.  Patient has been on prednisone 50 mg daily.  Patient received multiple DuoNeb treatments prior to arrival as well as Solu-Medrol IV by EMS.  On evaluation patient in no acute respiratory distress, tachypneic with increased work of breathing.  Lungs without wheezing, slightly diminished.  Workup obtained in the ED including chest x-ray and lab work.  Patient given magnesium IV and additional DuoNeb treatment.  No infectious symptoms.  Low suspicion for  pneumonia or viral URI.    Chest x-ray with cardiomegaly, otherwise no acute findings.  No edema.  No pneumothorax.  Lab work reviewed and is overall unremarkable.  Venous gas with normal pH, pCO2 of 36.    On reevaluation patient breathing more comfortably, no longer tachypneic.  Normal breath sounds bilaterally, no wheezing.      On repeat evaluation patient started to increased respiratory rate and work of breathing.  Additional DuoNeb treatment was ordered.  Given that patient is unable to space out to 4 hours between treatments, plan to admit for continued observation and treatment of asthma exacerbation.        Procedure  Procedures     Devonte Zeng MD  04/28/24 0745

## 2024-04-28 NOTE — PROGRESS NOTES
04/28/24 1317   Discharge Planning   Living Arrangements Family members   Support Systems Family members   Assistance Needed none   Type of Residence Private residence   Number of Stairs to Enter Residence 0   Do you have animals or pets at home? No   Who is requesting discharge planning? Provider   Home or Post Acute Services None   Patient expects to be discharged to: Home   Patient Choice   Provider Choice list and CMS website (https://medicare.gov/care-compare#search) for post-acute Quality and Resource Measure Data were provided and reviewed with: Patient   Patient / Family choosing to utilize agency / facility established prior to hospitalization No        04/28/24 1317   Discharge Planning   Living Arrangements Family members   Support Systems Family members   Assistance Needed none   Type of Residence Private residence   Number of Stairs to Enter Residence 0   Do you have animals or pets at home? No   Who is requesting discharge planning? Provider   Home or Post Acute Services None   Patient expects to be discharged to: Home   Patient Choice   Provider Choice list and CMS website (https://medicare.gov/care-compare#search) for post-acute Quality and Resource Measure Data were provided and reviewed with: Patient   Patient / Family choosing to utilize agency / facility established prior to hospitalization No     Patient from home, plan is home no needs identified, will continue to follow.

## 2024-04-28 NOTE — H&P
Medical Group History and Physical  ASSESSMENT & PLAN:     Severe persistent asthma with exacerbation  - Patient with pretty significant history of severe persistent asthma on multiple medications including Monocal antibody that was restarted by her allergist along with daily prednisone therapy for the past 4 months presented from home with shortness of breath  - Chest x-ray, VBG reviewed  - Recent admission for an asthma exacerbation was discharged home on increased dose of prednisone at 50 mg daily which she was slowly tapering off  - Continue Solu-Medrol 40 mg IV, bronchodilators as ordered along with home medications  - Doing well on room air however    Hypothyroidism  Generalized anxiety  - Continue home medications as reconciled    Morbid obesity    VTE prophylaxis: Enoxaparin subcutaneous      ---Of note, this documentation is completed using the Dragon Dictation system (voice recognition software). There may be spelling and/or grammatical errors that were not corrected prior to final submission.---    Jason Bill MD    HISTORY OF PRESENT ILLNESS:   Chief Complaint: Shortness of breath    History Of Present Illness:    Sarahi Haley is a 38 y.o. female with a significant past medical history of severe persistent asthma on chronic prednisone and immunosuppressive therapy, morbid obesity, poor thyroidism that presented from home with chief complaints of worsening shortness of breath this morning.  States that the symptoms were consistent with her typical asthma exacerbations.  States slight improvement in her symptoms with DuoNebs and steroids.  Does endorse also having nausea.  Denies having any cough, fevers, chills however.    She was recently admitted from 4/22 to 4/23 for acute asthma exacerbation.  She was discharged home with an increase in her prednisone to 50 mg daily for which she was supposed to taper after 5 days.  States that her symptoms began on day 2 of discharge and progressed to her  "coming back to the ED this morning.    ED course:   - Temperature 97.9, HR 92, /98, RR 25 saturating 98% on room air  - Labs: Unremarkable CBC and BMP.  VBG reviewed.  - Imaging: Chest x-ray unremarkable for any acute findings.  - Interventions: Received Solu-Medrol 150 mg IV by EMS, significant bronchodilators and DuoNebs while in the ED.     Review of systems: 10 point review of systems is otherwise negative except as mentioned above.    PAST HISTORIES:     Past Medical History: Severe persistent asthma, chronic steroid therapy, hypothyroidism, depression, generalized anxiety, morbid obesity    Past Surgical History: Appendectomy, cholecystectomy, bilateral shoulder repair (2 right and 1 left)      Social History: Denies previous tobacco use.  Consumes alcohol socially.  Denies current drug use.  Lives at home with a roommate.  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  Otherwise independent with ADLs.    Family History: Mother with rheumatoid arthritis.  Father with cardiovascular disease, hypertension, hyperlipidemia.     Allergies: Bee venom protein (honey bee), Diphenhydramine, Nsaids (non-steroidal anti-inflammatory drug), Procaine, and Ketorolac    OBJECTIVE:     Last Recorded Vitals:  Vitals:    04/28/24 0523 04/28/24 0654 04/28/24 0845 04/28/24 0931   BP: (!) 148/98 154/73  138/69   Pulse: 92 82 86 86   Resp: (!) 25 18 20    Temp: 36.6 °C (97.9 °F)   35.7 °C (96.3 °F)   TempSrc: Temporal   Temporal   SpO2: 98% 99% 95% 94%   Weight: 143 kg (315 lb)      Height: 1.6 m (5' 3\")        Last I/O:  No intake/output data recorded.    Physical Exam  Constitutional:       General: She is not in acute distress.     Appearance: Normal appearance. She is not toxic-appearing.   HENT:      Head: Normocephalic and atraumatic.   Eyes:      Extraocular Movements: Extraocular movements intact.      Conjunctiva/sclera: Conjunctivae normal.   Cardiovascular:      Rate and Rhythm: Normal rate and regular rhythm.      " Pulses: Normal pulses.      Heart sounds: Normal heart sounds.   Pulmonary:      Effort: Pulmonary effort is normal. No respiratory distress.      Comments: No acute respiratory distress.  Breath sounds diminished to auscultation bilaterally however no wheezes auscultated on my exam.  Abdominal:      General: Bowel sounds are normal.      Palpations: Abdomen is soft.      Tenderness: There is no abdominal tenderness. There is no guarding.   Musculoskeletal:         General: Normal range of motion.      Cervical back: Normal range of motion and neck supple.   Neurological:      General: No focal deficit present.      Mental Status: She is alert and oriented to person, place, and time. Mental status is at baseline.   Psychiatric:         Mood and Affect: Mood normal.         Behavior: Behavior normal.         Thought Content: Thought content normal.     Scheduled Medications  aspirin, 81 mg, oral, Daily  budesonide, 0.5 mg, nebulization, BID  desvenlafaxine, 100 mg, oral, Daily  desvenlafaxine, 50 mg, oral, Daily  enoxaparin, 60 mg, subcutaneous, q12h SERGIO  [Held by provider] tiotropium, 2 puff, inhalation, Daily   And  fluticasone furoate-vilanteroL, 1 puff, inhalation, Daily  insulin lispro, 0-10 Units, subcutaneous, Before meals & nightly  ipratropium-albuteroL, 3 mL, nebulization, q6h  levothyroxine, 100 mcg, oral, Daily  loratadine, 10 mg, oral, Daily  methylPREDNISolone sodium succinate (PF), 40 mg, intravenous, q6h  montelukast, 10 mg, oral, Nightly  [START ON 4/29/2024] pantoprazole, 40 mg, oral, Daily before breakfast      PRN Medications  PRN medications: acetaminophen **OR** acetaminophen **OR** acetaminophen, dextrose, dextrose, glucagon, glucagon, hydrOXYzine HCL, ipratropium-albuteroL, ondansetron, polyethylene glycol, prochlorperazine  Continuous Medications     Outpatient Medications:  Prior to Admission medications    Medication Sig   albuterol 2.5 mg /3 mL (0.083 %) nebulizer solution Take 3 mL by  nebulization every 4 hours if needed for shortness of breath.   aspirin 81 mg chewable tablet Chew 1 tablet (81 mg) once daily.   budesonide (Pulmicort) 0.5 mg/2 mL nebulizer solution Take 2 mL (0.5 mg) by nebulization 2 times a day.   butalbital-acetaminophen-caff -40 mg tablet Take 1 tablet by mouth early in the morning.. 1 tab(s) orally once a day, As Needed   desvenlafaxine 100 mg 24 hr tablet TAKE 1 TABLET BY MOUTH EVERY DAY   desvenlafaxine 50 mg 24 hr tablet Take 1 tablet (50 mg) by mouth once daily. Take with 100 mg = 150 mg dose   EPINEPHrine 0.3 mg/0.3 mL injection syringe Inject 0.3 mL (0.3 mg) into the muscle once daily as needed for anaphylaxis.  Patient not taking: Reported on 4/22/2024   fexofenadine (Allegra) 180 mg tablet Take 1 tablet (180 mg) by mouth once daily.   fluticasone-umeclidin-vilanter (Trelegy Ellipta) 100-62.5-25 mcg blister with device Inhale 2 puffs once daily.   galcanezumab (Emgality Syringe) 120 mg/mL prefilled syringe Inject 1 Syringe (120 mg) under the skin every 28 (twenty-eight) days.   hydrOXYzine HCL (Atarax) 25 mg tablet Take 1 tablet (25 mg) by mouth 3 times a day as needed for anxiety.   levothyroxine (Synthroid, Levoxyl) 100 mcg tablet Take 1 tablet (100 mcg) by mouth once daily.   metoclopramide (Reglan) 10 mg tablet Take 1 tablet (10 mg) by mouth every 8 hours if needed (nausea).   montelukast (Singulair) 10 mg tablet Take 1 tablet (10 mg) by mouth once daily at bedtime.   multivitamin-Ca-iron-minerals (Tab-A-Vu Womens) 27-0.4 mg tablet Take 1 tablet by mouth once daily.   predniSONE (Deltasone) 10 mg tablet Take 3 tablets (30 mg) by mouth once daily.   predniSONE (Deltasone) 10 mg tablet Take 5 tablets (50 mg) by mouth once daily for 2 days, THEN 4 tablets (40 mg) once daily for 2 days, THEN 3 tablets (30 mg) once daily for 2 days, THEN 2 tablets (20 mg) once daily for 2 days, THEN 1 tablet (10 mg) once daily for 2 days.   rizatriptan (Maxalt) 10 mg tablet  Take 1 tablet (10 mg) by mouth if needed for migraine. May repeat in 2 hours if unresolved. Do not exceed 30 mg in 24 hours.   SUMAtriptan (Imitrex) 100 mg tablet Take 1 tablet (100 mg) by mouth 1 time if needed for migraine.     LABS AND IMAGING:     Labs:  Results from last 7 days   Lab Units 04/28/24  0527 04/23/24  0610 04/22/24  0547   WBC AUTO x10*3/uL 11.2 19.2* 6.7   RBC AUTO x10*6/uL 5.33* 4.34 4.62   HEMOGLOBIN g/dL 15.8 12.9 13.7   HEMATOCRIT % 44.5 37.8 39.0   MCV fL 84 87 84   MCH pg 29.6 29.7 29.7   MCHC g/dL 35.5 34.1 35.1   RDW % 12.2 12.7 12.4   PLATELETS AUTO x10*3/uL 440 354 331     Results from last 7 days   Lab Units 04/28/24  0527 04/23/24  0610 04/22/24  0547 04/21/24  2300   SODIUM mmol/L 134* 135* 134* 137   POTASSIUM mmol/L 4.2 4.2 3.4* 3.6   CHLORIDE mmol/L 102 105 104 106   CO2 mmol/L 24 23 21 23   BUN mg/dL 13 15 11 9   CREATININE mg/dL 0.93 0.94 0.87 0.80   GLUCOSE mg/dL 103* 148* 207* 88   PROTEIN TOTAL g/dL  --   --   --  6.7   CALCIUM mg/dL 9.2 8.9 8.8 8.9   BILIRUBIN TOTAL mg/dL  --   --   --  0.9   ALK PHOS U/L  --   --   --  82   AST U/L  --   --   --  27   ALT U/L  --   --   --  37     Results from last 7 days   Lab Units 04/23/24  0610   MAGNESIUM mg/dL 2.08     Results from last 7 days   Lab Units 04/22/24  0034 04/21/24  2300   TROPHS ng/L <3 <3       Imaging:  XR chest 1 view  Narrative: Interpreted By:  Lauren Perrin,   STUDY:  XR CHEST 1 VIEW;  4/28/2024 5:51 am      INDICATION:  Signs/Symptoms:SOB      COMPARISON:  Chest x-ray 04/21/2024      ACCESSION NUMBER(S):  [id number]      ORDERING CLINICIAN:  SHERRY BAIRD      TECHNIQUE:  Portable frontal view of the chest was obtained .      FINDINGS:  The heart is enlarged. There is no overt vascular congestion.      No focal consolidation, pleural effusion or pneumothorax.      Impression: 1.  Cardiomegaly. Otherwise, no acute radiographic finding at the  chest.              MACRO:  None.      Signed by: Lauren Perrin  4/28/2024 6:26 AM  Dictation workstation:   [id number]

## 2024-04-29 ENCOUNTER — PATIENT OUTREACH (OUTPATIENT)
Dept: CARE COORDINATION | Facility: CLINIC | Age: 39
End: 2024-04-29
Payer: COMMERCIAL

## 2024-04-29 DIAGNOSIS — F32.A DEPRESSION, UNSPECIFIED: ICD-10-CM

## 2024-04-29 RX ORDER — DESVENLAFAXINE SUCCINATE 50 MG/1
50 TABLET, EXTENDED RELEASE ORAL DAILY
Qty: 90 TABLET | Refills: 3 | Status: SHIPPED | OUTPATIENT
Start: 2024-04-29

## 2024-04-29 NOTE — PROGRESS NOTES
Discharge Facility: [facility]   Discharge Diagnosis: Severe persistent asthma with exacerbation  Generalized anxiety  Hypothyroidism  Morbid obesity  Admission Date:4/28/2024  Discharge Date: 4/28/2024    PCP Appointment Date:Paul Matthew DO TBD/tasked  Specialist Appointment Date: Allergy and Imm Dr Garcia 5/1/2024  Hospital Encounter and Summary: Linked   See discharge assessment below for further details     Engagement  Call Start Time: 1110 (4/29/2024 11:12 AM)    Medications  Medications reviewed with patient/caregiver?: Yes (4/29/2024 11:12 AM)  Is the patient having any side effects they believe may be caused by any medication additions or changes?: No (4/29/2024 11:12 AM)  Does the patient have all medications ordered at discharge?: Yes (4/29/2024 11:12 AM)  Care Management Interventions: No intervention needed (4/29/2024 11:12 AM)  Prescription Comments: Prednisone 40mg (4/29/2024 11:12 AM)  Is the patient taking all medications as directed (includes completed medication regime)?: Yes (4/29/2024 11:12 AM)  Care Management Interventions: Provided patient education (4/29/2024 11:12 AM)    Appointments  Does the patient have a primary care provider?: Yes (4/29/2024 11:12 AM)  Care Management Interventions: Verified appointment date/time/provider (Paul Matthew DO 5/22/2024 past 14 day window. Will task to office) (4/29/2024 11:12 AM)  Care Management Interventions: Advised patient to keep appointment (4/29/2024 11:12 AM)    Self Management  What is the home health agency?: denies need (4/29/2024 11:12 AM)  What Durable Medical Equipment (DME) was ordered?: denies need (4/29/2024 11:12 AM)    Patient Teaching  Does the patient have access to their discharge instructions?: Yes (4/29/2024 11:12 AM)  Care Management Interventions: Reviewed instructions with patient; Educated on MyChart (4/29/2024 11:12 AM)  What is the patient's perception of their health status since discharge?: Improving  (4/29/2024 11:12 AM)    Wrap Up  Wrap Up Additional Comments: This CM spoke with patient via phone. Pt reports doing well at home since discharge. New meds reviewed. Pt denies CP and SOB. Patient denies any further discharge questions/needs at this time. Emphasized that Follow up is needed after discharge to review the hospital recommendations, assess your response to your treatment.  Pt aware of my availability for non-emergent concerns. Contact info provided to patient (4/29/2024 11:12 AM)

## 2024-05-01 ENCOUNTER — OFFICE VISIT (OUTPATIENT)
Dept: ALLERGY | Facility: CLINIC | Age: 39
End: 2024-05-01
Payer: COMMERCIAL

## 2024-05-01 VITALS
SYSTOLIC BLOOD PRESSURE: 124 MMHG | TEMPERATURE: 98 F | WEIGHT: 293 LBS | BODY MASS INDEX: 48.82 KG/M2 | RESPIRATION RATE: 17 BRPM | HEIGHT: 65 IN | HEART RATE: 67 BPM | OXYGEN SATURATION: 95 % | DIASTOLIC BLOOD PRESSURE: 76 MMHG

## 2024-05-01 DIAGNOSIS — J45.50 SEVERE PERSISTENT ASTHMA WITHOUT COMPLICATION (MULTI): ICD-10-CM

## 2024-05-01 DIAGNOSIS — K21.9 GASTROESOPHAGEAL REFLUX DISEASE WITHOUT ESOPHAGITIS: Primary | ICD-10-CM

## 2024-05-01 DIAGNOSIS — E66.01 MORBID OBESITY (MULTI): ICD-10-CM

## 2024-05-01 DIAGNOSIS — T38.0X5A STEROID SIDE EFFECTS, INITIAL ENCOUNTER: ICD-10-CM

## 2024-05-01 PROCEDURE — 96372 THER/PROPH/DIAG INJ SC/IM: CPT | Performed by: ALLERGY & IMMUNOLOGY

## 2024-05-01 PROCEDURE — 3008F BODY MASS INDEX DOCD: CPT | Performed by: ALLERGY & IMMUNOLOGY

## 2024-05-01 PROCEDURE — 99214 OFFICE O/P EST MOD 30 MIN: CPT | Performed by: ALLERGY & IMMUNOLOGY

## 2024-05-01 RX ORDER — TEZEPELUMAB-EKKO 210 MG/1.9ML
210 INJECTION, SOLUTION SUBCUTANEOUS
COMMUNITY

## 2024-05-01 RX ORDER — HYDROGEN PEROXIDE 3 %
20 SOLUTION, NON-ORAL MISCELLANEOUS
Qty: 30 CAPSULE | Refills: 11 | Status: SHIPPED | OUTPATIENT
Start: 2024-05-01 | End: 2025-05-01

## 2024-05-01 NOTE — PROGRESS NOTES
Patient ID: Sarahi Haley is a 38 y.o. female.     Chief Complaint: Follow up  History Of Present Illness  Sarahi Haley is a 38 y.o. female with PMx severe persistent asthma, CR sensitization. Here for follow up.  She was recently hospitalized for asthma.     She is currently on steroids and is having bothersome side effects. She would like to wean.    Has been on Tezspire for 2 months. Today is 3rd dose.  She has continues Trelegy 200, Singulair.  In the neb, she is usually using albuterol.  She uses Duoneb if she is not better within 5-10 min.  She also has Budesonide BID routinely.  She does have AirSupra for her rescue inhaler when she is out and out and about.  This averages about 3 times a week.  Sarahi does also follow with pulmonary.    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  She sat down, used her inhaler then went back to work. When she went home she did her Pulmicort. She awoke with sob, she felt like her heart was racing and she felt she was wheezing. She believes the stress from that shift sent her asthma out of control.  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    Current ACT 16.    She has a headache. She has been taking Tylenol.  This is different than her migraines.  It occurs on the crown of her head.  No changes in vision.  The treatments she does for asthma (albuterol) causes her to feel nauseated.  She is not on gut protection (PPI) for her steroids right now.    Review of Systems    Pertinent positives and negatives have been assessed in the HPI. All other systems have been reviewed and are negative except as noted in the HPI.    Allergies  Bee venom protein (honey bee), Diphenhydramine, Nsaids (non-steroidal anti-inflammatory drug), Procaine, and Ketorolac    Past Medical History  She has a past medical history of Acute cystitis without hematuria  (08/08/2019), Asthma ([facility]), Disease of thyroid gland, Encounter for initial prescription of contraceptive pills (11/01/2019), Nausea (02/05/2021), Other general symptoms and signs (12/05/2019), Parageusia (04/06/2020), Pelvic and perineal pain, Personal history of other diseases of the respiratory system (04/06/2020), Personal history of other specified conditions (07/01/2020), Personal history of other specified conditions (01/26/2021), Personal history of other specified conditions (02/01/2021), Personal history of thrombophlebitis (06/03/2019), Personal history of urinary (tract) infections (01/16/2020), and PTSD (post-traumatic stress disorder).    Family History  Family History   Problem Relation Name Age of Onset    Hypertension Father      Other (Pulmonary Valve Stenosis) Sister      Heart disease Maternal Grandmother      Diabetes Other Grandparent     Lung cancer Other Grandparent     Anxiety disorder Sibling         Surgical History  She has a past surgical history that includes Other surgical history (02/08/2019); Other surgical history (02/08/2019); Other surgical history (02/08/2019); Other surgical history (02/08/2019); and Other surgical history (02/08/2019).    Social/Environmental History  She reports that she has never smoked. She has been exposed to tobacco smoke. She has never used smokeless tobacco. She reports current alcohol use of about 1.0 - 2.0 standard drink of alcohol per week. She reports that she does not use drugs.      MEDICATIONS  Current Outpatient Medications on File Prior to Visit   Medication Sig Dispense Refill    albuterol 2.5 mg /3 mL (0.083 %) nebulizer solution Take 3 mL by nebulization every 4 hours if needed for shortness of breath.      aspirin 81 mg chewable tablet Chew 1 tablet (81 mg) once daily.      budesonide (Pulmicort) 0.5 mg/2 mL nebulizer solution Take 2 mL (0.5 mg) by nebulization 2 times a day.      butalbital-acetaminophen-caff -40 mg tablet Take 1  tablet by mouth early in the morning.. 1 tab(s) orally once a day, As Needed 90 tablet 3    desvenlafaxine 100 mg 24 hr tablet TAKE 1 TABLET BY MOUTH EVERY DAY 90 tablet 3    desvenlafaxine 50 mg 24 hr tablet TAKE 1 TABLET BY MOUTH ONCE DAILY 90 tablet 3    fexofenadine (Allegra) 180 mg tablet Take 1 tablet (180 mg) by mouth once daily.      fluticasone-umeclidin-vilanter (Trelegy Ellipta) 100-62.5-25 mcg blister with device Inhale 2 puffs once daily.      galcanezumab (Emgality Syringe) 120 mg/mL prefilled syringe Inject 1 Syringe (120 mg) under the skin every 28 (twenty-eight) days.      hydrOXYzine HCL (Atarax) 25 mg tablet Take 1 tablet (25 mg) by mouth 3 times a day as needed for anxiety. 90 tablet 2    levothyroxine (Synthroid, Levoxyl) 100 mcg tablet Take 1 tablet (100 mcg) by mouth once daily.      metoclopramide (Reglan) 10 mg tablet Take 1 tablet (10 mg) by mouth every 8 hours if needed (nausea).      multivitamin-Ca-iron-minerals (Tab-A-Vu Womens) 27-0.4 mg tablet Take 1 tablet by mouth once daily.      predniSONE (Deltasone) 10 mg tablet Take 5 tablets (50 mg) by mouth once daily for 2 days, THEN 4 tablets (40 mg) once daily for 2 days, THEN 3 tablets (30 mg) once daily for 2 days, THEN 2 tablets (20 mg) once daily for 2 days, THEN 1 tablet (10 mg) once daily for 2 days. 30 tablet 0    rizatriptan (Maxalt) 10 mg tablet Take 1 tablet (10 mg) by mouth if needed for migraine. May repeat in 2 hours if unresolved. Do not exceed 30 mg in 24 hours.      SUMAtriptan (Imitrex) 100 mg tablet Take 1 tablet (100 mg) by mouth 1 time if needed for migraine.      tezepelumab-ekko (Tezspire) SubQ syringe Inject 210 mg under the skin every 28 (twenty-eight) days.      EPINEPHrine 0.3 mg/0.3 mL injection syringe Inject 0.3 mL (0.3 mg) into the muscle once daily as needed for anaphylaxis. (Patient not taking: Reported on 4/22/2024) 2 each 3    montelukast (Singulair) 10 mg tablet Take 1 tablet (10 mg) by mouth once daily  "at bedtime. 30 tablet 5    predniSONE (Deltasone) 10 mg tablet Take 3 tablets (30 mg) by mouth once daily.      [DISCONTINUED] desvenlafaxine 50 mg 24 hr tablet Take 1 tablet (50 mg) by mouth once daily. Take with 100 mg = 150 mg dose       Current Facility-Administered Medications on File Prior to Visit   Medication Dose Route Frequency Provider Last Rate Last Admin    tezepelumab-ekko (Tezpire) SubQ syringe 210 mg  210 mg subcutaneous Once Salomón Montgomery MD             Physical Exam  Visit Vitals  /76   Pulse 67   Temp 36.7 °C (98 °F) (Temporal)   Resp 17   Ht 1.651 m (5' 5\")   Wt 143 kg (315 lb)   SpO2 95%   BMI 52.42 kg/m²   OB Status Having periods   Smoking Status Never   BSA 2.56 m²       Wt Readings from Last 1 Encounters:   05/01/24 143 kg (315 lb)       Physical Exam    General: Well appearing, no acute distress  Head: Normocephalic, atraumatic, neck supple without lymphadenopathy  Eyes: EOMI, non-injected  Ears: Tm's with visible landmarks.  Nose: No nasal crease, nares patent, slightly boggy turbinates, minimal discharge  Throat: Normal dentition, no erythema  Heart: Regular rate and rhythm  Lungs: Clear to auscultation bilaterally, effort normal  Abdomen: Soft, non-tender, normal bowel sounds  Extremities: Moves all extremities symmetrically, no edema  Skin: No rashes/lesions  Psych: normal mood and affect    LAB RESULTS:  CBC:  Recent Labs     04/28/24  0527 04/23/24  0610 04/22/24  0547 04/21/24  2300 04/03/24  0024   WBC 11.2 19.2* 6.7 6.3 11.7*   HGB 15.8 12.9 13.7 14.2 14.7   HCT 44.5 37.8 39.0 40.1 41.2    354 331 357 382   MCV 84 87 84 84 85   EOSABS 0.00  --   --  0.00 0.00       CMP:  Recent Labs     04/28/24  0527 04/23/24  0610 04/22/24  0547 03/25/24  2345 03/01/24  0404 02/29/24  0415   * 135* 134*   < > 135* 136   K 4.2 4.2 3.4*   < > 3.8 4.4    105 104   < > 103 104   CO2 24 23 21   < > 23 23   ANIONGAP 12 11 12   < > 13 13   BUN 13 15 11   < > 13 10   CREATININE " 0.93 0.94 0.87   < > 0.81 0.83   EGFR 82 81 89   < > >90 >90   MG  --  2.08  --   --  1.97 2.10    < > = values in this interval not displayed.     Recent Labs     04/21/24  2300 03/01/24  0404 02/29/24  0415 09/14/22  0200 09/13/22  0735 08/03/22  1851 07/28/22  1827 01/22/22  0407 09/13/21  0050   ALBUMIN 4.1 3.8 4.0   < > 3.9   < > 4.2   < > 4.0   ALKPHOS 82 93 95   < > 73   < > 74   < > 74   ALT 37 123* 109*   < > 38   < > 46*   < > 41   AST 27 46* 48*   < > 23   < > 33   < > 23   BILITOT 0.9 0.3 0.5   < > 0.3   < > 0.8   < > 0.5   LIPASE  --   --   --   --  34  --  35  --  55    < > = values in this interval not displayed.       ALLERGY:   Lab Results   Component Value Date    ICIGE 67.0 08/14/2023    WHITEASH <0.10 08/14/2023    SILVERBIRCH <0.10 08/14/2023    BOXELDER <0.10 08/14/2023    MOUNTJUNIPER <0.10 08/14/2023    COTTONWOOD <0.10 08/14/2023    ELM <0.10 08/14/2023    MULBERRY <0.10 08/14/2023    PECANHICKORY <0.10 08/14/2023    MAPLESYCAMOR <0.10 08/14/2023    OAK <0.10 08/14/2023    BERMUDAGR <0.10 08/14/2023    JOHNSONGR <0.10 08/14/2023    BLUEGRASS <0.10 08/14/2023    TIMOTHYGRASS <0.10 08/14/2023     Lab Results   Component Value Date    LAMBQUART <0.10 08/14/2023    PIGWEED <0.10 08/14/2023    COMRAGWEED <0.10 08/14/2023    SHEEPSOR <0.10 08/14/2023    PLANTAIN <0.10 08/14/2023    CATEPI <0.10 08/14/2023    DOGEPI <0.10 08/14/2023    ALTERNA <0.10 08/14/2023    CLADHERB <0.10 08/14/2023    ICA04 <0.10 08/14/2023    DERMFAR <0.10 08/14/2023    DERMPTE <0.10 08/14/2023    COCKR 0.35 (A) 08/14/2023     Lab Results   Component Value Date    WALNUT <0.10 08/14/2023       Recent Labs     08/14/23  1548   ICIGE 67.0     Recent Labs     04/28/24  0527 04/21/24  2300 04/03/24  0024   EOSABS 0.00 0.00 0.00     COAG:   Recent Labs     10/15/23  0644 06/06/23  0042 10/07/22  2350   INR 1.0 0.9 1.0       HEME/ENDO:  Recent Labs     04/03/24  0024 10/15/23  1423 08/21/23  0120 07/10/23  1434 05/24/23  0917  09/14/22  0200   TSH 4.56*  --  2.23 2.66   < >  --    HGBA1C  --  4.7  --   --   --   --    FERRITIN  --   --   --   --   --  183*    < > = values in this interval not displayed.     Interpreted By:  Salbador Urbina,   STUDY:  CT ANGIO CHEST FOR PULMONARY EMBOLISM;  4/22/2024 12:03 am      INDICATION:  Signs/Symptoms:elevated d dimer, shortness of breath.      COMPARISON:  None.      ACCESSION NUMBER(S):  [id number]      ORDERING CLINICIAN:  BOBBI SALAZAR      TECHNIQUE:  Contiguous axial images of the chest were obtained after the  intravenous administration of  contrast. Coronal and sagittal  reformatted images were obtained from the axial images. MIPS of the  chest were also performed and reviewed.      FINDINGS:  No axillary, mediastinal, or hilar lymphadenopathy.      The heart is normal in size. No significant pericardial effusion. No  evidence of acute central, main, lobar or process segmental pulmonary  embolism.      No airspace consolidation or pleural effusion. No pneumothorax.      Limited evaluation of the upper abdomen.      Multichannel change of the thoracic spine.      IMPRESSION:  No evidence of acute pulmonary embolism.      No evidence of pneumonia.      MACRO:  None    Assessment/Plan   Sarahi is a 39 yo with history of severe asthma with recent exacerbation and she has recently been hospitalized for exacerbation. Morbid obesity is a complicating factor.  -we reviewed her labs and ct scan from this hospitalization. She has clear exam today. Will wean steroid. Add Nexium for gut protection.  -Continue with current asthma medications  -Her change from ER to OR will hopefully be a better schedule with less physical   -she is going to see Dr. Matthew to talk about therapy to help her get moving more.  Follow up 3 month.  Hilary Trinidad DO

## 2024-05-01 NOTE — PATIENT INSTRUCTIONS
Wean to 30 mg for a week, then to 20 mg for a week until I see you in a month.  Call with any side effects or concerns.  Start Nexium for Gut protection and to help with Nausea.    Talk with Dr. Matthew about therapy such as water therapy.

## 2024-05-07 ENCOUNTER — OFFICE VISIT (OUTPATIENT)
Dept: PRIMARY CARE | Facility: CLINIC | Age: 39
End: 2024-05-07
Payer: COMMERCIAL

## 2024-05-07 VITALS
OXYGEN SATURATION: 97 % | HEIGHT: 65 IN | SYSTOLIC BLOOD PRESSURE: 128 MMHG | BODY MASS INDEX: 48.82 KG/M2 | DIASTOLIC BLOOD PRESSURE: 74 MMHG | TEMPERATURE: 97.4 F | WEIGHT: 293 LBS | HEART RATE: 81 BPM

## 2024-05-07 DIAGNOSIS — K21.9 GASTROESOPHAGEAL REFLUX DISEASE WITHOUT ESOPHAGITIS: ICD-10-CM

## 2024-05-07 DIAGNOSIS — Z79.52 CURRENT CHRONIC USE OF SYSTEMIC STEROIDS: ICD-10-CM

## 2024-05-07 DIAGNOSIS — J45.50 SEVERE PERSISTENT ASTHMA, UNSPECIFIED WHETHER COMPLICATED (MULTI): Primary | ICD-10-CM

## 2024-05-07 DIAGNOSIS — F51.5 NIGHTMARES: ICD-10-CM

## 2024-05-07 PROCEDURE — 99495 TRANSJ CARE MGMT MOD F2F 14D: CPT | Performed by: FAMILY MEDICINE

## 2024-05-07 RX ORDER — METOCLOPRAMIDE 10 MG/1
10 TABLET ORAL EVERY 8 HOURS PRN
Qty: 90 TABLET | Refills: 1 | Status: SHIPPED | OUTPATIENT
Start: 2024-05-07

## 2024-05-07 RX ORDER — MOXIFLOXACIN HYDROCHLORIDE 400 MG/1
400 TABLET ORAL DAILY
Qty: 10 TABLET | Refills: 0 | Status: SHIPPED | OUTPATIENT
Start: 2024-05-07 | End: 2024-05-17

## 2024-05-07 NOTE — PROGRESS NOTES
Subjective   Patient ID: Sarahi Haley is a 38 y.o. female who presents for hospitial follow up.    HPI Pt was seen through  [facility]  on  04/23/2024 through 04/24/2024 and then was seen through the ER on 04/28/2024 for Asthma and SOB. Pt states she is still not feeling the greatest.    Review of Systems  12 Systems have been reviewed as follows.   Constitutional: Fever, weight gain, weight loss, appetite change, night sweats, fatigue, chills.  Eyes : blurry, double vision, vision, loss, tearing, redness, pain, sensitivity to light, glaucoma.  Ears, nose, mouth, and throat: Hearing loss, ringing in the ears, ear pain, nasal congestion, nasal drainage, nosebleeds, mouth, throat, irritation tooth problem.  Cardiovascular :chest pain, pressure, heart racing, palpitations, sweating, leg swelling, high or low blood pressure  Pulmonary: Cough, yellow or green sputum, blood and sputum, shortness of breath, wheezing  Gastrointestinal: Nausea, vomiting, diarrhea, constipation, pain, blood in stool, or vomitus, heartburn, difficulty swallowing  Genitourinary: incontinence, abnormal bleeding, abnormal discharge, urinary frequency, urinary hesitancy, pain, impotence sexual problem, infection, urinary retention  Musculoskeletal: Pain, stiffness, joint, redness or warmth, arthritis, back pain, weakness, muscle wasting, sprain or fracture  Neuro: Weight weakness, dizziness, change in voice, change in taste change in vision, change in hearing, loss, or change of sensation, trouble walking, balance problems coordination problems, shaking, speech problem  Endocrine , cold or heat intolerance, blood sugar problem, weight gain or loss missed periods hot flashes, sweats, change in body hair, change in libido, increased thirst, increased urination  Heme/lymph: Swelling, bleeding, problem anemia, bruising, enlarged lymph nodes  Allergic/immunologic: H. plus nasal drip, watery itchy eyes, nasal drainage, immunosuppressed  The above were  "reviewed and noted negative except as noted in HPI and Problem List.    Objective   /74 (BP Location: Left arm, Patient Position: Sitting, BP Cuff Size: Adult)   Pulse 81   Temp 36.3 °C (97.4 °F)   Ht 1.651 m (5' 5\")   Wt 142 kg (314 lb)   SpO2 97%   BMI 52.25 kg/m²     Physical Exam  Constitutional: Well developed, well nourished, alert and in no acute distress  Eyes: Normal external exam. Pupils equally round and reactive to light with normal accommodation and extraocular movements intact.  Neck: Supple, no lymphadenopathy or masses.  Cardiovascular: Regular rate and rhythm, normal S1 and S2, no murmurs, gallops, or rubs. Radial pulses normal. No peripheral edema.  Abdomen: soft, non tender, distended, no masses or HSM.  Pulmonary: No respiratory distress, lungs clear to auscultation bilaterally. No wheezes, rhonchi, rales.  Skin: Warm, well perfused, normal skin turgor and color.  Neurologic: Cranial nerves II-XII grossly intact.  Psychiatric: Mood calm and affect normal  Musculoskeletal: Moving all extremities without restriction    Assessment/Plan   Problem List Items Addressed This Visit             ICD-10-CM    Asthma ([facility]) - Primary J45.909    Relevant Medications    moxifloxacin (Avelox) 400 mg tablet    GERD (gastroesophageal reflux disease) K21.9    Relevant Medications    metoclopramide (Reglan) 10 mg tablet     Other Visit Diagnoses         Codes    Current chronic use of systemic steroids     Z79.52    Nightmares     F51.5             Scribe Attestation  By signing my name below, I, Paul Matthew DO, Scribe   attest that this documentation has been prepared under the direction and in the presence of Paul Matthew DO.    Provider Attestation - Scribe documentation    All medical record entries made by the Scribe were at my direction and personally dictated by me. I have reviewed the chart and agree that the record accurately reflects my personal performance of the history, physical " exam, discussion and plan.

## 2024-05-10 ENCOUNTER — PATIENT OUTREACH (OUTPATIENT)
Dept: CARE COORDINATION | Facility: CLINIC | Age: 39
End: 2024-05-10
Payer: COMMERCIAL

## 2024-05-14 ENCOUNTER — HOSPITAL ENCOUNTER (EMERGENCY)
Facility: HOSPITAL | Age: 39
Discharge: HOME | End: 2024-05-14
Attending: EMERGENCY MEDICINE
Payer: COMMERCIAL

## 2024-05-14 ENCOUNTER — APPOINTMENT (OUTPATIENT)
Dept: CARDIOLOGY | Facility: HOSPITAL | Age: 39
End: 2024-05-14
Payer: COMMERCIAL

## 2024-05-14 VITALS
DIASTOLIC BLOOD PRESSURE: 88 MMHG | OXYGEN SATURATION: 94 % | RESPIRATION RATE: 18 BRPM | BODY MASS INDEX: 48.82 KG/M2 | SYSTOLIC BLOOD PRESSURE: 164 MMHG | WEIGHT: 293 LBS | TEMPERATURE: 96.8 F | HEART RATE: 79 BPM | HEIGHT: 65 IN

## 2024-05-14 DIAGNOSIS — J45.901 MILD ASTHMA WITH EXACERBATION, UNSPECIFIED WHETHER PERSISTENT (HHS-HCC): Primary | ICD-10-CM

## 2024-05-14 LAB
ANION GAP SERPL CALC-SCNC: 12 MMOL/L (ref 10–20)
BASOPHILS # BLD AUTO: 0.03 X10*3/UL (ref 0–0.1)
BASOPHILS NFR BLD AUTO: 0.3 %
BUN SERPL-MCNC: 11 MG/DL (ref 6–23)
CALCIUM SERPL-MCNC: 8.7 MG/DL (ref 8.6–10.3)
CHLORIDE SERPL-SCNC: 105 MMOL/L (ref 98–107)
CO2 SERPL-SCNC: 24 MMOL/L (ref 21–32)
CREAT SERPL-MCNC: 0.9 MG/DL (ref 0.5–1.05)
EGFRCR SERPLBLD CKD-EPI 2021: 85 ML/MIN/1.73M*2
EOSINOPHIL # BLD AUTO: 0 X10*3/UL (ref 0–0.7)
EOSINOPHIL NFR BLD AUTO: 0 %
ERYTHROCYTE [DISTWIDTH] IN BLOOD BY AUTOMATED COUNT: 12.3 % (ref 11.5–14.5)
GLUCOSE SERPL-MCNC: 117 MG/DL (ref 74–99)
HCT VFR BLD AUTO: 40.1 % (ref 36–46)
HGB BLD-MCNC: 14.1 G/DL (ref 12–16)
IMM GRANULOCYTES # BLD AUTO: 0.02 X10*3/UL (ref 0–0.7)
IMM GRANULOCYTES NFR BLD AUTO: 0.2 % (ref 0–0.9)
LYMPHOCYTES # BLD AUTO: 3.81 X10*3/UL (ref 1.2–4.8)
LYMPHOCYTES NFR BLD AUTO: 41.7 %
MCH RBC QN AUTO: 30 PG (ref 26–34)
MCHC RBC AUTO-ENTMCNC: 35.2 G/DL (ref 32–36)
MCV RBC AUTO: 85 FL (ref 80–100)
MONOCYTES # BLD AUTO: 0.77 X10*3/UL (ref 0.1–1)
MONOCYTES NFR BLD AUTO: 8.4 %
NEUTROPHILS # BLD AUTO: 4.5 X10*3/UL (ref 1.2–7.7)
NEUTROPHILS NFR BLD AUTO: 49.4 %
NRBC BLD-RTO: 0 /100 WBCS (ref 0–0)
PLATELET # BLD AUTO: 392 X10*3/UL (ref 150–450)
POTASSIUM SERPL-SCNC: 3 MMOL/L (ref 3.5–5.3)
RBC # BLD AUTO: 4.7 X10*6/UL (ref 4–5.2)
SODIUM SERPL-SCNC: 138 MMOL/L (ref 136–145)
WBC # BLD AUTO: 9.1 X10*3/UL (ref 4.4–11.3)

## 2024-05-14 PROCEDURE — 96375 TX/PRO/DX INJ NEW DRUG ADDON: CPT

## 2024-05-14 PROCEDURE — 93005 ELECTROCARDIOGRAM TRACING: CPT

## 2024-05-14 PROCEDURE — 80048 BASIC METABOLIC PNL TOTAL CA: CPT | Performed by: EMERGENCY MEDICINE

## 2024-05-14 PROCEDURE — 96374 THER/PROPH/DIAG INJ IV PUSH: CPT

## 2024-05-14 PROCEDURE — 2500000004 HC RX 250 GENERAL PHARMACY W/ HCPCS (ALT 636 FOR OP/ED): Performed by: EMERGENCY MEDICINE

## 2024-05-14 PROCEDURE — 85025 COMPLETE CBC W/AUTO DIFF WBC: CPT | Performed by: EMERGENCY MEDICINE

## 2024-05-14 PROCEDURE — 2500000001 HC RX 250 WO HCPCS SELF ADMINISTERED DRUGS (ALT 637 FOR MEDICARE OP): Performed by: EMERGENCY MEDICINE

## 2024-05-14 PROCEDURE — 36415 COLL VENOUS BLD VENIPUNCTURE: CPT | Performed by: EMERGENCY MEDICINE

## 2024-05-14 PROCEDURE — 96361 HYDRATE IV INFUSION ADD-ON: CPT

## 2024-05-14 PROCEDURE — 99284 EMERGENCY DEPT VISIT MOD MDM: CPT | Mod: 25

## 2024-05-14 RX ORDER — ONDANSETRON HYDROCHLORIDE 2 MG/ML
4 INJECTION, SOLUTION INTRAVENOUS ONCE
Status: COMPLETED | OUTPATIENT
Start: 2024-05-14 | End: 2024-05-14

## 2024-05-14 RX ORDER — POTASSIUM CHLORIDE 1.5 G/1.58G
20 POWDER, FOR SOLUTION ORAL ONCE
Status: COMPLETED | OUTPATIENT
Start: 2024-05-14 | End: 2024-05-14

## 2024-05-14 RX ORDER — POTASSIUM CHLORIDE 1.5 G/1.58G
20 POWDER, FOR SOLUTION ORAL 2 TIMES DAILY
Status: DISCONTINUED | OUTPATIENT
Start: 2024-05-14 | End: 2024-05-14

## 2024-05-14 RX ORDER — MORPHINE SULFATE 4 MG/ML
4 INJECTION, SOLUTION INTRAMUSCULAR; INTRAVENOUS ONCE
Status: COMPLETED | OUTPATIENT
Start: 2024-05-14 | End: 2024-05-14

## 2024-05-14 RX ADMIN — MORPHINE SULFATE 4 MG: 4 INJECTION, SOLUTION INTRAMUSCULAR; INTRAVENOUS at 04:51

## 2024-05-14 RX ADMIN — SODIUM CHLORIDE 500 ML: 9 INJECTION, SOLUTION INTRAVENOUS at 03:52

## 2024-05-14 RX ADMIN — POTASSIUM CHLORIDE 20 MEQ: 1.5 POWDER, FOR SOLUTION ORAL at 05:42

## 2024-05-14 RX ADMIN — ONDANSETRON 4 MG: 2 INJECTION INTRAMUSCULAR; INTRAVENOUS at 04:52

## 2024-05-14 ASSESSMENT — PAIN SCALES - GENERAL
PAINLEVEL_OUTOF10: 5 - MODERATE PAIN
PAINLEVEL_OUTOF10: 3

## 2024-05-14 ASSESSMENT — PAIN - FUNCTIONAL ASSESSMENT
PAIN_FUNCTIONAL_ASSESSMENT: 0-10
PAIN_FUNCTIONAL_ASSESSMENT: 0-10

## 2024-05-14 ASSESSMENT — LIFESTYLE VARIABLES
HAVE PEOPLE ANNOYED YOU BY CRITICIZING YOUR DRINKING: NO
HAVE YOU EVER FELT YOU SHOULD CUT DOWN ON YOUR DRINKING: NO
TOTAL SCORE: 0
EVER FELT BAD OR GUILTY ABOUT YOUR DRINKING: NO
EVER HAD A DRINK FIRST THING IN THE MORNING TO STEADY YOUR NERVES TO GET RID OF A HANGOVER: NO

## 2024-05-14 ASSESSMENT — PAIN DESCRIPTION - PAIN TYPE: TYPE: ACUTE PAIN

## 2024-05-14 ASSESSMENT — PAIN DESCRIPTION - LOCATION: LOCATION: HEAD

## 2024-05-14 NOTE — ED PROVIDER NOTES
HPI   Chief Complaint   Patient presents with    Shortness of Breath       Chief complaint shortness of breath  History of present illness 38-year-old  female who had an acute exacerbation of asthma took albuterol.  I inhaler squad arrived gave her epinephrine Solu-Medrol and magnesium and brought her here.  The wheezing is ceased she is here with a blood pressure 141/81 temp 36 pulse 88 respiratory rate 20 pulse ox 95%                          Matheus Coma Scale Score: 15                     Patient History   Past Medical History:   Diagnosis Date    Acute cystitis without hematuria 08/08/2019    Acute cystitis without hematuria    Asthma ([facility])     Disease of thyroid gland     Encounter for initial prescription of contraceptive pills 11/01/2019    Encounter for initial prescription of contraceptive pills    Nausea 02/05/2021    Nausea in adult    Other general symptoms and signs 12/05/2019    Forgetfulness    Parageusia 04/06/2020    Taste perversion    Pelvic and perineal pain     Pelvic pain in female    Personal history of other diseases of the respiratory system 04/06/2020    History of sinusitis    Personal history of other specified conditions 07/01/2020    History of vomiting    Personal history of other specified conditions 01/26/2021    History of headache    Personal history of other specified conditions 02/01/2021    History of diarrhea    Personal history of thrombophlebitis 06/03/2019    History of phlebitis    Personal history of urinary (tract) infections 01/16/2020    History of urinary tract infection    PTSD (post-traumatic stress disorder)      Past Surgical History:   Procedure Laterality Date    OTHER SURGICAL HISTORY  02/08/2019    Appendectomy    OTHER SURGICAL HISTORY  02/08/2019    Cholecystectomy    OTHER SURGICAL HISTORY  02/08/2019    Cystoscopy    OTHER SURGICAL HISTORY  02/08/2019    Ureteroscopy    OTHER SURGICAL HISTORY  02/08/2019    Shoulder surgery     Family History    Problem Relation Name Age of Onset    Hypertension Father      Other (Pulmonary Valve Stenosis) Sister      Heart disease Maternal Grandmother      Diabetes Other Grandparent     Lung cancer Other Grandparent     Anxiety disorder Sibling       Social History     Tobacco Use    Smoking status: Never     Passive exposure: Current    Smokeless tobacco: Never   Vaping Use    Vaping status: Never Used   Substance Use Topics    Alcohol use: Yes     Alcohol/week: 1.0 - 2.0 standard drink of alcohol     Types: 1 - 2 Glasses of wine per week     Comment: social    Drug use: Never       Physical Exam   ED Triage Vitals   Temperature Heart Rate Respirations BP   05/14/24 0326 05/14/24 0326 05/14/24 0326 05/14/24 0328   36 °C (96.8 °F) 98 20 (!) 179/94      Pulse Ox Temp Source Heart Rate Source Patient Position   05/14/24 0326 05/14/24 0326 -- --   99 % Temporal        BP Location FiO2 (%)     -- --             Physical Exam  Vitals and nursing note reviewed. Exam conducted with a chaperone present.   Constitutional:       Appearance: Normal appearance.   HENT:      Head: Normocephalic and atraumatic.      Nose: Nose normal.      Mouth/Throat:      Mouth: Mucous membranes are moist.   Eyes:      Pupils: Pupils are equal, round, and reactive to light.   Cardiovascular:      Rate and Rhythm: Normal rate and regular rhythm.   Pulmonary:      Effort: Pulmonary effort is normal.      Breath sounds: Decreased breath sounds present.   Abdominal:      Palpations: Abdomen is soft.   Musculoskeletal:         General: Normal range of motion.      Cervical back: Normal range of motion.   Skin:     General: Skin is warm and dry.      Capillary Refill: Capillary refill takes less than 2 seconds.   Neurological:      General: No focal deficit present.      Mental Status: She is alert.   Psychiatric:         Mood and Affect: Mood normal.         ED Course & MDM   Diagnoses as of 05/14/24 0521   Mild asthma with exacerbation, unspecified  whether persistent ([facility])       Medical Decision Making  Differential diagnosis  Acute asthma exacerbation  Arrhythmia  Bronchospasm  .  IV fluids were given IV Zofran IV morphine potassium CBC electrolytes were performed patient was observed and then discharged home    Amount and/or Complexity of Data Reviewed  ECG/medicine tests: ordered and independent interpretation performed.     Details: EKG    1.  Normal sinus 87  Discussion of management or test interpretation with external provider(s): Patient was reassessed improved was discharged home        Procedure  ECG 12 lead    Performed by: Greg Napoles MD  Authorized by: Greg Napoles MD    ECG interpreted by ED Physician in the absence of a cardiologist: yes    Interpretation:     Interpretation: normal    Rate:     ECG rate:  87    ECG rate assessment: normal    Rhythm:     Rhythm: sinus rhythm    Ectopy:     Ectopy: none    QRS:     QRS axis:  Normal  ST segments:     ST segments:  Normal  T waves:     T waves: normal         Greg Napoles MD  05/14/24 0506

## 2024-05-15 ENCOUNTER — HOSPITAL ENCOUNTER (EMERGENCY)
Facility: HOSPITAL | Age: 39
Discharge: HOME | End: 2024-05-15
Attending: EMERGENCY MEDICINE
Payer: COMMERCIAL

## 2024-05-15 VITALS
HEART RATE: 98 BPM | BODY MASS INDEX: 48.82 KG/M2 | TEMPERATURE: 97.9 F | OXYGEN SATURATION: 98 % | SYSTOLIC BLOOD PRESSURE: 146 MMHG | RESPIRATION RATE: 18 BRPM | HEIGHT: 65 IN | DIASTOLIC BLOOD PRESSURE: 66 MMHG | WEIGHT: 293 LBS

## 2024-05-15 DIAGNOSIS — J45.901 MODERATE ASTHMA WITH EXACERBATION, UNSPECIFIED WHETHER PERSISTENT (HHS-HCC): Primary | ICD-10-CM

## 2024-05-15 LAB
ANION GAP SERPL CALC-SCNC: 12 MMOL/L (ref 10–20)
BASOPHILS # BLD AUTO: 0.02 X10*3/UL (ref 0–0.1)
BASOPHILS NFR BLD AUTO: 0.1 %
BUN SERPL-MCNC: 13 MG/DL (ref 6–23)
CALCIUM SERPL-MCNC: 9 MG/DL (ref 8.6–10.3)
CHLORIDE SERPL-SCNC: 105 MMOL/L (ref 98–107)
CO2 SERPL-SCNC: 22 MMOL/L (ref 21–32)
CREAT SERPL-MCNC: 0.92 MG/DL (ref 0.5–1.05)
EGFRCR SERPLBLD CKD-EPI 2021: 83 ML/MIN/1.73M*2
EOSINOPHIL # BLD AUTO: 0 X10*3/UL (ref 0–0.7)
EOSINOPHIL NFR BLD AUTO: 0 %
ERYTHROCYTE [DISTWIDTH] IN BLOOD BY AUTOMATED COUNT: 12.5 % (ref 11.5–14.5)
GLUCOSE SERPL-MCNC: 160 MG/DL (ref 74–99)
HCT VFR BLD AUTO: 39.6 % (ref 36–46)
HGB BLD-MCNC: 14.1 G/DL (ref 12–16)
IMM GRANULOCYTES # BLD AUTO: 0.07 X10*3/UL (ref 0–0.7)
IMM GRANULOCYTES NFR BLD AUTO: 0.3 % (ref 0–0.9)
LYMPHOCYTES # BLD AUTO: 3.57 X10*3/UL (ref 1.2–4.8)
LYMPHOCYTES NFR BLD AUTO: 17.1 %
MCH RBC QN AUTO: 30.5 PG (ref 26–34)
MCHC RBC AUTO-ENTMCNC: 35.6 G/DL (ref 32–36)
MCV RBC AUTO: 86 FL (ref 80–100)
MONOCYTES # BLD AUTO: 1.51 X10*3/UL (ref 0.1–1)
MONOCYTES NFR BLD AUTO: 7.2 %
NEUTROPHILS # BLD AUTO: 15.67 X10*3/UL (ref 1.2–7.7)
NEUTROPHILS NFR BLD AUTO: 75.3 %
NRBC BLD-RTO: 0 /100 WBCS (ref 0–0)
PLATELET # BLD AUTO: 428 X10*3/UL (ref 150–450)
POTASSIUM SERPL-SCNC: 3.3 MMOL/L (ref 3.5–5.3)
RBC # BLD AUTO: 4.62 X10*6/UL (ref 4–5.2)
SODIUM SERPL-SCNC: 136 MMOL/L (ref 136–145)
WBC # BLD AUTO: 20.8 X10*3/UL (ref 4.4–11.3)

## 2024-05-15 PROCEDURE — 96365 THER/PROPH/DIAG IV INF INIT: CPT

## 2024-05-15 PROCEDURE — 96361 HYDRATE IV INFUSION ADD-ON: CPT

## 2024-05-15 PROCEDURE — 85025 COMPLETE CBC W/AUTO DIFF WBC: CPT | Performed by: EMERGENCY MEDICINE

## 2024-05-15 PROCEDURE — 96376 TX/PRO/DX INJ SAME DRUG ADON: CPT

## 2024-05-15 PROCEDURE — 99284 EMERGENCY DEPT VISIT MOD MDM: CPT | Mod: 25

## 2024-05-15 PROCEDURE — 94640 AIRWAY INHALATION TREATMENT: CPT

## 2024-05-15 PROCEDURE — 2500000004 HC RX 250 GENERAL PHARMACY W/ HCPCS (ALT 636 FOR OP/ED): Performed by: EMERGENCY MEDICINE

## 2024-05-15 PROCEDURE — 36415 COLL VENOUS BLD VENIPUNCTURE: CPT | Performed by: EMERGENCY MEDICINE

## 2024-05-15 PROCEDURE — 2500000004 HC RX 250 GENERAL PHARMACY W/ HCPCS (ALT 636 FOR OP/ED)

## 2024-05-15 PROCEDURE — 2500000002 HC RX 250 W HCPCS SELF ADMINISTERED DRUGS (ALT 637 FOR MEDICARE OP, ALT 636 FOR OP/ED): Performed by: EMERGENCY MEDICINE

## 2024-05-15 PROCEDURE — 80048 BASIC METABOLIC PNL TOTAL CA: CPT | Performed by: EMERGENCY MEDICINE

## 2024-05-15 PROCEDURE — 96375 TX/PRO/DX INJ NEW DRUG ADDON: CPT

## 2024-05-15 RX ORDER — MORPHINE SULFATE 4 MG/ML
4 INJECTION, SOLUTION INTRAMUSCULAR; INTRAVENOUS ONCE
Status: COMPLETED | OUTPATIENT
Start: 2024-05-15 | End: 2024-05-15

## 2024-05-15 RX ORDER — ONDANSETRON HYDROCHLORIDE 2 MG/ML
4 INJECTION, SOLUTION INTRAVENOUS ONCE
Status: COMPLETED | OUTPATIENT
Start: 2024-05-15 | End: 2024-05-15

## 2024-05-15 RX ORDER — MAGNESIUM SULFATE HEPTAHYDRATE 40 MG/ML
INJECTION, SOLUTION INTRAVENOUS
Status: COMPLETED
Start: 2024-05-15 | End: 2024-05-15

## 2024-05-15 RX ORDER — MAGNESIUM SULFATE HEPTAHYDRATE 40 MG/ML
2 INJECTION, SOLUTION INTRAVENOUS ONCE
Status: COMPLETED | OUTPATIENT
Start: 2024-05-15 | End: 2024-05-15

## 2024-05-15 RX ORDER — IPRATROPIUM BROMIDE AND ALBUTEROL SULFATE 2.5; .5 MG/3ML; MG/3ML
3 SOLUTION RESPIRATORY (INHALATION) ONCE
Status: COMPLETED | OUTPATIENT
Start: 2024-05-15 | End: 2024-05-15

## 2024-05-15 RX ORDER — ACETAMINOPHEN 325 MG/1
975 TABLET ORAL ONCE
Status: COMPLETED | OUTPATIENT
Start: 2024-05-15 | End: 2024-05-15

## 2024-05-15 RX ADMIN — MAGNESIUM SULFATE HEPTAHYDRATE 2 G: 40 INJECTION, SOLUTION INTRAVENOUS at 03:46

## 2024-05-15 RX ADMIN — ONDANSETRON 4 MG: 2 INJECTION INTRAMUSCULAR; INTRAVENOUS at 02:43

## 2024-05-15 RX ADMIN — ACETAMINOPHEN 975 MG: 325 TABLET ORAL at 03:45

## 2024-05-15 RX ADMIN — MORPHINE SULFATE 4 MG: 4 INJECTION, SOLUTION INTRAMUSCULAR; INTRAVENOUS at 02:43

## 2024-05-15 RX ADMIN — SODIUM CHLORIDE 500 ML: 9 INJECTION, SOLUTION INTRAVENOUS at 02:40

## 2024-05-15 RX ADMIN — IPRATROPIUM BROMIDE AND ALBUTEROL SULFATE 3 ML: 2.5; .5 SOLUTION RESPIRATORY (INHALATION) at 02:38

## 2024-05-15 RX ADMIN — ONDANSETRON 4 MG: 2 INJECTION INTRAMUSCULAR; INTRAVENOUS at 03:55

## 2024-05-15 ASSESSMENT — PAIN DESCRIPTION - PAIN TYPE: TYPE: ACUTE PAIN

## 2024-05-15 ASSESSMENT — LIFESTYLE VARIABLES
TOTAL SCORE: 0
EVER FELT BAD OR GUILTY ABOUT YOUR DRINKING: NO
EVER HAD A DRINK FIRST THING IN THE MORNING TO STEADY YOUR NERVES TO GET RID OF A HANGOVER: NO
HAVE YOU EVER FELT YOU SHOULD CUT DOWN ON YOUR DRINKING: NO
HAVE PEOPLE ANNOYED YOU BY CRITICIZING YOUR DRINKING: NO

## 2024-05-15 ASSESSMENT — PAIN SCALES - GENERAL
PAINLEVEL_OUTOF10: 7
PAINLEVEL_OUTOF10: 0 - NO PAIN

## 2024-05-15 ASSESSMENT — PAIN - FUNCTIONAL ASSESSMENT: PAIN_FUNCTIONAL_ASSESSMENT: 0-10

## 2024-05-15 ASSESSMENT — PAIN DESCRIPTION - LOCATION: LOCATION: HEAD

## 2024-05-15 NOTE — ED PROVIDER NOTES
HPI   Chief Complaint   Patient presents with    Shortness of Breath       Chief complaint shortness of breath  History of present illness 38-year-old female who has a history of asthma she has got an exacerbation today she was brought by squad she used her own treatments as well and is here with bronchospasm and this tightness in the chest.  Seer with blood pressure 131/60 temp 36 6 pulse 99 respirate is 18 pulse ox 98%                          Matheus Coma Scale Score: 15                     Patient History   Past Medical History:   Diagnosis Date    Acute cystitis without hematuria 08/08/2019    Acute cystitis without hematuria    Asthma ([facility])     Disease of thyroid gland     Encounter for initial prescription of contraceptive pills 11/01/2019    Encounter for initial prescription of contraceptive pills    Nausea 02/05/2021    Nausea in adult    Other general symptoms and signs 12/05/2019    Forgetfulness    Parageusia 04/06/2020    Taste perversion    Pelvic and perineal pain     Pelvic pain in female    Personal history of other diseases of the respiratory system 04/06/2020    History of sinusitis    Personal history of other specified conditions 07/01/2020    History of vomiting    Personal history of other specified conditions 01/26/2021    History of headache    Personal history of other specified conditions 02/01/2021    History of diarrhea    Personal history of thrombophlebitis 06/03/2019    History of phlebitis    Personal history of urinary (tract) infections 01/16/2020    History of urinary tract infection    PTSD (post-traumatic stress disorder)      Past Surgical History:   Procedure Laterality Date    OTHER SURGICAL HISTORY  02/08/2019    Appendectomy    OTHER SURGICAL HISTORY  02/08/2019    Cholecystectomy    OTHER SURGICAL HISTORY  02/08/2019    Cystoscopy    OTHER SURGICAL HISTORY  02/08/2019    Ureteroscopy    OTHER SURGICAL HISTORY  02/08/2019    Shoulder surgery     Family History    Problem Relation Name Age of Onset    Hypertension Father      Other (Pulmonary Valve Stenosis) Sister      Heart disease Maternal Grandmother      Diabetes Other Grandparent     Lung cancer Other Grandparent     Anxiety disorder Sibling       Social History     Tobacco Use    Smoking status: Never     Passive exposure: Current    Smokeless tobacco: Never   Vaping Use    Vaping status: Never Used   Substance Use Topics    Alcohol use: Yes     Alcohol/week: 1.0 - 2.0 standard drink of alcohol     Types: 1 - 2 Glasses of wine per week     Comment: social    Drug use: Never       Physical Exam   ED Triage Vitals   Temperature Heart Rate Respirations BP   05/15/24 0230 05/15/24 0230 05/15/24 0230 05/15/24 0230   36.6 °C (97.9 °F) (!) 110 18 (!) 178/99      Pulse Ox Temp Source Heart Rate Source Patient Position   05/15/24 0230 05/15/24 0230 05/15/24 0230 --   99 % Temporal Monitor       BP Location FiO2 (%)     -- 05/15/24 0238      32 %       Physical Exam  Vitals and nursing note reviewed.   Constitutional:       General: She is in acute distress.      Appearance: She is normal weight.   HENT:      Head: Normocephalic and atraumatic.      Mouth/Throat:      Pharynx: Oropharynx is clear.   Eyes:      Extraocular Movements: Extraocular movements intact.      Pupils: Pupils are equal, round, and reactive to light.   Cardiovascular:      Rate and Rhythm: Normal rate.   Pulmonary:      Effort: Tachypnea present.      Breath sounds: Examination of the right-middle field reveals wheezing. Examination of the left-middle field reveals wheezing. Examination of the right-lower field reveals wheezing. Examination of the left-lower field reveals wheezing. Decreased breath sounds and wheezing present.   Musculoskeletal:         General: Normal range of motion.      Cervical back: Normal range of motion and neck supple.   Skin:     General: Skin is warm.      Capillary Refill: Capillary refill takes less than 2 seconds.    Neurological:      General: No focal deficit present.      Mental Status: She is alert.         ED Course & MDM   Diagnoses as of 05/15/24 0452   Moderate asthma with exacerbation, unspecified whether persistent ([facility])       Medical Decision Making  Differential diagnosis  Acute asthma exacerbation  Laryngeal spasm  Bronchospasm  Tracheitis  Considering the above differential diagnosis following test and treatments were given here aerosol treatment magnesium CBC electrolytes were done Zofran and morphine and then another dose of Zofran and Tylenol    Amount and/or Complexity of Data Reviewed  Labs: ordered. Decision-making details documented in ED Course.     Details: White count of 20,000, glucose 160 sodium 136 potassium 3 3 chloride 105 bicarb 22 BUN 13 creatinine 49  Discussion of management or test interpretation with external provider(s):  patient patient improved and was discharged home      Labs Reviewed   CBC WITH AUTO DIFFERENTIAL - Abnormal       Result Value    WBC 20.8 (*)     nRBC 0.0      RBC 4.62      Hemoglobin 14.1      Hematocrit 39.6      MCV 86      MCH 30.5      MCHC 35.6      RDW 12.5      Platelets 428      Neutrophils % 75.3      Immature Granulocytes %, Automated 0.3      Lymphocytes % 17.1      Monocytes % 7.2      Eosinophils % 0.0      Basophils % 0.1      Neutrophils Absolute 15.67 (*)     Immature Granulocytes Absolute, Automated 0.07      Lymphocytes Absolute 3.57      Monocytes Absolute 1.51 (*)     Eosinophils Absolute 0.00      Basophils Absolute 0.02     BASIC METABOLIC PANEL - Abnormal    Glucose 160 (*)     Sodium 136      Potassium 3.3 (*)     Chloride 105      Bicarbonate 22      Anion Gap 12      Urea Nitrogen 13      Creatinine 0.92      eGFR 83      Calcium 9.0          No orders to display        Procedure  Procedures     Greg Napoles MD  05/15/24 0452

## 2024-05-17 LAB
ATRIAL RATE: 87 BPM
P OFFSET: 193 MS
P ONSET: 145 MS
PR INTERVAL: 156 MS
Q ONSET: 223 MS
QRS COUNT: 15 BEATS
QRS DURATION: 84 MS
QT INTERVAL: 392 MS
QTC CALCULATION(BAZETT): 471 MS
QTC FREDERICIA: 443 MS
R AXIS: -4 DEGREES
T AXIS: 57 DEGREES
T OFFSET: 419 MS
VENTRICULAR RATE: 87 BPM

## 2024-05-22 ENCOUNTER — APPOINTMENT (OUTPATIENT)
Dept: PRIMARY CARE | Facility: CLINIC | Age: 39
End: 2024-05-22
Payer: COMMERCIAL

## 2024-05-28 ENCOUNTER — APPOINTMENT (OUTPATIENT)
Dept: RADIOLOGY | Facility: HOSPITAL | Age: 39
End: 2024-05-28
Payer: COMMERCIAL

## 2024-05-28 ENCOUNTER — APPOINTMENT (OUTPATIENT)
Dept: CARDIOLOGY | Facility: HOSPITAL | Age: 39
End: 2024-05-28
Payer: COMMERCIAL

## 2024-05-28 ENCOUNTER — HOSPITAL ENCOUNTER (OUTPATIENT)
Facility: HOSPITAL | Age: 39
Setting detail: OBSERVATION
Discharge: HOME | End: 2024-05-29
Attending: EMERGENCY MEDICINE | Admitting: INTERNAL MEDICINE
Payer: COMMERCIAL

## 2024-05-28 DIAGNOSIS — J45.51 SEVERE PERSISTENT ASTHMA WITH ACUTE EXACERBATION (MULTI): Primary | ICD-10-CM

## 2024-05-28 PROBLEM — R31.9 HEMATURIA: Status: RESOLVED | Noted: 2023-10-10 | Resolved: 2024-05-28

## 2024-05-28 PROBLEM — J45.50: Status: RESOLVED | Noted: 2024-01-28 | Resolved: 2024-05-28

## 2024-05-28 PROBLEM — M84.30XA STRESS FRACTURE: Status: RESOLVED | Noted: 2023-10-10 | Resolved: 2024-05-28

## 2024-05-28 PROBLEM — M79.10 MUSCLE PAIN: Status: RESOLVED | Noted: 2019-01-28 | Resolved: 2024-05-28

## 2024-05-28 PROBLEM — R00.2 PALPITATIONS: Status: RESOLVED | Noted: 2023-10-10 | Resolved: 2024-05-28

## 2024-05-28 PROBLEM — R06.83 SNORING: Status: RESOLVED | Noted: 2023-10-10 | Resolved: 2024-05-28

## 2024-05-28 PROBLEM — R74.8 HIGH LEVEL OF CARDIAC MARKER: Status: RESOLVED | Noted: 2023-10-10 | Resolved: 2024-05-28

## 2024-05-28 PROBLEM — D72.829 LEUKOCYTOSIS: Status: RESOLVED | Noted: 2023-10-10 | Resolved: 2024-05-28

## 2024-05-28 PROBLEM — R06.02 SHORTNESS OF BREATH ON EXERTION: Status: RESOLVED | Noted: 2023-10-10 | Resolved: 2024-05-28

## 2024-05-28 PROBLEM — S39.012A LUMBOSACRAL STRAIN: Status: RESOLVED | Noted: 2023-10-10 | Resolved: 2024-05-28

## 2024-05-28 PROBLEM — G47.8 UNREFRESHED BY SLEEP: Status: RESOLVED | Noted: 2023-10-10 | Resolved: 2024-05-28

## 2024-05-28 PROBLEM — J45.41 MODERATE PERSISTENT ASTHMA WITH EXACERBATION (HHS-HCC): Status: RESOLVED | Noted: 2023-10-15 | Resolved: 2024-05-28

## 2024-05-28 PROBLEM — J45.901 ASTHMA EXACERBATION ATTACKS (HHS-HCC): Status: RESOLVED | Noted: 2023-12-05 | Resolved: 2024-05-28

## 2024-05-28 PROBLEM — R63.4 WEIGHT LOSS: Status: RESOLVED | Noted: 2017-08-22 | Resolved: 2024-05-28

## 2024-05-28 PROBLEM — R42 VERTIGO: Status: RESOLVED | Noted: 2023-10-10 | Resolved: 2024-05-28

## 2024-05-28 PROBLEM — J45.41 ASTHMA EXACERBATION, NON-ALLERGIC, MODERATE PERSISTENT (HHS-HCC): Status: ACTIVE | Noted: 2024-05-28

## 2024-05-28 PROBLEM — I80.292: Status: RESOLVED | Noted: 2023-10-10 | Resolved: 2024-05-28

## 2024-05-28 PROBLEM — R41.82 ALTERED MENTAL STATUS: Status: RESOLVED | Noted: 2023-10-10 | Resolved: 2024-05-28

## 2024-05-28 LAB
ALBUMIN SERPL BCP-MCNC: 3.9 G/DL (ref 3.4–5)
ALP SERPL-CCNC: 78 U/L (ref 33–110)
ALT SERPL W P-5'-P-CCNC: 59 U/L (ref 7–45)
ANION GAP SERPL CALC-SCNC: 13 MMOL/L (ref 10–20)
AST SERPL W P-5'-P-CCNC: 39 U/L (ref 9–39)
BASOPHILS # BLD AUTO: 0.03 X10*3/UL (ref 0–0.1)
BASOPHILS NFR BLD AUTO: 0.4 %
BILIRUB DIRECT SERPL-MCNC: 0.1 MG/DL (ref 0–0.3)
BILIRUB SERPL-MCNC: 0.6 MG/DL (ref 0–1.2)
BUN SERPL-MCNC: 8 MG/DL (ref 6–23)
CALCIUM SERPL-MCNC: 8.8 MG/DL (ref 8.6–10.3)
CARDIAC TROPONIN I PNL SERPL HS: 5 NG/L (ref 0–13)
CHLORIDE SERPL-SCNC: 105 MMOL/L (ref 98–107)
CO2 SERPL-SCNC: 23 MMOL/L (ref 21–32)
CREAT SERPL-MCNC: 0.78 MG/DL (ref 0.5–1.05)
EGFRCR SERPLBLD CKD-EPI 2021: >90 ML/MIN/1.73M*2
EOSINOPHIL # BLD AUTO: 0 X10*3/UL (ref 0–0.7)
EOSINOPHIL NFR BLD AUTO: 0 %
ERYTHROCYTE [DISTWIDTH] IN BLOOD BY AUTOMATED COUNT: 12.2 % (ref 11.5–14.5)
GLUCOSE SERPL-MCNC: 103 MG/DL (ref 74–99)
HCT VFR BLD AUTO: 41.9 % (ref 36–46)
HGB BLD-MCNC: 14.8 G/DL (ref 12–16)
HOLD SPECIMEN: NORMAL
HOLD SPECIMEN: NORMAL
IMM GRANULOCYTES # BLD AUTO: 0.02 X10*3/UL (ref 0–0.7)
IMM GRANULOCYTES NFR BLD AUTO: 0.2 % (ref 0–0.9)
LYMPHOCYTES # BLD AUTO: 3.28 X10*3/UL (ref 1.2–4.8)
LYMPHOCYTES NFR BLD AUTO: 38.7 %
MCH RBC QN AUTO: 30.3 PG (ref 26–34)
MCHC RBC AUTO-ENTMCNC: 35.3 G/DL (ref 32–36)
MCV RBC AUTO: 86 FL (ref 80–100)
MONOCYTES # BLD AUTO: 0.66 X10*3/UL (ref 0.1–1)
MONOCYTES NFR BLD AUTO: 7.8 %
NEUTROPHILS # BLD AUTO: 4.48 X10*3/UL (ref 1.2–7.7)
NEUTROPHILS NFR BLD AUTO: 52.9 %
NRBC BLD-RTO: 0 /100 WBCS (ref 0–0)
PLATELET # BLD AUTO: 336 X10*3/UL (ref 150–450)
POTASSIUM SERPL-SCNC: 3.1 MMOL/L (ref 3.5–5.3)
PROT SERPL-MCNC: 6.6 G/DL (ref 6.4–8.2)
RBC # BLD AUTO: 4.89 X10*6/UL (ref 4–5.2)
SODIUM SERPL-SCNC: 138 MMOL/L (ref 136–145)
WBC # BLD AUTO: 8.5 X10*3/UL (ref 4.4–11.3)

## 2024-05-28 PROCEDURE — 2500000002 HC RX 250 W HCPCS SELF ADMINISTERED DRUGS (ALT 637 FOR MEDICARE OP, ALT 636 FOR OP/ED): Performed by: EMERGENCY MEDICINE

## 2024-05-28 PROCEDURE — 94640 AIRWAY INHALATION TREATMENT: CPT

## 2024-05-28 PROCEDURE — G0378 HOSPITAL OBSERVATION PER HR: HCPCS

## 2024-05-28 PROCEDURE — 84484 ASSAY OF TROPONIN QUANT: CPT | Performed by: EMERGENCY MEDICINE

## 2024-05-28 PROCEDURE — 2500000004 HC RX 250 GENERAL PHARMACY W/ HCPCS (ALT 636 FOR OP/ED): Performed by: EMERGENCY MEDICINE

## 2024-05-28 PROCEDURE — 85025 COMPLETE CBC W/AUTO DIFF WBC: CPT | Performed by: EMERGENCY MEDICINE

## 2024-05-28 PROCEDURE — 71045 X-RAY EXAM CHEST 1 VIEW: CPT

## 2024-05-28 PROCEDURE — 80053 COMPREHEN METABOLIC PANEL: CPT | Performed by: EMERGENCY MEDICINE

## 2024-05-28 PROCEDURE — 2500000001 HC RX 250 WO HCPCS SELF ADMINISTERED DRUGS (ALT 637 FOR MEDICARE OP): Performed by: EMERGENCY MEDICINE

## 2024-05-28 PROCEDURE — 96375 TX/PRO/DX INJ NEW DRUG ADDON: CPT | Performed by: INTERNAL MEDICINE

## 2024-05-28 PROCEDURE — 82248 BILIRUBIN DIRECT: CPT | Performed by: EMERGENCY MEDICINE

## 2024-05-28 PROCEDURE — 71045 X-RAY EXAM CHEST 1 VIEW: CPT | Performed by: RADIOLOGY

## 2024-05-28 PROCEDURE — 36415 COLL VENOUS BLD VENIPUNCTURE: CPT | Performed by: EMERGENCY MEDICINE

## 2024-05-28 PROCEDURE — 96365 THER/PROPH/DIAG IV INF INIT: CPT | Performed by: INTERNAL MEDICINE

## 2024-05-28 PROCEDURE — 93005 ELECTROCARDIOGRAM TRACING: CPT

## 2024-05-28 PROCEDURE — 99285 EMERGENCY DEPT VISIT HI MDM: CPT

## 2024-05-28 PROCEDURE — 99222 1ST HOSP IP/OBS MODERATE 55: CPT | Performed by: NURSE PRACTITIONER

## 2024-05-28 RX ORDER — POTASSIUM CHLORIDE 1.5 G/1.58G
40 POWDER, FOR SOLUTION ORAL ONCE
Status: COMPLETED | OUTPATIENT
Start: 2024-05-28 | End: 2024-05-28

## 2024-05-28 RX ORDER — ACETAMINOPHEN 325 MG/1
650 TABLET ORAL ONCE
Status: COMPLETED | OUTPATIENT
Start: 2024-05-28 | End: 2024-05-28

## 2024-05-28 RX ORDER — IPRATROPIUM BROMIDE AND ALBUTEROL SULFATE 2.5; .5 MG/3ML; MG/3ML
3 SOLUTION RESPIRATORY (INHALATION) ONCE
Status: COMPLETED | OUTPATIENT
Start: 2024-05-28 | End: 2024-05-28

## 2024-05-28 RX ORDER — MAGNESIUM SULFATE HEPTAHYDRATE 40 MG/ML
2 INJECTION, SOLUTION INTRAVENOUS ONCE
Status: COMPLETED | OUTPATIENT
Start: 2024-05-28 | End: 2024-05-28

## 2024-05-28 RX ORDER — LORAZEPAM 2 MG/ML
1 INJECTION INTRAMUSCULAR ONCE
Status: COMPLETED | OUTPATIENT
Start: 2024-05-28 | End: 2024-05-28

## 2024-05-28 RX ORDER — FENTANYL CITRATE 50 UG/ML
25 INJECTION, SOLUTION INTRAMUSCULAR; INTRAVENOUS ONCE
Status: COMPLETED | OUTPATIENT
Start: 2024-05-28 | End: 2024-05-28

## 2024-05-28 RX ORDER — ONDANSETRON HYDROCHLORIDE 2 MG/ML
4 INJECTION, SOLUTION INTRAVENOUS ONCE
Status: COMPLETED | OUTPATIENT
Start: 2024-05-28 | End: 2024-05-28

## 2024-05-28 RX ORDER — MORPHINE SULFATE 4 MG/ML
4 INJECTION, SOLUTION INTRAMUSCULAR; INTRAVENOUS ONCE
Status: COMPLETED | OUTPATIENT
Start: 2024-05-28 | End: 2024-05-28

## 2024-05-28 RX ADMIN — MORPHINE SULFATE 4 MG: 4 INJECTION, SOLUTION INTRAMUSCULAR; INTRAVENOUS at 20:19

## 2024-05-28 RX ADMIN — IPRATROPIUM BROMIDE AND ALBUTEROL SULFATE 3 ML: 2.5; .5 SOLUTION RESPIRATORY (INHALATION) at 22:02

## 2024-05-28 RX ADMIN — ACETAMINOPHEN 650 MG: 325 TABLET ORAL at 19:21

## 2024-05-28 RX ADMIN — ONDANSETRON 4 MG: 2 INJECTION INTRAMUSCULAR; INTRAVENOUS at 22:05

## 2024-05-28 RX ADMIN — FENTANYL CITRATE 25 MCG: 50 INJECTION, SOLUTION INTRAMUSCULAR; INTRAVENOUS at 21:25

## 2024-05-28 RX ADMIN — MAGNESIUM SULFATE HEPTAHYDRATE 2 G: 40 INJECTION, SOLUTION INTRAVENOUS at 19:19

## 2024-05-28 RX ADMIN — POTASSIUM CHLORIDE 40 MEQ: 1.5 POWDER, FOR SOLUTION ORAL at 20:46

## 2024-05-28 RX ADMIN — LORAZEPAM 1 MG: 2 INJECTION INTRAMUSCULAR; INTRAVENOUS at 19:18

## 2024-05-28 ASSESSMENT — PAIN - FUNCTIONAL ASSESSMENT
PAIN_FUNCTIONAL_ASSESSMENT: 0-10
PAIN_FUNCTIONAL_ASSESSMENT: 0-10

## 2024-05-28 ASSESSMENT — PAIN SCALES - GENERAL
PAINLEVEL_OUTOF10: 5 - MODERATE PAIN
PAINLEVEL_OUTOF10: 7

## 2024-05-28 ASSESSMENT — LIFESTYLE VARIABLES
EVER FELT BAD OR GUILTY ABOUT YOUR DRINKING: NO
HAVE YOU EVER FELT YOU SHOULD CUT DOWN ON YOUR DRINKING: NO
TOTAL SCORE: 0
EVER HAD A DRINK FIRST THING IN THE MORNING TO STEADY YOUR NERVES TO GET RID OF A HANGOVER: NO
HAVE PEOPLE ANNOYED YOU BY CRITICIZING YOUR DRINKING: NO

## 2024-05-28 ASSESSMENT — PAIN DESCRIPTION - LOCATION
LOCATION: HEAD
LOCATION: HEAD

## 2024-05-28 ASSESSMENT — PAIN DESCRIPTION - PAIN TYPE: TYPE: ACUTE PAIN

## 2024-05-28 NOTE — ED PROVIDER NOTES
HPI   Chief Complaint   Patient presents with    Shortness of Breath       Patient is a 38-year-old female with history of asthma states that for the last 3 to 4 days has been having some cough some shortness of breath progressively getting worse she has been getting her treatments at home using her inhalers and had to call the squad today because she could hardly breathe when EMS got to the scene they gave her 2 breathing treatments, Solu-Medrol, epinephrine, and at this time patient was feeling better just complains some scratchiness in the throat and bodyaches                          No data recorded                   Patient History   Past Medical History:   Diagnosis Date    Acute cystitis without hematuria 08/08/2019    Acute cystitis without hematuria    Asthma ([facility])     Disease of thyroid gland     Encounter for initial prescription of contraceptive pills 11/01/2019    Encounter for initial prescription of contraceptive pills    Nausea 02/05/2021    Nausea in adult    Other general symptoms and signs 12/05/2019    Forgetfulness    Parageusia 04/06/2020    Taste perversion    Pelvic and perineal pain     Pelvic pain in female    Personal history of other diseases of the respiratory system 04/06/2020    History of sinusitis    Personal history of other specified conditions 07/01/2020    History of vomiting    Personal history of other specified conditions 01/26/2021    History of headache    Personal history of other specified conditions 02/01/2021    History of diarrhea    Personal history of thrombophlebitis 06/03/2019    History of phlebitis    Personal history of urinary (tract) infections 01/16/2020    History of urinary tract infection    PTSD (post-traumatic stress disorder)      Past Surgical History:   Procedure Laterality Date    OTHER SURGICAL HISTORY  02/08/2019    Appendectomy    OTHER SURGICAL HISTORY  02/08/2019    Cholecystectomy    OTHER SURGICAL HISTORY  02/08/2019    Cystoscopy    OTHER  SURGICAL HISTORY  02/08/2019    Ureteroscopy    OTHER SURGICAL HISTORY  02/08/2019    Shoulder surgery     Family History   Problem Relation Name Age of Onset    Hypertension Father      Other (Pulmonary Valve Stenosis) Sister      Heart disease Maternal Grandmother      Diabetes Other Grandparent     Lung cancer Other Grandparent     Anxiety disorder Sibling       Social History     Tobacco Use    Smoking status: Never     Passive exposure: Current    Smokeless tobacco: Never   Vaping Use    Vaping status: Never Used   Substance Use Topics    Alcohol use: Yes     Alcohol/week: 1.0 - 2.0 standard drink of alcohol     Types: 1 - 2 Glasses of wine per week     Comment: social    Drug use: Never       Physical Exam   ED Triage Vitals   Temp Pulse Resp BP   -- -- -- --      SpO2 Temp src Heart Rate Source Patient Position   -- -- -- --      BP Location FiO2 (%)     -- --       Physical Exam  Vitals and nursing note reviewed.   Constitutional:       Appearance: She is obese.   HENT:      Head: Normocephalic and atraumatic.   Eyes:      Pupils: Pupils are equal, round, and reactive to light.   Cardiovascular:      Rate and Rhythm: Normal rate and regular rhythm.   Pulmonary:      Effort: Pulmonary effort is normal.      Comments: Diminished breath sounds at the bases  Musculoskeletal:         General: Normal range of motion.      Cervical back: Normal range of motion.   Skin:     General: Skin is warm.   Neurological:      General: No focal deficit present.      Mental Status: She is alert.         ED Course & MDM   Diagnoses as of 05/28/24 2202   Severe persistent asthma with acute exacerbation ([facility])       Medical Decision Making  EKG interpreted by me shows normal sinus rhythm rate of 85 normal QRS and nonspecific ST changes  My differential diagnosis  Acute asthma exacerbation acute pneumonia acute pneumothorax  I ordered CBC chemistry cardiac labs chest x-ray and EKG and also IV magnesium along with IV Ativan and  p.o. Tylenol.  Labs Reviewed  BASIC METABOLIC PANEL - Abnormal     Glucose                       103 (*)                Sodium                        138                    Potassium                     3.1 (*)                Chloride                      105                    Bicarbonate                   23                     Anion Gap                     13                     Urea Nitrogen                 8                      Creatinine                    0.78                   eGFR                          >90                    Calcium                       8.8                 HEPATIC FUNCTION PANEL - Abnormal     Albumin                       3.9                    Bilirubin, Total              0.6                    Bilirubin, Direct             0.1                    Alkaline Phosphatase          78                     ALT                           59 (*)                 AST                           39                     Total Protein                 6.6                 TROPONIN I, HIGH SENSITIVITY - Normal     Troponin I, High Sensitivity   5                        Narrative: Less than 99th percentile of normal range cutoff-                Female and children under 18 years old <14 ng/L; Male <21 ng/L: Negative                Repeat testing should be performed if clinically indicated.                                 Female and children under 18 years old 14-50 ng/L; Male 21-50 ng/L:                Consistent with possible cardiac damage and possible increased clinical                 risk. Serial measurements may help to assess extent of myocardial damage.                                 >50 ng/L: Consistent with cardiac damage, increased clinical risk and                myocardial infarction. Serial measurements may help assess extent of                 myocardial damage.                                  NOTE: Children less than 1 year old may have higher baseline troponin                 levels and  results should be interpreted in conjunction with the overall                 clinical context.                                 NOTE: Troponin I testing is performed using a different                 testing methodology at [facility] than at other                 [facility] hospitals. Direct result comparisons should only                 be made within the same method.  CBC WITH AUTO DIFFERENTIAL     XR chest 1 view   Final Result    No acute cardiopulmonary process.          MACRO:    None          Signed by: Elisa Cruz 5/28/2024 7:57 PM    Dictation workstation:   [id number]      Initially was feeling much better but then went back to evaluate her around 9:30 PM and she was complaining of tightness in her chest at this time listen to her still moving significant amount here but patient mention she was feeling little tired I ordered another DuoNeb treatment for her.  At this time because of the intensity of her symptoms and despite multiple interventions patient was not completely back to baseline decision made to admit the patient.  She was agreeable        Procedure  Procedures     James Mcclure MD  05/28/24 4431

## 2024-05-29 VITALS
HEART RATE: 85 BPM | SYSTOLIC BLOOD PRESSURE: 117 MMHG | OXYGEN SATURATION: 92 % | TEMPERATURE: 99 F | BODY MASS INDEX: 48.82 KG/M2 | RESPIRATION RATE: 24 BRPM | HEIGHT: 65 IN | DIASTOLIC BLOOD PRESSURE: 57 MMHG | WEIGHT: 293 LBS

## 2024-05-29 LAB
ALBUMIN SERPL BCP-MCNC: 4 G/DL (ref 3.4–5)
ALP SERPL-CCNC: 72 U/L (ref 33–110)
ALT SERPL W P-5'-P-CCNC: 63 U/L (ref 7–45)
ANION GAP SERPL CALC-SCNC: 12 MMOL/L (ref 10–20)
AST SERPL W P-5'-P-CCNC: 40 U/L (ref 9–39)
ATRIAL RATE: 85 BPM
BASOPHILS # BLD AUTO: 0.01 X10*3/UL (ref 0–0.1)
BASOPHILS NFR BLD AUTO: 0.1 %
BILIRUB SERPL-MCNC: 0.6 MG/DL (ref 0–1.2)
BUN SERPL-MCNC: 8 MG/DL (ref 6–23)
CALCIUM SERPL-MCNC: 8.8 MG/DL (ref 8.6–10.3)
CHLORIDE SERPL-SCNC: 104 MMOL/L (ref 98–107)
CO2 SERPL-SCNC: 22 MMOL/L (ref 21–32)
CREAT SERPL-MCNC: 0.83 MG/DL (ref 0.5–1.05)
EGFRCR SERPLBLD CKD-EPI 2021: >90 ML/MIN/1.73M*2
EOSINOPHIL # BLD AUTO: 0 X10*3/UL (ref 0–0.7)
EOSINOPHIL NFR BLD AUTO: 0 %
ERYTHROCYTE [DISTWIDTH] IN BLOOD BY AUTOMATED COUNT: 12.3 % (ref 11.5–14.5)
GLUCOSE SERPL-MCNC: 205 MG/DL (ref 74–99)
HCT VFR BLD AUTO: 39.4 % (ref 36–46)
HGB BLD-MCNC: 13.8 G/DL (ref 12–16)
HOLD SPECIMEN: NORMAL
IMM GRANULOCYTES # BLD AUTO: 0.03 X10*3/UL (ref 0–0.7)
IMM GRANULOCYTES NFR BLD AUTO: 0.3 % (ref 0–0.9)
LYMPHOCYTES # BLD AUTO: 0.54 X10*3/UL (ref 1.2–4.8)
LYMPHOCYTES NFR BLD AUTO: 5.1 %
MAGNESIUM SERPL-MCNC: 2.11 MG/DL (ref 1.6–2.4)
MCH RBC QN AUTO: 30 PG (ref 26–34)
MCHC RBC AUTO-ENTMCNC: 35 G/DL (ref 32–36)
MCV RBC AUTO: 86 FL (ref 80–100)
MONOCYTES # BLD AUTO: 0.06 X10*3/UL (ref 0.1–1)
MONOCYTES NFR BLD AUTO: 0.6 %
NEUTROPHILS # BLD AUTO: 9.86 X10*3/UL (ref 1.2–7.7)
NEUTROPHILS NFR BLD AUTO: 93.9 %
NRBC BLD-RTO: 0 /100 WBCS (ref 0–0)
P AXIS: 61 DEGREES
P OFFSET: 195 MS
P ONSET: 143 MS
PLATELET # BLD AUTO: 299 X10*3/UL (ref 150–450)
POTASSIUM SERPL-SCNC: 4 MMOL/L (ref 3.5–5.3)
PR INTERVAL: 160 MS
PROT SERPL-MCNC: 6.6 G/DL (ref 6.4–8.2)
Q ONSET: 223 MS
QRS COUNT: 14 BEATS
QRS DURATION: 76 MS
QT INTERVAL: 390 MS
QTC CALCULATION(BAZETT): 464 MS
QTC FREDERICIA: 438 MS
R AXIS: -20 DEGREES
RBC # BLD AUTO: 4.6 X10*6/UL (ref 4–5.2)
SODIUM SERPL-SCNC: 134 MMOL/L (ref 136–145)
T AXIS: 23 DEGREES
T OFFSET: 418 MS
VENTRICULAR RATE: 85 BPM
WBC # BLD AUTO: 10.5 X10*3/UL (ref 4.4–11.3)

## 2024-05-29 PROCEDURE — 2500000004 HC RX 250 GENERAL PHARMACY W/ HCPCS (ALT 636 FOR OP/ED): Performed by: NURSE PRACTITIONER

## 2024-05-29 PROCEDURE — G0378 HOSPITAL OBSERVATION PER HR: HCPCS

## 2024-05-29 PROCEDURE — 2500000002 HC RX 250 W HCPCS SELF ADMINISTERED DRUGS (ALT 637 FOR MEDICARE OP, ALT 636 FOR OP/ED): Performed by: NURSE PRACTITIONER

## 2024-05-29 PROCEDURE — 85025 COMPLETE CBC W/AUTO DIFF WBC: CPT | Performed by: NURSE PRACTITIONER

## 2024-05-29 PROCEDURE — 83735 ASSAY OF MAGNESIUM: CPT | Performed by: NURSE PRACTITIONER

## 2024-05-29 PROCEDURE — 36415 COLL VENOUS BLD VENIPUNCTURE: CPT | Performed by: NURSE PRACTITIONER

## 2024-05-29 PROCEDURE — 96376 TX/PRO/DX INJ SAME DRUG ADON: CPT | Performed by: INTERNAL MEDICINE

## 2024-05-29 PROCEDURE — 96372 THER/PROPH/DIAG INJ SC/IM: CPT | Performed by: NURSE PRACTITIONER

## 2024-05-29 PROCEDURE — 80053 COMPREHEN METABOLIC PANEL: CPT | Performed by: NURSE PRACTITIONER

## 2024-05-29 PROCEDURE — 99239 HOSP IP/OBS DSCHRG MGMT >30: CPT | Performed by: INTERNAL MEDICINE

## 2024-05-29 PROCEDURE — 94640 AIRWAY INHALATION TREATMENT: CPT

## 2024-05-29 PROCEDURE — 2500000001 HC RX 250 WO HCPCS SELF ADMINISTERED DRUGS (ALT 637 FOR MEDICARE OP): Performed by: NURSE PRACTITIONER

## 2024-05-29 RX ORDER — ACETAMINOPHEN 650 MG/1
650 SUPPOSITORY RECTAL EVERY 4 HOURS PRN
Status: DISCONTINUED | OUTPATIENT
Start: 2024-05-29 | End: 2024-05-29 | Stop reason: HOSPADM

## 2024-05-29 RX ORDER — ONDANSETRON HYDROCHLORIDE 2 MG/ML
4 INJECTION, SOLUTION INTRAVENOUS EVERY 8 HOURS PRN
Status: DISCONTINUED | OUTPATIENT
Start: 2024-05-29 | End: 2024-05-29 | Stop reason: HOSPADM

## 2024-05-29 RX ORDER — ACETAMINOPHEN 160 MG/5ML
650 SOLUTION ORAL EVERY 4 HOURS PRN
Status: DISCONTINUED | OUTPATIENT
Start: 2024-05-29 | End: 2024-05-29 | Stop reason: HOSPADM

## 2024-05-29 RX ORDER — FLUTICASONE FUROATE AND VILANTEROL 100; 25 UG/1; UG/1
1 POWDER RESPIRATORY (INHALATION)
Status: DISCONTINUED | OUTPATIENT
Start: 2024-05-29 | End: 2024-05-29 | Stop reason: HOSPADM

## 2024-05-29 RX ORDER — GUAIFENESIN 600 MG/1
600 TABLET, EXTENDED RELEASE ORAL EVERY 12 HOURS PRN
Status: DISCONTINUED | OUTPATIENT
Start: 2024-05-29 | End: 2024-05-29 | Stop reason: HOSPADM

## 2024-05-29 RX ORDER — ENOXAPARIN SODIUM 100 MG/ML
40 INJECTION SUBCUTANEOUS EVERY 12 HOURS SCHEDULED
Status: DISCONTINUED | OUTPATIENT
Start: 2024-05-29 | End: 2024-05-29 | Stop reason: HOSPADM

## 2024-05-29 RX ORDER — PREDNISONE 20 MG/1
30 TABLET ORAL DAILY
Status: DISCONTINUED | OUTPATIENT
Start: 2024-05-29 | End: 2024-05-29 | Stop reason: HOSPADM

## 2024-05-29 RX ORDER — METOCLOPRAMIDE HYDROCHLORIDE 5 MG/ML
10 INJECTION INTRAMUSCULAR; INTRAVENOUS ONCE
Status: COMPLETED | OUTPATIENT
Start: 2024-05-29 | End: 2024-05-29

## 2024-05-29 RX ORDER — PANTOPRAZOLE SODIUM 40 MG/10ML
40 INJECTION, POWDER, LYOPHILIZED, FOR SOLUTION INTRAVENOUS DAILY
Status: DISCONTINUED | OUTPATIENT
Start: 2024-05-29 | End: 2024-05-29 | Stop reason: HOSPADM

## 2024-05-29 RX ORDER — ONDANSETRON 4 MG/1
4 TABLET, FILM COATED ORAL EVERY 8 HOURS PRN
Status: DISCONTINUED | OUTPATIENT
Start: 2024-05-29 | End: 2024-05-29 | Stop reason: HOSPADM

## 2024-05-29 RX ORDER — BUDESONIDE 0.5 MG/2ML
0.5 INHALANT ORAL
Status: DISCONTINUED | OUTPATIENT
Start: 2024-05-29 | End: 2024-05-29 | Stop reason: HOSPADM

## 2024-05-29 RX ORDER — ACETAMINOPHEN 325 MG/1
650 TABLET ORAL EVERY 4 HOURS PRN
Status: DISCONTINUED | OUTPATIENT
Start: 2024-05-29 | End: 2024-05-29 | Stop reason: HOSPADM

## 2024-05-29 RX ORDER — DESVENLAFAXINE 50 MG/1
100 TABLET, EXTENDED RELEASE ORAL DAILY
Status: DISCONTINUED | OUTPATIENT
Start: 2024-05-29 | End: 2024-05-29

## 2024-05-29 RX ORDER — LORAZEPAM 1 MG/1
1 TABLET ORAL EVERY 8 HOURS PRN
Status: DISCONTINUED | OUTPATIENT
Start: 2024-05-29 | End: 2024-05-29 | Stop reason: HOSPADM

## 2024-05-29 RX ORDER — PANTOPRAZOLE SODIUM 40 MG/1
40 TABLET, DELAYED RELEASE ORAL DAILY
Status: DISCONTINUED | OUTPATIENT
Start: 2024-05-29 | End: 2024-05-29 | Stop reason: HOSPADM

## 2024-05-29 RX ORDER — ONDANSETRON HYDROCHLORIDE 2 MG/ML
4 INJECTION, SOLUTION INTRAVENOUS ONCE
Status: COMPLETED | OUTPATIENT
Start: 2024-05-29 | End: 2024-05-29

## 2024-05-29 RX ORDER — DESVENLAFAXINE 50 MG/1
150 TABLET, EXTENDED RELEASE ORAL DAILY
Status: DISCONTINUED | OUTPATIENT
Start: 2024-05-30 | End: 2024-05-29 | Stop reason: HOSPADM

## 2024-05-29 RX ORDER — LEVOTHYROXINE SODIUM 100 UG/1
100 TABLET ORAL DAILY
Status: DISCONTINUED | OUTPATIENT
Start: 2024-05-29 | End: 2024-05-29 | Stop reason: HOSPADM

## 2024-05-29 RX ADMIN — FLUTICASONE FUROATE AND VILANTEROL TRIFENATATE 1 PUFF: 100; 25 POWDER RESPIRATORY (INHALATION) at 08:08

## 2024-05-29 RX ADMIN — METOCLOPRAMIDE HYDROCHLORIDE 10 MG: 5 INJECTION INTRAMUSCULAR; INTRAVENOUS at 05:18

## 2024-05-29 RX ADMIN — DESVENLAFAXINE 100 MG: 50 TABLET, FILM COATED, EXTENDED RELEASE ORAL at 08:08

## 2024-05-29 RX ADMIN — TIOTROPIUM BROMIDE INHALATION SPRAY 2 PUFF: 3.12 SPRAY, METERED RESPIRATORY (INHALATION) at 08:08

## 2024-05-29 RX ADMIN — GUAIFENESIN 600 MG: 600 TABLET ORAL at 03:07

## 2024-05-29 RX ADMIN — ONDANSETRON 4 MG: 2 INJECTION INTRAMUSCULAR; INTRAVENOUS at 03:05

## 2024-05-29 RX ADMIN — BUDESONIDE INHALATION 0.5 MG: 0.5 SUSPENSION RESPIRATORY (INHALATION) at 06:59

## 2024-05-29 RX ADMIN — ENOXAPARIN SODIUM 40 MG: 40 INJECTION SUBCUTANEOUS at 03:06

## 2024-05-29 RX ADMIN — ACETAMINOPHEN 650 MG: 325 TABLET ORAL at 13:38

## 2024-05-29 RX ADMIN — LORAZEPAM 1 MG: 1 TABLET ORAL at 03:06

## 2024-05-29 RX ADMIN — PANTOPRAZOLE SODIUM 40 MG: 40 TABLET, DELAYED RELEASE ORAL at 05:18

## 2024-05-29 RX ADMIN — LEVOTHYROXINE SODIUM 100 MCG: 0.1 TABLET ORAL at 05:18

## 2024-05-29 RX ADMIN — ACETAMINOPHEN 650 MG: 325 TABLET ORAL at 03:06

## 2024-05-29 RX ADMIN — PREDNISONE 30 MG: 20 TABLET ORAL at 08:08

## 2024-05-29 SDOH — SOCIAL STABILITY: SOCIAL INSECURITY: DO YOU FEEL UNSAFE GOING BACK TO THE PLACE WHERE YOU ARE LIVING?: NO

## 2024-05-29 SDOH — SOCIAL STABILITY: SOCIAL INSECURITY: HAVE YOU HAD ANY THOUGHTS OF HARMING ANYONE ELSE?: NO

## 2024-05-29 SDOH — SOCIAL STABILITY: SOCIAL INSECURITY: HAS ANYONE EVER THREATENED TO HURT YOUR FAMILY OR YOUR PETS?: NO

## 2024-05-29 SDOH — SOCIAL STABILITY: SOCIAL INSECURITY: HAVE YOU HAD THOUGHTS OF HARMING ANYONE ELSE?: NO

## 2024-05-29 SDOH — SOCIAL STABILITY: SOCIAL INSECURITY: ARE YOU OR HAVE YOU BEEN THREATENED OR ABUSED PHYSICALLY, EMOTIONALLY, OR SEXUALLY BY ANYONE?: NO

## 2024-05-29 SDOH — SOCIAL STABILITY: SOCIAL INSECURITY: DOES ANYONE TRY TO KEEP YOU FROM HAVING/CONTACTING OTHER FRIENDS OR DOING THINGS OUTSIDE YOUR HOME?: NO

## 2024-05-29 SDOH — SOCIAL STABILITY: SOCIAL INSECURITY: ARE THERE ANY APPARENT SIGNS OF INJURIES/BEHAVIORS THAT COULD BE RELATED TO ABUSE/NEGLECT?: NO

## 2024-05-29 SDOH — SOCIAL STABILITY: SOCIAL INSECURITY: ABUSE: ADULT

## 2024-05-29 SDOH — SOCIAL STABILITY: SOCIAL INSECURITY: DO YOU FEEL ANYONE HAS EXPLOITED OR TAKEN ADVANTAGE OF YOU FINANCIALLY OR OF YOUR PERSONAL PROPERTY?: NO

## 2024-05-29 SDOH — SOCIAL STABILITY: SOCIAL INSECURITY: WERE YOU ABLE TO COMPLETE ALL THE BEHAVIORAL HEALTH SCREENINGS?: YES

## 2024-05-29 ASSESSMENT — PAIN SCALES - GENERAL
PAINLEVEL_OUTOF10: 5 - MODERATE PAIN
PAINLEVEL_OUTOF10: 3

## 2024-05-29 ASSESSMENT — COGNITIVE AND FUNCTIONAL STATUS - GENERAL
MOBILITY SCORE: 24
DAILY ACTIVITIY SCORE: 24
PATIENT BASELINE BEDBOUND: NO
MOBILITY SCORE: 24
DAILY ACTIVITIY SCORE: 24

## 2024-05-29 ASSESSMENT — ACTIVITIES OF DAILY LIVING (ADL)
ADEQUATE_TO_COMPLETE_ADL: YES
FEEDING YOURSELF: INDEPENDENT
TOILETING: INDEPENDENT
LACK_OF_TRANSPORTATION: NO
WALKS IN HOME: INDEPENDENT
JUDGMENT_ADEQUATE_SAFELY_COMPLETE_DAILY_ACTIVITIES: YES
PATIENT'S MEMORY ADEQUATE TO SAFELY COMPLETE DAILY ACTIVITIES?: YES
BATHING: INDEPENDENT
HEARING - RIGHT EAR: FUNCTIONAL
GROOMING: INDEPENDENT
DRESSING YOURSELF: INDEPENDENT
HEARING - LEFT EAR: FUNCTIONAL

## 2024-05-29 ASSESSMENT — LIFESTYLE VARIABLES
HOW MANY STANDARD DRINKS CONTAINING ALCOHOL DO YOU HAVE ON A TYPICAL DAY: PATIENT DOES NOT DRINK
SKIP TO QUESTIONS 9-10: 1
HOW OFTEN DO YOU HAVE 6 OR MORE DRINKS ON ONE OCCASION: NEVER
HOW OFTEN DO YOU HAVE A DRINK CONTAINING ALCOHOL: NEVER
PRESCIPTION_ABUSE_PAST_12_MONTHS: NO
AUDIT-C TOTAL SCORE: 0
SUBSTANCE_ABUSE_PAST_12_MONTHS: NO
AUDIT-C TOTAL SCORE: 0

## 2024-05-29 ASSESSMENT — PAIN - FUNCTIONAL ASSESSMENT
PAIN_FUNCTIONAL_ASSESSMENT: 0-10
PAIN_FUNCTIONAL_ASSESSMENT: 0-10

## 2024-05-29 ASSESSMENT — PAIN DESCRIPTION - LOCATION: LOCATION: HEAD

## 2024-05-29 NOTE — PROGRESS NOTES
05/29/24 0926   Discharge Planning   Living Arrangements Alone   Support Systems Family members   Assistance Needed independent , drives, works. has home nebulizer.   Type of Residence Private residence   Number of Stairs to Enter Residence 3   Do you have animals or pets at home? No   Home or Post Acute Services None   Patient expects to be discharged to: home   Does the patient need discharge transport arranged? No     Pt is independent, works, drives,no medication barriers. Will discharge to home, no needs.

## 2024-05-29 NOTE — H&P
Medical Group History and Physical    ASSESSMENT & PLAN:     Recurrent exacerbation of moderate/severe Asthma  Morbid Obesity BMI 49.89  GERD  Anxiety w panic attacks  PTSD  3 hospital admissions last month; and 2 ER evaluations this month; no hypoxia, no o2 requirements, no leukocytosis, normal eosinophils, CXR negative; EKG shows NSR. Last seen Allergy/Immunology provider 05/01/24 - note reviewed; and saw PCP on 5/7/24  - cont home respiratory meds   - recently weaned off of steroids d/t side effects - nightmares; will not add at this time; no acute distress  - monitor overnight on tele   - consult pulmonary  - limit use of narcotics; treating with Ativan  - labs in AM    Hypokalemia  - replaced with 40 and will recheck in AM    VTE Prophylaxis: HEMA Sosa-CNP    HISTORY OF PRESENT ILLNESS:   Chief Complaint: asthma     History Of Present Illness:    Sarahi Haley is a 38 y.o. female with a significant past medical history of anxiety, poorly controlled asthma presenting today with c/o shortness of breath over the last 3-4 days, some coughing, no increase in sputum production, no fevers. Today she called EMS from home because she couldn't breath, taking home meds as prescribed. EMS gave 2 breathing treatments, solu-medrol, and Epi with improvement of symptoms initially. She is still feeling tightness in the chest and has a sore throat with some generalized body aches. She was treated with ativan, fentanyl, and morphine in ER.    Lab work is unremarkable, low K but will replete with oral supplement and recheck labs, no leukocytosis w normal eosinophils, CXR normal, no hypoxia, BP/HR stable, afebrile. No GI/ complaints, no new rashes and allergies d/t seasonal change; She has been admitted to the hospital 3x last month and seen in ER 2x this month [5/14 and 5/15] presenting again for admission. Consult pulmonary for further management and optimization of medications.    Patient is ready  for observation admission under general medicine for the management of recurrent asthma exacerbation with hypokalemia.     Review of systems: 10 point review of systems is otherwise negative except as mentioned above.    PAST HISTORIES:       Past Medical History:  Medical Problems       Problem List       * (Principal) Asthma exacerbation, non-allergic, moderate persistent (HHS-HCC)    Asthma (HHS-HCC)    RAD (reactive airway disease) (HHS-HCC)    Vitamin D deficiency    Tear film insufficiency    Syncope    Superficial thrombophlebitis of lower extremity    Superficial phlebitis of leg    Seizure disorder, complex partial, without intractable epilepsy (Multi)    Regular astigmatism of left eye    Panic attack    NAFLD (nonalcoholic fatty liver disease)    Myopia    Morbid obesity (Multi)    Migraine    Lymphedema    Low grade squamous intraepithelial lesion (LGSIL) on Papanicolaou smear of cervix    Left ventricular hypertrophy    Hypothyroidism, adult    Hypokalemia    GERD (gastroesophageal reflux disease)    Dysmenorrhea    Dysfunction of both eustachian tubes    Chronic idiopathic urticaria    Overview Signed 10/10/2023  1:44 PM by Ange Jansen     Formatting of this note might be different from the original. Continue home meds         Cervical radiculopathy, acute    Visceral pain    Cephalgia    Acute asthma exacerbation (HHS-HCC)    PTSD (post-traumatic stress disorder)    Anxiety and depression    Cystitis, acute    Acute respiratory failure with hypoxemia (Multi)    Acute bronchospasm    Absence seizure (Multi)    Asthma in adult, moderate persistent, with acute exacerbation (HHS-HCC)    Allergy to nonsteroidal anti-inflammatory drug (NSAID)    Steroid side effects, initial encounter    Acute respiratory failure with hypoxia (Multi)    Asthma with exacerbation (HHS-HCC)           Past Surgical History:  Past Surgical History:   Procedure Laterality Date   • OTHER SURGICAL HISTORY  02/08/2019     "Appendectomy   • OTHER SURGICAL HISTORY  02/08/2019    Cholecystectomy   • OTHER SURGICAL HISTORY  02/08/2019    Cystoscopy   • OTHER SURGICAL HISTORY  02/08/2019    Ureteroscopy   • OTHER SURGICAL HISTORY  02/08/2019    Shoulder surgery          Social History:  She reports that she has never smoked. She has been exposed to tobacco smoke. She has never used smokeless tobacco. She reports current alcohol use of about 1.0 - 2.0 standard drink of alcohol per week. She reports that she does not use drugs.    Family History:  Family History   Problem Relation Name Age of Onset   • Hypertension Father     • Other (Pulmonary Valve Stenosis) Sister     • Heart disease Maternal Grandmother     • Diabetes Other Grandparent    • Lung cancer Other Grandparent    • Anxiety disorder Sibling          Allergies:  Bee venom protein (honey bee), Diphenhydramine, Nsaids (non-steroidal anti-inflammatory drug), Procaine, and Ketorolac    OBJECTIVE:       Last Recorded Vitals:  Vitals:    05/28/24 2008 05/28/24 2022 05/28/24 2023 05/28/24 2030   BP: 153/68  153/68 147/74   BP Location:   Right arm Right arm   Patient Position:   Sitting Lying   Pulse: 86  90 84   Temp:       TempSrc:       SpO2: 96% 95% 95% 98%   Weight:   136 kg (299 lb 13.2 oz)    Height:   1.651 m (5' 5\")        Last I/O:  No intake/output data recorded.    Physical Exam  Vitals and nursing note reviewed.   Constitutional:       Appearance: Normal appearance. She is obese.   HENT:      Head: Normocephalic and atraumatic.      Nose: Nose normal.      Mouth/Throat:      Mouth: Mucous membranes are moist.      Pharynx: Oropharynx is clear.   Eyes:      Extraocular Movements: Extraocular movements intact.      Conjunctiva/sclera: Conjunctivae normal.      Pupils: Pupils are equal, round, and reactive to light.   Cardiovascular:      Rate and Rhythm: Normal rate and regular rhythm.      Pulses: Normal pulses.      Heart sounds: Normal heart sounds.   Abdominal:      " General: Abdomen is flat. Bowel sounds are normal.      Palpations: Abdomen is soft.   Musculoskeletal:         General: Normal range of motion.      Cervical back: Normal range of motion and neck supple.   Skin:     General: Skin is warm and dry.      Capillary Refill: Capillary refill takes less than 2 seconds.   Neurological:      General: No focal deficit present.      Mental Status: She is alert and oriented to person, place, and time. Mental status is at baseline.   Psychiatric:         Mood and Affect: Mood normal.         Behavior: Behavior normal.         Thought Content: Thought content normal.         Judgment: Judgment normal.      Comments: Anxiety, PTSD, nightmares with steroid use           Scheduled Medications  fentaNYL, 25 mcg, intravenous, Once  ipratropium-albuteroL, 3 mL, nebulization, Once  ondansetron, 4 mg, intravenous, Once  tezepelumab-ekko, 210 mg, subcutaneous, Once      PRN Medications    Continuous Medications       Outpatient Medications:  Prior to Admission medications    Medication Sig Start Date End Date Taking? Authorizing Provider   albuterol 2.5 mg /3 mL (0.083 %) nebulizer solution Take 3 mL by nebulization every 4 hours if needed for shortness of breath. 9/13/22   Historical Provider, MD   aspirin 81 mg chewable tablet Chew 1 tablet (81 mg) once daily.    Historical Provider, MD   budesonide (Pulmicort) 0.5 mg/2 mL nebulizer solution Take 2 mL (0.5 mg) by nebulization 2 times a day. 7/26/23   Historical Provider, MD   butalbital-acetaminophen-caff -40 mg tablet Take 1 tablet by mouth early in the morning.. 1 tab(s) orally once a day, As Needed 8/9/23   Paul Matthew DO   desvenlafaxine 100 mg 24 hr tablet TAKE 1 TABLET BY MOUTH EVERY DAY 11/27/23   Paul Matthew DO   desvenlafaxine 50 mg 24 hr tablet TAKE 1 TABLET BY MOUTH ONCE DAILY 4/29/24   Paul Matthew DO   EPINEPHrine 0.3 mg/0.3 mL injection syringe Inject 0.3 mL (0.3 mg) into the muscle once daily as needed  for anaphylaxis.  Patient not taking: Reported on 4/22/2024 10/23/23   Paul Matthew DO   esomeprazole (NexIUM) 20 mg DR capsule Take 1 capsule (20 mg) by mouth once daily in the morning. Take before meals. Do not open capsule. 5/1/24 5/1/25  Hilary Garcia [place], DO   fexofenadine (Allegra) 180 mg tablet Take 1 tablet (180 mg) by mouth once daily. 5/21/15   Historical Provider, MD   fluticasone-umeclidin-vilanter (Trelegy Ellipta) 100-62.5-25 mcg blister with device Inhale 2 puffs once daily.    Historical Provider, MD   galcanezumab (Emgality Syringe) 120 mg/mL prefilled syringe Inject 1 Syringe (120 mg) under the skin every 28 (twenty-eight) days.    Historical Provider, MD   hydrOXYzine HCL (Atarax) 25 mg tablet Take 1 tablet (25 mg) by mouth 3 times a day as needed for anxiety. 11/20/23   Paul Matthew DO   levothyroxine (Synthroid, Levoxyl) 100 mcg tablet Take 1 tablet (100 mcg) by mouth once daily. 11/13/19   Historical Provider, MD   metoclopramide (Reglan) 10 mg tablet Take 1 tablet (10 mg) by mouth every 8 hours if needed (nausea). 5/7/24   Paul Matthew DO   montelukast (Singulair) 10 mg tablet Take 1 tablet (10 mg) by mouth once daily at bedtime. 8/9/23 4/3/24  Paul Matthew DO   multivitamin-Ca-iron-minerals (Tab-A-Vu Womens) 27-0.4 mg tablet Take 1 tablet by mouth once daily.    Historical Provider, MD   predniSONE (Deltasone) 10 mg tablet Take 3 tablets (30 mg) by mouth once daily.    Historical Provider, MD   rizatriptan (Maxalt) 10 mg tablet Take 1 tablet (10 mg) by mouth if needed for migraine. May repeat in 2 hours if unresolved. Do not exceed 30 mg in 24 hours.    Historical Provider, MD   SUMAtriptan (Imitrex) 100 mg tablet Take 1 tablet (100 mg) by mouth 1 time if needed for migraine.    Historical Provider, MD   tezepelumab-ekko (Tezspire) SubQ syringe Inject 210 mg under the skin every 28 (twenty-eight) days.    Historical Provider, MD       LABS AND IMAGING:      Labs:  Results for orders placed or performed during the hospital encounter of 05/28/24 (from the past 24 hour(s))   CBC and Auto Differential   Result Value Ref Range    WBC 8.5 4.4 - 11.3 x10*3/uL    nRBC 0.0 0.0 - 0.0 /100 WBCs    RBC 4.89 4.00 - 5.20 x10*6/uL    Hemoglobin 14.8 12.0 - 16.0 g/dL    Hematocrit 41.9 36.0 - 46.0 %    MCV 86 80 - 100 fL    MCH 30.3 26.0 - 34.0 pg    MCHC 35.3 32.0 - 36.0 g/dL    RDW 12.2 11.5 - 14.5 %    Platelets 336 150 - 450 x10*3/uL    Neutrophils % 52.9 40.0 - 80.0 %    Immature Granulocytes %, Automated 0.2 0.0 - 0.9 %    Lymphocytes % 38.7 13.0 - 44.0 %    Monocytes % 7.8 2.0 - 10.0 %    Eosinophils % 0.0 0.0 - 6.0 %    Basophils % 0.4 0.0 - 2.0 %    Neutrophils Absolute 4.48 1.20 - 7.70 x10*3/uL    Immature Granulocytes Absolute, Automated 0.02 0.00 - 0.70 x10*3/uL    Lymphocytes Absolute 3.28 1.20 - 4.80 x10*3/uL    Monocytes Absolute 0.66 0.10 - 1.00 x10*3/uL    Eosinophils Absolute 0.00 0.00 - 0.70 x10*3/uL    Basophils Absolute 0.03 0.00 - 0.10 x10*3/uL   Basic metabolic panel   Result Value Ref Range    Glucose 103 (H) 74 - 99 mg/dL    Sodium 138 136 - 145 mmol/L    Potassium 3.1 (L) 3.5 - 5.3 mmol/L    Chloride 105 98 - 107 mmol/L    Bicarbonate 23 21 - 32 mmol/L    Anion Gap 13 10 - 20 mmol/L    Urea Nitrogen 8 6 - 23 mg/dL    Creatinine 0.78 0.50 - 1.05 mg/dL    eGFR >90 >60 mL/min/1.73m*2    Calcium 8.8 8.6 - 10.3 mg/dL   Hepatic function panel   Result Value Ref Range    Albumin 3.9 3.4 - 5.0 g/dL    Bilirubin, Total 0.6 0.0 - 1.2 mg/dL    Bilirubin, Direct 0.1 0.0 - 0.3 mg/dL    Alkaline Phosphatase 78 33 - 110 U/L    ALT 59 (H) 7 - 45 U/L    AST 39 9 - 39 U/L    Total Protein 6.6 6.4 - 8.2 g/dL   Troponin I, High Sensitivity   Result Value Ref Range    Troponin I, High Sensitivity 5 0 - 13 ng/L   Light Blue Top   Result Value Ref Range    Extra Tube Hold for add-ons.    Lavender Top   Result Value Ref Range    Extra Tube Hold for add-ons.         Imaging:  XR  chest 1 view   Final Result   No acute cardiopulmonary process.        MACRO:   None        Signed by: Elisa Cruz 5/28/2024 7:57 PM   Dictation workstation:   [id number]

## 2024-05-29 NOTE — DISCHARGE SUMMARY
Discharge Diagnosis  Asthma exacerbation, non-allergic, moderate persistent (HHS-HCC)    Issues Requiring Follow-Up  PCP, Pulmonology and Immunology.     Discharge Meds     Your medication list        CONTINUE taking these medications        Instructions Last Dose Given Next Dose Due   albuterol 2.5 mg /3 mL (0.083 %) nebulizer solution           aspirin 81 mg chewable tablet           budesonide 0.5 mg/2 mL nebulizer solution  Commonly known as: Pulmicort           butalbital-acetaminophen-caff -40 mg tablet      Take 1 tablet by mouth early in the morning.. 1 tab(s) orally once a day, As Needed       desvenlafaxine 100 mg 24 hr tablet  Commonly known as: Pristiq      TAKE 1 TABLET BY MOUTH EVERY DAY       desvenlafaxine 50 mg 24 hr tablet  Commonly known as: Pristiq      TAKE 1 TABLET BY MOUTH ONCE DAILY       Emgality Syringe 120 mg/mL prefilled syringe  Generic drug: galcanezumab           esomeprazole 20 mg DR capsule  Commonly known as: NexIUM      Take 1 capsule (20 mg) by mouth once daily in the morning. Take before meals. Do not open capsule.       fexofenadine 180 mg tablet  Commonly known as: Allegra           hydrOXYzine HCL 25 mg tablet  Commonly known as: Atarax      Take 1 tablet (25 mg) by mouth 3 times a day as needed for anxiety.       levothyroxine 100 mcg tablet  Commonly known as: Synthroid, Levoxyl           metoclopramide 10 mg tablet  Commonly known as: Reglan      Take 1 tablet (10 mg) by mouth every 8 hours if needed (nausea).       montelukast 10 mg tablet  Commonly known as: Singulair      Take 1 tablet (10 mg) by mouth once daily at bedtime.       multivitamin-Ca-iron-minerals 27-0.4 mg tablet  Commonly known as: Tab-A-Uv Womens           predniSONE 10 mg tablet  Commonly known as: Deltasone           rizatriptan 10 mg tablet  Commonly known as: Maxalt           SUMAtriptan 100 mg tablet  Commonly known as: Imitrex           Tezspire SubQ syringe  Generic drug: tezepelumab-ekko            Michaelkris Ellipta 100-62.5-25 mcg blister with device  Generic drug: fluticasone-umeclidin-vilanter                  ASK your doctor about these medications        Instructions Last Dose Given Next Dose Due   EPINEPHrine 0.3 mg/0.3 mL injection syringe  Commonly known as: Epipen      Inject 0.3 mL (0.3 mg) into the muscle once daily as needed for anaphylaxis.                Test Results Pending At Discharge  Pending Labs       No current pending labs.            Hospital Course   Sarahi Haley is a 38 y.o. female with a significant past medical history of anxiety, poorly controlled asthma presenting today with c/o shortness of breath over the last 3-4 days, some coughing, no increase in sputum production, no fevers. Today she called EMS from home because she couldn't breath, taking home meds as prescribed. EMS gave 2 breathing treatments, solu-medrol, and Epi with improvement of symptoms initially. She was still feeling chest tightness and was admitted to the hospital.  Lab work is unremarkable, low K but will replete with oral supplement and recheck labs, no leukocytosis w normal eosinophils, CXR normal, no hypoxia, BP/HR stable, afebrile. No GI/ complaints, no new rashes and allergies d/t seasonal change. She has been admitted to the hospital 3x last month and seen in ER 2x this month [5/14 and 5/15]. The pt was recently weaned off of steroids d/t side effects. The pt has severe persistent asthma and is under the care of immunology and pulmonology. Recommend follow up with Neurology for headaches as well. Pulmonology were consulted but the pt stated that she follows with pulmonologist in [facility]. At this time the pt is not wheezing. On room air and requesting for discharge. The pt is otherwise stable for discharge with out pt follow up.     Pertinent Physical Exam At Time of Discharge  Physical Exam  Constitutional:       Appearance: She is obese.   HENT:      Head: Normocephalic and atraumatic.      Nose:  Nose normal.      Mouth/Throat:      Mouth: Mucous membranes are dry.   Eyes:      Extraocular Movements: Extraocular movements intact.      Conjunctiva/sclera: Conjunctivae normal.   Cardiovascular:      Rate and Rhythm: Normal rate and regular rhythm.      Pulses: Normal pulses.      Heart sounds: Normal heart sounds.   Pulmonary:      Effort: Pulmonary effort is normal.      Breath sounds: Normal breath sounds.   Abdominal:      General: Bowel sounds are normal.      Palpations: Abdomen is soft.   Musculoskeletal:         General: Normal range of motion.      Cervical back: Normal range of motion and neck supple.   Skin:     General: Skin is warm and dry.   Neurological:      General: No focal deficit present.      Mental Status: She is alert and oriented to person, place, and time. Mental status is at baseline.   Psychiatric:         Mood and Affect: Mood normal.         Behavior: Behavior normal.         Thought Content: Thought content normal.         Judgment: Judgment normal.         Outpatient Follow-Up  Future Appointments   Date Time Provider Department Center   6/5/2024  4:10 PM Hilary Trinidad DO [id number] [place]   6/13/2024  3:45 PM Paul Matthew DO [id number] [place]         Guerrero Lebron MD

## 2024-05-30 ENCOUNTER — PATIENT OUTREACH (OUTPATIENT)
Dept: CARE COORDINATION | Facility: CLINIC | Age: 39
End: 2024-05-30
Payer: COMMERCIAL

## 2024-05-30 NOTE — PROGRESS NOTES
Discharge Facility: [facility]   *30 day readmit  Discharge Diagnosis: Asthma exacerbation, non-allergic, moderate persistent   Admission Date: 5/28/2024   Discharge Date: 5/29/2024    PCP Appointment Date: Paul Matthew DO 6/13/24  *30 day readmit  Specialist Appointment Date: Tish and Mark Garcia 6/5/24  Hospital Encounter and Summary: Linked   See discharge assessment below for further details    Engagement  Call Start Time: 1215 (5/30/2024 12:19 PM)    Medications  Medications reviewed with patient/caregiver?: Yes (5/30/2024 12:19 PM)  Is the patient having any side effects they believe may be caused by any medication additions or changes?: No (5/30/2024 12:19 PM)  Does the patient have all medications ordered at discharge?: Yes (5/30/2024 12:19 PM)  Care Management Interventions: No intervention needed (5/30/2024 12:19 PM)  Prescription Comments: Rx Epi pen (5/30/2024 12:19 PM)  Is the patient taking all medications as directed (includes completed medication regime)?: Yes (5/30/2024 12:19 PM)  Medication Comments: No issues obtaining medication(s) (5/30/2024 12:19 PM)    Appointments  Does the patient have a primary care provider?: Yes (Paul Matthew DO 6/13/24  *30 day readmit) (5/30/2024 12:19 PM)  Care Management Interventions: Verified appointment date/time/provider (5/30/2024 12:19 PM)  Care Management Interventions: Advised patient to keep appointment (5/30/2024 12:19 PM)    Self Management  What is the home health agency?: denies need (5/30/2024 12:19 PM)  What Durable Medical Equipment (DME) was ordered?: denies need (5/30/2024 12:19 PM)    Patient Teaching  Does the patient have access to their discharge instructions?: Yes (5/30/2024 12:19 PM)  Care Management Interventions: Reviewed instructions with patient; Educated on MyChart (5/30/2024 12:19 PM)  What is the patient's perception of their health status since discharge?: Improving (5/30/2024 12:19 PM)  Is the patient/caregiver able  to teach back the hierarchy of who to call/visit for symptoms/problems? PCP, Specialist, Home Health nurse, Urgent Care, ED, 911: Yes (5/30/2024 12:19 PM)    Wrap Up  Wrap Up Additional Comments: This CM spoke with patient via phone. Successful transition of care outreach complete. Pt reports doing well at home since discharge. New meds reviewed. Pt denies CP and SOB. Patient denies any further discharge questions/needs at this time. Emphasized that Follow up is needed after discharge to review the hospital recommendations, assess your response to your treatment.  Pt aware of my availability for non-emergent concerns. Contact info provided to patient. (5/30/2024 12:19 PM)

## 2024-05-31 ENCOUNTER — TELEPHONE (OUTPATIENT)
Dept: PRIMARY CARE | Facility: CLINIC | Age: 39
End: 2024-05-31
Payer: COMMERCIAL

## 2024-05-31 DIAGNOSIS — J45.909 ASTHMA, UNSPECIFIED ASTHMA SEVERITY, UNSPECIFIED WHETHER COMPLICATED, UNSPECIFIED WHETHER PERSISTENT (HHS-HCC): Primary | ICD-10-CM

## 2024-06-05 ENCOUNTER — OFFICE VISIT (OUTPATIENT)
Dept: ALLERGY | Facility: CLINIC | Age: 39
End: 2024-06-05
Payer: COMMERCIAL

## 2024-06-05 VITALS
WEIGHT: 293 LBS | DIASTOLIC BLOOD PRESSURE: 84 MMHG | BODY MASS INDEX: 48.82 KG/M2 | HEIGHT: 65 IN | SYSTOLIC BLOOD PRESSURE: 134 MMHG | OXYGEN SATURATION: 98 % | TEMPERATURE: 98 F | HEART RATE: 82 BPM | RESPIRATION RATE: 18 BRPM

## 2024-06-05 DIAGNOSIS — J45.50 SEVERE PERSISTENT ASTHMA WITHOUT COMPLICATION (MULTI): ICD-10-CM

## 2024-06-05 DIAGNOSIS — E66.01 MORBID OBESITY (MULTI): Primary | ICD-10-CM

## 2024-06-05 PROCEDURE — 96372 THER/PROPH/DIAG INJ SC/IM: CPT | Performed by: ALLERGY & IMMUNOLOGY

## 2024-06-05 PROCEDURE — 3008F BODY MASS INDEX DOCD: CPT | Performed by: ALLERGY & IMMUNOLOGY

## 2024-06-05 PROCEDURE — 99214 OFFICE O/P EST MOD 30 MIN: CPT | Performed by: ALLERGY & IMMUNOLOGY

## 2024-06-05 PROCEDURE — 96160 PT-FOCUSED HLTH RISK ASSMT: CPT | Performed by: ALLERGY & IMMUNOLOGY

## 2024-06-05 RX ORDER — ALBUTEROL SULFATE AND BUDESONIDE 90; 80 UG/1; UG/1
2 AEROSOL, METERED RESPIRATORY (INHALATION) EVERY 4 HOURS PRN
Qty: 32.1 G | Refills: 1 | Status: SHIPPED | OUTPATIENT
Start: 2024-06-05 | End: 2025-06-05

## 2024-06-05 NOTE — PROGRESS NOTES
Patient ID: Sarahi Haley is a 38 y.o. female.     Chief Complaint: follow up  History Of Present Illness  Sarahi Haley is a 38 y.o. female with PMx severe asthma presenting for follow up.   Seems somewhat better on Tezspire.  She was hospitalized again.  She is not clear on what the trigger was this time, but she does tend to have trouble this season of the year. She was having some trouble sleeping. She got up at noon, did some work around the house and when she went to lay back down she had more trouble breathing.    Yesterday was active walking around at [place] with no issue.  She is currently on 30 mg of daily prednisone which we are trying to wean.    Starting PT and Aquatic therapy.        Review of Systems    Pertinent positives and negatives have been assessed in the HPI. All other systems have been reviewed and are negative except as noted in the HPI.    Allergies  Bee venom protein (honey bee), Diphenhydramine, Nsaids (non-steroidal anti-inflammatory drug), Procaine, and Ketorolac    Past Medical History  She has a past medical history of Acute cystitis without hematuria (08/08/2019), Asthma ([facility]), Disease of thyroid gland, Encounter for initial prescription of contraceptive pills (11/01/2019), Nausea (02/05/2021), Other general symptoms and signs (12/05/2019), Parageusia (04/06/2020), Pelvic and perineal pain, Personal history of other diseases of the respiratory system (04/06/2020), Personal history of other specified conditions (07/01/2020), Personal history of other specified conditions (01/26/2021), Personal history of other specified conditions (02/01/2021), Personal history of thrombophlebitis (06/03/2019), Personal history of urinary (tract) infections (01/16/2020), and PTSD (post-traumatic stress disorder).    Family History  Family History   Problem Relation Name Age of Onset    Hypertension Father      Other (Pulmonary Valve Stenosis) Sister      Heart disease Maternal Grandmother       Diabetes Other Grandparent     Lung cancer Other Grandparent     Anxiety disorder Sibling         No history of food allergy or atopic disease in the family.    Surgical History  She has a past surgical history that includes Other surgical history (02/08/2019); Other surgical history (02/08/2019); Other surgical history (02/08/2019); Other surgical history (02/08/2019); and Other surgical history (02/08/2019).    Social/Environmental History  She reports that she has never smoked. She has been exposed to tobacco smoke. She has never used smokeless tobacco. She reports current alcohol use of about 1.0 - 2.0 standard drink of alcohol per week. She reports that she does not use drugs.      MEDICATIONS  Current Outpatient Medications on File Prior to Visit   Medication Sig Dispense Refill    albuterol 2.5 mg /3 mL (0.083 %) nebulizer solution Take 3 mL by nebulization every 4 hours if needed for shortness of breath.      aspirin 81 mg chewable tablet Chew 1 tablet (81 mg) once daily.      budesonide (Pulmicort) 0.5 mg/2 mL nebulizer solution Take 2 mL (0.5 mg) by nebulization 2 times a day.      butalbital-acetaminophen-caff -40 mg tablet Take 1 tablet by mouth early in the morning.. 1 tab(s) orally once a day, As Needed 90 tablet 3    desvenlafaxine 50 mg 24 hr tablet TAKE 1 TABLET BY MOUTH ONCE DAILY 90 tablet 3    esomeprazole (NexIUM) 20 mg DR capsule Take 1 capsule (20 mg) by mouth once daily in the morning. Take before meals. Do not open capsule. 30 capsule 11    fexofenadine (Allegra) 180 mg tablet Take 1 tablet (180 mg) by mouth once daily.      fluticasone-umeclidin-vilanter (Trelegy Ellipta) 100-62.5-25 mcg blister with device Inhale 2 puffs once daily.      galcanezumab (Emgality Syringe) 120 mg/mL prefilled syringe Inject 1 Syringe (120 mg) under the skin every 28 (twenty-eight) days.      hydrOXYzine HCL (Atarax) 25 mg tablet Take 1 tablet (25 mg) by mouth 3 times a day as needed for anxiety. 90  "tablet 2    levothyroxine (Synthroid, Levoxyl) 100 mcg tablet Take 1 tablet (100 mcg) by mouth once daily.      metoclopramide (Reglan) 10 mg tablet Take 1 tablet (10 mg) by mouth every 8 hours if needed (nausea). 90 tablet 1    multivitamin-Ca-iron-minerals (Tab-A-Vu Womens) 27-0.4 mg tablet Take 1 tablet by mouth once daily.      predniSONE (Deltasone) 10 mg tablet Take 3 tablets (30 mg) by mouth once daily.      rizatriptan (Maxalt) 10 mg tablet Take 1 tablet (10 mg) by mouth if needed for migraine. May repeat in 2 hours if unresolved. Do not exceed 30 mg in 24 hours.      SUMAtriptan (Imitrex) 100 mg tablet Take 1 tablet (100 mg) by mouth 1 time if needed for migraine.      tezepelumab-ekko (Tezspire) SubQ syringe Inject 210 mg under the skin every 28 (twenty-eight) days.      desvenlafaxine 100 mg 24 hr tablet TAKE 1 TABLET BY MOUTH EVERY DAY 90 tablet 3    EPINEPHrine 0.3 mg/0.3 mL injection syringe Inject 0.3 mL (0.3 mg) into the muscle once daily as needed for anaphylaxis. (Patient not taking: Reported on 4/22/2024) 2 each 3    montelukast (Singulair) 10 mg tablet Take 1 tablet (10 mg) by mouth once daily at bedtime. 30 tablet 5     Current Facility-Administered Medications on File Prior to Visit   Medication Dose Route Frequency Provider Last Rate Last Admin    tezepelumab-ekko (Tezpire) SubQ syringe 210 mg  210 mg subcutaneous Once Salomón Montgomery MD             Physical Exam  Visit Vitals  /84   Pulse 82   Temp 36.7 °C (98 °F) (Temporal)   Resp 18   Ht 1.651 m (5' 5\")   Wt 147 kg (323 lb)   SpO2 98%   BMI 53.75 kg/m²   OB Status Having periods   Smoking Status Never   BSA 2.6 m²       Wt Readings from Last 1 Encounters:   06/05/24 147 kg (323 lb)       Physical Exam    General: Well appearing, no acute distress, obese  Head: Normocephalic, atraumatic, neck supple without lymphadenopathy  Eyes: EOMI, non-injected  Nose: No nasal crease, nares patent, slightly boggy turbinates, minimal " discharge  Throat: Normal dentition, no erythema  Heart: Regular rate and rhythm  Lungs: Clear to auscultation bilaterally, effort normal  Abdomen: Soft, non-tender, normal bowel sounds  Extremities: Moves all extremities symmetrically, no edema  Skin: No rashes/lesions  Psych: normal mood and affect    LAB RESULTS:  CBC:  Recent Labs     05/29/24  0529 05/28/24  1951 05/15/24  0238   WBC 10.5 8.5 20.8*   HGB 13.8 14.8 14.1   HCT 39.4 41.9 39.6    336 428   MCV 86 86 86   EOSABS 0.00 0.00 0.00       CMP:  Recent Labs     05/29/24  0530 05/28/24  1951 05/15/24  0238 04/28/24  0527 04/23/24  0610 03/25/24  2345 03/01/24  0404   * 138 136   < > 135*   < > 135*   K 4.0 3.1* 3.3*   < > 4.2   < > 3.8    105 105   < > 105   < > 103   CO2 22 23 22   < > 23   < > 23   ANIONGAP 12 13 12   < > 11   < > 13   BUN 8 8 13   < > 15   < > 13   CREATININE 0.83 0.78 0.92   < > 0.94   < > 0.81   EGFR >90 >90 83   < > 81   < > >90   MG 2.11  --   --   --  2.08  --  1.97    < > = values in this interval not displayed.     Recent Labs     05/29/24 0530 05/28/24 1951 04/21/24  2300 09/14/22  0200 09/13/22  0735 08/03/22  1851 07/28/22  1827 01/22/22  0407 09/13/21  0050   ALBUMIN 4.0 3.9 4.1   < > 3.9   < > 4.2   < > 4.0   ALKPHOS 72 78 82   < > 73   < > 74   < > 74   ALT 63* 59* 37   < > 38   < > 46*   < > 41   AST 40* 39 27   < > 23   < > 33   < > 23   BILITOT 0.6 0.6 0.9   < > 0.3   < > 0.8   < > 0.5   LIPASE  --   --   --   --  34  --  35  --  55    < > = values in this interval not displayed.       ALLERGY:   Lab Results   Component Value Date    ICIGE 67.0 08/14/2023    WHITEASH <0.10 08/14/2023    SILVERBIRCH <0.10 08/14/2023    BOXELDER <0.10 08/14/2023    MOUNTJUNIPER <0.10 08/14/2023    COTTONWOOD <0.10 08/14/2023    ELM <0.10 08/14/2023    MULBERRY <0.10 08/14/2023    PECANHICKORY <0.10 08/14/2023    MAPLESYCAMOR <0.10 08/14/2023    OAK <0.10 08/14/2023    BERMUDAGR <0.10 08/14/2023    JOHNSONGR <0.10  08/14/2023    BLUEGRASS <0.10 08/14/2023    TIMOTHYGRASS <0.10 08/14/2023     Lab Results   Component Value Date    LAMBQUART <0.10 08/14/2023    PIGWEED <0.10 08/14/2023    COMRAGWEED <0.10 08/14/2023    SHEEPSOR <0.10 08/14/2023    PLANTAIN <0.10 08/14/2023    CATEPI <0.10 08/14/2023    DOGEPI <0.10 08/14/2023    ALTERNA <0.10 08/14/2023    CLADHERB <0.10 08/14/2023    ICA04 <0.10 08/14/2023    DERMFAR <0.10 08/14/2023    DERMPTE <0.10 08/14/2023    COCKR 0.35 (A) 08/14/2023     Lab Results   Component Value Date    WALNUT <0.10 08/14/2023       Recent Labs     08/14/23  1548   ICIGE 67.0     Recent Labs     05/29/24  0529 05/28/24  1951 05/15/24  0238   EOSABS 0.00 0.00 0.00         COAG:   Recent Labs     10/15/23  0644 06/06/23  0042 10/07/22  2350   INR 1.0 0.9 1.0           HEME/ENDO:  Recent Labs     04/03/24  0024 10/15/23  1423 08/21/23  0120 07/10/23  1434 05/24/23  0917 09/14/22  0200   TSH 4.56*  --  2.23 2.66   < >  --    HGBA1C  --  4.7  --   --   --   --    FERRITIN  --   --   --   --   --  183*    < > = values in this interval not displayed.     ACT 11    Procedure: Pt here for routine Tezspire subcutaneous injection. Pt received injection in RA. Pt tolerated procedure well without any adverse reaction. Analy Spicer LPN    Assessment/Plan   Sarahi is a 37 yo woman with non allergic severe asthma GERD and morbid obesity.She recognizes these issues are interrelated and that weight loss would be beneficial, but is struggling with this.  We are trying to wean her steroids to help with her weight. 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 She does enjoy her job and has not found other opportunities that would work for her. We discussed continued current inhalers, Tezspire and wean steroid to 20 mg daily. We will continue to try and wean the steroid, but in the meantime, she needs help with her diet and would consider bariatric surgery. Since February,  she has gained almost 10 lbs. She is given a referral today and will follow up in a month.    Hilary Trinidad,

## 2024-06-06 ENCOUNTER — APPOINTMENT (OUTPATIENT)
Dept: RADIOLOGY | Facility: HOSPITAL | Age: 39
End: 2024-06-06
Payer: COMMERCIAL

## 2024-06-06 ENCOUNTER — HOSPITAL ENCOUNTER (EMERGENCY)
Facility: HOSPITAL | Age: 39
Discharge: HOME | End: 2024-06-06
Attending: STUDENT IN AN ORGANIZED HEALTH CARE EDUCATION/TRAINING PROGRAM
Payer: COMMERCIAL

## 2024-06-06 ENCOUNTER — APPOINTMENT (OUTPATIENT)
Dept: CARDIOLOGY | Facility: HOSPITAL | Age: 39
End: 2024-06-06
Payer: COMMERCIAL

## 2024-06-06 VITALS
TEMPERATURE: 98.2 F | SYSTOLIC BLOOD PRESSURE: 116 MMHG | OXYGEN SATURATION: 96 % | RESPIRATION RATE: 18 BRPM | HEART RATE: 88 BPM | DIASTOLIC BLOOD PRESSURE: 72 MMHG | HEIGHT: 65 IN | BODY MASS INDEX: 48.82 KG/M2 | WEIGHT: 293 LBS

## 2024-06-06 DIAGNOSIS — J45.901 MILD ASTHMA WITH EXACERBATION, UNSPECIFIED WHETHER PERSISTENT (HHS-HCC): Primary | ICD-10-CM

## 2024-06-06 PROCEDURE — 96376 TX/PRO/DX INJ SAME DRUG ADON: CPT

## 2024-06-06 PROCEDURE — 96365 THER/PROPH/DIAG IV INF INIT: CPT

## 2024-06-06 PROCEDURE — 2500000004 HC RX 250 GENERAL PHARMACY W/ HCPCS (ALT 636 FOR OP/ED): Performed by: PHYSICIAN ASSISTANT

## 2024-06-06 PROCEDURE — 71045 X-RAY EXAM CHEST 1 VIEW: CPT

## 2024-06-06 PROCEDURE — 99284 EMERGENCY DEPT VISIT MOD MDM: CPT | Mod: 25

## 2024-06-06 PROCEDURE — 96375 TX/PRO/DX INJ NEW DRUG ADDON: CPT

## 2024-06-06 PROCEDURE — 93005 ELECTROCARDIOGRAM TRACING: CPT

## 2024-06-06 PROCEDURE — 71045 X-RAY EXAM CHEST 1 VIEW: CPT | Mod: FOREIGN READ | Performed by: RADIOLOGY

## 2024-06-06 RX ORDER — ONDANSETRON HYDROCHLORIDE 2 MG/ML
4 INJECTION, SOLUTION INTRAVENOUS ONCE
Status: COMPLETED | OUTPATIENT
Start: 2024-06-06 | End: 2024-06-06

## 2024-06-06 RX ORDER — ACETAMINOPHEN 325 MG/1
975 TABLET ORAL ONCE
Status: COMPLETED | OUTPATIENT
Start: 2024-06-06 | End: 2024-06-06

## 2024-06-06 RX ORDER — MAGNESIUM SULFATE HEPTAHYDRATE 40 MG/ML
2 INJECTION, SOLUTION INTRAVENOUS ONCE
Status: COMPLETED | OUTPATIENT
Start: 2024-06-06 | End: 2024-06-06

## 2024-06-06 RX ADMIN — ONDANSETRON 4 MG: 2 INJECTION INTRAMUSCULAR; INTRAVENOUS at 02:48

## 2024-06-06 RX ADMIN — ONDANSETRON 4 MG: 2 INJECTION INTRAMUSCULAR; INTRAVENOUS at 04:23

## 2024-06-06 RX ADMIN — ACETAMINOPHEN 975 MG: 325 TABLET ORAL at 03:10

## 2024-06-06 RX ADMIN — MAGNESIUM SULFATE HEPTAHYDRATE 2 G: 40 INJECTION, SOLUTION INTRAVENOUS at 02:28

## 2024-06-06 ASSESSMENT — PAIN DESCRIPTION - LOCATION: LOCATION: HEAD

## 2024-06-06 ASSESSMENT — PAIN SCALES - GENERAL
PAINLEVEL_OUTOF10: 6
PAINLEVEL_OUTOF10: 8

## 2024-06-06 ASSESSMENT — PAIN DESCRIPTION - PAIN TYPE: TYPE: ACUTE PAIN

## 2024-06-06 ASSESSMENT — PAIN - FUNCTIONAL ASSESSMENT: PAIN_FUNCTIONAL_ASSESSMENT: 0-10

## 2024-06-06 NOTE — ED PROVIDER NOTES
HPI   Chief Complaint   Patient presents with    Shortness of Breath       38-year-old female with a history of asthma presenting to the ER today feeling short of breath.  Patient has her asthma flare from time to time from an unknown trigger that they are still working to identify.  Patient states that she has been doing well with her regimen recently and just saw her physician yesterday.  She became more more short of breath overnight and she took a breathing treatment plus her inhalers without relief so she called 911.  She states after medication given in the ambulance she is starting to feel improved.  She feels mildly short of breath and her chest feels tight.  She has had a slight cough.  No further complaints at this time.      History provided by:  Patient                      Brandon Coma Scale Score: 15                     Patient History   Past Medical History:   Diagnosis Date    Acute cystitis without hematuria 08/08/2019    Acute cystitis without hematuria    Asthma ([facility])     Disease of thyroid gland     Encounter for initial prescription of contraceptive pills 11/01/2019    Encounter for initial prescription of contraceptive pills    Nausea 02/05/2021    Nausea in adult    Other general symptoms and signs 12/05/2019    Forgetfulness    Parageusia 04/06/2020    Taste perversion    Pelvic and perineal pain     Pelvic pain in female    Personal history of other diseases of the respiratory system 04/06/2020    History of sinusitis    Personal history of other specified conditions 07/01/2020    History of vomiting    Personal history of other specified conditions 01/26/2021    History of headache    Personal history of other specified conditions 02/01/2021    History of diarrhea    Personal history of thrombophlebitis 06/03/2019    History of phlebitis    Personal history of urinary (tract) infections 01/16/2020    History of urinary tract infection    PTSD (post-traumatic stress disorder)      Past  3 Surgical History:   Procedure Laterality Date    OTHER SURGICAL HISTORY  02/08/2019    Appendectomy    OTHER SURGICAL HISTORY  02/08/2019    Cholecystectomy    OTHER SURGICAL HISTORY  02/08/2019    Cystoscopy    OTHER SURGICAL HISTORY  02/08/2019    Ureteroscopy    OTHER SURGICAL HISTORY  02/08/2019    Shoulder surgery     Family History   Problem Relation Name Age of Onset    Hypertension Father      Other (Pulmonary Valve Stenosis) Sister      Heart disease Maternal Grandmother      Diabetes Other Grandparent     Lung cancer Other Grandparent     Anxiety disorder Sibling       Social History     Tobacco Use    Smoking status: Never     Passive exposure: Current    Smokeless tobacco: Never   Vaping Use    Vaping status: Never Used   Substance Use Topics    Alcohol use: Yes     Alcohol/week: 1.0 - 2.0 standard drink of alcohol     Types: 1 - 2 Glasses of wine per week     Comment: social    Drug use: Never       Physical Exam   ED Triage Vitals   Temperature Heart Rate Respirations BP   06/06/24 0129 06/06/24 0129 06/06/24 0129 06/06/24 0129   36.8 °C (98.2 °F) (!) 108 (!) 28 (!) 113/91      Pulse Ox Temp Source Heart Rate Source Patient Position   06/06/24 0134 06/06/24 0129 06/06/24 0129 06/06/24 0129   96 % Temporal Monitor Sitting      BP Location FiO2 (%)     06/06/24 0129 --     Right arm        Physical Exam  Eyes:      Conjunctiva/sclera: Conjunctivae normal.   Cardiovascular:      Rate and Rhythm: Regular rhythm. Tachycardia present.      Pulses: Normal pulses.      Heart sounds: Normal heart sounds.   Pulmonary:      Effort: Pulmonary effort is normal.      Breath sounds: Normal breath sounds.   Musculoskeletal:      Cervical back: Normal range of motion and neck supple.   Skin:     General: Skin is warm.   Neurological:      Mental Status: She is alert and oriented to person, place, and time.         ED Course & MDM   Diagnoses as of 06/06/24 0507   Mild asthma with exacerbation, unspecified whether  persistent ([facility])       Medical Decision Making  38-year-old female with a history of asthma presenting to the ER today with worsening shortness of breath.  Patient took her inhalers, breathing treatments at home without relief so she called 911.  En route the patient was given epinephrine, Solu-Medrol and 3 breathing treatments.  She is starting to feel improved.  On arrival patient is afebrile, tachycardic with otherwise stable vital signs.  She is satting 99% on room air when I am in the room with her.  On my exam she is mildly tachycardic but regular, lungs are clear with good air exchange bilaterally and no wheezing.  I immediately discussed the case with my attending.  EKG and chest x-ray are ordered as well as magnesium and we will continue to observe the patient.    ECG per my interpretation tachycardic at 101 with nonspecific changes but no acute ST elevations or depressions.  Patient's chest x-ray today shows hypoinflated lungs but no evidence of acute cardiopulmonary process.  Patient did get a headache from the epinephrine so Tylenol was ordered and she was also feeling nauseous so I did order a dose of Zofran as well.  She is still resting comfortably, pending repeat assessment.    After the Tylenol was swallowed patient stated her nausea started to come back again so an additional dose of Zofran was ordered.    After additional dose of Zofran patient is feeling improved.  Her vital signs are stable and she has not required any additional breathing treatments while in the ED.  She was seen and evaluated today by my attending.  Patient reports that she is feeling improved and is ready for discharge.  She is on steroids chronically and does not need any refills of her inhalers or her nebulizers.  I did discuss the need for follow-up and she expressed understanding and agreed with the plan of care today.    Labs Reviewed - No data to display    XR chest 1 view   Final Result   The lungs are mildly  hypoinflated, but appear clear. No evidence of   acute cardiopulmonary disease.   Signed by Micheal Rider MD            Procedure  Procedures     Cherise Roberts PA-C  06/06/24 [id number]

## 2024-06-06 NOTE — ED TRIAGE NOTES
Pt presents to ED via EMS from home with c/o SOB x all day but worse over the past couple hours. Given 0.5mg IM epi, 3 duonebs and 125mg solumedrol IV

## 2024-06-08 ENCOUNTER — APPOINTMENT (OUTPATIENT)
Dept: RADIOLOGY | Facility: HOSPITAL | Age: 39
End: 2024-06-08
Payer: COMMERCIAL

## 2024-06-08 ENCOUNTER — HOSPITAL ENCOUNTER (OUTPATIENT)
Facility: HOSPITAL | Age: 39
Setting detail: OBSERVATION
Discharge: HOME | End: 2024-06-10
Attending: EMERGENCY MEDICINE | Admitting: STUDENT IN AN ORGANIZED HEALTH CARE EDUCATION/TRAINING PROGRAM
Payer: COMMERCIAL

## 2024-06-08 DIAGNOSIS — J45.41 MODERATE PERSISTENT ASTHMA WITH EXACERBATION (HHS-HCC): Primary | ICD-10-CM

## 2024-06-08 DIAGNOSIS — J45.41 MODERATE PERSISTENT ASTHMA WITH (ACUTE) EXACERBATION (HHS-HCC): ICD-10-CM

## 2024-06-08 LAB
BASOPHILS # BLD AUTO: 0.03 X10*3/UL (ref 0–0.1)
BASOPHILS NFR BLD AUTO: 0.3 %
EOSINOPHIL # BLD AUTO: 0.01 X10*3/UL (ref 0–0.7)
EOSINOPHIL NFR BLD AUTO: 0.1 %
ERYTHROCYTE [DISTWIDTH] IN BLOOD BY AUTOMATED COUNT: 12.4 % (ref 11.5–14.5)
HCT VFR BLD AUTO: 43.3 % (ref 36–46)
HGB BLD-MCNC: 14.9 G/DL (ref 12–16)
IMM GRANULOCYTES # BLD AUTO: 0.07 X10*3/UL (ref 0–0.7)
IMM GRANULOCYTES NFR BLD AUTO: 0.6 % (ref 0–0.9)
LYMPHOCYTES # BLD AUTO: 3.47 X10*3/UL (ref 1.2–4.8)
LYMPHOCYTES NFR BLD AUTO: 30.8 %
MCH RBC QN AUTO: 30.1 PG (ref 26–34)
MCHC RBC AUTO-ENTMCNC: 34.4 G/DL (ref 32–36)
MCV RBC AUTO: 88 FL (ref 80–100)
MONOCYTES # BLD AUTO: 0.79 X10*3/UL (ref 0.1–1)
MONOCYTES NFR BLD AUTO: 7 %
NEUTROPHILS # BLD AUTO: 6.88 X10*3/UL (ref 1.2–7.7)
NEUTROPHILS NFR BLD AUTO: 61.2 %
NRBC BLD-RTO: 0 /100 WBCS (ref 0–0)
PLATELET # BLD AUTO: 407 X10*3/UL (ref 150–450)
RBC # BLD AUTO: 4.95 X10*6/UL (ref 4–5.2)
WBC # BLD AUTO: 11.3 X10*3/UL (ref 4.4–11.3)

## 2024-06-08 PROCEDURE — 71045 X-RAY EXAM CHEST 1 VIEW: CPT

## 2024-06-08 PROCEDURE — 80053 COMPREHEN METABOLIC PANEL: CPT | Performed by: PHYSICIAN ASSISTANT

## 2024-06-08 PROCEDURE — 96365 THER/PROPH/DIAG IV INF INIT: CPT | Mod: 59

## 2024-06-08 PROCEDURE — 2500000002 HC RX 250 W HCPCS SELF ADMINISTERED DRUGS (ALT 637 FOR MEDICARE OP, ALT 636 FOR OP/ED): Performed by: PHYSICIAN ASSISTANT

## 2024-06-08 PROCEDURE — 94640 AIRWAY INHALATION TREATMENT: CPT

## 2024-06-08 PROCEDURE — 96375 TX/PRO/DX INJ NEW DRUG ADDON: CPT | Mod: 59

## 2024-06-08 PROCEDURE — 83735 ASSAY OF MAGNESIUM: CPT | Performed by: PHYSICIAN ASSISTANT

## 2024-06-08 PROCEDURE — 85025 COMPLETE CBC W/AUTO DIFF WBC: CPT | Performed by: PHYSICIAN ASSISTANT

## 2024-06-08 PROCEDURE — 2500000004 HC RX 250 GENERAL PHARMACY W/ HCPCS (ALT 636 FOR OP/ED): Performed by: PHYSICIAN ASSISTANT

## 2024-06-08 PROCEDURE — 99285 EMERGENCY DEPT VISIT HI MDM: CPT | Mod: 25

## 2024-06-08 PROCEDURE — 83880 ASSAY OF NATRIURETIC PEPTIDE: CPT | Performed by: PHYSICIAN ASSISTANT

## 2024-06-08 PROCEDURE — 84484 ASSAY OF TROPONIN QUANT: CPT | Performed by: PHYSICIAN ASSISTANT

## 2024-06-08 RX ORDER — ONDANSETRON HYDROCHLORIDE 2 MG/ML
4 INJECTION, SOLUTION INTRAVENOUS ONCE
Status: COMPLETED | OUTPATIENT
Start: 2024-06-08 | End: 2024-06-08

## 2024-06-08 RX ORDER — HYDROMORPHONE HYDROCHLORIDE 1 MG/ML
1 INJECTION, SOLUTION INTRAMUSCULAR; INTRAVENOUS; SUBCUTANEOUS ONCE
Status: COMPLETED | OUTPATIENT
Start: 2024-06-08 | End: 2024-06-09

## 2024-06-08 RX ORDER — IPRATROPIUM BROMIDE AND ALBUTEROL SULFATE 2.5; .5 MG/3ML; MG/3ML
3 SOLUTION RESPIRATORY (INHALATION) EVERY 20 MIN
Status: COMPLETED | OUTPATIENT
Start: 2024-06-08 | End: 2024-06-08

## 2024-06-08 RX ORDER — MAGNESIUM SULFATE HEPTAHYDRATE 40 MG/ML
2 INJECTION, SOLUTION INTRAVENOUS ONCE
Status: COMPLETED | OUTPATIENT
Start: 2024-06-08 | End: 2024-06-09

## 2024-06-08 RX ADMIN — IPRATROPIUM BROMIDE AND ALBUTEROL SULFATE 3 ML: 2.5; .5 SOLUTION RESPIRATORY (INHALATION) at 23:33

## 2024-06-08 RX ADMIN — IPRATROPIUM BROMIDE AND ALBUTEROL SULFATE 3 ML: 2.5; .5 SOLUTION RESPIRATORY (INHALATION) at 23:36

## 2024-06-08 RX ADMIN — ONDANSETRON 4 MG: 2 INJECTION INTRAMUSCULAR; INTRAVENOUS at 23:41

## 2024-06-08 RX ADMIN — METHYLPREDNISOLONE SODIUM SUCCINATE 125 MG: 125 INJECTION, POWDER, FOR SOLUTION INTRAMUSCULAR; INTRAVENOUS at 23:40

## 2024-06-08 RX ADMIN — MAGNESIUM SULFATE HEPTAHYDRATE 2 G: 40 INJECTION, SOLUTION INTRAVENOUS at 23:41

## 2024-06-08 RX ADMIN — IPRATROPIUM BROMIDE AND ALBUTEROL SULFATE 3 ML: 2.5; .5 SOLUTION RESPIRATORY (INHALATION) at 23:35

## 2024-06-08 ASSESSMENT — LIFESTYLE VARIABLES
EVER FELT BAD OR GUILTY ABOUT YOUR DRINKING: NO
EVER HAD A DRINK FIRST THING IN THE MORNING TO STEADY YOUR NERVES TO GET RID OF A HANGOVER: NO
HAVE YOU EVER FELT YOU SHOULD CUT DOWN ON YOUR DRINKING: NO
TOTAL SCORE: 0
HAVE PEOPLE ANNOYED YOU BY CRITICIZING YOUR DRINKING: NO

## 2024-06-08 ASSESSMENT — PAIN SCALES - GENERAL: PAINLEVEL_OUTOF10: 8

## 2024-06-08 ASSESSMENT — PAIN DESCRIPTION - LOCATION: LOCATION: HEAD

## 2024-06-08 ASSESSMENT — PAIN DESCRIPTION - PAIN TYPE: TYPE: ACUTE PAIN

## 2024-06-08 ASSESSMENT — PAIN - FUNCTIONAL ASSESSMENT: PAIN_FUNCTIONAL_ASSESSMENT: 0-10

## 2024-06-09 ENCOUNTER — APPOINTMENT (OUTPATIENT)
Dept: CARDIOLOGY | Facility: HOSPITAL | Age: 39
End: 2024-06-09
Payer: COMMERCIAL

## 2024-06-09 PROBLEM — J45.41 MODERATE PERSISTENT ASTHMA WITH (ACUTE) EXACERBATION (HHS-HCC): Status: ACTIVE | Noted: 2024-06-09

## 2024-06-09 LAB
ALBUMIN SERPL BCP-MCNC: 4.2 G/DL (ref 3.4–5)
ALP SERPL-CCNC: 75 U/L (ref 33–110)
ALT SERPL W P-5'-P-CCNC: 46 U/L (ref 7–45)
ANION GAP SERPL CALC-SCNC: 11 MMOL/L (ref 10–20)
ANION GAP SERPL CALC-SCNC: 13 MMOL/L (ref 10–20)
AST SERPL W P-5'-P-CCNC: 29 U/L (ref 9–39)
BILIRUB SERPL-MCNC: 0.4 MG/DL (ref 0–1.2)
BNP SERPL-MCNC: 37 PG/ML (ref 0–99)
BUN SERPL-MCNC: 13 MG/DL (ref 6–23)
BUN SERPL-MCNC: 16 MG/DL (ref 6–23)
CALCIUM SERPL-MCNC: 8.7 MG/DL (ref 8.6–10.3)
CALCIUM SERPL-MCNC: 9 MG/DL (ref 8.6–10.3)
CARDIAC TROPONIN I PNL SERPL HS: 4 NG/L (ref 0–13)
CHLORIDE SERPL-SCNC: 102 MMOL/L (ref 98–107)
CHLORIDE SERPL-SCNC: 104 MMOL/L (ref 98–107)
CO2 SERPL-SCNC: 22 MMOL/L (ref 21–32)
CO2 SERPL-SCNC: 28 MMOL/L (ref 21–32)
CREAT SERPL-MCNC: 0.8 MG/DL (ref 0.5–1.05)
CREAT SERPL-MCNC: 0.89 MG/DL (ref 0.5–1.05)
EGFRCR SERPLBLD CKD-EPI 2021: 86 ML/MIN/1.73M*2
EGFRCR SERPLBLD CKD-EPI 2021: >90 ML/MIN/1.73M*2
ERYTHROCYTE [DISTWIDTH] IN BLOOD BY AUTOMATED COUNT: 12.1 % (ref 11.5–14.5)
GLUCOSE SERPL-MCNC: 119 MG/DL (ref 74–99)
GLUCOSE SERPL-MCNC: 176 MG/DL (ref 74–99)
HCT VFR BLD AUTO: 40.9 % (ref 36–46)
HGB BLD-MCNC: 13.8 G/DL (ref 12–16)
INR PPP: 0.9 (ref 0.9–1.1)
LACTATE SERPL-SCNC: 1.5 MMOL/L (ref 0.4–2)
MAGNESIUM SERPL-MCNC: 2.11 MG/DL (ref 1.6–2.4)
MCH RBC QN AUTO: 29.7 PG (ref 26–34)
MCHC RBC AUTO-ENTMCNC: 33.7 G/DL (ref 32–36)
MCV RBC AUTO: 88 FL (ref 80–100)
NRBC BLD-RTO: 0 /100 WBCS (ref 0–0)
PLATELET # BLD AUTO: 344 X10*3/UL (ref 150–450)
POTASSIUM SERPL-SCNC: 3.6 MMOL/L (ref 3.5–5.3)
POTASSIUM SERPL-SCNC: 4 MMOL/L (ref 3.5–5.3)
PROT SERPL-MCNC: 7.1 G/DL (ref 6.4–8.2)
PROTHROMBIN TIME: 9.9 SECONDS (ref 9.8–12.8)
RBC # BLD AUTO: 4.64 X10*6/UL (ref 4–5.2)
SARS-COV-2 RNA RESP QL NAA+PROBE: NOT DETECTED
SODIUM SERPL-SCNC: 135 MMOL/L (ref 136–145)
SODIUM SERPL-SCNC: 137 MMOL/L (ref 136–145)
WBC # BLD AUTO: 11.6 X10*3/UL (ref 4.4–11.3)

## 2024-06-09 PROCEDURE — 83605 ASSAY OF LACTIC ACID: CPT | Performed by: PHYSICIAN ASSISTANT

## 2024-06-09 PROCEDURE — 2500000004 HC RX 250 GENERAL PHARMACY W/ HCPCS (ALT 636 FOR OP/ED): Performed by: STUDENT IN AN ORGANIZED HEALTH CARE EDUCATION/TRAINING PROGRAM

## 2024-06-09 PROCEDURE — 94640 AIRWAY INHALATION TREATMENT: CPT

## 2024-06-09 PROCEDURE — 99223 1ST HOSP IP/OBS HIGH 75: CPT | Performed by: STUDENT IN AN ORGANIZED HEALTH CARE EDUCATION/TRAINING PROGRAM

## 2024-06-09 PROCEDURE — 85610 PROTHROMBIN TIME: CPT | Performed by: PHYSICIAN ASSISTANT

## 2024-06-09 PROCEDURE — 99232 SBSQ HOSP IP/OBS MODERATE 35: CPT | Performed by: STUDENT IN AN ORGANIZED HEALTH CARE EDUCATION/TRAINING PROGRAM

## 2024-06-09 PROCEDURE — G0378 HOSPITAL OBSERVATION PER HR: HCPCS

## 2024-06-09 PROCEDURE — 93005 ELECTROCARDIOGRAM TRACING: CPT

## 2024-06-09 PROCEDURE — 87635 SARS-COV-2 COVID-19 AMP PRB: CPT | Performed by: STUDENT IN AN ORGANIZED HEALTH CARE EDUCATION/TRAINING PROGRAM

## 2024-06-09 PROCEDURE — 96375 TX/PRO/DX INJ NEW DRUG ADDON: CPT | Mod: 59

## 2024-06-09 PROCEDURE — 2500000001 HC RX 250 WO HCPCS SELF ADMINISTERED DRUGS (ALT 637 FOR MEDICARE OP): Performed by: STUDENT IN AN ORGANIZED HEALTH CARE EDUCATION/TRAINING PROGRAM

## 2024-06-09 PROCEDURE — 71045 X-RAY EXAM CHEST 1 VIEW: CPT | Performed by: RADIOLOGY

## 2024-06-09 PROCEDURE — 80048 BASIC METABOLIC PNL TOTAL CA: CPT | Performed by: STUDENT IN AN ORGANIZED HEALTH CARE EDUCATION/TRAINING PROGRAM

## 2024-06-09 PROCEDURE — 96372 THER/PROPH/DIAG INJ SC/IM: CPT | Performed by: STUDENT IN AN ORGANIZED HEALTH CARE EDUCATION/TRAINING PROGRAM

## 2024-06-09 PROCEDURE — 36415 COLL VENOUS BLD VENIPUNCTURE: CPT | Performed by: STUDENT IN AN ORGANIZED HEALTH CARE EDUCATION/TRAINING PROGRAM

## 2024-06-09 PROCEDURE — 96376 TX/PRO/DX INJ SAME DRUG ADON: CPT | Mod: 59

## 2024-06-09 PROCEDURE — 2500000002 HC RX 250 W HCPCS SELF ADMINISTERED DRUGS (ALT 637 FOR MEDICARE OP, ALT 636 FOR OP/ED): Performed by: STUDENT IN AN ORGANIZED HEALTH CARE EDUCATION/TRAINING PROGRAM

## 2024-06-09 PROCEDURE — 2500000004 HC RX 250 GENERAL PHARMACY W/ HCPCS (ALT 636 FOR OP/ED): Performed by: PHYSICIAN ASSISTANT

## 2024-06-09 PROCEDURE — 85027 COMPLETE CBC AUTOMATED: CPT | Performed by: STUDENT IN AN ORGANIZED HEALTH CARE EDUCATION/TRAINING PROGRAM

## 2024-06-09 RX ORDER — LEVOTHYROXINE SODIUM 100 UG/1
100 TABLET ORAL DAILY
Status: DISCONTINUED | OUTPATIENT
Start: 2024-06-09 | End: 2024-06-10 | Stop reason: HOSPADM

## 2024-06-09 RX ORDER — FORMOTEROL FUMARATE DIHYDRATE 20 UG/2ML
20 SOLUTION RESPIRATORY (INHALATION)
Status: DISCONTINUED | OUTPATIENT
Start: 2024-06-09 | End: 2024-06-10 | Stop reason: HOSPADM

## 2024-06-09 RX ORDER — ACETAMINOPHEN 160 MG/5ML
650 SOLUTION ORAL EVERY 4 HOURS PRN
Status: DISCONTINUED | OUTPATIENT
Start: 2024-06-09 | End: 2024-06-10 | Stop reason: HOSPADM

## 2024-06-09 RX ORDER — MONTELUKAST SODIUM 10 MG/1
10 TABLET ORAL NIGHTLY
Status: DISCONTINUED | OUTPATIENT
Start: 2024-06-09 | End: 2024-06-10 | Stop reason: HOSPADM

## 2024-06-09 RX ORDER — DESVENLAFAXINE 50 MG/1
100 TABLET, EXTENDED RELEASE ORAL DAILY
Status: DISCONTINUED | OUTPATIENT
Start: 2024-06-09 | End: 2024-06-10 | Stop reason: HOSPADM

## 2024-06-09 RX ORDER — HYDROXYZINE HYDROCHLORIDE 25 MG/1
25 TABLET, FILM COATED ORAL 3 TIMES DAILY PRN
Status: DISCONTINUED | OUTPATIENT
Start: 2024-06-09 | End: 2024-06-10 | Stop reason: HOSPADM

## 2024-06-09 RX ORDER — ACETAMINOPHEN 10 MG/ML
1000 INJECTION, SOLUTION INTRAVENOUS ONCE
Status: COMPLETED | OUTPATIENT
Start: 2024-06-09 | End: 2024-06-09

## 2024-06-09 RX ORDER — ONDANSETRON HYDROCHLORIDE 2 MG/ML
4 INJECTION, SOLUTION INTRAVENOUS ONCE
Status: COMPLETED | OUTPATIENT
Start: 2024-06-09 | End: 2024-06-09

## 2024-06-09 RX ORDER — ENOXAPARIN SODIUM 100 MG/ML
60 INJECTION SUBCUTANEOUS EVERY 12 HOURS SCHEDULED
Status: DISCONTINUED | OUTPATIENT
Start: 2024-06-09 | End: 2024-06-10 | Stop reason: HOSPADM

## 2024-06-09 RX ORDER — ACETAMINOPHEN 650 MG/1
650 SUPPOSITORY RECTAL EVERY 4 HOURS PRN
Status: DISCONTINUED | OUTPATIENT
Start: 2024-06-09 | End: 2024-06-10 | Stop reason: HOSPADM

## 2024-06-09 RX ORDER — POLYETHYLENE GLYCOL 3350 17 G/17G
17 POWDER, FOR SOLUTION ORAL DAILY
Status: DISCONTINUED | OUTPATIENT
Start: 2024-06-09 | End: 2024-06-10 | Stop reason: HOSPADM

## 2024-06-09 RX ORDER — PANTOPRAZOLE SODIUM 40 MG/1
40 TABLET, DELAYED RELEASE ORAL
Status: DISCONTINUED | OUTPATIENT
Start: 2024-06-09 | End: 2024-06-10 | Stop reason: HOSPADM

## 2024-06-09 RX ORDER — DESVENLAFAXINE 50 MG/1
100 TABLET, EXTENDED RELEASE ORAL DAILY
Status: DISCONTINUED | OUTPATIENT
Start: 2024-06-09 | End: 2024-06-09

## 2024-06-09 RX ORDER — HYDRALAZINE HYDROCHLORIDE 20 MG/ML
10 INJECTION INTRAMUSCULAR; INTRAVENOUS EVERY 6 HOURS PRN
Status: DISCONTINUED | OUTPATIENT
Start: 2024-06-09 | End: 2024-06-10 | Stop reason: HOSPADM

## 2024-06-09 RX ORDER — FLUTICASONE FUROATE AND VILANTEROL 200; 25 UG/1; UG/1
1 POWDER RESPIRATORY (INHALATION)
Status: DISCONTINUED | OUTPATIENT
Start: 2024-06-09 | End: 2024-06-10 | Stop reason: HOSPADM

## 2024-06-09 RX ORDER — NAPROXEN SODIUM 220 MG/1
81 TABLET, FILM COATED ORAL DAILY
Status: DISCONTINUED | OUTPATIENT
Start: 2024-06-09 | End: 2024-06-10 | Stop reason: HOSPADM

## 2024-06-09 RX ORDER — ACETAMINOPHEN 10 MG/ML
1000 INJECTION, SOLUTION INTRAVENOUS ONCE
Status: COMPLETED | OUTPATIENT
Start: 2024-06-09 | End: 2024-06-10

## 2024-06-09 RX ORDER — BUTALBITAL, ACETAMINOPHEN AND CAFFEINE 50; 325; 40 MG/1; MG/1; MG/1
1 TABLET ORAL EVERY 4 HOURS PRN
Status: DISCONTINUED | OUTPATIENT
Start: 2024-06-09 | End: 2024-06-10 | Stop reason: HOSPADM

## 2024-06-09 RX ORDER — DESVENLAFAXINE 50 MG/1
50 TABLET, EXTENDED RELEASE ORAL DAILY
Status: DISCONTINUED | OUTPATIENT
Start: 2024-06-09 | End: 2024-06-09

## 2024-06-09 RX ORDER — DESVENLAFAXINE 50 MG/1
50 TABLET, EXTENDED RELEASE ORAL DAILY
Status: DISCONTINUED | OUTPATIENT
Start: 2024-06-09 | End: 2024-06-10 | Stop reason: HOSPADM

## 2024-06-09 RX ORDER — PROMETHAZINE HYDROCHLORIDE 25 MG/1
25 SUPPOSITORY RECTAL EVERY 12 HOURS PRN
Status: DISCONTINUED | OUTPATIENT
Start: 2024-06-09 | End: 2024-06-10 | Stop reason: HOSPADM

## 2024-06-09 RX ORDER — BUDESONIDE 0.5 MG/2ML
0.5 INHALANT ORAL
Status: DISCONTINUED | OUTPATIENT
Start: 2024-06-09 | End: 2024-06-10 | Stop reason: HOSPADM

## 2024-06-09 RX ORDER — PROMETHAZINE HYDROCHLORIDE 25 MG/1
25 TABLET ORAL EVERY 6 HOURS PRN
Status: DISCONTINUED | OUTPATIENT
Start: 2024-06-09 | End: 2024-06-10 | Stop reason: HOSPADM

## 2024-06-09 RX ORDER — ONDANSETRON HYDROCHLORIDE 2 MG/ML
4 INJECTION, SOLUTION INTRAVENOUS EVERY 8 HOURS PRN
Status: DISCONTINUED | OUTPATIENT
Start: 2024-06-09 | End: 2024-06-10 | Stop reason: HOSPADM

## 2024-06-09 RX ORDER — TRAMADOL HYDROCHLORIDE 50 MG/1
50 TABLET ORAL ONCE
Status: COMPLETED | OUTPATIENT
Start: 2024-06-09 | End: 2024-06-09

## 2024-06-09 RX ORDER — BUDESONIDE 0.5 MG/2ML
0.5 INHALANT ORAL
Status: DISCONTINUED | OUTPATIENT
Start: 2024-06-09 | End: 2024-06-09

## 2024-06-09 RX ORDER — ACETAMINOPHEN 325 MG/1
650 TABLET ORAL EVERY 4 HOURS PRN
Status: DISCONTINUED | OUTPATIENT
Start: 2024-06-09 | End: 2024-06-10 | Stop reason: HOSPADM

## 2024-06-09 RX ORDER — ALBUTEROL SULFATE 0.83 MG/ML
2.5 SOLUTION RESPIRATORY (INHALATION)
Status: DISCONTINUED | OUTPATIENT
Start: 2024-06-09 | End: 2024-06-10 | Stop reason: HOSPADM

## 2024-06-09 RX ORDER — ONDANSETRON 4 MG/1
4 TABLET, FILM COATED ORAL EVERY 8 HOURS PRN
Status: DISCONTINUED | OUTPATIENT
Start: 2024-06-09 | End: 2024-06-10 | Stop reason: HOSPADM

## 2024-06-09 RX ORDER — TALC
3 POWDER (GRAM) TOPICAL NIGHTLY PRN
Status: DISCONTINUED | OUTPATIENT
Start: 2024-06-09 | End: 2024-06-10 | Stop reason: HOSPADM

## 2024-06-09 RX ORDER — BUTALBITAL, ACETAMINOPHEN AND CAFFEINE 50; 325; 40 MG/1; MG/1; MG/1
1 TABLET ORAL DAILY
Status: DISCONTINUED | OUTPATIENT
Start: 2024-06-09 | End: 2024-06-10 | Stop reason: HOSPADM

## 2024-06-09 RX ADMIN — ENOXAPARIN SODIUM 60 MG: 60 INJECTION SUBCUTANEOUS at 20:15

## 2024-06-09 RX ADMIN — ALBUTEROL SULFATE 2.5 MG: 2.5 SOLUTION RESPIRATORY (INHALATION) at 19:52

## 2024-06-09 RX ADMIN — DESVENLAFAXINE 100 MG: 50 TABLET, FILM COATED, EXTENDED RELEASE ORAL at 11:29

## 2024-06-09 RX ADMIN — TIOTROPIUM BROMIDE INHALATION SPRAY 2 PUFF: 3.12 SPRAY, METERED RESPIRATORY (INHALATION) at 06:32

## 2024-06-09 RX ADMIN — METHYLPREDNISOLONE SODIUM SUCCINATE 40 MG: 40 INJECTION, POWDER, FOR SOLUTION INTRAMUSCULAR; INTRAVENOUS at 08:23

## 2024-06-09 RX ADMIN — ONDANSETRON 4 MG: 2 INJECTION INTRAMUSCULAR; INTRAVENOUS at 01:16

## 2024-06-09 RX ADMIN — ALBUTEROL SULFATE 2.5 MG: 2.5 SOLUTION RESPIRATORY (INHALATION) at 12:18

## 2024-06-09 RX ADMIN — BUDESONIDE INHALATION 0.5 MG: 0.5 SUSPENSION RESPIRATORY (INHALATION) at 20:01

## 2024-06-09 RX ADMIN — PROMETHAZINE HYDROCHLORIDE 12.5 MG: 25 INJECTION INTRAMUSCULAR; INTRAVENOUS at 02:12

## 2024-06-09 RX ADMIN — TRAMADOL HYDROCHLORIDE 50 MG: 50 TABLET, FILM COATED ORAL at 05:19

## 2024-06-09 RX ADMIN — FLUTICASONE FUROATE AND VILANTEROL TRIFENATATE 1 PUFF: 200; 25 POWDER RESPIRATORY (INHALATION) at 06:33

## 2024-06-09 RX ADMIN — DESVENLAFAXINE 50 MG: 50 TABLET, FILM COATED, EXTENDED RELEASE ORAL at 11:29

## 2024-06-09 RX ADMIN — HYDRALAZINE HYDROCHLORIDE 10 MG: 20 INJECTION INTRAMUSCULAR; INTRAVENOUS at 03:42

## 2024-06-09 RX ADMIN — MONTELUKAST 10 MG: 10 TABLET, FILM COATED ORAL at 20:15

## 2024-06-09 RX ADMIN — ENOXAPARIN SODIUM 60 MG: 60 INJECTION SUBCUTANEOUS at 08:23

## 2024-06-09 RX ADMIN — FORMOTEROL FUMARATE DIHYDRATE 20 MCG: 20 SOLUTION RESPIRATORY (INHALATION) at 12:29

## 2024-06-09 RX ADMIN — METHYLPREDNISOLONE SODIUM SUCCINATE 40 MG: 40 INJECTION, POWDER, FOR SOLUTION INTRAMUSCULAR; INTRAVENOUS at 20:15

## 2024-06-09 RX ADMIN — FORMOTEROL FUMARATE DIHYDRATE 20 MCG: 20 SOLUTION RESPIRATORY (INHALATION) at 20:13

## 2024-06-09 RX ADMIN — HYDROMORPHONE HYDROCHLORIDE 1 MG: 1 INJECTION, SOLUTION INTRAMUSCULAR; INTRAVENOUS; SUBCUTANEOUS at 00:04

## 2024-06-09 RX ADMIN — ALBUTEROL SULFATE 2.5 MG: 2.5 SOLUTION RESPIRATORY (INHALATION) at 23:19

## 2024-06-09 RX ADMIN — PANTOPRAZOLE SODIUM 40 MG: 40 TABLET, DELAYED RELEASE ORAL at 20:53

## 2024-06-09 RX ADMIN — BUTALBITAL, ACETAMINOPHEN, AND CAFFEINE 1 TABLET: 50; 325; 40 TABLET, COATED ORAL at 10:27

## 2024-06-09 RX ADMIN — ASPIRIN 81 MG: 81 TABLET, CHEWABLE ORAL at 10:27

## 2024-06-09 RX ADMIN — PROMETHAZINE HYDROCHLORIDE 25 MG: 25 TABLET ORAL at 08:27

## 2024-06-09 RX ADMIN — BUDESONIDE INHALATION 0.5 MG: 0.5 SUSPENSION RESPIRATORY (INHALATION) at 12:26

## 2024-06-09 RX ADMIN — METHYLPREDNISOLONE SODIUM SUCCINATE 40 MG: 40 INJECTION, POWDER, FOR SOLUTION INTRAMUSCULAR; INTRAVENOUS at 15:09

## 2024-06-09 RX ADMIN — LEVOTHYROXINE SODIUM 100 MCG: 0.1 TABLET ORAL at 10:27

## 2024-06-09 RX ADMIN — BUTALBITAL, ACETAMINOPHEN, AND CAFFEINE 1 TABLET: 50; 325; 40 TABLET, COATED ORAL at 03:38

## 2024-06-09 RX ADMIN — ACETAMINOPHEN 1000 MG: 10 INJECTION, SOLUTION INTRAVENOUS at 11:29

## 2024-06-09 RX ADMIN — ONDANSETRON 4 MG: 2 INJECTION INTRAMUSCULAR; INTRAVENOUS at 23:38

## 2024-06-09 RX ADMIN — ALBUTEROL SULFATE 2.5 MG: 2.5 SOLUTION RESPIRATORY (INHALATION) at 09:54

## 2024-06-09 SDOH — SOCIAL STABILITY: SOCIAL INSECURITY: HAVE YOU HAD ANY THOUGHTS OF HARMING ANYONE ELSE?: NO

## 2024-06-09 SDOH — ECONOMIC STABILITY: HOUSING INSECURITY: IN THE LAST 12 MONTHS, HOW MANY PLACES HAVE YOU LIVED?: 1

## 2024-06-09 SDOH — SOCIAL STABILITY: SOCIAL INSECURITY: WITHIN THE LAST YEAR, HAVE YOU BEEN AFRAID OF YOUR PARTNER OR EX-PARTNER?: NO

## 2024-06-09 SDOH — SOCIAL STABILITY: SOCIAL INSECURITY: DO YOU FEEL UNSAFE GOING BACK TO THE PLACE WHERE YOU ARE LIVING?: NO

## 2024-06-09 SDOH — SOCIAL STABILITY: SOCIAL NETWORK: HOW OFTEN DO YOU GET TOGETHER WITH FRIENDS OR RELATIVES?: MORE THAN THREE TIMES A WEEK

## 2024-06-09 SDOH — ECONOMIC STABILITY: FOOD INSECURITY: WITHIN THE PAST 12 MONTHS, THE FOOD YOU BOUGHT JUST DIDN'T LAST AND YOU DIDN'T HAVE MONEY TO GET MORE.: NEVER TRUE

## 2024-06-09 SDOH — SOCIAL STABILITY: SOCIAL NETWORK: HOW OFTEN DO YOU ATTEND CHURCH OR RELIGIOUS SERVICES?: NEVER

## 2024-06-09 SDOH — SOCIAL STABILITY: SOCIAL INSECURITY: DOES ANYONE TRY TO KEEP YOU FROM HAVING/CONTACTING OTHER FRIENDS OR DOING THINGS OUTSIDE YOUR HOME?: NO

## 2024-06-09 SDOH — SOCIAL STABILITY: SOCIAL INSECURITY: HAVE YOU HAD THOUGHTS OF HARMING ANYONE ELSE?: NO

## 2024-06-09 SDOH — SOCIAL STABILITY: SOCIAL INSECURITY: DO YOU FEEL ANYONE HAS EXPLOITED OR TAKEN ADVANTAGE OF YOU FINANCIALLY OR OF YOUR PERSONAL PROPERTY?: NO

## 2024-06-09 SDOH — ECONOMIC STABILITY: FOOD INSECURITY: WITHIN THE PAST 12 MONTHS, YOU WORRIED THAT YOUR FOOD WOULD RUN OUT BEFORE YOU GOT MONEY TO BUY MORE.: NEVER TRUE

## 2024-06-09 SDOH — SOCIAL STABILITY: SOCIAL NETWORK: ARE YOU MARRIED, WIDOWED, DIVORCED, SEPARATED, NEVER MARRIED, OR LIVING WITH A PARTNER?: MARRIED

## 2024-06-09 SDOH — ECONOMIC STABILITY: TRANSPORTATION INSECURITY
IN THE PAST 12 MONTHS, HAS THE LACK OF TRANSPORTATION KEPT YOU FROM MEDICAL APPOINTMENTS OR FROM GETTING MEDICATIONS?: NO

## 2024-06-09 SDOH — SOCIAL STABILITY: SOCIAL INSECURITY: ABUSE: ADULT

## 2024-06-09 SDOH — SOCIAL STABILITY: SOCIAL INSECURITY: WITHIN THE LAST YEAR, HAVE YOU BEEN HUMILIATED OR EMOTIONALLY ABUSED IN OTHER WAYS BY YOUR PARTNER OR EX-PARTNER?: NO

## 2024-06-09 SDOH — SOCIAL STABILITY: SOCIAL INSECURITY: WERE YOU ABLE TO COMPLETE ALL THE BEHAVIORAL HEALTH SCREENINGS?: YES

## 2024-06-09 SDOH — ECONOMIC STABILITY: INCOME INSECURITY: HOW HARD IS IT FOR YOU TO PAY FOR THE VERY BASICS LIKE FOOD, HOUSING, MEDICAL CARE, AND HEATING?: PATIENT DECLINED

## 2024-06-09 SDOH — ECONOMIC STABILITY: TRANSPORTATION INSECURITY
IN THE PAST 12 MONTHS, HAS LACK OF TRANSPORTATION KEPT YOU FROM MEETINGS, WORK, OR FROM GETTING THINGS NEEDED FOR DAILY LIVING?: NO

## 2024-06-09 SDOH — SOCIAL STABILITY: SOCIAL INSECURITY: ARE THERE ANY APPARENT SIGNS OF INJURIES/BEHAVIORS THAT COULD BE RELATED TO ABUSE/NEGLECT?: NO

## 2024-06-09 SDOH — HEALTH STABILITY: PHYSICAL HEALTH: ON AVERAGE, HOW MANY MINUTES DO YOU ENGAGE IN EXERCISE AT THIS LEVEL?: PATIENT DECLINED

## 2024-06-09 SDOH — ECONOMIC STABILITY: HOUSING INSECURITY
IN THE LAST 12 MONTHS, WAS THERE A TIME WHEN YOU DID NOT HAVE A STEADY PLACE TO SLEEP OR SLEPT IN A SHELTER (INCLUDING NOW)?: NO

## 2024-06-09 SDOH — SOCIAL STABILITY: SOCIAL INSECURITY: HAS ANYONE EVER THREATENED TO HURT YOUR FAMILY OR YOUR PETS?: NO

## 2024-06-09 SDOH — SOCIAL STABILITY: SOCIAL NETWORK: HOW OFTEN DO YOU ATTENT MEETINGS OF THE CLUB OR ORGANIZATION YOU BELONG TO?: MORE THAN 4 TIMES PER YEAR

## 2024-06-09 SDOH — SOCIAL STABILITY: SOCIAL INSECURITY: ARE YOU OR HAVE YOU BEEN THREATENED OR ABUSED PHYSICALLY, EMOTIONALLY, OR SEXUALLY BY ANYONE?: NO

## 2024-06-09 SDOH — ECONOMIC STABILITY: INCOME INSECURITY: IN THE LAST 12 MONTHS, WAS THERE A TIME WHEN YOU WERE NOT ABLE TO PAY THE MORTGAGE OR RENT ON TIME?: PATIENT DECLINED

## 2024-06-09 SDOH — HEALTH STABILITY: PHYSICAL HEALTH
ON AVERAGE, HOW MANY DAYS PER WEEK DO YOU ENGAGE IN MODERATE TO STRENUOUS EXERCISE (LIKE A BRISK WALK)?: PATIENT DECLINED

## 2024-06-09 ASSESSMENT — COGNITIVE AND FUNCTIONAL STATUS - GENERAL
DAILY ACTIVITIY SCORE: 24
DAILY ACTIVITIY SCORE: 24
MOBILITY SCORE: 24
MOBILITY SCORE: 24
PATIENT BASELINE BEDBOUND: NO
MOBILITY SCORE: 24
MOBILITY SCORE: 24
DAILY ACTIVITIY SCORE: 24
DAILY ACTIVITIY SCORE: 24

## 2024-06-09 ASSESSMENT — LIFESTYLE VARIABLES
SUBSTANCE_ABUSE_PAST_12_MONTHS: NO
AUDIT-C TOTAL SCORE: 0
PRESCIPTION_ABUSE_PAST_12_MONTHS: NO
PRESCIPTION_ABUSE_PAST_12_MONTHS: NO
SKIP TO QUESTIONS 9-10: 1
SUBSTANCE_ABUSE_PAST_12_MONTHS: NO
HOW OFTEN DO YOU HAVE 6 OR MORE DRINKS ON ONE OCCASION: NEVER
AUDIT-C TOTAL SCORE: 0
HOW OFTEN DO YOU HAVE A DRINK CONTAINING ALCOHOL: NEVER
HOW MANY STANDARD DRINKS CONTAINING ALCOHOL DO YOU HAVE ON A TYPICAL DAY: PATIENT DOES NOT DRINK

## 2024-06-09 ASSESSMENT — ACTIVITIES OF DAILY LIVING (ADL)
JUDGMENT_ADEQUATE_SAFELY_COMPLETE_DAILY_ACTIVITIES: YES
ASSISTIVE_DEVICE: EYEGLASSES
FEEDING YOURSELF: INDEPENDENT
DRESSING YOURSELF: INDEPENDENT
HEARING - LEFT EAR: FUNCTIONAL
ADEQUATE_TO_COMPLETE_ADL: YES
TOILETING: INDEPENDENT
WALKS IN HOME: INDEPENDENT
HEARING - RIGHT EAR: FUNCTIONAL
PATIENT'S MEMORY ADEQUATE TO SAFELY COMPLETE DAILY ACTIVITIES?: YES
BATHING: INDEPENDENT
GROOMING: INDEPENDENT

## 2024-06-09 ASSESSMENT — PAIN - FUNCTIONAL ASSESSMENT
PAIN_FUNCTIONAL_ASSESSMENT: 0-10

## 2024-06-09 ASSESSMENT — PAIN DESCRIPTION - DESCRIPTORS
DESCRIPTORS: ACHING

## 2024-06-09 ASSESSMENT — PAIN SCALES - GENERAL
PAINLEVEL_OUTOF10: 7
PAINLEVEL_OUTOF10: 3
PAINLEVEL_OUTOF10: 0 - NO PAIN

## 2024-06-09 ASSESSMENT — PAIN DESCRIPTION - LOCATION
LOCATION: HEAD
LOCATION: HEAD

## 2024-06-09 NOTE — CARE PLAN
The patient's goals for the shift include to get some sleep and to have headache pain relieved    The clinical goals for the shift include Patient will remain hemodynamically stable throughout the shift      Problem: Pain  Goal: My pain/discomfort is manageable  Outcome: Not met  Goal: Ambulates independently  Outcome: Adequate for Discharge

## 2024-06-09 NOTE — ED PROVIDER NOTES
HPI   Chief Complaint   Patient presents with    Shortness of Breath     Pt arrived by squad with sob. 2 duo nebs given in route       A 38-year-old female patient with history of asthma comes in the emergency department today by EMS secondary to acute shortness of breath that started around 1030 this evening.  Says she is having difficulty breathing.  Feels wheezy have difficulty talking.  Denies any fevers, chills, nausea, vomiting or diarrhea.  Otherwise no other complaints this present time.  For this purpose she was brought to the emergency department today further evaluation.                          Matheus Coma Scale Score: 15                     Patient History   Past Medical History:   Diagnosis Date    Acute cystitis without hematuria 08/08/2019    Acute cystitis without hematuria    Asthma ([facility])     Disease of thyroid gland     Encounter for initial prescription of contraceptive pills 11/01/2019    Encounter for initial prescription of contraceptive pills    Nausea 02/05/2021    Nausea in adult    Other general symptoms and signs 12/05/2019    Forgetfulness    Parageusia 04/06/2020    Taste perversion    Pelvic and perineal pain     Pelvic pain in female    Personal history of other diseases of the respiratory system 04/06/2020    History of sinusitis    Personal history of other specified conditions 07/01/2020    History of vomiting    Personal history of other specified conditions 01/26/2021    History of headache    Personal history of other specified conditions 02/01/2021    History of diarrhea    Personal history of thrombophlebitis 06/03/2019    History of phlebitis    Personal history of urinary (tract) infections 01/16/2020    History of urinary tract infection    PTSD (post-traumatic stress disorder)      Past Surgical History:   Procedure Laterality Date    OTHER SURGICAL HISTORY  02/08/2019    Appendectomy    OTHER SURGICAL HISTORY  02/08/2019    Cholecystectomy    OTHER SURGICAL HISTORY   02/08/2019    Cystoscopy    OTHER SURGICAL HISTORY  02/08/2019    Ureteroscopy    OTHER SURGICAL HISTORY  02/08/2019    Shoulder surgery     Family History   Problem Relation Name Age of Onset    Hypertension Father      Other (Pulmonary Valve Stenosis) Sister      Heart disease Maternal Grandmother      Diabetes Other Grandparent     Lung cancer Other Grandparent     Anxiety disorder Sibling       Social History     Tobacco Use    Smoking status: Never     Passive exposure: Current    Smokeless tobacco: Never   Vaping Use    Vaping status: Never Used   Substance Use Topics    Alcohol use: Yes     Alcohol/week: 1.0 - 2.0 standard drink of alcohol     Types: 1 - 2 Glasses of wine per week     Comment: social    Drug use: Never       Physical Exam   ED Triage Vitals   Temp Pulse Resp BP   -- -- -- --      SpO2 Temp src Heart Rate Source Patient Position   -- -- -- --      BP Location FiO2 (%)     -- --       Physical Exam  Constitutional:       General: She is in acute distress.      Appearance: Normal appearance. She is ill-appearing.   HENT:      Head: Normocephalic and atraumatic.      Nose: Nose normal.   Eyes:      Extraocular Movements: Extraocular movements intact.      Conjunctiva/sclera: Conjunctivae normal.      Pupils: Pupils are equal, round, and reactive to light.   Cardiovascular:      Rate and Rhythm: Normal rate and regular rhythm.   Pulmonary:      Effort: Respiratory distress present.      Breath sounds: No stridor. Wheezing present.   Musculoskeletal:         General: Normal range of motion.      Cervical back: Normal range of motion.   Skin:     General: Skin is warm and dry.   Neurological:      General: No focal deficit present.      Mental Status: She is alert and oriented to person, place, and time. Mental status is at baseline.   Psychiatric:         Mood and Affect: Mood normal.         ED Course & MDM   Diagnoses as of 06/09/24 0158   Moderate persistent asthma with exacerbation ([facility])        Medical Decision Making  A 38-year-old female patient with history of asthma comes in the emergency department today by EMS secondary to acute shortness of breath that started around 1030 this evening.  Says she is having difficulty breathing.  Feels wheezy have difficulty talking.  Denies any fevers, chills, nausea, vomiting or diarrhea.  Otherwise no other complaints this present time.  For this purpose she was brought to the emergency department today further evaluation.    EKG, chest x-ray, laboratory studies ordered to rule out ACS, arrhythmias, electrolyte abnormalities, leukocytosis, left shift, pneumonia, pneumothorax, pulmonary congestion.  DuoNebs ordered for the patient as well as IV Solu-Medrol IV magnesium, IV Dilaudid, IV Zofran    Lactate negative INR negative BNP negative, metabolic panel within normal limits magnesium normal negative troponins negative, CBC negative.  Chest x-ray negative.  Patient is beginning to feel better.  She would like to wait for period time to make sure that she feels comfortable going home.    Historian is the patient    Diagnosis: Asthma exacerbation    Patient's stool does not feel well.  Still feeling nauseated.  Order IV Phenergan.  Will admit patient to the hospitalist.    Discussed case with Dr. Mckeon agrees admit patient under his service      Labs Reviewed   COMPREHENSIVE METABOLIC PANEL - Abnormal       Result Value    Glucose 119 (*)     Sodium 137      Potassium 4.0      Chloride 102      Bicarbonate 28      Anion Gap 11      Urea Nitrogen 13      Creatinine 0.89      eGFR 86      Calcium 9.0      Albumin 4.2      Alkaline Phosphatase 75      Total Protein 7.1      AST 29      Bilirubin, Total 0.4      ALT 46 (*)    MAGNESIUM - Normal    Magnesium 2.11     LACTATE - Normal    Lactate 1.5      Narrative:     Venipuncture immediately after or during the administration of Metamizole may lead to falsely low results. Testing should be performed  immediately  prior to Metamizole dosing.   PROTIME-INR - Normal    Protime 9.9      INR 0.9     B-TYPE NATRIURETIC PEPTIDE - Normal    BNP 37      Narrative:        <100 pg/mL - Heart failure unlikely  100-299 pg/mL - Intermediate probability of acute heart                  failure exacerbation. Correlate with clinical                  context and patient history.    >=300 pg/mL - Heart Failure likely. Correlate with clinical                  context and patient history.    BNP testing is performed using different testing methodology at [facility] than at other system hospitals. Direct result comparisons should only be made within the same method.      TROPONIN I, HIGH SENSITIVITY - Normal    Troponin I, High Sensitivity 4      Narrative:     Less than 99th percentile of normal range cutoff-  Female and children under 18 years old <14 ng/L; Male <21 ng/L: Negative  Repeat testing should be performed if clinically indicated.     Female and children under 18 years old 14-50 ng/L; Male 21-50 ng/L:  Consistent with possible cardiac damage and possible increased clinical   risk. Serial measurements may help to assess extent of myocardial damage.     >50 ng/L: Consistent with cardiac damage, increased clinical risk and  myocardial infarction. Serial measurements may help assess extent of   myocardial damage.      NOTE: Children less than 1 year old may have higher baseline troponin   levels and results should be interpreted in conjunction with the overall   clinical context.     NOTE: Troponin I testing is performed using a different   testing methodology at [facility] than at other   [facility]. Direct result comparisons should only   be made within the same method.   CBC WITH AUTO DIFFERENTIAL    WBC 11.3      nRBC 0.0      RBC 4.95      Hemoglobin 14.9      Hematocrit 43.3      MCV 88      MCH 30.1      MCHC 34.4      RDW 12.4      Platelets 407      Neutrophils % 61.2      Immature  Granulocytes %, Automated 0.6      Lymphocytes % 30.8      Monocytes % 7.0      Eosinophils % 0.1      Basophils % 0.3      Neutrophils Absolute 6.88      Immature Granulocytes Absolute, Automated 0.07      Lymphocytes Absolute 3.47      Monocytes Absolute 0.79      Eosinophils Absolute 0.01      Basophils Absolute 0.03          XR chest 1 view   Final Result   1.  No focal consolidation present.                  MACRO:   None        Signed by: Michell Rosario 6/9/2024 12:49 AM   Dictation workstation:   [id number]            Procedure  ECG 12 lead    Performed by: Jeovany Hendricks PA-C  Authorized by: Jeovany Hendricks PA-C    Interpretation:     Details:  My EKG interpretation  Rate:     ECG rate:  92    ECG rate assessment: normal    Rhythm:     Rhythm: sinus rhythm    ST segments:     ST segments:  Normal  T waves:     T waves: inverted      Inverted:  III       Jeovany Hendricks PA-C  06/09/24 0157       Jeovany Hendricks PA-C  06/09/24 0158

## 2024-06-09 NOTE — H&P
Medical Group History and Physical      ASSESSMENT & PLAN:     #.  Severe persistent asthma with exacerbation  -Patient does have significant history of severe persistent asthma with frequent exacerbations requiring hospital admission  -CXR reviewed, no focal consolidation  -Saturating well on room air  -Continue IV steroids  -Scheduled albuterol, Breo Ellipta, Spiriva  -Continue home montelukast, Allegra  -Spot pulse ox  -Patient is on to Tezspire biologic therapy for the last 4 months without improvement, recommended she discuss Dupixent with her outpatient immunologist specially given her history of hives (likely chronic spontaneous urticaria) and seasonal allergies as well as being on chronic glucocorticoids    #.  Hypothyroidism  #.  Generalized anxiety disorder  -Continue home medications    VTE PPX: Lovenox      Salo Mckeon MD    --Of note, this documentation is completed using the Dragon Dictation system (voice recognition software). There may be spelling and/or grammatical errors that were not corrected prior to final submission.--    HISTORY OF PRESENT ILLNESS:   Chief Complaint: Shortness of breath    Sarahi Haley is a 38 y.o. female with a history of severe persistent asthma on chronic glucocorticoids and biologic therapy, hypothyroidism, morbid obesity presenting with shortness of breath.  Patient states that her symptoms are consistent with her previous asthma exacerbations.  She does feel slightly better after receiving nebulizer treatments as well as IV steroids and IV magnesium in the ED.  She also endorses a headache and nausea.  Denies any productive cough, fever, chills.  Saturating well on room air, tachypneic, mildly tachycardic, blood pressure stable.  Labs essentially unremarkable.  She did receive nebulizer treatments, IV steroids, and IV mag in the ED.  CXR was negative for acute cardiopulmonary process.       ROS  10 point review of systems negative except per HPI     PAST  HISTORIES:     Past Medical History  See HPI    Surgical History  She has a past surgical history that includes Other surgical history (02/08/2019); Other surgical history (02/08/2019); Other surgical history (02/08/2019); Other surgical history (02/08/2019); and Other surgical history (02/08/2019).     Social History  She reports that she has never smoked. She has been exposed to tobacco smoke. She has never used smokeless tobacco. She reports current alcohol use of about 1.0 - 2.0 standard drink of alcohol per week. She reports that she does not use drugs.    Family History  Family History   Problem Relation Name Age of Onset   • Hypertension Father     • Other (Pulmonary Valve Stenosis) Sister     • Heart disease Maternal Grandmother     • Diabetes Other Grandparent    • Lung cancer Other Grandparent    • Anxiety disorder Sibling         Allergies:  Bee venom protein (honey bee), Diphenhydramine, Nsaids (non-steroidal anti-inflammatory drug), Procaine, and Ketorolac      OBJECTIVE:      Last Recorded Vitals  /75 (BP Location: Left arm)   Pulse 88   Resp 16   Wt 147 kg (323 lb)   SpO2 94%     Last I/O:  No intake/output data recorded.    Physical Exam   Gen: NAD, appears stated age  HEENT: EOM, MMM  CV: RRR, no murmurs rubs or gallops  Resp: Mild wheezing bilaterally, slightly increased effort  Abdomen: soft, NT,+BS  LE: No edema, no deformity  Neuro: A&Ox4, moving all extremities    LABS AND IMAGING:       Relevant Results  Labs Reviewed   COMPREHENSIVE METABOLIC PANEL - Abnormal       Result Value    Glucose 119 (*)     Sodium 137      Potassium 4.0      Chloride 102      Bicarbonate 28      Anion Gap 11      Urea Nitrogen 13      Creatinine 0.89      eGFR 86      Calcium 9.0      Albumin 4.2      Alkaline Phosphatase 75      Total Protein 7.1      AST 29      Bilirubin, Total 0.4      ALT 46 (*)    MAGNESIUM - Normal    Magnesium 2.11     LACTATE - Normal    Lactate 1.5      Narrative:      Venipuncture immediately after or during the administration of Metamizole may lead to falsely low results. Testing should be performed immediately  prior to Metamizole dosing.   PROTIME-INR - Normal    Protime 9.9      INR 0.9     B-TYPE NATRIURETIC PEPTIDE - Normal    BNP 37      Narrative:        <100 pg/mL - Heart failure unlikely  100-299 pg/mL - Intermediate probability of acute heart                  failure exacerbation. Correlate with clinical                  context and patient history.    >=300 pg/mL - Heart Failure likely. Correlate with clinical                  context and patient history.    BNP testing is performed using different testing methodology at [facility] than at other [facility]. Direct result comparisons should only be made within the same method.      TROPONIN I, HIGH SENSITIVITY - Normal    Troponin I, High Sensitivity 4      Narrative:     Less than 99th percentile of normal range cutoff-  Female and children under 18 years old <14 ng/L; Male <21 ng/L: Negative  Repeat testing should be performed if clinically indicated.     Female and children under 18 years old 14-50 ng/L; Male 21-50 ng/L:  Consistent with possible cardiac damage and possible increased clinical   risk. Serial measurements may help to assess extent of myocardial damage.     >50 ng/L: Consistent with cardiac damage, increased clinical risk and  myocardial infarction. Serial measurements may help assess extent of   myocardial damage.      NOTE: Children less than 1 year old may have higher baseline troponin   levels and results should be interpreted in conjunction with the overall   clinical context.     NOTE: Troponin I testing is performed using a different   testing methodology at [facility] than at other   [facility]. Direct result comparisons should only   be made within the same method.   CBC WITH AUTO DIFFERENTIAL    WBC 11.3      nRBC 0.0      RBC 4.95      Hemoglobin 14.9       Hematocrit 43.3      MCV 88      MCH 30.1      MCHC 34.4      RDW 12.4      Platelets 407      Neutrophils % 61.2      Immature Granulocytes %, Automated 0.6      Lymphocytes % 30.8      Monocytes % 7.0      Eosinophils % 0.1      Basophils % 0.3      Neutrophils Absolute 6.88      Immature Granulocytes Absolute, Automated 0.07      Lymphocytes Absolute 3.47      Monocytes Absolute 0.79      Eosinophils Absolute 0.01      Basophils Absolute 0.03       XR chest 1 view   Final Result   1.  No focal consolidation present.                  MACRO:   None        Signed by: Michell Rosario 6/9/2024 12:49 AM   Dictation workstation:   [id number]

## 2024-06-09 NOTE — PROGRESS NOTES
"   Medical Group Progress Note  ASSESSMENT & PLAN:     Severe persistent asthma with exacerbation  - Patient does have significant history of severe persistent asthma with frequent exacerbations requiring hospital admission  - Continue IV steroids, bronchodilators  - Continue home montelukast, Allegra  - Outpatient follow up with her allergist - advised to discuss ongoing Tezspire vs switching to another medication - patient stated that her allergist wanted to trial 6 months before switching     Hypothyroidism  Generalized anxiety disorder  - Continue home medications    VTE Prophylaxis: Enoxaparin subcutaneous    Disposition/Daily update: Patient with slight improvement in overall respiratory status however still short of breath and wheezing on exam.  Advised patient continue ambulating.  Did have a discussion regarding test prior however patient allergist would like to continue for at least 6 months before switching.  Anticipate discharge home possibly in 24 hours      ---Of note, this documentation is completed using the Dragon Dictation system (voice recognition software). There may be spelling and/or grammatical errors that were not corrected prior to final submission.---      Jason Bill MD    SUBJECTIVE     Patient was seen and examined bedside this morning.  She had no acute events after arrival to the floor from her admission.  States her shortness of breath is slightly improved.  Does have a headache this morning asking for IV Tylenol.    OBJECTIVE:     Last Recorded Vitals:  Vitals:    06/09/24 0600 06/09/24 0743 06/09/24 0954 06/09/24 1105   BP:  145/87  175/79   BP Location:  Left arm  Left arm   Patient Position:  Lying  Sitting   Pulse:  94  90   Resp:  18  18   Temp:  36.1 °C (97 °F)  36.2 °C (97.2 °F)   TempSrc:  Temporal  Temporal   SpO2:  95% 95% 95%   Weight: 146 kg (322 lb 5 oz)      Height: 1.651 m (5' 5\")        Last I/O:  I/O last 3 completed shifts:  In: 50 (0.3 mL/kg) [I.V.:50 (0.3 " mL/kg)]  Out: - (0 mL/kg)   Weight: 146.2 kg     Physical Exam  Constitutional:       General: She is not in acute distress.     Appearance: Normal appearance. She is obese. She is not toxic-appearing.   HENT:      Head: Normocephalic and atraumatic.   Eyes:      Extraocular Movements: Extraocular movements intact.      Conjunctiva/sclera: Conjunctivae normal.   Cardiovascular:      Rate and Rhythm: Normal rate and regular rhythm.      Pulses: Normal pulses.      Heart sounds: Normal heart sounds.   Pulmonary:      Effort: Pulmonary effort is normal. No respiratory distress.      Breath sounds: Wheezing present.   Abdominal:      General: Bowel sounds are normal. There is no distension.      Palpations: Abdomen is soft.      Tenderness: There is no abdominal tenderness. There is no guarding.   Musculoskeletal:         General: Normal range of motion.      Cervical back: Normal range of motion and neck supple.      Comments: Seen ambulating in the hallway.   Neurological:      General: No focal deficit present.      Mental Status: She is alert and oriented to person, place, and time. Mental status is at baseline.   Psychiatric:         Mood and Affect: Mood normal.         Behavior: Behavior normal.         Thought Content: Thought content normal.     Inpatient Medications:  albuterol, 2.5 mg, nebulization, q4h  aspirin, 81 mg, oral, Daily  budesonide, 0.5 mg, nebulization, BID  butalbital-acetaminophen-caff, 1 tablet, oral, Daily  desvenlafaxine, 100 mg, oral, Daily  desvenlafaxine, 50 mg, oral, Daily  enoxaparin, 60 mg, subcutaneous, q12h SERGIO  [Held by provider] fluticasone furoate-vilanteroL, 1 puff, inhalation, Daily  formoterol, 20 mcg, nebulization, BID  levothyroxine, 100 mcg, oral, Daily  methylPREDNISolone sodium succinate (PF), 40 mg, intravenous, q6h  montelukast, 10 mg, oral, Nightly  polyethylene glycol, 17 g, oral, Daily  tiotropium, 2 puff, inhalation, Daily    PRN Medications  PRN medications:  acetaminophen **OR** acetaminophen **OR** acetaminophen, butalbital-acetaminophen-caff, hydrALAZINE, hydrOXYzine HCL, melatonin, ondansetron **OR** ondansetron, promethazine **OR** promethazine  Continuous Medications:     LABS AND IMAGING:     Labs:  Results from last 7 days   Lab Units 06/09/24  0403 06/08/24  2344   WBC AUTO x10*3/uL 11.6* 11.3   RBC AUTO x10*6/uL 4.64 4.95   HEMOGLOBIN g/dL 13.8 14.9   HEMATOCRIT % 40.9 43.3   MCV fL 88 88   MCH pg 29.7 30.1   MCHC g/dL 33.7 34.4   RDW % 12.1 12.4   PLATELETS AUTO x10*3/uL 344 407     Results from last 7 days   Lab Units 06/09/24  0404 06/08/24  2344   SODIUM mmol/L 135* 137   POTASSIUM mmol/L 3.6 4.0   CHLORIDE mmol/L 104 102   CO2 mmol/L 22 28   BUN mg/dL 16 13   CREATININE mg/dL 0.80 0.89   GLUCOSE mg/dL 176* 119*   PROTEIN TOTAL g/dL  --  7.1   CALCIUM mg/dL 8.7 9.0   BILIRUBIN TOTAL mg/dL  --  0.4   ALK PHOS U/L  --  75   AST U/L  --  29   ALT U/L  --  46*     Results from last 7 days   Lab Units 06/08/24  2344   MAGNESIUM mg/dL 2.11     Results from last 7 days   Lab Units 06/08/24  2344   TROPHS ng/L 4     Imaging:  ECG 12 lead  Normal sinus rhythm  Cannot rule out Anterior infarct (cited on or before 28-MAY-2024)  Abnormal ECG  When compared with ECG of 06-JUN-2024 01:44, (unconfirmed)  Sinus rhythm has replaced Junctional rhythm  QT has shortened  XR chest 1 view  Narrative: Interpreted By:  Michell Rosario,   STUDY:  XR CHEST 1 VIEW;  6/9/2024 12:13 am      INDICATION:  Signs/Symptoms:Shortness of breath.      COMPARISON:  06/06/2024      ACCESSION NUMBER(S):  [id number]      ORDERING CLINICIAN:  ARIA ROBLES      FINDINGS:  AP radiograph of the chest was provided.              CARDIOMEDIASTINAL SILHOUETTE:  Cardiomediastinal silhouette is normal in size and configuration.      LUNGS:  No focal consolidation, pleural effusion or sizable pneumothorax seen.      ABDOMEN:  No remarkable upper abdominal findings.      BONES:  No acute osseous changes.       Impression: 1.  No focal consolidation present.              MACRO:  None      Signed by: Michell Rosario 6/9/2024 12:49 AM  Dictation workstation:   [id number]

## 2024-06-10 VITALS
DIASTOLIC BLOOD PRESSURE: 71 MMHG | RESPIRATION RATE: 15 BRPM | SYSTOLIC BLOOD PRESSURE: 172 MMHG | WEIGHT: 293 LBS | TEMPERATURE: 98.1 F | HEART RATE: 88 BPM | OXYGEN SATURATION: 94 % | HEIGHT: 65 IN | BODY MASS INDEX: 48.82 KG/M2

## 2024-06-10 LAB
ANION GAP SERPL CALC-SCNC: 11 MMOL/L (ref 10–20)
BUN SERPL-MCNC: 14 MG/DL (ref 6–23)
CALCIUM SERPL-MCNC: 8.9 MG/DL (ref 8.6–10.3)
CHLORIDE SERPL-SCNC: 104 MMOL/L (ref 98–107)
CO2 SERPL-SCNC: 25 MMOL/L (ref 21–32)
CREAT SERPL-MCNC: 0.71 MG/DL (ref 0.5–1.05)
EGFRCR SERPLBLD CKD-EPI 2021: >90 ML/MIN/1.73M*2
ERYTHROCYTE [DISTWIDTH] IN BLOOD BY AUTOMATED COUNT: 12.7 % (ref 11.5–14.5)
GLUCOSE SERPL-MCNC: 163 MG/DL (ref 74–99)
HCT VFR BLD AUTO: 38.1 % (ref 36–46)
HGB BLD-MCNC: 13 G/DL (ref 12–16)
HOLD SPECIMEN: NORMAL
MCH RBC QN AUTO: 30.2 PG (ref 26–34)
MCHC RBC AUTO-ENTMCNC: 34.1 G/DL (ref 32–36)
MCV RBC AUTO: 89 FL (ref 80–100)
NRBC BLD-RTO: 0 /100 WBCS (ref 0–0)
PLATELET # BLD AUTO: 378 X10*3/UL (ref 150–450)
POTASSIUM SERPL-SCNC: 3.7 MMOL/L (ref 3.5–5.3)
RBC # BLD AUTO: 4.3 X10*6/UL (ref 4–5.2)
SODIUM SERPL-SCNC: 136 MMOL/L (ref 136–145)
WBC # BLD AUTO: 25.1 X10*3/UL (ref 4.4–11.3)

## 2024-06-10 PROCEDURE — 2500000002 HC RX 250 W HCPCS SELF ADMINISTERED DRUGS (ALT 637 FOR MEDICARE OP, ALT 636 FOR OP/ED): Performed by: STUDENT IN AN ORGANIZED HEALTH CARE EDUCATION/TRAINING PROGRAM

## 2024-06-10 PROCEDURE — 2500000001 HC RX 250 WO HCPCS SELF ADMINISTERED DRUGS (ALT 637 FOR MEDICARE OP): Performed by: STUDENT IN AN ORGANIZED HEALTH CARE EDUCATION/TRAINING PROGRAM

## 2024-06-10 PROCEDURE — 85027 COMPLETE CBC AUTOMATED: CPT | Performed by: STUDENT IN AN ORGANIZED HEALTH CARE EDUCATION/TRAINING PROGRAM

## 2024-06-10 PROCEDURE — G0378 HOSPITAL OBSERVATION PER HR: HCPCS

## 2024-06-10 PROCEDURE — 94640 AIRWAY INHALATION TREATMENT: CPT

## 2024-06-10 PROCEDURE — 80048 BASIC METABOLIC PNL TOTAL CA: CPT | Performed by: STUDENT IN AN ORGANIZED HEALTH CARE EDUCATION/TRAINING PROGRAM

## 2024-06-10 PROCEDURE — 96376 TX/PRO/DX INJ SAME DRUG ADON: CPT | Mod: 59

## 2024-06-10 PROCEDURE — 36415 COLL VENOUS BLD VENIPUNCTURE: CPT | Performed by: STUDENT IN AN ORGANIZED HEALTH CARE EDUCATION/TRAINING PROGRAM

## 2024-06-10 PROCEDURE — 2500000004 HC RX 250 GENERAL PHARMACY W/ HCPCS (ALT 636 FOR OP/ED): Performed by: STUDENT IN AN ORGANIZED HEALTH CARE EDUCATION/TRAINING PROGRAM

## 2024-06-10 PROCEDURE — 99239 HOSP IP/OBS DSCHRG MGMT >30: CPT | Performed by: STUDENT IN AN ORGANIZED HEALTH CARE EDUCATION/TRAINING PROGRAM

## 2024-06-10 RX ORDER — FORMOTEROL FUMARATE DIHYDRATE 20 UG/2ML
20 SOLUTION RESPIRATORY (INHALATION)
Qty: 120 ML | Refills: 1 | Status: SHIPPED | OUTPATIENT
Start: 2024-06-10 | End: 2024-07-10

## 2024-06-10 RX ADMIN — BUDESONIDE INHALATION 0.5 MG: 0.5 SUSPENSION RESPIRATORY (INHALATION) at 07:28

## 2024-06-10 RX ADMIN — DESVENLAFAXINE 100 MG: 50 TABLET, FILM COATED, EXTENDED RELEASE ORAL at 08:06

## 2024-06-10 RX ADMIN — TIOTROPIUM BROMIDE INHALATION SPRAY 2 PUFF: 3.12 SPRAY, METERED RESPIRATORY (INHALATION) at 06:10

## 2024-06-10 RX ADMIN — DESVENLAFAXINE 50 MG: 50 TABLET, FILM COATED, EXTENDED RELEASE ORAL at 08:07

## 2024-06-10 RX ADMIN — FORMOTEROL FUMARATE DIHYDRATE 20 MCG: 20 SOLUTION RESPIRATORY (INHALATION) at 07:26

## 2024-06-10 RX ADMIN — METHYLPREDNISOLONE SODIUM SUCCINATE 40 MG: 40 INJECTION, POWDER, FOR SOLUTION INTRAMUSCULAR; INTRAVENOUS at 08:07

## 2024-06-10 RX ADMIN — ACETAMINOPHEN 1000 MG: 1000 INJECTION INTRAVENOUS at 00:10

## 2024-06-10 RX ADMIN — LEVOTHYROXINE SODIUM 100 MCG: 0.1 TABLET ORAL at 06:10

## 2024-06-10 RX ADMIN — ALBUTEROL SULFATE 2.5 MG: 2.5 SOLUTION RESPIRATORY (INHALATION) at 07:23

## 2024-06-10 RX ADMIN — ASPIRIN 81 MG: 81 TABLET, CHEWABLE ORAL at 08:06

## 2024-06-10 RX ADMIN — METHYLPREDNISOLONE SODIUM SUCCINATE 40 MG: 40 INJECTION, POWDER, FOR SOLUTION INTRAMUSCULAR; INTRAVENOUS at 01:21

## 2024-06-10 RX ADMIN — PANTOPRAZOLE SODIUM 40 MG: 40 TABLET, DELAYED RELEASE ORAL at 06:10

## 2024-06-10 ASSESSMENT — COGNITIVE AND FUNCTIONAL STATUS - GENERAL
DAILY ACTIVITIY SCORE: 24
MOBILITY SCORE: 24

## 2024-06-10 ASSESSMENT — PAIN SCALES - GENERAL: PAINLEVEL_OUTOF10: 0 - NO PAIN

## 2024-06-10 NOTE — CARE PLAN
The patient's goals for the shift include to get some sleep and to have headache pain relieved    The clinical goals for the shift include pt will maintain patent airway and oxygenation throughout shift    Over the shift, the patient did not make progress toward the following goals. Barriers to progression include none. Recommendations to address these barriers include discharge instructions.

## 2024-06-10 NOTE — CARE PLAN
The patient's goals for the shift include to get some sleep and to have headache pain relieved    The clinical goals for the shift include patient will maintain airway

## 2024-06-10 NOTE — NURSING NOTE
Discharge, follow up, and medications discussed with pt also instructions on a asthma action plan given and discussed, Iv removed with tip intact. Non tele pt. Pt asked to walk out when ride arrives, disclaimer discussed and pt ready when ride arrived to be discharged home

## 2024-06-10 NOTE — DISCHARGE INSTRUCTIONS
Increase prednisone to 40 mg daily for the next 5 days and slowly begin to taper off down to 30 mg as you are currently on per your immunologist/allergist instructions.

## 2024-06-10 NOTE — DISCHARGE SUMMARY
Medical [facility] Discharge Summary  DISCHARGE DIAGNOSIS     Severe persistent asthma with exacerbation  Hypothyroidism  Anxiety  Morbid obesity  Long-term steroid use    HOSPITAL COURSE AND DETAILS     This is a 38-year-old female with a significant past medical history of severe persistent asthma with recurrent exacerbations, hypothyroidism, generalized anxiety, morbid obesity that was admitted for asthma exacerbation.    Patient did well with bronchodilators, IV steroids with improvement in her symptoms.  She was requesting discharge today.  Was advised to follow-up with her allergist to discuss because continue tezspire injections versus switching to another medications.  Was also advised to follow-up with her PCP.    Advised to continue taking prednisone 40 mg for the next 5 days and then begin tapering off once again down to 30 mg as instructed by her primary allergist.    Total time spent on discharge: >30min      ---Of note, this documentation is completed using the Dragon Dictation system (voice recognition software). There may be spelling and/or grammatical errors that were not corrected prior to final submission.---    Jason Bill MD    DISCHARGE PHYSICAL EXAM     Last Recorded Vitals:  Vitals:    06/09/24 2355 06/10/24 0400 06/10/24 0800 06/10/24 0817   BP: 148/67 145/72  172/71   BP Location: Left arm Left arm  Left arm   Patient Position: Lying Lying  Lying   Pulse: 91 82  88   Resp:    15   Temp: 36.6 °C (97.9 °F) 36.6 °C (97.9 °F)  36.7 °C (98.1 °F)   TempSrc: Temporal Temporal  Temporal   SpO2: 94% 93% 93% 94%   Weight:       Height:         Physical Exam  Constitutional:       General: She is not in acute distress.     Appearance: Normal appearance. She is obese. She is not toxic-appearing.   HENT:      Head: Normocephalic and atraumatic.   Eyes:      Extraocular Movements: Extraocular movements intact.      Conjunctiva/sclera: Conjunctivae normal.   Cardiovascular:      Rate and Rhythm: Normal  rate and regular rhythm.      Pulses: Normal pulses.      Heart sounds: Normal heart sounds.   Pulmonary:      Effort: Pulmonary effort is normal. No respiratory distress.      Breath sounds: Wheezing present.   Abdominal:      General: Bowel sounds are normal. There is no distension.      Palpations: Abdomen is soft.      Tenderness: There is no abdominal tenderness. There is no guarding.   Musculoskeletal:         General: Normal range of motion.      Cervical back: Normal range of motion and neck supple.      Comments: Seen ambulating in the hallway.   Neurological:      General: No focal deficit present.      Mental Status: She is alert and oriented to person, place, and time. Mental status is at baseline.   Psychiatric:         Mood and Affect: Mood normal.         Behavior: Behavior normal.         Thought Content: Thought content normal.       DISCHARGE MEDICATIONS        Your medication list        START taking these medications        Instructions Last Dose Given Next Dose Due   formoterol 20 mcg/2 mL nebulizer solution  Commonly known as: Perforomist      Take 2 mL (20 mcg) by nebulization 2 times a day.              CONTINUE taking these medications        Instructions Last Dose Given Next Dose Due   Airsupra 90-80 mcg/actuation inhaler  Generic drug: albuterol-budesonide      Inhale 2 puffs every 4 hours if needed (cough, wheeze, short of breath). Patient has a coupon       albuterol 2.5 mg /3 mL (0.083 %) nebulizer solution           aspirin 81 mg chewable tablet           budesonide 0.5 mg/2 mL nebulizer solution  Commonly known as: Pulmicort           butalbital-acetaminophen-caff -40 mg tablet      Take 1 tablet by mouth early in the morning.. 1 tab(s) orally once a day, As Needed       desvenlafaxine 100 mg 24 hr tablet  Commonly known as: Pristiq      TAKE 1 TABLET BY MOUTH EVERY DAY       desvenlafaxine 50 mg 24 hr tablet  Commonly known as: Pristiq      TAKE 1 TABLET BY MOUTH ONCE DAILY        Emgality Syringe 120 mg/mL prefilled syringe  Generic drug: galcanezumab  Notes to patient: As scheduled           esomeprazole 20 mg DR capsule  Commonly known as: NexIUM      Take 1 capsule (20 mg) by mouth once daily in the morning. Take before meals. Do not open capsule.       fexofenadine 180 mg tablet  Commonly known as: Allegra           hydrOXYzine HCL 25 mg tablet  Commonly known as: Atarax      Take 1 tablet (25 mg) by mouth 3 times a day as needed for anxiety.       levothyroxine 100 mcg tablet  Commonly known as: Synthroid, Levoxyl           metoclopramide 10 mg tablet  Commonly known as: Reglan      Take 1 tablet (10 mg) by mouth every 8 hours if needed (nausea).       montelukast 10 mg tablet  Commonly known as: Singulair      Take 1 tablet (10 mg) by mouth once daily at bedtime.       multivitamin-Ca-iron-minerals 27-0.4 mg tablet  Commonly known as: Tab-A-Vu Womens           predniSONE 10 mg tablet  Commonly known as: Deltasone           rizatriptan 10 mg tablet  Commonly known as: Maxalt           SUMAtriptan 100 mg tablet  Commonly known as: Imitrex           Tezspire SubQ syringe  Generic drug: tezepelumab-ekko  Notes to patient: As scheduled           Trelegy Ellipta 100-62.5-25 mcg blister with device  Generic drug: fluticasone-umeclidin-vilanter                  ASK your doctor about these medications        Instructions Last Dose Given Next Dose Due   EPINEPHrine 0.3 mg/0.3 mL injection syringe  Commonly known as: Epipen      Inject 0.3 mL (0.3 mg) into the muscle once daily as needed for anaphylaxis.                 Where to Get Your Medications        These medications were sent to [id number] - [place], [place] - [address]  [address], [place] [address]      Phone: [phone]   formoterol 20 mcg/2 mL nebulizer solution       OUTPATIENT FOLLOW-UP     Future Appointments   Date Time Provider Department Center   6/13/2024  3:45 PM Paul Matthew DO  [id number] [place]   7/10/2024  1:50 PM Hilary Trinidad DO [id number] [place]

## 2024-06-11 ENCOUNTER — PATIENT OUTREACH (OUTPATIENT)
Dept: PRIMARY CARE | Facility: CLINIC | Age: 39
End: 2024-06-11
Payer: COMMERCIAL

## 2024-06-11 NOTE — PROGRESS NOTES
Discharge Facility:  [facility]-30 day readmit   Discharge Diagnosis:  Severe persistent asthma with exacerbation   Admission Date: 6/9/24   Discharge Date:  6/10/24     PCP Appointment Date:  6/13/24    Specialist Appointment Date:  7/10/2 4 alg/imm  Hospital Encounter and Summary: Linked     Unable to reach patient x2 attempts. Voicemail left with call back number.

## 2024-06-13 ENCOUNTER — APPOINTMENT (OUTPATIENT)
Dept: PRIMARY CARE | Facility: CLINIC | Age: 39
End: 2024-06-13
Payer: COMMERCIAL

## 2024-06-14 LAB
ATRIAL RATE: 68 BPM
Q ONSET: 222 MS
QRS COUNT: 16 BEATS
QRS DURATION: 68 MS
QT INTERVAL: 512 MS
QTC CALCULATION(BAZETT): 663 MS
QTC FREDERICIA: 609 MS
R AXIS: -14 DEGREES
T AXIS: 5 DEGREES
T OFFSET: 478 MS
VENTRICULAR RATE: 101 BPM

## 2024-06-16 LAB
ATRIAL RATE: 92 BPM
P AXIS: 43 DEGREES
P OFFSET: 190 MS
P ONSET: 137 MS
PR INTERVAL: 166 MS
Q ONSET: 220 MS
QRS COUNT: 16 BEATS
QRS DURATION: 76 MS
QT INTERVAL: 356 MS
QTC CALCULATION(BAZETT): 440 MS
QTC FREDERICIA: 410 MS
R AXIS: -4 DEGREES
T AXIS: 5 DEGREES
T OFFSET: 398 MS
VENTRICULAR RATE: 92 BPM

## 2024-06-18 ENCOUNTER — PATIENT OUTREACH (OUTPATIENT)
Dept: CARE COORDINATION | Facility: CLINIC | Age: 39
End: 2024-06-18
Payer: COMMERCIAL

## 2024-06-26 ENCOUNTER — HOSPITAL ENCOUNTER (EMERGENCY)
Facility: HOSPITAL | Age: 39
Discharge: HOME | End: 2024-06-26
Attending: EMERGENCY MEDICINE
Payer: COMMERCIAL

## 2024-06-26 ENCOUNTER — APPOINTMENT (OUTPATIENT)
Dept: RADIOLOGY | Facility: HOSPITAL | Age: 39
End: 2024-06-26
Payer: COMMERCIAL

## 2024-06-26 VITALS
DIASTOLIC BLOOD PRESSURE: 69 MMHG | HEART RATE: 76 BPM | RESPIRATION RATE: 18 BRPM | OXYGEN SATURATION: 98 % | TEMPERATURE: 97.3 F | HEIGHT: 65 IN | SYSTOLIC BLOOD PRESSURE: 130 MMHG | WEIGHT: 293 LBS | BODY MASS INDEX: 48.82 KG/M2

## 2024-06-26 DIAGNOSIS — J45.901 MODERATE ASTHMA WITH ACUTE EXACERBATION, UNSPECIFIED WHETHER PERSISTENT (HHS-HCC): Primary | ICD-10-CM

## 2024-06-26 LAB
ALBUMIN SERPL BCP-MCNC: 3.8 G/DL (ref 3.4–5)
ALP SERPL-CCNC: 70 U/L (ref 33–110)
ALT SERPL W P-5'-P-CCNC: 51 U/L (ref 7–45)
ANION GAP SERPL CALC-SCNC: 10 MMOL/L (ref 10–20)
AST SERPL W P-5'-P-CCNC: 26 U/L (ref 9–39)
BASOPHILS # BLD AUTO: 0.03 X10*3/UL (ref 0–0.1)
BASOPHILS NFR BLD AUTO: 0.4 %
BILIRUB SERPL-MCNC: 0.8 MG/DL (ref 0–1.2)
BUN SERPL-MCNC: 7 MG/DL (ref 6–23)
CALCIUM SERPL-MCNC: 9.1 MG/DL (ref 8.6–10.3)
CHLORIDE SERPL-SCNC: 105 MMOL/L (ref 98–107)
CO2 SERPL-SCNC: 24 MMOL/L (ref 21–32)
CREAT SERPL-MCNC: 0.8 MG/DL (ref 0.5–1.05)
EGFRCR SERPLBLD CKD-EPI 2021: >90 ML/MIN/1.73M*2
EOSINOPHIL # BLD AUTO: 0.15 X10*3/UL (ref 0–0.7)
EOSINOPHIL NFR BLD AUTO: 2.1 %
ERYTHROCYTE [DISTWIDTH] IN BLOOD BY AUTOMATED COUNT: 12 % (ref 11.5–14.5)
GLUCOSE SERPL-MCNC: 91 MG/DL (ref 74–99)
HCT VFR BLD AUTO: 39.5 % (ref 36–46)
HGB BLD-MCNC: 14.1 G/DL (ref 12–16)
IMM GRANULOCYTES # BLD AUTO: 0.03 X10*3/UL (ref 0–0.7)
IMM GRANULOCYTES NFR BLD AUTO: 0.4 % (ref 0–0.9)
LYMPHOCYTES # BLD AUTO: 2.72 X10*3/UL (ref 1.2–4.8)
LYMPHOCYTES NFR BLD AUTO: 38.2 %
MCH RBC QN AUTO: 30.1 PG (ref 26–34)
MCHC RBC AUTO-ENTMCNC: 35.7 G/DL (ref 32–36)
MCV RBC AUTO: 84 FL (ref 80–100)
MONOCYTES # BLD AUTO: 0.73 X10*3/UL (ref 0.1–1)
MONOCYTES NFR BLD AUTO: 10.3 %
NEUTROPHILS # BLD AUTO: 3.46 X10*3/UL (ref 1.2–7.7)
NEUTROPHILS NFR BLD AUTO: 48.6 %
NRBC BLD-RTO: 0 /100 WBCS (ref 0–0)
PLATELET # BLD AUTO: 346 X10*3/UL (ref 150–450)
POTASSIUM SERPL-SCNC: 3.2 MMOL/L (ref 3.5–5.3)
PROT SERPL-MCNC: 6.5 G/DL (ref 6.4–8.2)
RBC # BLD AUTO: 4.69 X10*6/UL (ref 4–5.2)
SODIUM SERPL-SCNC: 136 MMOL/L (ref 136–145)
WBC # BLD AUTO: 7.1 X10*3/UL (ref 4.4–11.3)

## 2024-06-26 PROCEDURE — 2500000004 HC RX 250 GENERAL PHARMACY W/ HCPCS (ALT 636 FOR OP/ED)

## 2024-06-26 PROCEDURE — 85025 COMPLETE CBC W/AUTO DIFF WBC: CPT | Performed by: PHYSICIAN ASSISTANT

## 2024-06-26 PROCEDURE — 71045 X-RAY EXAM CHEST 1 VIEW: CPT

## 2024-06-26 PROCEDURE — 2500000002 HC RX 250 W HCPCS SELF ADMINISTERED DRUGS (ALT 637 FOR MEDICARE OP, ALT 636 FOR OP/ED): Performed by: PHYSICIAN ASSISTANT

## 2024-06-26 PROCEDURE — 96365 THER/PROPH/DIAG IV INF INIT: CPT

## 2024-06-26 PROCEDURE — 2500000004 HC RX 250 GENERAL PHARMACY W/ HCPCS (ALT 636 FOR OP/ED): Performed by: PHYSICIAN ASSISTANT

## 2024-06-26 PROCEDURE — 96375 TX/PRO/DX INJ NEW DRUG ADDON: CPT

## 2024-06-26 PROCEDURE — 96361 HYDRATE IV INFUSION ADD-ON: CPT

## 2024-06-26 PROCEDURE — 99285 EMERGENCY DEPT VISIT HI MDM: CPT | Mod: 25

## 2024-06-26 PROCEDURE — 71045 X-RAY EXAM CHEST 1 VIEW: CPT | Performed by: RADIOLOGY

## 2024-06-26 PROCEDURE — 94640 AIRWAY INHALATION TREATMENT: CPT

## 2024-06-26 PROCEDURE — 80053 COMPREHEN METABOLIC PANEL: CPT | Performed by: PHYSICIAN ASSISTANT

## 2024-06-26 RX ORDER — MAGNESIUM SULFATE HEPTAHYDRATE 40 MG/ML
2 INJECTION, SOLUTION INTRAVENOUS ONCE
Status: COMPLETED | OUTPATIENT
Start: 2024-06-26 | End: 2024-06-26

## 2024-06-26 RX ORDER — MAGNESIUM SULFATE HEPTAHYDRATE 40 MG/ML
INJECTION, SOLUTION INTRAVENOUS
Status: COMPLETED
Start: 2024-06-26 | End: 2024-06-26

## 2024-06-26 RX ORDER — PREDNISONE 50 MG/1
50 TABLET ORAL DAILY
Qty: 5 TABLET | Refills: 0 | Status: SHIPPED | OUTPATIENT
Start: 2024-06-26 | End: 2024-07-01

## 2024-06-26 RX ORDER — POTASSIUM CHLORIDE 20 MEQ/1
40 TABLET, EXTENDED RELEASE ORAL ONCE
Status: COMPLETED | OUTPATIENT
Start: 2024-06-26 | End: 2024-06-26

## 2024-06-26 RX ORDER — ACETAMINOPHEN 10 MG/ML
1000 INJECTION, SOLUTION INTRAVENOUS ONCE
Status: COMPLETED | OUTPATIENT
Start: 2024-06-26 | End: 2024-06-26

## 2024-06-26 RX ORDER — IPRATROPIUM BROMIDE AND ALBUTEROL SULFATE 2.5; .5 MG/3ML; MG/3ML
3 SOLUTION RESPIRATORY (INHALATION) EVERY 20 MIN
Status: COMPLETED | OUTPATIENT
Start: 2024-06-26 | End: 2024-06-26

## 2024-06-26 ASSESSMENT — PAIN SCALES - GENERAL
PAINLEVEL_OUTOF10: 0 - NO PAIN
PAINLEVEL_OUTOF10: 6

## 2024-06-26 ASSESSMENT — PAIN - FUNCTIONAL ASSESSMENT: PAIN_FUNCTIONAL_ASSESSMENT: 0-10

## 2024-06-26 ASSESSMENT — PAIN DESCRIPTION - LOCATION: LOCATION: HEAD

## 2024-06-26 ASSESSMENT — PAIN DESCRIPTION - PROGRESSION: CLINICAL_PROGRESSION: NOT CHANGED

## 2024-06-26 NOTE — ED PROVIDER NOTES
HPI   Chief Complaint   Patient presents with    Shortness of Breath       38-year-old female presents ER with complaint of chest tightness shortness of breath cough and wheezing that started earlier this evening.  The patient gave her self aerosol treatments as well as took oral prednisone with no improvement of her symptoms.  She was transported by EMS and route received aerosol treatments.  She describes chest tightness with cough and wheezing, denies any recent fevers, chills, palpitations, hemoptysis, abdominal pain, nausea vomiting or diarrhea.  Patient has a history of moderate asthma with frequent admissions.  She is a non-smoker.  Denies any other complaints.  Patient was last admitted to the ICU back on June 8, 2024.      History provided by:  Patient, EMS personnel and medical records                      [place] Coma Scale Score: 15                     Patient History   Past Medical History:   Diagnosis Date    Acute cystitis without hematuria 08/08/2019    Acute cystitis without hematuria    Asthma ([facility])     Disease of thyroid gland     Encounter for initial prescription of contraceptive pills 11/01/2019    Encounter for initial prescription of contraceptive pills    Nausea 02/05/2021    Nausea in adult    Other general symptoms and signs 12/05/2019    Forgetfulness    Parageusia 04/06/2020    Taste perversion    Pelvic and perineal pain     Pelvic pain in female    Personal history of other diseases of the respiratory system 04/06/2020    History of sinusitis    Personal history of other specified conditions 07/01/2020    History of vomiting    Personal history of other specified conditions 01/26/2021    History of headache    Personal history of other specified conditions 02/01/2021    History of diarrhea    Personal history of thrombophlebitis 06/03/2019    History of phlebitis    Personal history of urinary (tract) infections 01/16/2020    History of urinary tract infection    PTSD  (post-traumatic stress disorder)      Past Surgical History:   Procedure Laterality Date    ABDOMINAL SURGERY      APPENDECTOMY      OTHER SURGICAL HISTORY  02/08/2019    Appendectomy    OTHER SURGICAL HISTORY  02/08/2019    Cholecystectomy    OTHER SURGICAL HISTORY  02/08/2019    Cystoscopy    OTHER SURGICAL HISTORY  02/08/2019    Ureteroscopy    OTHER SURGICAL HISTORY  02/08/2019    Shoulder surgery     Family History   Problem Relation Name Age of Onset    Hypertension Father      Other (Pulmonary Valve Stenosis) Sister      Heart disease Maternal Grandmother      Diabetes Other Grandparent     Lung cancer Other Grandparent     Anxiety disorder Sibling       Social History     Tobacco Use    Smoking status: Never     Passive exposure: Current    Smokeless tobacco: Never   Vaping Use    Vaping status: Never Used   Substance Use Topics    Alcohol use: Yes     Alcohol/week: 1.0 - 2.0 standard drink of alcohol     Types: 1 - 2 Glasses of wine per week     Comment: social    Drug use: Never       Physical Exam   ED Triage Vitals [06/26/24 0327]   Temperature Heart Rate Respirations BP   36.3 °C (97.3 °F) 82 (!) 26 159/89      Pulse Ox Temp Source Heart Rate Source Patient Position   97 % Temporal Monitor Sitting      BP Location FiO2 (%)     Right arm --       Physical Exam  Vitals and nursing note reviewed.   Constitutional:       General: She is awake. She is not in acute distress.     Appearance: Normal appearance. She is well-developed and well-groomed. She is not ill-appearing, toxic-appearing or diaphoretic.   HENT:      Head: Normocephalic and atraumatic.      Right Ear: Tympanic membrane, ear canal and external ear normal.      Left Ear: Tympanic membrane, ear canal and external ear normal.      Nose: Nose normal.      Mouth/Throat:      Mouth: Mucous membranes are moist.      Pharynx: Oropharynx is clear.   Eyes:      Extraocular Movements: Extraocular movements intact.      Conjunctiva/sclera: Conjunctivae  normal.      Pupils: Pupils are equal, round, and reactive to light.   Cardiovascular:      Rate and Rhythm: Normal rate and regular rhythm.      Pulses: Normal pulses.      Heart sounds: Normal heart sounds.   Pulmonary:      Effort: Pulmonary effort is normal.      Breath sounds: Examination of the right-upper field reveals decreased breath sounds and wheezing. Examination of the left-upper field reveals decreased breath sounds and wheezing. Examination of the right-middle field reveals decreased breath sounds and wheezing. Examination of the left-middle field reveals decreased breath sounds and wheezing. Examination of the right-lower field reveals decreased breath sounds and wheezing. Examination of the left-lower field reveals decreased breath sounds and wheezing. Decreased breath sounds and wheezing present. No rhonchi or rales.   Abdominal:      General: Abdomen is flat. Bowel sounds are normal.      Palpations: Abdomen is soft. There is no mass.      Tenderness: There is no abdominal tenderness. There is no guarding.   Musculoskeletal:         General: No swelling or tenderness. Normal range of motion.      Cervical back: Normal range of motion and neck supple.   Skin:     General: Skin is warm and dry.      Capillary Refill: Capillary refill takes less than 2 seconds.      Findings: No rash.   Neurological:      General: No focal deficit present.      Mental Status: She is alert and oriented to person, place, and time. Mental status is at baseline.   Psychiatric:         Mood and Affect: Mood normal.         Behavior: Behavior normal. Behavior is cooperative.         Thought Content: Thought content normal.         Judgment: Judgment normal.         ED Course & MDM   Diagnoses as of 06/26/24 0456   Moderate asthma with acute exacerbation, unspecified whether persistent ([facility])       Medical Decision Making  Temperature 36 3 heart rate 82 respirations 26 blood pressure 159/89 pulse ox is 97% on room  air  Patient was given Phenergan 12.5 mg IV piggyback, 1 g of IV Tylenol, magnesium 2 g IV piggyback, 3 DuoNeb aerosol treatments a liter of LR and Solu-Medrol 125 mg IV push.  She was kept on the monitor.  Lab results white count 7.1 hemoglobin 14.1 hematocrit 39.5 platelet count was 246, metabolic potassium 3.2, ALT 51 otherwise unremarkable repeat exam BP is 131/73 heart rate 74 respirations 20 pulse ox is 95% on room air  Patient states she is feeling better after the treatments and the medications repeat exam shows improved lung sounds without wheezing or rhonchi.  The plan is to walk the patient with a pulse ox and as long as she stays asymptomatic the patient states she would like to be discharged home.  Chest x-ray shows no evidence of acute cardiopulmonary process.  Patient ambulated 98 % on room air.  No signs of distress or respiratory discomfort.  She was discharged home with prescription for prednisone.  Advised to continue with her current medications return with any concerns or worsening of symptoms all questions answered prior to discharge          Procedure  Procedures     Salo Perez PA-C  06/26/24 9645

## 2024-06-26 NOTE — Clinical Note
Sarahi Haley was seen and treated in our emergency department on 6/26/2024.  She may return to work on 06/28/2024.       If you have any questions or concerns, please don't hesitate to call.      Greg Napoles MD

## 2024-07-02 ENCOUNTER — PATIENT OUTREACH (OUTPATIENT)
Dept: PRIMARY CARE | Facility: CLINIC | Age: 39
End: 2024-07-02
Payer: COMMERCIAL

## 2024-07-10 ENCOUNTER — APPOINTMENT (OUTPATIENT)
Dept: ALLERGY | Facility: CLINIC | Age: 39
End: 2024-07-10
Payer: COMMERCIAL

## 2024-07-11 ENCOUNTER — APPOINTMENT (OUTPATIENT)
Dept: CARDIOLOGY | Facility: HOSPITAL | Age: 39
End: 2024-07-11
Payer: COMMERCIAL

## 2024-07-11 ENCOUNTER — HOSPITAL ENCOUNTER (EMERGENCY)
Facility: HOSPITAL | Age: 39
Discharge: HOME | End: 2024-07-11
Payer: COMMERCIAL

## 2024-07-11 ENCOUNTER — APPOINTMENT (OUTPATIENT)
Dept: RADIOLOGY | Facility: HOSPITAL | Age: 39
End: 2024-07-11
Payer: COMMERCIAL

## 2024-07-11 VITALS
OXYGEN SATURATION: 95 % | HEIGHT: 63 IN | TEMPERATURE: 96.8 F | DIASTOLIC BLOOD PRESSURE: 61 MMHG | HEART RATE: 83 BPM | WEIGHT: 293 LBS | BODY MASS INDEX: 51.91 KG/M2 | RESPIRATION RATE: 20 BRPM | SYSTOLIC BLOOD PRESSURE: 143 MMHG

## 2024-07-11 DIAGNOSIS — J45.41 MODERATE PERSISTENT ASTHMA WITH ACUTE EXACERBATION (HHS-HCC): Primary | ICD-10-CM

## 2024-07-11 LAB
ALBUMIN SERPL BCP-MCNC: 4 G/DL (ref 3.4–5)
ALP SERPL-CCNC: 66 U/L (ref 33–110)
ALT SERPL W P-5'-P-CCNC: 31 U/L (ref 7–45)
ANION GAP SERPL CALC-SCNC: 13 MMOL/L (ref 10–20)
AST SERPL W P-5'-P-CCNC: 20 U/L (ref 9–39)
BASOPHILS # BLD AUTO: 0.04 X10*3/UL (ref 0–0.1)
BASOPHILS NFR BLD AUTO: 0.4 %
BILIRUB SERPL-MCNC: 0.4 MG/DL (ref 0–1.2)
BUN SERPL-MCNC: 14 MG/DL (ref 6–23)
CALCIUM SERPL-MCNC: 9.3 MG/DL (ref 8.6–10.3)
CARDIAC TROPONIN I PNL SERPL HS: <3 NG/L (ref 0–13)
CHLORIDE SERPL-SCNC: 108 MMOL/L (ref 98–107)
CO2 SERPL-SCNC: 21 MMOL/L (ref 21–32)
CREAT SERPL-MCNC: 0.86 MG/DL (ref 0.5–1.05)
EGFRCR SERPLBLD CKD-EPI 2021: 90 ML/MIN/1.73M*2
EOSINOPHIL # BLD AUTO: 0 X10*3/UL (ref 0–0.7)
EOSINOPHIL NFR BLD AUTO: 0 %
ERYTHROCYTE [DISTWIDTH] IN BLOOD BY AUTOMATED COUNT: 12.4 % (ref 11.5–14.5)
GLUCOSE SERPL-MCNC: 107 MG/DL (ref 74–99)
HCT VFR BLD AUTO: 42.4 % (ref 36–46)
HGB BLD-MCNC: 14.7 G/DL (ref 12–16)
IMM GRANULOCYTES # BLD AUTO: 0.02 X10*3/UL (ref 0–0.7)
IMM GRANULOCYTES NFR BLD AUTO: 0.2 % (ref 0–0.9)
LYMPHOCYTES # BLD AUTO: 3.99 X10*3/UL (ref 1.2–4.8)
LYMPHOCYTES NFR BLD AUTO: 40.9 %
MCH RBC QN AUTO: 29.8 PG (ref 26–34)
MCHC RBC AUTO-ENTMCNC: 34.7 G/DL (ref 32–36)
MCV RBC AUTO: 86 FL (ref 80–100)
MONOCYTES # BLD AUTO: 0.87 X10*3/UL (ref 0.1–1)
MONOCYTES NFR BLD AUTO: 8.9 %
NEUTROPHILS # BLD AUTO: 4.84 X10*3/UL (ref 1.2–7.7)
NEUTROPHILS NFR BLD AUTO: 49.6 %
NRBC BLD-RTO: 0 /100 WBCS (ref 0–0)
PLATELET # BLD AUTO: 367 X10*3/UL (ref 150–450)
POTASSIUM SERPL-SCNC: 3.4 MMOL/L (ref 3.5–5.3)
PROT SERPL-MCNC: 6.7 G/DL (ref 6.4–8.2)
RBC # BLD AUTO: 4.94 X10*6/UL (ref 4–5.2)
SODIUM SERPL-SCNC: 139 MMOL/L (ref 136–145)
WBC # BLD AUTO: 9.8 X10*3/UL (ref 4.4–11.3)

## 2024-07-11 PROCEDURE — 80053 COMPREHEN METABOLIC PANEL: CPT | Performed by: PHYSICIAN ASSISTANT

## 2024-07-11 PROCEDURE — 71045 X-RAY EXAM CHEST 1 VIEW: CPT | Mod: FOREIGN READ | Performed by: RADIOLOGY

## 2024-07-11 PROCEDURE — 96365 THER/PROPH/DIAG IV INF INIT: CPT

## 2024-07-11 PROCEDURE — 99285 EMERGENCY DEPT VISIT HI MDM: CPT | Mod: 25

## 2024-07-11 PROCEDURE — 84484 ASSAY OF TROPONIN QUANT: CPT | Performed by: PHYSICIAN ASSISTANT

## 2024-07-11 PROCEDURE — 93005 ELECTROCARDIOGRAM TRACING: CPT

## 2024-07-11 PROCEDURE — 94640 AIRWAY INHALATION TREATMENT: CPT

## 2024-07-11 PROCEDURE — 2500000002 HC RX 250 W HCPCS SELF ADMINISTERED DRUGS (ALT 637 FOR MEDICARE OP, ALT 636 FOR OP/ED): Performed by: PHYSICIAN ASSISTANT

## 2024-07-11 PROCEDURE — 85025 COMPLETE CBC W/AUTO DIFF WBC: CPT | Performed by: PHYSICIAN ASSISTANT

## 2024-07-11 PROCEDURE — 2500000001 HC RX 250 WO HCPCS SELF ADMINISTERED DRUGS (ALT 637 FOR MEDICARE OP): Performed by: PHYSICIAN ASSISTANT

## 2024-07-11 PROCEDURE — 96375 TX/PRO/DX INJ NEW DRUG ADDON: CPT

## 2024-07-11 PROCEDURE — 71045 X-RAY EXAM CHEST 1 VIEW: CPT

## 2024-07-11 PROCEDURE — 2500000004 HC RX 250 GENERAL PHARMACY W/ HCPCS (ALT 636 FOR OP/ED): Performed by: PHYSICIAN ASSISTANT

## 2024-07-11 RX ORDER — LORAZEPAM 1 MG/1
1 TABLET ORAL ONCE
Status: COMPLETED | OUTPATIENT
Start: 2024-07-11 | End: 2024-07-11

## 2024-07-11 RX ORDER — ACETAMINOPHEN 10 MG/ML
1000 INJECTION, SOLUTION INTRAVENOUS ONCE
Status: COMPLETED | OUTPATIENT
Start: 2024-07-11 | End: 2024-07-11

## 2024-07-11 RX ORDER — MAGNESIUM SULFATE HEPTAHYDRATE 40 MG/ML
2 INJECTION, SOLUTION INTRAVENOUS ONCE
Status: COMPLETED | OUTPATIENT
Start: 2024-07-11 | End: 2024-07-11

## 2024-07-11 RX ORDER — IPRATROPIUM BROMIDE AND ALBUTEROL SULFATE 2.5; .5 MG/3ML; MG/3ML
3 SOLUTION RESPIRATORY (INHALATION) EVERY 20 MIN
Status: COMPLETED | OUTPATIENT
Start: 2024-07-11 | End: 2024-07-11

## 2024-07-11 RX ADMIN — MAGNESIUM SULFATE HEPTAHYDRATE 2 G: 40 INJECTION, SOLUTION INTRAVENOUS at 04:23

## 2024-07-11 RX ADMIN — IPRATROPIUM BROMIDE AND ALBUTEROL SULFATE 3 ML: 2.5; .5 SOLUTION RESPIRATORY (INHALATION) at 04:35

## 2024-07-11 RX ADMIN — PROMETHAZINE HYDROCHLORIDE 12.5 MG: 25 INJECTION INTRAMUSCULAR; INTRAVENOUS at 05:07

## 2024-07-11 RX ADMIN — IPRATROPIUM BROMIDE AND ALBUTEROL SULFATE 3 ML: 2.5; .5 SOLUTION RESPIRATORY (INHALATION) at 04:31

## 2024-07-11 RX ADMIN — LORAZEPAM 1 MG: 1 TABLET ORAL at 04:23

## 2024-07-11 RX ADMIN — ACETAMINOPHEN 1000 MG: 10 INJECTION, SOLUTION INTRAVENOUS at 05:06

## 2024-07-11 RX ADMIN — IPRATROPIUM BROMIDE AND ALBUTEROL SULFATE 3 ML: 2.5; .5 SOLUTION RESPIRATORY (INHALATION) at 04:34

## 2024-07-11 ASSESSMENT — LIFESTYLE VARIABLES
HAVE PEOPLE ANNOYED YOU BY CRITICIZING YOUR DRINKING: NO
EVER FELT BAD OR GUILTY ABOUT YOUR DRINKING: NO
HAVE YOU EVER FELT YOU SHOULD CUT DOWN ON YOUR DRINKING: NO
EVER HAD A DRINK FIRST THING IN THE MORNING TO STEADY YOUR NERVES TO GET RID OF A HANGOVER: NO
TOTAL SCORE: 0

## 2024-07-11 ASSESSMENT — PAIN DESCRIPTION - LOCATION
LOCATION: HEAD
LOCATION: HEAD

## 2024-07-11 ASSESSMENT — PAIN SCALES - GENERAL
PAINLEVEL_OUTOF10: 2
PAINLEVEL_OUTOF10: 8

## 2024-07-11 NOTE — ED PROVIDER NOTES
HPI   Chief Complaint   Patient presents with    Shortness of Breath     Pt arrived by squad with c/o shortness of breath that has been worsening over the past 7 hours. Squad administered 0.5mg epi IM and 125 solumedrol IM in route       38-year-old female presents emergency room with chief complaint acute shortness of breath that started 7 hours prior to arrival from home.  Patient had been giving her self aerosol treatments and she took oral prednisone with no relief of symptoms.  Patient states that this happens frequently.  She denies any recent fevers, chills, diaphoresis, chest pain, palpitations, hemoptysis, abdominal pain, nausea, vomiting, diarrhea or urinary symptoms.  She does have a history of moderate asthma with frequent admissions.  She is a non-smoker denies any second tobacco smoke at home.  In route the patient received Solu-Medrol 125 mg IM, she received 0.5 mg subcutaneous epinephrine.  And route the patient's vital signs are stable she was nonhypoxic.      History provided by:  Patient, medical records and EMS personnel                      [place] Coma Scale Score: 15                     Patient History   Past Medical History:   Diagnosis Date    Acute cystitis without hematuria 08/08/2019    Acute cystitis without hematuria    Asthma ([facility])     Disease of thyroid gland     Encounter for initial prescription of contraceptive pills 11/01/2019    Encounter for initial prescription of contraceptive pills    Nausea 02/05/2021    Nausea in adult    Other general symptoms and signs 12/05/2019    Forgetfulness    Parageusia 04/06/2020    Taste perversion    Pelvic and perineal pain     Pelvic pain in female    Personal history of other diseases of the respiratory system 04/06/2020    History of sinusitis    Personal history of other specified conditions 07/01/2020    History of vomiting    Personal history of other specified conditions 01/26/2021    History of headache    Personal history of other  specified conditions 02/01/2021    History of diarrhea    Personal history of thrombophlebitis 06/03/2019    History of phlebitis    Personal history of urinary (tract) infections 01/16/2020    History of urinary tract infection    PTSD (post-traumatic stress disorder)      Past Surgical History:   Procedure Laterality Date    ABDOMINAL SURGERY      APPENDECTOMY      OTHER SURGICAL HISTORY  02/08/2019    Appendectomy    OTHER SURGICAL HISTORY  02/08/2019    Cholecystectomy    OTHER SURGICAL HISTORY  02/08/2019    Cystoscopy    OTHER SURGICAL HISTORY  02/08/2019    Ureteroscopy    OTHER SURGICAL HISTORY  02/08/2019    Shoulder surgery     Family History   Problem Relation Name Age of Onset    Hypertension Father      Other (Pulmonary Valve Stenosis) Sister      Heart disease Maternal Grandmother      Diabetes Other Grandparent     Lung cancer Other Grandparent     Anxiety disorder Sibling       Social History     Tobacco Use    Smoking status: Never     Passive exposure: Current    Smokeless tobacco: Never   Vaping Use    Vaping status: Never Used   Substance Use Topics    Alcohol use: Yes     Alcohol/week: 1.0 - 2.0 standard drink of alcohol     Types: 1 - 2 Glasses of wine per week     Comment: social    Drug use: Never       Physical Exam   ED Triage Vitals [07/11/24 0412]   Temperature Heart Rate Respirations BP   36 °C (96.8 °F) 85 20 176/84      Pulse Ox Temp src Heart Rate Source Patient Position   96 % -- Monitor Sitting      BP Location FiO2 (%)     Right arm --       Physical Exam  Vitals and nursing note reviewed.   Constitutional:       General: She is awake. She is not in acute distress.     Appearance: Normal appearance. She is well-developed and well-groomed. She is not ill-appearing, toxic-appearing or diaphoretic.   HENT:      Head: Normocephalic and atraumatic.      Right Ear: Tympanic membrane, ear canal and external ear normal.      Left Ear: Tympanic membrane, ear canal and external ear normal.       Nose: Nose normal.      Mouth/Throat:      Mouth: Mucous membranes are moist.      Pharynx: Oropharynx is clear.   Eyes:      Extraocular Movements: Extraocular movements intact.      Conjunctiva/sclera: Conjunctivae normal.      Pupils: Pupils are equal, round, and reactive to light.   Cardiovascular:      Rate and Rhythm: Normal rate and regular rhythm.      Pulses: Normal pulses.      Heart sounds: Normal heart sounds.   Pulmonary:      Effort: Pulmonary effort is normal.      Breath sounds: Examination of the right-upper field reveals decreased breath sounds. Examination of the left-upper field reveals decreased breath sounds. Examination of the right-middle field reveals decreased breath sounds. Examination of the left-middle field reveals decreased breath sounds. Examination of the right-lower field reveals decreased breath sounds. Examination of the left-lower field reveals decreased breath sounds. Decreased breath sounds present. No wheezing, rhonchi or rales.   Abdominal:      General: Bowel sounds are normal.      Palpations: Abdomen is soft. There is no mass.      Tenderness: There is no abdominal tenderness. There is no guarding.   Musculoskeletal:         General: No swelling or tenderness. Normal range of motion.      Cervical back: Normal range of motion and neck supple.      Right lower leg: No edema.      Left lower leg: No edema.   Skin:     General: Skin is warm and dry.      Capillary Refill: Capillary refill takes less than 2 seconds.      Findings: No rash.   Neurological:      General: No focal deficit present.      Mental Status: She is alert and oriented to person, place, and time. Mental status is at baseline.   Psychiatric:         Mood and Affect: Mood normal.         Behavior: Behavior normal. Behavior is cooperative.         Thought Content: Thought content normal.         Judgment: Judgment normal.         ED Course & MDM   Diagnoses as of 07/11/24 0550   Moderate persistent  asthma with acute exacerbation ([facility])       Medical Decision Making  Temperature 36 heart rate 85, respirations 20, blood pressure 176/84, pulse ox was 96% on room air  Patient was given 3 DuoNeb aerosol treatments, 2 g magnesium IV piggyback over 20 minutes, 1 g of Ofirmev IV piggyback and 1 mg of Ativan p.o.    EKG interpreted by me at 0430 hrs. normal sinus rhythm rate 87  QRS was 70 which is low voltage,  QTc was 462 no ischemic changes  Repeat blood pressure 143/61 heart rate 87 respirations 18 pulse ox is 93% on room air  CBC white count 9.8 hemoglobin was 14.7 hematocrit 42.4 platelet count was 367 troponin was negative, metabolic glucose 107 potassium 3.4 chloride 108.  Patient's chest x-ray shows no acute cardiopulmonary process.  Repeat exam the patient is resting comfortably and lung sounds have improved.  Patient states she feels comfortable going home.  She was discharged home advised to continue with the current medications as prescribed return with any concerns or worsening of symptoms all questions answered prior to discharge        Procedure  Procedures     Salo Perez PA-C  07/11/24 0550       Salo Perez PA-C  07/11/24 0559

## 2024-07-11 NOTE — Clinical Note
Sarahi Haley was seen and treated in our emergency department on 7/11/2024.  She may return to work on 07/14/2024.       If you have any questions or concerns, please don't hesitate to call.      Salo Perez PA-C

## 2024-07-14 LAB
ATRIAL RATE: 87 BPM
P AXIS: 32 DEGREES
P OFFSET: 189 MS
P ONSET: 141 MS
PR INTERVAL: 172 MS
Q ONSET: 227 MS
QRS COUNT: 14 BEATS
QRS DURATION: 70 MS
QT INTERVAL: 384 MS
QTC CALCULATION(BAZETT): 462 MS
QTC FREDERICIA: 434 MS
R AXIS: -12 DEGREES
T AXIS: -13 DEGREES
T OFFSET: 419 MS
VENTRICULAR RATE: 87 BPM

## 2024-07-19 ENCOUNTER — APPOINTMENT (OUTPATIENT)
Dept: PRIMARY CARE | Facility: CLINIC | Age: 39
End: 2024-07-19
Payer: COMMERCIAL

## 2024-07-29 ENCOUNTER — APPOINTMENT (OUTPATIENT)
Dept: PRIMARY CARE | Facility: CLINIC | Age: 39
End: 2024-07-29
Payer: COMMERCIAL

## 2024-07-30 ENCOUNTER — APPOINTMENT (OUTPATIENT)
Dept: PRIMARY CARE | Facility: CLINIC | Age: 39
End: 2024-07-30
Payer: COMMERCIAL

## 2024-07-30 VITALS
TEMPERATURE: 97.7 F | OXYGEN SATURATION: 97 % | HEART RATE: 82 BPM | RESPIRATION RATE: 16 BRPM | HEIGHT: 65 IN | SYSTOLIC BLOOD PRESSURE: 140 MMHG | BODY MASS INDEX: 48.82 KG/M2 | WEIGHT: 293 LBS | DIASTOLIC BLOOD PRESSURE: 64 MMHG

## 2024-07-30 DIAGNOSIS — E66.01 CLASS 3 SEVERE OBESITY WITH BODY MASS INDEX (BMI) OF 50.0 TO 59.9 IN ADULT, UNSPECIFIED OBESITY TYPE, UNSPECIFIED WHETHER SERIOUS COMORBIDITY PRESENT (MULTI): ICD-10-CM

## 2024-07-30 DIAGNOSIS — R06.83 SNORING: Primary | ICD-10-CM

## 2024-07-30 DIAGNOSIS — J45.41 ASTHMA EXACERBATION, NON-ALLERGIC, MODERATE PERSISTENT (HHS-HCC): ICD-10-CM

## 2024-07-30 PROCEDURE — 99213 OFFICE O/P EST LOW 20 MIN: CPT | Performed by: NURSE PRACTITIONER

## 2024-07-30 ASSESSMENT — ENCOUNTER SYMPTOMS
DIARRHEA: 0
FATIGUE: 0
WHEEZING: 0
NAUSEA: 0
SORE THROAT: 0
COUGH: 0
SINUS PRESSURE: 0
BLOOD IN STOOL: 0
NUMBNESS: 0
SINUS PAIN: 0
PALPITATIONS: 0
FEVER: 0
ABDOMINAL PAIN: 0
WEAKNESS: 0
CHILLS: 0
DIZZINESS: 0
SHORTNESS OF BREATH: 1
CONSTIPATION: 0

## 2024-07-30 NOTE — PROGRESS NOTES
"Subjective   Patient ID: Sarahi Haley is a 38 y.o. female who presents for Shortness of Breath.    Patient is following up from hospital with shortness of breath. She states she has been in the hospital a few times in the past year for this issue, she states to be on steroids. Patient denies any chest pain, dizziness, or palpitations with the SOB.     Shortness of Breath  Pertinent negatives include no abdominal pain, chest pain, fever, sore throat or wheezing.     38 year old female (PMH: Asthma/RAD, seizure disorder, morbid obesity, migraine, hypothyroidism, GERD, anxiety/depression) presents today to follow up from an ER visit. She was seen in [place] ER on 7/11/24 due to shortness of breath. She was given prednisone which she has been slowly tapering off of. She states that she feels better with the steroid.   She feels that her asthma has been worsening over the last 14 months. She does follow with Asthma/Allergist Dr. Garcia. She is concerned with her frequent steroid prescriptions and her weight gain.    She is also requesting a referral for a sleep study. She states that she was told that she snores a lot.     Review of Systems   Constitutional:  Negative for chills, fatigue and fever.   HENT:  Negative for congestion, sinus pressure, sinus pain and sore throat.    Respiratory:  Positive for shortness of breath. Negative for cough and wheezing.    Cardiovascular:  Negative for chest pain and palpitations.   Gastrointestinal:  Negative for abdominal pain, blood in stool, constipation, diarrhea and nausea.   Neurological:  Negative for dizziness, weakness and numbness.       Objective   /64 (BP Location: Left arm, Patient Position: Sitting, BP Cuff Size: Adult)   Pulse 82   Temp 36.5 °C (97.7 °F) (Temporal)   Resp 16   Ht 1.651 m (5' 5\")   Wt 147 kg (323 lb)   SpO2 97%   BMI 53.75 kg/m²     Physical Exam  Vitals and nursing note reviewed.   Constitutional:       Appearance: Normal " appearance. She is obese.   HENT:      Right Ear: Tympanic membrane normal.      Left Ear: Tympanic membrane normal.      Nose: Nose normal. No congestion or rhinorrhea.      Mouth/Throat:      Mouth: Mucous membranes are moist.      Pharynx: Oropharynx is clear.   Eyes:      Conjunctiva/sclera: Conjunctivae normal.      Pupils: Pupils are equal, round, and reactive to light.   Cardiovascular:      Rate and Rhythm: Normal rate and regular rhythm.      Heart sounds: Normal heart sounds.   Pulmonary:      Effort: Pulmonary effort is normal.      Breath sounds: Normal breath sounds.   Musculoskeletal:      Right lower leg: No edema.      Left lower leg: No edema.   Skin:     General: Skin is warm and dry.   Neurological:      Mental Status: She is alert.   Psychiatric:         Mood and Affect: Mood normal.         Behavior: Behavior normal.         Assessment/Plan   Problem List Items Addressed This Visit             ICD-10-CM    Asthma exacerbation, non-allergic, moderate persistent ([facility]) J45.41     Other Visit Diagnoses         Codes    Snoring    -  Primary R06.83    Relevant Orders    In-Center Sleep Study (Non-Sleep Provider Only)    Class 3 severe obesity with body mass index (BMI) of 50.0 to 59.9 in adult, unspecified obesity type, unspecified whether serious comorbidity present (Multi)     E66.01, Z68.43          ER follow-up: Continue with prednisone  Sleep study ordered today.   Encouraged follow up with Dr. Garcia for Asthma recheck.   Encouraged healthy diet.   Follow up with PCP in 3 months for recheck, or sooner with any additional concerns.

## 2024-08-03 ENCOUNTER — HOSPITAL ENCOUNTER (EMERGENCY)
Facility: HOSPITAL | Age: 39
Discharge: HOME | End: 2024-08-03
Attending: STUDENT IN AN ORGANIZED HEALTH CARE EDUCATION/TRAINING PROGRAM
Payer: COMMERCIAL

## 2024-08-03 ENCOUNTER — APPOINTMENT (OUTPATIENT)
Dept: RADIOLOGY | Facility: HOSPITAL | Age: 39
End: 2024-08-03
Payer: COMMERCIAL

## 2024-08-03 ENCOUNTER — APPOINTMENT (OUTPATIENT)
Dept: CARDIOLOGY | Facility: HOSPITAL | Age: 39
End: 2024-08-03
Payer: COMMERCIAL

## 2024-08-03 VITALS
TEMPERATURE: 96.8 F | OXYGEN SATURATION: 94 % | WEIGHT: 293 LBS | BODY MASS INDEX: 48.82 KG/M2 | RESPIRATION RATE: 18 BRPM | HEIGHT: 65 IN | HEART RATE: 74 BPM | SYSTOLIC BLOOD PRESSURE: 138 MMHG | DIASTOLIC BLOOD PRESSURE: 72 MMHG

## 2024-08-03 DIAGNOSIS — E87.6 HYPOKALEMIA: ICD-10-CM

## 2024-08-03 DIAGNOSIS — R11.0 NAUSEA: ICD-10-CM

## 2024-08-03 DIAGNOSIS — R51.9 NONINTRACTABLE HEADACHE, UNSPECIFIED CHRONICITY PATTERN, UNSPECIFIED HEADACHE TYPE: ICD-10-CM

## 2024-08-03 DIAGNOSIS — J45.41 MODERATE PERSISTENT ASTHMA WITH EXACERBATION (HHS-HCC): Primary | ICD-10-CM

## 2024-08-03 LAB
ALBUMIN SERPL BCP-MCNC: 4.1 G/DL (ref 3.4–5)
ALP SERPL-CCNC: 75 U/L (ref 33–110)
ALT SERPL W P-5'-P-CCNC: 23 U/L (ref 7–45)
ANION GAP SERPL CALC-SCNC: 12 MMOL/L (ref 10–20)
AST SERPL W P-5'-P-CCNC: 16 U/L (ref 9–39)
ATRIAL RATE: 81 BPM
BASOPHILS # BLD AUTO: 0.07 X10*3/UL (ref 0–0.1)
BASOPHILS NFR BLD AUTO: 0.5 %
BILIRUB SERPL-MCNC: 0.7 MG/DL (ref 0–1.2)
BUN SERPL-MCNC: 16 MG/DL (ref 6–23)
CALCIUM SERPL-MCNC: 9 MG/DL (ref 8.6–10.3)
CHLORIDE SERPL-SCNC: 103 MMOL/L (ref 98–107)
CO2 SERPL-SCNC: 24 MMOL/L (ref 21–32)
CREAT SERPL-MCNC: 1.03 MG/DL (ref 0.5–1.05)
EGFRCR SERPLBLD CKD-EPI 2021: 72 ML/MIN/1.73M*2
EOSINOPHIL # BLD AUTO: 0.01 X10*3/UL (ref 0–0.7)
EOSINOPHIL NFR BLD AUTO: 0.1 %
ERYTHROCYTE [DISTWIDTH] IN BLOOD BY AUTOMATED COUNT: 12.6 % (ref 11.5–14.5)
GLUCOSE SERPL-MCNC: 128 MG/DL (ref 74–99)
HCT VFR BLD AUTO: 43.8 % (ref 36–46)
HGB BLD-MCNC: 14.9 G/DL (ref 12–16)
IMM GRANULOCYTES # BLD AUTO: 0.05 X10*3/UL (ref 0–0.7)
IMM GRANULOCYTES NFR BLD AUTO: 0.4 % (ref 0–0.9)
LYMPHOCYTES # BLD AUTO: 5.47 X10*3/UL (ref 1.2–4.8)
LYMPHOCYTES NFR BLD AUTO: 41.7 %
MCH RBC QN AUTO: 29.6 PG (ref 26–34)
MCHC RBC AUTO-ENTMCNC: 34 G/DL (ref 32–36)
MCV RBC AUTO: 87 FL (ref 80–100)
MONOCYTES # BLD AUTO: 1.34 X10*3/UL (ref 0.1–1)
MONOCYTES NFR BLD AUTO: 10.2 %
NEUTROPHILS # BLD AUTO: 6.19 X10*3/UL (ref 1.2–7.7)
NEUTROPHILS NFR BLD AUTO: 47.1 %
NRBC BLD-RTO: 0 /100 WBCS (ref 0–0)
P AXIS: 31 DEGREES
P OFFSET: 192 MS
P ONSET: 143 MS
PLATELET # BLD AUTO: 489 X10*3/UL (ref 150–450)
POTASSIUM SERPL-SCNC: 2.9 MMOL/L (ref 3.5–5.3)
PR INTERVAL: 152 MS
PROT SERPL-MCNC: 7 G/DL (ref 6.4–8.2)
Q ONSET: 219 MS
QRS COUNT: 14 BEATS
QRS DURATION: 80 MS
QT INTERVAL: 446 MS
QTC CALCULATION(BAZETT): 518 MS
QTC FREDERICIA: 492 MS
R AXIS: -14 DEGREES
RBC # BLD AUTO: 5.03 X10*6/UL (ref 4–5.2)
SODIUM SERPL-SCNC: 136 MMOL/L (ref 136–145)
T AXIS: 38 DEGREES
T OFFSET: 442 MS
VENTRICULAR RATE: 81 BPM
WBC # BLD AUTO: 13.1 X10*3/UL (ref 4.4–11.3)

## 2024-08-03 PROCEDURE — 71045 X-RAY EXAM CHEST 1 VIEW: CPT | Performed by: RADIOLOGY

## 2024-08-03 PROCEDURE — 99284 EMERGENCY DEPT VISIT MOD MDM: CPT | Mod: 25

## 2024-08-03 PROCEDURE — 93005 ELECTROCARDIOGRAM TRACING: CPT

## 2024-08-03 PROCEDURE — 96365 THER/PROPH/DIAG IV INF INIT: CPT

## 2024-08-03 PROCEDURE — 85025 COMPLETE CBC W/AUTO DIFF WBC: CPT | Performed by: PHYSICIAN ASSISTANT

## 2024-08-03 PROCEDURE — 36415 COLL VENOUS BLD VENIPUNCTURE: CPT | Performed by: PHYSICIAN ASSISTANT

## 2024-08-03 PROCEDURE — 96375 TX/PRO/DX INJ NEW DRUG ADDON: CPT

## 2024-08-03 PROCEDURE — 2500000004 HC RX 250 GENERAL PHARMACY W/ HCPCS (ALT 636 FOR OP/ED): Performed by: PHYSICIAN ASSISTANT

## 2024-08-03 PROCEDURE — 2500000002 HC RX 250 W HCPCS SELF ADMINISTERED DRUGS (ALT 637 FOR MEDICARE OP, ALT 636 FOR OP/ED): Performed by: PHYSICIAN ASSISTANT

## 2024-08-03 PROCEDURE — 71045 X-RAY EXAM CHEST 1 VIEW: CPT

## 2024-08-03 PROCEDURE — 96361 HYDRATE IV INFUSION ADD-ON: CPT

## 2024-08-03 PROCEDURE — 80053 COMPREHEN METABOLIC PANEL: CPT | Performed by: PHYSICIAN ASSISTANT

## 2024-08-03 RX ORDER — ONDANSETRON HYDROCHLORIDE 2 MG/ML
4 INJECTION, SOLUTION INTRAVENOUS ONCE
Status: COMPLETED | OUTPATIENT
Start: 2024-08-03 | End: 2024-08-03

## 2024-08-03 RX ORDER — HYDROMORPHONE HYDROCHLORIDE 1 MG/ML
1 INJECTION, SOLUTION INTRAMUSCULAR; INTRAVENOUS; SUBCUTANEOUS ONCE
Status: COMPLETED | OUTPATIENT
Start: 2024-08-03 | End: 2024-08-03

## 2024-08-03 RX ORDER — ACETAMINOPHEN 10 MG/ML
1000 INJECTION, SOLUTION INTRAVENOUS ONCE
Status: COMPLETED | OUTPATIENT
Start: 2024-08-03 | End: 2024-08-03

## 2024-08-03 RX ORDER — POTASSIUM CHLORIDE 20 MEQ/1
40 TABLET, EXTENDED RELEASE ORAL ONCE
Status: COMPLETED | OUTPATIENT
Start: 2024-08-03 | End: 2024-08-03

## 2024-08-03 RX ORDER — MAGNESIUM SULFATE HEPTAHYDRATE 40 MG/ML
2 INJECTION, SOLUTION INTRAVENOUS ONCE
Status: COMPLETED | OUTPATIENT
Start: 2024-08-03 | End: 2024-08-03

## 2024-08-03 RX ORDER — MORPHINE SULFATE 4 MG/ML
4 INJECTION, SOLUTION INTRAMUSCULAR; INTRAVENOUS ONCE
Status: COMPLETED | OUTPATIENT
Start: 2024-08-03 | End: 2024-08-03

## 2024-08-03 RX ADMIN — ONDANSETRON 4 MG: 2 INJECTION INTRAMUSCULAR; INTRAVENOUS at 05:43

## 2024-08-03 RX ADMIN — MORPHINE SULFATE 4 MG: 4 INJECTION, SOLUTION INTRAMUSCULAR; INTRAVENOUS at 06:34

## 2024-08-03 RX ADMIN — PROMETHAZINE HYDROCHLORIDE 12.5 MG: 25 INJECTION INTRAMUSCULAR; INTRAVENOUS at 06:35

## 2024-08-03 RX ADMIN — SODIUM CHLORIDE, POTASSIUM CHLORIDE, SODIUM LACTATE AND CALCIUM CHLORIDE 1000 ML: 600; 310; 30; 20 INJECTION, SOLUTION INTRAVENOUS at 04:22

## 2024-08-03 RX ADMIN — MAGNESIUM SULFATE HEPTAHYDRATE 2 G: 40 INJECTION, SOLUTION INTRAVENOUS at 04:23

## 2024-08-03 RX ADMIN — POTASSIUM CHLORIDE 40 MEQ: 1500 TABLET, EXTENDED RELEASE ORAL at 05:43

## 2024-08-03 RX ADMIN — HYDROMORPHONE HYDROCHLORIDE 1 MG: 1 INJECTION, SOLUTION INTRAMUSCULAR; INTRAVENOUS; SUBCUTANEOUS at 07:27

## 2024-08-03 RX ADMIN — ACETAMINOPHEN 1000 MG: 10 INJECTION, SOLUTION INTRAVENOUS at 04:59

## 2024-08-03 ASSESSMENT — PAIN SCALES - GENERAL
PAINLEVEL_OUTOF10: 4
PAINLEVEL_OUTOF10: 2
PAINLEVEL_OUTOF10: 7

## 2024-08-03 ASSESSMENT — LIFESTYLE VARIABLES
HAVE PEOPLE ANNOYED YOU BY CRITICIZING YOUR DRINKING: NO
TOTAL SCORE: 0
HAVE YOU EVER FELT YOU SHOULD CUT DOWN ON YOUR DRINKING: NO
EVER HAD A DRINK FIRST THING IN THE MORNING TO STEADY YOUR NERVES TO GET RID OF A HANGOVER: NO
EVER FELT BAD OR GUILTY ABOUT YOUR DRINKING: NO

## 2024-08-03 ASSESSMENT — PAIN - FUNCTIONAL ASSESSMENT
PAIN_FUNCTIONAL_ASSESSMENT: 0-10
PAIN_FUNCTIONAL_ASSESSMENT: 0-10

## 2024-08-03 ASSESSMENT — COLUMBIA-SUICIDE SEVERITY RATING SCALE - C-SSRS
2. HAVE YOU ACTUALLY HAD ANY THOUGHTS OF KILLING YOURSELF?: NO
1. IN THE PAST MONTH, HAVE YOU WISHED YOU WERE DEAD OR WISHED YOU COULD GO TO SLEEP AND NOT WAKE UP?: NO
6. HAVE YOU EVER DONE ANYTHING, STARTED TO DO ANYTHING, OR PREPARED TO DO ANYTHING TO END YOUR LIFE?: NO

## 2024-08-03 ASSESSMENT — PAIN DESCRIPTION - PAIN TYPE: TYPE: ACUTE PAIN

## 2024-08-03 NOTE — PROGRESS NOTES
Emergency Medicine Transition of Care Note    I received Sarahi Haley in signout from Salo Perez PA-C.  Please see the previous ED provider note for all HPI, PE and MDM up to the time of signout at 6 AM. This is in addition to the primary record.    In brief Sarahi Haley is an 38 y.o. female presenting for   Chief Complaint   Patient presents with    Shortness of Breath     At the time of signout we were awaiting: Patient to be medicated and reevaluation    Diagnoses as of 08/03/24 0740   Moderate persistent asthma with exacerbation ([facility])   Nonintractable headache, unspecified chronicity pattern, unspecified headache type   Hypokalemia   Nausea       Medical Decision Making  I evaluated the patient and patient has not received her medications.  Will round back at a later period of time.    Patient still doing well IV Dilaudid ordered.    Reevaluated the patient patient doing much better.  Patient feels comfortable being discharged home.  Will discharge patient at this time.  Patient agrees with this plan expressed full verbal understanding.  All questions answered.    Historian is the patient    Diagnosis: Asthma exacerbation, cephalgia, hypokalemia        Final diagnoses:   [J45.41] Moderate persistent asthma with exacerbation ([facility])   [R51.9] Nonintractable headache, unspecified chronicity pattern, unspecified headache type   [E87.6] Hypokalemia   [R11.0] Nausea     Labs Reviewed   CBC WITH AUTO DIFFERENTIAL - Abnormal       Result Value    WBC 13.1 (*)     nRBC 0.0      RBC 5.03      Hemoglobin 14.9      Hematocrit 43.8      MCV 87      MCH 29.6      MCHC 34.0      RDW 12.6      Platelets 489 (*)     Neutrophils % 47.1      Immature Granulocytes %, Automated 0.4      Lymphocytes % 41.7      Monocytes % 10.2      Eosinophils % 0.1      Basophils % 0.5      Neutrophils Absolute 6.19      Immature Granulocytes Absolute, Automated 0.05      Lymphocytes Absolute 5.47 (*)     Monocytes Absolute 1.34  (*)     Eosinophils Absolute 0.01      Basophils Absolute 0.07     COMPREHENSIVE METABOLIC PANEL - Abnormal    Glucose 128 (*)     Sodium 136      Potassium 2.9 (*)     Chloride 103      Bicarbonate 24      Anion Gap 12      Urea Nitrogen 16      Creatinine 1.03      eGFR 72      Calcium 9.0      Albumin 4.1      Alkaline Phosphatase 75      Total Protein 7.0      AST 16      Bilirubin, Total 0.7      ALT 23          XR chest 1 view   Final Result   No airspace consolidation or pleural effusion.        MACRO:   None        Signed by: Salbador Urbina 8/3/2024 4:46 AM   Dictation workstation:   [id number]              Procedure  Procedures    Jeovany Hendricks PA-C

## 2024-08-03 NOTE — ED PROVIDER NOTES
HPI   Chief Complaint   Patient presents with   • Shortness of Breath       38-year-old female with a history of asthma presents emergency room with chief complaint of worsening asthma symptoms.  Patient reports that she has been having symptoms all week.  She has been able to control up with her home medications however this evening the symptoms worsen causing her to feel more chest tightness with wheezing despite her aerosol treatments.  EMS was called and route the patient received Solu-Medrol 125 mg IM, Zofran 4 mg ODT.  The patient states that this is something that happens quite often.  She denies any chest pain, fever, chills, hemoptysis, nausea or vomiting.  Has a past medical history of reactive airway disease, asthma with frequent exacerbations, panic attacks, seizure disorder, migraine headaches, lymphedema, hypothyroidism, GERD, dysmenorrhea      History provided by:  Patient, medical records and EMS personnel          Patient History   Past Medical History:   Diagnosis Date   • Acute cystitis without hematuria 08/08/2019    Acute cystitis without hematuria   • Asthma ([facility])    • Disease of thyroid gland    • Encounter for initial prescription of contraceptive pills 11/01/2019    Encounter for initial prescription of contraceptive pills   • Nausea 02/05/2021    Nausea in adult   • Other general symptoms and signs 12/05/2019    Forgetfulness   • Parageusia 04/06/2020    Taste perversion   • Pelvic and perineal pain     Pelvic pain in female   • Personal history of other diseases of the respiratory system 04/06/2020    History of sinusitis   • Personal history of other specified conditions 07/01/2020    History of vomiting   • Personal history of other specified conditions 01/26/2021    History of headache   • Personal history of other specified conditions 02/01/2021    History of diarrhea   • Personal history of thrombophlebitis 06/03/2019    History of phlebitis   • Personal history of urinary (tract)  infections 01/16/2020    History of urinary tract infection   • PTSD (post-traumatic stress disorder)      Past Surgical History:   Procedure Laterality Date   • ABDOMINAL SURGERY     • APPENDECTOMY     • OTHER SURGICAL HISTORY  02/08/2019    Appendectomy   • OTHER SURGICAL HISTORY  02/08/2019    Cholecystectomy   • OTHER SURGICAL HISTORY  02/08/2019    Cystoscopy   • OTHER SURGICAL HISTORY  02/08/2019    Ureteroscopy   • OTHER SURGICAL HISTORY  02/08/2019    Shoulder surgery     Family History   Problem Relation Name Age of Onset   • Hypertension Father     • Other (Pulmonary Valve Stenosis) Sister     • Heart disease Maternal Grandmother     • Diabetes Other Grandparent    • Lung cancer Other Grandparent    • Anxiety disorder Sibling       Social History     Tobacco Use   • Smoking status: Never     Passive exposure: Current   • Smokeless tobacco: Never   Vaping Use   • Vaping status: Never Used   Substance Use Topics   • Alcohol use: Yes     Alcohol/week: 1.0 - 2.0 standard drink of alcohol     Types: 1 - 2 Glasses of wine per week     Comment: social   • Drug use: Never       Physical Exam   ED Triage Vitals [08/03/24 0414]   Temperature Heart Rate Respirations BP   36 °C (96.8 °F) 88 16 133/79      Pulse Ox Temp Source Heart Rate Source Patient Position   99 % Temporal Monitor --      BP Location FiO2 (%)     -- --       Physical Exam  Vitals and nursing note reviewed.   Constitutional:       General: She is awake. She is not in acute distress.     Appearance: Normal appearance. She is well-developed and well-groomed. She is not ill-appearing, toxic-appearing or diaphoretic.   HENT:      Head: Normocephalic and atraumatic.      Right Ear: Tympanic membrane, ear canal and external ear normal.      Left Ear: Tympanic membrane, ear canal and external ear normal.      Nose: Nose normal.      Mouth/Throat:      Mouth: Mucous membranes are moist.      Pharynx: Oropharynx is clear.   Eyes:      Extraocular Movements:  Extraocular movements intact.      Conjunctiva/sclera: Conjunctivae normal.      Pupils: Pupils are equal, round, and reactive to light.   Cardiovascular:      Rate and Rhythm: Normal rate and regular rhythm.      Pulses: Normal pulses.      Heart sounds: Normal heart sounds.   Pulmonary:      Effort: Pulmonary effort is normal.      Breath sounds: Examination of the right-upper field reveals decreased breath sounds. Examination of the left-upper field reveals decreased breath sounds. Examination of the right-middle field reveals decreased breath sounds. Examination of the left-middle field reveals decreased breath sounds. Examination of the right-lower field reveals decreased breath sounds. Examination of the left-lower field reveals decreased breath sounds. Decreased breath sounds present. No wheezing, rhonchi or rales.   Abdominal:      General: Abdomen is flat. Bowel sounds are normal.      Palpations: Abdomen is soft. There is no mass.      Tenderness: There is no abdominal tenderness. There is no guarding.   Musculoskeletal:         General: No swelling or tenderness. Normal range of motion.      Cervical back: Normal range of motion and neck supple.   Skin:     General: Skin is warm and dry.      Capillary Refill: Capillary refill takes less than 2 seconds.      Findings: No rash.   Neurological:      General: No focal deficit present.      Mental Status: She is alert and oriented to person, place, and time. Mental status is at baseline.   Psychiatric:         Mood and Affect: Mood normal.         Behavior: Behavior normal. Behavior is cooperative.         Thought Content: Thought content normal.         Judgment: Judgment normal.           ED Course & MDM   Diagnoses as of 08/03/24 0551   Moderate persistent asthma with exacerbation ([facility])   Nonintractable headache, unspecified chronicity pattern, unspecified headache type   Hypokalemia   Nausea                       Matheus Coma Scale Score: 15                         Medical Decision Making  Temperature 36 heart rate 88 respirations 16 blood pressure 133/79 pulse ox is 99% on room air  The patient also received 0.3 mg of epinephrine subcutaneously and route.  Patient was given a liter of LR, 2 g magnesium sulfate IV piggyback and 1 g of Ofirmev IV piggyback.  I have ordered lab work and a portable chest x-ray    EKG interpreted by me at 0500 hrs. normal sinus rhythm rate 81 ID was 152 QRS was 80  QTc was 518 normal axis.  CBC white count 13.1 hemoglobin 14.9 hematocrit 43.8 platelet count was 49, metabolic glucose is 128 potassium 2.9 otherwise unremarkable.  Patient was given 40 mEq of potassium orally.  Chest x-ray shows no evidence of any airspace consolidation or pleural effusion.  0515 hrs. rounded on the patient she is resting comfortably lung sounds have improved.  0545 hrs.: Patient still complaining of a headache with nausea.  She was given morphine 4 mg IV push and Phenergan 12.5 mg IV piggyback.  Care was turned over to Jeovany Hendricks PA-C        Procedure  Procedures     Salo Perez PA-C  08/03/24 0527

## 2024-08-07 ENCOUNTER — OFFICE VISIT (OUTPATIENT)
Dept: ALLERGY | Facility: CLINIC | Age: 39
End: 2024-08-07
Payer: COMMERCIAL

## 2024-08-07 VITALS
OXYGEN SATURATION: 98 % | HEART RATE: 72 BPM | DIASTOLIC BLOOD PRESSURE: 88 MMHG | RESPIRATION RATE: 20 BRPM | TEMPERATURE: 98.3 F | SYSTOLIC BLOOD PRESSURE: 140 MMHG

## 2024-08-07 DIAGNOSIS — E27.49 ADRENAL SUPPRESSION (MULTI): ICD-10-CM

## 2024-08-07 DIAGNOSIS — J45.50 SEVERE PERSISTENT ASTHMA, UNSPECIFIED WHETHER COMPLICATED (MULTI): Primary | ICD-10-CM

## 2024-08-07 DIAGNOSIS — E03.4 HYPOTHYROIDISM DUE TO ACQUIRED ATROPHY OF THYROID: ICD-10-CM

## 2024-08-07 DIAGNOSIS — E66.01 CLASS 3 SEVERE OBESITY WITHOUT SERIOUS COMORBIDITY IN ADULT, UNSPECIFIED BMI, UNSPECIFIED OBESITY TYPE (MULTI): ICD-10-CM

## 2024-08-07 DIAGNOSIS — F19.20 STEROID DEPENDENT (MULTI): ICD-10-CM

## 2024-08-07 PROCEDURE — 99214 OFFICE O/P EST MOD 30 MIN: CPT | Performed by: ALLERGY & IMMUNOLOGY

## 2024-08-07 RX ORDER — FLUTICASONE FUROATE, UMECLIDINIUM BROMIDE AND VILANTEROL TRIFENATATE 200; 62.5; 25 UG/1; UG/1; UG/1
1 POWDER RESPIRATORY (INHALATION) DAILY
Qty: 3 EACH | Refills: 3 | Status: SHIPPED | OUTPATIENT
Start: 2024-08-07 | End: 2024-09-06

## 2024-08-07 NOTE — PROGRESS NOTES
Patient ID: Sarahi Haley is a 38 y.o. female.     Chief Complaint: follow up  History Of Present Illness  Sarahi Haley is a 38 y.o. female with PMx severe asthma, hypothyroid presenting for follow up.       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 Felt better after receiving and went home. She is weaning steroid from 20 mg, planning to reduce to 10 mg this weekend.  Has missed 2 Tezspire doses due to miscommunication with shipping pharmacy.  Using [facility].   Has noticed improvement on Tezspire without side effects. Willing to self inject.  Continues on Trelegy 200 daily and has used her rescue about 4 times in the past week. She does like her AirSupra and feels it works better than regular albuterol.  Working hard on her weight.  She is unable to pursue any medications or surgical weight loss until her steroids are reduced.       Rhinoconjunctivitis  Tested 8 yrs ago and did have positives, but can't recall details  Had planned to skin test, but unable due to need for Allegra and continued issues with asthma.    She had immunocap and it was not significant for any environmental allergy other than cockroach.      Review of Systems    Pertinent positives and negatives have been assessed in the HPI. All other systems have been reviewed and are negative except as noted in the HPI.    Allergies  Bee venom protein (honey bee), Diphenhydramine, Nsaids (non-steroidal anti-inflammatory drug), Procaine, and Ketorolac    Past Medical History  She has a past medical history of Acute cystitis without hematuria (08/08/2019), Asthma ([facility]), Disease of thyroid gland, Encounter for initial prescription of contraceptive pills (11/01/2019), Nausea (02/05/2021), Other general symptoms and signs (12/05/2019), Parageusia (04/06/2020), Pelvic and perineal pain, Personal history of other diseases of the respiratory system (04/06/2020), Personal history of other specified conditions  (07/01/2020), Personal history of other specified conditions (01/26/2021), Personal history of other specified conditions (02/01/2021), Personal history of thrombophlebitis (06/03/2019), Personal history of urinary (tract) infections (01/16/2020), and PTSD (post-traumatic stress disorder).    Family History  Family History   Problem Relation Name Age of Onset    Hypertension Father      Other (Pulmonary Valve Stenosis) Sister      Heart disease Maternal Grandmother      Diabetes Other Grandparent     Lung cancer Other Grandparent     Anxiety disorder Sibling           Surgical History  She has a past surgical history that includes Other surgical history (02/08/2019); Other surgical history (02/08/2019); Other surgical history (02/08/2019); Other surgical history (02/08/2019); Other surgical history (02/08/2019); Abdominal surgery; and Appendectomy.    Social/Environmental History  She reports that she has never smoked. She has been exposed to tobacco smoke. She has never used smokeless tobacco. She reports current alcohol use of about 1.0 - 2.0 standard drink of alcohol per week. She reports that she does not use drugs.      MEDICATIONS  Current Outpatient Medications on File Prior to Visit   Medication Sig Dispense Refill    albuterol 2.5 mg /3 mL (0.083 %) nebulizer solution Take 3 mL by nebulization every 4 hours if needed for shortness of breath.      albuterol-budesonide (Airsupra) 90-80 mcg/actuation inhaler Inhale 2 puffs every 4 hours if needed (cough, wheeze, short of breath). Patient has a coupon 32.1 g 1    aspirin 81 mg chewable tablet Chew 1 tablet (81 mg) once daily.      budesonide (Pulmicort) 0.5 mg/2 mL nebulizer solution Take 2 mL (0.5 mg) by nebulization 2 times a day.      butalbital-acetaminophen-caff -40 mg tablet Take 1 tablet by mouth early in the morning.. 1 tab(s) orally once a day, As Needed 90 tablet 3    desvenlafaxine 100 mg 24 hr tablet TAKE 1 TABLET BY MOUTH EVERY DAY 90 tablet  3    desvenlafaxine 50 mg 24 hr tablet TAKE 1 TABLET BY MOUTH ONCE DAILY 90 tablet 3    EPINEPHrine 0.3 mg/0.3 mL injection syringe Inject 0.3 mL (0.3 mg) into the muscle once daily as needed for anaphylaxis. (Patient not taking: Reported on 4/22/2024) 2 each 3    esomeprazole (NexIUM) 20 mg DR capsule Take 1 capsule (20 mg) by mouth once daily in the morning. Take before meals. Do not open capsule. 30 capsule 11    fexofenadine (Allegra) 180 mg tablet Take 1 tablet (180 mg) by mouth once daily.      formoterol (Perforomist) 20 mcg/2 mL nebulizer solution Take 2 mL (20 mcg) by nebulization 2 times a day. 120 mL 1    galcanezumab (Emgality Syringe) 120 mg/mL prefilled syringe Inject 1 Syringe (120 mg) under the skin every 28 (twenty-eight) days.      hydrOXYzine HCL (Atarax) 25 mg tablet Take 1 tablet (25 mg) by mouth 3 times a day as needed for anxiety. 90 tablet 2    levothyroxine (Synthroid, Levoxyl) 100 mcg tablet Take 1 tablet (100 mcg) by mouth once daily.      metoclopramide (Reglan) 10 mg tablet Take 1 tablet (10 mg) by mouth every 8 hours if needed (nausea). 90 tablet 1    montelukast (Singulair) 10 mg tablet Take 1 tablet (10 mg) by mouth once daily at bedtime. 30 tablet 5    multivitamin-Ca-iron-minerals (Tab-A-Vu Womens) 27-0.4 mg tablet Take 1 tablet by mouth once daily.      predniSONE (Deltasone) 10 mg tablet Take 3 tablets (30 mg) by mouth once daily.      rizatriptan (Maxalt) 10 mg tablet Take 1 tablet (10 mg) by mouth if needed for migraine. May repeat in 2 hours if unresolved. Do not exceed 30 mg in 24 hours.      SUMAtriptan (Imitrex) 100 mg tablet Take 1 tablet (100 mg) by mouth 1 time if needed for migraine.      tezepelumab-ekko (Tezspire) SubQ syringe Inject 210 mg under the skin every 28 (twenty-eight) days.       Current Facility-Administered Medications on File Prior to Visit   Medication Dose Route Frequency Provider Last Rate Last Admin    tezepelumab-ekko (Tezpire) SubQ syringe 210 mg   210 mg subcutaneous Once Salomón Montgomery MD             Physical Exam  Visit Vitals  /88   Pulse 72   Temp 36.8 °C (98.3 °F)   Resp 20   SpO2 98%   OB Status Having periods   Smoking Status Never       Wt Readings from Last 1 Encounters:   08/03/24 147 kg (323 lb)       Physical Exam    General: Well appearing, no acute distress  Head: Normocephalic, atraumatic, neck supple without lymphadenopathy. Hump on upper back  Eyes: EOMI, non-injected  Nose: No nasal crease, nares patent  Throat: Normal dentition, no erythema  Heart: Regular rate and rhythm  Lungs: Clear to auscultation bilaterally, effort normal  Abdomen: Soft, non-tender, normal bowel sounds  Extremities: Moves all extremities symmetrically, no edema  Skin: No rashes/lesions  Psych: normal mood and affect    LAB RESULTS:  CBC:  Recent Labs     08/03/24 0419 07/11/24 0415 06/26/24  0354   WBC 13.1* 9.8 7.1   HGB 14.9 14.7 14.1   HCT 43.8 42.4 39.5   * 367 346   MCV 87 86 84   EOSABS 0.01 0.00 0.15       CMP:  Recent Labs     08/03/24 0419 07/11/24  0415 06/26/24  0354 06/09/24  0404 06/08/24  2344 05/29/24  0530 04/28/24  0527 04/23/24  0610    139 136   < > 137 134*   < > 135*   K 2.9* 3.4* 3.2*   < > 4.0 4.0   < > 4.2    108* 105   < > 102 104   < > 105   CO2 24 21 24   < > 28 22   < > 23   ANIONGAP 12 13 10   < > 11 12   < > 11   BUN 16 14 7   < > 13 8   < > 15   CREATININE 1.03 0.86 0.80   < > 0.89 0.83   < > 0.94   EGFR 72 90 >90   < > 86 >90   < > 81   MG  --   --   --   --  2.11 2.11  --  2.08    < > = values in this interval not displayed.     Recent Labs     08/03/24 0419 07/11/24  0415 06/26/24  0354 09/14/22  0200 09/13/22  0735 08/03/22  1851 07/28/22  1827 01/22/22  0407 09/13/21  0050   ALBUMIN 4.1 4.0 3.8   < > 3.9   < > 4.2   < > 4.0   ALKPHOS 75 66 70   < > 73   < > 74   < > 74   ALT 23 31 51*   < > 38   < > 46*   < > 41   AST 16 20 26   < > 23   < > 33   < > 23   BILITOT 0.7 0.4 0.8   < > 0.3   < > 0.8   < > 0.5    LIPASE  --   --   --   --  34  --  35  --  55    < > = values in this interval not displayed.       ALLERGY:   Lab Results   Component Value Date    ICIGE 67.0 08/14/2023    WHITEASH <0.10 08/14/2023    SILVERBIRCH <0.10 08/14/2023    BOXELDER <0.10 08/14/2023    MOUNTJUNIPER <0.10 08/14/2023    COTTONWOOD <0.10 08/14/2023    ELM <0.10 08/14/2023    MULBERRY <0.10 08/14/2023    PECANHICKORY <0.10 08/14/2023    MAPLESYCAMOR <0.10 08/14/2023    OAK <0.10 08/14/2023    BERMUDAGR <0.10 08/14/2023    JOHNSONGR <0.10 08/14/2023    BLUEGRASS <0.10 08/14/2023    TIMOTHYGRASS <0.10 08/14/2023     Lab Results   Component Value Date    LAMBQUART <0.10 08/14/2023    PIGWEED <0.10 08/14/2023    COMRAGWEED <0.10 08/14/2023    SHEEPSOR <0.10 08/14/2023    PLANTAIN <0.10 08/14/2023    CATEPI <0.10 08/14/2023    DOGEPI <0.10 08/14/2023    ALTERNA <0.10 08/14/2023    CLADHERB <0.10 08/14/2023    ICA04 <0.10 08/14/2023    DERMFAR <0.10 08/14/2023    DERMPTE <0.10 08/14/2023    COCKR 0.35 (A) 08/14/2023       Recent Labs     08/14/23  1548   ICIGE 67.0     Recent Labs     08/03/24  0419 07/11/24  0415 06/26/24  0354   EOSABS 0.01 0.00 0.15       COAG:   Recent Labs     06/09/24  0012 10/15/23  0644 06/06/23  0042   INR 0.9 1.0 0.9         HEME/ENDO:  Recent Labs     04/03/24  0024 10/15/23  1423 08/21/23  0120 07/10/23  1434 05/24/23  0917 09/14/22  0200   TSH 4.56*  --  2.23 2.66   < >  --    HGBA1C  --  4.7  --   --   --   --    FERRITIN  --   --   --   --   --  183*    < > = values in this interval not displayed.     Assessment/Plan   Sarahi is a 37 yo with history of severe persistent asthma, steroid dependent, hypothyroid, obesity. She does report a history of allergies on previous skin tests several years ago, but most recent immunocap was not showing significant allergic trigger.  I would like to re skin test her when asthma is stable and she can go off her antihistamine, but she still feels she needs to take regularly.     Discussed Tezspire with patient-tolerating well, but 2 months behind. She has [facility] and will do shots as home injection.  She will try and be more on time.  Patient to consider endocrine consult to address multiple comorbidities leading to poor asthma control.    Hilary Trinidad, DO

## 2024-08-07 NOTE — PATIENT INSTRUCTIONS
Wean steroids to 10 mg on Saturday and then see how you feel.  OK to self inject when Tezspire is delivered.  Follow up in 2 months

## 2024-08-23 ENCOUNTER — CLINICAL SUPPORT (OUTPATIENT)
Dept: SLEEP MEDICINE | Facility: HOSPITAL | Age: 39
End: 2024-08-23
Payer: COMMERCIAL

## 2024-08-23 VITALS — HEIGHT: 65 IN | BODY MASS INDEX: 48.82 KG/M2 | WEIGHT: 293 LBS

## 2024-08-23 DIAGNOSIS — R06.83 SNORING: ICD-10-CM

## 2024-08-24 NOTE — PROGRESS NOTES
[facility] TECH NOTE:     Patient: Sarahi Haley   MRN//AGE: [id number]  1985  38 y.o.   Technologist: Reji Jay   Room: 402   Service Date: 2024        Sleep Testing Location: [facility]    Alma: 13    TECHNOLOGIST SLEEP STUDY PROCEDURE NOTE:   This sleep study is being conducted according to the policies and procedures outlined by the AAS accreditation standards.  The sleep study procedure and processes involved during this appointment was explained to the patient/patient’s family, questions were answered. The patient/family verbalized understanding.      The patient is a 38 y.o. year old female scheduled for a  Diagnostic PSG  with montage of:  PSG . she arrived for her appointment.      The study that was ultimately completed was a  Diagnostic PSG  with montage of:  PSG .    The full study Was completed.  Patient questionnaires completed?: yes     Consents signed? yes    Initial Fall Risk Screening:     Sarahi has not fallen in the last 6 months. her did not result in injury. Sarahi does not have a fear of falling. He does not need assistance with sitting, standing, or walking. she does not need assistance walking in her home. she does not need assistance in an unfamiliar setting. The patient is notusing an assistive device.     Brief Study observations: Pt presents as 39 y/o Female here for scheduled PSG.  No override orders noted on record.  Study Observations:  Pt arrived on time, was ambulatory w/o assistance, and appeared alert/ oriented throughout interactions w/ sleep staff.  Throughout intervention period noted 4% AHI < 20, accompanied by frequent arousals, frequent awakenings, frequent positional changes, snoring, periodic WASO, and fragmented sleep architecture.  Noted no abnormal behaviors throughout duration of study.  Study Interventions:  Per Split Night protocol, did not administer CPAP therapy due to 4% AHI < 30 observed during intervention window  and at least 120 mins of recorded sleep.  Additional Notes:  None Noted.     Deviation to order/protocol and reason: N/A      If PAP, which was preferred mask/pressure/mode: N/A      Other:None    After the procedure, the patient/family was informed to ensure followup with ordering clinician for testing results.      Technologist: Reji Jay

## 2024-08-27 ENCOUNTER — HOSPITAL ENCOUNTER (EMERGENCY)
Facility: HOSPITAL | Age: 39
Discharge: HOME | End: 2024-08-28
Attending: STUDENT IN AN ORGANIZED HEALTH CARE EDUCATION/TRAINING PROGRAM
Payer: COMMERCIAL

## 2024-08-27 ENCOUNTER — APPOINTMENT (OUTPATIENT)
Dept: RADIOLOGY | Facility: HOSPITAL | Age: 39
End: 2024-08-27
Payer: COMMERCIAL

## 2024-08-27 ENCOUNTER — APPOINTMENT (OUTPATIENT)
Dept: CARDIOLOGY | Facility: HOSPITAL | Age: 39
End: 2024-08-27
Payer: COMMERCIAL

## 2024-08-27 DIAGNOSIS — R06.02 SHORTNESS OF BREATH: Primary | ICD-10-CM

## 2024-08-27 DIAGNOSIS — J45.41 MODERATE PERSISTENT ASTHMA WITH EXACERBATION (HHS-HCC): ICD-10-CM

## 2024-08-27 DIAGNOSIS — U07.1 COVID: ICD-10-CM

## 2024-08-27 LAB
ALBUMIN SERPL BCP-MCNC: 3.7 G/DL (ref 3.4–5)
ALP SERPL-CCNC: 68 U/L (ref 33–110)
ALT SERPL W P-5'-P-CCNC: 31 U/L (ref 7–45)
ANION GAP SERPL CALC-SCNC: 12 MMOL/L (ref 10–20)
APTT PPP: 34 SECONDS (ref 27–38)
AST SERPL W P-5'-P-CCNC: 24 U/L (ref 9–39)
BASOPHILS # BLD AUTO: 0.03 X10*3/UL (ref 0–0.1)
BASOPHILS NFR BLD AUTO: 0.5 %
BILIRUB SERPL-MCNC: 1 MG/DL (ref 0–1.2)
BNP SERPL-MCNC: 38 PG/ML (ref 0–99)
BUN SERPL-MCNC: 10 MG/DL (ref 6–23)
CALCIUM SERPL-MCNC: 8.7 MG/DL (ref 8.6–10.3)
CARDIAC TROPONIN I PNL SERPL HS: <3 NG/L (ref 0–13)
CHLORIDE SERPL-SCNC: 107 MMOL/L (ref 98–107)
CO2 SERPL-SCNC: 22 MMOL/L (ref 21–32)
CREAT SERPL-MCNC: 0.89 MG/DL (ref 0.5–1.05)
D DIMER PPP FEU-MCNC: 936 NG/ML FEU
EGFRCR SERPLBLD CKD-EPI 2021: 86 ML/MIN/1.73M*2
EOSINOPHIL # BLD AUTO: 0.01 X10*3/UL (ref 0–0.7)
EOSINOPHIL NFR BLD AUTO: 0.2 %
ERYTHROCYTE [DISTWIDTH] IN BLOOD BY AUTOMATED COUNT: 12.1 % (ref 11.5–14.5)
GLUCOSE SERPL-MCNC: 94 MG/DL (ref 74–99)
HCT VFR BLD AUTO: 41.2 % (ref 36–46)
HGB BLD-MCNC: 14.4 G/DL (ref 12–16)
IMM GRANULOCYTES # BLD AUTO: 0.01 X10*3/UL (ref 0–0.7)
IMM GRANULOCYTES NFR BLD AUTO: 0.2 % (ref 0–0.9)
INR PPP: 1 (ref 0.9–1.1)
LYMPHOCYTES # BLD AUTO: 1.77 X10*3/UL (ref 1.2–4.8)
LYMPHOCYTES NFR BLD AUTO: 32.1 %
MCH RBC QN AUTO: 30.1 PG (ref 26–34)
MCHC RBC AUTO-ENTMCNC: 35 G/DL (ref 32–36)
MCV RBC AUTO: 86 FL (ref 80–100)
MONOCYTES # BLD AUTO: 0.64 X10*3/UL (ref 0.1–1)
MONOCYTES NFR BLD AUTO: 11.6 %
NEUTROPHILS # BLD AUTO: 3.05 X10*3/UL (ref 1.2–7.7)
NEUTROPHILS NFR BLD AUTO: 55.4 %
NRBC BLD-RTO: 0 /100 WBCS (ref 0–0)
PLATELET # BLD AUTO: 325 X10*3/UL (ref 150–450)
POTASSIUM SERPL-SCNC: 3.1 MMOL/L (ref 3.5–5.3)
PROT SERPL-MCNC: 6.9 G/DL (ref 6.4–8.2)
PROTHROMBIN TIME: 11.3 SECONDS (ref 9.8–12.8)
RBC # BLD AUTO: 4.79 X10*6/UL (ref 4–5.2)
SARS-COV-2 RNA RESP QL NAA+PROBE: DETECTED
SODIUM SERPL-SCNC: 138 MMOL/L (ref 136–145)
WBC # BLD AUTO: 5.5 X10*3/UL (ref 4.4–11.3)

## 2024-08-27 PROCEDURE — 36415 COLL VENOUS BLD VENIPUNCTURE: CPT

## 2024-08-27 PROCEDURE — 84075 ASSAY ALKALINE PHOSPHATASE: CPT

## 2024-08-27 PROCEDURE — 85610 PROTHROMBIN TIME: CPT

## 2024-08-27 PROCEDURE — 2500000002 HC RX 250 W HCPCS SELF ADMINISTERED DRUGS (ALT 637 FOR MEDICARE OP, ALT 636 FOR OP/ED)

## 2024-08-27 PROCEDURE — 87635 SARS-COV-2 COVID-19 AMP PRB: CPT

## 2024-08-27 PROCEDURE — 71275 CT ANGIOGRAPHY CHEST: CPT

## 2024-08-27 PROCEDURE — 99285 EMERGENCY DEPT VISIT HI MDM: CPT | Mod: 25

## 2024-08-27 PROCEDURE — 96365 THER/PROPH/DIAG IV INF INIT: CPT | Mod: 59

## 2024-08-27 PROCEDURE — 2550000001 HC RX 255 CONTRASTS: Performed by: STUDENT IN AN ORGANIZED HEALTH CARE EDUCATION/TRAINING PROGRAM

## 2024-08-27 PROCEDURE — 85379 FIBRIN DEGRADATION QUANT: CPT

## 2024-08-27 PROCEDURE — 71275 CT ANGIOGRAPHY CHEST: CPT | Performed by: STUDENT IN AN ORGANIZED HEALTH CARE EDUCATION/TRAINING PROGRAM

## 2024-08-27 PROCEDURE — 96375 TX/PRO/DX INJ NEW DRUG ADDON: CPT | Mod: 59

## 2024-08-27 PROCEDURE — 71045 X-RAY EXAM CHEST 1 VIEW: CPT | Performed by: RADIOLOGY

## 2024-08-27 PROCEDURE — 85025 COMPLETE CBC W/AUTO DIFF WBC: CPT

## 2024-08-27 PROCEDURE — 83880 ASSAY OF NATRIURETIC PEPTIDE: CPT

## 2024-08-27 PROCEDURE — 93005 ELECTROCARDIOGRAM TRACING: CPT

## 2024-08-27 PROCEDURE — 2500000004 HC RX 250 GENERAL PHARMACY W/ HCPCS (ALT 636 FOR OP/ED)

## 2024-08-27 PROCEDURE — 84484 ASSAY OF TROPONIN QUANT: CPT

## 2024-08-27 PROCEDURE — 71045 X-RAY EXAM CHEST 1 VIEW: CPT

## 2024-08-27 RX ORDER — POTASSIUM CHLORIDE 20 MEQ/1
40 TABLET, EXTENDED RELEASE ORAL ONCE
Status: COMPLETED | OUTPATIENT
Start: 2024-08-27 | End: 2024-08-27

## 2024-08-27 RX ORDER — ONDANSETRON HYDROCHLORIDE 2 MG/ML
4 INJECTION, SOLUTION INTRAVENOUS ONCE
Status: DISCONTINUED | OUTPATIENT
Start: 2024-08-27 | End: 2024-08-27

## 2024-08-27 RX ORDER — ONDANSETRON HYDROCHLORIDE 2 MG/ML
4 INJECTION, SOLUTION INTRAVENOUS ONCE
Status: COMPLETED | OUTPATIENT
Start: 2024-08-27 | End: 2024-08-27

## 2024-08-27 RX ORDER — MAGNESIUM SULFATE HEPTAHYDRATE 40 MG/ML
2 INJECTION, SOLUTION INTRAVENOUS ONCE
Status: COMPLETED | OUTPATIENT
Start: 2024-08-27 | End: 2024-08-27

## 2024-08-27 RX ORDER — ACETAMINOPHEN 10 MG/ML
1000 INJECTION, SOLUTION INTRAVENOUS ONCE
Status: COMPLETED | OUTPATIENT
Start: 2024-08-27 | End: 2024-08-27

## 2024-08-27 ASSESSMENT — PAIN DESCRIPTION - LOCATION: LOCATION: HEAD

## 2024-08-27 ASSESSMENT — PAIN - FUNCTIONAL ASSESSMENT
PAIN_FUNCTIONAL_ASSESSMENT: 0-10
PAIN_FUNCTIONAL_ASSESSMENT: 0-10

## 2024-08-27 ASSESSMENT — PAIN SCALES - GENERAL
PAINLEVEL_OUTOF10: 6
PAINLEVEL_OUTOF10: 6

## 2024-08-27 ASSESSMENT — PAIN DESCRIPTION - PAIN TYPE: TYPE: ACUTE PAIN

## 2024-08-27 ASSESSMENT — LIFESTYLE VARIABLES
EVER FELT BAD OR GUILTY ABOUT YOUR DRINKING: NO
HAVE YOU EVER FELT YOU SHOULD CUT DOWN ON YOUR DRINKING: NO
HAVE PEOPLE ANNOYED YOU BY CRITICIZING YOUR DRINKING: NO
TOTAL SCORE: 0
EVER HAD A DRINK FIRST THING IN THE MORNING TO STEADY YOUR NERVES TO GET RID OF A HANGOVER: NO

## 2024-08-27 NOTE — Clinical Note
Sarahi Haley was seen and treated in our emergency department on 8/27/2024.  She may return to work on 09/02/2024.       If you have any questions or concerns, please don't hesitate to call.      Rolly Patel, DO

## 2024-08-28 ENCOUNTER — TELEPHONE (OUTPATIENT)
Dept: PRIMARY CARE | Facility: CLINIC | Age: 39
End: 2024-08-28
Payer: COMMERCIAL

## 2024-08-28 VITALS
HEART RATE: 87 BPM | SYSTOLIC BLOOD PRESSURE: 137 MMHG | TEMPERATURE: 98.2 F | OXYGEN SATURATION: 96 % | BODY MASS INDEX: 48.82 KG/M2 | DIASTOLIC BLOOD PRESSURE: 72 MMHG | WEIGHT: 293 LBS | HEIGHT: 65 IN | RESPIRATION RATE: 18 BRPM

## 2024-08-28 LAB
ATRIAL RATE: 74 BPM
P AXIS: 51 DEGREES
P OFFSET: 187 MS
P ONSET: 134 MS
PR INTERVAL: 178 MS
Q ONSET: 223 MS
QRS COUNT: 13 BEATS
QRS DURATION: 80 MS
QT INTERVAL: 386 MS
QTC CALCULATION(BAZETT): 428 MS
QTC FREDERICIA: 414 MS
R AXIS: -18 DEGREES
T AXIS: 7 DEGREES
T OFFSET: 416 MS
VENTRICULAR RATE: 74 BPM

## 2024-08-28 PROCEDURE — 2500000001 HC RX 250 WO HCPCS SELF ADMINISTERED DRUGS (ALT 637 FOR MEDICARE OP)

## 2024-08-28 RX ORDER — BUTALBITAL, ACETAMINOPHEN AND CAFFEINE 50; 325; 40 MG/1; MG/1; MG/1
TABLET ORAL
Status: DISCONTINUED
Start: 2024-08-28 | End: 2024-08-28 | Stop reason: HOSPADM

## 2024-08-28 RX ORDER — BUTALBITAL, ACETAMINOPHEN AND CAFFEINE 50; 325; 40 MG/1; MG/1; MG/1
1 TABLET ORAL EVERY 4 HOURS PRN
Status: DISCONTINUED | OUTPATIENT
Start: 2024-08-28 | End: 2024-08-28 | Stop reason: HOSPADM

## 2024-08-28 RX ORDER — PROMETHAZINE HYDROCHLORIDE 12.5 MG/1
12.5 TABLET ORAL EVERY 6 HOURS PRN
Qty: 28 TABLET | Refills: 0 | Status: SHIPPED | OUTPATIENT
Start: 2024-08-28 | End: 2024-09-04

## 2024-08-28 RX ORDER — PREDNISONE 20 MG/1
20 TABLET ORAL DAILY
Qty: 5 TABLET | Refills: 0 | Status: SHIPPED | OUTPATIENT
Start: 2024-08-28 | End: 2024-09-02

## 2024-08-28 RX ORDER — BUTALBITAL, ACETAMINOPHEN AND CAFFEINE 50; 325; 40 MG/1; MG/1; MG/1
1 TABLET ORAL EVERY 6 HOURS PRN
Qty: 20 TABLET | Refills: 0 | Status: SHIPPED | OUTPATIENT
Start: 2024-08-28 | End: 2024-09-02

## 2024-08-28 NOTE — TELEPHONE ENCOUNTER
Patient is scheduled with you on Friday as a new patient (this is Dinora's sister) she had to go to the ER yesterday because her Asthma was acting up. When she was in the ER they tested her for Covid and it was positive. She states that she is not having any symptoms at all. She is wanting to know if she can still come in for her appointment on Friday for is she should reschedule for another day.    Please advise.

## 2024-08-28 NOTE — TELEPHONE ENCOUNTER
Gregory Valdez MD  Do [id number] [facility] Clerical2 hours ago (12:52 PM)       Okay to come in Friday but she needs to wear a mask in the office at all times.  When she comes in we should all wear masks when we take her back and see her.  Please let her know       Patient aware.

## 2024-08-28 NOTE — ED PROVIDER NOTES
HPI   Chief Complaint   Patient presents with    Shortness of Breath       History provided by: Patient    Limitations to history: None    CC: Shortness of breath    HPI: 38-year-old female with a history of asthma, GERD, PTSD presents the emergency department to be evaluated for shortness of breath.  She presents via EMS.  Patient states that she has been feeling short of breath for the last week.  She denies a cough.  She says it feels similar to her previous asthma exacerbations.  She is on multiple daily inhalers and maintenance medications and inhalers for her asthma.  She reports that her chest feels tight which is also associate with her asthma exacerbations.  She denies having chest pain denies pleuritic pain or hemoptysis.  Denies congestion, runny nose, fever and chills.  Reports mild nausea from the DuoNeb breathing treatment she received in route but denies vomiting.  She usually feels nauseous after these treatments.  She also received Solu-Medrol en route.  She did not require IM or subcutaneous epinephrine.  She states that the breathing treatments have provided some relief of her symptoms.  She denies history of ACS, her last stress test was approximately year ago which has not required stent placement or bypass.  Denies history of heart failure denies pain or swelling of extremities with use of diuretics.  Denies history of PEs however she did have a DVT in 2017 that required approximately 6 months of Eliquis.  Has not required anticoagulation since.  Denies recent plane flights, recent surgeries, history of cancer, use of OCPs.  Denies weakness and fatigue.  Her shortness of breath is worse with exertion.  Denies all other systemic symptoms.    ROS: Negative unless mentioned in HPI    Social Hx: Occasional alcohol use.  Denies drug use and tobacco use.    Medical Hx: Allergy list reviewed.  Immunizations up-to-date.    Physical exam:    Constitutional: Patient is well-nourished and well-developed.   Sitting comfortably in the room and in no distress.  Oriented to person, place, time, and situation.    HEENT: Head is normocephalic, atraumatic. Patient's airway is patent.  Tympanic membranes are clear bilaterally.  Nasal mucosa clear.  Mouth with normal mucosa.  Throat is not erythematous and there are no oropharyngeal exudates, uvula is midline.  No obvious facial deformities.    Eyes: Clear bilaterally.  Pupils are equal round and reactive to light and accommodation.  Extraocular movements intact.      Cardiac: Regular rate, regular rhythm.  Heart sounds S1, S2.  No murmurs, rubs, or gallops.  PMI nondisplaced.  No JVD.    Respiratory: 100% on room air.  Regular respiratory rate and effort.  Breath sounds are clear and equal bilaterally, no adventitious lung sounds.  Patient is speaking in full sentences and is in no apparent respiratory distress. No use of accessory muscles.      Gastrointestinal: Abdomen is soft, nondistended, and nontender.  There are no obvious deformities.  No rebound tenderness or guarding.  Bowel sounds are normal active.    Genitourinary: No CVA or flank tenderness.    Musculoskeletal: No reproducible tenderness.  No obvious skin or bony deformities.  Patient has equal range of motion in all extremities and no strength deficiencies.  No muscle or joint tenderness. No back or neck tenderness.  Capillary refill less than 3 seconds.  Strong peripheral pulses.  No sensory deficits.    Neurological: Patient is alert and oriented.  No focal deficits.  5/5 strength in all extremities.  Cranial nerves II through XII intact. GCS15.     Skin: Skin is normal color for race and is warm, dry, and intact.  No evidence of trauma.  No lesions, rashes, bruising, jaundice, or masses.    Psych: Appropriate mood and affect.  No apparent risk to self or others.    Heme/lymph: No adenopathy, lymphadenopathy, or splenomegaly    Physical exam is otherwise negative unless stated above or in history of present  illness.              Patient History   Past Medical History:   Diagnosis Date    Acute cystitis without hematuria 08/08/2019    Acute cystitis without hematuria    Asthma ([facility])     Disease of thyroid gland     Encounter for initial prescription of contraceptive pills 11/01/2019    Encounter for initial prescription of contraceptive pills    Nausea 02/05/2021    Nausea in adult    Other general symptoms and signs 12/05/2019    Forgetfulness    Parageusia 04/06/2020    Taste perversion    Pelvic and perineal pain     Pelvic pain in female    Personal history of other diseases of the respiratory system 04/06/2020    History of sinusitis    Personal history of other specified conditions 07/01/2020    History of vomiting    Personal history of other specified conditions 01/26/2021    History of headache    Personal history of other specified conditions 02/01/2021    History of diarrhea    Personal history of thrombophlebitis 06/03/2019    History of phlebitis    Personal history of urinary (tract) infections 01/16/2020    History of urinary tract infection    PTSD (post-traumatic stress disorder)      Past Surgical History:   Procedure Laterality Date    ABDOMINAL SURGERY      APPENDECTOMY      OTHER SURGICAL HISTORY  02/08/2019    Appendectomy    OTHER SURGICAL HISTORY  02/08/2019    Cholecystectomy    OTHER SURGICAL HISTORY  02/08/2019    Cystoscopy    OTHER SURGICAL HISTORY  02/08/2019    Ureteroscopy    OTHER SURGICAL HISTORY  02/08/2019    Shoulder surgery     Family History   Problem Relation Name Age of Onset    Hypertension Father      Other (Pulmonary Valve Stenosis) Sister      Heart disease Maternal Grandmother      Diabetes Other Grandparent     Lung cancer Other Grandparent     Anxiety disorder Sibling       Social History     Tobacco Use    Smoking status: Never     Passive exposure: Current    Smokeless tobacco: Never   Vaping Use    Vaping status: Never Used   Substance Use Topics    Alcohol use: Yes      Alcohol/week: 1.0 - 2.0 standard drink of alcohol     Types: 1 - 2 Glasses of wine per week     Comment: social    Drug use: Never       Physical Exam   ED Triage Vitals [08/27/24 2129]   Temperature Heart Rate Respirations BP   36.8 °C (98.2 °F) 80 20 168/77      Pulse Ox Temp Source Heart Rate Source Patient Position   100 % Temporal Monitor Lying      BP Location FiO2 (%)     Right arm --       Physical Exam      ED Course & MDM   Diagnoses as of 08/28/24 0042   Shortness of breath   Moderate persistent asthma with exacerbation ([facility])   COVID          Patient updated on plan for lab testing, IV insertion, radiology imaging, and medications to be administered while in the ER (if indicated). Patient updated on expected wait times for testing and results. Patient provided my name and told to ask any staff member for questions or concerns if they should arise. Electronic medical record reviewed.     MDM    Upon initial assessment, patient was healthy non-toxic appearing and in no apparent distress.     Patient presented to the emergency department with the chief complaint shortness of breath.  Her breath sounds are clear and equal bilaterally, 100% on room air.  In no acute respiratory distress.  No muffled heart sounds, JVD, murmur.  On arrival to the emergency department, vital signs were within normal limits    Give the patient magnesium for her asthma and Zofran for her nausea.  Obtain basic blood work, EKG and troponin, chest x-ray, COVID PCR, BNP, coagulation screen, and a D-dimer given her shortness of breath with clear breath sounds and history of DVT.    This patient was staffed my attending, agreeable plan of care.    Patient's EKG was performed at 2144 interpreted by me.  Normal sinus rhythm 74 beats minute.  Normal axis.  No ST ovation or depression.  No prolonged QT.  Nonspecific T wave version in lead II, 3, aVF, V3.  She has had similar inversions in the past.    Patient tested positive for  COVID.  Her troponin is less than 3 making ACS unlikely.  Heart score is 1.  Coagulation scans within normal limits.  BNP is 38.  CMP reveals hypokalemia 3.1, is given oral potassium.  CBC shows no leukocytosis or anemia.  Patient is still complaining of nausea after the Zofran so she was given IV Phenergan.  Chest x-ray reveals no acute cardiopulmonary process.    CTA reveals no PE or pneumonia.  Patient feels well and feels comfortable being discharged home.  She continues to be in no acute distress.  She will be given prednisone and Phenergan.  I confirm that she is adequate breathing treatments at home.  She will follow-up with her PCP.  Will provide her with a note for work that way she can rest with her COVID diagnosis.  All questions and concerns addressed.  Reasons to return to ER discussed.  Patient verbalized understanding and agreement with the treatment plan and they remained hemodynamically stable in the ER.    This note was dictated using a speech recognition program.  While an attempt was made at proof-reading to minimize errors, minor errors in transcription may be present       No data recorded     [place] Coma Scale Score: 15 (08/27/24 2131 : Rachel Nayak RN)                           Medical Decision Making      Procedure  Procedures     Chuck Johnson PA-C  08/28/24 0042

## 2024-08-30 ENCOUNTER — APPOINTMENT (OUTPATIENT)
Dept: PRIMARY CARE | Facility: CLINIC | Age: 39
End: 2024-08-30
Payer: COMMERCIAL

## 2024-08-30 VITALS
HEIGHT: 65 IN | BODY MASS INDEX: 48.82 KG/M2 | SYSTOLIC BLOOD PRESSURE: 120 MMHG | RESPIRATION RATE: 18 BRPM | OXYGEN SATURATION: 97 % | WEIGHT: 293 LBS | HEART RATE: 75 BPM | DIASTOLIC BLOOD PRESSURE: 82 MMHG

## 2024-08-30 DIAGNOSIS — E66.01 CLASS 3 SEVERE OBESITY DUE TO EXCESS CALORIES WITH SERIOUS COMORBIDITY AND BODY MASS INDEX (BMI) OF 50.0 TO 59.9 IN ADULT (MULTI): ICD-10-CM

## 2024-08-30 DIAGNOSIS — G40.209 SEIZURE DISORDER, COMPLEX PARTIAL, WITHOUT INTRACTABLE EPILEPSY (MULTI): ICD-10-CM

## 2024-08-30 DIAGNOSIS — G43.809 OTHER MIGRAINE WITHOUT STATUS MIGRAINOSUS, NOT INTRACTABLE: ICD-10-CM

## 2024-08-30 DIAGNOSIS — J45.50 SEVERE PERSISTENT ASTHMA, UNSPECIFIED WHETHER COMPLICATED (MULTI): ICD-10-CM

## 2024-08-30 DIAGNOSIS — K21.9 GASTROESOPHAGEAL REFLUX DISEASE WITHOUT ESOPHAGITIS: ICD-10-CM

## 2024-08-30 DIAGNOSIS — E87.6 HYPOKALEMIA: ICD-10-CM

## 2024-08-30 DIAGNOSIS — J45.41 ASTHMA EXACERBATION, NON-ALLERGIC, MODERATE PERSISTENT (HHS-HCC): Primary | ICD-10-CM

## 2024-08-30 PROCEDURE — 3008F BODY MASS INDEX DOCD: CPT | Performed by: FAMILY MEDICINE

## 2024-08-30 PROCEDURE — 99214 OFFICE O/P EST MOD 30 MIN: CPT | Performed by: FAMILY MEDICINE

## 2024-08-30 RX ORDER — SEMAGLUTIDE 0.5 MG/.5ML
0.5 INJECTION, SOLUTION SUBCUTANEOUS WEEKLY
Qty: 2 ML | Refills: 3 | Status: SHIPPED | OUTPATIENT
Start: 2024-08-30 | End: 2024-12-14

## 2024-08-30 RX ORDER — POTASSIUM CHLORIDE 750 MG/1
10 CAPSULE, EXTENDED RELEASE ORAL DAILY
Qty: 30 CAPSULE | Refills: 3 | Status: SHIPPED | OUTPATIENT
Start: 2024-08-30 | End: 2024-12-28

## 2024-08-30 ASSESSMENT — PATIENT HEALTH QUESTIONNAIRE - PHQ9
2. FEELING DOWN, DEPRESSED OR HOPELESS: NOT AT ALL
1. LITTLE INTEREST OR PLEASURE IN DOING THINGS: NOT AT ALL
SUM OF ALL RESPONSES TO PHQ9 QUESTIONS 1 AND 2: 0

## 2024-08-30 NOTE — PROGRESS NOTES
"Subjective   Patient ID: Sarahi Haley is a 38 y.o. female who presents for Establish Care and ER Follow-up.  HPI    Establish care  Last PCP -- Paul Matthew   How did you hear about us -- sister works in the office    Patient presents in office today for an ER follow up. Patient went to [facility]. Patient went inf or SOB on 8/27/2024. Patient was diagnosed with Covid. Patient had Labs, XR chest 1 view, CT angio chest for pulmonary embolism, and an EKG. States that she has been in the ER for 2-3 times a month for the last year. States that her asthma is just getting under control.    Patient presents in office today to talk about weight management. Patient states that in her right hip she does have a labral tear. States that  they will not to surgery for hip until her BMI is under 40. Last weight was 300 lbs. Today's in office weight is 328 lbs.     Also would like to talk about what vaccines she should have for this next year.     Taking current medications which were reviewed.  Problem list discussed.    Overall doing well.  Eating okay.  Staying active.    Has no other new problem /question.     ROS  Constitutional- No activity change. No appetite change.  Eyes- Denies vision changes.  Respiratory- No shortness of breath.  Cardiovascular- No palpitations. No chest pain.  GI- No nausea or vomiting. No diarrhea or constipation. Denies abdominal pain.  Musculoskeletal- Denies joint swelling.  Extremities- No edema.  Neurological- Denies headaches. Denies dizziness.  Skin- No rashes.  Psychiatric/Behavioral- Denies significant anxiety, or depressed mood.     Objective     /82   Pulse 75   Resp 18   Ht 1.651 m (5' 5\")   Wt 149 kg (328 lb)   SpO2 97%   BMI 54.58 kg/m²     Allergies   Allergen Reactions    Bee Venom Protein (Honey Bee) Shortness of breath    Diphenhydramine Rash     arythmia    Nsaids (Non-Steroidal Anti-Inflammatory Drug) Hives    Procaine Hives and Swelling    Ketorolac Other     facial redness "       Constitutional-- Well-nourished.  No distress  Head- unremarkable.  Eyes- PERRL.  Conjunctiva normal.  Nose- Normal.  No rhinorrhea noted.  Throat- Oropharynx is clear and moist.  Neck- Supple with no thyromegaly.  No significant cervical adenopathy noted.  Pulmonary/Chest- Breath sounds normal with normal effort.  No wheezing.  Heart- Regular rate and rhythm.  No murmur.  Abdomen- Soft and non-tender.  No masses noted.  Musculoskeletal- Normal ROM.  No significant joint swelling  Extremities- No edema.   Neurological- Alert.  No noted deficits.  Skin- Warm.  No rashes.  Psychiatric/Behavioral- Mood and affect normal.  Behavior normal.     Assessment/Plan   1. Asthma exacerbation, non-allergic, moderate persistent ([facility])        2. BMI 50.0-59.9, adult (Multi)  semaglutide, weight loss, (Wegovy) 0.5 mg/0.5 mL pen injector      3. Gastroesophageal reflux disease without esophagitis        4. Seizure disorder, complex partial, without intractable epilepsy (Multi)        5. Severe persistent asthma, unspecified whether complicated (Multi)        6. Other migraine without status migrainosus, not intractable        7. Class 3 severe obesity due to excess calories with serious comorbidity and body mass index (BMI) of 50.0 to 59.9 in adult (Multi)  semaglutide, weight loss, (Wegovy) 0.5 mg/0.5 mL pen injector      8. Hypokalemia  potassium chloride ER (Micro-K) 10 mEq ER capsule             Long talk. Treatment options reviewed.    Spent 30 minutes with the patient, with at least 1/2 of the time spent in counseling and instruction.  Reviewed most recent lab work with patient. Advised patient to remain up to date on routine maintenance and health screening.      Educated on asthma care and management.     Educated on acid reflux, heart burn and prevention.     Educated on migraine care, prevention and management.    Treatment options for weight loss reviewed.  Risk versus benefits of semaglutide discussed.  Will  start semaglutide and titrate as needed.  Discussed importance of natural sources of nutrition.  Advised patient to consume vegetables, salads, fruits, nuts, and proteins such as fish and chicken.  Discussed portion control.      Discussed the importance of routine stretching and exercise.     Continue and take your medications as prescribed.    Health Maintenance issues discussed.    Importance of healthy diet and regular exercise regimen discussed.    Follow-up with me in about 2 months    Follow-up as instructed or sooner if any problems or symptoms do not resolve as expected.      Scribe Attestation  By signing my name below, IRitu Scribe   attest that this documentation has been prepared under the direction and in the presence of Gregory Valdez MD.

## 2024-09-12 ENCOUNTER — APPOINTMENT (OUTPATIENT)
Dept: RADIOLOGY | Facility: HOSPITAL | Age: 39
End: 2024-09-12
Payer: COMMERCIAL

## 2024-09-12 ENCOUNTER — HOSPITAL ENCOUNTER (OUTPATIENT)
Facility: HOSPITAL | Age: 39
Setting detail: OBSERVATION
Discharge: HOME | End: 2024-09-13
Attending: STUDENT IN AN ORGANIZED HEALTH CARE EDUCATION/TRAINING PROGRAM | Admitting: STUDENT IN AN ORGANIZED HEALTH CARE EDUCATION/TRAINING PROGRAM
Payer: COMMERCIAL

## 2024-09-12 ENCOUNTER — APPOINTMENT (OUTPATIENT)
Dept: CARDIOLOGY | Facility: HOSPITAL | Age: 39
End: 2024-09-12
Payer: COMMERCIAL

## 2024-09-12 DIAGNOSIS — J45.51 SEVERE PERSISTENT ASTHMA WITH EXACERBATION (MULTI): ICD-10-CM

## 2024-09-12 DIAGNOSIS — E66.01 CLASS 3 SEVERE OBESITY DUE TO EXCESS CALORIES WITH SERIOUS COMORBIDITY AND BODY MASS INDEX (BMI) OF 50.0 TO 59.9 IN ADULT: ICD-10-CM

## 2024-09-12 DIAGNOSIS — J45.41 MODERATE PERSISTENT ASTHMA WITH ACUTE EXACERBATION (HHS-HCC): Primary | ICD-10-CM

## 2024-09-12 DIAGNOSIS — Z20.822 LAB TEST NEGATIVE FOR COVID-19 VIRUS: ICD-10-CM

## 2024-09-12 LAB
ALBUMIN SERPL BCP-MCNC: 4.4 G/DL (ref 3.4–5)
ALP SERPL-CCNC: 67 U/L (ref 33–110)
ALT SERPL W P-5'-P-CCNC: 41 U/L (ref 7–45)
ANION GAP SERPL CALC-SCNC: 13 MMOL/L (ref 10–20)
AST SERPL W P-5'-P-CCNC: 26 U/L (ref 9–39)
B-HCG SERPL-ACNC: <2 MIU/ML
BASOPHILS # BLD AUTO: 0.04 X10*3/UL (ref 0–0.1)
BASOPHILS NFR BLD AUTO: 0.6 %
BILIRUB SERPL-MCNC: 1.2 MG/DL (ref 0–1.2)
BNP SERPL-MCNC: 19 PG/ML (ref 0–99)
BUN SERPL-MCNC: 12 MG/DL (ref 6–23)
CALCIUM SERPL-MCNC: 9.5 MG/DL (ref 8.6–10.3)
CARDIAC TROPONIN I PNL SERPL HS: 3 NG/L (ref 0–13)
CHLORIDE SERPL-SCNC: 105 MMOL/L (ref 98–107)
CO2 SERPL-SCNC: 23 MMOL/L (ref 21–32)
CREAT SERPL-MCNC: 1.12 MG/DL (ref 0.5–1.05)
EGFRCR SERPLBLD CKD-EPI 2021: 65 ML/MIN/1.73M*2
EOSINOPHIL # BLD AUTO: 0 X10*3/UL (ref 0–0.7)
EOSINOPHIL NFR BLD AUTO: 0 %
ERYTHROCYTE [DISTWIDTH] IN BLOOD BY AUTOMATED COUNT: 12 % (ref 11.5–14.5)
GLUCOSE SERPL-MCNC: 94 MG/DL (ref 74–99)
HCT VFR BLD AUTO: 45.4 % (ref 36–46)
HGB BLD-MCNC: 16.1 G/DL (ref 12–16)
IMM GRANULOCYTES # BLD AUTO: 0.02 X10*3/UL (ref 0–0.7)
IMM GRANULOCYTES NFR BLD AUTO: 0.3 % (ref 0–0.9)
LYMPHOCYTES # BLD AUTO: 2.04 X10*3/UL (ref 1.2–4.8)
LYMPHOCYTES NFR BLD AUTO: 28.5 %
MAGNESIUM SERPL-MCNC: 1.94 MG/DL (ref 1.6–2.4)
MCH RBC QN AUTO: 29.9 PG (ref 26–34)
MCHC RBC AUTO-ENTMCNC: 35.5 G/DL (ref 32–36)
MCV RBC AUTO: 84 FL (ref 80–100)
MONOCYTES # BLD AUTO: 0.5 X10*3/UL (ref 0.1–1)
MONOCYTES NFR BLD AUTO: 7 %
NEUTROPHILS # BLD AUTO: 4.57 X10*3/UL (ref 1.2–7.7)
NEUTROPHILS NFR BLD AUTO: 63.6 %
NRBC BLD-RTO: 0 /100 WBCS (ref 0–0)
PLATELET # BLD AUTO: 349 X10*3/UL (ref 150–450)
POTASSIUM SERPL-SCNC: 3.5 MMOL/L (ref 3.5–5.3)
PROT SERPL-MCNC: 7.3 G/DL (ref 6.4–8.2)
RBC # BLD AUTO: 5.38 X10*6/UL (ref 4–5.2)
SARS-COV-2 RNA RESP QL NAA+PROBE: NOT DETECTED
SODIUM SERPL-SCNC: 137 MMOL/L (ref 136–145)
WBC # BLD AUTO: 7.2 X10*3/UL (ref 4.4–11.3)

## 2024-09-12 PROCEDURE — 80053 COMPREHEN METABOLIC PANEL: CPT | Performed by: PHYSICIAN ASSISTANT

## 2024-09-12 PROCEDURE — 96375 TX/PRO/DX INJ NEW DRUG ADDON: CPT

## 2024-09-12 PROCEDURE — 2500000002 HC RX 250 W HCPCS SELF ADMINISTERED DRUGS (ALT 637 FOR MEDICARE OP, ALT 636 FOR OP/ED): Performed by: PHYSICIAN ASSISTANT

## 2024-09-12 PROCEDURE — 96361 HYDRATE IV INFUSION ADD-ON: CPT

## 2024-09-12 PROCEDURE — 94640 AIRWAY INHALATION TREATMENT: CPT

## 2024-09-12 PROCEDURE — 96365 THER/PROPH/DIAG IV INF INIT: CPT

## 2024-09-12 PROCEDURE — 87635 SARS-COV-2 COVID-19 AMP PRB: CPT | Performed by: PHYSICIAN ASSISTANT

## 2024-09-12 PROCEDURE — 85025 COMPLETE CBC W/AUTO DIFF WBC: CPT | Performed by: PHYSICIAN ASSISTANT

## 2024-09-12 PROCEDURE — 93005 ELECTROCARDIOGRAM TRACING: CPT

## 2024-09-12 PROCEDURE — 36415 COLL VENOUS BLD VENIPUNCTURE: CPT | Performed by: PHYSICIAN ASSISTANT

## 2024-09-12 PROCEDURE — 96368 THER/DIAG CONCURRENT INF: CPT

## 2024-09-12 PROCEDURE — 83880 ASSAY OF NATRIURETIC PEPTIDE: CPT | Performed by: PHYSICIAN ASSISTANT

## 2024-09-12 PROCEDURE — 71045 X-RAY EXAM CHEST 1 VIEW: CPT

## 2024-09-12 PROCEDURE — 84702 CHORIONIC GONADOTROPIN TEST: CPT | Performed by: PHYSICIAN ASSISTANT

## 2024-09-12 PROCEDURE — 83735 ASSAY OF MAGNESIUM: CPT | Performed by: PHYSICIAN ASSISTANT

## 2024-09-12 PROCEDURE — 71045 X-RAY EXAM CHEST 1 VIEW: CPT | Performed by: RADIOLOGY

## 2024-09-12 PROCEDURE — 84484 ASSAY OF TROPONIN QUANT: CPT | Performed by: PHYSICIAN ASSISTANT

## 2024-09-12 PROCEDURE — 2500000004 HC RX 250 GENERAL PHARMACY W/ HCPCS (ALT 636 FOR OP/ED): Performed by: PHYSICIAN ASSISTANT

## 2024-09-12 PROCEDURE — 99285 EMERGENCY DEPT VISIT HI MDM: CPT

## 2024-09-12 RX ORDER — IPRATROPIUM BROMIDE AND ALBUTEROL SULFATE 2.5; .5 MG/3ML; MG/3ML
3 SOLUTION RESPIRATORY (INHALATION) EVERY 20 MIN
Status: COMPLETED | OUTPATIENT
Start: 2024-09-12 | End: 2024-09-12

## 2024-09-12 RX ORDER — MORPHINE SULFATE 4 MG/ML
4 INJECTION, SOLUTION INTRAMUSCULAR; INTRAVENOUS ONCE
Status: COMPLETED | OUTPATIENT
Start: 2024-09-12 | End: 2024-09-13

## 2024-09-12 RX ORDER — ONDANSETRON HYDROCHLORIDE 2 MG/ML
4 INJECTION, SOLUTION INTRAVENOUS ONCE
Status: COMPLETED | OUTPATIENT
Start: 2024-09-12 | End: 2024-09-12

## 2024-09-12 RX ORDER — MAGNESIUM SULFATE HEPTAHYDRATE 40 MG/ML
2 INJECTION, SOLUTION INTRAVENOUS ONCE
Status: COMPLETED | OUTPATIENT
Start: 2024-09-12 | End: 2024-09-12

## 2024-09-12 RX ORDER — LORAZEPAM 2 MG/ML
1 INJECTION INTRAMUSCULAR ONCE
Status: COMPLETED | OUTPATIENT
Start: 2024-09-12 | End: 2024-09-12

## 2024-09-12 RX ORDER — ACETAMINOPHEN 10 MG/ML
1000 INJECTION, SOLUTION INTRAVENOUS ONCE
Status: COMPLETED | OUTPATIENT
Start: 2024-09-12 | End: 2024-09-12

## 2024-09-12 RX ORDER — ONDANSETRON HYDROCHLORIDE 2 MG/ML
4 INJECTION, SOLUTION INTRAVENOUS ONCE
Status: COMPLETED | OUTPATIENT
Start: 2024-09-12 | End: 2024-09-13

## 2024-09-12 ASSESSMENT — LIFESTYLE VARIABLES
EVER FELT BAD OR GUILTY ABOUT YOUR DRINKING: NO
EVER HAD A DRINK FIRST THING IN THE MORNING TO STEADY YOUR NERVES TO GET RID OF A HANGOVER: NO
HAVE YOU EVER FELT YOU SHOULD CUT DOWN ON YOUR DRINKING: NO
HAVE PEOPLE ANNOYED YOU BY CRITICIZING YOUR DRINKING: NO
TOTAL SCORE: 0

## 2024-09-12 ASSESSMENT — PAIN - FUNCTIONAL ASSESSMENT
PAIN_FUNCTIONAL_ASSESSMENT: 0-10

## 2024-09-12 ASSESSMENT — PAIN DESCRIPTION - LOCATION
LOCATION: HEAD

## 2024-09-12 ASSESSMENT — PAIN SCALES - GENERAL
PAINLEVEL_OUTOF10: 4
PAINLEVEL_OUTOF10: 5 - MODERATE PAIN

## 2024-09-12 ASSESSMENT — PAIN SCALES - PAIN ASSESSMENT IN ADVANCED DEMENTIA (PAINAD): TOTALSCORE: MEDICATION (SEE MAR)

## 2024-09-13 VITALS
OXYGEN SATURATION: 99 % | HEIGHT: 65 IN | HEART RATE: 86 BPM | DIASTOLIC BLOOD PRESSURE: 70 MMHG | BODY MASS INDEX: 48.82 KG/M2 | RESPIRATION RATE: 18 BRPM | TEMPERATURE: 96.8 F | WEIGHT: 293 LBS | SYSTOLIC BLOOD PRESSURE: 128 MMHG

## 2024-09-13 PROBLEM — J45.51 SEVERE PERSISTENT ASTHMA WITH EXACERBATION (MULTI): Status: ACTIVE | Noted: 2024-09-13

## 2024-09-13 LAB
ANION GAP SERPL CALC-SCNC: 13 MMOL/L (ref 10–20)
BUN SERPL-MCNC: 12 MG/DL (ref 6–23)
CALCIUM SERPL-MCNC: 8.9 MG/DL (ref 8.6–10.3)
CHLORIDE SERPL-SCNC: 103 MMOL/L (ref 98–107)
CO2 SERPL-SCNC: 22 MMOL/L (ref 21–32)
CREAT SERPL-MCNC: 1 MG/DL (ref 0.5–1.05)
EGFRCR SERPLBLD CKD-EPI 2021: 75 ML/MIN/1.73M*2
ERYTHROCYTE [DISTWIDTH] IN BLOOD BY AUTOMATED COUNT: 11.9 % (ref 11.5–14.5)
GLUCOSE SERPL-MCNC: 229 MG/DL (ref 74–99)
HCT VFR BLD AUTO: 40.3 % (ref 36–46)
HGB BLD-MCNC: 14.2 G/DL (ref 12–16)
MCH RBC QN AUTO: 29.8 PG (ref 26–34)
MCHC RBC AUTO-ENTMCNC: 35.2 G/DL (ref 32–36)
MCV RBC AUTO: 85 FL (ref 80–100)
NRBC BLD-RTO: 0 /100 WBCS (ref 0–0)
PLATELET # BLD AUTO: 305 X10*3/UL (ref 150–450)
POTASSIUM SERPL-SCNC: 3.7 MMOL/L (ref 3.5–5.3)
RBC # BLD AUTO: 4.76 X10*6/UL (ref 4–5.2)
SODIUM SERPL-SCNC: 134 MMOL/L (ref 136–145)
WBC # BLD AUTO: 7.2 X10*3/UL (ref 4.4–11.3)

## 2024-09-13 PROCEDURE — 99238 HOSP IP/OBS DSCHRG MGMT 30/<: CPT | Performed by: HOSPITALIST

## 2024-09-13 PROCEDURE — 96366 THER/PROPH/DIAG IV INF ADDON: CPT

## 2024-09-13 PROCEDURE — 94640 AIRWAY INHALATION TREATMENT: CPT

## 2024-09-13 PROCEDURE — 2500000001 HC RX 250 WO HCPCS SELF ADMINISTERED DRUGS (ALT 637 FOR MEDICARE OP): Performed by: HOSPITALIST

## 2024-09-13 PROCEDURE — 2500000004 HC RX 250 GENERAL PHARMACY W/ HCPCS (ALT 636 FOR OP/ED): Performed by: PHYSICIAN ASSISTANT

## 2024-09-13 PROCEDURE — 85027 COMPLETE CBC AUTOMATED: CPT | Performed by: STUDENT IN AN ORGANIZED HEALTH CARE EDUCATION/TRAINING PROGRAM

## 2024-09-13 PROCEDURE — G0378 HOSPITAL OBSERVATION PER HR: HCPCS

## 2024-09-13 PROCEDURE — 2500000002 HC RX 250 W HCPCS SELF ADMINISTERED DRUGS (ALT 637 FOR MEDICARE OP, ALT 636 FOR OP/ED): Performed by: STUDENT IN AN ORGANIZED HEALTH CARE EDUCATION/TRAINING PROGRAM

## 2024-09-13 PROCEDURE — 2500000004 HC RX 250 GENERAL PHARMACY W/ HCPCS (ALT 636 FOR OP/ED): Performed by: STUDENT IN AN ORGANIZED HEALTH CARE EDUCATION/TRAINING PROGRAM

## 2024-09-13 PROCEDURE — 96376 TX/PRO/DX INJ SAME DRUG ADON: CPT

## 2024-09-13 PROCEDURE — 99222 1ST HOSP IP/OBS MODERATE 55: CPT | Performed by: STUDENT IN AN ORGANIZED HEALTH CARE EDUCATION/TRAINING PROGRAM

## 2024-09-13 PROCEDURE — 96375 TX/PRO/DX INJ NEW DRUG ADDON: CPT

## 2024-09-13 PROCEDURE — 82374 ASSAY BLOOD CARBON DIOXIDE: CPT | Performed by: STUDENT IN AN ORGANIZED HEALTH CARE EDUCATION/TRAINING PROGRAM

## 2024-09-13 PROCEDURE — 2500000001 HC RX 250 WO HCPCS SELF ADMINISTERED DRUGS (ALT 637 FOR MEDICARE OP): Performed by: STUDENT IN AN ORGANIZED HEALTH CARE EDUCATION/TRAINING PROGRAM

## 2024-09-13 RX ORDER — ONDANSETRON HYDROCHLORIDE 2 MG/ML
4 INJECTION, SOLUTION INTRAVENOUS EVERY 8 HOURS PRN
Status: DISCONTINUED | OUTPATIENT
Start: 2024-09-13 | End: 2024-09-13 | Stop reason: HOSPADM

## 2024-09-13 RX ORDER — PREDNISONE 5 MG/1
5 TABLET ORAL
COMMUNITY

## 2024-09-13 RX ORDER — ENOXAPARIN SODIUM 100 MG/ML
60 INJECTION SUBCUTANEOUS EVERY 12 HOURS SCHEDULED
Status: DISCONTINUED | OUTPATIENT
Start: 2024-09-13 | End: 2024-09-13 | Stop reason: HOSPADM

## 2024-09-13 RX ORDER — MINERAL OIL
180 ENEMA (ML) RECTAL DAILY
Status: DISCONTINUED | OUTPATIENT
Start: 2024-09-13 | End: 2024-09-13 | Stop reason: HOSPADM

## 2024-09-13 RX ORDER — TRAMADOL HYDROCHLORIDE 50 MG/1
50 TABLET ORAL EVERY 6 HOURS PRN
Status: DISCONTINUED | OUTPATIENT
Start: 2024-09-13 | End: 2024-09-13 | Stop reason: HOSPADM

## 2024-09-13 RX ORDER — IPRATROPIUM BROMIDE AND ALBUTEROL SULFATE 2.5; .5 MG/3ML; MG/3ML
3 SOLUTION RESPIRATORY (INHALATION)
Status: DISCONTINUED | OUTPATIENT
Start: 2024-09-13 | End: 2024-09-13 | Stop reason: HOSPADM

## 2024-09-13 RX ORDER — FLUTICASONE FUROATE AND VILANTEROL 200; 25 UG/1; UG/1
1 POWDER RESPIRATORY (INHALATION)
Status: DISCONTINUED | OUTPATIENT
Start: 2024-09-13 | End: 2024-09-13 | Stop reason: HOSPADM

## 2024-09-13 RX ORDER — BUTALBITAL, ACETAMINOPHEN AND CAFFEINE 50; 325; 40 MG/1; MG/1; MG/1
1 TABLET ORAL 3 TIMES DAILY PRN
Status: DISCONTINUED | OUTPATIENT
Start: 2024-09-13 | End: 2024-09-13 | Stop reason: HOSPADM

## 2024-09-13 RX ORDER — POLYETHYLENE GLYCOL 3350 17 G/17G
17 POWDER, FOR SOLUTION ORAL DAILY
Status: DISCONTINUED | OUTPATIENT
Start: 2024-09-13 | End: 2024-09-13 | Stop reason: HOSPADM

## 2024-09-13 RX ORDER — MORPHINE SULFATE 4 MG/ML
4 INJECTION, SOLUTION INTRAMUSCULAR; INTRAVENOUS ONCE
Status: COMPLETED | OUTPATIENT
Start: 2024-09-13 | End: 2024-09-13

## 2024-09-13 RX ORDER — IPRATROPIUM BROMIDE AND ALBUTEROL SULFATE 2.5; .5 MG/3ML; MG/3ML
3 SOLUTION RESPIRATORY (INHALATION) AS NEEDED
Status: DISCONTINUED | OUTPATIENT
Start: 2024-09-13 | End: 2024-09-13

## 2024-09-13 RX ORDER — TALC
3 POWDER (GRAM) TOPICAL NIGHTLY PRN
Status: DISCONTINUED | OUTPATIENT
Start: 2024-09-13 | End: 2024-09-13 | Stop reason: HOSPADM

## 2024-09-13 RX ORDER — LEVOTHYROXINE SODIUM 100 UG/1
100 TABLET ORAL DAILY
Status: DISCONTINUED | OUTPATIENT
Start: 2024-09-13 | End: 2024-09-13 | Stop reason: HOSPADM

## 2024-09-13 RX ORDER — ACETAMINOPHEN 10 MG/ML
1000 INJECTION, SOLUTION INTRAVENOUS EVERY 6 HOURS SCHEDULED
Status: DISCONTINUED | OUTPATIENT
Start: 2024-09-13 | End: 2024-09-13 | Stop reason: HOSPADM

## 2024-09-13 RX ORDER — PROMETHAZINE HYDROCHLORIDE 12.5 MG/1
12.5 TABLET ORAL EVERY 6 HOURS PRN
COMMUNITY

## 2024-09-13 RX ORDER — PREDNISONE 20 MG/1
TABLET ORAL
Qty: 7 TABLET | Refills: 0 | Status: SHIPPED | OUTPATIENT
Start: 2024-09-13 | End: 2024-09-19

## 2024-09-13 RX ORDER — HYDROXYZINE HYDROCHLORIDE 25 MG/1
25 TABLET, FILM COATED ORAL 3 TIMES DAILY PRN
Status: DISCONTINUED | OUTPATIENT
Start: 2024-09-13 | End: 2024-09-13 | Stop reason: HOSPADM

## 2024-09-13 RX ORDER — ONDANSETRON 4 MG/1
4 TABLET, FILM COATED ORAL EVERY 8 HOURS PRN
Status: DISCONTINUED | OUTPATIENT
Start: 2024-09-13 | End: 2024-09-13 | Stop reason: HOSPADM

## 2024-09-13 RX ORDER — MONTELUKAST SODIUM 10 MG/1
10 TABLET ORAL NIGHTLY
Status: DISCONTINUED | OUTPATIENT
Start: 2024-09-13 | End: 2024-09-13 | Stop reason: HOSPADM

## 2024-09-13 RX ORDER — TEZEPELUMAB-EKKO 210 MG/1.9ML
210 INJECTION, SOLUTION SUBCUTANEOUS
COMMUNITY

## 2024-09-13 RX ORDER — PREDNISONE 20 MG/1
40 TABLET ORAL DAILY
Status: DISCONTINUED | OUTPATIENT
Start: 2024-09-13 | End: 2024-09-13 | Stop reason: HOSPADM

## 2024-09-13 ASSESSMENT — PAIN DESCRIPTION - LOCATION
LOCATION: HEAD

## 2024-09-13 ASSESSMENT — PAIN DESCRIPTION - DESCRIPTORS
DESCRIPTORS: ACHING

## 2024-09-13 ASSESSMENT — PAIN - FUNCTIONAL ASSESSMENT
PAIN_FUNCTIONAL_ASSESSMENT: 0-10

## 2024-09-13 ASSESSMENT — PAIN DESCRIPTION - PROGRESSION
CLINICAL_PROGRESSION: NOT CHANGED
CLINICAL_PROGRESSION: NOT CHANGED

## 2024-09-13 ASSESSMENT — PAIN SCALES - PAIN ASSESSMENT IN ADVANCED DEMENTIA (PAINAD)
TOTALSCORE: MEDICATION (SEE MAR)
BREATHING: NORMAL
TOTALSCORE: 4
FACIALEXPRESSION: FACIAL GRIMACING
CONSOLABILITY: DISTRACTED OR REASSURED BY VOICE/TOUCH
BODYLANGUAGE: TENSE, DISTRESSED PACING, FIDGETING

## 2024-09-13 ASSESSMENT — PAIN DESCRIPTION - PAIN TYPE
TYPE: ACUTE PAIN

## 2024-09-13 ASSESSMENT — PAIN SCALES - GENERAL
PAINLEVEL_OUTOF10: 3
PAINLEVEL_OUTOF10: 6
PAINLEVEL_OUTOF10: 7
PAINLEVEL_OUTOF10: 8
PAINLEVEL_OUTOF10: 6
PAINLEVEL_OUTOF10: 5 - MODERATE PAIN
PAINLEVEL_OUTOF10: 0 - NO PAIN
PAINLEVEL_OUTOF10: 6

## 2024-09-13 ASSESSMENT — PAIN DESCRIPTION - FREQUENCY: FREQUENCY: CONSTANT/CONTINUOUS

## 2024-09-13 ASSESSMENT — PAIN DESCRIPTION - ONSET
ONSET: ONGOING

## 2024-09-13 NOTE — ED PROVIDER NOTES
HPI   Chief Complaint   Patient presents with    Shortness of Breath     Pt arrived by squad after asthma attack. Took 3 duonebs at home with inhaler. No relief. Squad gave one duoneb.       38-year-old female with past medical history of asthma presents emergency room with chief complaint of a sudden onset of shortness of breath with chest tightness.  Patient states that the symptoms began an hour prior to arrival.  She gave herself the aerosol treatments at home with no relief.  EMS and route gave her 3 more aerosol treatments.  They attempted to start a line but were unsuccessful.  The patient has a history of asthma, GERD, PTSD.  The patient states that this feels like her typical asthma exacerbation.  She is on multiple inhalers as well as many medications for her asthma.  She is compliant with her medications.  She denies any recent fevers, chills, congestion, runny nose, chest pain, heart palpitations, hemoptysis, back pain, nausea or vomiting.  She denies any history of cardiac disease or heart failure.  She denies a history of PE but did have a DVT back in 2017.  She was on Eliquis for about 6 months.  She is currently not on any kind of anticoagulation.  He denies any recent flying or long distance travel, no pain swelling to her extremities.  She does not use birth control pills she is a non-smoker.  Denies any recent ill contacts.  She also has a history of obstructive sleep apnea.      History provided by:  Patient, medical records, EMS personnel and parent          Patient History   Past Medical History:   Diagnosis Date    Acute cystitis without hematuria 08/08/2019    Acute cystitis without hematuria    Asthma ([facility])     Disease of thyroid gland     Encounter for initial prescription of contraceptive pills 11/01/2019    Encounter for initial prescription of contraceptive pills    Nausea 02/05/2021    Nausea in adult    Other general symptoms and signs 12/05/2019    Forgetfulness    Parageusia  04/06/2020    Taste perversion    Pelvic and perineal pain     Pelvic pain in female    Personal history of other diseases of the respiratory system 04/06/2020    History of sinusitis    Personal history of other specified conditions 07/01/2020    History of vomiting    Personal history of other specified conditions 01/26/2021    History of headache    Personal history of other specified conditions 02/01/2021    History of diarrhea    Personal history of thrombophlebitis 06/03/2019    History of phlebitis    Personal history of urinary (tract) infections 01/16/2020    History of urinary tract infection    PTSD (post-traumatic stress disorder)      Past Surgical History:   Procedure Laterality Date    ABDOMINAL SURGERY      APPENDECTOMY      OTHER SURGICAL HISTORY  02/08/2019    Appendectomy    OTHER SURGICAL HISTORY  02/08/2019    Cholecystectomy    OTHER SURGICAL HISTORY  02/08/2019    Cystoscopy    OTHER SURGICAL HISTORY  02/08/2019    Ureteroscopy    OTHER SURGICAL HISTORY  02/08/2019    Shoulder surgery     Family History   Problem Relation Name Age of Onset    Hypertension Father      Other (Pulmonary Valve Stenosis) Sister      Heart disease Maternal Grandmother      Diabetes Other Grandparent     Lung cancer Other Grandparent     Anxiety disorder Sibling       Social History     Tobacco Use    Smoking status: Never     Passive exposure: Current    Smokeless tobacco: Never   Vaping Use    Vaping status: Never Used   Substance Use Topics    Alcohol use: Yes     Alcohol/week: 1.0 - 2.0 standard drink of alcohol     Types: 1 - 2 Glasses of wine per week     Comment: social    Drug use: Never       Physical Exam   ED Triage Vitals [09/12/24 2158]   Temp Heart Rate Respirations BP   -- 91 (!) 22 (!) 152/97      Pulse Ox Temp src Heart Rate Source Patient Position   96 % -- -- --      BP Location FiO2 (%)     -- --       Physical Exam  Vitals and nursing note reviewed. Exam conducted with a chaperone present.    Constitutional:       General: She is awake. She is not in acute distress.     Appearance: Normal appearance. She is well-developed and well-groomed. She is ill-appearing. She is not toxic-appearing or diaphoretic.   HENT:      Head: Normocephalic and atraumatic.      Right Ear: Tympanic membrane, ear canal and external ear normal.      Left Ear: Tympanic membrane, ear canal and external ear normal.      Nose: Nose normal.      Mouth/Throat:      Mouth: Mucous membranes are moist.      Pharynx: Oropharynx is clear.   Eyes:      Extraocular Movements: Extraocular movements intact.      Conjunctiva/sclera: Conjunctivae normal.      Pupils: Pupils are equal, round, and reactive to light.   Cardiovascular:      Rate and Rhythm: Normal rate and regular rhythm.      Pulses: Normal pulses.      Heart sounds: Normal heart sounds.   Pulmonary:      Effort: Pulmonary effort is normal. Tachypnea present.      Breath sounds: Decreased air movement present. Examination of the right-upper field reveals decreased breath sounds. Examination of the left-upper field reveals decreased breath sounds. Examination of the right-middle field reveals decreased breath sounds. Examination of the left-middle field reveals decreased breath sounds. Examination of the right-lower field reveals decreased breath sounds. Examination of the left-lower field reveals decreased breath sounds. Decreased breath sounds present. No wheezing, rhonchi or rales.   Abdominal:      General: Bowel sounds are normal.      Palpations: Abdomen is soft. There is no mass.      Tenderness: There is no abdominal tenderness. There is no guarding.   Musculoskeletal:         General: No swelling or tenderness. Normal range of motion.      Cervical back: Normal range of motion and neck supple.   Skin:     General: Skin is warm and dry.      Capillary Refill: Capillary refill takes less than 2 seconds.      Findings: No rash.   Neurological:      General: No focal deficit  present.      Mental Status: She is alert and oriented to person, place, and time. Mental status is at baseline.   Psychiatric:         Mood and Affect: Mood normal.         Behavior: Behavior normal. Behavior is cooperative.         Thought Content: Thought content normal.         Judgment: Judgment normal.           ED Course & MDM   Diagnoses as of 09/13/24 0046   Moderate persistent asthma with acute exacerbation ([facility])   Lab test negative for COVID-19 virus                 No data recorded                                 Medical Decision Making  Blood pressure 152/97 heart rate 91 respirations 22 pulse ox is 96% on room air  EKG interpreted by me at 2253 hrs. normal sinus rhythm rate 97  QRS is 76  QTc was 444 no ischemic changes.  CBC white count 7.2 hemoglobin 16.1 hematocrit 45.4, platelet count was 349, metabolic panel creatinine 1.12 otherwise unremarkable, BNP 19 troponin was negative at 3, beta quant negative less than 2, patient's portable chest x-ray was unremarkable no acute cardiopulmonary process.  2325 hrs. rounded on the patient appreciated still has breath sounds with very little improvement over several times.  Awaiting results of the COVID swab.  Repeat blood pressures 135/81, heart rate 91 respirations 18 pulse ox is 94% on room air temp 36 °C  Patient's COVID screen is negative.  Discussed results of workup with the patient repeat exam she is still having shortness of breath or chest tightness.  We have ordered additional DuoNeb aerosol treatments and I paged out to the hospitalist for admission.  The patient was also medicated with morphine 4 mg IV push and Zofran 4 mg IV push.  Spoke with Dr. Mckeon patient was accepted to his service        Procedure  Procedures     Salo Perez PA-C  09/13/24 0105

## 2024-09-13 NOTE — DISCHARGE INSTRUCTIONS
It was nice caring for you during your stay at [facility]. As we discussed,  -Please continue your prednisone taper, once you have completed your prednisone taper, please continue prednisone 5 mg as instructed    Wishing you a healthy recovery!

## 2024-09-13 NOTE — DISCHARGE SUMMARY
DISCHARGE DIAGNOSIS     Acute asthma exacerbation  Hypothyroidism  General Anxiety disorder  Morbid obesity      HOSPITAL COURSE AND DETAILS     This is a 38-year-old female with a significant past medical history of severe persistent asthma with recurrent exacerbations, hypothyroidism, generalized anxiety, morbid obesity that was admitted for asthma exacerbation.     She was given initially IV Solu-Medrol, magnesium, was continued on prednisone, she rapidly improved over 24 hours.  Expressed interest to be discharged.  She has been slowly tapering herself off steroids, she was advised to increase her prednisone to 40 mg for 2 days, 20 mg for 2 days then 10 mg for 2 days and subsequently after that go back to her 5 mg daily dose.    She was advised to follow-up with her primary care doctor.    * Some of these notes were completed using Dragon voice recognition technology and may include unintended areas with respect to translation of word, typographical errors or primary areas which may not have been identified prior to finalization of the document.          DISCHARGE PHYSICAL EXAM     Last Recorded Vitals:  Vitals:    09/13/24 0548 09/13/24 0709 09/13/24 0736 09/13/24 1227   BP: 136/78  127/70    BP Location:   Right arm    Patient Position:   Sitting    Pulse: 89  86    Resp: 18  20    Temp:       SpO2: 95% 97% 94% 99%   Weight:       Height:           Physical Exam  PHYSICAL EXAM:   GENERAL: Laying in bed, does not appear to be in any distress.   HEENT: HEAD: Normocephalic atraumatic.  Neck: Supple.  Eyes: Pupils are reactive to direct light.   CVS: S1, S2 heard. Regular rate and rhythm  LUNGS: Clear to auscultate bilaterally. No wheezing or rhonchi appreciated.  ABDOMEN: Soft, nontender to palpate. Positive bowel sounds. No guarding or rebound appreciated.  NEUROLOGICAL: No focal neurological deficits appreciated. Cranial nerves are grossly intact.  EXTREMITIES: No edema appreciated.  SKIN:  Grossly intact,  warm and dry.    DISCHARGE MEDICATIONS        Your medication list        CHANGE how you take these medications        Instructions Last Dose Given Next Dose Due   predniSONE 5 mg tablet  Commonly known as: Deltasone  What changed: Another medication with the same name was added. Make sure you understand how and when to take each.           predniSONE 20 mg tablet  Commonly known as: Deltasone  Start taking on: September 13, 2024  What changed: You were already taking a medication with the same name, and this prescription was added. Make sure you understand how and when to take each.      Take 2 tablets (40 mg) by mouth once daily for 2 days, THEN 1 tablet (20 mg) once daily for 2 days, THEN 0.5 tablets (10 mg) once daily for 2 days.              CONTINUE taking these medications        Instructions Last Dose Given Next Dose Due   Airsupra 90-80 mcg/actuation inhaler  Generic drug: albuterol-budesonide      Inhale 2 puffs every 4 hours if needed (cough, wheeze, short of breath). Patient has a coupon       albuterol 2.5 mg /3 mL (0.083 %) nebulizer solution           aspirin 81 mg chewable tablet           BREO ELLIPTA INHL           budesonide 0.5 mg/2 mL nebulizer solution  Commonly known as: Pulmicort           butalbital-acetaminophen-caff -40 mg tablet      Take 1 tablet by mouth early in the morning.. 1 tab(s) orally once a day, As Needed       desvenlafaxine 100 mg 24 hr tablet  Commonly known as: Pristiq      TAKE 1 TABLET BY MOUTH EVERY DAY       desvenlafaxine 50 mg 24 hr tablet  Commonly known as: Pristiq      TAKE 1 TABLET BY MOUTH ONCE DAILY       Emgality Syringe 120 mg/mL prefilled syringe  Generic drug: galcanezumab           EPINEPHrine 0.3 mg/0.3 mL injection syringe  Commonly known as: Epipen      Inject 0.3 mL (0.3 mg) into the muscle once daily as needed for anaphylaxis.       esomeprazole 20 mg DR capsule  Commonly known as: NexIUM      Take 1 capsule (20 mg) by mouth once daily in the  morning. Take before meals. Do not open capsule.       fexofenadine 180 mg tablet  Commonly known as: Allegra           formoterol 20 mcg/2 mL nebulizer solution  Commonly known as: Perforomist      Take 2 mL (20 mcg) by nebulization 2 times a day.       hydrOXYzine HCL 25 mg tablet  Commonly known as: Atarax      Take 1 tablet (25 mg) by mouth 3 times a day as needed for anxiety.       levothyroxine 100 mcg tablet  Commonly known as: Synthroid, Levoxyl           metoclopramide 10 mg tablet  Commonly known as: Reglan      Take 1 tablet (10 mg) by mouth every 8 hours if needed (nausea).       montelukast 10 mg tablet  Commonly known as: Singulair      Take 1 tablet (10 mg) by mouth once daily at bedtime.       multivitamin-Ca-iron-minerals 27-0.4 mg tablet  Commonly known as: Tab-A-Vu Womens           potassium chloride ER 10 mEq ER capsule  Commonly known as: Micro-K      Take 1 capsule (10 mEq) by mouth once daily.       promethazine 12.5 mg tablet  Commonly known as: Phenergan           Tezspire SubQ syringe  Generic drug: tezepelumab-ekko           Wegovy 0.5 mg/0.5 mL pen injector  Generic drug: semaglutide (weight loss)      Inject 0.5 mg under the skin 1 (one) time per week for 16 doses.              ASK your doctor about these medications        Instructions Last Dose Given Next Dose Due   Trelegy Ellipta 200-62.5-25 mcg blister with device  Generic drug: fluticasone-umeclidin-vilanter      Inhale 1 puff early in the morning..                 Where to Get Your Medications        These medications were sent to [id number] - [place], [place] - [address]  [address] [place], [place] [address]      Phone: [phone]   predniSONE 20 mg tablet           OUTPATIENT FOLLOW-UP     Future Appointments   Date Time Provider Department Center   10/22/2024  9:20 AM Hilary Trinidad DO [id number] [place]   11/1/2024  8:15 AM Gregory Valdez MD [id number] [place]

## 2024-09-13 NOTE — H&P
Medical Group History and Physical      ASSESSMENT & PLAN:     #.  Severe persistent asthma with exacerbation  -P/w severe shortness of breath and chest tightness with minimal relief despite multiple nebulizer treatments as well as Solu-Medrol and IV magnesium  -She does have significant history of severe persistent asthma with frequent exacerbations requiring hospital admission   -CXR negative, no hypoxia  -Prednisone 40 mg daily  -Scheduled DuoNebs  -Continue home inhalers, montelukast, Allegra  -Continued outpatient follow-up with her allergist    #.  Hypothyroidism  #.  PRANEETH  -Continuing home medications    VTE PPX: Lovenox and SCDs      Salo Mckeon MD    --Of note, this documentation is completed using the Dragon Dictation system (voice recognition software). There may be spelling and/or grammatical errors that were not corrected prior to final submission.--    HISTORY OF PRESENT ILLNESS:   Chief Complaint: Shortness of breath    Sarahi Haley is a 38 y.o. female with a history of severe persistent asthma with recurrent exacerbations, hypothyroidism, generalized anxiety, morbid obesity presenting with shortness of breath.  Patient states her symptoms started suddenly earlier this evening.  Her chest felt very tight as if there was a rubber band wrapped around her lungs.  She denies any productive cough, fever, chills.  She attempted 3 nebulizer treatments at home without relief.  EMS was called and she was given 3 more aerosol treatments without relief.       ER Course: /97, HR 91, RR 22, T36.0.  Labs unremarkable.  CXR negative.  Patient was given IV Solu-Medrol, IV Tylenol, IV magnesium, morphine, Zofran.  Despite these interventions patient still feels significantly short of breath and thus will be admitted.    ROS  10 point review of systems negative except per HPI     PAST HISTORIES:     Past Medical History  She has a past medical history of Acute cystitis without hematuria (08/08/2019),  Asthma ([facility]), Disease of thyroid gland, Encounter for initial prescription of contraceptive pills (11/01/2019), Nausea (02/05/2021), Other general symptoms and signs (12/05/2019), Parageusia (04/06/2020), Pelvic and perineal pain, Personal history of other diseases of the respiratory system (04/06/2020), Personal history of other specified conditions (07/01/2020), Personal history of other specified conditions (01/26/2021), Personal history of other specified conditions (02/01/2021), Personal history of thrombophlebitis (06/03/2019), Personal history of urinary (tract) infections (01/16/2020), and PTSD (post-traumatic stress disorder).    Surgical History  She has a past surgical history that includes Other surgical history (02/08/2019); Other surgical history (02/08/2019); Other surgical history (02/08/2019); Other surgical history (02/08/2019); Other surgical history (02/08/2019); Abdominal surgery; and Appendectomy.     Social History  She reports that she has never smoked. She has been exposed to tobacco smoke. She has never used smokeless tobacco. She reports current alcohol use of about 1.0 - 2.0 standard drink of alcohol per week. She reports that she does not use drugs.    Family History  Family History   Problem Relation Name Age of Onset    Hypertension Father      Other (Pulmonary Valve Stenosis) Sister      Heart disease Maternal Grandmother      Diabetes Other Grandparent     Lung cancer Other Grandparent     Anxiety disorder Sibling         Allergies:  Bee venom protein (honey bee), Diphenhydramine, Nsaids (non-steroidal anti-inflammatory drug), Procaine, and Ketorolac      OBJECTIVE:      Last Recorded Vitals  /79   Pulse 81   Temp 36 °C (96.8 °F)   Resp 18   Wt 149 kg (328 lb)   SpO2 95%     Last I/O:  No intake/output data recorded.    Physical Exam   Gen: NAD, appears stated age  HEENT: EOM, MMM  CV: RRR, no murmurs rubs or gallops  Resp: Breath sounds diminished bilaterally however  clear without wheezing, normal effort  Abdomen: soft, NT,+BS  LE: No edema, no deformity  Neuro: A&Ox4, moving all extremities    LABS AND IMAGING:       Relevant Results  Labs Reviewed   CBC WITH AUTO DIFFERENTIAL - Abnormal       Result Value    WBC 7.2      nRBC 0.0      RBC 5.38 (*)     Hemoglobin 16.1 (*)     Hematocrit 45.4      MCV 84      MCH 29.9      MCHC 35.5      RDW 12.0      Platelets 349      Neutrophils % 63.6      Immature Granulocytes %, Automated 0.3      Lymphocytes % 28.5      Monocytes % 7.0      Eosinophils % 0.0      Basophils % 0.6      Neutrophils Absolute 4.57      Immature Granulocytes Absolute, Automated 0.02      Lymphocytes Absolute 2.04      Monocytes Absolute 0.50      Eosinophils Absolute 0.00      Basophils Absolute 0.04     COMPREHENSIVE METABOLIC PANEL - Abnormal    Glucose 94      Sodium 137      Potassium 3.5      Chloride 105      Bicarbonate 23      Anion Gap 13      Urea Nitrogen 12      Creatinine 1.12 (*)     eGFR 65      Calcium 9.5      Albumin 4.4      Alkaline Phosphatase 67      Total Protein 7.3      AST 26      Bilirubin, Total 1.2      ALT 41     MAGNESIUM - Normal    Magnesium 1.94     TROPONIN I, HIGH SENSITIVITY - Normal    Troponin I, High Sensitivity 3      Narrative:     Less than 99th percentile of normal range cutoff-  Female and children under 18 years old <14 ng/L; Male <21 ng/L: Negative  Repeat testing should be performed if clinically indicated.     Female and children under 18 years old 14-50 ng/L; Male 21-50 ng/L:  Consistent with possible cardiac damage and possible increased clinical   risk. Serial measurements may help to assess extent of myocardial damage.     >50 ng/L: Consistent with cardiac damage, increased clinical risk and  myocardial infarction. Serial measurements may help assess extent of   myocardial damage.      NOTE: Children less than 1 year old may have higher baseline troponin   levels and results should be interpreted in  conjunction with the overall   clinical context.     NOTE: Troponin I testing is performed using a different   testing methodology at [facility] than at other   [facility]. Direct result comparisons should only   be made within the same method.   B-TYPE NATRIURETIC PEPTIDE - Normal    BNP 19      Narrative:        <100 pg/mL - Heart failure unlikely  100-299 pg/mL - Intermediate probability of acute heart                  failure exacerbation. Correlate with clinical                  context and patient history.    >=300 pg/mL - Heart Failure likely. Correlate with clinical                  context and patient history.    BNP testing is performed using different testing methodology at [facility] than at other [facility]. Direct result comparisons should only be made within the same method.      HUMAN CHORIONIC GONADOTROPIN, SERUM QUANTITATIVE - Normal    HCG, Beta-Quantitative <2      Narrative:      Total HCG measurement is performed using the [employer] Access   Immunoassay which detects intact HCG and free beta HCG subunit.    This test is not indicated for use as a tumor marker.   HCG testing is performed using a different test methodology at [facility] than other [facility]. Direct result comparison   should only be made within the same method.       SARS-COV-2 PCR - Normal    Coronavirus 2019, PCR Not Detected      Narrative:     This assay has received FDA Emergency Use Authorization (EUA) and is only authorized for the duration of time that circumstances exist to justify the authorization of the emergency use of in vitro diagnostic tests for the detection of SARS-CoV-2 virus and/or diagnosis of COVID-19 infection under section 564(b)(1) of the Act, 21 U.S.C. 360bbb-3(b)(1). This assay is an in vitro diagnostic nucleic acid amplification test for the qualitative detection of SARS-CoV-2 from nasopharyngeal specimens and has been validated for  use at [facility]. Negative results do not preclude COVID-19 infections and should not be used as the sole basis for diagnosis, treatment, or other management decisions.       XR chest 1 view   Final Result   1.  No evidence of acute cardiopulmonary process.                  MACRO:   None        Signed by: Zaheer Wood 9/12/2024 11:11 PM   Dictation workstation:   [id number]

## 2024-09-15 LAB
ATRIAL RATE: 97 BPM
P AXIS: 10 DEGREES
P OFFSET: 184 MS
P ONSET: 152 MS
PR INTERVAL: 132 MS
Q ONSET: 218 MS
QRS COUNT: 16 BEATS
QRS DURATION: 76 MS
QT INTERVAL: 350 MS
QTC CALCULATION(BAZETT): 444 MS
QTC FREDERICIA: 410 MS
R AXIS: -15 DEGREES
T AXIS: 40 DEGREES
T OFFSET: 393 MS
VENTRICULAR RATE: 97 BPM

## 2024-09-16 ENCOUNTER — TELEPHONE (OUTPATIENT)
Dept: PRIMARY CARE | Facility: CLINIC | Age: 39
End: 2024-09-16
Payer: COMMERCIAL

## 2024-09-16 NOTE — TELEPHONE ENCOUNTER
Gregory Valdez MD  Do [id number] Clerical2 hours ago (11:46 AM)       Please let her know it could come in next week and to let me know if it does not.  The other option is  pharmacy in [place] or [place] can carry it and hopefully get it for a good price with your insurance.  The final option would be to change to Zepbound which is similar but from a different company.  Please let her know but also check and see if our  pharmacy carries it at that dosage.  Thanks

## 2024-09-16 NOTE — TELEPHONE ENCOUNTER
Please advise patient message.     Wegovy  Received: Today  Sarahi Haley [id number] [address] Clinical Support Staff  Phone Number: [phone]     Hi. I am in 0.25 of Wegovy- week 2. I have searched numerous pharmacies in the area, and the 0.5 Wegovy has been on back order for quite some time they said. How should I proceed?    Thank you,  Sarahi

## 2024-09-28 ENCOUNTER — HOSPITAL ENCOUNTER (INPATIENT)
Facility: HOSPITAL | Age: 39
DRG: 189 | End: 2024-09-28
Attending: EMERGENCY MEDICINE | Admitting: SURGERY
Payer: COMMERCIAL

## 2024-09-28 ENCOUNTER — APPOINTMENT (OUTPATIENT)
Dept: CARDIOLOGY | Facility: HOSPITAL | Age: 39
DRG: 189 | End: 2024-09-28
Payer: COMMERCIAL

## 2024-09-28 ENCOUNTER — APPOINTMENT (OUTPATIENT)
Dept: RADIOLOGY | Facility: HOSPITAL | Age: 39
DRG: 189 | End: 2024-09-28
Payer: COMMERCIAL

## 2024-09-28 DIAGNOSIS — J45.41 MODERATE PERSISTENT ASTHMA WITH (ACUTE) EXACERBATION (HHS-HCC): ICD-10-CM

## 2024-09-28 DIAGNOSIS — J45.41 MODERATE PERSISTENT ASTHMA WITH ACUTE EXACERBATION (HHS-HCC): Primary | ICD-10-CM

## 2024-09-28 DIAGNOSIS — J45.41 ASTHMA IN ADULT, MODERATE PERSISTENT, WITH ACUTE EXACERBATION (HHS-HCC): ICD-10-CM

## 2024-09-28 LAB
ALBUMIN SERPL BCP-MCNC: 4.1 G/DL (ref 3.4–5)
ALP SERPL-CCNC: 64 U/L (ref 33–110)
ALT SERPL W P-5'-P-CCNC: 55 U/L (ref 7–45)
ANION GAP SERPL CALC-SCNC: 13 MMOL/L (ref 10–20)
APPARATUS: ABNORMAL
ARTERIAL PATENCY WRIST A: POSITIVE
AST SERPL W P-5'-P-CCNC: 32 U/L (ref 9–39)
ATRIAL RATE: 82 BPM
BASE EXCESS BLDA CALC-SCNC: -1.5 MMOL/L (ref -2–3)
BASOPHILS # BLD AUTO: 0.04 X10*3/UL (ref 0–0.1)
BASOPHILS NFR BLD AUTO: 0.6 %
BILIRUB SERPL-MCNC: 1 MG/DL (ref 0–1.2)
BNP SERPL-MCNC: 60 PG/ML (ref 0–99)
BODY TEMPERATURE: ABNORMAL
BUN SERPL-MCNC: 9 MG/DL (ref 6–23)
CALCIUM SERPL-MCNC: 9 MG/DL (ref 8.6–10.3)
CARDIAC TROPONIN I PNL SERPL HS: 3 NG/L (ref 0–13)
CARDIAC TROPONIN I PNL SERPL HS: <3 NG/L (ref 0–13)
CHLORIDE SERPL-SCNC: 105 MMOL/L (ref 98–107)
CO2 SERPL-SCNC: 23 MMOL/L (ref 21–32)
CREAT SERPL-MCNC: 0.92 MG/DL (ref 0.5–1.05)
CRITICAL CALL TIME: 419
CRITICAL CALLED BY: ABNORMAL
CRITICAL CALLED TO: ABNORMAL
CRITICAL NOTE: ABNORMAL
CRITICAL READ BACK: ABNORMAL
CRP SERPL-MCNC: 0.29 MG/DL
EGFRCR SERPLBLD CKD-EPI 2021: 83 ML/MIN/1.73M*2
EOSINOPHIL # BLD AUTO: 0.01 X10*3/UL (ref 0–0.7)
EOSINOPHIL NFR BLD AUTO: 0.2 %
ERYTHROCYTE [DISTWIDTH] IN BLOOD BY AUTOMATED COUNT: 11.9 % (ref 11.5–14.5)
GLUCOSE SERPL-MCNC: 113 MG/DL (ref 74–99)
HCO3 BLDA-SCNC: 21.3 MMOL/L (ref 22–26)
HCT VFR BLD AUTO: 43.8 % (ref 36–46)
HGB BLD-MCNC: 15.4 G/DL (ref 12–16)
IMM GRANULOCYTES # BLD AUTO: 0.01 X10*3/UL (ref 0–0.7)
IMM GRANULOCYTES NFR BLD AUTO: 0.2 % (ref 0–0.9)
INHALED O2 CONCENTRATION: 32 %
INR PPP: 1 (ref 0.9–1.1)
LACTATE SERPL-SCNC: 1.8 MMOL/L (ref 0.4–2)
LYMPHOCYTES # BLD AUTO: 2.16 X10*3/UL (ref 1.2–4.8)
LYMPHOCYTES NFR BLD AUTO: 33.9 %
MCH RBC QN AUTO: 29.6 PG (ref 26–34)
MCHC RBC AUTO-ENTMCNC: 35.2 G/DL (ref 32–36)
MCV RBC AUTO: 84 FL (ref 80–100)
MONOCYTES # BLD AUTO: 0.36 X10*3/UL (ref 0.1–1)
MONOCYTES NFR BLD AUTO: 5.6 %
NEUTROPHILS # BLD AUTO: 3.8 X10*3/UL (ref 1.2–7.7)
NEUTROPHILS NFR BLD AUTO: 59.5 %
NRBC BLD-RTO: 0 /100 WBCS (ref 0–0)
OXYHGB MFR BLDA: ABNORMAL %
P AXIS: 60 DEGREES
P OFFSET: 185 MS
P ONSET: 136 MS
PCO2 BLDA: 30 MM HG (ref 38–42)
PH BLDA: 7.46 PH (ref 7.38–7.42)
PLATELET # BLD AUTO: 351 X10*3/UL (ref 150–450)
PO2 BLDA: 144 MM HG (ref 85–95)
POTASSIUM SERPL-SCNC: 3.5 MMOL/L (ref 3.5–5.3)
PR INTERVAL: 160 MS
PROT SERPL-MCNC: 6.9 G/DL (ref 6.4–8.2)
PROTHROMBIN TIME: 11.6 SECONDS (ref 9.8–12.8)
Q ONSET: 216 MS
QRS COUNT: 13 BEATS
QRS DURATION: 84 MS
QT INTERVAL: 394 MS
QTC CALCULATION(BAZETT): 460 MS
QTC FREDERICIA: 437 MS
R AXIS: -4 DEGREES
RBC # BLD AUTO: 5.2 X10*6/UL (ref 4–5.2)
SAO2 % BLDA: ABNORMAL %
SODIUM SERPL-SCNC: 137 MMOL/L (ref 136–145)
SPECIMEN DRAWN FROM PATIENT: ABNORMAL
T AXIS: 58 DEGREES
T OFFSET: 413 MS
VENTRICULAR RATE: 82 BPM
WBC # BLD AUTO: 6.4 X10*3/UL (ref 4.4–11.3)

## 2024-09-28 PROCEDURE — 94640 AIRWAY INHALATION TREATMENT: CPT

## 2024-09-28 PROCEDURE — 71045 X-RAY EXAM CHEST 1 VIEW: CPT

## 2024-09-28 PROCEDURE — 96368 THER/DIAG CONCURRENT INF: CPT

## 2024-09-28 PROCEDURE — 2500000002 HC RX 250 W HCPCS SELF ADMINISTERED DRUGS (ALT 637 FOR MEDICARE OP, ALT 636 FOR OP/ED): Performed by: NURSE PRACTITIONER

## 2024-09-28 PROCEDURE — 99232 SBSQ HOSP IP/OBS MODERATE 35: CPT | Performed by: STUDENT IN AN ORGANIZED HEALTH CARE EDUCATION/TRAINING PROGRAM

## 2024-09-28 PROCEDURE — 2500000001 HC RX 250 WO HCPCS SELF ADMINISTERED DRUGS (ALT 637 FOR MEDICARE OP): Performed by: STUDENT IN AN ORGANIZED HEALTH CARE EDUCATION/TRAINING PROGRAM

## 2024-09-28 PROCEDURE — 2500000001 HC RX 250 WO HCPCS SELF ADMINISTERED DRUGS (ALT 637 FOR MEDICARE OP): Performed by: NURSE PRACTITIONER

## 2024-09-28 PROCEDURE — 2500000004 HC RX 250 GENERAL PHARMACY W/ HCPCS (ALT 636 FOR OP/ED): Performed by: PHYSICIAN ASSISTANT

## 2024-09-28 PROCEDURE — 82805 BLOOD GASES W/O2 SATURATION: CPT | Performed by: PHYSICIAN ASSISTANT

## 2024-09-28 PROCEDURE — 2500000002 HC RX 250 W HCPCS SELF ADMINISTERED DRUGS (ALT 637 FOR MEDICARE OP, ALT 636 FOR OP/ED): Performed by: PHYSICIAN ASSISTANT

## 2024-09-28 PROCEDURE — 96372 THER/PROPH/DIAG INJ SC/IM: CPT | Performed by: NURSE PRACTITIONER

## 2024-09-28 PROCEDURE — 93005 ELECTROCARDIOGRAM TRACING: CPT

## 2024-09-28 PROCEDURE — G0378 HOSPITAL OBSERVATION PER HR: HCPCS

## 2024-09-28 PROCEDURE — 96365 THER/PROPH/DIAG IV INF INIT: CPT | Mod: 59

## 2024-09-28 PROCEDURE — 36415 COLL VENOUS BLD VENIPUNCTURE: CPT | Performed by: PHYSICIAN ASSISTANT

## 2024-09-28 PROCEDURE — 85610 PROTHROMBIN TIME: CPT | Performed by: PHYSICIAN ASSISTANT

## 2024-09-28 PROCEDURE — 80053 COMPREHEN METABOLIC PANEL: CPT | Performed by: PHYSICIAN ASSISTANT

## 2024-09-28 PROCEDURE — 85025 COMPLETE CBC W/AUTO DIFF WBC: CPT | Performed by: PHYSICIAN ASSISTANT

## 2024-09-28 PROCEDURE — 84484 ASSAY OF TROPONIN QUANT: CPT | Performed by: PHYSICIAN ASSISTANT

## 2024-09-28 PROCEDURE — 96361 HYDRATE IV INFUSION ADD-ON: CPT

## 2024-09-28 PROCEDURE — 96375 TX/PRO/DX INJ NEW DRUG ADDON: CPT

## 2024-09-28 PROCEDURE — 99285 EMERGENCY DEPT VISIT HI MDM: CPT | Mod: 25

## 2024-09-28 PROCEDURE — 83880 ASSAY OF NATRIURETIC PEPTIDE: CPT | Performed by: PHYSICIAN ASSISTANT

## 2024-09-28 PROCEDURE — 2500000004 HC RX 250 GENERAL PHARMACY W/ HCPCS (ALT 636 FOR OP/ED): Performed by: STUDENT IN AN ORGANIZED HEALTH CARE EDUCATION/TRAINING PROGRAM

## 2024-09-28 PROCEDURE — 86140 C-REACTIVE PROTEIN: CPT | Performed by: NURSE PRACTITIONER

## 2024-09-28 PROCEDURE — 83605 ASSAY OF LACTIC ACID: CPT | Performed by: PHYSICIAN ASSISTANT

## 2024-09-28 PROCEDURE — 2500000004 HC RX 250 GENERAL PHARMACY W/ HCPCS (ALT 636 FOR OP/ED): Performed by: NURSE PRACTITIONER

## 2024-09-28 PROCEDURE — 96376 TX/PRO/DX INJ SAME DRUG ADON: CPT

## 2024-09-28 PROCEDURE — 2500000004 HC RX 250 GENERAL PHARMACY W/ HCPCS (ALT 636 FOR OP/ED): Performed by: INTERNAL MEDICINE

## 2024-09-28 PROCEDURE — 71045 X-RAY EXAM CHEST 1 VIEW: CPT | Mod: FOREIGN READ | Performed by: RADIOLOGY

## 2024-09-28 PROCEDURE — 99222 1ST HOSP IP/OBS MODERATE 55: CPT | Performed by: NURSE PRACTITIONER

## 2024-09-28 RX ORDER — ONDANSETRON HYDROCHLORIDE 2 MG/ML
4 INJECTION, SOLUTION INTRAVENOUS ONCE
Status: COMPLETED | OUTPATIENT
Start: 2024-09-28 | End: 2024-09-28

## 2024-09-28 RX ORDER — IPRATROPIUM BROMIDE AND ALBUTEROL SULFATE 2.5; .5 MG/3ML; MG/3ML
3 SOLUTION RESPIRATORY (INHALATION)
Status: DISCONTINUED | OUTPATIENT
Start: 2024-09-28 | End: 2024-09-29

## 2024-09-28 RX ORDER — ACETAMINOPHEN 650 MG/1
650 SUPPOSITORY RECTAL EVERY 4 HOURS PRN
Status: DISCONTINUED | OUTPATIENT
Start: 2024-09-28 | End: 2024-09-30 | Stop reason: HOSPADM

## 2024-09-28 RX ORDER — SODIUM CHLORIDE 9 MG/ML
125 INJECTION, SOLUTION INTRAVENOUS CONTINUOUS
Status: DISCONTINUED | OUTPATIENT
Start: 2024-09-28 | End: 2024-09-28

## 2024-09-28 RX ORDER — ACETAMINOPHEN 500 MG
5 TABLET ORAL NIGHTLY PRN
Status: DISCONTINUED | OUTPATIENT
Start: 2024-09-28 | End: 2024-09-30 | Stop reason: HOSPADM

## 2024-09-28 RX ORDER — LORAZEPAM 2 MG/ML
1 INJECTION INTRAMUSCULAR ONCE
Status: COMPLETED | OUTPATIENT
Start: 2024-09-28 | End: 2024-09-28

## 2024-09-28 RX ORDER — MORPHINE SULFATE 2 MG/ML
2 INJECTION, SOLUTION INTRAMUSCULAR; INTRAVENOUS EVERY 4 HOURS PRN
Status: DISCONTINUED | OUTPATIENT
Start: 2024-09-28 | End: 2024-09-29

## 2024-09-28 RX ORDER — BUTALBITAL, ACETAMINOPHEN AND CAFFEINE 50; 325; 40 MG/1; MG/1; MG/1
1 TABLET ORAL EVERY 4 HOURS PRN
Status: DISCONTINUED | OUTPATIENT
Start: 2024-09-28 | End: 2024-09-30 | Stop reason: HOSPADM

## 2024-09-28 RX ORDER — ENOXAPARIN SODIUM 100 MG/ML
60 INJECTION SUBCUTANEOUS EVERY 12 HOURS SCHEDULED
Status: DISCONTINUED | OUTPATIENT
Start: 2024-09-28 | End: 2024-09-29

## 2024-09-28 RX ORDER — ACETAMINOPHEN 10 MG/ML
1000 INJECTION, SOLUTION INTRAVENOUS ONCE
Status: COMPLETED | OUTPATIENT
Start: 2024-09-28 | End: 2024-09-28

## 2024-09-28 RX ORDER — ACETAMINOPHEN 160 MG/5ML
650 SOLUTION ORAL EVERY 4 HOURS PRN
Status: DISCONTINUED | OUTPATIENT
Start: 2024-09-28 | End: 2024-09-30 | Stop reason: HOSPADM

## 2024-09-28 RX ORDER — POTASSIUM CHLORIDE 20 MEQ/1
20 TABLET, EXTENDED RELEASE ORAL ONCE
Status: COMPLETED | OUTPATIENT
Start: 2024-09-28 | End: 2024-09-28

## 2024-09-28 RX ORDER — ONDANSETRON HYDROCHLORIDE 2 MG/ML
4 INJECTION, SOLUTION INTRAVENOUS EVERY 8 HOURS PRN
Status: DISCONTINUED | OUTPATIENT
Start: 2024-09-28 | End: 2024-09-30 | Stop reason: HOSPADM

## 2024-09-28 RX ORDER — ACETAMINOPHEN 325 MG/1
650 TABLET ORAL EVERY 4 HOURS PRN
Status: DISCONTINUED | OUTPATIENT
Start: 2024-09-28 | End: 2024-09-30 | Stop reason: HOSPADM

## 2024-09-28 RX ORDER — ONDANSETRON 4 MG/1
4 TABLET, FILM COATED ORAL EVERY 8 HOURS PRN
Status: DISCONTINUED | OUTPATIENT
Start: 2024-09-28 | End: 2024-09-30 | Stop reason: HOSPADM

## 2024-09-28 RX ORDER — IPRATROPIUM BROMIDE AND ALBUTEROL SULFATE 2.5; .5 MG/3ML; MG/3ML
3 SOLUTION RESPIRATORY (INHALATION) EVERY 20 MIN
Status: COMPLETED | OUTPATIENT
Start: 2024-09-28 | End: 2024-09-28

## 2024-09-28 RX ORDER — MORPHINE SULFATE 4 MG/ML
4 INJECTION, SOLUTION INTRAMUSCULAR; INTRAVENOUS ONCE
Status: COMPLETED | OUTPATIENT
Start: 2024-09-28 | End: 2024-09-28

## 2024-09-28 RX ORDER — PANTOPRAZOLE SODIUM 40 MG/10ML
40 INJECTION, POWDER, LYOPHILIZED, FOR SOLUTION INTRAVENOUS
Status: DISCONTINUED | OUTPATIENT
Start: 2024-09-28 | End: 2024-09-30

## 2024-09-28 RX ORDER — PANTOPRAZOLE SODIUM 40 MG/1
40 TABLET, DELAYED RELEASE ORAL
Status: DISCONTINUED | OUTPATIENT
Start: 2024-09-28 | End: 2024-09-30 | Stop reason: HOSPADM

## 2024-09-28 RX ORDER — MAGNESIUM SULFATE HEPTAHYDRATE 40 MG/ML
2 INJECTION, SOLUTION INTRAVENOUS ONCE
Status: COMPLETED | OUTPATIENT
Start: 2024-09-28 | End: 2024-09-28

## 2024-09-28 RX ORDER — HYDROXYZINE HYDROCHLORIDE 25 MG/1
25 TABLET, FILM COATED ORAL 3 TIMES DAILY PRN
Status: DISCONTINUED | OUTPATIENT
Start: 2024-09-28 | End: 2024-09-30 | Stop reason: HOSPADM

## 2024-09-28 RX ORDER — HYDROXYZINE HYDROCHLORIDE 25 MG/1
25 TABLET, FILM COATED ORAL 3 TIMES DAILY
Status: DISCONTINUED | OUTPATIENT
Start: 2024-09-28 | End: 2024-09-28

## 2024-09-28 RX ORDER — SODIUM CHLORIDE 9 MG/ML
125 INJECTION, SOLUTION INTRAVENOUS CONTINUOUS
Status: DISCONTINUED | OUTPATIENT
Start: 2024-09-28 | End: 2024-09-29

## 2024-09-28 RX ADMIN — ONDANSETRON 4 MG: 2 INJECTION, SOLUTION INTRAMUSCULAR; INTRAVENOUS at 04:22

## 2024-09-28 RX ADMIN — METHYLPREDNISOLONE SODIUM SUCCINATE 125 MG: 125 INJECTION, POWDER, FOR SOLUTION INTRAMUSCULAR; INTRAVENOUS at 04:02

## 2024-09-28 RX ADMIN — LORAZEPAM 1 MG: 2 INJECTION INTRAMUSCULAR; INTRAVENOUS at 04:02

## 2024-09-28 RX ADMIN — MORPHINE SULFATE 2 MG: 2 INJECTION, SOLUTION INTRAMUSCULAR; INTRAVENOUS at 15:29

## 2024-09-28 RX ADMIN — SODIUM CHLORIDE 125 ML/HR: 9 INJECTION, SOLUTION INTRAVENOUS at 22:59

## 2024-09-28 RX ADMIN — MAGNESIUM SULFATE HEPTAHYDRATE 2 G: 40 INJECTION, SOLUTION INTRAVENOUS at 04:06

## 2024-09-28 RX ADMIN — ONDANSETRON 4 MG: 2 INJECTION INTRAMUSCULAR; INTRAVENOUS at 08:44

## 2024-09-28 RX ADMIN — MORPHINE SULFATE 2 MG: 2 INJECTION, SOLUTION INTRAMUSCULAR; INTRAVENOUS at 09:19

## 2024-09-28 RX ADMIN — MORPHINE SULFATE 4 MG: 4 INJECTION, SOLUTION INTRAMUSCULAR; INTRAVENOUS at 05:23

## 2024-09-28 RX ADMIN — IPRATROPIUM BROMIDE AND ALBUTEROL SULFATE 3 ML: 2.5; .5 SOLUTION RESPIRATORY (INHALATION) at 04:02

## 2024-09-28 RX ADMIN — IPRATROPIUM BROMIDE AND ALBUTEROL SULFATE 3 ML: 2.5; .5 SOLUTION RESPIRATORY (INHALATION) at 04:43

## 2024-09-28 RX ADMIN — IPRATROPIUM BROMIDE AND ALBUTEROL SULFATE 3 ML: 2.5; .5 SOLUTION RESPIRATORY (INHALATION) at 08:27

## 2024-09-28 RX ADMIN — ONDANSETRON 4 MG: 2 INJECTION INTRAMUSCULAR; INTRAVENOUS at 05:23

## 2024-09-28 RX ADMIN — ACETAMINOPHEN 1000 MG: 10 INJECTION, SOLUTION INTRAVENOUS at 04:14

## 2024-09-28 RX ADMIN — ENOXAPARIN SODIUM 60 MG: 60 INJECTION SUBCUTANEOUS at 08:44

## 2024-09-28 RX ADMIN — BUTALBITAL, ACETAMINOPHEN AND CAFFEINE 1 TABLET: 325; 50; 40 TABLET ORAL at 20:57

## 2024-09-28 RX ADMIN — SODIUM CHLORIDE 125 ML/HR: 9 INJECTION, SOLUTION INTRAVENOUS at 04:33

## 2024-09-28 RX ADMIN — POTASSIUM CHLORIDE 20 MEQ: 1500 TABLET, EXTENDED RELEASE ORAL at 05:30

## 2024-09-28 RX ADMIN — IPRATROPIUM BROMIDE AND ALBUTEROL SULFATE 3 ML: 2.5; .5 SOLUTION RESPIRATORY (INHALATION) at 21:03

## 2024-09-28 RX ADMIN — ENOXAPARIN SODIUM 60 MG: 60 INJECTION SUBCUTANEOUS at 20:57

## 2024-09-28 RX ADMIN — PANTOPRAZOLE SODIUM 40 MG: 40 TABLET, DELAYED RELEASE ORAL at 06:51

## 2024-09-28 RX ADMIN — IPRATROPIUM BROMIDE AND ALBUTEROL SULFATE 3 ML: 2.5; .5 SOLUTION RESPIRATORY (INHALATION) at 13:55

## 2024-09-28 RX ADMIN — IPRATROPIUM BROMIDE AND ALBUTEROL SULFATE 3 ML: 2.5; .5 SOLUTION RESPIRATORY (INHALATION) at 04:23

## 2024-09-28 RX ADMIN — ONDANSETRON 4 MG: 2 INJECTION INTRAMUSCULAR; INTRAVENOUS at 15:29

## 2024-09-28 RX ADMIN — ACETAMINOPHEN 650 MG: 325 TABLET ORAL at 12:25

## 2024-09-28 RX ADMIN — METHYLPREDNISOLONE SODIUM SUCCINATE 40 MG: 40 INJECTION, POWDER, FOR SOLUTION INTRAMUSCULAR; INTRAVENOUS at 15:32

## 2024-09-28 RX ADMIN — HYDROXYZINE HYDROCHLORIDE 25 MG: 25 TABLET ORAL at 13:52

## 2024-09-28 RX ADMIN — BUTALBITAL, ACETAMINOPHEN AND CAFFEINE 1 TABLET: 325; 50; 40 TABLET ORAL at 09:19

## 2024-09-28 RX ADMIN — MORPHINE SULFATE 2 MG: 2 INJECTION, SOLUTION INTRAMUSCULAR; INTRAVENOUS at 20:57

## 2024-09-28 RX ADMIN — ACETAMINOPHEN 650 MG: 325 TABLET ORAL at 06:51

## 2024-09-28 SDOH — ECONOMIC STABILITY: FOOD INSECURITY: WITHIN THE PAST 12 MONTHS, THE FOOD YOU BOUGHT JUST DIDN'T LAST AND YOU DIDN'T HAVE MONEY TO GET MORE.: NEVER TRUE

## 2024-09-28 SDOH — SOCIAL STABILITY: SOCIAL INSECURITY: HAVE YOU HAD ANY THOUGHTS OF HARMING ANYONE ELSE?: NO

## 2024-09-28 SDOH — ECONOMIC STABILITY: FOOD INSECURITY: WITHIN THE PAST 12 MONTHS, YOU WORRIED THAT YOUR FOOD WOULD RUN OUT BEFORE YOU GOT MONEY TO BUY MORE.: NEVER TRUE

## 2024-09-28 SDOH — SOCIAL STABILITY: SOCIAL INSECURITY: ABUSE: ADULT

## 2024-09-28 SDOH — SOCIAL STABILITY: SOCIAL INSECURITY: WITHIN THE LAST YEAR, HAVE YOU BEEN AFRAID OF YOUR PARTNER OR EX-PARTNER?: NO

## 2024-09-28 SDOH — ECONOMIC STABILITY: INCOME INSECURITY: HOW HARD IS IT FOR YOU TO PAY FOR THE VERY BASICS LIKE FOOD, HOUSING, MEDICAL CARE, AND HEATING?: NOT HARD AT ALL

## 2024-09-28 SDOH — SOCIAL STABILITY: SOCIAL INSECURITY: WITHIN THE LAST YEAR, HAVE YOU BEEN HUMILIATED OR EMOTIONALLY ABUSED IN OTHER WAYS BY YOUR PARTNER OR EX-PARTNER?: NO

## 2024-09-28 SDOH — SOCIAL STABILITY: SOCIAL INSECURITY: ARE THERE ANY APPARENT SIGNS OF INJURIES/BEHAVIORS THAT COULD BE RELATED TO ABUSE/NEGLECT?: NO

## 2024-09-28 SDOH — ECONOMIC STABILITY: INCOME INSECURITY: IN THE PAST 12 MONTHS, HAS THE ELECTRIC, GAS, OIL, OR WATER COMPANY THREATENED TO SHUT OFF SERVICE IN YOUR HOME?: NO

## 2024-09-28 SDOH — SOCIAL STABILITY: SOCIAL INSECURITY: DOES ANYONE TRY TO KEEP YOU FROM HAVING/CONTACTING OTHER FRIENDS OR DOING THINGS OUTSIDE YOUR HOME?: NO

## 2024-09-28 SDOH — ECONOMIC STABILITY: HOUSING INSECURITY: AT ANY TIME IN THE PAST 12 MONTHS, WERE YOU HOMELESS OR LIVING IN A SHELTER (INCLUDING NOW)?: NO

## 2024-09-28 SDOH — SOCIAL STABILITY: SOCIAL INSECURITY: WERE YOU ABLE TO COMPLETE ALL THE BEHAVIORAL HEALTH SCREENINGS?: YES

## 2024-09-28 SDOH — ECONOMIC STABILITY: INCOME INSECURITY: IN THE LAST 12 MONTHS, WAS THERE A TIME WHEN YOU WERE NOT ABLE TO PAY THE MORTGAGE OR RENT ON TIME?: NO

## 2024-09-28 SDOH — SOCIAL STABILITY: SOCIAL INSECURITY: HAVE YOU HAD THOUGHTS OF HARMING ANYONE ELSE?: NO

## 2024-09-28 SDOH — SOCIAL STABILITY: SOCIAL INSECURITY: ARE YOU OR HAVE YOU BEEN THREATENED OR ABUSED PHYSICALLY, EMOTIONALLY, OR SEXUALLY BY ANYONE?: NO

## 2024-09-28 SDOH — SOCIAL STABILITY: SOCIAL INSECURITY: HAS ANYONE EVER THREATENED TO HURT YOUR FAMILY OR YOUR PETS?: NO

## 2024-09-28 SDOH — ECONOMIC STABILITY: HOUSING INSECURITY: IN THE PAST 12 MONTHS, HOW MANY TIMES HAVE YOU MOVED WHERE YOU WERE LIVING?: 1

## 2024-09-28 SDOH — SOCIAL STABILITY: SOCIAL INSECURITY: DO YOU FEEL UNSAFE GOING BACK TO THE PLACE WHERE YOU ARE LIVING?: NO

## 2024-09-28 SDOH — SOCIAL STABILITY: SOCIAL INSECURITY: DO YOU FEEL ANYONE HAS EXPLOITED OR TAKEN ADVANTAGE OF YOU FINANCIALLY OR OF YOUR PERSONAL PROPERTY?: NO

## 2024-09-28 ASSESSMENT — COGNITIVE AND FUNCTIONAL STATUS - GENERAL
PATIENT BASELINE BEDBOUND: NO
MOBILITY SCORE: 24
DAILY ACTIVITIY SCORE: 24
MOBILITY SCORE: 24
DAILY ACTIVITIY SCORE: 24

## 2024-09-28 ASSESSMENT — PAIN SCALES - GENERAL
PAINLEVEL_OUTOF10: 0 - NO PAIN
PAINLEVEL_OUTOF10: 3
PAINLEVEL_OUTOF10: 7
PAINLEVEL_OUTOF10: 0 - NO PAIN
PAINLEVEL_OUTOF10: 6
PAINLEVEL_OUTOF10: 5 - MODERATE PAIN
PAINLEVEL_OUTOF10: 4
PAINLEVEL_OUTOF10: 7

## 2024-09-28 ASSESSMENT — LIFESTYLE VARIABLES
HOW OFTEN DO YOU HAVE A DRINK CONTAINING ALCOHOL: 2-4 TIMES A MONTH
HOW OFTEN DO YOU HAVE 6 OR MORE DRINKS ON ONE OCCASION: NEVER
HAVE YOU EVER FELT YOU SHOULD CUT DOWN ON YOUR DRINKING: NO
EVER HAD A DRINK FIRST THING IN THE MORNING TO STEADY YOUR NERVES TO GET RID OF A HANGOVER: NO
AUDIT-C TOTAL SCORE: 2
SKIP TO QUESTIONS 9-10: 1
EVER FELT BAD OR GUILTY ABOUT YOUR DRINKING: NO
HAVE PEOPLE ANNOYED YOU BY CRITICIZING YOUR DRINKING: NO
HOW MANY STANDARD DRINKS CONTAINING ALCOHOL DO YOU HAVE ON A TYPICAL DAY: 1 OR 2
AUDIT-C TOTAL SCORE: 2
TOTAL SCORE: 0

## 2024-09-28 ASSESSMENT — ACTIVITIES OF DAILY LIVING (ADL)
GROOMING: INDEPENDENT
LACK_OF_TRANSPORTATION: NO
FEEDING YOURSELF: INDEPENDENT
JUDGMENT_ADEQUATE_SAFELY_COMPLETE_DAILY_ACTIVITIES: YES
TOILETING: INDEPENDENT
ADEQUATE_TO_COMPLETE_ADL: YES
BATHING: INDEPENDENT
HEARING - RIGHT EAR: FUNCTIONAL
WALKS IN HOME: INDEPENDENT
DRESSING YOURSELF: INDEPENDENT
PATIENT'S MEMORY ADEQUATE TO SAFELY COMPLETE DAILY ACTIVITIES?: YES
ASSISTIVE_DEVICE: EYEGLASSES
HEARING - LEFT EAR: FUNCTIONAL
LACK_OF_TRANSPORTATION: NO

## 2024-09-28 ASSESSMENT — PAIN - FUNCTIONAL ASSESSMENT
PAIN_FUNCTIONAL_ASSESSMENT: 0-10

## 2024-09-28 ASSESSMENT — PAIN DESCRIPTION - LOCATION
LOCATION: HEAD

## 2024-09-28 ASSESSMENT — PATIENT HEALTH QUESTIONNAIRE - PHQ9
1. LITTLE INTEREST OR PLEASURE IN DOING THINGS: SEVERAL DAYS
2. FEELING DOWN, DEPRESSED OR HOPELESS: SEVERAL DAYS
SUM OF ALL RESPONSES TO PHQ9 QUESTIONS 1 & 2: 2

## 2024-09-28 ASSESSMENT — PAIN DESCRIPTION - PAIN TYPE: TYPE: ACUTE PAIN

## 2024-09-28 NOTE — PROGRESS NOTES
Sarahi Haley is a 38 y.o. female on day 0 of admission presenting with Asthma exacerbation, non-allergic, moderate persistent ([facility]).      Subjective   Patient has no respiratory distress, has no complaint, breathing well, on room air    Objective     Last Recorded Vitals  /58   Pulse 90   Temp 36.3 °C (97.3 °F)   Resp 20   Wt 149 kg (328 lb)   SpO2 96%   Intake/Output last 3 Shifts:    Intake/Output Summary (Last 24 hours) at 9/28/2024 1354  Last data filed at 9/28/2024 1300  Gross per 24 hour   Intake 2416.67 ml   Output --   Net 2416.67 ml       Admission Weight  Weight: 149 kg (328 lb) (09/28/24 0355)    Daily Weight  09/28/24 : 149 kg (328 lb)    Image Results  ECG 12 lead  Normal sinus rhythm  Cannot rule out Anterior infarct (cited on or before 27-AUG-2024)  Abnormal ECG  When compared with ECG of 12-SEP-2024 22:51,  No significant change was found  See ED provider note for full interpretation and clinical correlation  XR chest 1 view  Narrative: STUDY:  Chest Radiograph;  9/28/2024 4:24 AM  INDICATION:  Asthma.  COMPARISON:  7/11/2024 XR Chest one view  ACCESSION NUMBER(S):  [id number]  ORDERING CLINICIAN:  TRACI STUBBS  TECHNIQUE:  Frontal chest was obtained at 4:24 hours.  FINDINGS:  CARDIOMEDIASTINAL SILHOUETTE:  Cardiomediastinal silhouette is normal in size and configuration.     LUNGS:  Lungs are clear.     ABDOMEN:  No remarkable upper abdominal findings.     BONES:  No acute osseous changes.  Impression: No acute pulmonary abnormality.  Signed by Hosea Bonner MD      Physical Exam    General: Well-developed obese female, in no acute distress  HEENT: AT, NC, no JVD, no lymphadenopathy, neck supple  Lungs: Coarse breath sound bilaterally noted  Cardiac: Normal S1-S2, no murmur, no gallop  Abdomen: Soft, nontender, no distention, positive bowel sound  Extremities: No deformity, no edema, pulses intact, ROM intact  Neurological: Alert awake oriented x3, sensation intact, clear  speech        Assessment & Plan  Asthma exacerbation, non-allergic, moderate persistent ([facility])      Sarahi Haley is a 38 y.o. female with a significant past medical history of Asthma, GERD, hypothyroid, seizure disorder, PTSD, anxiety who was admitted for management of asthma exacerbation    Oxygen therapy as needed, pain management as needed  Pulm recs appreciated, continue with current management  Continue with steroid, DuoNebs, antihistamine, Ativan as needed  Continue with home meds as needed  VTE prophylaxis: Lovenox subcu  Aspiration: Home once hemodynamically stable  No need for am labs       Criss Lawrence MD

## 2024-09-28 NOTE — CARE PLAN
The patient's goals for the shift include      The clinical goals for the shift include not have exacerbation/ SOB    Over the shift, the patient did not make progress toward the following goals. Barriers to progression include . Recommendations to address these barriers include .

## 2024-09-28 NOTE — ED PROVIDER NOTES
HPI   No chief complaint on file.      38-year-old female with a history of asthma presents emergency room chief complaint of asthma exacerbation that started while she was asleep this morning.  She describes chest tightness with the shortness of breath.  She gave her cell phone aerosol treatment and called 911.  EMS reports setting up they gave her 1 DuoNeb aerosol treatment with no improvement of her symptoms.  The patient has had multiple ED visits and admissions for asthma exacerbation.  She denies any recent fevers, chills, abdominal pain, nausea vomiting or diarrhea.  Patient is a past medical history of asthma, GERD, PTSD.  She is a non-smoker.  She denies any other complaints.  Denies any history of recent ill contacts.  Patient states this feels like her typical asthma exacerbation.      History provided by:  Patient, medical records and EMS personnel          Patient History   Past Medical History:   Diagnosis Date   • Acute cystitis without hematuria 08/08/2019    Acute cystitis without hematuria   • Asthma ([facility])    • Disease of thyroid gland    • Encounter for initial prescription of contraceptive pills 11/01/2019    Encounter for initial prescription of contraceptive pills   • Nausea 02/05/2021    Nausea in adult   • Other general symptoms and signs 12/05/2019    Forgetfulness   • Parageusia 04/06/2020    Taste perversion   • Pelvic and perineal pain     Pelvic pain in female   • Personal history of other diseases of the respiratory system 04/06/2020    History of sinusitis   • Personal history of other specified conditions 07/01/2020    History of vomiting   • Personal history of other specified conditions 01/26/2021    History of headache   • Personal history of other specified conditions 02/01/2021    History of diarrhea   • Personal history of thrombophlebitis 06/03/2019    History of phlebitis   • Personal history of urinary (tract) infections 01/16/2020    History of urinary tract infection    • PTSD (post-traumatic stress disorder)      Past Surgical History:   Procedure Laterality Date   • ABDOMINAL SURGERY     • APPENDECTOMY     • OTHER SURGICAL HISTORY  02/08/2019    Appendectomy   • OTHER SURGICAL HISTORY  02/08/2019    Cholecystectomy   • OTHER SURGICAL HISTORY  02/08/2019    Cystoscopy   • OTHER SURGICAL HISTORY  02/08/2019    Ureteroscopy   • OTHER SURGICAL HISTORY  02/08/2019    Shoulder surgery     Family History   Problem Relation Name Age of Onset   • Hypertension Father     • Other (Pulmonary Valve Stenosis) Sister     • Heart disease Maternal Grandmother     • Diabetes Other Grandparent    • Lung cancer Other Grandparent    • Anxiety disorder Sibling       Social History     Tobacco Use   • Smoking status: Never     Passive exposure: Current   • Smokeless tobacco: Never   Vaping Use   • Vaping status: Never Used   Substance Use Topics   • Alcohol use: Yes     Alcohol/week: 1.0 - 2.0 standard drink of alcohol     Types: 1 - 2 Glasses of wine per week     Comment: social   • Drug use: Never       Physical Exam   ED Triage Vitals   Temp Pulse Resp BP   -- -- -- --      SpO2 Temp src Heart Rate Source Patient Position   -- -- -- --      BP Location FiO2 (%)     -- --       Physical Exam  Vitals and nursing note reviewed. Exam conducted with a chaperone present.   Constitutional:       General: She is awake. She is not in acute distress.     Appearance: Normal appearance. She is well-developed and well-groomed. She is ill-appearing. She is not toxic-appearing or diaphoretic.   HENT:      Head: Normocephalic and atraumatic.      Right Ear: Tympanic membrane, ear canal and external ear normal.      Left Ear: Tympanic membrane, ear canal and external ear normal.      Nose: Nose normal.      Mouth/Throat:      Mouth: Mucous membranes are moist.      Pharynx: Oropharynx is clear.   Eyes:      Extraocular Movements: Extraocular movements intact.      Conjunctiva/sclera: Conjunctivae normal.       Pupils: Pupils are equal, round, and reactive to light.   Cardiovascular:      Rate and Rhythm: Normal rate and regular rhythm.      Pulses: Normal pulses.      Heart sounds: Normal heart sounds.   Pulmonary:      Effort: Pulmonary effort is normal.      Breath sounds: Examination of the right-upper field reveals decreased breath sounds and wheezing. Examination of the left-upper field reveals decreased breath sounds and wheezing. Examination of the right-middle field reveals decreased breath sounds. Examination of the left-middle field reveals decreased breath sounds. Examination of the right-lower field reveals decreased breath sounds. Examination of the left-lower field reveals decreased breath sounds. Decreased breath sounds and wheezing present. No rhonchi or rales.   Abdominal:      General: Bowel sounds are normal.      Palpations: Abdomen is soft. There is no mass.      Tenderness: There is no abdominal tenderness. There is no guarding.   Musculoskeletal:         General: No swelling or tenderness. Normal range of motion.      Cervical back: Normal range of motion and neck supple.   Skin:     General: Skin is warm and dry.      Capillary Refill: Capillary refill takes less than 2 seconds.      Findings: No rash.   Neurological:      General: No focal deficit present.      Mental Status: She is alert and oriented to person, place, and time. Mental status is at baseline.   Psychiatric:         Mood and Affect: Mood normal.         Behavior: Behavior normal. Behavior is cooperative.         Thought Content: Thought content normal.         Judgment: Judgment normal.           ED Course & MDM   Diagnoses as of 09/28/24 0511   Moderate persistent asthma with acute exacerbation ([facility])                 No data recorded                                 Medical Decision Making  Patient was placed on monitor, I have ordered 2 L nasal cannula oxygen with end-tidal, arterial blood gas.  The patient was given 3 DuoNeb  aerosol treatments, 125 mg Solu-Medrol IV push, 2 g magnesium sulfate IV piggyback, 1 g of Ofirmev IV piggyback, Ativan 1 mg IV push, Zofran 4 mg IV push.  I have ordered lab work and a portable chest x-ray  EKG interpreted by me at 0458 hrs. normal sinus rhythm rate 82, , QRS is 84  QTc was 460 no ischemic changes no STEMI,  CBC white count 6.4, hemoglobin 15.4, hematocrit 43.8, platelet count was 351, chemistry panel glucose 113 ALT of 55 otherwise unremarkable, troponin was negative at 3, PT 11.6 INR 1.0, BNP is 60, arterial blood gas pH 7.46 patient's of 30 pO2 144.  Patient's portable chest x-ray shows no evidence of any acute pulmonary normality.  0510 hrs. rounded on the patient to discuss results of workup and repeat exam she is complaining of a mild headache and nausea.  She was given morphine 4 mg IV push and Zofran 4 mg IV push.  0510 hrs. rounded on the patient repeat exam she still has severely diminished breath sounds.  I have ordered repeat aerosol treatments and paged out to the hospitalist for admission.        Procedure  Procedures     Salo Perez PA-C  09/28/24 0582

## 2024-09-28 NOTE — H&P
Medical Group History and Physical    ASSESSMENT & PLAN:     Asthma Exacerbation  Acute respiratory failure with hypoxia  Woke from sleep and unable to breathe - improving with treatment in ER, has been seen and admitted 2x monthly for the past several months.  - cont home meds  - consult pulmonary  - steroids, duo nebs, and antihistamine  - morphine, ativan  - consider epi if not improving  - NS infusion for hydration  - K 3.5 replace with 20 Kcl and recheck  - supplemental O2 as needed [at 4L now]  - plan for DC in AM after pulmonary eval     VTE Prophylaxis: crystal Roach, APRN-CNP    HISTORY OF PRESENT ILLNESS:   Chief Complaint: difficulty breathing    History Of Present Illness:    Sarahi Haley is a 38 y.o. female with a significant past medical history of Asthma, GERD, hypothyroid, seizure disorder, PTSD, anxiety presenting to [place] ER after being woke from sleep and unable to breathe.   This patient is well known to our service. We will admit overnight and have pulmonary eval in AM, she reports improvement with treatment in ER. She is on a prednisone taper at 30mg daily. Requiring 4L NC but still having difficulty taking in deep breaths, appears calm and comfortable while at rest. Family at bedside. VSS and she is ready for admission to general medicine for the management of acute asthma exacerbation.     Review of systems: 10 point review of systems is otherwise negative except as mentioned above.    PAST HISTORIES:       Past Medical History:  Medical Problems       Problem List       * (Principal) Asthma exacerbation, non-allergic, moderate persistent ([facility])    Asthma ([facility])    RAD (reactive airway disease) ([facility]-HCC)    Syncope    Superficial thrombophlebitis of lower extremity    Superficial phlebitis of leg    Seizure disorder, complex partial, without intractable epilepsy (Multi)    Regular astigmatism of left eye    Panic attack    NAFLD (nonalcoholic fatty liver disease)     Myopia    Morbid obesity (Multi)    Migraine    Lymphedema    Low grade squamous intraepithelial lesion (LGSIL) on Papanicolaou smear of cervix    Left ventricular hypertrophy    Hypothyroidism, adult    Hypokalemia    GERD (gastroesophageal reflux disease)    Acute asthma exacerbation ([facility])    PTSD (post-traumatic stress disorder)    Anxiety and depression    Cystitis, acute    Acute respiratory failure with hypoxemia (Multi)    Acute bronchospasm    Absence seizure (Multi)    Asthma in adult, moderate persistent, with acute exacerbation ([facility])    Allergy to nonsteroidal anti-inflammatory drug (NSAID)    Steroid side effects, initial encounter    Acute respiratory failure with hypoxia (Multi)    Asthma with exacerbation ([facility])    Moderate persistent asthma with (acute) exacerbation ([facility]-HCC)    Severe persistent asthma with exacerbation (Multi)           Past Surgical History:  Past Surgical History:   Procedure Laterality Date   • ABDOMINAL SURGERY     • APPENDECTOMY     • OTHER SURGICAL HISTORY  02/08/2019    Appendectomy   • OTHER SURGICAL HISTORY  02/08/2019    Cholecystectomy   • OTHER SURGICAL HISTORY  02/08/2019    Cystoscopy   • OTHER SURGICAL HISTORY  02/08/2019    Ureteroscopy   • OTHER SURGICAL HISTORY  02/08/2019    Shoulder surgery          Social History:  She reports that she has never smoked. She has been exposed to tobacco smoke. She has never used smokeless tobacco. She reports current alcohol use of about 1.0 - 2.0 standard drink of alcohol per week. She reports that she does not use drugs.    Family History:  Family History   Problem Relation Name Age of Onset   • Hypertension Father     • Other (Pulmonary Valve Stenosis) Sister     • Heart disease Maternal Grandmother     • Diabetes Other Grandparent    • Lung cancer Other Grandparent    • Anxiety disorder Sibling          Allergies:  Bee venom protein (honey bee), Diphenhydramine, Nsaids (non-steroidal anti-inflammatory drug),  Procaine, and Ketorolac    OBJECTIVE:       Last Recorded Vitals:  Vitals:    09/28/24 0420 09/28/24 0423 09/28/24 0443 09/28/24 0500   BP: 133/77 133/77  134/58   BP Location: Right arm   Right arm   Patient Position: Sitting   Sitting   Pulse: 88 82 82 84   Resp: (!) 26 (!) 23 (!) 21 20   Temp:       TempSrc:       SpO2: 97% 96% 97% 100%   Weight:       Height:           Last I/O:  No intake/output data recorded.    Physical Exam  Vitals and nursing note reviewed.   Constitutional:       Appearance: She is obese.   HENT:      Head: Normocephalic and atraumatic.      Nose: Nose normal.      Mouth/Throat:      Mouth: Mucous membranes are moist.      Pharynx: Oropharynx is clear.   Eyes:      Extraocular Movements: Extraocular movements intact.      Conjunctiva/sclera: Conjunctivae normal.      Pupils: Pupils are equal, round, and reactive to light.   Cardiovascular:      Rate and Rhythm: Normal rate and regular rhythm.      Pulses: Normal pulses.      Heart sounds: Normal heart sounds.   Pulmonary:      Breath sounds: Wheezing present.      Comments: Asthma exacerbation, poor air movement, diminished bases bilaterally, 4L NC  Abdominal:      General: Abdomen is flat. Bowel sounds are normal.      Palpations: Abdomen is soft.   Genitourinary:     Comments: Not assessed  Musculoskeletal:         General: Normal range of motion.      Cervical back: Normal range of motion and neck supple.   Skin:     General: Skin is warm and dry.      Capillary Refill: Capillary refill takes less than 2 seconds.   Neurological:      General: No focal deficit present.      Mental Status: She is alert and oriented to person, place, and time. Mental status is at baseline.   Psychiatric:         Mood and Affect: Mood normal.         Behavior: Behavior normal.         Thought Content: Thought content normal.         Judgment: Judgment normal.           Scheduled Medications  morphine, 4 mg, intravenous, Once  ondansetron, 4 mg, intravenous,  Once  tezepelumab-ekko, 210 mg, subcutaneous, Once      PRN Medications    Continuous Medications  sodium chloride 0.9%, 125 mL/hr, Last Rate: 125 mL/hr (09/28/24 8131)        Outpatient Medications:  Prior to Admission medications    Medication Sig Start Date End Date Taking? Authorizing Provider   albuterol 2.5 mg /3 mL (0.083 %) nebulizer solution Take 3 mL by nebulization every 4 hours if needed for shortness of breath. 9/13/22   Historical Provider, MD   albuterol-budesonide (Airsupra) 90-80 mcg/actuation inhaler Inhale 2 puffs every 4 hours if needed (cough, wheeze, short of breath). Patient has a coupon 6/5/24 6/5/25  Hilary Trinidad, DO   aspirin 81 mg chewable tablet Chew 1 tablet (81 mg) once daily.    Historical Provider, MD   budesonide (Pulmicort) 0.5 mg/2 mL nebulizer solution Take 2 mL (0.5 mg) by nebulization 2 times a day. 7/26/23   Historical Provider, MD   butalbital-acetaminophen-caff -40 mg tablet Take 1 tablet by mouth early in the morning.. 1 tab(s) orally once a day, As Needed  Patient taking differently: Take 1 tablet by mouth once daily in the morning. 8/9/23   Paul Matthew DO   desvenlafaxine 100 mg 24 hr tablet TAKE 1 TABLET BY MOUTH EVERY DAY  Patient taking differently: Take 1 tablet (100 mg) by mouth once daily. Take with 50 mg 11/27/23   Paul Matthew DO   desvenlafaxine 50 mg 24 hr tablet TAKE 1 TABLET BY MOUTH ONCE DAILY  Patient taking differently: Take 1 tablet (50 mg) by mouth once daily. Take with 100 mg 4/29/24   Paul Matthew DO   EPINEPHrine 0.3 mg/0.3 mL injection syringe Inject 0.3 mL (0.3 mg) into the muscle once daily as needed for anaphylaxis. 10/23/23   Paul Matthew DO   esomeprazole (NexIUM) 20 mg DR capsule Take 1 capsule (20 mg) by mouth once daily in the morning. Take before meals. Do not open capsule. 5/1/24 5/1/25  Hilary Trinidad, DO   fexofenadine (Allegra) 180 mg tablet Take 1 tablet (180 mg) by mouth once daily. 5/21/15    Historical Provider, MD   fluticasone-umeclidin-vilanter (Trelegy Ellipta) 200-62.5-25 mcg blister with device Inhale 1 puff early in the morning..  Patient not taking: Reported on 9/13/2024 8/7/24 9/13/24  Hilary Garcia [place], DO   fluticasone/vilanterol (BREO ELLIPTA INHL) Inhale 1 puff once daily.    Historical Provider, MD   formoterol (Perforomist) 20 mcg/2 mL nebulizer solution Take 2 mL (20 mcg) by nebulization 2 times a day.  Patient taking differently: Take 2 mL (20 mcg) by nebulization once daily. 6/10/24 9/13/24  Jason Bill MD   galcanezumab (Emgality Syringe) 120 mg/mL prefilled syringe Inject 1 Syringe (120 mg) under the skin every 28 (twenty-eight) days.    Historical Provider, MD   hydrOXYzine HCL (Atarax) 25 mg tablet Take 1 tablet (25 mg) by mouth 3 times a day as needed for anxiety. 11/20/23   Paul Matthew,    levothyroxine (Synthroid, Levoxyl) 100 mcg tablet Take 1 tablet (100 mcg) by mouth once daily. 11/13/19   Historical Provider, MD   metoclopramide (Reglan) 10 mg tablet Take 1 tablet (10 mg) by mouth every 8 hours if needed (nausea). 5/7/24   Paul Matthew DO   montelukast (Singulair) 10 mg tablet Take 1 tablet (10 mg) by mouth once daily at bedtime. 8/9/23 9/13/24  Paul Matthew DO   multivitamin-Ca-iron-minerals (Tab-A-Vu Womens) 27-0.4 mg tablet Take 1 tablet by mouth once daily.    Historical Provider, MD   potassium chloride ER (Micro-K) 10 mEq ER capsule Take 1 capsule (10 mEq) by mouth once daily. 8/30/24 12/28/24  Gregory Valdez MD   predniSONE (Deltasone) 5 mg tablet Take 1 tablet (5 mg) by mouth. Tapering dose    Historical Provider, MD   promethazine (Phenergan) 12.5 mg tablet Take 1 tablet (12.5 mg) by mouth every 6 hours if needed for nausea or vomiting.    Historical Provider, MD   semaglutide, weight loss, (Wegovy) 0.5 mg/0.5 mL pen injector Inject 0.5 mg under the skin 1 (one) time per week for 16 doses.  Patient taking differently: Inject 0.5 mg under the  skin 1 (one) time per week. Wednesday 8/30/24 12/14/24  Gregory Valdez MD   tezepelumab-ekko (Tezspire) SubQ syringe Inject 210 mg under the skin every 28 (twenty-eight) days.    Historical Provider, MD       LABS AND IMAGING:     Labs:  Results for orders placed or performed during the hospital encounter of 09/28/24 (from the past 24 hour(s))   CBC and Auto Differential   Result Value Ref Range    WBC 6.4 4.4 - 11.3 x10*3/uL    nRBC 0.0 0.0 - 0.0 /100 WBCs    RBC 5.20 4.00 - 5.20 x10*6/uL    Hemoglobin 15.4 12.0 - 16.0 g/dL    Hematocrit 43.8 36.0 - 46.0 %    MCV 84 80 - 100 fL    MCH 29.6 26.0 - 34.0 pg    MCHC 35.2 32.0 - 36.0 g/dL    RDW 11.9 11.5 - 14.5 %    Platelets 351 150 - 450 x10*3/uL    Neutrophils % 59.5 40.0 - 80.0 %    Immature Granulocytes %, Automated 0.2 0.0 - 0.9 %    Lymphocytes % 33.9 13.0 - 44.0 %    Monocytes % 5.6 2.0 - 10.0 %    Eosinophils % 0.2 0.0 - 6.0 %    Basophils % 0.6 0.0 - 2.0 %    Neutrophils Absolute 3.80 1.20 - 7.70 x10*3/uL    Immature Granulocytes Absolute, Automated 0.01 0.00 - 0.70 x10*3/uL    Lymphocytes Absolute 2.16 1.20 - 4.80 x10*3/uL    Monocytes Absolute 0.36 0.10 - 1.00 x10*3/uL    Eosinophils Absolute 0.01 0.00 - 0.70 x10*3/uL    Basophils Absolute 0.04 0.00 - 0.10 x10*3/uL   Comprehensive metabolic panel   Result Value Ref Range    Glucose 113 (H) 74 - 99 mg/dL    Sodium 137 136 - 145 mmol/L    Potassium 3.5 3.5 - 5.3 mmol/L    Chloride 105 98 - 107 mmol/L    Bicarbonate 23 21 - 32 mmol/L    Anion Gap 13 10 - 20 mmol/L    Urea Nitrogen 9 6 - 23 mg/dL    Creatinine 0.92 0.50 - 1.05 mg/dL    eGFR 83 >60 mL/min/1.73m*2    Calcium 9.0 8.6 - 10.3 mg/dL    Albumin 4.1 3.4 - 5.0 g/dL    Alkaline Phosphatase 64 33 - 110 U/L    Total Protein 6.9 6.4 - 8.2 g/dL    AST 32 9 - 39 U/L    Bilirubin, Total 1.0 0.0 - 1.2 mg/dL    ALT 55 (H) 7 - 45 U/L   B-Type Natriuretic Peptide   Result Value Ref Range    BNP 60 0 - 99 pg/mL   Lactate   Result Value Ref Range    Lactate 1.8 0.4 -  2.0 mmol/L   Protime-INR   Result Value Ref Range    Protime 11.6 9.8 - 12.8 seconds    INR 1.0 0.9 - 1.1   Troponin I, High Sensitivity, Initial   Result Value Ref Range    Troponin I, High Sensitivity 3 0 - 13 ng/L   Blood Gas, Arterial   Result Value Ref Range    POCT pH, Arterial 7.46 (H) 7.38 - 7.42 pH    POCT pCO2, Arterial 30 (L) 38 - 42 mm Hg    POCT pO2, Arterial 144 (H) 85 - 95 mm Hg    POCT SO2, Arterial      POCT Oxy Hemoglobin, Arterial      POCT Base Excess, Arterial -1.5 -2.0 - 3.0 mmol/L    POCT HCO3 Calculated, Arterial 21.3 (L) 22.0 - 26.0 mmol/L    Patient Temperature      FiO2 32 %    Apparatus CANNULA     Critical Called By ALBA STARKS     Critical Called To EVELYNE CADENA     Critical Call Time 419     Critical Read Back Y     Critical Note RAN X2     Site of Arterial Puncture Radial Right     Vinay's Test Positive         Imaging:  XR chest 1 view   Final Result   No acute pulmonary abnormality.   Signed by Hosea Bonner MD

## 2024-09-28 NOTE — CONSULTS
Reason For Consult  Evaluation of asthma exacerbation, dyspnea, sleep apnea and various pulmonary related issues    History Of Present Illness  Sarahi Haley is a 38 y.o. female presenting with   difficulty breathing     History Of Present Illness:    Sarahi Haley is a 38 y.o. female with a significant past medical history of Asthma, GERD, hypothyroid, seizure disorder, PTSD, anxiety presenting to [facility] after being woke from sleep and unable to breathe.   This patient is well known to our service. We will admit overnight and have pulmonary eval in AM, she reports improvement with treatment in ER. She is on a prednisone taper at 30mg daily. Requiring 4L NC but still having difficulty taking in deep breaths, appears calm and comfortable while at rest. Family at bedside. VSS and she is ready for admission to general medicine for the management of acute asthma exacerbation.     Review of systems: 10 point review of systems is otherwise negative except as mentioned above.     I was asked to evaluate and follow from a pulmonary perspective. Patient is well-known to me from prior hospitalizations. 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 She has no specific triggers for asthma except for weather changes. Her asthma appears to have gotten worse over last year and has had at least 14 admissions to the hospital including for ICU stays. She has not been intubated however. Lately she has been on Trelegy Ellipta 200 mcg inhaler daily, Pulmicort nebulizer 0.5 mg twice daily, Allegra 180 mg twice daily, montelukast 10 mg nightly, albuterol MDI/nebulizer every 4 as needed. She has allergic rhinitis as well as occasional GERD. For GERD which she takes famotidine 20 mg on a as needed basis. She denies nasal polyposis, aspirin sensitivity or eczema. She is however intolerant to NSAIDS. She has never been on a biologic. She used to follow-up with   Abiodun an allergist who has retired. When she saw the allergist around 5 years ago, her asthma did not warrant a biologic. She now sees a  allergist Dr. Castillo and is on Tezspire since April 2024. She is not a diabetic. She has no cardiac disease. She had a sleep study August 2024 showing severe sleep apnea with RDI 4% of 25.9/h and RDI 3% of 40.8/h.  She is to be treated for sleep apnea. She denies history of  insomnia. She is currently admitted with asthma exacerbation. She apparently has had 3 shots against COVID-19 so far. She has been regularly vaccinated against flu. She does not recall having Pneumovax      Past Medical History:  Medical Problems         Problem List         * (Principal) Asthma exacerbation, non-allergic, moderate persistent (HHS-HCC)     Asthma (HHS-HCC)     RAD (reactive airway disease) (HHS-HCC)     Syncope     Superficial thrombophlebitis of lower extremity     Superficial phlebitis of leg     Seizure disorder, complex partial, without intractable epilepsy (Multi)     Regular astigmatism of left eye     Panic attack     NAFLD (nonalcoholic fatty liver disease)     Myopia     Morbid obesity (Multi)     Migraine     Lymphedema     Low grade squamous intraepithelial lesion (LGSIL) on Papanicolaou smear of cervix     Left ventricular hypertrophy     Hypothyroidism, adult     Hypokalemia     GERD (gastroesophageal reflux disease)     Acute asthma exacerbation (HHS-HCC)     PTSD (post-traumatic stress disorder)     Anxiety and depression     Cystitis, acute     Acute respiratory failure with hypoxemia (Multi)     Acute bronchospasm     Absence seizure (Multi)     Asthma in adult, moderate persistent, with acute exacerbation (HHS-HCC)     Allergy to nonsteroidal anti-inflammatory drug (NSAID)     Steroid side effects, initial encounter     Acute respiratory failure with hypoxia (Multi)     Asthma with exacerbation (HHS-HCC)     Moderate persistent asthma with (acute) exacerbation  ([facility])     Severe persistent asthma with exacerbation (Multi)            Past Surgical History:  Surgical History         Past Surgical History:   Procedure Laterality Date    ABDOMINAL SURGERY        APPENDECTOMY        OTHER SURGICAL HISTORY   02/08/2019     Appendectomy    OTHER SURGICAL HISTORY   02/08/2019     Cholecystectomy    OTHER SURGICAL HISTORY   02/08/2019     Cystoscopy    OTHER SURGICAL HISTORY   02/08/2019     Ureteroscopy    OTHER SURGICAL HISTORY   02/08/2019     Shoulder surgery             Social History:  She reports that she has never smoked. She has been exposed to tobacco smoke. She has never used smokeless tobacco. She reports current alcohol use of about 1.0 - 2.0 standard drink of alcohol per week. She reports that she does not use drugs.     Family History:  Family History          Family History   Problem Relation Name Age of Onset    Hypertension Father        Other (Pulmonary Valve Stenosis) Sister        Heart disease Maternal Grandmother        Diabetes Other Grandparent      Lung cancer Other Grandparent      Anxiety disorder Sibling                Allergies:  Bee venom protein (honey bee), Diphenhydramine, Nsaids (non-steroidal anti-inflammatory drug), Procaine, and Ketorolac     OBJECTIVE:         Last Recorded Vitals:  Vitals          Vitals:     09/28/24 0420 09/28/24 0423 09/28/24 0443 09/28/24 0500   BP: 133/77 133/77   134/58   BP Location: Right arm     Right arm   Patient Position: Sitting     Sitting   Pulse: 88 82 82 84   Resp: (!) 26 (!) 23 (!) 21 20   Temp:           TempSrc:           SpO2: 97% 96% 97% 100%   Weight:           Height:                    Last I/O:  No intake/output data recorded.     Physical Exam  Vitals and nursing note reviewed.   Constitutional:       Appearance: She is obese.   HENT:      Head: Normocephalic and atraumatic.      Nose: Nose normal.      Mouth/Throat:      Mouth: Mucous membranes are moist.      Pharynx: Oropharynx is clear.    Eyes:      Extraocular Movements: Extraocular movements intact.      Conjunctiva/sclera: Conjunctivae normal.      Pupils: Pupils are equal, round, and reactive to light.   Cardiovascular:      Rate and Rhythm: Normal rate and regular rhythm.      Pulses: Normal pulses.      Heart sounds: Normal heart sounds.   Pulmonary:      Breath sounds: Wheezing present.      Comments: Asthma exacerbation, poor air movement, diminished bases bilaterally, 4L NC  Abdominal:      General: Abdomen is flat. Bowel sounds are normal.      Palpations: Abdomen is soft.   Genitourinary:     Comments: Not assessed  Musculoskeletal:         General: Normal range of motion.      Cervical back: Normal range of motion and neck supple.   Skin:     General: Skin is warm and dry.      Capillary Refill: Capillary refill takes less than 2 seconds.   Neurological:      General: No focal deficit present.      Mental Status: She is alert and oriented to person, place, and time. Mental status is at baseline.   Psychiatric:         Mood and Affect: Mood normal.         Behavior: Behavior normal.         Thought Content: Thought content normal.         Judgment: Judgment normal.               Scheduled Medications    Scheduled Medications   morphine, 4 mg, intravenous, Once  ondansetron, 4 mg, intravenous, Once  tezepelumab-ekko, 210 mg, subcutaneous, Once         PRN Medications  PRN Medications         Continuous Medications    Continuous Medications   sodium chloride 0.9%, 125 mL/hr, Last Rate: 125 mL/hr (09/28/24 0433)            Outpatient Medications:          Prior to Admission medications    Medication Sig Start Date End Date Taking? Authorizing Provider   albuterol 2.5 mg /3 mL (0.083 %) nebulizer solution Take 3 mL by nebulization every 4 hours if needed for shortness of breath. 9/13/22     Historical Provider, MD   albuterol-budesonide (Airsupra) 90-80 mcg/actuation inhaler Inhale 2 puffs every 4 hours if needed (cough, wheeze, short of  breath). Patient has a coupon 6/5/24 6/5/25   Hilary Trinidad, DO   aspirin 81 mg chewable tablet Chew 1 tablet (81 mg) once daily.       Historical Provider, MD   budesonide (Pulmicort) 0.5 mg/2 mL nebulizer solution Take 2 mL (0.5 mg) by nebulization 2 times a day. 7/26/23     Historical Provider, MD   butalbital-acetaminophen-caff -40 mg tablet Take 1 tablet by mouth early in the morning.. 1 tab(s) orally once a day, As Needed  Patient taking differently: Take 1 tablet by mouth once daily in the morning. 8/9/23     Paul Matthew DO   desvenlafaxine 100 mg 24 hr tablet TAKE 1 TABLET BY MOUTH EVERY DAY  Patient taking differently: Take 1 tablet (100 mg) by mouth once daily. Take with 50 mg 11/27/23     Paul Matthew DO   desvenlafaxine 50 mg 24 hr tablet TAKE 1 TABLET BY MOUTH ONCE DAILY  Patient taking differently: Take 1 tablet (50 mg) by mouth once daily. Take with 100 mg 4/29/24     Paul Matthew DO   EPINEPHrine 0.3 mg/0.3 mL injection syringe Inject 0.3 mL (0.3 mg) into the muscle once daily as needed for anaphylaxis. 10/23/23     Paul Matthew DO   esomeprazole (NexIUM) 20 mg DR capsule Take 1 capsule (20 mg) by mouth once daily in the morning. Take before meals. Do not open capsule. 5/1/24 5/1/25   Hilary Trinidad DO   fexofenadine (Allegra) 180 mg tablet Take 1 tablet (180 mg) by mouth once daily. 5/21/15     Historical Provider, MD   fluticasone-umeclidin-vilanter (Trelegy Ellipta) 200-62.5-25 mcg blister with device Inhale 1 puff early in the morning..  Patient not taking: Reported on 9/13/2024 8/7/24 9/13/24   Hilary Trinidad DO   fluticasone/vilanterol (BREO ELLIPTA INHL) Inhale 1 puff once daily.       Historical Provider, MD   formoterol (Perforomist) 20 mcg/2 mL nebulizer solution Take 2 mL (20 mcg) by nebulization 2 times a day.  Patient taking differently: Take 2 mL (20 mcg) by nebulization once daily. 6/10/24 9/13/24   Jason Bill MD    galcanezumab (Emgality Syringe) 120 mg/mL prefilled syringe Inject 1 Syringe (120 mg) under the skin every 28 (twenty-eight) days.       Historical Provider, MD   hydrOXYzine HCL (Atarax) 25 mg tablet Take 1 tablet (25 mg) by mouth 3 times a day as needed for anxiety. 11/20/23     Paul Matthew DO   levothyroxine (Synthroid, Levoxyl) 100 mcg tablet Take 1 tablet (100 mcg) by mouth once daily. 11/13/19     Historical Provider, MD   metoclopramide (Reglan) 10 mg tablet Take 1 tablet (10 mg) by mouth every 8 hours if needed (nausea). 5/7/24     Paul Matthew DO   montelukast (Singulair) 10 mg tablet Take 1 tablet (10 mg) by mouth once daily at bedtime. 8/9/23 9/13/24   Paul Matthew DO   multivitamin-Ca-iron-minerals (Tab-A-Vu Womens) 27-0.4 mg tablet Take 1 tablet by mouth once daily.       Historical Provider, MD   potassium chloride ER (Micro-K) 10 mEq ER capsule Take 1 capsule (10 mEq) by mouth once daily. 8/30/24 12/28/24   Gregory Valdez MD   predniSONE (Deltasone) 5 mg tablet Take 1 tablet (5 mg) by mouth. Tapering dose       Historical Provider, MD   promethazine (Phenergan) 12.5 mg tablet Take 1 tablet (12.5 mg) by mouth every 6 hours if needed for nausea or vomiting.       Historical Provider, MD   semaglutide, weight loss, (Wegovy) 0.5 mg/0.5 mL pen injector Inject 0.5 mg under the skin 1 (one) time per week for 16 doses.  Patient taking differently: Inject 0.5 mg under the skin 1 (one) time per week. Wednesday 8/30/24 12/14/24   Gregory Valdez MD   tezepelumab-ekko (Tezspire) SubQ syringe Inject 210 mg under the skin every 28 (twenty-eight) days.       Historical Provider, MD         LABS AND IMAGING:      Labs:        Results for orders placed or performed during the hospital encounter of 09/28/24 (from the past 24 hour(s))   CBC and Auto Differential   Result Value Ref Range     WBC 6.4 4.4 - 11.3 x10*3/uL     nRBC 0.0 0.0 - 0.0 /100 WBCs     RBC 5.20 4.00 - 5.20 x10*6/uL     Hemoglobin 15.4 12.0  - 16.0 g/dL     Hematocrit 43.8 36.0 - 46.0 %     MCV 84 80 - 100 fL     MCH 29.6 26.0 - 34.0 pg     MCHC 35.2 32.0 - 36.0 g/dL     RDW 11.9 11.5 - 14.5 %     Platelets 351 150 - 450 x10*3/uL     Neutrophils % 59.5 40.0 - 80.0 %     Immature Granulocytes %, Automated 0.2 0.0 - 0.9 %     Lymphocytes % 33.9 13.0 - 44.0 %     Monocytes % 5.6 2.0 - 10.0 %     Eosinophils % 0.2 0.0 - 6.0 %     Basophils % 0.6 0.0 - 2.0 %     Neutrophils Absolute 3.80 1.20 - 7.70 x10*3/uL     Immature Granulocytes Absolute, Automated 0.01 0.00 - 0.70 x10*3/uL     Lymphocytes Absolute 2.16 1.20 - 4.80 x10*3/uL     Monocytes Absolute 0.36 0.10 - 1.00 x10*3/uL     Eosinophils Absolute 0.01 0.00 - 0.70 x10*3/uL     Basophils Absolute 0.04 0.00 - 0.10 x10*3/uL   Comprehensive metabolic panel   Result Value Ref Range     Glucose 113 (H) 74 - 99 mg/dL     Sodium 137 136 - 145 mmol/L     Potassium 3.5 3.5 - 5.3 mmol/L     Chloride 105 98 - 107 mmol/L     Bicarbonate 23 21 - 32 mmol/L     Anion Gap 13 10 - 20 mmol/L     Urea Nitrogen 9 6 - 23 mg/dL     Creatinine 0.92 0.50 - 1.05 mg/dL     eGFR 83 >60 mL/min/1.73m*2     Calcium 9.0 8.6 - 10.3 mg/dL     Albumin 4.1 3.4 - 5.0 g/dL     Alkaline Phosphatase 64 33 - 110 U/L     Total Protein 6.9 6.4 - 8.2 g/dL     AST 32 9 - 39 U/L     Bilirubin, Total 1.0 0.0 - 1.2 mg/dL     ALT 55 (H) 7 - 45 U/L   B-Type Natriuretic Peptide   Result Value Ref Range     BNP 60 0 - 99 pg/mL   Lactate   Result Value Ref Range     Lactate 1.8 0.4 - 2.0 mmol/L   Protime-INR   Result Value Ref Range     Protime 11.6 9.8 - 12.8 seconds     INR 1.0 0.9 - 1.1   Troponin I, High Sensitivity, Initial   Result Value Ref Range     Troponin I, High Sensitivity 3 0 - 13 ng/L   Blood Gas, Arterial   Result Value Ref Range     POCT pH, Arterial 7.46 (H) 7.38 - 7.42 pH     POCT pCO2, Arterial 30 (L) 38 - 42 mm Hg     POCT pO2, Arterial 144 (H) 85 - 95 mm Hg     POCT SO2, Arterial         POCT Oxy Hemoglobin, Arterial         POCT Base  Excess, Arterial -1.5 -2.0 - 3.0 mmol/L     POCT HCO3 Calculated, Arterial 21.3 (L) 22.0 - 26.0 mmol/L     Patient Temperature         FiO2 32 %     Apparatus CANNULA       Critical Called By ALBA RRT       Critical Called To EVELYNE CADENA       Critical Call Time 419       Critical Read Back Y       Critical Note RAN X2       Site of Arterial Puncture Radial Right       Vinay's Test Positive           Imaging:  XR chest 1 view   Final Result   No acute pulmonary abnormality.     Asthma Exacerbation  Acute respiratory failure with hypoxia  Severe obstructive sleep apnea  Morbid obesity    Woke from sleep and unable to breathe - improving with treatment in ER, has been seen and admitted 2x monthly for the past several months.  - cont home meds  - steroids, duo nebs, and antihistamine  - morphine, ativan  - consider epi if not improving  - NS infusion for hydration  - K 3.5 replace with 20 Kcl and recheck  - supplemental O2 as needed [at 4L now]  VTE Prophylaxis: lovenox    Pulmonary comments:-Agree with current treatment of this patient with IV Solu-Medrol and bronchodilator nebs as ordered.  At this point she feels she is not ready to be discharged.  Hence will put her on Solu-Medrol 40 Q12.  She could be discharged home in a.m. if doing well.  She is to follow-up with Dr. Castillo her allergist for further treatment of asthma.  She is to see me in the office for treatment of severe obstructive sleep apnea.  I shall continue to monitor this patient with you and I thank you for the referral.    I spent 55 minutes in the professional and overall care of this patient.      Noah Ortiz MD

## 2024-09-29 VITALS
HEIGHT: 65 IN | HEART RATE: 90 BPM | DIASTOLIC BLOOD PRESSURE: 69 MMHG | RESPIRATION RATE: 22 BRPM | TEMPERATURE: 99.1 F | BODY MASS INDEX: 48.82 KG/M2 | WEIGHT: 293 LBS | OXYGEN SATURATION: 99 % | SYSTOLIC BLOOD PRESSURE: 146 MMHG

## 2024-09-29 PROBLEM — J45.41 MODERATE PERSISTENT ASTHMA WITH ACUTE EXACERBATION (HHS-HCC): Status: ACTIVE | Noted: 2024-09-29

## 2024-09-29 LAB
ALBUMIN SERPL BCP-MCNC: 3.8 G/DL (ref 3.4–5)
ALP SERPL-CCNC: 60 U/L (ref 33–110)
ALT SERPL W P-5'-P-CCNC: 70 U/L (ref 7–45)
ANION GAP SERPL CALC-SCNC: 11 MMOL/L (ref 10–20)
AST SERPL W P-5'-P-CCNC: 37 U/L (ref 9–39)
BASOPHILS # BLD AUTO: 0.02 X10*3/UL (ref 0–0.1)
BASOPHILS NFR BLD AUTO: 0.1 %
BILIRUB SERPL-MCNC: 0.5 MG/DL (ref 0–1.2)
BUN SERPL-MCNC: 9 MG/DL (ref 6–23)
CALCIUM SERPL-MCNC: 9 MG/DL (ref 8.6–10.3)
CHLORIDE SERPL-SCNC: 109 MMOL/L (ref 98–107)
CO2 SERPL-SCNC: 22 MMOL/L (ref 21–32)
CREAT SERPL-MCNC: 0.83 MG/DL (ref 0.5–1.05)
EGFRCR SERPLBLD CKD-EPI 2021: >90 ML/MIN/1.73M*2
EOSINOPHIL # BLD AUTO: 0 X10*3/UL (ref 0–0.7)
EOSINOPHIL NFR BLD AUTO: 0 %
ERYTHROCYTE [DISTWIDTH] IN BLOOD BY AUTOMATED COUNT: 12.2 % (ref 11.5–14.5)
GLUCOSE BLD MANUAL STRIP-MCNC: 160 MG/DL (ref 74–99)
GLUCOSE BLD MANUAL STRIP-MCNC: 163 MG/DL (ref 74–99)
GLUCOSE SERPL-MCNC: 128 MG/DL (ref 74–99)
HCT VFR BLD AUTO: 38.5 % (ref 36–46)
HGB BLD-MCNC: 13.2 G/DL (ref 12–16)
HOLD SPECIMEN: NORMAL
IMM GRANULOCYTES # BLD AUTO: 0.14 X10*3/UL (ref 0–0.7)
IMM GRANULOCYTES NFR BLD AUTO: 0.7 % (ref 0–0.9)
LYMPHOCYTES # BLD AUTO: 1 X10*3/UL (ref 1.2–4.8)
LYMPHOCYTES NFR BLD AUTO: 5.2 %
MAGNESIUM SERPL-MCNC: 2 MG/DL (ref 1.6–2.4)
MCH RBC QN AUTO: 29.7 PG (ref 26–34)
MCHC RBC AUTO-ENTMCNC: 34.3 G/DL (ref 32–36)
MCV RBC AUTO: 87 FL (ref 80–100)
MONOCYTES # BLD AUTO: 0.82 X10*3/UL (ref 0.1–1)
MONOCYTES NFR BLD AUTO: 4.3 %
NEUTROPHILS # BLD AUTO: 17.17 X10*3/UL (ref 1.2–7.7)
NEUTROPHILS NFR BLD AUTO: 89.7 %
NRBC BLD-RTO: 0 /100 WBCS (ref 0–0)
PLATELET # BLD AUTO: 299 X10*3/UL (ref 150–450)
POTASSIUM SERPL-SCNC: 4 MMOL/L (ref 3.5–5.3)
PROT SERPL-MCNC: 6.4 G/DL (ref 6.4–8.2)
RBC # BLD AUTO: 4.44 X10*6/UL (ref 4–5.2)
SODIUM SERPL-SCNC: 138 MMOL/L (ref 136–145)
WBC # BLD AUTO: 19.2 X10*3/UL (ref 4.4–11.3)

## 2024-09-29 PROCEDURE — 2500000004 HC RX 250 GENERAL PHARMACY W/ HCPCS (ALT 636 FOR OP/ED): Performed by: NURSE PRACTITIONER

## 2024-09-29 PROCEDURE — 2500000004 HC RX 250 GENERAL PHARMACY W/ HCPCS (ALT 636 FOR OP/ED): Performed by: STUDENT IN AN ORGANIZED HEALTH CARE EDUCATION/TRAINING PROGRAM

## 2024-09-29 PROCEDURE — 2500000005 HC RX 250 GENERAL PHARMACY W/O HCPCS: Performed by: NURSE PRACTITIONER

## 2024-09-29 PROCEDURE — 1100000001 HC PRIVATE ROOM DAILY

## 2024-09-29 PROCEDURE — 94640 AIRWAY INHALATION TREATMENT: CPT

## 2024-09-29 PROCEDURE — 82947 ASSAY GLUCOSE BLOOD QUANT: CPT

## 2024-09-29 PROCEDURE — 85025 COMPLETE CBC W/AUTO DIFF WBC: CPT | Performed by: NURSE PRACTITIONER

## 2024-09-29 PROCEDURE — 83735 ASSAY OF MAGNESIUM: CPT | Performed by: NURSE PRACTITIONER

## 2024-09-29 PROCEDURE — 2500000001 HC RX 250 WO HCPCS SELF ADMINISTERED DRUGS (ALT 637 FOR MEDICARE OP): Performed by: STUDENT IN AN ORGANIZED HEALTH CARE EDUCATION/TRAINING PROGRAM

## 2024-09-29 PROCEDURE — 80053 COMPREHEN METABOLIC PANEL: CPT | Performed by: NURSE PRACTITIONER

## 2024-09-29 PROCEDURE — 5A09357 ASSISTANCE WITH RESPIRATORY VENTILATION, LESS THAN 24 CONSECUTIVE HOURS, CONTINUOUS POSITIVE AIRWAY PRESSURE: ICD-10-PCS | Performed by: STUDENT IN AN ORGANIZED HEALTH CARE EDUCATION/TRAINING PROGRAM

## 2024-09-29 PROCEDURE — 99291 CRITICAL CARE FIRST HOUR: CPT | Performed by: NURSE PRACTITIONER

## 2024-09-29 PROCEDURE — 2500000004 HC RX 250 GENERAL PHARMACY W/ HCPCS (ALT 636 FOR OP/ED): Performed by: INTERNAL MEDICINE

## 2024-09-29 PROCEDURE — 94660 CPAP INITIATION&MGMT: CPT

## 2024-09-29 PROCEDURE — 2500000002 HC RX 250 W HCPCS SELF ADMINISTERED DRUGS (ALT 637 FOR MEDICARE OP, ALT 636 FOR OP/ED): Performed by: NURSE PRACTITIONER

## 2024-09-29 PROCEDURE — 2500000001 HC RX 250 WO HCPCS SELF ADMINISTERED DRUGS (ALT 637 FOR MEDICARE OP): Performed by: NURSE PRACTITIONER

## 2024-09-29 PROCEDURE — 36415 COLL VENOUS BLD VENIPUNCTURE: CPT | Performed by: NURSE PRACTITIONER

## 2024-09-29 PROCEDURE — 2500000002 HC RX 250 W HCPCS SELF ADMINISTERED DRUGS (ALT 637 FOR MEDICARE OP, ALT 636 FOR OP/ED): Performed by: STUDENT IN AN ORGANIZED HEALTH CARE EDUCATION/TRAINING PROGRAM

## 2024-09-29 PROCEDURE — 96372 THER/PROPH/DIAG INJ SC/IM: CPT | Performed by: NURSE PRACTITIONER

## 2024-09-29 RX ORDER — BUDESONIDE 0.5 MG/2ML
0.5 INHALANT ORAL
Status: DISCONTINUED | OUTPATIENT
Start: 2024-09-29 | End: 2024-09-30 | Stop reason: HOSPADM

## 2024-09-29 RX ORDER — MORPHINE SULFATE 2 MG/ML
2 INJECTION, SOLUTION INTRAMUSCULAR; INTRAVENOUS EVERY 4 HOURS PRN
Status: DISCONTINUED | OUTPATIENT
Start: 2024-09-29 | End: 2024-09-30

## 2024-09-29 RX ORDER — MORPHINE SULFATE 2 MG/ML
2 INJECTION, SOLUTION INTRAMUSCULAR; INTRAVENOUS ONCE
Status: DISCONTINUED | OUTPATIENT
Start: 2024-09-29 | End: 2024-09-29

## 2024-09-29 RX ORDER — DEXTROSE 50 % IN WATER (D50W) INTRAVENOUS SYRINGE
12.5
Status: DISCONTINUED | OUTPATIENT
Start: 2024-09-29 | End: 2024-09-30 | Stop reason: HOSPADM

## 2024-09-29 RX ORDER — ALBUTEROL SULFATE 0.83 MG/ML
2.5 SOLUTION RESPIRATORY (INHALATION) EVERY 2 HOUR PRN
Status: DISCONTINUED | OUTPATIENT
Start: 2024-09-29 | End: 2024-09-30 | Stop reason: HOSPADM

## 2024-09-29 RX ORDER — NAPROXEN SODIUM 220 MG/1
81 TABLET, FILM COATED ORAL DAILY
Status: DISCONTINUED | OUTPATIENT
Start: 2024-09-29 | End: 2024-09-30 | Stop reason: HOSPADM

## 2024-09-29 RX ORDER — ENOXAPARIN SODIUM 100 MG/ML
60 INJECTION SUBCUTANEOUS EVERY 12 HOURS SCHEDULED
Status: DISCONTINUED | OUTPATIENT
Start: 2024-09-29 | End: 2024-09-30 | Stop reason: HOSPADM

## 2024-09-29 RX ORDER — LEVOTHYROXINE SODIUM 100 UG/1
100 TABLET ORAL DAILY
Status: DISCONTINUED | OUTPATIENT
Start: 2024-09-29 | End: 2024-09-30 | Stop reason: HOSPADM

## 2024-09-29 RX ORDER — ALBUTEROL SULFATE 0.83 MG/ML
2.5 SOLUTION RESPIRATORY (INHALATION) EVERY 6 HOURS PRN
Status: DISCONTINUED | OUTPATIENT
Start: 2024-09-29 | End: 2024-09-29

## 2024-09-29 RX ORDER — LORAZEPAM 2 MG/ML
1 INJECTION INTRAMUSCULAR ONCE
Status: DISCONTINUED | OUTPATIENT
Start: 2024-09-29 | End: 2024-09-29

## 2024-09-29 RX ORDER — MONTELUKAST SODIUM 10 MG/1
10 TABLET ORAL NIGHTLY
Status: DISCONTINUED | OUTPATIENT
Start: 2024-09-29 | End: 2024-09-30 | Stop reason: HOSPADM

## 2024-09-29 RX ORDER — INSULIN LISPRO 100 [IU]/ML
0-5 INJECTION, SOLUTION INTRAVENOUS; SUBCUTANEOUS
Status: DISCONTINUED | OUTPATIENT
Start: 2024-09-29 | End: 2024-09-29

## 2024-09-29 RX ORDER — EPINEPHRINE 1 MG/ML
0.3 INJECTION INTRAMUSCULAR; INTRAVENOUS; SUBCUTANEOUS ONCE
Status: DISCONTINUED | OUTPATIENT
Start: 2024-09-29 | End: 2024-09-29

## 2024-09-29 RX ORDER — DESVENLAFAXINE 50 MG/1
50 TABLET, EXTENDED RELEASE ORAL DAILY
Status: DISCONTINUED | OUTPATIENT
Start: 2024-09-29 | End: 2024-09-30 | Stop reason: HOSPADM

## 2024-09-29 RX ORDER — DEXTROSE 50 % IN WATER (D50W) INTRAVENOUS SYRINGE
25
Status: DISCONTINUED | OUTPATIENT
Start: 2024-09-29 | End: 2024-09-30 | Stop reason: HOSPADM

## 2024-09-29 RX ORDER — INSULIN LISPRO 100 [IU]/ML
0-5 INJECTION, SOLUTION INTRAVENOUS; SUBCUTANEOUS
Status: DISCONTINUED | OUTPATIENT
Start: 2024-09-29 | End: 2024-09-30 | Stop reason: HOSPADM

## 2024-09-29 RX ORDER — IPRATROPIUM BROMIDE AND ALBUTEROL SULFATE 2.5; .5 MG/3ML; MG/3ML
3 SOLUTION RESPIRATORY (INHALATION) EVERY 4 HOURS
Status: DISCONTINUED | OUTPATIENT
Start: 2024-09-29 | End: 2024-09-30 | Stop reason: HOSPADM

## 2024-09-29 RX ORDER — FLUTICASONE FUROATE AND VILANTEROL 200; 25 UG/1; UG/1
1 POWDER RESPIRATORY (INHALATION) DAILY
Status: DISCONTINUED | OUTPATIENT
Start: 2024-09-29 | End: 2024-09-30 | Stop reason: HOSPADM

## 2024-09-29 RX ORDER — LORAZEPAM 2 MG/ML
1 INJECTION INTRAMUSCULAR ONCE
Status: COMPLETED | OUTPATIENT
Start: 2024-09-29 | End: 2024-09-29

## 2024-09-29 RX ORDER — CETIRIZINE HYDROCHLORIDE 10 MG/1
10 TABLET ORAL DAILY
Status: DISCONTINUED | OUTPATIENT
Start: 2024-09-29 | End: 2024-09-30 | Stop reason: HOSPADM

## 2024-09-29 RX ADMIN — ASPIRIN 81 MG: 81 TABLET, CHEWABLE ORAL at 14:52

## 2024-09-29 RX ADMIN — IPRATROPIUM BROMIDE AND ALBUTEROL SULFATE 3 ML: 2.5; .5 SOLUTION RESPIRATORY (INHALATION) at 20:04

## 2024-09-29 RX ADMIN — ENOXAPARIN SODIUM 60 MG: 60 INJECTION SUBCUTANEOUS at 09:20

## 2024-09-29 RX ADMIN — Medication 3 L/MIN: at 20:05

## 2024-09-29 RX ADMIN — IPRATROPIUM BROMIDE AND ALBUTEROL SULFATE 3 ML: 2.5; .5 SOLUTION RESPIRATORY (INHALATION) at 07:06

## 2024-09-29 RX ADMIN — BUTALBITAL, ACETAMINOPHEN AND CAFFEINE 1 TABLET: 325; 50; 40 TABLET ORAL at 14:53

## 2024-09-29 RX ADMIN — MORPHINE SULFATE 2 MG: 2 INJECTION, SOLUTION INTRAMUSCULAR; INTRAVENOUS at 07:24

## 2024-09-29 RX ADMIN — IPRATROPIUM BROMIDE AND ALBUTEROL SULFATE 3 ML: 2.5; .5 SOLUTION RESPIRATORY (INHALATION) at 01:42

## 2024-09-29 RX ADMIN — INSULIN LISPRO 1 UNITS: 100 INJECTION, SOLUTION INTRAVENOUS; SUBCUTANEOUS at 16:34

## 2024-09-29 RX ADMIN — IPRATROPIUM BROMIDE AND ALBUTEROL SULFATE 3 ML: 2.5; .5 SOLUTION RESPIRATORY (INHALATION) at 10:56

## 2024-09-29 RX ADMIN — METHYLPREDNISOLONE SODIUM SUCCINATE 40 MG: 40 INJECTION, POWDER, LYOPHILIZED, FOR SOLUTION INTRAMUSCULAR; INTRAVENOUS at 07:24

## 2024-09-29 RX ADMIN — PANTOPRAZOLE SODIUM 40 MG: 40 TABLET, DELAYED RELEASE ORAL at 06:18

## 2024-09-29 RX ADMIN — Medication 3 L/MIN: at 07:13

## 2024-09-29 RX ADMIN — DESVENLAFAXINE 50 MG: 50 TABLET, FILM COATED, EXTENDED RELEASE ORAL at 14:52

## 2024-09-29 RX ADMIN — ENOXAPARIN SODIUM 60 MG: 60 INJECTION SUBCUTANEOUS at 20:50

## 2024-09-29 RX ADMIN — ALBUTEROL SULFATE 2.5 MG: 2.5 SOLUTION RESPIRATORY (INHALATION) at 07:27

## 2024-09-29 RX ADMIN — MORPHINE SULFATE 2 MG: 2 INJECTION, SOLUTION INTRAMUSCULAR; INTRAVENOUS at 20:50

## 2024-09-29 RX ADMIN — ALBUTEROL SULFATE 2.5 MG: 2.5 SOLUTION RESPIRATORY (INHALATION) at 07:15

## 2024-09-29 RX ADMIN — IPRATROPIUM BROMIDE AND ALBUTEROL SULFATE 3 ML: 2.5; .5 SOLUTION RESPIRATORY (INHALATION) at 14:53

## 2024-09-29 RX ADMIN — FLUTICASONE FUROATE AND VILANTEROL TRIFENATATE 1 PUFF: 200; 25 POWDER RESPIRATORY (INHALATION) at 14:52

## 2024-09-29 RX ADMIN — INSULIN LISPRO 1 UNITS: 100 INJECTION, SOLUTION INTRAVENOUS; SUBCUTANEOUS at 12:17

## 2024-09-29 RX ADMIN — ALBUTEROL SULFATE 2.5 MG: 2.5 SOLUTION RESPIRATORY (INHALATION) at 12:26

## 2024-09-29 RX ADMIN — BUTALBITAL, ACETAMINOPHEN AND CAFFEINE 1 TABLET: 325; 50; 40 TABLET ORAL at 09:59

## 2024-09-29 RX ADMIN — ONDANSETRON 4 MG: 2 INJECTION INTRAMUSCULAR; INTRAVENOUS at 05:38

## 2024-09-29 RX ADMIN — CETIRIZINE HYDROCHLORIDE 10 MG: 10 TABLET ORAL at 14:52

## 2024-09-29 RX ADMIN — BUDESONIDE 0.5 MG: 0.5 INHALANT RESPIRATORY (INHALATION) at 20:04

## 2024-09-29 RX ADMIN — BUTALBITAL, ACETAMINOPHEN AND CAFFEINE 1 TABLET: 325; 50; 40 TABLET ORAL at 20:50

## 2024-09-29 RX ADMIN — MONTELUKAST SODIUM 10 MG: 10 TABLET, FILM COATED ORAL at 20:50

## 2024-09-29 RX ADMIN — IPRATROPIUM BROMIDE AND ALBUTEROL SULFATE 3 ML: 2.5; .5 SOLUTION RESPIRATORY (INHALATION) at 23:46

## 2024-09-29 RX ADMIN — BUTALBITAL, ACETAMINOPHEN AND CAFFEINE 1 TABLET: 325; 50; 40 TABLET ORAL at 05:38

## 2024-09-29 RX ADMIN — MORPHINE SULFATE 2 MG: 2 INJECTION, SOLUTION INTRAMUSCULAR; INTRAVENOUS at 05:38

## 2024-09-29 RX ADMIN — METHYLPREDNISOLONE SODIUM SUCCINATE 40 MG: 40 INJECTION, POWDER, FOR SOLUTION INTRAMUSCULAR; INTRAVENOUS at 04:19

## 2024-09-29 RX ADMIN — LORAZEPAM 1 MG: 2 INJECTION INTRAMUSCULAR; INTRAVENOUS at 08:15

## 2024-09-29 ASSESSMENT — COGNITIVE AND FUNCTIONAL STATUS - GENERAL
MOBILITY SCORE: 24
DAILY ACTIVITIY SCORE: 24

## 2024-09-29 ASSESSMENT — PAIN - FUNCTIONAL ASSESSMENT: PAIN_FUNCTIONAL_ASSESSMENT: 0-10

## 2024-09-29 ASSESSMENT — PAIN SCALES - GENERAL
PAINLEVEL_OUTOF10: 0 - NO PAIN
PAINLEVEL_OUTOF10: 0 - NO PAIN
PAINLEVEL_OUTOF10: 10 - WORST POSSIBLE PAIN

## 2024-09-29 NOTE — CARE PLAN
The patient's goals for the shift include      The clinical goals for the shift include Pt will have decreased O2 demand throughout shift    Problem: Pain - Adult  Goal: Verbalizes/displays adequate comfort level or baseline comfort level  Outcome: Progressing     Problem: Safety - Adult  Goal: Free from fall injury  Outcome: Progressing     Problem: Discharge Planning  Goal: Discharge to home or other facility with appropriate resources  Outcome: Progressing     Problem: Chronic Conditions and Co-morbidities  Goal: Patient's chronic conditions and co-morbidity symptoms are monitored and maintained or improved  Outcome: Progressing     Problem: Pain  Goal: Takes deep breaths with improved pain control throughout the shift  Outcome: Progressing  Goal: Walks with improved pain control throughout the shift  Outcome: Progressing  Goal: Performs ADL's with improved pain control throughout shift  Outcome: Progressing  Goal: Free from opioid side effects throughout the shift  Outcome: Progressing  Goal: Free from acute confusion related to pain meds throughout the shift  Outcome: Progressing

## 2024-09-29 NOTE — CARE PLAN
The patient's goals for the shift include      The clinical goals for the shift include pain management      Problem: Pain - Adult  Goal: Verbalizes/displays adequate comfort level or baseline comfort level  Outcome: Progressing     Problem: Safety - Adult  Goal: Free from fall injury  Outcome: Progressing     Problem: Discharge Planning  Goal: Discharge to home or other facility with appropriate resources  Outcome: Progressing     Problem: Chronic Conditions and Co-morbidities  Goal: Patient's chronic conditions and co-morbidity symptoms are monitored and maintained or improved  Outcome: Progressing     Problem: Pain  Goal: Takes deep breaths with improved pain control throughout the shift  Outcome: Progressing  Goal: Walks with improved pain control throughout the shift  Outcome: Progressing  Goal: Performs ADL's with improved pain control throughout shift  Outcome: Progressing  Goal: Free from opioid side effects throughout the shift  Outcome: Progressing  Goal: Free from acute confusion related to pain meds throughout the shift  Outcome: Progressing

## 2024-09-29 NOTE — PROGRESS NOTES
[facility] Critical Care Medicine       Date:  9/29/2024  Patient:  Sarahi Haley  YOB: 1985  MRN:  [id number]   Admit Date:  9/28/2024  ========================================================================================================    Chief Complaint   Patient presents with    Shortness of Breath     Pt arrived by squad with sob that woke her up from sleep. Tried duonebs and home and squad gave 2 additional without relief         History of Present Illness:  Sarahi Haley is a 38 y.o. year old female patient with Past Medical History of  asthma with no prior intubations, GERD, seizure disorder, PTSD, and anxiety who presented to [facility] emergency department on 9/28 for dyspnea. Reported woke up from sleep with difficulty breathing. Received duonebs, initiated on steroid therapy, and admitted to hospital medicine service. This am a rapid response was called for acute asthma exacerbation with increased work of breathing, hypoxia, and tachycardia. Was given duoneb with additional albuterol treatment with no significant improvement in work of breathing, was initiated on BiPAP therapy, and transferred to the ICU.     Interval ICU Events:  9/29: Admitted to the ICU with acute asthma exacerbation on BiPAP therapy.     Medical History:  Past Medical History:   Diagnosis Date    Acute cystitis without hematuria 08/08/2019    Acute cystitis without hematuria    Asthma ([facility])     Disease of thyroid gland     Encounter for initial prescription of contraceptive pills 11/01/2019    Encounter for initial prescription of contraceptive pills    Nausea 02/05/2021    Nausea in adult    Other general symptoms and signs 12/05/2019    Forgetfulness    Parageusia 04/06/2020    Taste perversion    Pelvic and perineal pain     Pelvic pain in female    Personal history of other diseases of the respiratory system 04/06/2020    History of sinusitis    Personal history of other specified conditions 07/01/2020     History of vomiting    Personal history of other specified conditions 01/26/2021    History of headache    Personal history of other specified conditions 02/01/2021    History of diarrhea    Personal history of thrombophlebitis 06/03/2019    History of phlebitis    Personal history of urinary (tract) infections 01/16/2020    History of urinary tract infection    PTSD (post-traumatic stress disorder)      Past Surgical History:   Procedure Laterality Date    ABDOMINAL SURGERY      APPENDECTOMY      OTHER SURGICAL HISTORY  02/08/2019    Appendectomy    OTHER SURGICAL HISTORY  02/08/2019    Cholecystectomy    OTHER SURGICAL HISTORY  02/08/2019    Cystoscopy    OTHER SURGICAL HISTORY  02/08/2019    Ureteroscopy    OTHER SURGICAL HISTORY  02/08/2019    Shoulder surgery     Facility-Administered Medications Prior to Admission   Medication Dose Route Frequency Provider Last Rate Last Admin    tezepelumab-ekko (Tezpire) SubQ syringe 210 mg  210 mg subcutaneous Once Salomón Montgomery MD         Medications Prior to Admission   Medication Sig Dispense Refill Last Dose    albuterol 2.5 mg /3 mL (0.083 %) nebulizer solution Take 3 mL by nebulization every 4 hours if needed for shortness of breath.   9/28/2024    albuterol-budesonide (Airsupra) 90-80 mcg/actuation inhaler Inhale 2 puffs every 4 hours if needed (cough, wheeze, short of breath). Patient has a coupon 32.1 g 1 9/28/2024    aspirin 81 mg chewable tablet Chew 1 tablet (81 mg) once daily.   9/27/2024    budesonide (Pulmicort) 0.5 mg/2 mL nebulizer solution Take 2 mL (0.5 mg) by nebulization 2 times a day.   9/27/2024    butalbital-acetaminophen-caff -40 mg tablet Take 1 tablet by mouth early in the morning.. 1 tab(s) orally once a day, As Needed (Patient taking differently: Take 1 tablet by mouth once daily in the morning.) 90 tablet 3 Past Week    desvenlafaxine 100 mg 24 hr tablet TAKE 1 TABLET BY MOUTH EVERY DAY (Patient taking differently: Take 1 tablet (100 mg)  by mouth once daily. Take with 50 mg) 90 tablet 3 9/27/2024    desvenlafaxine 50 mg 24 hr tablet TAKE 1 TABLET BY MOUTH ONCE DAILY (Patient taking differently: Take 1 tablet (50 mg) by mouth once daily. Take with 100 mg) 90 tablet 3 9/27/2024    esomeprazole (NexIUM) 20 mg DR capsule Take 1 capsule (20 mg) by mouth once daily in the morning. Take before meals. Do not open capsule. 30 capsule 11 9/27/2024    fexofenadine (Allegra) 180 mg tablet Take 1 tablet (180 mg) by mouth once daily.   9/27/2024    fluticasone/vilanterol (BREO ELLIPTA INHL) Inhale 1 puff once daily.   9/27/2024    hydrOXYzine HCL (Atarax) 25 mg tablet Take 1 tablet (25 mg) by mouth 3 times a day as needed for anxiety. 90 tablet 2 9/27/2024    levothyroxine (Synthroid, Levoxyl) 100 mcg tablet Take 1 tablet (100 mcg) by mouth once daily.   9/27/2024    metoclopramide (Reglan) 10 mg tablet Take 1 tablet (10 mg) by mouth every 8 hours if needed (nausea). 90 tablet 1 9/20/2024    montelukast (Singulair) 10 mg tablet Take 1 tablet (10 mg) by mouth once daily at bedtime. 30 tablet 5 9/27/2024    multivitamin-Ca-iron-minerals (Tab-A-Vu Womens) 27-0.4 mg tablet Take 1 tablet by mouth once daily.   9/27/2024    potassium chloride ER (Micro-K) 10 mEq ER capsule Take 1 capsule (10 mEq) by mouth once daily. 30 capsule 3 9/27/2024    predniSONE (Deltasone) 5 mg tablet Take 1 tablet (5 mg) by mouth. Tapering dose   9/27/2024    EPINEPHrine 0.3 mg/0.3 mL injection syringe Inject 0.3 mL (0.3 mg) into the muscle once daily as needed for anaphylaxis. 2 each 3 Unknown    fluticasone-umeclidin-vilanter (Trelegy Ellipta) 200-62.5-25 mcg blister with device Inhale 1 puff early in the morning.. (Patient not taking: Reported on 9/13/2024) 3 each 3     formoterol (Perforomist) 20 mcg/2 mL nebulizer solution Take 2 mL (20 mcg) by nebulization 2 times a day. (Patient taking differently: Take 2 mL (20 mcg) by nebulization once daily.) 120 mL 1     galcanezumab (Emgality  Syringe) 120 mg/mL prefilled syringe Inject 1 Syringe (120 mg) under the skin every 28 (twenty-eight) days.   9/22/2024    promethazine (Phenergan) 12.5 mg tablet Take 1 tablet (12.5 mg) by mouth every 6 hours if needed for nausea or vomiting.   9/21/2024    semaglutide, weight loss, (Wegovy) 0.5 mg/0.5 mL pen injector Inject 0.5 mg under the skin 1 (one) time per week for 16 doses. (Patient taking differently: Inject 0.5 mg under the skin 1 (one) time per week. Wednesday) 2 mL 3 9/25/2024    tezepelumab-ekko (Tezspire) SubQ syringe Inject 210 mg under the skin every 28 (twenty-eight) days.   9/13/2024     Bee venom protein (honey bee), Diphenhydramine, Nsaids (non-steroidal anti-inflammatory drug), Procaine, and Ketorolac  Social History     Tobacco Use    Smoking status: Never     Passive exposure: Current    Smokeless tobacco: Never   Vaping Use    Vaping status: Never Used   Substance Use Topics    Alcohol use: Yes     Alcohol/week: 1.0 - 2.0 standard drink of alcohol     Types: 1 - 2 Glasses of wine per week     Comment: social    Drug use: Never     Family History   Problem Relation Name Age of Onset    Hypertension Father      Other (Pulmonary Valve Stenosis) Sister      Heart disease Maternal Grandmother      Diabetes Other Grandparent     Lung cancer Other Grandparent     Anxiety disorder Sibling         Review of Systems:  14 point review of systems was completed and negative except for those specially mention in my HPI    Physical Exam:    Heart Rate:  [68-90]   Temp:  [35.2 °C (95.4 °F)-36.5 °C (97.7 °F)]   Resp:  [18-20]   BP: (120-141)/(58-65)   SpO2:  [94 %-97 %]     Physical Exam  HENT:      Head: Normocephalic.      Mouth/Throat:      Mouth: Mucous membranes are moist.   Eyes:      Extraocular Movements: Extraocular movements intact.      Conjunctiva/sclera: Conjunctivae normal.   Cardiovascular:      Rate and Rhythm: Regular rhythm. Tachycardia present.      Pulses: Normal pulses.      Heart  sounds: Normal heart sounds.   Pulmonary:      Effort: Respiratory distress present.      Breath sounds: Wheezing present.   Abdominal:      General: Bowel sounds are normal.      Palpations: Abdomen is soft.   Musculoskeletal:         General: Normal range of motion.      Cervical back: Normal range of motion.   Skin:     General: Skin is warm.      Capillary Refill: Capillary refill takes less than 2 seconds.   Neurological:      General: No focal deficit present.      Mental Status: She is alert.         Objective:    I have reviewed all medications, laboratory results, and imaging pertinent for today's encounter    Assessment/Plan:    I am currently managing this critically ill patient for the following problems:    Neuro/Psych/Pain Ctrl/Sedation:  Anxiety/ PTSD  Migraine  Home desvenlafaxine, atarax  Fioricet as needed  CAM-ICU  Delirium precautions    Respiratory/ENT:  Acute asthma exacerbation  Acute hypoxic respiratory failure  Duonebs scheduled and as needed  Solumedrol  BiPAP as needed  Oxygen therapy as needed to maintain SPO2 greater than 90%, wean as able  Home inhalers, singulair  Pulmonology following    Cardiovascular:  No acute concerns    GI:  GERD  Home PPI  Regular diet    Renal/Volume Status (Intra & Extravascular):  Trend BMP  Strict I&Os    Endocrine  Hypothyroidism   Accu check with SSI while on steroid therapy  Home levothyroxine    Infectious Disease:  Leukocytosis  Likely 2/2 to steroid  Low suspicion for infection, afebrile  Monitor off antibiotic therapy, initiate as clinically indicated    Heme/Onc:  Monitor for s/s of bleeding  DVTp    MSK:  OOB as able    Ethics/Code Status:  FULL    :  DVT Prophylaxis: lovenox  GI Prophylaxis: home PPI  Bowel Regimen: as needed  Diet: regular  CVC: none  Jeanette: none  Strickland: none  Restraints: none  Dispo: ICU    Critical Care Time:  40 minutes spent in preparing to see patient (I.e. review of medical records), evaluation of diagnostics  (I.e. labs, imaging, etc.), documentation, discussing plan of care with patient/ family/ caregiver, and/ or coordination of care with multidisciplinary team. Time does not include completion of procedure time.       Ashley Huang, APRN-CNP     Yes

## 2024-09-29 NOTE — SIGNIFICANT EVENT
Rapid response called this morning for respiratory distress.    On my exam patient is in respiratory distress, tachypneic, tachycardic with significantly diminished breath sounds to auscultation with bilateral scattered expiratory wheezes.  ICU team at bedside, decision made to transfer patient to the ICU.  Will start patient on BiPAP, did provide 3 treatments of back-to-back albuterol nebulizer.  Also ordered morphine for respiratory distress, patient requesting Ativan for BiPAP which was also ordered.  Per ED nursing staff who knows patient quite well, epinephrine IM injections do help, therefore this was ordered as well.    Primary attending notified of events via secure chat.

## 2024-09-29 NOTE — PROGRESS NOTES
Chief Complaint   Patient presents with    Shortness of Breath       Pt arrived by squad with sob that woke her up from sleep. Tried duonebs and home and squad gave 2 additional without relief            History of Present Illness:  Sarahi Haley is a 38 y.o. year old female patient with Past Medical History of  asthma with no prior intubations, GERD, seizure disorder, PTSD, and anxiety who presented to [facility] emergency department on 9/28 for dyspnea. Reported woke up from sleep with difficulty breathing. Received duonebs, initiated on steroid therapy, and admitted to hospital medicine service. This am a rapid response was called for acute asthma exacerbation with increased work of breathing, hypoxia, and tachycardia. Was given duoneb with additional albuterol treatment with no significant improvement in work of breathing, was initiated on BiPAP therapy, and transferred to the ICU.      Interval ICU Events:  9/29: Admitted to the ICU with acute asthma exacerbation on BiPAP therapy.      Medical History:  Medical History        Past Medical History:   Diagnosis Date    Acute cystitis without hematuria 08/08/2019     Acute cystitis without hematuria    Asthma ([facility])      Disease of thyroid gland      Encounter for initial prescription of contraceptive pills 11/01/2019     Encounter for initial prescription of contraceptive pills    Nausea 02/05/2021     Nausea in adult    Other general symptoms and signs 12/05/2019     Forgetfulness    Parageusia 04/06/2020     Taste perversion    Pelvic and perineal pain       Pelvic pain in female    Personal history of other diseases of the respiratory system 04/06/2020     History of sinusitis    Personal history of other specified conditions 07/01/2020     History of vomiting    Personal history of other specified conditions 01/26/2021     History of headache    Personal history of other specified conditions 02/01/2021     History of diarrhea    Personal history of  thrombophlebitis 06/03/2019     History of phlebitis    Personal history of urinary (tract) infections 01/16/2020     History of urinary tract infection    PTSD (post-traumatic stress disorder)           Surgical History         Past Surgical History:   Procedure Laterality Date    ABDOMINAL SURGERY        APPENDECTOMY        OTHER SURGICAL HISTORY   02/08/2019     Appendectomy    OTHER SURGICAL HISTORY   02/08/2019     Cholecystectomy    OTHER SURGICAL HISTORY   02/08/2019     Cystoscopy    OTHER SURGICAL HISTORY   02/08/2019     Ureteroscopy    OTHER SURGICAL HISTORY   02/08/2019     Shoulder surgery         Prescriptions Prior to Admission             Facility-Administered Medications Prior to Admission   Medication Dose Route Frequency Provider Last Rate Last Admin    tezepelumab-ekko (Tezpire) SubQ syringe 210 mg  210 mg subcutaneous Once Salomón Montgomery MD                  Medications Prior to Admission   Medication Sig Dispense Refill Last Dose    albuterol 2.5 mg /3 mL (0.083 %) nebulizer solution Take 3 mL by nebulization every 4 hours if needed for shortness of breath.     9/28/2024    albuterol-budesonide (Airsupra) 90-80 mcg/actuation inhaler Inhale 2 puffs every 4 hours if needed (cough, wheeze, short of breath). Patient has a coupon 32.1 g 1 9/28/2024    aspirin 81 mg chewable tablet Chew 1 tablet (81 mg) once daily.     9/27/2024    budesonide (Pulmicort) 0.5 mg/2 mL nebulizer solution Take 2 mL (0.5 mg) by nebulization 2 times a day.     9/27/2024    butalbital-acetaminophen-caff -40 mg tablet Take 1 tablet by mouth early in the morning.. 1 tab(s) orally once a day, As Needed (Patient taking differently: Take 1 tablet by mouth once daily in the morning.) 90 tablet 3 Past Week    desvenlafaxine 100 mg 24 hr tablet TAKE 1 TABLET BY MOUTH EVERY DAY (Patient taking differently: Take 1 tablet (100 mg) by mouth once daily. Take with 50 mg) 90 tablet 3 9/27/2024    desvenlafaxine 50 mg 24 hr tablet TAKE  1 TABLET BY MOUTH ONCE DAILY (Patient taking differently: Take 1 tablet (50 mg) by mouth once daily. Take with 100 mg) 90 tablet 3 9/27/2024    esomeprazole (NexIUM) 20 mg DR capsule Take 1 capsule (20 mg) by mouth once daily in the morning. Take before meals. Do not open capsule. 30 capsule 11 9/27/2024    fexofenadine (Allegra) 180 mg tablet Take 1 tablet (180 mg) by mouth once daily.     9/27/2024    fluticasone/vilanterol (BREO ELLIPTA INHL) Inhale 1 puff once daily.     9/27/2024    hydrOXYzine HCL (Atarax) 25 mg tablet Take 1 tablet (25 mg) by mouth 3 times a day as needed for anxiety. 90 tablet 2 9/27/2024    levothyroxine (Synthroid, Levoxyl) 100 mcg tablet Take 1 tablet (100 mcg) by mouth once daily.     9/27/2024    metoclopramide (Reglan) 10 mg tablet Take 1 tablet (10 mg) by mouth every 8 hours if needed (nausea). 90 tablet 1 9/20/2024    montelukast (Singulair) 10 mg tablet Take 1 tablet (10 mg) by mouth once daily at bedtime. 30 tablet 5 9/27/2024    multivitamin-Ca-iron-minerals (Tab-A-Vu Womens) 27-0.4 mg tablet Take 1 tablet by mouth once daily.     9/27/2024    potassium chloride ER (Micro-K) 10 mEq ER capsule Take 1 capsule (10 mEq) by mouth once daily. 30 capsule 3 9/27/2024    predniSONE (Deltasone) 5 mg tablet Take 1 tablet (5 mg) by mouth. Tapering dose     9/27/2024    EPINEPHrine 0.3 mg/0.3 mL injection syringe Inject 0.3 mL (0.3 mg) into the muscle once daily as needed for anaphylaxis. 2 each 3 Unknown    fluticasone-umeclidin-vilanter (Trelegy Ellipta) 200-62.5-25 mcg blister with device Inhale 1 puff early in the morning.. (Patient not taking: Reported on 9/13/2024) 3 each 3      formoterol (Perforomist) 20 mcg/2 mL nebulizer solution Take 2 mL (20 mcg) by nebulization 2 times a day. (Patient taking differently: Take 2 mL (20 mcg) by nebulization once daily.) 120 mL 1      galcanezumab (Emgality Syringe) 120 mg/mL prefilled syringe Inject 1 Syringe (120 mg) under the skin every 28  (twenty-eight) days.     9/22/2024    promethazine (Phenergan) 12.5 mg tablet Take 1 tablet (12.5 mg) by mouth every 6 hours if needed for nausea or vomiting.     9/21/2024    semaglutide, weight loss, (Wegovy) 0.5 mg/0.5 mL pen injector Inject 0.5 mg under the skin 1 (one) time per week for 16 doses. (Patient taking differently: Inject 0.5 mg under the skin 1 (one) time per week. Wednesday) 2 mL 3 9/25/2024    tezepelumab-ekko (Tezspire) SubQ syringe Inject 210 mg under the skin every 28 (twenty-eight) days.     9/13/2024         Bee venom protein (honey bee), Diphenhydramine, Nsaids (non-steroidal anti-inflammatory drug), Procaine, and Ketorolac  Social History   Social History            Tobacco Use    Smoking status: Never       Passive exposure: Current    Smokeless tobacco: Never   Vaping Use    Vaping status: Never Used   Substance Use Topics    Alcohol use: Yes       Alcohol/week: 1.0 - 2.0 standard drink of alcohol       Types: 1 - 2 Glasses of wine per week       Comment: social    Drug use: Never         Family History          Family History   Problem Relation Name Age of Onset    Hypertension Father        Other (Pulmonary Valve Stenosis) Sister        Heart disease Maternal Grandmother        Diabetes Other Grandparent      Lung cancer Other Grandparent      Anxiety disorder Sibling                Review of Systems:  14 point review of systems was completed and negative except for those specially mention in my HPI     Physical Exam:     Heart Rate:  [68-90]   Temp:  [35.2 °C (95.4 °F)-36.5 °C (97.7 °F)]   Resp:  [18-20]   BP: (120-141)/(58-65)   SpO2:  [94 %-97 %]      Physical Exam  HENT:      Head: Normocephalic.      Mouth/Throat:      Mouth: Mucous membranes are moist.   Eyes:      Extraocular Movements: Extraocular movements intact.      Conjunctiva/sclera: Conjunctivae normal.   Cardiovascular:      Rate and Rhythm: Regular rhythm. Tachycardia present.      Pulses: Normal pulses.      Heart  sounds: Normal heart sounds.   Pulmonary:      Effort: Respiratory distress present.      Breath sounds: Wheezing present.   Abdominal:      General: Bowel sounds are normal.      Palpations: Abdomen is soft.   Musculoskeletal:         General: Normal range of motion.      Cervical back: Normal range of motion.   Skin:     General: Skin is warm.      Capillary Refill: Capillary refill takes less than 2 seconds.   Neurological:      General: No focal deficit present.      Mental Status: She is alert.            Objective:     I have reviewed all medications, laboratory results, and imaging pertinent for today's encounter     Assessment/Plan:       Neuro/Psych/Pain Ctrl/Sedation:  Anxiety/ PTSD  Migraine  Home desvenlafaxine, atarax  Fioricet as needed  CAM-ICU  Delirium precautions     Respiratory/ENT:  Acute asthma exacerbation  Acute hypoxic respiratory failure  Duonebs scheduled and as needed  Solumedrol  BiPAP as needed  Oxygen therapy as needed to maintain SPO2 greater than 90%, wean as able  Home inhalers, singulair  Pulmonology following     Cardiovascular:  No acute concerns     GI:  GERD  Home PPI  Regular diet     Renal/Volume Status (Intra & Extravascular):  Trend BMP  Strict I&Os    Endocrine  Hypothyroidism   Accu check with SSI while on steroid therapy  Home levothyroxine     Infectious Disease:  Leukocytosis  Likely 2/2 to steroid  Low suspicion for infection, afebrile  Monitor off antibiotic therapy, initiate as clinically indicated     Heme/Onc:  Monitor for s/s of bleeding  DVTp     MSK:  OOB as able     Ethics/Code Status:  FULL     :  DVT Prophylaxis: lovenox  GI Prophylaxis: home PPI    Pulmonary  comments:- Agree with current treatment for Asthma. Will also set up outpatient APAP 5-15 cm with nasal mack/chinstrap.

## 2024-09-29 NOTE — NURSING NOTE
0715 rapid response called due to respiratory distress. Patient has history of asthma attacks. Patient given albuterol treatments continuous and 2 mg morphine. Patients SBP between 190s-200s but resolved to 130s after morphine given. Orders received by Dr. Bill that was at bedside. Patient transferred to [id number]

## 2024-09-30 VITALS
HEIGHT: 65 IN | HEART RATE: 72 BPM | TEMPERATURE: 99.1 F | WEIGHT: 293 LBS | RESPIRATION RATE: 25 BRPM | DIASTOLIC BLOOD PRESSURE: 60 MMHG | SYSTOLIC BLOOD PRESSURE: 132 MMHG | OXYGEN SATURATION: 95 % | BODY MASS INDEX: 48.82 KG/M2

## 2024-09-30 PROBLEM — J45.41 ASTHMA EXACERBATION, NON-ALLERGIC, MODERATE PERSISTENT (HHS-HCC): Status: RESOLVED | Noted: 2024-05-28 | Resolved: 2024-09-30

## 2024-09-30 PROBLEM — J45.41 MODERATE PERSISTENT ASTHMA WITH ACUTE EXACERBATION (HHS-HCC): Status: RESOLVED | Noted: 2024-09-29 | Resolved: 2024-09-30

## 2024-09-30 LAB
ALBUMIN SERPL BCP-MCNC: 3.9 G/DL (ref 3.4–5)
ALP SERPL-CCNC: 57 U/L (ref 33–110)
ALT SERPL W P-5'-P-CCNC: 112 U/L (ref 7–45)
ANION GAP SERPL CALC-SCNC: 11 MMOL/L (ref 10–20)
AST SERPL W P-5'-P-CCNC: 57 U/L (ref 9–39)
ATRIAL RATE: 82 BPM
BASOPHILS # BLD AUTO: 0.02 X10*3/UL (ref 0–0.1)
BASOPHILS NFR BLD AUTO: 0.1 %
BILIRUB SERPL-MCNC: 0.5 MG/DL (ref 0–1.2)
BUN SERPL-MCNC: 14 MG/DL (ref 6–23)
CALCIUM SERPL-MCNC: 9 MG/DL (ref 8.6–10.3)
CHLORIDE SERPL-SCNC: 108 MMOL/L (ref 98–107)
CO2 SERPL-SCNC: 24 MMOL/L (ref 21–32)
CREAT SERPL-MCNC: 0.88 MG/DL (ref 0.5–1.05)
EGFRCR SERPLBLD CKD-EPI 2021: 87 ML/MIN/1.73M*2
EOSINOPHIL # BLD AUTO: 0 X10*3/UL (ref 0–0.7)
EOSINOPHIL NFR BLD AUTO: 0 %
ERYTHROCYTE [DISTWIDTH] IN BLOOD BY AUTOMATED COUNT: 12.7 % (ref 11.5–14.5)
GLUCOSE BLD MANUAL STRIP-MCNC: 70 MG/DL (ref 74–99)
GLUCOSE BLD MANUAL STRIP-MCNC: 93 MG/DL (ref 74–99)
GLUCOSE SERPL-MCNC: 81 MG/DL (ref 74–99)
HCT VFR BLD AUTO: 39.5 % (ref 36–46)
HGB BLD-MCNC: 13.2 G/DL (ref 12–16)
IMM GRANULOCYTES # BLD AUTO: 0.09 X10*3/UL (ref 0–0.7)
IMM GRANULOCYTES NFR BLD AUTO: 0.5 % (ref 0–0.9)
LYMPHOCYTES # BLD AUTO: 2.57 X10*3/UL (ref 1.2–4.8)
LYMPHOCYTES NFR BLD AUTO: 15.4 %
MAGNESIUM SERPL-MCNC: 2.01 MG/DL (ref 1.6–2.4)
MCH RBC QN AUTO: 30.1 PG (ref 26–34)
MCHC RBC AUTO-ENTMCNC: 33.4 G/DL (ref 32–36)
MCV RBC AUTO: 90 FL (ref 80–100)
MONOCYTES # BLD AUTO: 0.95 X10*3/UL (ref 0.1–1)
MONOCYTES NFR BLD AUTO: 5.7 %
NEUTROPHILS # BLD AUTO: 13.06 X10*3/UL (ref 1.2–7.7)
NEUTROPHILS NFR BLD AUTO: 78.3 %
NRBC BLD-RTO: 0 /100 WBCS (ref 0–0)
P AXIS: 60 DEGREES
P OFFSET: 185 MS
P ONSET: 136 MS
PLATELET # BLD AUTO: 281 X10*3/UL (ref 150–450)
POTASSIUM SERPL-SCNC: 3.3 MMOL/L (ref 3.5–5.3)
PR INTERVAL: 160 MS
PROT SERPL-MCNC: 6.5 G/DL (ref 6.4–8.2)
Q ONSET: 216 MS
QRS COUNT: 13 BEATS
QRS DURATION: 84 MS
QT INTERVAL: 394 MS
QTC CALCULATION(BAZETT): 460 MS
QTC FREDERICIA: 437 MS
R AXIS: -4 DEGREES
RBC # BLD AUTO: 4.39 X10*6/UL (ref 4–5.2)
SODIUM SERPL-SCNC: 140 MMOL/L (ref 136–145)
T AXIS: 58 DEGREES
T OFFSET: 413 MS
VENTRICULAR RATE: 82 BPM
WBC # BLD AUTO: 16.7 X10*3/UL (ref 4.4–11.3)

## 2024-09-30 PROCEDURE — 2500000001 HC RX 250 WO HCPCS SELF ADMINISTERED DRUGS (ALT 637 FOR MEDICARE OP): Performed by: NURSE PRACTITIONER

## 2024-09-30 PROCEDURE — 80053 COMPREHEN METABOLIC PANEL: CPT | Performed by: NURSE PRACTITIONER

## 2024-09-30 PROCEDURE — 2500000005 HC RX 250 GENERAL PHARMACY W/O HCPCS: Performed by: NURSE PRACTITIONER

## 2024-09-30 PROCEDURE — 2500000004 HC RX 250 GENERAL PHARMACY W/ HCPCS (ALT 636 FOR OP/ED): Performed by: NURSE PRACTITIONER

## 2024-09-30 PROCEDURE — 2500000002 HC RX 250 W HCPCS SELF ADMINISTERED DRUGS (ALT 637 FOR MEDICARE OP, ALT 636 FOR OP/ED): Performed by: HOSPITALIST

## 2024-09-30 PROCEDURE — 2500000002 HC RX 250 W HCPCS SELF ADMINISTERED DRUGS (ALT 637 FOR MEDICARE OP, ALT 636 FOR OP/ED): Performed by: NURSE PRACTITIONER

## 2024-09-30 PROCEDURE — 2500000004 HC RX 250 GENERAL PHARMACY W/ HCPCS (ALT 636 FOR OP/ED): Performed by: HOSPITALIST

## 2024-09-30 PROCEDURE — 94640 AIRWAY INHALATION TREATMENT: CPT

## 2024-09-30 PROCEDURE — 36415 COLL VENOUS BLD VENIPUNCTURE: CPT | Performed by: NURSE PRACTITIONER

## 2024-09-30 PROCEDURE — 85025 COMPLETE CBC W/AUTO DIFF WBC: CPT | Performed by: NURSE PRACTITIONER

## 2024-09-30 PROCEDURE — 83735 ASSAY OF MAGNESIUM: CPT | Performed by: NURSE PRACTITIONER

## 2024-09-30 PROCEDURE — 99239 HOSP IP/OBS DSCHRG MGMT >30: CPT | Performed by: NURSE PRACTITIONER

## 2024-09-30 PROCEDURE — 82947 ASSAY GLUCOSE BLOOD QUANT: CPT

## 2024-09-30 RX ORDER — POTASSIUM CHLORIDE 20 MEQ/1
40 TABLET, EXTENDED RELEASE ORAL ONCE
Status: COMPLETED | OUTPATIENT
Start: 2024-09-30 | End: 2024-09-30

## 2024-09-30 RX ORDER — ACETAMINOPHEN 10 MG/ML
1000 INJECTION, SOLUTION INTRAVENOUS ONCE
Status: COMPLETED | OUTPATIENT
Start: 2024-09-30 | End: 2024-09-30

## 2024-09-30 RX ORDER — MAGNESIUM SULFATE 1 G/100ML
1 INJECTION INTRAVENOUS ONCE
Status: COMPLETED | OUTPATIENT
Start: 2024-09-30 | End: 2024-09-30

## 2024-09-30 RX ORDER — PREDNISONE 10 MG/1
10 TABLET ORAL DAILY
Qty: 30 TABLET | Refills: 0 | Status: SHIPPED | OUTPATIENT
Start: 2024-09-30

## 2024-09-30 RX ORDER — METOCLOPRAMIDE HYDROCHLORIDE 5 MG/ML
10 INJECTION INTRAMUSCULAR; INTRAVENOUS ONCE
Status: COMPLETED | OUTPATIENT
Start: 2024-09-30 | End: 2024-09-30

## 2024-09-30 RX ADMIN — ONDANSETRON 4 MG: 2 INJECTION INTRAMUSCULAR; INTRAVENOUS at 03:13

## 2024-09-30 RX ADMIN — ENOXAPARIN SODIUM 60 MG: 60 INJECTION SUBCUTANEOUS at 08:52

## 2024-09-30 RX ADMIN — LEVOTHYROXINE SODIUM 100 MCG: 0.1 TABLET ORAL at 05:44

## 2024-09-30 RX ADMIN — BUTALBITAL, ACETAMINOPHEN AND CAFFEINE 1 TABLET: 325; 50; 40 TABLET ORAL at 03:13

## 2024-09-30 RX ADMIN — POTASSIUM CHLORIDE 40 MEQ: 1500 TABLET, EXTENDED RELEASE ORAL at 08:52

## 2024-09-30 RX ADMIN — CETIRIZINE HYDROCHLORIDE 10 MG: 10 TABLET ORAL at 08:52

## 2024-09-30 RX ADMIN — IPRATROPIUM BROMIDE AND ALBUTEROL SULFATE 3 ML: 2.5; .5 SOLUTION RESPIRATORY (INHALATION) at 03:44

## 2024-09-30 RX ADMIN — BUDESONIDE 0.5 MG: 0.5 INHALANT RESPIRATORY (INHALATION) at 07:13

## 2024-09-30 RX ADMIN — Medication 4 L/MIN: at 07:49

## 2024-09-30 RX ADMIN — METHYLPREDNISOLONE SODIUM SUCCINATE 40 MG: 40 INJECTION, POWDER, LYOPHILIZED, FOR SOLUTION INTRAMUSCULAR; INTRAVENOUS at 08:51

## 2024-09-30 RX ADMIN — DESVENLAFAXINE 50 MG: 50 TABLET, FILM COATED, EXTENDED RELEASE ORAL at 08:52

## 2024-09-30 RX ADMIN — FLUTICASONE FUROATE AND VILANTEROL TRIFENATATE 1 PUFF: 200; 25 POWDER RESPIRATORY (INHALATION) at 08:52

## 2024-09-30 RX ADMIN — ASPIRIN 81 MG: 81 TABLET, CHEWABLE ORAL at 08:52

## 2024-09-30 RX ADMIN — PANTOPRAZOLE SODIUM 40 MG: 40 INJECTION, POWDER, FOR SOLUTION INTRAVENOUS at 07:44

## 2024-09-30 RX ADMIN — METOCLOPRAMIDE HYDROCHLORIDE 10 MG: 5 INJECTION INTRAMUSCULAR; INTRAVENOUS at 07:44

## 2024-09-30 RX ADMIN — IPRATROPIUM BROMIDE AND ALBUTEROL SULFATE 3 ML: 2.5; .5 SOLUTION RESPIRATORY (INHALATION) at 10:54

## 2024-09-30 RX ADMIN — MAGNESIUM SULFATE HEPTAHYDRATE 1 G: 1 INJECTION, SOLUTION INTRAVENOUS at 07:47

## 2024-09-30 RX ADMIN — IPRATROPIUM BROMIDE AND ALBUTEROL SULFATE 3 ML: 2.5; .5 SOLUTION RESPIRATORY (INHALATION) at 07:13

## 2024-09-30 RX ADMIN — ACETAMINOPHEN 1000 MG: 10 INJECTION, SOLUTION INTRAVENOUS at 07:44

## 2024-09-30 ASSESSMENT — PAIN SCALES - GENERAL
PAINLEVEL_OUTOF10: 0 - NO PAIN
PAINLEVEL_OUTOF10: 3

## 2024-09-30 ASSESSMENT — COGNITIVE AND FUNCTIONAL STATUS - GENERAL
MOBILITY SCORE: 24
DAILY ACTIVITIY SCORE: 24

## 2024-09-30 ASSESSMENT — PAIN DESCRIPTION - DESCRIPTORS: DESCRIPTORS: ACHING

## 2024-09-30 ASSESSMENT — PAIN - FUNCTIONAL ASSESSMENT
PAIN_FUNCTIONAL_ASSESSMENT: 0-10
PAIN_FUNCTIONAL_ASSESSMENT: 0-10

## 2024-09-30 NOTE — CARE PLAN
Problem: Pain - Adult  Goal: Verbalizes/displays adequate comfort level or baseline comfort level  Outcome: Met     Problem: Safety - Adult  Goal: Free from fall injury  Outcome: Met     Problem: Discharge Planning  Goal: Discharge to home or other facility with appropriate resources  Outcome: Met     Problem: Chronic Conditions and Co-morbidities  Goal: Patient's chronic conditions and co-morbidity symptoms are monitored and maintained or improved  Outcome: Met     Problem: Pain  Goal: Takes deep breaths with improved pain control throughout the shift  Outcome: Met  Goal: Walks with improved pain control throughout the shift  Outcome: Met  Goal: Performs ADL's with improved pain control throughout shift  Outcome: Met  Goal: Free from opioid side effects throughout the shift  Outcome: Met  Goal: Free from acute confusion related to pain meds throughout the shift  Outcome: Met     Problem: Fall/Injury  Goal: Not fall by end of shift  Outcome: Met  Goal: Be free from injury by end of the shift  Outcome: Met  Goal: Verbalize understanding of personal risk factors for fall in the hospital  Outcome: Met  Goal: Verbalize understanding of risk factor reduction measures to prevent injury from fall in the home  Outcome: Met  Goal: Use assistive devices by end of the shift  Outcome: Met  Goal: Pace activities to prevent fatigue by end of the shift  Outcome: Met   The patient's goals for the shift include     The clinical goals for the shift include patient will maintain spo2> 92% on room air throughout this shift    Over the shift, the patient met goals .

## 2024-09-30 NOTE — PROGRESS NOTES
[facility] Critical Care Medicine       Date:  9/30/2024  Patient:  Sarahi Haley  YOB: 1985  MRN:  [id number]   Admit Date:  9/28/2024  ========================================================================================================    Chief Complaint   Patient presents with    Shortness of Breath     Pt arrived by squad with sob that woke her up from sleep. Tried duonebs and home and squad gave 2 additional without relief         History of Present Illness:  Sarahi Haley is a 38 y.o. year old female patient with Past Medical History of  asthma with no prior intubations, GERD, seizure disorder, PTSD, and anxiety who presented to [facility] emergency department on 9/28 for dyspnea. Reported woke up from sleep with difficulty breathing. Received duonebs, initiated on steroid therapy, and admitted to hospital medicine service. This am a rapid response was called for acute asthma exacerbation with increased work of breathing, hypoxia, and tachycardia. Was given duoneb with additional albuterol treatment with no significant improvement in work of breathing, was initiated on BiPAP therapy, and transferred to the ICU.     Interval ICU Events:  9/29: Admitted to the ICU with acute asthma exacerbation on BiPAP therapy.   9/30: Improvement in respiratory status, no respiratory distress. On room air with SPO2 greater than 95%. Discussed with respiratory regarding CPAP and Dr. Ortiz notified. CPAP ordered.     Medical History:  Past Medical History:   Diagnosis Date    Acute cystitis without hematuria 08/08/2019    Acute cystitis without hematuria    Asthma ([facility]-HCC)     Disease of thyroid gland     Encounter for initial prescription of contraceptive pills 11/01/2019    Encounter for initial prescription of contraceptive pills    Nausea 02/05/2021    Nausea in adult    Other general symptoms and signs 12/05/2019    Forgetfulness    Parageusia 04/06/2020    Taste perversion    Pelvic and perineal pain      Pelvic pain in female    Personal history of other diseases of the respiratory system 04/06/2020    History of sinusitis    Personal history of other specified conditions 07/01/2020    History of vomiting    Personal history of other specified conditions 01/26/2021    History of headache    Personal history of other specified conditions 02/01/2021    History of diarrhea    Personal history of thrombophlebitis 06/03/2019    History of phlebitis    Personal history of urinary (tract) infections 01/16/2020    History of urinary tract infection    PTSD (post-traumatic stress disorder)      Past Surgical History:   Procedure Laterality Date    ABDOMINAL SURGERY      APPENDECTOMY      OTHER SURGICAL HISTORY  02/08/2019    Appendectomy    OTHER SURGICAL HISTORY  02/08/2019    Cholecystectomy    OTHER SURGICAL HISTORY  02/08/2019    Cystoscopy    OTHER SURGICAL HISTORY  02/08/2019    Ureteroscopy    OTHER SURGICAL HISTORY  02/08/2019    Shoulder surgery     Facility-Administered Medications Prior to Admission   Medication Dose Route Frequency Provider Last Rate Last Admin    tezepelumab-ekko (Tezpire) SubQ syringe 210 mg  210 mg subcutaneous Once Salomón Montgomery MD         Medications Prior to Admission   Medication Sig Dispense Refill Last Dose    albuterol 2.5 mg /3 mL (0.083 %) nebulizer solution Take 3 mL by nebulization every 4 hours if needed for shortness of breath.   9/28/2024    albuterol-budesonide (Airsupra) 90-80 mcg/actuation inhaler Inhale 2 puffs every 4 hours if needed (cough, wheeze, short of breath). Patient has a coupon 32.1 g 1 9/28/2024    aspirin 81 mg chewable tablet Chew 1 tablet (81 mg) once daily.   9/27/2024    budesonide (Pulmicort) 0.5 mg/2 mL nebulizer solution Take 2 mL (0.5 mg) by nebulization 2 times a day.   9/27/2024    butalbital-acetaminophen-caff -40 mg tablet Take 1 tablet by mouth early in the morning.. 1 tab(s) orally once a day, As Needed (Patient taking differently: Take 1  tablet by mouth once daily in the morning.) 90 tablet 3 Past Week    desvenlafaxine 100 mg 24 hr tablet TAKE 1 TABLET BY MOUTH EVERY DAY (Patient taking differently: Take 1 tablet (100 mg) by mouth once daily. Take with 50 mg) 90 tablet 3 9/27/2024    desvenlafaxine 50 mg 24 hr tablet TAKE 1 TABLET BY MOUTH ONCE DAILY (Patient taking differently: Take 1 tablet (50 mg) by mouth once daily. Take with 100 mg) 90 tablet 3 9/27/2024    esomeprazole (NexIUM) 20 mg DR capsule Take 1 capsule (20 mg) by mouth once daily in the morning. Take before meals. Do not open capsule. 30 capsule 11 9/27/2024    fexofenadine (Allegra) 180 mg tablet Take 1 tablet (180 mg) by mouth once daily.   9/27/2024    fluticasone/vilanterol (BREO ELLIPTA INHL) Inhale 1 puff once daily.   9/27/2024    hydrOXYzine HCL (Atarax) 25 mg tablet Take 1 tablet (25 mg) by mouth 3 times a day as needed for anxiety. 90 tablet 2 9/27/2024    levothyroxine (Synthroid, Levoxyl) 100 mcg tablet Take 1 tablet (100 mcg) by mouth once daily.   9/27/2024    metoclopramide (Reglan) 10 mg tablet Take 1 tablet (10 mg) by mouth every 8 hours if needed (nausea). 90 tablet 1 9/20/2024    montelukast (Singulair) 10 mg tablet Take 1 tablet (10 mg) by mouth once daily at bedtime. 30 tablet 5 9/27/2024    multivitamin-Ca-iron-minerals (Tab-A-Vu Womens) 27-0.4 mg tablet Take 1 tablet by mouth once daily.   9/27/2024    potassium chloride ER (Micro-K) 10 mEq ER capsule Take 1 capsule (10 mEq) by mouth once daily. 30 capsule 3 9/27/2024    predniSONE (Deltasone) 5 mg tablet Take 1 tablet (5 mg) by mouth. Tapering dose   9/27/2024    EPINEPHrine 0.3 mg/0.3 mL injection syringe Inject 0.3 mL (0.3 mg) into the muscle once daily as needed for anaphylaxis. 2 each 3 Unknown    fluticasone-umeclidin-vilanter (Trelegy Ellipta) 200-62.5-25 mcg blister with device Inhale 1 puff early in the morning.. (Patient not taking: Reported on 9/13/2024) 3 each 3     formoterol (Perforomist) 20  mcg/2 mL nebulizer solution Take 2 mL (20 mcg) by nebulization 2 times a day. (Patient taking differently: Take 2 mL (20 mcg) by nebulization once daily.) 120 mL 1     galcanezumab (Emgality Syringe) 120 mg/mL prefilled syringe Inject 1 Syringe (120 mg) under the skin every 28 (twenty-eight) days.   9/22/2024    promethazine (Phenergan) 12.5 mg tablet Take 1 tablet (12.5 mg) by mouth every 6 hours if needed for nausea or vomiting.   9/21/2024    semaglutide, weight loss, (Wegovy) 0.5 mg/0.5 mL pen injector Inject 0.5 mg under the skin 1 (one) time per week for 16 doses. (Patient taking differently: Inject 0.5 mg under the skin 1 (one) time per week. Wednesday) 2 mL 3 9/25/2024    tezepelumab-ekko (Tezspire) SubQ syringe Inject 210 mg under the skin every 28 (twenty-eight) days.   9/13/2024     Bee venom protein (honey bee), Diphenhydramine, Nsaids (non-steroidal anti-inflammatory drug), Procaine, and Ketorolac  Social History     Tobacco Use    Smoking status: Never     Passive exposure: Current    Smokeless tobacco: Never   Vaping Use    Vaping status: Never Used   Substance Use Topics    Alcohol use: Yes     Alcohol/week: 1.0 - 2.0 standard drink of alcohol     Types: 1 - 2 Glasses of wine per week     Comment: social    Drug use: Never     Family History   Problem Relation Name Age of Onset    Hypertension Father      Other (Pulmonary Valve Stenosis) Sister      Heart disease Maternal Grandmother      Diabetes Other Grandparent     Lung cancer Other Grandparent     Anxiety disorder Sibling         Review of Systems:  14 point review of systems was completed and negative except for those specially mention in my HPI    Physical Exam:    Heart Rate:  []   Resp:  [9-31]   BP: (114-211)/(52-99)   Weight:  [148 kg (326 lb 8 oz)]   SpO2:  [94 %-100 %]     Physical Exam  HENT:      Head: Normocephalic.      Mouth/Throat:      Mouth: Mucous membranes are moist.   Eyes:      Extraocular Movements: Extraocular  movements intact.      Conjunctiva/sclera: Conjunctivae normal.   Cardiovascular:      Rate and Rhythm: Regular rhythm. Tachycardia present.      Pulses: Normal pulses.      Heart sounds: Normal heart sounds.   Abdominal:      General: Bowel sounds are normal.      Palpations: Abdomen is soft.   Musculoskeletal:         General: Normal range of motion.      Cervical back: Normal range of motion.   Skin:     General: Skin is warm.      Capillary Refill: Capillary refill takes less than 2 seconds.   Neurological:      General: No focal deficit present.      Mental Status: She is alert.         Objective:    I have reviewed all medications, laboratory results, and imaging pertinent for today's encounter    Assessment/Plan:    I am currently managing this critically ill patient for the following problems:    Neuro/Psych/Pain Ctrl/Sedation:  Anxiety/ PTSD  Migraine  Home desvenlafaxine, atarax  Fioricet as needed  CAM-ICU  Delirium precautions    Respiratory/ENT:  Acute asthma exacerbation  Acute hypoxic respiratory failure  Duonebs scheduled and as needed  Solumedrol  BiPAP as needed  Oxygen therapy as needed to maintain SPO2 greater than 90%, wean as able  Home inhalers, singulair  Pulmonology following  CPAP ordered by Dr. Ortiz should be delivered by end of week  OP follow up with sleep medicine    Cardiovascular:  No acute concerns    GI:  GERD  Home PPI  Regular diet    Renal/Volume Status (Intra & Extravascular):  Trend BMP  Strict I&Os    Endocrine  Hypothyroidism   Accu check with SSI while on steroid therapy  Home levothyroxine    Infectious Disease:  Leukocytosis-improving  Likely 2/2 to steroid  Low suspicion for infection, afebrile  Monitor off antibiotic therapy, initiate as clinically indicated    Heme/Onc:  Monitor for s/s of bleeding  DVTp    MSK:  OOB as able    Ethics/Code Status:  FULL    :  DVT Prophylaxis: lovenox  GI Prophylaxis: home PPI  Bowel Regimen: as needed  Diet:  regular  CVC: none  Ensign: none  St[place]land: none  Restraints: none  Dispo: discharge    Plan discussed with Dr. Jocelyn Huang, APRN-CNP

## 2024-09-30 NOTE — DISCHARGE INSTRUCTIONS
CPAP will be delivered to your home by end of week if have any questions regarding CPAP device contact Dr. Ortiz office  Follow up with Dr. Ortiz for sleep medicine  Follow up with pulmonologist and allergist

## 2024-09-30 NOTE — DISCHARGE SUMMARY
Discharge Diagnosis  Asthma exacerbation, non-allergic, moderate persistent ([facility]-HCC)    Issues Requiring Follow-Up  Moderate asthma with acute exacerbation  Obstructive sleep apnea    Test Results Pending At Discharge  Pending Labs       No current pending labs.            Hospital Course   Sarahi Haley is a 38 y.o. year old female patient with Past Medical History of  asthma with no prior intubations, GERD, seizure disorder, PTSD, and anxiety who presented to [facility] emergency department on 9/28 for dyspnea. Reported woke up from sleep with difficulty breathing. Received duonebs, initiated on steroid therapy, and admitted to hospital medicine service. The following morning a rapid response was called for acute asthma exacerbation with increased work of breathing, hypoxia, and tachycardia. Was given duoneb with additional albuterol treatment with no significant improvement in work of breathing, was initiated on BiPAP therapy, and transferred to the ICU. Was able to be weaned off BiPAP therapy after a couple of hours, received Duonebs, and steroid therapy. The next day was weaned off oxygen therapy and endorsed improvement in respiratory status with no respiratory distress or wheezing noted. Discussed with patient sleep study results and MYNOR contributing to asthma symptoms/ exacerbation. Stated pending appointment with Dr. Ortiz to obtain CPAP. Dr. Ortiz was notified and arrangements made to complete ordering of device and should be delivered to patient's home by this week.     Pertinent Physical Exam At Time of Discharge  Physical Exam  HENT:      Head: Normocephalic.      Mouth/Throat:      Mouth: Mucous membranes are moist.   Eyes:      Extraocular Movements: Extraocular movements intact.      Conjunctiva/sclera: Conjunctivae normal.   Cardiovascular:      Rate and Rhythm: Normal rate and regular rhythm.      Pulses: Normal pulses.      Heart sounds: Normal heart sounds.   Pulmonary:      Effort: Pulmonary  effort is normal.      Breath sounds: Normal breath sounds.   Abdominal:      General: Bowel sounds are normal.      Palpations: Abdomen is soft.   Musculoskeletal:         General: Normal range of motion.      Cervical back: Normal range of motion.   Skin:     General: Skin is warm and dry.      Capillary Refill: Capillary refill takes less than 2 seconds.   Neurological:      General: No focal deficit present.      Mental Status: She is alert.   Psychiatric:         Mood and Affect: Mood normal.         Behavior: Behavior normal.         Home Medications     Medication List      CHANGE how you take these medications     formoterol 20 mcg/2 mL nebulizer solution; Commonly known as:   Perforomist; Take 2 mL (20 mcg) by nebulization 2 times a day.; What   changed: when to take this   predniSONE 10 mg tablet; Commonly known as: Deltasone; Take 1 tablet (10   mg) by mouth once daily.; What changed: medication strength, how much to   take, when to take this, additional instructions     CONTINUE taking these medications     Airsupra 90-80 mcg/actuation inhaler; Generic drug:   albuterol-budesonide; Inhale 2 puffs every 4 hours if needed (cough,   wheeze, short of breath). Patient has a coupon   albuterol 2.5 mg /3 mL (0.083 %) nebulizer solution   aspirin 81 mg chewable tablet   budesonide 0.5 mg/2 mL nebulizer solution; Commonly known as: Pulmicort   butalbital-acetaminophen-caff -40 mg tablet; Take 1 tablet by   mouth early in the morning.. 1 tab(s) orally once a day, As Needed   * desvenlafaxine 100 mg 24 hr tablet; Commonly known as: Pristiq; TAKE 1   TABLET BY MOUTH EVERY DAY   * desvenlafaxine 50 mg 24 hr tablet; Commonly known as: Pristiq; TAKE 1   TABLET BY MOUTH ONCE DAILY   Emgality Syringe 120 mg/mL prefilled syringe; Generic drug: galcanezumab   EPINEPHrine 0.3 mg/0.3 mL injection syringe; Commonly known as: Epipen;   Inject 0.3 mL (0.3 mg) into the muscle once daily as needed for   anaphylaxis.    esomeprazole 20 mg DR capsule; Commonly known as: NexIUM; Take 1 capsule   (20 mg) by mouth once daily in the morning. Take before meals. Do not open   capsule.   fexofenadine 180 mg tablet; Commonly known as: Allegra   hydrOXYzine HCL 25 mg tablet; Commonly known as: Atarax; Take 1 tablet   (25 mg) by mouth 3 times a day as needed for anxiety.   levothyroxine 100 mcg tablet; Commonly known as: Synthroid, Levoxyl   metoclopramide 10 mg tablet; Commonly known as: Reglan; Take 1 tablet   (10 mg) by mouth every 8 hours if needed (nausea).   montelukast 10 mg tablet; Commonly known as: Singulair; Take 1 tablet   (10 mg) by mouth once daily at bedtime.   multivitamin-Ca-iron-minerals 27-0.4 mg tablet; Commonly known as:   Tab-A-Vu Womens   potassium chloride ER 10 mEq ER capsule; Commonly known as: Micro-K;   Take 1 capsule (10 mEq) by mouth once daily.   promethazine 12.5 mg tablet; Commonly known as: Phenergan   Tezspire SubQ syringe; Generic drug: tezepelumab-ekko   Wegovy 0.5 mg/0.5 mL pen injector; Generic drug: semaglutide (weight   loss); Inject 0.5 mg under the skin 1 (one) time per week for 16 doses.  * This list has 2 medication(s) that are the same as other medications   prescribed for you. Read the directions carefully, and ask your doctor or   other care provider to review them with you.     STOP taking these medications     BREO ELLIPTA INHL   Trelegy Ellipta 200-62.5-25 mcg blister with device; Generic drug:   fluticasone-umeclidin-vilanter       Outpatient Follow-Up  Future Appointments   Date Time Provider Department Center   10/22/2024  9:20 AM Hilary Trinidad DO [id number] [place]   11/1/2024  8:15 AM Gregory Valdez MD [email or web address] [place]       Ashley Huang, APRN-CNP

## 2024-09-30 NOTE — PROGRESS NOTES
09/30/24 1340   Discharge Planning   Living Arrangements Spouse/significant other   Support Systems Spouse/significant other   Assistance Needed NONE   Type of Residence Private residence   Home or Post Acute Services None   Expected Discharge Disposition Home   Does the patient need discharge transport arranged? No   Patient Choice   Patient / Family choosing to utilize agency / facility established prior to hospitalization No

## 2024-10-01 ENCOUNTER — PATIENT OUTREACH (OUTPATIENT)
Dept: PRIMARY CARE | Facility: CLINIC | Age: 39
End: 2024-10-01
Payer: COMMERCIAL

## 2024-10-01 NOTE — PROGRESS NOTES
Discharge Facility: [facility]  Discharge Diagnosis: asthma  Admission Date: 9./28/24  Discharge Date: 9/30/24    PCP Appointment Date: 10/3/24  Specialist Appointment Date: NICOLASA Bustos working on CPAP  Hospital Encounter and Summary Linked: Yes  See discharge assessment below for further details      Engagement  Call Start Time: 1055 (10/1/2024 11:03 AM)    Medications  Medications reviewed with patient/caregiver?: Yes (10/1/2024 11:03 AM)  Is the patient having any side effects they believe may be caused by any medication additions or changes?: No (10/1/2024 11:03 AM)  Does the patient have all medications ordered at discharge?: Yes (10/1/2024 11:03 AM)  Care Management Interventions: No intervention needed (10/1/2024 11:03 AM)  Prescription Comments: on course of prednisone (10/1/2024 11:03 AM)  Is the patient taking all medications as directed (includes completed medication regime)?: Yes (10/1/2024 11:03 AM)  Care Management Interventions: Provided patient education (10/1/2024 11:03 AM)  Medication Comments: No other issues with inhalers or medications (10/1/2024 11:03 AM)    Appointments  Does the patient have a primary care provider?: Yes (10/1/2024 11:03 AM)  Care Management Interventions: Verified appointment date/time/provider (10/1/2024 11:03 AM)  Has the patient kept scheduled appointments due by today?: Yes (10/1/2024 11:03 AM)  Care Management Interventions: Advised patient to keep appointment (Working with Dr Bustos's office, hoping CPAP will be delivered to home this week) (10/1/2024 11:03 AM)    Self Management  What is the home health agency?: N/A (10/1/2024 11:03 AM)  What Durable Medical Equipment (DME) was ordered?: N/A (10/1/2024 11:03 AM)    Patient Teaching  Does the patient have access to their discharge instructions?: Yes (10/1/2024 11:03 AM)  Care Management Interventions: Reviewed instructions with patient (10/1/2024 11:03 AM)  What is the patient's perception of their health status since  discharge?: Improving (10/1/2024 11:03 AM)  Is the patient/caregiver able to teach back the hierarchy of who to call/visit for symptoms/problems? PCP, Specialist, Home Health nurse, Urgent Care, ED, 911: Yes (10/1/2024 11:03 AM)  Patient/Caregiver Education Comments: Sarahi will continue her prednisone as ordered, seek care with any worsening shortness of breath. Use inhalers as ordered. (10/1/2024 11:03 AM)    Wrap Up  Wrap Up Additional Comments: Successful transition of care outreach with patient. Patient reports doing well at home since discharge. New meds/changes reviewed with patient during outreach. Patient denies CP/SOB/abdominal pain. Patient denies further discharge questions/concerns/needs at time of outreach call. Emphasized that follow up appts are needed after discharge with PCP and reviewed needed follow ups with any specialties to assess response to treatment from hospitalization. Patient aware of my availability for non-emergent concerns. (10/1/2024 11:03 AM)  Call End Time: 1100 (10/1/2024 11:03 AM)

## 2024-10-03 ENCOUNTER — OFFICE VISIT (OUTPATIENT)
Dept: PRIMARY CARE | Facility: CLINIC | Age: 39
End: 2024-10-03
Payer: COMMERCIAL

## 2024-10-03 VITALS
HEART RATE: 75 BPM | SYSTOLIC BLOOD PRESSURE: 120 MMHG | RESPIRATION RATE: 18 BRPM | BODY MASS INDEX: 48.82 KG/M2 | DIASTOLIC BLOOD PRESSURE: 80 MMHG | HEIGHT: 65 IN | OXYGEN SATURATION: 97 % | WEIGHT: 293 LBS

## 2024-10-03 DIAGNOSIS — J45.41 ASTHMA IN ADULT, MODERATE PERSISTENT, WITH ACUTE EXACERBATION (HHS-HCC): Primary | ICD-10-CM

## 2024-10-03 DIAGNOSIS — F41.9 ANXIETY: ICD-10-CM

## 2024-10-03 DIAGNOSIS — Z09 HOSPITAL DISCHARGE FOLLOW-UP: ICD-10-CM

## 2024-10-03 DIAGNOSIS — G40.209 SEIZURE DISORDER, COMPLEX PARTIAL, WITHOUT INTRACTABLE EPILEPSY (MULTI): ICD-10-CM

## 2024-10-03 DIAGNOSIS — J45.41 ASTHMA IN ADULT, MODERATE PERSISTENT, WITH ACUTE EXACERBATION (HHS-HCC): ICD-10-CM

## 2024-10-03 PROCEDURE — 3008F BODY MASS INDEX DOCD: CPT | Performed by: FAMILY MEDICINE

## 2024-10-03 PROCEDURE — 99213 OFFICE O/P EST LOW 20 MIN: CPT | Performed by: FAMILY MEDICINE

## 2024-10-03 RX ORDER — HYDROXYZINE HYDROCHLORIDE 25 MG/1
25 TABLET, FILM COATED ORAL 3 TIMES DAILY PRN
Qty: 90 TABLET | Refills: 2 | Status: SHIPPED | OUTPATIENT
Start: 2024-10-03

## 2024-10-03 NOTE — PROGRESS NOTES
"Subjective   Patient ID: Sarahi Haley is a 38 y.o. female who presents for Hospital Follow-up and Asthma.  HPI    Discharge Facility:  [facility]  Discharge Diagnosis: asthma  Admission Date: 9/28/24  Discharge Date: 9/30/24     PCP Appointment Date: 10/3/24  Specialist Appointment Date: NICOLASA Bustos working on CPAP    Patient had BW, XR chest 1 view, ECG 12 Lead.   Patient was prescribed prednisone.   Patient states that she is not feeling the best since being out of the hospital. States that her breathing is better. Patient is having nausea, abdominal cramping, headaches.     Taking current medications which were reviewed.  Problem list discussed.    Eating okay.  Staying active.    Has no other new problem /question.     ROS  Constitutional- No activity change. No appetite change.  Eyes- Denies vision changes.  Respiratory- No shortness of breath.  Cardiovascular- No palpitations. No chest pain.  Musculoskeletal- Denies joint swelling.  Extremities- No edema.  Neurological- Denies headaches. Denies dizziness.  Skin- No rashes.  Psychiatric/Behavioral- Denies significant anxiety, or depressed mood.     Objective     /80   Pulse 75   Resp 18   Ht 1.651 m (5' 5\")   Wt 142 kg (313 lb)   SpO2 97%   BMI 52.09 kg/m²     Allergies   Allergen Reactions    Bee Venom Protein (Honey Bee) Shortness of breath    Diphenhydramine Rash     arythmia    Nsaids (Non-Steroidal Anti-Inflammatory Drug) Hives    Procaine Hives and Swelling    Ketorolac Other     facial redness       Constitutional-- Well-nourished.  No distress  Head- unremarkable.  Eyes- PERRL.  Conjunctiva normal.  Nose- Normal.  No rhinorrhea noted.  Throat- Oropharynx is clear and moist.  Neck- Supple with no thyromegaly.  No significant cervical adenopathy noted.  Pulmonary/Chest- Breath sounds normal with normal effort.  No wheezing.  Heart- Regular rate and rhythm.  No murmur.  Abdomen- Soft and non-tender.  No masses noted.  Musculoskeletal- Normal ROM.  " No significant joint swelling  Extremities- No edema.   Neurological- Alert.  No noted deficits.  Skin- Warm.   Psychiatric/Behavioral- Mood and affect normal.  Behavior normal.     Assessment/Plan   1. Asthma in adult, moderate persistent, with acute exacerbation ([facility])        2. Anxiety  hydrOXYzine HCL (Atarax) 25 mg tablet      3. Seizure disorder, complex partial, without intractable epilepsy ([facility])               Long talk. Treatment options reviewed.    Reviewed most recent lab work with patient. Advised patient to remain up to date on routine maintenance and health screening.  Maintain appointments with specialists as scheduled.  Advised patient to remain up to date on immunizations.     Counseled the patient on anxiety.  Controlled    Educated on asthma care and management.     Discussed importance of natural sources of nutrition.  Advised patient to consume vegetables, salads, fruits, nuts, and proteins such as fish and chicken.  Discussed portion control.      Discussed the importance of routine stretching and exercise.   Continue and take your medications as prescribed.    Health Maintenance issues discussed.      Follow-up as instructed or sooner if any problems or symptoms do not resolve as expected.        Scribe Attestation  By signing my name below, IRitu Scribe   attest that this documentation has been prepared under the direction and in the presence of Gregory Valdez MD.

## 2024-10-08 DIAGNOSIS — J45.50 SEVERE PERSISTENT ASTHMA, UNSPECIFIED WHETHER COMPLICATED (MULTI): Primary | ICD-10-CM

## 2024-10-09 ENCOUNTER — PATIENT OUTREACH (OUTPATIENT)
Dept: PRIMARY CARE | Facility: CLINIC | Age: 39
End: 2024-10-09
Payer: COMMERCIAL

## 2024-10-22 ENCOUNTER — APPOINTMENT (OUTPATIENT)
Dept: ALLERGY | Facility: CLINIC | Age: 39
End: 2024-10-22
Payer: COMMERCIAL

## 2024-10-22 VITALS
DIASTOLIC BLOOD PRESSURE: 80 MMHG | HEIGHT: 65 IN | SYSTOLIC BLOOD PRESSURE: 110 MMHG | TEMPERATURE: 98.1 F | HEART RATE: 76 BPM | BODY MASS INDEX: 48.82 KG/M2 | OXYGEN SATURATION: 97 % | WEIGHT: 293 LBS | RESPIRATION RATE: 18 BRPM

## 2024-10-22 DIAGNOSIS — J45.40 MODERATE PERSISTENT ASTHMA, UNSPECIFIED WHETHER COMPLICATED (HHS-HCC): ICD-10-CM

## 2024-10-22 DIAGNOSIS — T73.2XXS FATIGUE DUE TO EXPOSURE, SEQUELA: Primary | ICD-10-CM

## 2024-10-22 PROCEDURE — 3008F BODY MASS INDEX DOCD: CPT | Performed by: ALLERGY & IMMUNOLOGY

## 2024-10-22 PROCEDURE — 96160 PT-FOCUSED HLTH RISK ASSMT: CPT | Performed by: ALLERGY & IMMUNOLOGY

## 2024-10-22 PROCEDURE — 99214 OFFICE O/P EST MOD 30 MIN: CPT | Performed by: ALLERGY & IMMUNOLOGY

## 2024-10-22 RX ORDER — PREDNISONE 2.5 MG/1
5 TABLET ORAL DAILY
Qty: 35 TABLET | Refills: 0 | OUTPATIENT
Start: 2024-10-22 | End: 2024-10-25

## 2024-10-22 NOTE — PATIENT INSTRUCTIONS
Check with your cardiologist about aspirin.  Allegra-need to be off of for 5 days prior to testing.  Try 5 mg oral prednisone for a week, then do 2.5 mg daily.

## 2024-10-22 NOTE — PROGRESS NOTES
Patient ID: Sarahi Haley is a 38 y.o. female.     Chief Complaint: follow up  History Of Present Illness  Sarahi Haley is a 38 y.o. female with PMx severe asthma presenting for follow up.     Food Allergy  No    Eczema/ Atopic Dermatitis  No    Asthma  Trelegy 200 one time daily  Singulair at bed  Tezspire once a month, last 10/14/24  AirSupra use is about 5 times in the past week. Not sure what is triggering.  Was on Prednisone for a week prior to her Tezspire.  She had been on s/p hospitalized.  She had been in for 3 days.  She was at [facility].  Required bipap/icu  ACT 15  Rhinoconjunctivitis  No    Drug Allergy   History noted for NSAID allergy. This was noted when she had Toradol and Aleve post op.  Hives then started.  Has been on ASA 81 mg since June 2023.  Followed by cardiology.    Insect Allergy   No    Infections  No history of frequent or recurrent infections      Review of Systems    Pertinent positives and negatives have been assessed in the HPI. All other systems have been reviewed and are negative except as noted in the HPI.    Allergies  Bee venom protein (honey bee), Diphenhydramine, Nsaids (non-steroidal anti-inflammatory drug), Procaine, and Ketorolac    Past Medical History  She has a past medical history of Acute cystitis without hematuria (08/08/2019), Asthma, Disease of thyroid gland, Encounter for initial prescription of contraceptive pills (11/01/2019), Nausea (02/05/2021), Other general symptoms and signs (12/05/2019), Parageusia (04/06/2020), Pelvic and perineal pain, Personal history of other diseases of the respiratory system (04/06/2020), Personal history of other specified conditions (07/01/2020), Personal history of other specified conditions (01/26/2021), Personal history of other specified conditions (02/01/2021), Personal history of thrombophlebitis (06/03/2019), Personal history of urinary (tract) infections (01/16/2020), and PTSD (post-traumatic stress disorder).    Family  History  Family History   Problem Relation Name Age of Onset    Hypertension Father      Other (Pulmonary Valve Stenosis) Sister      Heart disease Maternal Grandmother      Diabetes Other Grandparent     Lung cancer Other Grandparent     Anxiety disorder Sibling         No history of food allergy or atopic disease in the family.    Surgical History  She has a past surgical history that includes Other surgical history (02/08/2019); Other surgical history (02/08/2019); Other surgical history (02/08/2019); Other surgical history (02/08/2019); Other surgical history (02/08/2019); Abdominal surgery; and Appendectomy.    Social/Environmental History  She reports that she has never smoked. She has been exposed to tobacco smoke. She has never used smokeless tobacco. She reports current alcohol use of about 1.0 - 2.0 standard drink of alcohol per week. She reports that she does not use drugs.      MEDICATIONS  Current Outpatient Medications on File Prior to Visit   Medication Sig Dispense Refill    albuterol 2.5 mg /3 mL (0.083 %) nebulizer solution Take 3 mL by nebulization every 4 hours if needed for shortness of breath.      albuterol-budesonide (Airsupra) 90-80 mcg/actuation inhaler Inhale 2 puffs every 4 hours if needed (cough, wheeze, short of breath). Patient has a coupon 32.1 g 1    aspirin 81 mg chewable tablet Chew 1 tablet (81 mg) once daily.      budesonide (Pulmicort) 0.5 mg/2 mL nebulizer solution Take 2 mL (0.5 mg) by nebulization 2 times a day.      butalbital-acetaminophen-caff -40 mg tablet Take 1 tablet by mouth early in the morning.. 1 tab(s) orally once a day, As Needed 90 tablet 3    desvenlafaxine 50 mg 24 hr tablet TAKE 1 TABLET BY MOUTH ONCE DAILY (Patient taking differently: Take 1 tablet (50 mg) by mouth once daily. Take with 100 mg) 90 tablet 3    EPINEPHrine 0.3 mg/0.3 mL injection syringe Inject 0.3 mL (0.3 mg) into the muscle once daily as needed for anaphylaxis. 2 each 3    esomeprazole  (NexIUM) 20 mg DR capsule Take 1 capsule (20 mg) by mouth once daily in the morning. Take before meals. Do not open capsule. 30 capsule 11    fexofenadine (Allegra) 180 mg tablet Take 1 tablet (180 mg) by mouth once daily.      galcanezumab (Emgality Syringe) 120 mg/mL prefilled syringe Inject 1 Syringe (120 mg) under the skin every 28 (twenty-eight) days.      hydrOXYzine HCL (Atarax) 25 mg tablet Take 1 tablet (25 mg) by mouth 3 times a day as needed for anxiety. 90 tablet 2    levothyroxine (Synthroid, Levoxyl) 100 mcg tablet Take 1 tablet (100 mcg) by mouth once daily.      metoclopramide (Reglan) 10 mg tablet Take 1 tablet (10 mg) by mouth every 8 hours if needed (nausea). 90 tablet 1    multivitamin-Ca-iron-minerals (Tab-A-Vu Womens) 27-0.4 mg tablet Take 1 tablet by mouth once daily.      potassium chloride ER (Micro-K) 10 mEq ER capsule Take 1 capsule (10 mEq) by mouth once daily. 30 capsule 3    promethazine (Phenergan) 12.5 mg tablet Take 1 tablet (12.5 mg) by mouth every 6 hours if needed for nausea or vomiting.      semaglutide, weight loss, (Wegovy) 0.5 mg/0.5 mL pen injector Inject 0.5 mg under the skin 1 (one) time per week for 16 doses. 2 mL 3    tezepelumab-ekko (Tezspire) SubQ syringe Inject 210 mg under the skin every 28 (twenty-eight) days.      desvenlafaxine 100 mg 24 hr tablet TAKE 1 TABLET BY MOUTH EVERY DAY (Patient taking differently: Take 1 tablet (100 mg) by mouth once daily. Take with 50 mg) 90 tablet 3    formoterol (Perforomist) 20 mcg/2 mL nebulizer solution Take 2 mL (20 mcg) by nebulization 2 times a day. (Patient taking differently: Take 2 mL (20 mcg) by nebulization once daily.) 120 mL 1    montelukast (Singulair) 10 mg tablet Take 1 tablet (10 mg) by mouth once daily at bedtime. 30 tablet 5    predniSONE (Deltasone) 10 mg tablet Take 1 tablet (10 mg) by mouth once daily. (Patient not taking: Reported on 10/22/2024) 30 tablet 0     No current facility-administered medications on  "file prior to visit.         Physical Exam  Visit Vitals  /80   Pulse 76   Temp 36.7 °C (98.1 °F) (Temporal)   Resp 18   Ht 1.651 m (5' 5\")   Wt 141 kg (310 lb)   SpO2 97%   BMI 51.59 kg/m²   OB Status Having periods   Smoking Status Never   BSA 2.54 m²       Wt Readings from Last 1 Encounters:   10/22/24 141 kg (310 lb)       Physical Exam    General: Well appearing, no acute distress. BMI 51  Head: Normocephalic, atraumatic, neck supple without lymphadenopathy  Eyes: non-injected  Nose: No nasal crease, nares patent, slightly boggy turbinates, minimal discharge  Throat: Normal dentition, no erythema  Heart: Regular rate and rhythm  Lungs: Clear to auscultation bilaterally, effort normal  Extremities: Moves all extremities symmetrically, no edema  Skin: No rashes/lesions  Psych: normal mood and affect    LAB RESULTS:  CBC:  Recent Labs     09/30/24 0358 09/29/24 0528 09/28/24  0403   WBC 16.7* 19.2* 6.4   HGB 13.2 13.2 15.4   HCT 39.5 38.5 43.8    299 351   MCV 90 87 84   EOSABS 0.00 0.00 0.01       CMP:  Recent Labs     09/30/24  0358 09/29/24  0528 09/28/24  0403 09/13/24  0556 09/12/24  2210    138 137   < > 137   K 3.3* 4.0 3.5   < > 3.5   * 109* 105   < > 105   CO2 24 22 23   < > 23   ANIONGAP 11 11 13   < > 13   BUN 14 9 9   < > 12   CREATININE 0.88 0.83 0.92   < > 1.12*   EGFR 87 >90 83   < > 65   MG 2.01 2.00  --   --  1.94    < > = values in this interval not displayed.     Recent Labs     09/30/24 0358 09/29/24  0528 09/28/24  0403 09/14/22  0200 09/13/22  0735 08/03/22  1851 07/28/22  1827 01/22/22  0407 09/13/21  0050   ALBUMIN 3.9 3.8 4.1   < > 3.9   < > 4.2   < > 4.0   ALKPHOS 57 60 64   < > 73   < > 74   < > 74   * 70* 55*   < > 38   < > 46*   < > 41   AST 57* 37 32   < > 23   < > 33   < > 23   BILITOT 0.5 0.5 1.0   < > 0.3   < > 0.8   < > 0.5   LIPASE  --   --   --   --  34  --  35  --  55    < > = values in this interval not displayed.       ALLERGY:   Lab Results "   Component Value Date    ICIGE 67.0 08/14/2023    WHITEASH <0.10 08/14/2023    SILVERBIRCH <0.10 08/14/2023    BOXELDER <0.10 08/14/2023    MOUNTJUNIPER <0.10 08/14/2023    COTTONWOOD <0.10 08/14/2023    ELM <0.10 08/14/2023    MULBERRY <0.10 08/14/2023    PECANHICKORY <0.10 08/14/2023    MAPLESYCAMOR <0.10 08/14/2023    OAK <0.10 08/14/2023    BERMUDAGR <0.10 08/14/2023    JOHNSONGR <0.10 08/14/2023    BLUEGRASS <0.10 08/14/2023    TIMOTHYGRASS <0.10 08/14/2023     Lab Results   Component Value Date    LAMBQUART <0.10 08/14/2023    PIGWEED <0.10 08/14/2023    COMRAGWEED <0.10 08/14/2023    SHEEPSOR <0.10 08/14/2023    PLANTAIN <0.10 08/14/2023    CATEPI <0.10 08/14/2023    DOGEPI <0.10 08/14/2023    ALTERNA <0.10 08/14/2023    CLADHERB <0.10 08/14/2023    ICA04 <0.10 08/14/2023    DERMFAR <0.10 08/14/2023    DERMPTE <0.10 08/14/2023    COCKR 0.35 (A) 08/14/2023       Recent Labs     08/14/23  1548   ICIGE 67.0     Recent Labs     09/30/24  0358 09/29/24  0528 09/28/24  0403   EOSABS 0.00 0.00 0.01       COAG:   Recent Labs     09/28/24  0403 08/27/24  2144 06/09/24  0012   INR 1.0 1.0 0.9       HEME/ENDO:  Recent Labs     04/03/24  0024 10/15/23  1423 08/21/23  0120 07/10/23  1434 05/24/23  0917 09/14/22  0200   TSH 4.56*  --  2.23 2.66   < >  --    HGBA1C  --  4.7  --   --   --   --    FERRITIN  --   --   --   --   --  183*    < > = values in this interval not displayed.         Assessment/Plan   Sarahi is a 39 yo woman with severe persistent asthma. There is some improvement on Tezspire, but has multiple confounding issues contributing to flares. She has now started CPAP.  She will see pulmonology. She has had trouble when she weans off steroids, so will attempt low dose.  She is trying to lose weight is on weight loss medication.  Patient has been on ASA and has a documented allergy to NSAID. I am concerned that this may be an underlying trigger for her unstable severe persistent asthm.  Sarahi will speak  with her cardiologist.  Discontinue nebulized hospital meds. Restart Trelegy, Singulair and AirSupra.   Follow up one month    Hilary Trinidad DO

## 2024-10-24 ENCOUNTER — APPOINTMENT (OUTPATIENT)
Dept: PHARMACY | Facility: HOSPITAL | Age: 39
End: 2024-10-24
Payer: COMMERCIAL

## 2024-10-24 ENCOUNTER — HOSPITAL ENCOUNTER (EMERGENCY)
Facility: HOSPITAL | Age: 39
Discharge: HOME | End: 2024-10-25
Attending: INTERNAL MEDICINE
Payer: COMMERCIAL

## 2024-10-24 DIAGNOSIS — R06.02 SHORTNESS OF BREATH: Primary | ICD-10-CM

## 2024-10-24 DIAGNOSIS — J98.01 ACUTE BRONCHOSPASM: ICD-10-CM

## 2024-10-24 PROCEDURE — 99284 EMERGENCY DEPT VISIT MOD MDM: CPT

## 2024-10-24 ASSESSMENT — PAIN SCALES - GENERAL: PAINLEVEL_OUTOF10: 4

## 2024-10-25 ENCOUNTER — APPOINTMENT (OUTPATIENT)
Dept: CARDIOLOGY | Facility: HOSPITAL | Age: 39
End: 2024-10-25
Payer: COMMERCIAL

## 2024-10-25 ENCOUNTER — APPOINTMENT (OUTPATIENT)
Dept: RADIOLOGY | Facility: HOSPITAL | Age: 39
End: 2024-10-25
Payer: COMMERCIAL

## 2024-10-25 VITALS
HEART RATE: 64 BPM | WEIGHT: 200 LBS | HEIGHT: 65 IN | OXYGEN SATURATION: 95 % | RESPIRATION RATE: 21 BRPM | DIASTOLIC BLOOD PRESSURE: 70 MMHG | BODY MASS INDEX: 33.32 KG/M2 | SYSTOLIC BLOOD PRESSURE: 121 MMHG | TEMPERATURE: 97 F

## 2024-10-25 LAB
ALBUMIN SERPL BCP-MCNC: 4 G/DL (ref 3.4–5)
ALP SERPL-CCNC: 64 U/L (ref 33–110)
ALT SERPL W P-5'-P-CCNC: 40 U/L (ref 7–45)
ANION GAP SERPL CALC-SCNC: 11 MMOL/L (ref 10–20)
AST SERPL W P-5'-P-CCNC: 20 U/L (ref 9–39)
B-HCG SERPL-ACNC: <2 MIU/ML
BASOPHILS # BLD AUTO: 0.04 X10*3/UL (ref 0–0.1)
BASOPHILS NFR BLD AUTO: 0.5 %
BILIRUB SERPL-MCNC: 0.7 MG/DL (ref 0–1.2)
BUN SERPL-MCNC: 10 MG/DL (ref 6–23)
CALCIUM SERPL-MCNC: 9.1 MG/DL (ref 8.6–10.3)
CARDIAC TROPONIN I PNL SERPL HS: 3 NG/L (ref 0–13)
CARDIAC TROPONIN I PNL SERPL HS: 3 NG/L (ref 0–13)
CHLORIDE SERPL-SCNC: 106 MMOL/L (ref 98–107)
CO2 SERPL-SCNC: 25 MMOL/L (ref 21–32)
CREAT SERPL-MCNC: 0.92 MG/DL (ref 0.5–1.05)
EGFRCR SERPLBLD CKD-EPI 2021: 83 ML/MIN/1.73M*2
EOSINOPHIL # BLD AUTO: 0.01 X10*3/UL (ref 0–0.7)
EOSINOPHIL NFR BLD AUTO: 0.1 %
ERYTHROCYTE [DISTWIDTH] IN BLOOD BY AUTOMATED COUNT: 12.2 % (ref 11.5–14.5)
FLUAV RNA RESP QL NAA+PROBE: NOT DETECTED
FLUBV RNA RESP QL NAA+PROBE: NOT DETECTED
GLUCOSE SERPL-MCNC: 134 MG/DL (ref 74–99)
HCT VFR BLD AUTO: 41.3 % (ref 36–46)
HGB BLD-MCNC: 14.7 G/DL (ref 12–16)
IMM GRANULOCYTES # BLD AUTO: 0.01 X10*3/UL (ref 0–0.7)
IMM GRANULOCYTES NFR BLD AUTO: 0.1 % (ref 0–0.9)
LYMPHOCYTES # BLD AUTO: 2.71 X10*3/UL (ref 1.2–4.8)
LYMPHOCYTES NFR BLD AUTO: 34.3 %
MAGNESIUM SERPL-MCNC: 1.71 MG/DL (ref 1.6–2.4)
MCH RBC QN AUTO: 29.9 PG (ref 26–34)
MCHC RBC AUTO-ENTMCNC: 35.6 G/DL (ref 32–36)
MCV RBC AUTO: 84 FL (ref 80–100)
MONOCYTES # BLD AUTO: 0.6 X10*3/UL (ref 0.1–1)
MONOCYTES NFR BLD AUTO: 7.6 %
NEUTROPHILS # BLD AUTO: 4.53 X10*3/UL (ref 1.2–7.7)
NEUTROPHILS NFR BLD AUTO: 57.4 %
NRBC BLD-RTO: 0 /100 WBCS (ref 0–0)
PLATELET # BLD AUTO: 356 X10*3/UL (ref 150–450)
POTASSIUM SERPL-SCNC: 2.9 MMOL/L (ref 3.5–5.3)
PROT SERPL-MCNC: 6.7 G/DL (ref 6.4–8.2)
RBC # BLD AUTO: 4.91 X10*6/UL (ref 4–5.2)
SARS-COV-2 RNA RESP QL NAA+PROBE: NOT DETECTED
SODIUM SERPL-SCNC: 139 MMOL/L (ref 136–145)
WBC # BLD AUTO: 7.9 X10*3/UL (ref 4.4–11.3)

## 2024-10-25 PROCEDURE — 71045 X-RAY EXAM CHEST 1 VIEW: CPT

## 2024-10-25 PROCEDURE — 85025 COMPLETE CBC W/AUTO DIFF WBC: CPT | Performed by: INTERNAL MEDICINE

## 2024-10-25 PROCEDURE — 80053 COMPREHEN METABOLIC PANEL: CPT | Performed by: INTERNAL MEDICINE

## 2024-10-25 PROCEDURE — 84484 ASSAY OF TROPONIN QUANT: CPT | Performed by: INTERNAL MEDICINE

## 2024-10-25 PROCEDURE — 83735 ASSAY OF MAGNESIUM: CPT | Performed by: INTERNAL MEDICINE

## 2024-10-25 PROCEDURE — 36415 COLL VENOUS BLD VENIPUNCTURE: CPT | Performed by: INTERNAL MEDICINE

## 2024-10-25 PROCEDURE — 93005 ELECTROCARDIOGRAM TRACING: CPT

## 2024-10-25 PROCEDURE — 2500000002 HC RX 250 W HCPCS SELF ADMINISTERED DRUGS (ALT 637 FOR MEDICARE OP, ALT 636 FOR OP/ED): Performed by: INTERNAL MEDICINE

## 2024-10-25 PROCEDURE — 2500000004 HC RX 250 GENERAL PHARMACY W/ HCPCS (ALT 636 FOR OP/ED): Performed by: INTERNAL MEDICINE

## 2024-10-25 PROCEDURE — 96365 THER/PROPH/DIAG IV INF INIT: CPT

## 2024-10-25 PROCEDURE — 87636 SARSCOV2 & INF A&B AMP PRB: CPT | Performed by: INTERNAL MEDICINE

## 2024-10-25 PROCEDURE — 96367 TX/PROPH/DG ADDL SEQ IV INF: CPT

## 2024-10-25 PROCEDURE — 84702 CHORIONIC GONADOTROPIN TEST: CPT | Performed by: INTERNAL MEDICINE

## 2024-10-25 PROCEDURE — 96375 TX/PRO/DX INJ NEW DRUG ADDON: CPT

## 2024-10-25 PROCEDURE — 96376 TX/PRO/DX INJ SAME DRUG ADON: CPT

## 2024-10-25 PROCEDURE — 71045 X-RAY EXAM CHEST 1 VIEW: CPT | Performed by: RADIOLOGY

## 2024-10-25 RX ORDER — ONDANSETRON HYDROCHLORIDE 2 MG/ML
4 INJECTION, SOLUTION INTRAVENOUS ONCE
Status: COMPLETED | OUTPATIENT
Start: 2024-10-25 | End: 2024-10-25

## 2024-10-25 RX ORDER — MAGNESIUM SULFATE HEPTAHYDRATE 40 MG/ML
2 INJECTION, SOLUTION INTRAVENOUS ONCE
Status: COMPLETED | OUTPATIENT
Start: 2024-10-25 | End: 2024-10-25

## 2024-10-25 RX ORDER — ACETAMINOPHEN 10 MG/ML
1000 INJECTION, SOLUTION INTRAVENOUS ONCE
Status: COMPLETED | OUTPATIENT
Start: 2024-10-25 | End: 2024-10-25

## 2024-10-25 RX ORDER — PREDNISONE 50 MG/1
50 TABLET ORAL DAILY
Qty: 3 TABLET | Refills: 0 | Status: SHIPPED | OUTPATIENT
Start: 2024-10-25 | End: 2024-10-28

## 2024-10-25 RX ORDER — POTASSIUM CHLORIDE 20 MEQ/1
40 TABLET, EXTENDED RELEASE ORAL ONCE
Status: COMPLETED | OUTPATIENT
Start: 2024-10-25 | End: 2024-10-25

## 2024-10-25 ASSESSMENT — PAIN SCALES - GENERAL
PAINLEVEL_OUTOF10: 0 - NO PAIN
PAINLEVEL_OUTOF10: 4
PAINLEVEL_OUTOF10: 0 - NO PAIN
PAINLEVEL_OUTOF10: 0 - NO PAIN

## 2024-10-25 ASSESSMENT — PAIN DESCRIPTION - LOCATION
LOCATION: HEAD
LOCATION: HEAD

## 2024-10-25 ASSESSMENT — PAIN - FUNCTIONAL ASSESSMENT
PAIN_FUNCTIONAL_ASSESSMENT: 0-10
PAIN_FUNCTIONAL_ASSESSMENT: 0-10

## 2024-10-25 NOTE — ED PROVIDER NOTES
HPI   Chief Complaint   Patient presents with    Shortness of Breath     Pt arrived by squad with SOB since 5pm today. Hx of asthma with frequent asthma attacks. 2 Duo nebs and 2 albuterol at home before calling squad with no relief. 2 Duonebs en route. Pt satting at 99% RA on arrival.       Patient presented for evaluation of shortness of breath.  Patient states she has had ongoing problems with shortness of breath intermittently for the last year and a half.  Patient notes in the last 6 hours she started developing shortness of breath.  Patient did 2 treatments at home.  Patient also had 2 DuoNebs in route.  Patient was treated with Solu-Medrol via EMS and route.  Patient feels mild improvement but continues to be short of breath.  Patient denies productive cough.  Denies runny nose.  Currently on long-acting beta agonist as well as prednisone 10 mg every other day.              Patient History   Past Medical History:   Diagnosis Date    Acute cystitis without hematuria 08/08/2019    Acute cystitis without hematuria    Asthma     Disease of thyroid gland     Encounter for initial prescription of contraceptive pills 11/01/2019    Encounter for initial prescription of contraceptive pills    Nausea 02/05/2021    Nausea in adult    Other general symptoms and signs 12/05/2019    Forgetfulness    Parageusia 04/06/2020    Taste perversion    Pelvic and perineal pain     Pelvic pain in female    Personal history of other diseases of the respiratory system 04/06/2020    History of sinusitis    Personal history of other specified conditions 07/01/2020    History of vomiting    Personal history of other specified conditions 01/26/2021    History of headache    Personal history of other specified conditions 02/01/2021    History of diarrhea    Personal history of thrombophlebitis 06/03/2019    History of phlebitis    Personal history of urinary (tract) infections 01/16/2020    History of urinary tract infection    PTSD  (post-traumatic stress disorder)      Past Surgical History:   Procedure Laterality Date    ABDOMINAL SURGERY      APPENDECTOMY      OTHER SURGICAL HISTORY  02/08/2019    Appendectomy    OTHER SURGICAL HISTORY  02/08/2019    Cholecystectomy    OTHER SURGICAL HISTORY  02/08/2019    Cystoscopy    OTHER SURGICAL HISTORY  02/08/2019    Ureteroscopy    OTHER SURGICAL HISTORY  02/08/2019    Shoulder surgery     Family History   Problem Relation Name Age of Onset    Hypertension Father      Other (Pulmonary Valve Stenosis) Sister      Heart disease Maternal Grandmother      Diabetes Other Grandparent     Lung cancer Other Grandparent     Anxiety disorder Sibling       Social History     Tobacco Use    Smoking status: Never     Passive exposure: Current    Smokeless tobacco: Never   Vaping Use    Vaping status: Never Used   Substance Use Topics    Alcohol use: Yes     Alcohol/week: 1.0 - 2.0 standard drink of alcohol     Types: 1 - 2 Glasses of wine per week     Comment: social    Drug use: Never       Physical Exam   ED Triage Vitals [10/24/24 8337]   Temperature Heart Rate Respirations BP   36.1 °C (97 °F) 96 (!) 22 (!) 185/147      Pulse Ox Temp src Heart Rate Source Patient Position   99 % -- Monitor Sitting      BP Location FiO2 (%)     Right arm --       Physical Exam  Vitals and nursing note reviewed.   Constitutional:       Appearance: Normal appearance. She is ill-appearing.   HENT:      Head: Atraumatic.      Right Ear: External ear normal.      Left Ear: External ear normal.      Nose: Nose normal.      Mouth/Throat:      Mouth: Mucous membranes are moist.   Eyes:      Extraocular Movements: Extraocular movements intact.      Pupils: Pupils are equal, round, and reactive to light.   Cardiovascular:      Rate and Rhythm: Normal rate and regular rhythm.      Pulses: Normal pulses.   Pulmonary:      Effort: Pulmonary effort is normal. Tachypnea present.      Breath sounds: Examination of the left-middle field  reveals wheezing. Examination of the right-lower field reveals decreased breath sounds. Examination of the left-lower field reveals decreased breath sounds. Decreased breath sounds and wheezing present.   Abdominal:      Palpations: Abdomen is soft.      Tenderness: There is no abdominal tenderness.   Musculoskeletal:         General: No tenderness. Normal range of motion.      Cervical back: Normal range of motion and neck supple. No rigidity or tenderness.   Skin:     General: Skin is warm and dry.   Neurological:      General: No focal deficit present.      Mental Status: She is alert and oriented to person, place, and time. Mental status is at baseline.   Psychiatric:         Mood and Affect: Mood normal.         Behavior: Behavior normal.           ED Course & MDM   ED Course as of 10/26/24 0251   Fri Oct 25, 2024   0132 Reassessed patient.  Breath sounds clear to auscultation bilaterally.  Feeling better. [JA]   0240 Reassessed patient.  Patient has some intermittent nausea but she states this is common after these exacerbations.  Will do short course of prednisone.  Patient agrees to follow-up with pulmonology as outpatient and to return to the ED if having worsening symptoms or other concerns. [JA]      ED Course User Index  [JA] Rolly Patel,          Diagnoses as of 10/26/24 0251   Shortness of breath   Acute bronchospasm                 No data recorded                                 Medical Decision Making  Differential diagnosis: Acute asthma exacerbation, bronchospasm, upper respiratory infection, pneumonia, other    Patient presented for recurrent shortness of breath.  Wheezing on initial exam.  Significantly improved with IV magnesium.  Patient previously received Solu-Medrol multiple breathing treatments.  Magnesium considered and as patient likely has hypokalemia related to multiple albuterol treatments.  X-ray negative for infiltrate.  O2 sat now within normal notes on room air.  Breath  sounds now clear to auscultation bilaterally.  No significant anemia.  No significant electrolyte imbalance or renal dysfunction.  Patient agrees to follow-up with pulmonology as outpatient and to return to the ED if having worsening symptoms or other concerns.        Procedure  ECG 12 lead    Performed by: Rolly Patel DO  Authorized by: Rolly Patel DO    ECG interpreted by ED Physician in the absence of a cardiologist: yes    Comments:      10/25/2024 00: 59 sinus rhythm, rate 81, normal axis, ST segments normal, diffuse T wave flattening, abnormal EKG.  EKG interpreted by myself.       Rolly Patel DO  10/26/24 0251

## 2024-10-26 LAB
ATRIAL RATE: 81 BPM
P AXIS: 56 DEGREES
P OFFSET: 182 MS
P ONSET: 128 MS
PR INTERVAL: 178 MS
Q ONSET: 217 MS
QRS COUNT: 14 BEATS
QRS DURATION: 86 MS
QT INTERVAL: 370 MS
QTC CALCULATION(BAZETT): 429 MS
QTC FREDERICIA: 408 MS
R AXIS: 13 DEGREES
T AXIS: -28 DEGREES
T OFFSET: 402 MS
VENTRICULAR RATE: 81 BPM

## 2024-10-29 ENCOUNTER — APPOINTMENT (OUTPATIENT)
Dept: OBSTETRICS AND GYNECOLOGY | Facility: CLINIC | Age: 39
End: 2024-10-29
Payer: COMMERCIAL

## 2024-11-01 ENCOUNTER — APPOINTMENT (OUTPATIENT)
Dept: PRIMARY CARE | Facility: CLINIC | Age: 39
End: 2024-11-01
Payer: COMMERCIAL

## 2024-11-13 ENCOUNTER — APPOINTMENT (OUTPATIENT)
Dept: RADIOLOGY | Facility: HOSPITAL | Age: 39
End: 2024-11-13
Payer: COMMERCIAL

## 2024-11-13 ENCOUNTER — APPOINTMENT (OUTPATIENT)
Dept: CARDIOLOGY | Facility: HOSPITAL | Age: 39
End: 2024-11-13
Payer: COMMERCIAL

## 2024-11-13 ENCOUNTER — HOSPITAL ENCOUNTER (EMERGENCY)
Facility: HOSPITAL | Age: 39
Discharge: HOME | End: 2024-11-13
Attending: EMERGENCY MEDICINE
Payer: COMMERCIAL

## 2024-11-13 VITALS
TEMPERATURE: 98.4 F | BODY MASS INDEX: 33.32 KG/M2 | RESPIRATION RATE: 18 BRPM | HEIGHT: 65 IN | DIASTOLIC BLOOD PRESSURE: 60 MMHG | HEART RATE: 87 BPM | SYSTOLIC BLOOD PRESSURE: 151 MMHG | WEIGHT: 200 LBS | OXYGEN SATURATION: 96 %

## 2024-11-13 DIAGNOSIS — J45.901 SEVERE ASTHMA WITH EXACERBATION, UNSPECIFIED WHETHER PERSISTENT (HHS-HCC): Primary | ICD-10-CM

## 2024-11-13 LAB
ANION GAP SERPL CALC-SCNC: 12 MMOL/L (ref 10–20)
BASOPHILS # BLD MANUAL: 0 X10*3/UL (ref 0–0.1)
BASOPHILS NFR BLD MANUAL: 0 %
BNP SERPL-MCNC: 26 PG/ML (ref 0–99)
BUN SERPL-MCNC: 8 MG/DL (ref 6–23)
CALCIUM SERPL-MCNC: 8.7 MG/DL (ref 8.6–10.3)
CHLORIDE SERPL-SCNC: 106 MMOL/L (ref 98–107)
CO2 SERPL-SCNC: 24 MMOL/L (ref 21–32)
CREAT SERPL-MCNC: 0.95 MG/DL (ref 0.5–1.05)
EGFRCR SERPLBLD CKD-EPI 2021: 80 ML/MIN/1.73M*2
EOSINOPHIL # BLD MANUAL: 0 X10*3/UL (ref 0–0.7)
EOSINOPHIL NFR BLD MANUAL: 0 %
ERYTHROCYTE [DISTWIDTH] IN BLOOD BY AUTOMATED COUNT: 12.6 % (ref 11.5–14.5)
FLUAV RNA RESP QL NAA+PROBE: NOT DETECTED
FLUBV RNA RESP QL NAA+PROBE: NOT DETECTED
GLUCOSE SERPL-MCNC: 127 MG/DL (ref 74–99)
HCT VFR BLD AUTO: 42.6 % (ref 36–46)
HGB BLD-MCNC: 15 G/DL (ref 12–16)
IMM GRANULOCYTES # BLD AUTO: 0.03 X10*3/UL (ref 0–0.7)
IMM GRANULOCYTES NFR BLD AUTO: 0.3 % (ref 0–0.9)
LYMPHOCYTES # BLD MANUAL: 4.66 X10*3/UL (ref 1.2–4.8)
LYMPHOCYTES NFR BLD MANUAL: 42 %
MCH RBC QN AUTO: 29.9 PG (ref 26–34)
MCHC RBC AUTO-ENTMCNC: 35.2 G/DL (ref 32–36)
MCV RBC AUTO: 85 FL (ref 80–100)
MONOCYTES # BLD MANUAL: 0.78 X10*3/UL (ref 0.1–1)
MONOCYTES NFR BLD MANUAL: 7 %
NEUTS SEG # BLD MANUAL: 5.66 X10*3/UL (ref 1.2–7)
NEUTS SEG NFR BLD MANUAL: 51 %
NRBC BLD-RTO: 0 /100 WBCS (ref 0–0)
PLATELET # BLD AUTO: 407 X10*3/UL (ref 150–450)
POTASSIUM SERPL-SCNC: 2.8 MMOL/L (ref 3.5–5.3)
RBC # BLD AUTO: 5.02 X10*6/UL (ref 4–5.2)
RBC MORPH BLD: NORMAL
SARS-COV-2 RNA RESP QL NAA+PROBE: NOT DETECTED
SODIUM SERPL-SCNC: 139 MMOL/L (ref 136–145)
TOTAL CELLS COUNTED BLD: 100
WBC # BLD AUTO: 11.1 X10*3/UL (ref 4.4–11.3)

## 2024-11-13 PROCEDURE — 71045 X-RAY EXAM CHEST 1 VIEW: CPT

## 2024-11-13 PROCEDURE — 87636 SARSCOV2 & INF A&B AMP PRB: CPT | Performed by: EMERGENCY MEDICINE

## 2024-11-13 PROCEDURE — 80048 BASIC METABOLIC PNL TOTAL CA: CPT | Performed by: EMERGENCY MEDICINE

## 2024-11-13 PROCEDURE — 2500000004 HC RX 250 GENERAL PHARMACY W/ HCPCS (ALT 636 FOR OP/ED): Performed by: EMERGENCY MEDICINE

## 2024-11-13 PROCEDURE — 85027 COMPLETE CBC AUTOMATED: CPT | Performed by: EMERGENCY MEDICINE

## 2024-11-13 PROCEDURE — 71045 X-RAY EXAM CHEST 1 VIEW: CPT | Performed by: RADIOLOGY

## 2024-11-13 PROCEDURE — 99285 EMERGENCY DEPT VISIT HI MDM: CPT | Mod: 25

## 2024-11-13 PROCEDURE — 36415 COLL VENOUS BLD VENIPUNCTURE: CPT | Performed by: EMERGENCY MEDICINE

## 2024-11-13 PROCEDURE — 93005 ELECTROCARDIOGRAM TRACING: CPT

## 2024-11-13 PROCEDURE — 94640 AIRWAY INHALATION TREATMENT: CPT

## 2024-11-13 PROCEDURE — 96376 TX/PRO/DX INJ SAME DRUG ADON: CPT

## 2024-11-13 PROCEDURE — 96368 THER/DIAG CONCURRENT INF: CPT

## 2024-11-13 PROCEDURE — 96375 TX/PRO/DX INJ NEW DRUG ADDON: CPT

## 2024-11-13 PROCEDURE — 2500000002 HC RX 250 W HCPCS SELF ADMINISTERED DRUGS (ALT 637 FOR MEDICARE OP, ALT 636 FOR OP/ED): Performed by: EMERGENCY MEDICINE

## 2024-11-13 PROCEDURE — 85007 BL SMEAR W/DIFF WBC COUNT: CPT | Performed by: EMERGENCY MEDICINE

## 2024-11-13 PROCEDURE — 96365 THER/PROPH/DIAG IV INF INIT: CPT

## 2024-11-13 PROCEDURE — 83880 ASSAY OF NATRIURETIC PEPTIDE: CPT | Performed by: EMERGENCY MEDICINE

## 2024-11-13 RX ORDER — IPRATROPIUM BROMIDE AND ALBUTEROL SULFATE 2.5; .5 MG/3ML; MG/3ML
3 SOLUTION RESPIRATORY (INHALATION) ONCE
Status: COMPLETED | OUTPATIENT
Start: 2024-11-13 | End: 2024-11-13

## 2024-11-13 RX ORDER — ALBUTEROL SULFATE 0.83 MG/ML
2.5 SOLUTION RESPIRATORY (INHALATION) ONCE
Status: COMPLETED | OUTPATIENT
Start: 2024-11-13 | End: 2024-11-13

## 2024-11-13 RX ORDER — ONDANSETRON HYDROCHLORIDE 2 MG/ML
4 INJECTION, SOLUTION INTRAVENOUS ONCE
Status: COMPLETED | OUTPATIENT
Start: 2024-11-13 | End: 2024-11-13

## 2024-11-13 RX ORDER — ACETAMINOPHEN 10 MG/ML
1000 INJECTION, SOLUTION INTRAVENOUS EVERY 6 HOURS SCHEDULED
Status: DISCONTINUED | OUTPATIENT
Start: 2024-11-13 | End: 2024-11-13 | Stop reason: HOSPADM

## 2024-11-13 RX ORDER — MAGNESIUM SULFATE HEPTAHYDRATE 40 MG/ML
2 INJECTION, SOLUTION INTRAVENOUS ONCE
Status: COMPLETED | OUTPATIENT
Start: 2024-11-13 | End: 2024-11-13

## 2024-11-13 RX ORDER — MORPHINE SULFATE 4 MG/ML
4 INJECTION, SOLUTION INTRAMUSCULAR; INTRAVENOUS ONCE
Status: COMPLETED | OUTPATIENT
Start: 2024-11-13 | End: 2024-11-13

## 2024-11-13 RX ORDER — MAGNESIUM SULFATE HEPTAHYDRATE 40 MG/ML
INJECTION, SOLUTION INTRAVENOUS
Status: COMPLETED
Start: 2024-11-13 | End: 2024-11-13

## 2024-11-13 ASSESSMENT — PAIN - FUNCTIONAL ASSESSMENT
PAIN_FUNCTIONAL_ASSESSMENT: 0-10
PAIN_FUNCTIONAL_ASSESSMENT: 0-10

## 2024-11-13 ASSESSMENT — PAIN SCALES - GENERAL
PAINLEVEL_OUTOF10: 6
PAINLEVEL_OUTOF10: 7
PAINLEVEL_OUTOF10: 6
PAINLEVEL_OUTOF10: 6
PAINLEVEL_OUTOF10: 7

## 2024-11-13 ASSESSMENT — PAIN DESCRIPTION - LOCATION
LOCATION: HEAD

## 2024-11-13 NOTE — ED PROVIDER NOTES
HPI   Chief Complaint   Patient presents with   • Shortness of Breath     Pt arrived by squad with SOB due to an asthma attack. Pt states she was doing laundry around 2300 when this started.       cc              Patient History   Past Medical History:   Diagnosis Date   • Acute cystitis without hematuria 08/08/2019    Acute cystitis without hematuria   • Asthma    • Disease of thyroid gland    • Encounter for initial prescription of contraceptive pills 11/01/2019    Encounter for initial prescription of contraceptive pills   • Nausea 02/05/2021    Nausea in adult   • Other general symptoms and signs 12/05/2019    Forgetfulness   • Parageusia 04/06/2020    Taste perversion   • Pelvic and perineal pain     Pelvic pain in female   • Personal history of other diseases of the respiratory system 04/06/2020    History of sinusitis   • Personal history of other specified conditions 07/01/2020    History of vomiting   • Personal history of other specified conditions 01/26/2021    History of headache   • Personal history of other specified conditions 02/01/2021    History of diarrhea   • Personal history of thrombophlebitis 06/03/2019    History of phlebitis   • Personal history of urinary (tract) infections 01/16/2020    History of urinary tract infection   • PTSD (post-traumatic stress disorder)      Past Surgical History:   Procedure Laterality Date   • ABDOMINAL SURGERY     • APPENDECTOMY     • OTHER SURGICAL HISTORY  02/08/2019    Appendectomy   • OTHER SURGICAL HISTORY  02/08/2019    Cholecystectomy   • OTHER SURGICAL HISTORY  02/08/2019    Cystoscopy   • OTHER SURGICAL HISTORY  02/08/2019    Ureteroscopy   • OTHER SURGICAL HISTORY  02/08/2019    Shoulder surgery     Family History   Problem Relation Name Age of Onset   • Hypertension Father     • Other (Pulmonary Valve Stenosis) Sister     • Heart disease Maternal Grandmother     • Diabetes Other Grandparent    • Lung cancer Other Grandparent    • Anxiety disorder  Sibling       Social History     Tobacco Use   • Smoking status: Never     Passive exposure: Current   • Smokeless tobacco: Never   Vaping Use   • Vaping status: Never Used   Substance Use Topics   • Alcohol use: Yes     Alcohol/week: 1.0 - 2.0 standard drink of alcohol     Types: 1 - 2 Glasses of wine per week     Comment: social   • Drug use: Never       Physical Exam   ED Triage Vitals   Temp Pulse Resp BP   -- -- -- --      SpO2 Temp src Heart Rate Source Patient Position   -- -- -- --      BP Location FiO2 (%)     -- --       Physical Exam      ED Course & MDM                  No data recorded                                 Medical Decision Making      Procedure  ECG 12 lead    Performed by: Greg Napoles MD  Authorized by: Greg Napoles MD    ECG interpreted by ED Physician in the absence of a cardiologist: yes    Interpretation:     Interpretation: normal    Rate:     ECG rate:  97    ECG rate assessment: normal    Rhythm:     Rhythm: sinus rhythm    Ectopy:     Ectopy: none    QRS:     QRS axis:  Normal  ST segments:     ST segments:  Normal  T waves:     T waves: normal

## 2024-11-13 NOTE — ED PROVIDER NOTES
HPI   Chief Complaint   Patient presents with    Shortness of Breath     Pt arrived by squad with SOB due to an asthma attack. Pt states she was doing laundry around 2300 when this started.       Chief complaint shortness of breath  History of present illness this is a 38-year-old  female who has a history of asthma well-known to our emergency department today became short of breath requiring a squad transport was brought here triaged to room 16.  And here with a blood pressure 150/60 temp 36 pulse 87 respirate is 18 pulse ox 96%.  She is here multiple times is unclear what precipitates her asthma but there is some dust mite situation at home that precipitates this.              Patient History   Past Medical History:   Diagnosis Date    Acute cystitis without hematuria 08/08/2019    Acute cystitis without hematuria    Asthma     Disease of thyroid gland     Encounter for initial prescription of contraceptive pills 11/01/2019    Encounter for initial prescription of contraceptive pills    Nausea 02/05/2021    Nausea in adult    Other general symptoms and signs 12/05/2019    Forgetfulness    Parageusia 04/06/2020    Taste perversion    Pelvic and perineal pain     Pelvic pain in female    Personal history of other diseases of the respiratory system 04/06/2020    History of sinusitis    Personal history of other specified conditions 07/01/2020    History of vomiting    Personal history of other specified conditions 01/26/2021    History of headache    Personal history of other specified conditions 02/01/2021    History of diarrhea    Personal history of thrombophlebitis 06/03/2019    History of phlebitis    Personal history of urinary (tract) infections 01/16/2020    History of urinary tract infection    PTSD (post-traumatic stress disorder)      Past Surgical History:   Procedure Laterality Date    ABDOMINAL SURGERY      APPENDECTOMY      OTHER SURGICAL HISTORY  02/08/2019    Appendectomy    OTHER SURGICAL  HISTORY  02/08/2019    Cholecystectomy    OTHER SURGICAL HISTORY  02/08/2019    Cystoscopy    OTHER SURGICAL HISTORY  02/08/2019    Ureteroscopy    OTHER SURGICAL HISTORY  02/08/2019    Shoulder surgery     Family History   Problem Relation Name Age of Onset    Hypertension Father      Other (Pulmonary Valve Stenosis) Sister      Heart disease Maternal Grandmother      Diabetes Other Grandparent     Lung cancer Other Grandparent     Anxiety disorder Sibling       Social History     Tobacco Use    Smoking status: Never     Passive exposure: Current    Smokeless tobacco: Never   Vaping Use    Vaping status: Never Used   Substance Use Topics    Alcohol use: Yes     Alcohol/week: 1.0 - 2.0 standard drink of alcohol     Types: 1 - 2 Glasses of wine per week     Comment: social    Drug use: Never       Physical Exam   ED Triage Vitals [11/13/24 0337]   Temperature Heart Rate Respirations BP   36.9 °C (98.4 °F) 99 (!) 22 164/83      Pulse Ox Temp src Heart Rate Source Patient Position   98 % -- Monitor --      BP Location FiO2 (%)     -- --       Physical Exam  Vitals and nursing note reviewed. Exam conducted with a chaperone present.   Constitutional:       Appearance: Normal appearance.   HENT:      Head: Normocephalic and atraumatic.      Nose: Nose normal.      Mouth/Throat:      Mouth: Mucous membranes are moist.   Eyes:      Pupils: Pupils are equal, round, and reactive to light.   Cardiovascular:      Rate and Rhythm: Normal rate and regular rhythm.   Pulmonary:      Effort: Pulmonary effort is normal.      Breath sounds: Examination of the right-middle field reveals decreased breath sounds. Examination of the left-middle field reveals decreased breath sounds. Examination of the right-lower field reveals decreased breath sounds. Examination of the left-lower field reveals decreased breath sounds. Decreased breath sounds present.   Abdominal:      Palpations: Abdomen is soft.   Musculoskeletal:         General:  Normal range of motion.      Cervical back: Normal range of motion.   Skin:     General: Skin is warm and dry.      Capillary Refill: Capillary refill takes less than 2 seconds.   Neurological:      General: No focal deficit present.      Mental Status: She is alert.   Psychiatric:         Mood and Affect: Mood normal.           ED Course & MDM   Diagnoses as of 11/13/24 0559   Severe asthma with exacerbation, unspecified whether persistent ([facility])                 No data recorded                                 Medical Decision Making  Differential diagnosis   Asthma  Pneumonia COVID    Influenza  Bronchospasm  Considering the above here placed on cardiac monitor chest x-ray EKG were done blood work was done Solu-Medrol magnesium aerosol treatments patient was reassessed Zofran was given.  Offer Maalox.    Amount and/or Complexity of Data Reviewed  Labs: ordered. Decision-making details documented in ED Course.     Details: Labs reviewed  Radiology: ordered and independent interpretation performed.     Details: Chest x-ray clear  Discussion of management or test interpretation with external provider(s): Patient improved and was discharged home stable      Labs Reviewed   BASIC METABOLIC PANEL - Abnormal       Result Value    Glucose 127 (*)     Sodium 139      Potassium 2.8 (*)     Chloride 106      Bicarbonate 24      Anion Gap 12      Urea Nitrogen 8      Creatinine 0.95      eGFR 80      Calcium 8.7     CBC WITH AUTO DIFFERENTIAL - Normal    WBC 11.1      nRBC 0.0      RBC 5.02      Hemoglobin 15.0      Hematocrit 42.6      MCV 85      MCH 29.9      MCHC 35.2      RDW 12.6      Platelets 407      Immature Granulocytes %, Automated 0.3      Immature Granulocytes Absolute, Automated 0.03      Narrative:     The previously reported component Neutrophils % is no longer being reported.  The previously reported component Lymphocytes % is no longer being reported.  The previously reported component Monocytes % is no  longer being reported.  The previously   reported component Eosinophils % is no longer being reported.  The previously reported component Basophils % is no longer being reported.  The previously reported component Absolute Neutrophils is no longer being reported.  The previously reported   component Absolute Lymphocytes is no longer being reported.  The previously reported component Absolute Monocytes is no longer being reported.  The previously reported component Absolute Eosinophils is no longer being reported.  The previously reported   component Absolute Basophils is no longer being reported.   B-TYPE NATRIURETIC PEPTIDE - Normal    BNP 26      Narrative:        <100 pg/mL - Heart failure unlikely  100-299 pg/mL - Intermediate probability of acute heart                  failure exacerbation. Correlate with clinical                  context and patient history.    >=300 pg/mL - Heart Failure likely. Correlate with clinical                  context and patient history.    BNP testing is performed using different testing methodology at [facility] than at other [facility]. Direct result comparisons should only be made within the same method.      INFLUENZA A AND B PCR - Normal    Flu A Result Not Detected      Flu B Result Not Detected      Narrative:     This assay is an in vitro diagnostic multiplex nucleic acid amplification test for the detection and discrimination of Influenza A & B from nasopharyngeal specimens, and has been validated for use at [facility]. Negative results do not preclude Influenza A/B infections, and should not be used as the sole basis for diagnosis, treatment, or other management decisions. If Influenza A/B and RSV PCR results are negative, testing for Parainfluenza virus, Adenovirus and Metapneumovirus is routinely performed for [facility] [place] [place] in[place], and is available on other patients by placing an add-on request.    SARS-COV-2 PCR - Normal    Coronavirus 2019, PCR Not Detected      Narrative:     This assay has received FDA Emergency Use Authorization (EUA) and is only authorized for the duration of time that circumstances exist to justify the authorization of the emergency use of in vitro diagnostic tests for the detection of SARS-CoV-2 virus and/or diagnosis of COVID-19 infection under section 564(b)(1) of the Act, 21 U.S.C. 360bbb-3(b)(1). This assay is an in vitro diagnostic nucleic acid amplification test for the qualitative detection of SARS-CoV-2 from nasopharyngeal specimens and has been validated for use at [facility]. Negative results do not preclude COVID-19 infections and should not be used as the sole basis for diagnosis, treatment, or other management decisions.     MANUAL DIFFERENTIAL    Neutrophils %, Manual 51.0      Lymphocytes %, Manual 42.0      Monocytes %, Manual 7.0      Eosinophils %, Manual 0.0      Basophils %, Manual 0.0      Seg Neutrophils Absolute, Manual 5.66      Lymphocytes Absolute, Manual 4.66      Monocytes Absolute, Manual 0.78      Eosinophils Absolute, Manual 0.00      Basophils Absolute, Manual 0.00      Total Cells Counted 100      RBC Morphology No significant RBC morphology present          XR chest 1 view   Final Result   No airspace consolidation or pleural effusion.        MACRO:   None        Signed by: Salbador Urbina 11/13/2024 3:55 AM   Dictation workstation:   [id number]             Procedure  Procedures     Greg Napoles MD  11/13/24 0559

## 2024-11-14 ENCOUNTER — PATIENT OUTREACH (OUTPATIENT)
Dept: PRIMARY CARE | Facility: CLINIC | Age: 39
End: 2024-11-14
Payer: COMMERCIAL

## 2024-11-15 LAB
ATRIAL RATE: 97 BPM
P AXIS: 22 DEGREES
P OFFSET: 184 MS
P ONSET: 138 MS
PR INTERVAL: 170 MS
Q ONSET: 223 MS
QRS COUNT: 16 BEATS
QRS DURATION: 62 MS
QT INTERVAL: 350 MS
QTC CALCULATION(BAZETT): 444 MS
QTC FREDERICIA: 410 MS
R AXIS: -20 DEGREES
T AXIS: 4 DEGREES
T OFFSET: 398 MS
VENTRICULAR RATE: 97 BPM

## 2024-11-19 ENCOUNTER — APPOINTMENT (OUTPATIENT)
Dept: OBSTETRICS AND GYNECOLOGY | Facility: CLINIC | Age: 39
End: 2024-11-19
Payer: COMMERCIAL

## 2024-11-19 VITALS
SYSTOLIC BLOOD PRESSURE: 134 MMHG | BODY MASS INDEX: 48.82 KG/M2 | WEIGHT: 293 LBS | DIASTOLIC BLOOD PRESSURE: 64 MMHG | HEIGHT: 65 IN

## 2024-11-19 DIAGNOSIS — Z01.419 ENCNTR FOR GYN EXAM (GENERAL) (ROUTINE) W/O ABN FINDINGS: Primary | ICD-10-CM

## 2024-11-19 DIAGNOSIS — Z12.4 SCREENING FOR CERVICAL CANCER: ICD-10-CM

## 2024-11-19 PROCEDURE — 87591 N.GONORRHOEAE DNA AMP PROB: CPT

## 2024-11-19 PROCEDURE — 99395 PREV VISIT EST AGE 18-39: CPT | Performed by: ADVANCED PRACTICE MIDWIFE

## 2024-11-19 PROCEDURE — 87661 TRICHOMONAS VAGINALIS AMPLIF: CPT

## 2024-11-19 PROCEDURE — 3008F BODY MASS INDEX DOCD: CPT | Performed by: ADVANCED PRACTICE MIDWIFE

## 2024-11-19 PROCEDURE — 87491 CHLMYD TRACH DNA AMP PROBE: CPT

## 2024-11-19 PROCEDURE — 87624 HPV HI-RISK TYP POOLED RSLT: CPT

## 2024-11-19 RX ORDER — PREDNISONE 1 MG/1
1 TABLET ORAL DAILY
COMMUNITY

## 2024-11-19 RX ORDER — FLUTICASONE FUROATE, UMECLIDINIUM BROMIDE AND VILANTEROL TRIFENATATE 100; 62.5; 25 UG/1; UG/1; UG/1
POWDER RESPIRATORY (INHALATION) DAILY
COMMUNITY

## 2024-11-19 NOTE — PROGRESS NOTES
"GYNECOLOGY PROGRESS NOTE        CC:  see below. No issues or concerns. Wants STI cultures sent off the pap. Last pap 2023 LGSIL +HPV. Colpo and IUD inserted same day 3/34. ECC chronic cervicitis. Bx LGSIL. No menses with IUD. Does not feel for strings. No breast issues.   Chief Complaint   Patient presents with    Annual Exam     Est pt for annual visit  Pap 9/2023 LSIL hpv +  Colpo 3/2024  IUD inserted 3/2024       HPI:  Sarahi Haley is here for a routine GYN examination.  No GYN c/o, no AUB.      Depression screen:  negative      ROS:  GI - no blood in BMs  URO - no hematuria  GYN - no AUB or vaginal discharge  PSYCH - mood OK        PHYSICAL EXAM:  /64 (BP Location: Left arm, Patient Position: Sitting, BP Cuff Size: Adult)   Ht 1.651 m (5' 5\")   Wt 141 kg (310 lb 12.8 oz)   BMI 51.72 kg/m²   GEN:  A&O, NAD  URO:  normal urethra, no bladder TTP  GYN:  normal vulva and perineum w/o lesions or ulcers, normal vagina without discharge or lesions, normal cervix without lesions or discharge or CMT, IUD strings noted. uterus NT/NE, adnexa mobile and NT/NE  BREAST:  no masses or TTP, no skin lesions or nipple discharge  PSYCH:  normal affect, non-anxious      IMPRESSION/PLAN:    A: normal exam. IUD strings noted. No menses  Plan: 1. 1st pap after colpo today. 2. Follow up 1 year for annual with pap.   Problem List Items Addressed This Visit    None  Visit Diagnoses       Screening for cervical cancer                 .   ASCCP pap smear screening guidelines reviewed with the patient.        HEMA Arriaza-LOUISA  "

## 2024-11-20 ENCOUNTER — APPOINTMENT (OUTPATIENT)
Dept: ALLERGY | Facility: CLINIC | Age: 39
End: 2024-11-20
Payer: COMMERCIAL

## 2024-11-22 NOTE — PROGRESS NOTES
No chief complaint on file.    History of Present Illness:  Sarahi Haley is a 39 y.o. female presenting to clinic with complaints of right hip pain. She had a fluoroscopy guided right hip injection with Carol Nieves PA-C on 10/09/24.    Imaging:  X-rays: ***     Assessment:   ***    Plan:  We reviewed the role of imaging, physical therapy, injections and the time frame to healing and correlation with outcome.  ***  NSAID: ***.  GI side effects and medical risks discussed  Ice: 60 minutes on and off  Exercise home program: Medically directed Shoulder therapy / Handout given  Follow-up in ***.      Physical Exam:  Well-nourished, well-developed. No acute distress. Alert and oriented x3. Responds appropriately to questioning. Good mood. Normal affect.  Right Hip:  Normal gait, Negative Trendelenburg sign  Skin healthy and intact  ***  Negative straight leg raise  Neurovascular exam normal distally      Review of Systems:  GENERAL: Negative for malaise, significant weight loss, fever  MUSCULOSKELETAL: See HPI  NEURO:  Negative     Past Medical History:   Diagnosis Date    Acute cystitis without hematuria 08/08/2019    Acute cystitis without hematuria    Asthma     Disease of thyroid gland     Encounter for initial prescription of contraceptive pills 11/01/2019    Encounter for initial prescription of contraceptive pills    Nausea 02/05/2021    Nausea in adult    Other general symptoms and signs 12/05/2019    Forgetfulness    Parageusia 04/06/2020    Taste perversion    Pelvic and perineal pain     Pelvic pain in female    Personal history of other diseases of the respiratory system 04/06/2020    History of sinusitis    Personal history of other specified conditions 07/01/2020    History of vomiting    Personal history of other specified conditions 01/26/2021    History of headache    Personal history of other specified conditions 02/01/2021    History of diarrhea    Personal history of thrombophlebitis  06/03/2019    History of phlebitis    Personal history of urinary (tract) infections 01/16/2020    History of urinary tract infection    PTSD (post-traumatic stress disorder)        Medication Documentation Review Audit       Reviewed by Arjun Burnett MA (Medical Assistant) on 11/19/24 at 0829      Medication Order Taking? Sig Documenting Provider Last Dose Status   albuterol 2.5 mg /3 mL (0.083 %) nebulizer solution [id number]  Take 3 mL by nebulization every 4 hours if needed for shortness of breath. Historical Provider, MD  Active   albuterol-budesonide (Airsupra) 90-80 mcg/actuation inhaler [id number]  Inhale 2 puffs every 4 hours if needed (cough, wheeze, short of breath). Patient has a coupon Hilary Trinidad, DO  Active   aspirin 81 mg chewable tablet [id number]  Chew 1 tablet (81 mg) once daily. Historical Provider, MD  Active   budesonide (Pulmicort) 0.5 mg/2 mL nebulizer solution [id number]  Take 2 mL (0.5 mg) by nebulization 2 times a day. Historical Provider, MD  Active   butalbital-acetaminophen-caff -40 mg tablet [id number]  Take 1 tablet by mouth early in the morning.. 1 tab(s) orally once a day, As Needed Paul Matthew DO  Active   desvenlafaxine 100 mg 24 hr tablet [id number]  TAKE 1 TABLET BY MOUTH EVERY DAY   Patient taking differently: Take 1 tablet (100 mg) by mouth once daily. Take with 50 mg    Paul Matthew DO  Active   desvenlafaxine 50 mg 24 hr tablet [id number]  TAKE 1 TABLET BY MOUTH ONCE DAILY   Patient taking differently: Take 1 tablet (50 mg) by mouth once daily. Take with 100 mg    Paul Matthew DO  Active   EPINEPHrine 0.3 mg/0.3 mL injection syringe [id number]  Inject 0.3 mL (0.3 mg) into the muscle once daily as needed for anaphylaxis. Paul Matthew DO  Active   esomeprazole (NexIUM) 20 mg DR capsule [id number]  Take 1 capsule (20 mg) by mouth once daily in the morning. Take before meals. Do not open capsule. Hilary Trinidad, DO  Active   fexofenadine  (Allegra) 180 mg tablet [id number]  Take 1 tablet (180 mg) by mouth once daily. Historical Provider, MD  Active   galcanezumab (Emgality Syringe) 120 mg/mL prefilled syringe [id number]  Inject 1 Syringe (120 mg) under the skin every 28 (twenty-eight) days. Historical Provider, MD  Active   hydrOXYzine HCL (Atarax) 25 mg tablet [id number]  Take 1 tablet (25 mg) by mouth 3 times a day as needed for anxiety. Gregory Valdez MD  Active   levothyroxine (Synthroid, Levoxyl) 100 mcg tablet [id number]  Take 1 tablet (100 mcg) by mouth once daily. Deirdre Philippe MD  Active   metoclopramide (Reglan) 10 mg tablet [id number]  Take 1 tablet (10 mg) by mouth every 8 hours if needed (nausea). Paul Matthew DO  Active   montelukast (Singulair) 10 mg tablet [id number]  Take 1 tablet (10 mg) by mouth once daily at bedtime. Paul Matthew DO   24 2359   multivitamin-Ca-iron-minerals (Tab-A-Vu Womens) 27-0.4 mg tablet [id number]  Take 1 tablet by mouth once daily. Deirdre Provider, MD  Active   potassium chloride ER (Micro-K) 10 mEq ER capsule [id number]  Take 1 capsule (10 mEq) by mouth once daily. Gregory Valdez MD  Active   promethazine (Phenergan) 12.5 mg tablet [id number]  Take 1 tablet (12.5 mg) by mouth every 6 hours if needed for nausea or vomiting. Historical Provider, MD  Active   semaglutide, weight loss, (Wegovy) 0.5 mg/0.5 mL pen injector [id number]  Inject 0.5 mg under the skin 1 (one) time per week for 16 doses. Gregory Valdez MD  Active   tezepelumab-ekko (Tezspire) SubQ syringe [id number]  Inject 210 mg under the skin every 28 (twenty-eight) days. Historical Provider, MD  Active                    Allergies   Allergen Reactions    Bee Venom Protein (Honey Bee) Shortness of breath    Diphenhydramine Rash     arythmia    Nsaids (Non-Steroidal Anti-Inflammatory Drug) Hives    Procaine Hives and Swelling    Ketorolac Other     facial redness       Social History     Socioeconomic History    Marital status:       Spouse name: Not on file    Number of children: Not on file    Years of education: Not on file    Highest education level: Not on file   Occupational History    Not on file   Tobacco Use    Smoking status: Never     Passive exposure: Current    Smokeless tobacco: Never   Vaping Use    Vaping status: Never Used   Substance and Sexual Activity    Alcohol use: Yes     Alcohol/week: 1.0 - 2.0 standard drink of alcohol     Types: 1 - 2 Glasses of wine per week     Comment: social    Drug use: Never    Sexual activity: Yes   Other Topics Concern    Not on file   Social History Narrative    Not on file     Social Drivers of Health     Financial Resource Strain: Low Risk  (9/28/2024)    Overall Financial Resource Strain (CARDIA)     Difficulty of Paying Living Expenses: Not hard at all   Food Insecurity: No Food Insecurity (9/28/2024)    Hunger Vital Sign     Worried About Running Out of Food in the Last Year: Never true     Ran Out of Food in the Last Year: Never true   Transportation Needs: No Transportation Needs (9/28/2024)    PRAPARE - Transportation     Lack of Transportation (Medical): No     Lack of Transportation (Non-Medical): No   Physical Activity: Patient Declined (6/9/2024)    Exercise Vital Sign     Days of Exercise per Week: Patient declined     Minutes of Exercise per Session: Patient declined   Recent Concern: Physical Activity - Insufficiently Active (4/22/2024)    Exercise Vital Sign     Days of Exercise per Week: 1 day     Minutes of Exercise per Session: 30 min   Stress: No Stress Concern Present (6/9/2024)    [employer] [place] [employer] - Occupational Stress Questionnaire     Feeling of Stress : Not at all   Social Connections: Moderately Integrated (6/9/2024)    Social Connection and Isolation Panel [NHANES]     Frequency of Communication with Friends and Family: More than three times a week     Frequency of Social Gatherings with Friends and Family: More than three times a week      Attends Jainism Services: Never     Active Member of Clubs or Organizations: Yes     Attends Club or Organization Meetings: More than 4 times per year     Marital Status:    Intimate Partner Violence: Not At Risk (9/28/2024)    Humiliation, Afraid, Rape, and Kick questionnaire     Fear of Current or Ex-Partner: No     Emotionally Abused: No     Physically Abused: No     Sexually Abused: No   Housing Stability: Low Risk  (9/28/2024)    Housing Stability Vital Sign     Unable to Pay for Housing in the Last Year: No     Number of Times Moved in the Last Year: 1     Homeless in the Last Year: No       Past Surgical History:   Procedure Laterality Date    ABDOMINAL SURGERY      APPENDECTOMY      OTHER SURGICAL HISTORY  02/08/2019    Appendectomy    OTHER SURGICAL HISTORY  02/08/2019    Cholecystectomy    OTHER SURGICAL HISTORY  02/08/2019    Cystoscopy    OTHER SURGICAL HISTORY  02/08/2019    Ureteroscopy    OTHER SURGICAL HISTORY  02/08/2019    Shoulder surgery       ECG 12 lead    Result Date: 11/15/2024  Normal sinus rhythm Possible Left atrial enlargement Inferior infarct , age undetermined Anterior infarct , age undetermined Abnormal ECG No previous ECGs available See ED provider note for full interpretation and clinical correlation Confirmed by Jose Cochran ([id number]) on 11/15/2024 4:19:30 PM    XR chest 1 view    Result Date: 11/13/2024  Interpreted By:  Salbador Urbina, STUDY: XR CHEST 1 VIEW;  11/13/2024 3:53 am   INDICATION: Signs/Symptoms:sob.   COMPARISON: 10/25/2024   ACCESSION NUMBER(S): [id number]   ORDERING CLINICIAN: KENNETH ROSALES   FINDINGS: The cardiac silhouette is stable in size. No focal airspace consolidation or pleural effusion. No pneumothorax.       No airspace consolidation or pleural effusion.   MACRO: None   Signed by: Salbador Urbina 11/13/2024 3:55 AM Dictation workstation:   [id number]    ECG 12 lead    Result Date: 11/6/2024  Normal sinus rhythm Cannot rule out Anterior  infarct (cited on or before 27-AUG-2024) T wave abnormality, consider inferior ischemia Abnormal ECG When compared with ECG of 28-SEP-2024 04:58, Inverted T waves have replaced nonspecific T wave abnormality in Inferior leads Nonspecific T wave abnormality, improved in Lateral leads See ED provider note for full interpretation and clinical correlation Confirmed by Jose Cochran ([id number]) on 11/6/2024 9:37:12 AM    XR chest 1 view    Result Date: 10/25/2024  Interpreted By:  Mike Frank, STUDY: XR CHEST 1 VIEW;  10/25/2024 1:04 am   INDICATION: Signs/Symptoms:sob.   COMPARISON: 09/28/2024   ACCESSION NUMBER(S): [id number]   ORDERING CLINICIAN: LAUREN PRICE   FINDINGS:     No consolidation, pleural effusion, or pneumothorax. Low lung volumes. Borderline enlargement of the cardiac silhouette. No acute osseous abnormality. Upper abdomen and superficial soft tissues are unremarkable.       1. No acute cardiopulmonary process. 2. Low lung volumes, limiting evaluation. 3. Borderline enlargement of the cardiac silhouette at least in part secondary to magnification artifact related to low lung volumes.   Signed by: Mike Frank 10/25/2024 1:50 AM Dictation workstation:   [id number]                             Scribe Attestation  By signing my name below, I, Marta Rodriguez, Scribzander   attest that this documentation has been prepared under the direction and in the presence of Michael Anand MD.

## 2024-11-25 ENCOUNTER — APPOINTMENT (OUTPATIENT)
Dept: RADIOLOGY | Facility: HOSPITAL | Age: 39
End: 2024-11-25
Payer: COMMERCIAL

## 2024-11-25 ENCOUNTER — HOSPITAL ENCOUNTER (OUTPATIENT)
Facility: HOSPITAL | Age: 39
Setting detail: OBSERVATION
Discharge: HOME | End: 2024-11-26
Attending: EMERGENCY MEDICINE | Admitting: INTERNAL MEDICINE
Payer: COMMERCIAL

## 2024-11-25 ENCOUNTER — APPOINTMENT (OUTPATIENT)
Dept: CARDIOLOGY | Facility: HOSPITAL | Age: 39
End: 2024-11-25
Payer: COMMERCIAL

## 2024-11-25 ENCOUNTER — APPOINTMENT (OUTPATIENT)
Dept: PHARMACY | Facility: HOSPITAL | Age: 39
End: 2024-11-25
Payer: COMMERCIAL

## 2024-11-25 DIAGNOSIS — E87.6 HYPOKALEMIA: ICD-10-CM

## 2024-11-25 DIAGNOSIS — J45.51 SEVERE PERSISTENT ASTHMA WITH EXACERBATION (MULTI): Primary | ICD-10-CM

## 2024-11-25 DIAGNOSIS — J45.50 SEVERE PERSISTENT ASTHMA, UNSPECIFIED WHETHER COMPLICATED (MULTI): ICD-10-CM

## 2024-11-25 PROBLEM — J45.41 ASTHMA EXACERBATION, NON-ALLERGIC, MODERATE PERSISTENT (HHS-HCC): Status: ACTIVE | Noted: 2024-11-25

## 2024-11-25 LAB
ALBUMIN SERPL BCP-MCNC: 4.1 G/DL (ref 3.4–5)
ALP SERPL-CCNC: 74 U/L (ref 33–110)
ALT SERPL W P-5'-P-CCNC: 49 U/L (ref 7–45)
ANION GAP SERPL CALC-SCNC: 9 MMOL/L (ref 10–20)
AST SERPL W P-5'-P-CCNC: 29 U/L (ref 9–39)
ATRIAL RATE: 81 BPM
BASOPHILS # BLD AUTO: 0.05 X10*3/UL (ref 0–0.1)
BASOPHILS NFR BLD AUTO: 0.6 %
BILIRUB DIRECT SERPL-MCNC: 0.1 MG/DL (ref 0–0.3)
BILIRUB SERPL-MCNC: 0.8 MG/DL (ref 0–1.2)
BNP SERPL-MCNC: 15 PG/ML (ref 0–99)
BUN SERPL-MCNC: 8 MG/DL (ref 6–23)
CALCIUM SERPL-MCNC: 9.2 MG/DL (ref 8.6–10.3)
CARDIAC TROPONIN I PNL SERPL HS: <3 NG/L (ref 0–13)
CARDIAC TROPONIN I PNL SERPL HS: <3 NG/L (ref 0–13)
CHLORIDE SERPL-SCNC: 104 MMOL/L (ref 98–107)
CO2 SERPL-SCNC: 28 MMOL/L (ref 21–32)
CREAT SERPL-MCNC: 0.88 MG/DL (ref 0.5–1.05)
EGFRCR SERPLBLD CKD-EPI 2021: 87 ML/MIN/1.73M*2
EOSINOPHIL # BLD AUTO: 0.01 X10*3/UL (ref 0–0.7)
EOSINOPHIL NFR BLD AUTO: 0.1 %
ERYTHROCYTE [DISTWIDTH] IN BLOOD BY AUTOMATED COUNT: 12.2 % (ref 11.5–14.5)
GLUCOSE SERPL-MCNC: 96 MG/DL (ref 74–99)
HCT VFR BLD AUTO: 41.4 % (ref 36–46)
HGB BLD-MCNC: 14.9 G/DL (ref 12–16)
IMM GRANULOCYTES # BLD AUTO: 0.02 X10*3/UL (ref 0–0.7)
IMM GRANULOCYTES NFR BLD AUTO: 0.2 % (ref 0–0.9)
LYMPHOCYTES # BLD AUTO: 3.35 X10*3/UL (ref 1.2–4.8)
LYMPHOCYTES NFR BLD AUTO: 39.5 %
MCH RBC QN AUTO: 30 PG (ref 26–34)
MCHC RBC AUTO-ENTMCNC: 36 G/DL (ref 32–36)
MCV RBC AUTO: 83 FL (ref 80–100)
MONOCYTES # BLD AUTO: 0.74 X10*3/UL (ref 0.1–1)
MONOCYTES NFR BLD AUTO: 8.7 %
NEUTROPHILS # BLD AUTO: 4.31 X10*3/UL (ref 1.2–7.7)
NEUTROPHILS NFR BLD AUTO: 50.9 %
NRBC BLD-RTO: 0 /100 WBCS (ref 0–0)
P AXIS: 32 DEGREES
P OFFSET: 192 MS
P ONSET: 131 MS
PLATELET # BLD AUTO: 373 X10*3/UL (ref 150–450)
POTASSIUM SERPL-SCNC: 2.9 MMOL/L (ref 3.5–5.3)
PR INTERVAL: 172 MS
PROT SERPL-MCNC: 6.8 G/DL (ref 6.4–8.2)
Q ONSET: 217 MS
QRS COUNT: 13 BEATS
QRS DURATION: 82 MS
QT INTERVAL: 394 MS
QTC CALCULATION(BAZETT): 457 MS
QTC FREDERICIA: 435 MS
R AXIS: -14 DEGREES
RBC # BLD AUTO: 4.97 X10*6/UL (ref 4–5.2)
SODIUM SERPL-SCNC: 138 MMOL/L (ref 136–145)
T AXIS: 2 DEGREES
T OFFSET: 414 MS
VENTRICULAR RATE: 81 BPM
WBC # BLD AUTO: 8.5 X10*3/UL (ref 4.4–11.3)

## 2024-11-25 PROCEDURE — 71045 X-RAY EXAM CHEST 1 VIEW: CPT | Performed by: RADIOLOGY

## 2024-11-25 PROCEDURE — 93005 ELECTROCARDIOGRAM TRACING: CPT

## 2024-11-25 PROCEDURE — 2500000002 HC RX 250 W HCPCS SELF ADMINISTERED DRUGS (ALT 637 FOR MEDICARE OP, ALT 636 FOR OP/ED): Performed by: NURSE PRACTITIONER

## 2024-11-25 PROCEDURE — 36415 COLL VENOUS BLD VENIPUNCTURE: CPT | Performed by: EMERGENCY MEDICINE

## 2024-11-25 PROCEDURE — 2500000001 HC RX 250 WO HCPCS SELF ADMINISTERED DRUGS (ALT 637 FOR MEDICARE OP): Performed by: EMERGENCY MEDICINE

## 2024-11-25 PROCEDURE — 96365 THER/PROPH/DIAG IV INF INIT: CPT | Mod: 59

## 2024-11-25 PROCEDURE — 96372 THER/PROPH/DIAG INJ SC/IM: CPT | Performed by: NURSE PRACTITIONER

## 2024-11-25 PROCEDURE — G0378 HOSPITAL OBSERVATION PER HR: HCPCS

## 2024-11-25 PROCEDURE — 71045 X-RAY EXAM CHEST 1 VIEW: CPT

## 2024-11-25 PROCEDURE — 85025 COMPLETE CBC W/AUTO DIFF WBC: CPT | Performed by: EMERGENCY MEDICINE

## 2024-11-25 PROCEDURE — 82248 BILIRUBIN DIRECT: CPT | Performed by: EMERGENCY MEDICINE

## 2024-11-25 PROCEDURE — 2500000002 HC RX 250 W HCPCS SELF ADMINISTERED DRUGS (ALT 637 FOR MEDICARE OP, ALT 636 FOR OP/ED): Performed by: EMERGENCY MEDICINE

## 2024-11-25 PROCEDURE — 84484 ASSAY OF TROPONIN QUANT: CPT | Performed by: EMERGENCY MEDICINE

## 2024-11-25 PROCEDURE — 96375 TX/PRO/DX INJ NEW DRUG ADDON: CPT

## 2024-11-25 PROCEDURE — 80048 BASIC METABOLIC PNL TOTAL CA: CPT | Performed by: EMERGENCY MEDICINE

## 2024-11-25 PROCEDURE — 2500000001 HC RX 250 WO HCPCS SELF ADMINISTERED DRUGS (ALT 637 FOR MEDICARE OP): Performed by: NURSE PRACTITIONER

## 2024-11-25 PROCEDURE — 2500000004 HC RX 250 GENERAL PHARMACY W/ HCPCS (ALT 636 FOR OP/ED): Performed by: NURSE PRACTITIONER

## 2024-11-25 PROCEDURE — 94640 AIRWAY INHALATION TREATMENT: CPT

## 2024-11-25 PROCEDURE — 99222 1ST HOSP IP/OBS MODERATE 55: CPT | Performed by: NURSE PRACTITIONER

## 2024-11-25 PROCEDURE — 2500000004 HC RX 250 GENERAL PHARMACY W/ HCPCS (ALT 636 FOR OP/ED): Performed by: EMERGENCY MEDICINE

## 2024-11-25 PROCEDURE — 83880 ASSAY OF NATRIURETIC PEPTIDE: CPT | Performed by: EMERGENCY MEDICINE

## 2024-11-25 PROCEDURE — 99285 EMERGENCY DEPT VISIT HI MDM: CPT | Mod: 25

## 2024-11-25 RX ORDER — ACETAMINOPHEN 325 MG/1
650 TABLET ORAL EVERY 4 HOURS PRN
Status: DISCONTINUED | OUTPATIENT
Start: 2024-11-25 | End: 2024-11-26 | Stop reason: HOSPADM

## 2024-11-25 RX ORDER — ENOXAPARIN SODIUM 100 MG/ML
60 INJECTION SUBCUTANEOUS EVERY 12 HOURS SCHEDULED
Status: DISCONTINUED | OUTPATIENT
Start: 2024-11-25 | End: 2024-11-26 | Stop reason: HOSPADM

## 2024-11-25 RX ORDER — ACETAMINOPHEN 650 MG/1
650 SUPPOSITORY RECTAL EVERY 4 HOURS PRN
Status: DISCONTINUED | OUTPATIENT
Start: 2024-11-25 | End: 2024-11-26 | Stop reason: HOSPADM

## 2024-11-25 RX ORDER — MONTELUKAST SODIUM 10 MG/1
10 TABLET ORAL NIGHTLY
Status: DISCONTINUED | OUTPATIENT
Start: 2024-11-25 | End: 2024-11-26 | Stop reason: HOSPADM

## 2024-11-25 RX ORDER — ALBUTEROL SULFATE 0.83 MG/ML
2.5 SOLUTION RESPIRATORY (INHALATION) EVERY 20 MIN
Status: COMPLETED | OUTPATIENT
Start: 2024-11-25 | End: 2024-11-25

## 2024-11-25 RX ORDER — POTASSIUM CHLORIDE 20 MEQ/1
40 TABLET, EXTENDED RELEASE ORAL ONCE
Status: COMPLETED | OUTPATIENT
Start: 2024-11-25 | End: 2024-11-25

## 2024-11-25 RX ORDER — IPRATROPIUM BROMIDE AND ALBUTEROL SULFATE 2.5; .5 MG/3ML; MG/3ML
3 SOLUTION RESPIRATORY (INHALATION) EVERY 4 HOURS PRN
Status: DISCONTINUED | OUTPATIENT
Start: 2024-11-25 | End: 2024-11-26 | Stop reason: HOSPADM

## 2024-11-25 RX ORDER — OXYCODONE AND ACETAMINOPHEN 5; 325 MG/1; MG/1
1 TABLET ORAL ONCE
Status: COMPLETED | OUTPATIENT
Start: 2024-11-25 | End: 2024-11-25

## 2024-11-25 RX ORDER — PREDNISONE 5 MG/1
2.5 TABLET ORAL DAILY
Status: DISCONTINUED | OUTPATIENT
Start: 2024-11-26 | End: 2024-11-26 | Stop reason: HOSPADM

## 2024-11-25 RX ORDER — HYDROXYZINE HYDROCHLORIDE 25 MG/1
25 TABLET, FILM COATED ORAL 3 TIMES DAILY PRN
Status: DISCONTINUED | OUTPATIENT
Start: 2024-11-25 | End: 2024-11-26 | Stop reason: HOSPADM

## 2024-11-25 RX ORDER — CETIRIZINE HYDROCHLORIDE 10 MG/1
10 TABLET ORAL DAILY
Status: DISCONTINUED | OUTPATIENT
Start: 2024-11-26 | End: 2024-11-26 | Stop reason: HOSPADM

## 2024-11-25 RX ORDER — MAGNESIUM SULFATE HEPTAHYDRATE 40 MG/ML
2 INJECTION, SOLUTION INTRAVENOUS ONCE
Status: COMPLETED | OUTPATIENT
Start: 2024-11-25 | End: 2024-11-25

## 2024-11-25 RX ORDER — METOCLOPRAMIDE 10 MG/1
10 TABLET ORAL EVERY 8 HOURS PRN
Status: DISCONTINUED | OUTPATIENT
Start: 2024-11-25 | End: 2024-11-26 | Stop reason: HOSPADM

## 2024-11-25 RX ORDER — ONDANSETRON HYDROCHLORIDE 2 MG/ML
4 INJECTION, SOLUTION INTRAVENOUS ONCE
Status: COMPLETED | OUTPATIENT
Start: 2024-11-25 | End: 2024-11-25

## 2024-11-25 RX ORDER — FLUTICASONE FUROATE AND VILANTEROL 100; 25 UG/1; UG/1
1 POWDER RESPIRATORY (INHALATION)
Status: DISCONTINUED | OUTPATIENT
Start: 2024-11-26 | End: 2024-11-26 | Stop reason: HOSPADM

## 2024-11-25 RX ORDER — LORAZEPAM 2 MG/ML
1 INJECTION INTRAMUSCULAR ONCE
Status: COMPLETED | OUTPATIENT
Start: 2024-11-25 | End: 2024-11-25

## 2024-11-25 RX ORDER — LEVOTHYROXINE SODIUM 100 UG/1
100 TABLET ORAL DAILY
Status: DISCONTINUED | OUTPATIENT
Start: 2024-11-26 | End: 2024-11-26 | Stop reason: HOSPADM

## 2024-11-25 RX ORDER — PROMETHAZINE HYDROCHLORIDE 25 MG/1
12.5 TABLET ORAL EVERY 6 HOURS PRN
Status: DISCONTINUED | OUTPATIENT
Start: 2024-11-25 | End: 2024-11-26 | Stop reason: HOSPADM

## 2024-11-25 RX ORDER — ACETAMINOPHEN 160 MG/5ML
650 SOLUTION ORAL EVERY 4 HOURS PRN
Status: DISCONTINUED | OUTPATIENT
Start: 2024-11-25 | End: 2024-11-26 | Stop reason: HOSPADM

## 2024-11-25 RX ORDER — BUDESONIDE 0.5 MG/2ML
0.5 INHALANT ORAL
Status: DISCONTINUED | OUTPATIENT
Start: 2024-11-25 | End: 2024-11-26 | Stop reason: HOSPADM

## 2024-11-25 SDOH — SOCIAL STABILITY: SOCIAL INSECURITY: ABUSE: ADULT

## 2024-11-25 SDOH — ECONOMIC STABILITY: FOOD INSECURITY: WITHIN THE PAST 12 MONTHS, YOU WORRIED THAT YOUR FOOD WOULD RUN OUT BEFORE YOU GOT THE MONEY TO BUY MORE.: NEVER TRUE

## 2024-11-25 SDOH — SOCIAL STABILITY: SOCIAL INSECURITY: DO YOU FEEL ANYONE HAS EXPLOITED OR TAKEN ADVANTAGE OF YOU FINANCIALLY OR OF YOUR PERSONAL PROPERTY?: NO

## 2024-11-25 SDOH — ECONOMIC STABILITY: HOUSING INSECURITY: IN THE LAST 12 MONTHS, WAS THERE A TIME WHEN YOU WERE NOT ABLE TO PAY THE MORTGAGE OR RENT ON TIME?: NO

## 2024-11-25 SDOH — ECONOMIC STABILITY: HOUSING INSECURITY: AT ANY TIME IN THE PAST 12 MONTHS, WERE YOU HOMELESS OR LIVING IN A SHELTER (INCLUDING NOW)?: NO

## 2024-11-25 SDOH — SOCIAL STABILITY: SOCIAL INSECURITY: WERE YOU ABLE TO COMPLETE ALL THE BEHAVIORAL HEALTH SCREENINGS?: YES

## 2024-11-25 SDOH — ECONOMIC STABILITY: FOOD INSECURITY: HOW HARD IS IT FOR YOU TO PAY FOR THE VERY BASICS LIKE FOOD, HOUSING, MEDICAL CARE, AND HEATING?: NOT HARD AT ALL

## 2024-11-25 SDOH — SOCIAL STABILITY: SOCIAL INSECURITY: ARE YOU OR HAVE YOU BEEN THREATENED OR ABUSED PHYSICALLY, EMOTIONALLY, OR SEXUALLY BY ANYONE?: NO

## 2024-11-25 SDOH — SOCIAL STABILITY: SOCIAL INSECURITY: HAVE YOU HAD ANY THOUGHTS OF HARMING ANYONE ELSE?: NO

## 2024-11-25 SDOH — SOCIAL STABILITY: SOCIAL INSECURITY: WITHIN THE LAST YEAR, HAVE YOU BEEN AFRAID OF YOUR PARTNER OR EX-PARTNER?: NO

## 2024-11-25 SDOH — SOCIAL STABILITY: SOCIAL INSECURITY: DOES ANYONE TRY TO KEEP YOU FROM HAVING/CONTACTING OTHER FRIENDS OR DOING THINGS OUTSIDE YOUR HOME?: NO

## 2024-11-25 SDOH — SOCIAL STABILITY: SOCIAL INSECURITY: ARE THERE ANY APPARENT SIGNS OF INJURIES/BEHAVIORS THAT COULD BE RELATED TO ABUSE/NEGLECT?: NO

## 2024-11-25 SDOH — ECONOMIC STABILITY: HOUSING INSECURITY: IN THE PAST 12 MONTHS, HOW MANY TIMES HAVE YOU MOVED WHERE YOU WERE LIVING?: 0

## 2024-11-25 SDOH — ECONOMIC STABILITY: INCOME INSECURITY: IN THE PAST 12 MONTHS HAS THE ELECTRIC, GAS, OIL, OR WATER COMPANY THREATENED TO SHUT OFF SERVICES IN YOUR HOME?: NO

## 2024-11-25 SDOH — SOCIAL STABILITY: SOCIAL INSECURITY: HAVE YOU HAD THOUGHTS OF HARMING ANYONE ELSE?: NO

## 2024-11-25 SDOH — HEALTH STABILITY: PHYSICAL HEALTH: ON AVERAGE, HOW MANY DAYS PER WEEK DO YOU ENGAGE IN MODERATE TO STRENUOUS EXERCISE (LIKE A BRISK WALK)?: 2 DAYS

## 2024-11-25 SDOH — SOCIAL STABILITY: SOCIAL INSECURITY: WITHIN THE LAST YEAR, HAVE YOU BEEN HUMILIATED OR EMOTIONALLY ABUSED IN OTHER WAYS BY YOUR PARTNER OR EX-PARTNER?: NO

## 2024-11-25 SDOH — ECONOMIC STABILITY: TRANSPORTATION INSECURITY: IN THE PAST 12 MONTHS, HAS LACK OF TRANSPORTATION KEPT YOU FROM MEDICAL APPOINTMENTS OR FROM GETTING MEDICATIONS?: NO

## 2024-11-25 SDOH — SOCIAL STABILITY: SOCIAL INSECURITY: DO YOU FEEL UNSAFE GOING BACK TO THE PLACE WHERE YOU ARE LIVING?: NO

## 2024-11-25 SDOH — ECONOMIC STABILITY: FOOD INSECURITY: WITHIN THE PAST 12 MONTHS, THE FOOD YOU BOUGHT JUST DIDN'T LAST AND YOU DIDN'T HAVE MONEY TO GET MORE.: NEVER TRUE

## 2024-11-25 SDOH — SOCIAL STABILITY: SOCIAL INSECURITY
WITHIN THE LAST YEAR, HAVE YOU BEEN RAPED OR FORCED TO HAVE ANY KIND OF SEXUAL ACTIVITY BY YOUR PARTNER OR EX-PARTNER?: NO

## 2024-11-25 SDOH — SOCIAL STABILITY: SOCIAL INSECURITY: HAS ANYONE EVER THREATENED TO HURT YOUR FAMILY OR YOUR PETS?: NO

## 2024-11-25 SDOH — HEALTH STABILITY: PHYSICAL HEALTH: ON AVERAGE, HOW MANY MINUTES DO YOU ENGAGE IN EXERCISE AT THIS LEVEL?: 40 MIN

## 2024-11-25 ASSESSMENT — LIFESTYLE VARIABLES
HOW OFTEN DO YOU HAVE A DRINK CONTAINING ALCOHOL: 2-4 TIMES A MONTH
SKIP TO QUESTIONS 9-10: 1
AUDIT-C TOTAL SCORE: 2
TOTAL SCORE: 0
HAVE PEOPLE ANNOYED YOU BY CRITICIZING YOUR DRINKING: NO
HOW OFTEN DO YOU HAVE 6 OR MORE DRINKS ON ONE OCCASION: NEVER
HOW MANY STANDARD DRINKS CONTAINING ALCOHOL DO YOU HAVE ON A TYPICAL DAY: 1 OR 2
HAVE YOU EVER FELT YOU SHOULD CUT DOWN ON YOUR DRINKING: NO
AUDIT-C TOTAL SCORE: 2
EVER FELT BAD OR GUILTY ABOUT YOUR DRINKING: NO
EVER HAD A DRINK FIRST THING IN THE MORNING TO STEADY YOUR NERVES TO GET RID OF A HANGOVER: NO

## 2024-11-25 ASSESSMENT — COGNITIVE AND FUNCTIONAL STATUS - GENERAL
PATIENT BASELINE BEDBOUND: NO
DAILY ACTIVITIY SCORE: 24
MOBILITY SCORE: 24

## 2024-11-25 ASSESSMENT — PAIN SCALES - GENERAL
PAINLEVEL_OUTOF10: 1
PAINLEVEL_OUTOF10: 6
PAINLEVEL_OUTOF10: 6
PAINLEVEL_OUTOF10: 3

## 2024-11-25 ASSESSMENT — ACTIVITIES OF DAILY LIVING (ADL)
BATHING: INDEPENDENT
WALKS IN HOME: INDEPENDENT
LACK_OF_TRANSPORTATION: NO
JUDGMENT_ADEQUATE_SAFELY_COMPLETE_DAILY_ACTIVITIES: YES
LACK_OF_TRANSPORTATION: NO
HEARING - RIGHT EAR: FUNCTIONAL
ASSISTIVE_DEVICE: EYEGLASSES
PATIENT'S MEMORY ADEQUATE TO SAFELY COMPLETE DAILY ACTIVITIES?: YES
HEARING - LEFT EAR: FUNCTIONAL
DRESSING YOURSELF: INDEPENDENT
TOILETING: INDEPENDENT
ADEQUATE_TO_COMPLETE_ADL: YES
FEEDING YOURSELF: INDEPENDENT
GROOMING: INDEPENDENT

## 2024-11-25 ASSESSMENT — PAIN DESCRIPTION - PAIN TYPE: TYPE: ACUTE PAIN

## 2024-11-25 ASSESSMENT — PAIN DESCRIPTION - LOCATION
LOCATION: HEAD
LOCATION: HEAD

## 2024-11-25 ASSESSMENT — PAIN - FUNCTIONAL ASSESSMENT
PAIN_FUNCTIONAL_ASSESSMENT: 0-10

## 2024-11-25 NOTE — ED PROVIDER NOTES
HPI   Chief Complaint   Patient presents with    Shortness of Breath     Has been going on for the past couple weeks. EMS gave 125 solu-medrol, 1 duo-neb tx. Pt took 2 albuterol, 1 duo-neb at home.        Patient is a pleasant 39-year-old female with history of asthma non-smoker comes in complaining of nausea shortness of breath progressive getting worse for the last 4 days tried all treatments at home was not any helpful and called 911.              Patient History   Past Medical History:   Diagnosis Date    Acute cystitis without hematuria 08/08/2019    Acute cystitis without hematuria    Asthma     Disease of thyroid gland     Encounter for initial prescription of contraceptive pills 11/01/2019    Encounter for initial prescription of contraceptive pills    Nausea 02/05/2021    Nausea in adult    Other general symptoms and signs 12/05/2019    Forgetfulness    Parageusia 04/06/2020    Taste perversion    Pelvic and perineal pain     Pelvic pain in female    Personal history of other diseases of the respiratory system 04/06/2020    History of sinusitis    Personal history of other specified conditions 07/01/2020    History of vomiting    Personal history of other specified conditions 01/26/2021    History of headache    Personal history of other specified conditions 02/01/2021    History of diarrhea    Personal history of thrombophlebitis 06/03/2019    History of phlebitis    Personal history of urinary (tract) infections 01/16/2020    History of urinary tract infection    PTSD (post-traumatic stress disorder)      Past Surgical History:   Procedure Laterality Date    ABDOMINAL SURGERY      APPENDECTOMY      OTHER SURGICAL HISTORY  02/08/2019    Appendectomy    OTHER SURGICAL HISTORY  02/08/2019    Cholecystectomy    OTHER SURGICAL HISTORY  02/08/2019    Cystoscopy    OTHER SURGICAL HISTORY  02/08/2019    Ureteroscopy    OTHER SURGICAL HISTORY  02/08/2019    Shoulder surgery     Family History   Problem Relation  Name Age of Onset    Hypertension Father      Other (Pulmonary Valve Stenosis) Sister      Heart disease Maternal Grandmother      Diabetes Other Grandparent     Lung cancer Other Grandparent     Anxiety disorder Sibling       Social History     Tobacco Use    Smoking status: Never     Passive exposure: Current    Smokeless tobacco: Never   Vaping Use    Vaping status: Never Used   Substance Use Topics    Alcohol use: Yes     Alcohol/week: 1.0 - 2.0 standard drink of alcohol     Types: 1 - 2 Glasses of wine per week     Comment: social    Drug use: Never       Physical Exam   ED Triage Vitals [11/25/24 1654]   Temperature Heart Rate Respirations BP   36.5 °C (97.7 °F) 87 (!) 26 (!) 164/102      Pulse Ox Temp Source Heart Rate Source Patient Position   96 % Temporal Monitor --      BP Location FiO2 (%)     -- --       Physical Exam  Vitals and nursing note reviewed.   Constitutional:       Appearance: She is obese.   Cardiovascular:      Rate and Rhythm: Normal rate and regular rhythm.   Pulmonary:      Effort: Tachypnea present.      Comments: Good breath sounds on auscultation with very poor air exchange with mild and expiratory wheezing at the bases  Musculoskeletal:      Cervical back: Normal range of motion.   Skin:     General: Skin is warm.   Neurological:      General: No focal deficit present.      Mental Status: She is alert.           ED Course & MDM   Diagnoses as of 11/25/24 1942   Severe persistent asthma with exacerbation (Multi)   Hypokalemia                 No data recorded     [place] Coma Scale Score: 15 (11/25/24 1657 : Billie Muniz, LELA)                           Medical Decision Making  EKG interpreted by me shows normal sinus rhythm rate of 81 normal QRS nonspecific ST  My differential diagnosis  Acute asthma exacerbation acute pneumothorax acute pneumonia  I ordered chest x-ray  albuterol treatments along with IV magnesium as EMS already had given her IV Solu-Medrol.  Workup and management be  done based upon results of the test ordered and patient's response to the intervention  Reevaluation patient definitely has more air flow on auscultation but still is not feeling back to her baseline still feels short of breath discussed with the patient and she wanted to be okay with admitting to the hospital reach out the hospitalist and patient was accepted the service.  Labs Reviewed  HEPATIC FUNCTION PANEL - Abnormal     Albumin                       4.1                    Bilirubin, Total              0.8                    Bilirubin, Direct             0.1                    Alkaline Phosphatase          74                     ALT                           49 (*)                 AST                           29                     Total Protein                 6.8                 BASIC METABOLIC PANEL - Abnormal     Glucose                       96                     Sodium                        138                    Potassium                     2.9 (*)                Chloride                      104                    Bicarbonate                   28                     Anion Gap                     9 (*)                  Urea Nitrogen                 8                      Creatinine                    0.88                   eGFR                          87                     Calcium                       9.2                 B-TYPE NATRIURETIC PEPTIDE - Normal     BNP                           15                       Narrative:    <100 pg/mL - Heart failure unlikely                100-299 pg/mL - Intermediate probability of acute heart                                failure exacerbation. Correlate with clinical                                context and patient history.                  >=300 pg/mL - Heart Failure likely. Correlate with clinical                                context and patient history.                                BNP testing is performed using different testing methodology at  [facility] than at other [facility]. Direct result comparisons should only be made within the same method.                   SERIAL TROPONIN-INITIAL - Normal     Troponin I, High Sensitivity   <3                       Narrative: Less than 99th percentile of normal range cutoff-                Female and children under 18 years old <14 ng/L; Male <21 ng/L: Negative                Repeat testing should be performed if clinically indicated.                                 Female and children under 18 years old 14-50 ng/L; Male 21-50 ng/L:                Consistent with possible cardiac damage and possible increased clinical                 risk. Serial measurements may help to assess extent of myocardial damage.                                 >50 ng/L: Consistent with cardiac damage, increased clinical risk and                myocardial infarction. Serial measurements may help assess extent of                 myocardial damage.                                  NOTE: Children less than 1 year old may have higher baseline troponin                 levels and results should be interpreted in conjunction with the overall                 clinical context.                                 NOTE: Troponin I testing is performed using a different                 testing methodology at [facility] than at [facility]. Direct result comparisons should only                 be made within the same method.  SERIAL TROPONIN, 1 HOUR - Normal     Troponin I, High Sensitivity   <3                       Narrative: Less than 99th percentile of normal range cutoff-                Female and children under 18 years old <14 ng/L; Male <21 ng/L: Negative                Repeat testing should be performed if clinically indicated.                                 Female and children under 18 years old 14-50 ng/L; Male 21-50 ng/L:                Consistent with possible cardiac damage and  possible increased clinical                 risk. Serial measurements may help to assess extent of myocardial damage.                                 >50 ng/L: Consistent with cardiac damage, increased clinical risk and                myocardial infarction. Serial measurements may help assess extent of                 myocardial damage.                                  NOTE: Children less than 1 year old may have higher baseline troponin                 levels and results should be interpreted in conjunction with the overall                 clinical context.                                 NOTE: Troponin I testing is performed using a different                 testing methodology at [facility] than at other                 [facility]. Direct result comparisons should only                 be made within the same method.  TROPONIN SERIES- (INITIAL, 1 HR)       Narrative: The following orders were created for panel order Troponin I Series, High Sensitivity (0, 1 HR).                Procedure                               Abnormality         Status                                   ---------                               -----------         ------                                   Troponin I, High Sensiti...[[id number]]  Normal              Final result                             Troponin, High Sensitivi...[[id number]]  Normal              Final result                                             Please view results for these tests on the individual orders.  CBC WITH AUTO DIFFERENTIAL     XR chest 1 view   Final Result    1.  No evidence of acute cardiopulmonary process.                      MACRO:    None          Signed by: Zaheer Wood 11/25/2024 5:47 PM    Dictation workstation:   [id number]              Procedure  Procedures     James Mcclure MD  11/25/24 1943

## 2024-11-25 NOTE — PROGRESS NOTES
Pharmacy Post-Discharge Visit    Sarahi Haley is a 39 y.o. female was referred to Clinical Pharmacy Team to complete a post-discharge medication optimization and monitoring visit.  The patient was referred for their Asthma.    Pt is here for First appointment.     Admission Date: 9.28.24  Discharge Date: 9.30.24    + 2 recent ED visits d/t asthma exacerbation    PCP/Referring Provider: Gregory Valdez MD  Last Visit: 10.3.24  Next visit: 1.3.25    Subjective   Allergies   Allergen Reactions    Bee Venom Protein (Honey Bee) Shortness of breath    Diphenhydramine Rash     arythmia    Nsaids (Non-Steroidal Anti-Inflammatory Drug) Hives    Procaine Hives and Swelling    Ketorolac Other     facial redness       [id number] - [place], [place] - [address]  [address] [place] [address]  Phone: [phone] Fax: [phone]      Social History     Social History Narrative    Not on file        Notable Medication changes following discharge:  Start: none  Stop: Breo Ellipta   Change: prednisone    HPI  PULMONARY ASSESSMENT  Patient has been diagnosed with: Asthma  does not see a pulmonologist - dr. bourne  DOES see allergy/immunology  Last visit: 10.22.24  has had PFT's completed in last 2 years - 8/2023    Current Regimen:  Trelegy 100mcg  Singulair at bedtime   AirSupra PRN  Pulmicort neb  Tezspire monthly  Prednisone 2.5mg daily  Note: no fills for inhalers in fill history    Historical Treatment:  Breo  Nebulizers    Symptom Management:  Current symptoms: dyspnea, wheezing, and fatigue - occur randomly  Triggers: none identified, aspirin?   Alleviating factors: inhaler, deep breathing    Exacerbation Hx:  When was your last hospitalization for an exacerbation? 9 2024  When was the last time you were treated with antibiotics and/or steroids? 9 2024   Has been happening for about 18 months    Rescue Inhaler Use:  How often do you use your rescue inhaler?   AirSupra - 4-5x per  week  Albuterol nebulize - 3-4x per week    Appropriate Inhaler Technique: yes        Review of Systems    Objective     BP Readings from Last 4 Encounters:   11/19/24 134/64   11/13/24 151/60   10/25/24 121/70   10/22/24 110/80      There were no vitals filed for this visit.     LAB  Creatinine   Date Value Ref Range Status   11/13/2024 0.95 0.50 - 1.05 mg/dL Final     eGFR   Date Value Ref Range Status   11/13/2024 80 >60 mL/min/1.73m*2 Final     Comment:     Calculations of estimated GFR are performed using the 2021 CKD-EPI Study Refit equation without the race variable for the IDMS-Traceable creatinine methods.  https://jasn.asnjournals.org/content/early/2021/09/22/ASN.4215157812     Sodium   Date Value Ref Range Status   11/13/2024 139 136 - 145 mmol/L Final     Potassium   Date Value Ref Range Status   11/13/2024 2.8 (LL) 3.5 - 5.3 mmol/L Final     Chloride   Date Value Ref Range Status   11/13/2024 106 98 - 107 mmol/L Final      ANION GAP   Date Value Ref Range Status   01/22/2022 11 10 - 25 mmol/L Final     Urea Nitrogen   Date Value Ref Range Status   11/13/2024 8 6 - 23 mg/dL Final     AST   Date Value Ref Range Status   10/25/2024 20 9 - 39 U/L Final        Lab Results   Component Value Date    BILITOT 0.7 10/25/2024    CALCIUM 8.7 11/13/2024    CO2 24 11/13/2024     11/13/2024    CREATININE 0.95 11/13/2024    GLUCOSE 127 (H) 11/13/2024    ALKPHOS 64 10/25/2024    K 2.8 (LL) 11/13/2024    PROT 6.7 10/25/2024     11/13/2024    AST 20 10/25/2024    ALT 40 10/25/2024    BUN 8 11/13/2024    ANIONGAP 12 11/13/2024    MG 1.71 10/25/2024    PHOS 3.6 12/07/2023    ALBUMIN 4.0 10/25/2024    AMYLASE 25 (L) 07/17/2019    LIPASE 34 09/13/2022    GFRF 84 09/27/2023     Lab Results   Component Value Date    TRIG 173 (H) 07/10/2023    CHOL 299 (H) 07/10/2023    HDL 49.4 07/10/2023     Lab Results   Component Value Date    HGBA1C 4.7 10/15/2023         Current Outpatient Medications   Medication Instructions     albuterol 2.5 mg /3 mL (0.083 %) nebulizer solution 3 mL, Every 4 hours PRN    albuterol-budesonide (Airsupra) 90-80 mcg/actuation inhaler 2 puffs, inhalation, Every 4 hours PRN, Patient has a coupon    aspirin 81 mg, Daily    budesonide (Pulmicort) 0.5 mg/2 mL nebulizer solution Take 2 mL (0.5 mg) by nebulization 2 times a day.    butalbital-acetaminophen-caff -40 mg tablet 1 tablet, oral, Daily, 1 tab(s) orally once a day, As Needed    desvenlafaxine (PRISTIQ) 100 mg, oral, Daily    desvenlafaxine (PRISTIQ) 50 mg, oral, Daily    Emgality Syringe 120 mg, Every 28 days    EPINEPHrine (EPIPEN) 0.3 mg, intramuscular, Daily PRN    esomeprazole (NEXIUM) 20 mg, oral, Daily before breakfast, Do not open capsule.    fexofenadine (ALLEGRA) 180 mg, Daily RT    fluticasone-umeclidin-vilanter (Trelegy Ellipta) 100-62.5-25 mcg blister with device 1 puff, Daily    hydrOXYzine HCL (ATARAX) 25 mg, oral, 3 times daily PRN    levothyroxine (Synthroid, Levoxyl) 100 mcg tablet 1 tablet, Daily    metoclopramide (REGLAN) 10 mg, oral, Every 8 hours PRN    montelukast (SINGULAIR) 10 mg, oral, Nightly    multivitamin-Ca-iron-minerals (Tab-A-Vu Womens) 27-0.4 mg tablet 1 tablet, Daily    potassium chloride ER (Micro-K) 10 mEq ER capsule 10 mEq, oral, Daily    predniSONE (DELTASONE) 1 mg, Daily    promethazine (PHENERGAN) 12.5 mg, Every 6 hours PRN    Tezspire 210 mg, Every 28 days    Wegovy 0.5 mg, subcutaneous, Weekly          DRUG INTERACTIONS  None requiring intervention at this time      Assessment/Plan   Problem List Items Addressed This Visit       Asthma     Patient managed by allergy/immunology and pulmonology.   Reports that she was previously controlled but in the last 18 months, attacks have increased, often requiring ED visit or hospital admission. She is not able to identify any triggers and is planning to redo allergy testing.     Could consider increasing Trelegy to high dose.     CONTINUE:  Trelegy  100mcg  Singulair at bedtime  AirSupra PRN  Pulmicort neb  Tezspire monthly  Prednisone 2.5mg daily          Patient will be changing to  insurance soon.    Immunizations needed:  COVID, flu    Labs ordered: none    Follow-up: as needed     Patient was provided with PharmD phone number and encouraged to reach out with any questions or concerns In the future or ask provider for another pharmacy referral.    Time spent with pt: Total length of time 19 (minutes) of the encounter and more than 50% was spent counseling the patient.    Thank you for allowing to take part in the care of this patient.    Stephanie Moore PharmD, MARK  Clinical Pharmacist  [phone]  [email or web address]    Continue all meds under the continuation of care with the referring provider and clinical pharmacy team.    Verbal consent to manage patient's drug therapy was obtained from the patient. They were informed they may decline to participate or withdraw from participation in pharmacy services at any time.

## 2024-11-25 NOTE — ASSESSMENT & PLAN NOTE
Patient managed by allergy/immunology and pulmonology.   Reports that she was previously controlled but in the last 18 months, attacks have increased, often requiring ED visit or hospital admission. She is not able to identify any triggers and is planning to redo allergy testing.     Could consider increasing Trelegy to high dose.     CONTINUE:  Trelegy 100mcg  Singulair at bedtime  AirSupra PRN  Pulmicort neb  Tezspire monthly  Prednisone 2.5mg daily

## 2024-11-26 ENCOUNTER — APPOINTMENT (OUTPATIENT)
Dept: ORTHOPEDIC SURGERY | Facility: CLINIC | Age: 39
End: 2024-11-26
Payer: COMMERCIAL

## 2024-11-26 VITALS
WEIGHT: 293 LBS | DIASTOLIC BLOOD PRESSURE: 88 MMHG | OXYGEN SATURATION: 93 % | HEIGHT: 65 IN | SYSTOLIC BLOOD PRESSURE: 143 MMHG | RESPIRATION RATE: 16 BRPM | TEMPERATURE: 97.5 F | HEART RATE: 97 BPM | BODY MASS INDEX: 48.82 KG/M2

## 2024-11-26 LAB
ALBUMIN SERPL BCP-MCNC: 4 G/DL (ref 3.4–5)
ALP SERPL-CCNC: 72 U/L (ref 33–110)
ALT SERPL W P-5'-P-CCNC: 55 U/L (ref 7–45)
ANION GAP SERPL CALC-SCNC: 12 MMOL/L (ref 10–20)
AST SERPL W P-5'-P-CCNC: 37 U/L (ref 9–39)
BASOPHILS # BLD AUTO: 0.02 X10*3/UL (ref 0–0.1)
BASOPHILS NFR BLD AUTO: 0.1 %
BILIRUB SERPL-MCNC: 0.7 MG/DL (ref 0–1.2)
BUN SERPL-MCNC: 9 MG/DL (ref 6–23)
CALCIUM SERPL-MCNC: 9.3 MG/DL (ref 8.6–10.3)
CHLORIDE SERPL-SCNC: 104 MMOL/L (ref 98–107)
CO2 SERPL-SCNC: 24 MMOL/L (ref 21–32)
CREAT SERPL-MCNC: 0.78 MG/DL (ref 0.5–1.05)
EGFRCR SERPLBLD CKD-EPI 2021: >90 ML/MIN/1.73M*2
EOSINOPHIL # BLD AUTO: 0 X10*3/UL (ref 0–0.7)
EOSINOPHIL NFR BLD AUTO: 0 %
ERYTHROCYTE [DISTWIDTH] IN BLOOD BY AUTOMATED COUNT: 12.1 % (ref 11.5–14.5)
GLUCOSE SERPL-MCNC: 181 MG/DL (ref 74–99)
HCT VFR BLD AUTO: 41.4 % (ref 36–46)
HGB BLD-MCNC: 14.6 G/DL (ref 12–16)
HOLD SPECIMEN: NORMAL
IMM GRANULOCYTES # BLD AUTO: 0.05 X10*3/UL (ref 0–0.7)
IMM GRANULOCYTES NFR BLD AUTO: 0.4 % (ref 0–0.9)
LYMPHOCYTES # BLD AUTO: 0.67 X10*3/UL (ref 1.2–4.8)
LYMPHOCYTES NFR BLD AUTO: 4.7 %
MAGNESIUM SERPL-MCNC: 2.08 MG/DL (ref 1.6–2.4)
MCH RBC QN AUTO: 29.6 PG (ref 26–34)
MCHC RBC AUTO-ENTMCNC: 35.3 G/DL (ref 32–36)
MCV RBC AUTO: 84 FL (ref 80–100)
MONOCYTES # BLD AUTO: 0.08 X10*3/UL (ref 0.1–1)
MONOCYTES NFR BLD AUTO: 0.6 %
NEUTROPHILS # BLD AUTO: 13.31 X10*3/UL (ref 1.2–7.7)
NEUTROPHILS NFR BLD AUTO: 94.2 %
NRBC BLD-RTO: 0 /100 WBCS (ref 0–0)
PLATELET # BLD AUTO: 379 X10*3/UL (ref 150–450)
POTASSIUM SERPL-SCNC: 4.4 MMOL/L (ref 3.5–5.3)
PROT SERPL-MCNC: 6.7 G/DL (ref 6.4–8.2)
RBC # BLD AUTO: 4.93 X10*6/UL (ref 4–5.2)
SODIUM SERPL-SCNC: 136 MMOL/L (ref 136–145)
WBC # BLD AUTO: 14.1 X10*3/UL (ref 4.4–11.3)

## 2024-11-26 PROCEDURE — 94760 N-INVAS EAR/PLS OXIMETRY 1: CPT

## 2024-11-26 PROCEDURE — 83735 ASSAY OF MAGNESIUM: CPT | Performed by: NURSE PRACTITIONER

## 2024-11-26 PROCEDURE — 2500000004 HC RX 250 GENERAL PHARMACY W/ HCPCS (ALT 636 FOR OP/ED): Performed by: INTERNAL MEDICINE

## 2024-11-26 PROCEDURE — 99239 HOSP IP/OBS DSCHRG MGMT >30: CPT

## 2024-11-26 PROCEDURE — G0378 HOSPITAL OBSERVATION PER HR: HCPCS

## 2024-11-26 PROCEDURE — 36415 COLL VENOUS BLD VENIPUNCTURE: CPT | Performed by: NURSE PRACTITIONER

## 2024-11-26 PROCEDURE — 2500000004 HC RX 250 GENERAL PHARMACY W/ HCPCS (ALT 636 FOR OP/ED): Performed by: NURSE PRACTITIONER

## 2024-11-26 PROCEDURE — 94640 AIRWAY INHALATION TREATMENT: CPT

## 2024-11-26 PROCEDURE — 90656 IIV3 VACC NO PRSV 0.5 ML IM: CPT | Performed by: INTERNAL MEDICINE

## 2024-11-26 PROCEDURE — 2500000001 HC RX 250 WO HCPCS SELF ADMINISTERED DRUGS (ALT 637 FOR MEDICARE OP): Performed by: PHARMACIST

## 2024-11-26 PROCEDURE — 85025 COMPLETE CBC W/AUTO DIFF WBC: CPT | Performed by: NURSE PRACTITIONER

## 2024-11-26 PROCEDURE — 96372 THER/PROPH/DIAG INJ SC/IM: CPT | Performed by: NURSE PRACTITIONER

## 2024-11-26 PROCEDURE — 2500000002 HC RX 250 W HCPCS SELF ADMINISTERED DRUGS (ALT 637 FOR MEDICARE OP, ALT 636 FOR OP/ED)

## 2024-11-26 PROCEDURE — 2500000001 HC RX 250 WO HCPCS SELF ADMINISTERED DRUGS (ALT 637 FOR MEDICARE OP)

## 2024-11-26 PROCEDURE — 2500000001 HC RX 250 WO HCPCS SELF ADMINISTERED DRUGS (ALT 637 FOR MEDICARE OP): Performed by: NURSE PRACTITIONER

## 2024-11-26 PROCEDURE — 80053 COMPREHEN METABOLIC PANEL: CPT | Performed by: NURSE PRACTITIONER

## 2024-11-26 PROCEDURE — 90471 IMMUNIZATION ADMIN: CPT | Performed by: INTERNAL MEDICINE

## 2024-11-26 PROCEDURE — 2500000002 HC RX 250 W HCPCS SELF ADMINISTERED DRUGS (ALT 637 FOR MEDICARE OP, ALT 636 FOR OP/ED): Performed by: NURSE PRACTITIONER

## 2024-11-26 RX ORDER — BUTALBITAL, ACETAMINOPHEN AND CAFFEINE 300; 40; 50 MG/1; MG/1; MG/1
1 CAPSULE ORAL ONCE
Status: DISCONTINUED | OUTPATIENT
Start: 2024-11-26 | End: 2024-11-26

## 2024-11-26 RX ORDER — BUTALBITAL, ACETAMINOPHEN AND CAFFEINE 300; 40; 50 MG/1; MG/1; MG/1
1 CAPSULE ORAL EVERY 4 HOURS PRN
Status: DISCONTINUED | OUTPATIENT
Start: 2024-11-26 | End: 2024-11-26

## 2024-11-26 RX ORDER — DESVENLAFAXINE 50 MG/1
50 TABLET, EXTENDED RELEASE ORAL DAILY
Status: DISCONTINUED | OUTPATIENT
Start: 2024-11-26 | End: 2024-11-26 | Stop reason: HOSPADM

## 2024-11-26 RX ORDER — POTASSIUM CHLORIDE 750 MG/1
10 TABLET, FILM COATED, EXTENDED RELEASE ORAL DAILY
Status: DISCONTINUED | OUTPATIENT
Start: 2024-11-26 | End: 2024-11-26 | Stop reason: HOSPADM

## 2024-11-26 RX ORDER — BUTALBITAL, ACETAMINOPHEN AND CAFFEINE 50; 325; 40 MG/1; MG/1; MG/1
1 TABLET ORAL ONCE
Status: COMPLETED | OUTPATIENT
Start: 2024-11-26 | End: 2024-11-26

## 2024-11-26 ASSESSMENT — PAIN - FUNCTIONAL ASSESSMENT: PAIN_FUNCTIONAL_ASSESSMENT: 0-10

## 2024-11-26 ASSESSMENT — COGNITIVE AND FUNCTIONAL STATUS - GENERAL
DAILY ACTIVITIY SCORE: 24
MOBILITY SCORE: 24

## 2024-11-26 ASSESSMENT — PAIN SCALES - GENERAL
PAINLEVEL_OUTOF10: 6
PAINLEVEL_OUTOF10: 9
PAINLEVEL_OUTOF10: 8
PAINLEVEL_OUTOF10: 7
PAINLEVEL_OUTOF10: 3

## 2024-11-26 ASSESSMENT — PAIN DESCRIPTION - LOCATION: LOCATION: HEAD

## 2024-11-26 ASSESSMENT — PAIN DESCRIPTION - DESCRIPTORS: DESCRIPTORS: HEADACHE

## 2024-11-26 NOTE — PROGRESS NOTES
11/26/24 1312   Discharge Planning   Living Arrangements Spouse/significant other   Support Systems Spouse/significant other   Assistance Needed PTA - none, independent at baseline.   Type of Residence Private residence   Number of Stairs to Enter Residence 3   Number of Stairs Within Residence 0   Do you have animals or pets at home? No   Home or Post Acute Services None   Expected Discharge Disposition Home   Does the patient need discharge transport arranged? No

## 2024-11-26 NOTE — H&P
Medical Group History and Physical    ASSESSMENT & PLAN:     Asthma exacerbation  - cont home meds / RT treatments  - follow-up with pulmonary specialist  - steroid taper now at 2.5 mg daily reordered  - monitor overnight with plans for dc home in AM    Hypokalemia [2.9]  - replace and recheck  - tele overnight may dc in AM    Anxiety / PTSD  - resume home meds    VTE Prophylaxis: lovenox    Danelle Roach, APRN-CNP    HISTORY OF PRESENT ILLNESS:   Chief Complaint: shortness of breath    History Of Present Illness:    Sarahi Haley is a 39 y.o. female with a significant past medical history of moderate persistent asthma exacerbation, reactive airway, NAFLD, partial complex seizure, hypothyroid, migraine, GERD, MYNOR on CPAP presenting to [facility] in acute asthma exacerbation.  Patient has had multiple admissions for same generally speaking admitted every 4 to 6 weeks for optimization.  She reports increased anxiety and stress while at work, has been struggling with upper airway congestion and shortness of breath over the last 48 hours.  Lab work showing hypokalemia [2.9] and imaging is unremarkable.  She does not wear O2 at baseline, has history of MYNOR with CPAP at home.  Will sleep in recliner while inpatient ok for supplemental o2 while sleeping if pt requesting  No fevers or chills, syncope, LOC, palpitations or increased sputum production.     Upon arrival from the ER to nursing floor patient ambulated the department with me no evidence of shortness of breath able to speak in full sentences did not require O2.     Vital signs are stable and patient is ready for admission under general medicine for asthma exacerbation and hypokalemia    Review of systems: 10 point review of systems is otherwise negative except as mentioned above.    PAST HISTORIES:       Past Medical History:  Medical Problems       Problem List       * (Principal) Asthma exacerbation, non-allergic, moderate persistent ([facility])    Asthma     RAD (reactive airway disease) ([facility]-HCC)    Vitamin D deficiency    Tear film insufficiency    Syncope    Superficial thrombophlebitis of lower extremity    Superficial phlebitis of leg    Seizure disorder, complex partial, without intractable epilepsy (Multi)    Regular astigmatism of left eye    Panic attack    NAFLD (nonalcoholic fatty liver disease)    Myopia    Morbid obesity (Multi)    Migraine    Lymphedema    Low grade squamous intraepithelial lesion (LGSIL) on Papanicolaou smear of cervix    Left ventricular hypertrophy    Hypothyroidism, adult    Hypokalemia    GERD (gastroesophageal reflux disease)    Dysmenorrhea    Dysfunction of both eustachian tubes    Chronic idiopathic urticaria    Overview Signed 10/10/2023  1:44 PM by Ange Jansen     Formatting of this note might be different from the original. Continue home meds         Cervical radiculopathy, acute    Visceral pain    Cephalgia    Acute asthma exacerbation ([facility]-HCC)    PTSD (post-traumatic stress disorder)    Anxiety and depression    Cystitis, acute    Acute respiratory failure with hypoxemia (Multi)    Acute bronchospasm    Absence seizure (Multi)    Asthma in adult, moderate persistent, with acute exacerbation ([facility]-HCC)    Allergy to nonsteroidal anti-inflammatory drug (NSAID)    Steroid side effects, initial encounter    Acute respiratory failure with hypoxia (Multi)    Asthma with exacerbation ([facility]-HCC)    Moderate persistent asthma with (acute) exacerbation ([facility]-HCC)    Severe persistent asthma with exacerbation (Multi)           Past Surgical History:  Past Surgical History:   Procedure Laterality Date    ABDOMINAL SURGERY      APPENDECTOMY      OTHER SURGICAL HISTORY  02/08/2019    Appendectomy    OTHER SURGICAL HISTORY  02/08/2019    Cholecystectomy    OTHER SURGICAL HISTORY  02/08/2019    Cystoscopy    OTHER SURGICAL HISTORY  02/08/2019    Ureteroscopy    OTHER SURGICAL HISTORY  02/08/2019    Shoulder surgery          Social  "History:  She reports that she has never smoked. She has been exposed to tobacco smoke. She has never used smokeless tobacco. She reports current alcohol use of about 1.0 - 2.0 standard drink of alcohol per week. She reports that she does not use drugs.    Family History:  Family History   Problem Relation Name Age of Onset    Hypertension Father      Other (Pulmonary Valve Stenosis) Sister      Heart disease Maternal Grandmother      Diabetes Other Grandparent     Lung cancer Other Grandparent     Anxiety disorder Sibling          Allergies:  Bee venom protein (honey bee), Diphenhydramine, Nsaids (non-steroidal anti-inflammatory drug), Procaine, and Ketorolac    OBJECTIVE:       Last Recorded Vitals:  Vitals:    11/25/24 1654 11/25/24 1707 11/25/24 1822   BP: (!) 164/102  147/68   Pulse: 87 80 80   Resp: (!) 26  18   Temp: 36.5 °C (97.7 °F)     TempSrc: Temporal     SpO2: 96% 99% 95%   Weight: 141 kg (310 lb)     Height: 1.651 m (5' 5\")         Last I/O:  No intake/output data recorded.    Physical Exam  Vitals and nursing note reviewed.   Constitutional:       Appearance: Normal appearance. She is obese.   HENT:      Head: Normocephalic and atraumatic.      Nose: Nose normal.      Mouth/Throat:      Mouth: Mucous membranes are moist.      Pharynx: Oropharynx is clear.   Eyes:      Extraocular Movements: Extraocular movements intact.      Conjunctiva/sclera: Conjunctivae normal.      Pupils: Pupils are equal, round, and reactive to light.   Cardiovascular:      Rate and Rhythm: Normal rate and regular rhythm.      Pulses: Normal pulses.      Heart sounds: Normal heart sounds.      Comments: SR controlled HR  Pulmonary:      Effort: Pulmonary effort is normal.      Breath sounds: Normal breath sounds.      Comments: No wheezing, crackles, or increased work of breathing.  Abdominal:      General: Abdomen is flat. Bowel sounds are normal.      Palpations: Abdomen is soft.   Genitourinary:     Comments: Not " assessed  Musculoskeletal:         General: Normal range of motion.      Cervical back: Normal range of motion and neck supple.   Skin:     General: Skin is warm and dry.      Capillary Refill: Capillary refill takes less than 2 seconds.   Neurological:      General: No focal deficit present.      Mental Status: She is alert and oriented to person, place, and time. Mental status is at baseline.   Psychiatric:         Mood and Affect: Mood normal.         Behavior: Behavior normal.         Thought Content: Thought content normal.         Judgment: Judgment normal.      Comments: Anxious, increase stress at home/wk.           Scheduled Medications    PRN Medications    Continuous Medications      Outpatient Medications:  Prior to Admission medications    Medication Sig Start Date End Date Taking? Authorizing Provider   albuterol 2.5 mg /3 mL (0.083 %) nebulizer solution Take 3 mL by nebulization every 4 hours if needed for shortness of breath. 9/13/22   Historical Provider, MD   albuterol-budesonide (Airsupra) 90-80 mcg/actuation inhaler Inhale 2 puffs every 4 hours if needed (cough, wheeze, short of breath). Patient has a coupon 6/5/24 6/5/25  Hilary Trinidad, DO   aspirin 81 mg chewable tablet Chew 1 tablet (81 mg) once daily.    Historical Provider, MD   budesonide (Pulmicort) 0.5 mg/2 mL nebulizer solution Take 2 mL (0.5 mg) by nebulization 2 times a day. 7/26/23   Historical Provider, MD   butalbital-acetaminophen-caff -40 mg tablet Take 1 tablet by mouth early in the morning.. 1 tab(s) orally once a day, As Needed 8/9/23   Paul Matthew DO   desvenlafaxine 100 mg 24 hr tablet TAKE 1 TABLET BY MOUTH EVERY DAY 11/27/23   Paul Matthew DO   desvenlafaxine 50 mg 24 hr tablet TAKE 1 TABLET BY MOUTH ONCE DAILY 4/29/24   Paul Matthew DO   EPINEPHrine 0.3 mg/0.3 mL injection syringe Inject 0.3 mL (0.3 mg) into the muscle once daily as needed for anaphylaxis. 10/23/23   Paul Matthew DO    esomeprazole (NexIUM) 20 mg DR capsule Take 1 capsule (20 mg) by mouth once daily in the morning. Take before meals. Do not open capsule. 5/1/24 5/1/25  Hilary Garcia [place],    fexofenadine (Allegra) 180 mg tablet Take 1 tablet (180 mg) by mouth once daily. 5/21/15   Historical Provider, MD   fluticasone-umeclidin-vilanter (Trelegy Ellipta) 100-62.5-25 mcg blister with device Inhale 1 puff once daily.    Historical Provider, MD   galcanezumab (Emgality Syringe) 120 mg/mL prefilled syringe Inject 1 Syringe (120 mg) under the skin every 28 (twenty-eight) days.    Historical Provider, MD   hydrOXYzine HCL (Atarax) 25 mg tablet Take 1 tablet (25 mg) by mouth 3 times a day as needed for anxiety. 10/3/24   Gregory Valdez MD   levothyroxine (Synthroid, Levoxyl) 100 mcg tablet Take 1 tablet (100 mcg) by mouth once daily. 11/13/19   Historical Provider, MD   metoclopramide (Reglan) 10 mg tablet Take 1 tablet (10 mg) by mouth every 8 hours if needed (nausea). 5/7/24   Paul Matthew DO   montelukast (Singulair) 10 mg tablet Take 1 tablet (10 mg) by mouth once daily at bedtime. 8/9/23 11/25/24  Paul Matthew DO   multivitamin-Ca-iron-minerals (Tab-A-Vu Womens) 27-0.4 mg tablet Take 1 tablet by mouth once daily.    Historical Provider, MD   potassium chloride ER (Micro-K) 10 mEq ER capsule Take 1 capsule (10 mEq) by mouth once daily. 8/30/24 12/28/24  Gregory Valdez MD   predniSONE (Deltasone) 1 mg tablet Take 1 tablet (1 mg) by mouth once daily.    Historical Provider, MD   promethazine (Phenergan) 12.5 mg tablet Take 1 tablet (12.5 mg) by mouth every 6 hours if needed for nausea or vomiting.    Historical Provider, MD   semaglutide, weight loss, (Wegovy) 0.5 mg/0.5 mL pen injector Inject 0.5 mg under the skin 1 (one) time per week for 16 doses. 8/30/24 12/14/24  Gregory Valdez MD   tezepelumab-ekko (Tezspire) SubQ syringe Inject 210 mg under the skin every 28 (twenty-eight) days.    Historical Provider, MD        LABS AND IMAGING:     Labs:  Results for orders placed or performed during the hospital encounter of 11/25/24 (from the past 24 hours)   B-Type Natriuretic Peptide   Result Value Ref Range    BNP 15 0 - 99 pg/mL   Hepatic Function Panel   Result Value Ref Range    Albumin 4.1 3.4 - 5.0 g/dL    Bilirubin, Total 0.8 0.0 - 1.2 mg/dL    Bilirubin, Direct 0.1 0.0 - 0.3 mg/dL    Alkaline Phosphatase 74 33 - 110 U/L    ALT 49 (H) 7 - 45 U/L    AST 29 9 - 39 U/L    Total Protein 6.8 6.4 - 8.2 g/dL   Basic Metabolic Panel   Result Value Ref Range    Glucose 96 74 - 99 mg/dL    Sodium 138 136 - 145 mmol/L    Potassium 2.9 (LL) 3.5 - 5.3 mmol/L    Chloride 104 98 - 107 mmol/L    Bicarbonate 28 21 - 32 mmol/L    Anion Gap 9 (L) 10 - 20 mmol/L    Urea Nitrogen 8 6 - 23 mg/dL    Creatinine 0.88 0.50 - 1.05 mg/dL    eGFR 87 >60 mL/min/1.73m*2    Calcium 9.2 8.6 - 10.3 mg/dL   CBC and Auto Differential   Result Value Ref Range    WBC 8.5 4.4 - 11.3 x10*3/uL    nRBC 0.0 0.0 - 0.0 /100 WBCs    RBC 4.97 4.00 - 5.20 x10*6/uL    Hemoglobin 14.9 12.0 - 16.0 g/dL    Hematocrit 41.4 36.0 - 46.0 %    MCV 83 80 - 100 fL    MCH 30.0 26.0 - 34.0 pg    MCHC 36.0 32.0 - 36.0 g/dL    RDW 12.2 11.5 - 14.5 %    Platelets 373 150 - 450 x10*3/uL    Neutrophils % 50.9 40.0 - 80.0 %    Immature Granulocytes %, Automated 0.2 0.0 - 0.9 %    Lymphocytes % 39.5 13.0 - 44.0 %    Monocytes % 8.7 2.0 - 10.0 %    Eosinophils % 0.1 0.0 - 6.0 %    Basophils % 0.6 0.0 - 2.0 %    Neutrophils Absolute 4.31 1.20 - 7.70 x10*3/uL    Immature Granulocytes Absolute, Automated 0.02 0.00 - 0.70 x10*3/uL    Lymphocytes Absolute 3.35 1.20 - 4.80 x10*3/uL    Monocytes Absolute 0.74 0.10 - 1.00 x10*3/uL    Eosinophils Absolute 0.01 0.00 - 0.70 x10*3/uL    Basophils Absolute 0.05 0.00 - 0.10 x10*3/uL   Troponin I, High Sensitivity, Initial   Result Value Ref Range    Troponin I, High Sensitivity <3 0 - 13 ng/L   ECG 12 Lead   Result Value Ref Range    Ventricular Rate 81  BPM    Atrial Rate 81 BPM    KY Interval 172 ms    QRS Duration 82 ms    QT Interval 394 ms    QTC Calculation(Bazett) 457 ms    P Axis 32 degrees    R Axis -14 degrees    T Axis 2 degrees    QRS Count 13 beats    Q Onset 217 ms    P Onset 131 ms    P Offset 192 ms    T Offset 414 ms    QTC Fredericia 435 ms   Troponin, High Sensitivity, 1 Hour   Result Value Ref Range    Troponin I, High Sensitivity <3 0 - 13 ng/L        Imaging:  XR chest 1 view   Final Result   1.  No evidence of acute cardiopulmonary process.                  MACRO:   None        Signed by: Zaheer Wood 11/25/2024 5:47 PM   Dictation workstation:   [id number]

## 2024-11-26 NOTE — PROGRESS NOTES
11/26/24 1312   Discharge Planning   Living Arrangements Spouse/significant other   Support Systems Spouse/significant other   Assistance Needed PTA - none, independent at baseline.   Type of Residence Private residence   Number of Stairs to Enter Residence 3   Number of Stairs Within Residence 0   Do you have animals or pets at home? No   Home or Post Acute Services None   Expected Discharge Disposition Home   Does the patient need discharge transport arranged? No     HOME no needs.

## 2024-11-26 NOTE — CARE PLAN
Problem: Pain - Adult  Goal: Verbalizes/displays adequate comfort level or baseline comfort level  Outcome: Progressing     Problem: Safety - Adult  Goal: Free from fall injury  Outcome: Progressing     Problem: Discharge Planning  Goal: Discharge to home or other facility with appropriate resources  Outcome: Progressing     Problem: Chronic Conditions and Co-morbidities  Goal: Patient's chronic conditions and co-morbidity symptoms are monitored and maintained or improved  Outcome: Progressing     Problem: Fall/Injury  Goal: Not fall by end of shift  Outcome: Progressing  Goal: Be free from injury by end of the shift  Outcome: Progressing  Goal: Verbalize understanding of personal risk factors for fall in the hospital  Outcome: Progressing  Goal: Verbalize understanding of risk factor reduction measures to prevent injury from fall in the home  Outcome: Progressing  Goal: Use assistive devices by end of the shift  Outcome: Progressing  Goal: Pace activities to prevent fatigue by end of the shift  Outcome: Progressing     Problem: Skin  Goal: Decreased wound size/increased tissue granulation at next dressing change  Outcome: Progressing  Goal: Participates in plan/prevention/treatment measures  Outcome: Progressing  Goal: Prevent/manage excess moisture  Outcome: Progressing  Goal: Prevent/minimize sheer/friction injuries  Outcome: Progressing  Goal: Promote/optimize nutrition  Outcome: Progressing  Goal: Promote skin healing  Outcome: Progressing     Problem: Pain  Goal: Takes deep breaths with improved pain control throughout the shift  Outcome: Progressing  Goal: Turns in bed with improved pain control throughout the shift  Outcome: Progressing  Goal: Walks with improved pain control throughout the shift  Outcome: Progressing  Goal: Performs ADL's with improved pain control throughout shift  Outcome: Progressing  Goal: Participates in PT with improved pain control throughout the shift  Outcome: Progressing  Goal:  Free from opioid side effects throughout the shift  Outcome: Progressing  Goal: Free from acute confusion related to pain meds throughout the shift  Outcome: Progressing

## 2024-11-26 NOTE — DISCHARGE SUMMARY
Discharge Diagnosis  Asthma exacerbation, non-allergic, moderate persistent ([facility]-HCC)    Issues Requiring Follow-Up  Follow up with outpatient pulmonology and allergy/immunology  Follow up with PCP    Discharge Meds     Medication List      CONTINUE taking these medications     Airsupra 90-80 mcg/actuation inhaler; Generic drug:   albuterol-budesonide; Inhale 2 puffs every 4 hours if needed (cough,   wheeze, short of breath). Patient has a coupon   albuterol 2.5 mg /3 mL (0.083 %) nebulizer solution   aspirin 81 mg chewable tablet   budesonide 0.5 mg/2 mL nebulizer solution; Commonly known as: Pulmicort   butalbital-acetaminophen-caff -40 mg tablet; Take 1 tablet by   mouth early in the morning.. 1 tab(s) orally once a day, As Needed   * desvenlafaxine 100 mg 24 hr tablet; Commonly known as: Pristiq; TAKE 1   TABLET BY MOUTH EVERY DAY   * desvenlafaxine 50 mg 24 hr tablet; Commonly known as: Pristiq; TAKE 1   TABLET BY MOUTH ONCE DAILY   Emgality Syringe 120 mg/mL prefilled syringe; Generic drug: galcanezumab   EPINEPHrine 0.3 mg/0.3 mL injection syringe; Commonly known as: Epipen;   Inject 0.3 mL (0.3 mg) into the muscle once daily as needed for   anaphylaxis.   esomeprazole 20 mg DR capsule; Commonly known as: NexIUM; Take 1 capsule   (20 mg) by mouth once daily in the morning. Take before meals. Do not open   capsule.   fexofenadine 180 mg tablet; Commonly known as: Allegra   hydrOXYzine HCL 25 mg tablet; Commonly known as: Atarax; Take 1 tablet   (25 mg) by mouth 3 times a day as needed for anxiety.   levothyroxine 100 mcg tablet; Commonly known as: Synthroid, Levoxyl   metoclopramide 10 mg tablet; Commonly known as: Reglan; Take 1 tablet   (10 mg) by mouth every 8 hours if needed (nausea).   multivitamin-Ca-iron-minerals 27-0.4 mg tablet; Commonly known as:   Tab-A-Vu Womens   potassium chloride ER 10 mEq ER capsule; Commonly known as: Micro-K;   Take 1 capsule (10 mEq) by mouth once daily.   predniSONE  1 mg tablet; Commonly known as: Deltasone   promethazine 12.5 mg tablet; Commonly known as: Phenergan   Tezspire SubQ syringe; Generic drug: tezepelumab-ekko   Trelegy Ellipta 100-62.5-25 mcg blister with device; Generic drug:   fluticasone-umeclidin-vilanter   Wegovy 0.5 mg/0.5 mL pen injector; Generic drug: semaglutide (weight   loss); Inject 0.5 mg under the skin 1 (one) time per week for 16 doses.  * This list has 2 medication(s) that are the same as other medications   prescribed for you. Read the directions carefully, and ask your doctor or   other care provider to review them with you.     STOP taking these medications     montelukast 10 mg tablet; Commonly known as: Singulair       Test Results Pending At Discharge  Pending Labs       No current pending labs.            Hospital Course   This is a 39 year old female with PMHx including  moderate persistent asthma exacerbation, reactive airway, NAFLD, partial complex seizure, hypothyroid, migraine, GERD, MYNOR on CPAP who presented on 11/25 for SOB secondary to acute asthma exacerbation. Had hypokalemia of 2.9 on admission, resolved with PO replacement. Patient was treated with duonebs, singulair, prednisone, and inhalers. Remained stable on RA. With treatment, her symptoms have resolved. During admission she developed a headache, treated with tylenol and fioricet with some improvement. All other non-hospital problems remained stable. Patient will have no new meds on discharge. She should continue her current prednisone taper and home inhalers/breathing treatments. She should resume all other home meds. The patient will be discharged home today. She is hemodynamically stable at time of discharge.    Plan of care was discussed extensively with patient.  Patient verbalized understanding through teach back method.  All question and concerns addressed upon examination.     Of note, this documentation is completed using the Dragon Dictation system (voice recognition  software). There may be spelling and/or grammatical errors that were not corrected prior to final submission.      Pertinent Physical Exam At Time of Discharge  Physical Exam  Constitutional:       Appearance: She is obese.   HENT:      Head: Normocephalic.      Mouth/Throat:      Mouth: Mucous membranes are moist.   Eyes:      Pupils: Pupils are equal, round, and reactive to light.   Cardiovascular:      Rate and Rhythm: Normal rate and regular rhythm.      Heart sounds: Normal heart sounds, S1 normal and S2 normal.   Pulmonary:      Effort: Pulmonary effort is normal.      Breath sounds: Normal breath sounds.   Abdominal:      General: Bowel sounds are normal.      Palpations: Abdomen is soft.      Tenderness: There is no abdominal tenderness.   Musculoskeletal:         General: Normal range of motion.      Cervical back: Neck supple.      Right lower leg: No edema.      Left lower leg: No edema.   Skin:     General: Skin is warm.   Neurological:      Mental Status: She is alert and oriented to person, place, and time.   Psychiatric:         Mood and Affect: Mood normal.         Behavior: Behavior normal.         Outpatient Follow-Up  Future Appointments   Date Time Provider Department Center   1/3/2025 10:30 AM Gregory Valdez MD [email or web address] [place]   2/5/2025  8:30 AM Shadia Bryan DO [id number] [place]         Marilynn Love PA-C  I spent 35 minutes on discharge. Time calculated includes bedside evaluation, counseling, and outpatient care coordination.

## 2024-11-26 NOTE — DISCHARGE INSTRUCTIONS
Follow up with your pulmonologist and allergy/immunology.  Follow up with your PCP as needed.    Thank you for choosing [facility]. It has been a pleasure taking part in your medical care. Please follow up with your primary care provider as instructed. If your symptoms should persist or worsen, please contact your primary care physician, or in the case of an emergency proceed to the nearest Emergency Room for further care. If you have any questions about the care you received, please call [facility] at [phone]. Thank you again!

## 2024-11-27 ENCOUNTER — PATIENT OUTREACH (OUTPATIENT)
Dept: PRIMARY CARE | Facility: CLINIC | Age: 39
End: 2024-11-27
Payer: COMMERCIAL

## 2024-11-27 NOTE — PROGRESS NOTES
Discharge Facility: [facility]  Discharge Diagnosis: Asthma exacerbation, non-allergic, moderate persistent   Admission Date: 11/25/24  Discharge Date: 11/26/24    PCP Appointment Date: Declined  Specialist Appointment Date:   Noah Ortiz MD (Pulmonary Disease)  Hilary Trinidad DO (Allergy and Immunology)  Hospital Encounter and Summary Linked: Yes  See discharge assessment below for further details    Medications  Medications reviewed with patient/caregiver?: No (no medication changes) (11/27/2024 11:56 AM)  Is the patient having any side effects they believe may be caused by any medication additions or changes?: No (11/27/2024 11:56 AM)  Does the patient have all medications ordered at discharge?: Yes (11/27/2024 11:56 AM)  Care Management Interventions: No intervention needed (11/27/2024 11:56 AM)  Is the patient taking all medications as directed (includes completed medication regime)?: Yes (11/27/2024 11:56 AM)  Care Management Interventions: Provided patient education (11/27/2024 11:56 AM)    Appointments  Does the patient have a primary care provider?: Yes (11/27/2024 11:56 AM)  Care Management Interventions: Advised patient to make appointment (11/27/2024 11:56 AM)    Self Management  Has home health visited the patient within 72 hours of discharge?: Not applicable (11/27/2024 11:56 AM)  What Durable Medical Equipment (DME) was ordered?: n/a (11/27/2024 11:56 AM)    Patient Teaching  Does the patient have access to their discharge instructions?: Yes (11/27/2024 11:56 AM)  Care Management Interventions: Reviewed instructions with patient (11/27/2024 11:56 AM)  What is the patient's perception of their health status since discharge?: Improving (11/27/2024 11:56 AM)  Is the patient/caregiver able to teach back the hierarchy of who to call/visit for symptoms/problems? PCP, Specialist, Home Health nurse, Urgent Care, ED, 911: Yes (11/27/2024 11:56 AM)  Patient/Caregiver Education Comments: Patient  reports she is doing better today and her breathing has been better.  No medication changes. Pt reports she will be following up with her specialists and declines follow up with PCP. Denies questions/concerns at this time. (11/27/2024 11:56 AM)

## 2024-11-28 ENCOUNTER — PATIENT MESSAGE (OUTPATIENT)
Dept: PRIMARY CARE | Facility: CLINIC | Age: 39
End: 2024-11-28
Payer: COMMERCIAL

## 2024-11-30 RX ORDER — SEMAGLUTIDE 1 MG/.5ML
1 INJECTION, SOLUTION SUBCUTANEOUS WEEKLY
Qty: 2 ML | Refills: 0 | Status: SHIPPED | OUTPATIENT
Start: 2024-11-30 | End: 2024-12-22

## 2024-12-04 DIAGNOSIS — A49.8 GARDNERELLA INFECTION: Primary | ICD-10-CM

## 2024-12-04 LAB
CYTOLOGY CMNT CVX/VAG CYTO-IMP: NORMAL
HPV HR 12 DNA GENITAL QL NAA+PROBE: NEGATIVE
HPV HR GENOTYPES PNL CVX NAA+PROBE: NEGATIVE
HPV16 DNA SPEC QL NAA+PROBE: NEGATIVE
HPV18 DNA SPEC QL NAA+PROBE: NEGATIVE
LAB AP HPV GENOTYPE QUESTION: YES
LAB AP HPV HR: NORMAL
LAB AP PAP ADDITIONAL TESTS: NORMAL
LAB AP PREVIOUS ABNORMAL HISTORY: NORMAL
LABORATORY COMMENT REPORT: NORMAL
PATH REPORT.TOTAL CANCER: NORMAL

## 2024-12-04 RX ORDER — METRONIDAZOLE 500 MG/1
500 TABLET ORAL 2 TIMES DAILY
Qty: 14 TABLET | Refills: 0 | Status: SHIPPED | OUTPATIENT
Start: 2024-12-04 | End: 2024-12-11

## 2024-12-12 ENCOUNTER — DOCUMENTATION (OUTPATIENT)
Dept: PRIMARY CARE | Facility: CLINIC | Age: 39
End: 2024-12-12
Payer: COMMERCIAL

## 2024-12-12 DIAGNOSIS — Z09 HOSPITAL DISCHARGE FOLLOW-UP: ICD-10-CM

## 2024-12-17 ENCOUNTER — APPOINTMENT (OUTPATIENT)
Dept: ORTHOPEDIC SURGERY | Facility: CLINIC | Age: 39
End: 2024-12-17
Payer: COMMERCIAL

## 2024-12-18 ENCOUNTER — HOSPITAL ENCOUNTER (EMERGENCY)
Facility: HOSPITAL | Age: 39
Discharge: HOME | End: 2024-12-18
Attending: EMERGENCY MEDICINE
Payer: COMMERCIAL

## 2024-12-18 VITALS
HEIGHT: 65 IN | WEIGHT: 293 LBS | BODY MASS INDEX: 48.82 KG/M2 | DIASTOLIC BLOOD PRESSURE: 95 MMHG | OXYGEN SATURATION: 100 % | SYSTOLIC BLOOD PRESSURE: 152 MMHG | TEMPERATURE: 98.2 F | RESPIRATION RATE: 20 BRPM | HEART RATE: 78 BPM

## 2024-12-18 DIAGNOSIS — J45.41 MODERATE PERSISTENT ASTHMA WITH EXACERBATION (HHS-HCC): Primary | ICD-10-CM

## 2024-12-18 PROCEDURE — 99284 EMERGENCY DEPT VISIT MOD MDM: CPT | Performed by: EMERGENCY MEDICINE

## 2024-12-18 PROCEDURE — 96365 THER/PROPH/DIAG IV INF INIT: CPT

## 2024-12-18 PROCEDURE — 94640 AIRWAY INHALATION TREATMENT: CPT

## 2024-12-18 PROCEDURE — 96376 TX/PRO/DX INJ SAME DRUG ADON: CPT

## 2024-12-18 PROCEDURE — 96368 THER/DIAG CONCURRENT INF: CPT

## 2024-12-18 PROCEDURE — 2500000002 HC RX 250 W HCPCS SELF ADMINISTERED DRUGS (ALT 637 FOR MEDICARE OP, ALT 636 FOR OP/ED): Performed by: EMERGENCY MEDICINE

## 2024-12-18 PROCEDURE — 2500000004 HC RX 250 GENERAL PHARMACY W/ HCPCS (ALT 636 FOR OP/ED): Performed by: EMERGENCY MEDICINE

## 2024-12-18 PROCEDURE — 96375 TX/PRO/DX INJ NEW DRUG ADDON: CPT

## 2024-12-18 RX ORDER — ONDANSETRON HYDROCHLORIDE 2 MG/ML
4 INJECTION, SOLUTION INTRAVENOUS ONCE
Status: COMPLETED | OUTPATIENT
Start: 2024-12-18 | End: 2024-12-18

## 2024-12-18 RX ORDER — MORPHINE SULFATE 4 MG/ML
4 INJECTION, SOLUTION INTRAMUSCULAR; INTRAVENOUS ONCE
Status: COMPLETED | OUTPATIENT
Start: 2024-12-18 | End: 2024-12-18

## 2024-12-18 RX ORDER — ALBUTEROL SULFATE 0.83 MG/ML
2.5 SOLUTION RESPIRATORY (INHALATION) ONCE
Status: COMPLETED | OUTPATIENT
Start: 2024-12-18 | End: 2024-12-18

## 2024-12-18 RX ORDER — IPRATROPIUM BROMIDE AND ALBUTEROL SULFATE 2.5; .5 MG/3ML; MG/3ML
3 SOLUTION RESPIRATORY (INHALATION) ONCE
Status: COMPLETED | OUTPATIENT
Start: 2024-12-18 | End: 2024-12-18

## 2024-12-18 RX ORDER — MAGNESIUM SULFATE HEPTAHYDRATE 40 MG/ML
2 INJECTION, SOLUTION INTRAVENOUS ONCE
Status: COMPLETED | OUTPATIENT
Start: 2024-12-18 | End: 2024-12-18

## 2024-12-18 RX ORDER — ACETAMINOPHEN 10 MG/ML
1000 INJECTION, SOLUTION INTRAVENOUS ONCE
Status: COMPLETED | OUTPATIENT
Start: 2024-12-18 | End: 2024-12-18

## 2024-12-18 ASSESSMENT — PAIN SCALES - GENERAL
PAINLEVEL_OUTOF10: 8
PAINLEVEL_OUTOF10: 8

## 2024-12-18 ASSESSMENT — PAIN - FUNCTIONAL ASSESSMENT: PAIN_FUNCTIONAL_ASSESSMENT: 0-10

## 2024-12-18 ASSESSMENT — LIFESTYLE VARIABLES
HAVE PEOPLE ANNOYED YOU BY CRITICIZING YOUR DRINKING: NO
HAVE YOU EVER FELT YOU SHOULD CUT DOWN ON YOUR DRINKING: NO
EVER HAD A DRINK FIRST THING IN THE MORNING TO STEADY YOUR NERVES TO GET RID OF A HANGOVER: NO
EVER FELT BAD OR GUILTY ABOUT YOUR DRINKING: NO
TOTAL SCORE: 0

## 2024-12-18 NOTE — PROGRESS NOTES
Chief Complaint   Patient presents with    Right Hip - New Patient Visit, Pain     Xrays today     History of Present Illness:  Sarahi Haley is a 39 y.o. female presenting to clinic as a new patient  for her right hip. She was referred by Artem Woods. She works night shift as EMS. She has been seen by Dr. Lee several times for her chronic right hip pain and had at least 3 bursal injections in the hip with some relief. She injured her hip at work while dealing with a patient and got a MRI which showed a labral tear. She went to Dr. Mondragon who didn't do much for her and told her she wasn't a candidate for arthroscopic intervention. She then saw Dr. Burton for evaluation and recommended a fluoroscopic guided injection in 10/2024. The injection gave her about 5-6 weeks of relief. She last saw Artem Woods on 12/13/24. She is still having significant groin pain and is unable to get in and out of the truck when working. She also has pain at rest as well as her ADL. She's also had 3 shoulder surgeries. She recently finished oral steroids and is allergic to NSAIDS. She is frustrated with her progress.     Imaging:  MRI right hip: Shows mild arthritis and a small labral tear.      Assessment:   Right hip arthritis with mild symptomatic labral tear  Right hip trochanteric bursitis    Plan:  We reviewed the role of imaging, physical therapy, injections and the time frame to healing and correlation with outcome.  Valium for muscle spasms  Ice: 60 minutes on and off  Exercise home program: Medically directed hip therapy / Handout given.   We will get her set up in a month for USG hip injection with one of my non-operative partners.  We discussed possibility of a hip scope. I recommended two subspecialists specifically for this.   Follow-up as needed     Physical Exam:  Right Hip:  Normal gait, Negative Trendelenburg sign  Skin healthy and intact  Reproducible groin pain with impingement  Negative straight leg  raise  Neurovascular exam normal distally      Review of Systems:  GENERAL: Negative for malaise, significant weight loss, fever  MUSCULOSKELETAL: See HPI  NEURO:  Negative     Past Medical History:   Diagnosis Date    Acute cystitis without hematuria 08/08/2019    Acute cystitis without hematuria    Asthma     Disease of thyroid gland     Encounter for initial prescription of contraceptive pills 11/01/2019    Encounter for initial prescription of contraceptive pills    Nausea 02/05/2021    Nausea in adult    Other general symptoms and signs 12/05/2019    Forgetfulness    Parageusia 04/06/2020    Taste perversion    Pelvic and perineal pain     Pelvic pain in female    Personal history of other diseases of the respiratory system 04/06/2020    History of sinusitis    Personal history of other specified conditions 07/01/2020    History of vomiting    Personal history of other specified conditions 01/26/2021    History of headache    Personal history of other specified conditions 02/01/2021    History of diarrhea    Personal history of thrombophlebitis 06/03/2019    History of phlebitis    Personal history of urinary (tract) infections 01/16/2020    History of urinary tract infection    PTSD (post-traumatic stress disorder)        Medication Documentation Review Audit       Reviewed by Merle Reyes RN (Registered Nurse) on 11/26/24 at 1409      Medication Order Taking? Sig Documenting Provider Last Dose Status   albuterol 2.5 mg /3 mL (0.083 %) nebulizer solution [id number]  Take 3 mL by nebulization every 4 hours if needed for shortness of breath. Historical Provider, MD  Active   albuterol-budesonide (Airsupra) 90-80 mcg/actuation inhaler [id number]  Inhale 2 puffs every 4 hours if needed (cough, wheeze, short of breath). Patient has a coupon Hilary Garcia [place],   Active   aspirin 81 mg chewable tablet [id number]  Chew 1 tablet (81 mg) once daily. Historical Provider, MD  Active   budesonide (Pulmicort) 0.5  mg/2 mL nebulizer solution [id number]  Take 2 mL (0.5 mg) by nebulization 2 times a day. Historical Provider, MD  Active   butalbital-acetaminophen-caff -40 mg tablet [id number]  Take 1 tablet by mouth early in the morning.. 1 tab(s) orally once a day, As Needed Paul Matthew DO  Active   desvenlafaxine 100 mg 24 hr tablet [id number]  TAKE 1 TABLET BY MOUTH EVERY DAY Paul Matthew DO  Active   desvenlafaxine 50 mg 24 hr tablet [id number]  TAKE 1 TABLET BY MOUTH ONCE DAILY Paul Matthew DO  Active   EPINEPHrine 0.3 mg/0.3 mL injection syringe [id number]  Inject 0.3 mL (0.3 mg) into the muscle once daily as needed for anaphylaxis. Paul Matthew DO  Active   esomeprazole (NexIUM) 20 mg DR capsule [id number]  Take 1 capsule (20 mg) by mouth once daily in the morning. Take before meals. Do not open capsule. Hilary Garcia [place],   Active   fexofenadine (Allegra) 180 mg tablet [id number]  Take 1 tablet (180 mg) by mouth once daily. Historical Provider, MD  Active   fluticasone-umeclidin-vilanter (Trelegy Ellipta) 100-62.5-25 mcg blister with device [id number]  Inhale 1 puff once daily. Historical Provider, MD  Active   galcanezumab (Emgality Syringe) 120 mg/mL prefilled syringe [id number]  Inject 1 Syringe (120 mg) under the skin every 28 (twenty-eight) days. Historical Provider, MD  Active   hydrOXYzine HCL (Atarax) 25 mg tablet [id number]  Take 1 tablet (25 mg) by mouth 3 times a day as needed for anxiety. Gregory Valdez MD  Active   levothyroxine (Synthroid, Levoxyl) 100 mcg tablet [id number]  Take 1 tablet (100 mcg) by mouth once daily. Deirdre Provider, MD  Active   metoclopramide (Reglan) 10 mg tablet [id number]  Take 1 tablet (10 mg) by mouth every 8 hours if needed (nausea). Paul Matthew DO  Active   montelukast (Singulair) 10 mg tablet [id number]  Take 1 tablet (10 mg) by mouth once daily at bedtime. Paul Matthew DO   [id number]   multivitamin-Ca-iron-minerals (Tab-A-Vu Womens) 27-0.4 mg  tablet [id number]  Take 1 tablet by mouth once daily. Historical Provider, MD  Active   potassium chloride ER (Micro-K) 10 mEq ER capsule [id number]  Take 1 capsule (10 mEq) by mouth once daily. Gregory Valdez MD  Active   predniSONE (Deltasone) 1 mg tablet [id number]  Take 1 tablet (1 mg) by mouth once daily. Historical Provider, MD  Active   promethazine (Phenergan) 12.5 mg tablet [id number]  Take 1 tablet (12.5 mg) by mouth every 6 hours if needed for nausea or vomiting. Historical Provider, MD  Active   semaglutide, weight loss, (Wegovy) 0.5 mg/0.5 mL pen injector [id number]  Inject 0.5 mg under the skin 1 (one) time per week for 16 doses. Gregory Valdez MD  Active   tezepelumab-ekko (Tezspire) SubQ syringe [id number]  Inject 210 mg under the skin every 28 (twenty-eight) days. Historical Provider, MD  Active                    Allergies   Allergen Reactions    Bee Venom Protein (Honey Bee) Shortness of breath    Diphenhydramine Rash     arythmia    Nsaids (Non-Steroidal Anti-Inflammatory Drug) Hives    Procaine Hives and Swelling    Ketorolac Other     facial redness       Social History     Socioeconomic History    Marital status:      Spouse name: Not on file    Number of children: Not on file    Years of education: Not on file    Highest education level: Not on file   Occupational History    Not on file   Tobacco Use    Smoking status: Never     Passive exposure: Current    Smokeless tobacco: Never   Vaping Use    Vaping status: Never Used   Substance and Sexual Activity    Alcohol use: Yes     Alcohol/week: 1.0 - 2.0 standard drink of alcohol     Types: 1 - 2 Glasses of wine per week     Comment: social    Drug use: Never    Sexual activity: Yes   Other Topics Concern    Not on file   Social History Narrative    Not on file     Social Drivers of Health     Financial Resource Strain: Low Risk  (11/25/2024)    Overall Financial Resource Strain (CARDIA)     Difficulty of Paying Living Expenses: Not hard at all    Food Insecurity: No Food Insecurity (11/25/2024)    Hunger Vital Sign     Worried About Running Out of Food in the Last Year: Never true     Ran Out of Food in the Last Year: Never true   Transportation Needs: No Transportation Needs (11/25/2024)    PRAPARE - Transportation     Lack of Transportation (Medical): No     Lack of Transportation (Non-Medical): No   Physical Activity: Insufficiently Active (11/25/2024)    Exercise Vital Sign     Days of Exercise per Week: 2 days     Minutes of Exercise per Session: 40 min   Stress: No Stress Concern Present (6/9/2024)    [employer] [place] [employer] - Occupational Stress Questionnaire     Feeling of Stress : Not at all   Social Connections: Moderately Integrated (6/9/2024)    Social Connection and Isolation Panel [NHANES]     Frequency of Communication with Friends and Family: More than three times a week     Frequency of Social Gatherings with Friends and Family: More than three times a week     Attends Confucianist Services: Never     Active Member of Clubs or Organizations: Yes     Attends Club or Organization Meetings: More than 4 times per year     Marital Status:    Intimate Partner Violence: Not At Risk (11/25/2024)    Humiliation, Afraid, Rape, and Kick questionnaire     Fear of Current or Ex-Partner: No     Emotionally Abused: No     Physically Abused: No     Sexually Abused: No   Housing Stability: Low Risk  (11/25/2024)    Housing Stability Vital Sign     Unable to Pay for Housing in the Last Year: No     Number of Times Moved in the Last Year: 0     Homeless in the Last Year: No       Past Surgical History:   Procedure Laterality Date    ABDOMINAL SURGERY      APPENDECTOMY      OTHER SURGICAL HISTORY  02/08/2019    Appendectomy    OTHER SURGICAL HISTORY  02/08/2019    Cholecystectomy    OTHER SURGICAL HISTORY  02/08/2019    Cystoscopy    OTHER SURGICAL HISTORY  02/08/2019    Ureteroscopy    OTHER SURGICAL HISTORY  02/08/2019    Shoulder  surgery       ECG 12 Lead    Result Date: 11/25/2024  Normal sinus rhythm Possible Anterior infarct (cited on or before 27-AUG-2024) Abnormal ECG When compared with ECG of 13-NOV-2024 03:38, T wave inversion more evident in Inferior leads Nonspecific T wave abnormality now evident in Lateral leads See ED provider note for full interpretation and clinical correlation Confirmed by Pilar Vidal ([id number]) on 11/25/2024 5:49:03 PM    XR chest 1 view    Result Date: 11/25/2024  Interpreted By:  Zaheer Wood, STUDY: XR CHEST 1 VIEW;  11/25/2024 5:22 pm   INDICATION: Signs/Symptoms:sob.     COMPARISON: 11/13/2024   ACCESSION NUMBER(S): [id number]   ORDERING CLINICIAN: KARSTEN MARTINEZ   FINDINGS:         CARDIOMEDIASTINAL SILHOUETTE: Cardiomediastinal silhouette is normal in size and configuration.   LUNGS: Lungs are clear.   ABDOMEN: No remarkable upper abdominal findings.   BONES: No acute osseous changes.       1.  No evidence of acute cardiopulmonary process.       MACRO: None   Signed by: aZheer Wood 11/25/2024 5:47 PM Dictation workstation:   [id number]         Scribe Attestation  By signing my name below, Marta JUAREZ, Scribzander   attest that this documentation has been prepared under the direction and in the presence of Michael Anand MD.

## 2024-12-18 NOTE — ED PROVIDER NOTES
HPI   Chief Complaint   Patient presents with    Shortness of Breath       chief complaint shortness of breath  History of present illness 39-year-old female with a past medical history of asthma had an exacerbation this evening shortness of breath difficulty with movement.  She drove in for evaluation here with a blood pressure 152/95 temp 36 pulse 78 respiratory rate of 20 pulse ox of 100%              Patient History   Past Medical History:   Diagnosis Date    Acute cystitis without hematuria 08/08/2019    Acute cystitis without hematuria    Asthma     Disease of thyroid gland     Encounter for initial prescription of contraceptive pills 11/01/2019    Encounter for initial prescription of contraceptive pills    Nausea 02/05/2021    Nausea in adult    Other general symptoms and signs 12/05/2019    Forgetfulness    Parageusia 04/06/2020    Taste perversion    Pelvic and perineal pain     Pelvic pain in female    Personal history of other diseases of the respiratory system 04/06/2020    History of sinusitis    Personal history of other specified conditions 07/01/2020    History of vomiting    Personal history of other specified conditions 01/26/2021    History of headache    Personal history of other specified conditions 02/01/2021    History of diarrhea    Personal history of thrombophlebitis 06/03/2019    History of phlebitis    Personal history of urinary (tract) infections 01/16/2020    History of urinary tract infection    PTSD (post-traumatic stress disorder)      Past Surgical History:   Procedure Laterality Date    ABDOMINAL SURGERY      APPENDECTOMY      OTHER SURGICAL HISTORY  02/08/2019    Appendectomy    OTHER SURGICAL HISTORY  02/08/2019    Cholecystectomy    OTHER SURGICAL HISTORY  02/08/2019    Cystoscopy    OTHER SURGICAL HISTORY  02/08/2019    Ureteroscopy    OTHER SURGICAL HISTORY  02/08/2019    Shoulder surgery     Family History   Problem Relation Name Age of Onset    Hypertension Father       Other (Pulmonary Valve Stenosis) Sister      Heart disease Maternal Grandmother      Diabetes Other Grandparent     Lung cancer Other Grandparent     Anxiety disorder Sibling       Social History     Tobacco Use    Smoking status: Never     Passive exposure: Current    Smokeless tobacco: Never   Vaping Use    Vaping status: Never Used   Substance Use Topics    Alcohol use: Yes     Alcohol/week: 1.0 - 2.0 standard drink of alcohol     Types: 1 - 2 Glasses of wine per week     Comment: social    Drug use: Never       Physical Exam   ED Triage Vitals   Temp Pulse Resp BP   -- -- -- --      SpO2 Temp src Heart Rate Source Patient Position   -- -- -- --      BP Location FiO2 (%)     -- --       Physical Exam  Vitals and nursing note reviewed.   Constitutional:       General: She is in acute distress.   HENT:      Head: Normocephalic.      Right Ear: Tympanic membrane normal.      Left Ear: Tympanic membrane normal.   Eyes:      Pupils: Pupils are equal, round, and reactive to light.   Cardiovascular:      Rate and Rhythm: Regular rhythm. Tachycardia present.   Pulmonary:      Effort: Pulmonary effort is normal.      Breath sounds: Wheezing and rhonchi present.   Abdominal:      Palpations: Abdomen is soft.   Musculoskeletal:      Cervical back: Normal range of motion.   Skin:     General: Skin is warm.      Capillary Refill: Capillary refill takes less than 2 seconds.   Neurological:      General: No focal deficit present.      Mental Status: She is alert and oriented to person, place, and time.   Psychiatric:         Mood and Affect: Mood normal.           ED Course & MDM   Diagnoses as of 12/18/24 0632   Moderate persistent asthma with exacerbation ([facility])                 No data recorded                                 Medical Decision Making  Differential diagnosis  Acute asthma exacerbation  Bronchospasm  Laryngeal tracheitis      Given IV Solu-Medrol IV magnesium aerosol treatment morphine IV was given Zofran IV  was given    Amount and/or Complexity of Data Reviewed  Discussion of management or test interpretation with external provider(s): Patient improved and was discharged home        Procedure  Procedures     Greg Napoles MD  12/18/24 [id number]

## 2024-12-19 ENCOUNTER — OFFICE VISIT (OUTPATIENT)
Dept: ORTHOPEDIC SURGERY | Facility: CLINIC | Age: 39
End: 2024-12-19
Payer: COMMERCIAL

## 2024-12-19 ENCOUNTER — TELEPHONE (OUTPATIENT)
Dept: ORTHOPEDIC SURGERY | Facility: CLINIC | Age: 39
End: 2024-12-19

## 2024-12-19 ENCOUNTER — HOSPITAL ENCOUNTER (OUTPATIENT)
Dept: RADIOLOGY | Facility: HOSPITAL | Age: 39
Discharge: HOME | End: 2024-12-19
Payer: COMMERCIAL

## 2024-12-19 DIAGNOSIS — M25.551 PAIN OF RIGHT HIP: ICD-10-CM

## 2024-12-19 DIAGNOSIS — M62.838 MUSCLE SPASM: ICD-10-CM

## 2024-12-19 PROCEDURE — 73502 X-RAY EXAM HIP UNI 2-3 VIEWS: CPT | Mod: RT

## 2024-12-19 PROCEDURE — 73502 X-RAY EXAM HIP UNI 2-3 VIEWS: CPT | Mod: RIGHT SIDE | Performed by: RADIOLOGY

## 2024-12-19 RX ORDER — DIAZEPAM 5 MG/1
5 TABLET ORAL EVERY 8 HOURS PRN
Qty: 10 TABLET | Refills: 0 | Status: SHIPPED | OUTPATIENT
Start: 2024-12-19 | End: 2024-12-26

## 2024-12-21 DIAGNOSIS — F32.9 MAJOR DEPRESSIVE DISORDER, SINGLE EPISODE, UNSPECIFIED: ICD-10-CM

## 2024-12-23 DIAGNOSIS — E66.01 CLASS 3 SEVERE OBESITY DUE TO EXCESS CALORIES WITH SERIOUS COMORBIDITY AND BODY MASS INDEX (BMI) OF 50.0 TO 59.9 IN ADULT: ICD-10-CM

## 2024-12-23 DIAGNOSIS — E66.813 CLASS 3 SEVERE OBESITY DUE TO EXCESS CALORIES WITH SERIOUS COMORBIDITY AND BODY MASS INDEX (BMI) OF 50.0 TO 59.9 IN ADULT: ICD-10-CM

## 2024-12-23 RX ORDER — DESVENLAFAXINE 100 MG/1
100 TABLET, EXTENDED RELEASE ORAL DAILY
Qty: 90 TABLET | Refills: 3 | Status: SHIPPED | OUTPATIENT
Start: 2024-12-23

## 2024-12-24 RX ORDER — SEMAGLUTIDE 0.5 MG/.5ML
INJECTION, SOLUTION SUBCUTANEOUS
Qty: 0.5 ML | Refills: 3 | Status: SHIPPED | OUTPATIENT
Start: 2024-12-24

## 2025-01-03 ENCOUNTER — APPOINTMENT (OUTPATIENT)
Dept: PRIMARY CARE | Facility: CLINIC | Age: 40
End: 2025-01-03
Payer: COMMERCIAL

## 2025-01-15 ENCOUNTER — OFFICE VISIT (OUTPATIENT)
Dept: ORTHOPEDIC SURGERY | Facility: HOSPITAL | Age: 40
End: 2025-01-15
Payer: COMMERCIAL

## 2025-01-15 ENCOUNTER — HOSPITAL ENCOUNTER (OUTPATIENT)
Dept: RADIOLOGY | Facility: HOSPITAL | Age: 40
Discharge: HOME | End: 2025-01-15
Payer: COMMERCIAL

## 2025-01-15 DIAGNOSIS — M25.551 RIGHT HIP PAIN: ICD-10-CM

## 2025-01-15 DIAGNOSIS — M62.838 MUSCLE SPASM: ICD-10-CM

## 2025-01-15 DIAGNOSIS — M25.551 PAIN OF RIGHT HIP: ICD-10-CM

## 2025-01-15 PROCEDURE — 99214 OFFICE O/P EST MOD 30 MIN: CPT | Performed by: ORTHOPAEDIC SURGERY

## 2025-01-15 PROCEDURE — 99204 OFFICE O/P NEW MOD 45 MIN: CPT | Performed by: ORTHOPAEDIC SURGERY

## 2025-01-15 PROCEDURE — 73502 X-RAY EXAM HIP UNI 2-3 VIEWS: CPT | Mod: RT

## 2025-01-15 NOTE — PROGRESS NOTES
HPI  This is a pleasant 39 y.o. female here today for right hip pain.  The patient has a long history of right hip pain.  She states it began atraumatically.  She has seen several providers over the years.  She has tried physical therapy in the past with minimal relief.  She has had 2 corticosteroid injections.  The first injection lasted for quite some time, however her last injection lasted for about a month and a half.  Today she reports right hip pain deep within the groin.  It is worse with activities.  She saw a provider who told her she was not a candidate for hip arthroscopy given her degenerative changes.  She is here today for second opinion.        Past Medical History:   Diagnosis Date    Acute cystitis without hematuria 08/08/2019    Acute cystitis without hematuria    Asthma     Disease of thyroid gland     Encounter for initial prescription of contraceptive pills 11/01/2019    Encounter for initial prescription of contraceptive pills    Nausea 02/05/2021    Nausea in adult    Other general symptoms and signs 12/05/2019    Forgetfulness    Parageusia 04/06/2020    Taste perversion    Pelvic and perineal pain     Pelvic pain in female    Personal history of other diseases of the respiratory system 04/06/2020    History of sinusitis    Personal history of other specified conditions 07/01/2020    History of vomiting    Personal history of other specified conditions 01/26/2021    History of headache    Personal history of other specified conditions 02/01/2021    History of diarrhea    Personal history of thrombophlebitis 06/03/2019    History of phlebitis    Personal history of urinary (tract) infections 01/16/2020    History of urinary tract infection    PTSD (post-traumatic stress disorder)        Past Surgical History:   Procedure Laterality Date    ABDOMINAL SURGERY      APPENDECTOMY      OTHER SURGICAL HISTORY  02/08/2019    Appendectomy    OTHER SURGICAL HISTORY  02/08/2019    Cholecystectomy     OTHER SURGICAL HISTORY  02/08/2019    Cystoscopy    OTHER SURGICAL HISTORY  02/08/2019    Ureteroscopy    OTHER SURGICAL HISTORY  02/08/2019    Shoulder surgery       Social History     Tobacco Use    Smoking status: Never     Passive exposure: Current    Smokeless tobacco: Never   Vaping Use    Vaping status: Never Used   Substance Use Topics    Alcohol use: Yes     Alcohol/week: 1.0 - 2.0 standard drink of alcohol     Types: 1 - 2 Glasses of wine per week     Comment: social    Drug use: Never          ROS  Review of systems reviewed and pertinent positives mentioned in HPI.      PHYSICAL EXAM  There is not  pain with a resisted situp.    There is not abdominal distention or tenderness    The patient's range of motion reveals that they have 90° of hip flexion on the affected side.   ER 40 degrees.   IR 20 degrees  Hip extension to 10°   Abduction to 45°      The patient is 5 out of 5 strength with resisted hip AB, adduction, hamstring and quadriceps testing.    No pain over the hip flexor, ASIS.  No pain over the proximal hamstring, piriformis      Pain Provacation testing:    Positive impingement sign,with a painful arc from 12 to 3:00.  Negative Psoas impingement/Jake test  Negative instability, Log roll.  Positive subspine impingement test, which is pain with straight hip flexion.    Negative straight leg raise.  Negative circumduction clunk.      Peritrochanteric space examination:  Tenderness over the corina-trochanteric space - No  pain over the posterior trochanter - No      IMAGING  X-rays reviewed reveal  moderate to severe degenerative changes noted.    MRI reviewed reveals tearing of the acetabular labrum.  Is blunting of the labrum.  There is femoral head and acetabular cartilage loss.      ASSESSMENT/PLAN  This is a 39 y.o. female patient here today with significant hip pain secondary to Right osteoarthritis.    We discussed her imaging findings and that given her amount of arthritis, she is not a  candidate for hip arthroscopy.  We recommended continuing conservative management until it is exhausted.  We discussed she would likely need hip replacement in the future.  In the meantime, we will send her to our sports medicine colleagues to discuss ultrasound-guided injection options.    Follow-up as needed      Michael So MD

## 2025-01-17 ENCOUNTER — APPOINTMENT (OUTPATIENT)
Dept: PRIMARY CARE | Facility: CLINIC | Age: 40
End: 2025-01-17
Payer: COMMERCIAL

## 2025-01-17 ENCOUNTER — APPOINTMENT (OUTPATIENT)
Dept: ORTHOPEDIC SURGERY | Facility: HOSPITAL | Age: 40
End: 2025-01-17
Payer: COMMERCIAL

## 2025-01-20 ENCOUNTER — APPOINTMENT (OUTPATIENT)
Dept: ORTHOPEDIC SURGERY | Facility: CLINIC | Age: 40
End: 2025-01-20
Payer: COMMERCIAL

## 2025-01-20 ENCOUNTER — TELEPHONE (OUTPATIENT)
Dept: PRIMARY CARE | Facility: CLINIC | Age: 40
End: 2025-01-20

## 2025-01-20 DIAGNOSIS — E78.00 HYPERCHOLESTEROLEMIA: ICD-10-CM

## 2025-01-20 DIAGNOSIS — E87.6 HYPOKALEMIA: ICD-10-CM

## 2025-01-20 DIAGNOSIS — E03.9 HYPOTHYROIDISM, ADULT: ICD-10-CM

## 2025-01-20 DIAGNOSIS — Z00.00 HEALTHCARE MAINTENANCE: ICD-10-CM

## 2025-01-20 DIAGNOSIS — G40.209 SEIZURE DISORDER, COMPLEX PARTIAL, WITHOUT INTRACTABLE EPILEPSY (MULTI): Primary | ICD-10-CM

## 2025-01-20 NOTE — TELEPHONE ENCOUNTER
Gregory Valdez MD  [id number] [place] [id number] Clinical Support Staff33 minutes ago (7:43 AM)       Please let her know fasting labs are ordered to be done 2 to 5 days before her appointment with me at any  lab.  Thanks

## 2025-01-20 NOTE — TELEPHONE ENCOUNTER
PLEASE ADVISE      Sarahi FOX [id number] [facility] Clinical Support Staff (supporting Gregory Valdez MD)4 hours ago (2:30 AM)       Hi! I have my annual on 1/27. Could you please put in lab work so I’m able to obtain prior to the appointment? Could you please make sure to include tsh, t3& t4?      Thank you!

## 2025-01-23 ENCOUNTER — LAB (OUTPATIENT)
Dept: LAB | Facility: LAB | Age: 40
End: 2025-01-23
Payer: COMMERCIAL

## 2025-01-23 DIAGNOSIS — E87.6 HYPOKALEMIA: ICD-10-CM

## 2025-01-23 DIAGNOSIS — E78.00 HYPERCHOLESTEROLEMIA: ICD-10-CM

## 2025-01-23 DIAGNOSIS — E03.9 HYPOTHYROIDISM, ADULT: ICD-10-CM

## 2025-01-23 DIAGNOSIS — Z00.00 HEALTHCARE MAINTENANCE: ICD-10-CM

## 2025-01-23 DIAGNOSIS — G40.209 SEIZURE DISORDER, COMPLEX PARTIAL, WITHOUT INTRACTABLE EPILEPSY (MULTI): ICD-10-CM

## 2025-01-23 LAB
ALBUMIN SERPL BCP-MCNC: 4.2 G/DL (ref 3.4–5)
ALP SERPL-CCNC: 80 U/L (ref 33–110)
ALT SERPL W P-5'-P-CCNC: 93 U/L (ref 7–45)
ANION GAP SERPL CALC-SCNC: 12 MMOL/L (ref 10–20)
AST SERPL W P-5'-P-CCNC: 68 U/L (ref 9–39)
BASOPHILS # BLD AUTO: 0.05 X10*3/UL (ref 0–0.1)
BASOPHILS NFR BLD AUTO: 0.7 %
BILIRUB SERPL-MCNC: 1 MG/DL (ref 0–1.2)
BUN SERPL-MCNC: 13 MG/DL (ref 6–23)
CALCIUM SERPL-MCNC: 9.2 MG/DL (ref 8.6–10.3)
CHLORIDE SERPL-SCNC: 103 MMOL/L (ref 98–107)
CHOLEST SERPL-MCNC: 253 MG/DL (ref 0–199)
CHOLESTEROL/HDL RATIO: 4.3
CO2 SERPL-SCNC: 27 MMOL/L (ref 21–32)
CREAT SERPL-MCNC: 0.76 MG/DL (ref 0.5–1.05)
EGFRCR SERPLBLD CKD-EPI 2021: >90 ML/MIN/1.73M*2
EOSINOPHIL # BLD AUTO: 0.01 X10*3/UL (ref 0–0.7)
EOSINOPHIL NFR BLD AUTO: 0.1 %
ERYTHROCYTE [DISTWIDTH] IN BLOOD BY AUTOMATED COUNT: 11.9 % (ref 11.5–14.5)
GLUCOSE SERPL-MCNC: 77 MG/DL (ref 74–99)
HCT VFR BLD AUTO: 43.9 % (ref 36–46)
HDLC SERPL-MCNC: 59.3 MG/DL
HGB BLD-MCNC: 15.3 G/DL (ref 12–16)
IMM GRANULOCYTES # BLD AUTO: 0.01 X10*3/UL (ref 0–0.7)
IMM GRANULOCYTES NFR BLD AUTO: 0.1 % (ref 0–0.9)
LDLC SERPL CALC-MCNC: 169 MG/DL
LYMPHOCYTES # BLD AUTO: 2.41 X10*3/UL (ref 1.2–4.8)
LYMPHOCYTES NFR BLD AUTO: 33.1 %
MCH RBC QN AUTO: 30.3 PG (ref 26–34)
MCHC RBC AUTO-ENTMCNC: 34.9 G/DL (ref 32–36)
MCV RBC AUTO: 87 FL (ref 80–100)
MONOCYTES # BLD AUTO: 0.55 X10*3/UL (ref 0.1–1)
MONOCYTES NFR BLD AUTO: 7.6 %
NEUTROPHILS # BLD AUTO: 4.24 X10*3/UL (ref 1.2–7.7)
NEUTROPHILS NFR BLD AUTO: 58.4 %
NON HDL CHOLESTEROL: 194 MG/DL (ref 0–149)
NRBC BLD-RTO: 0 /100 WBCS (ref 0–0)
PLATELET # BLD AUTO: 331 X10*3/UL (ref 150–450)
POTASSIUM SERPL-SCNC: 3.9 MMOL/L (ref 3.5–5.3)
PROT SERPL-MCNC: 6.6 G/DL (ref 6.4–8.2)
RBC # BLD AUTO: 5.05 X10*6/UL (ref 4–5.2)
SODIUM SERPL-SCNC: 138 MMOL/L (ref 136–145)
T4 FREE SERPL-MCNC: 0.71 NG/DL (ref 0.61–1.12)
TRIGL SERPL-MCNC: 125 MG/DL (ref 0–149)
TSH SERPL-ACNC: 3.62 MIU/L (ref 0.44–3.98)
VLDL: 25 MG/DL (ref 0–40)
WBC # BLD AUTO: 7.3 X10*3/UL (ref 4.4–11.3)

## 2025-01-23 PROCEDURE — 80061 LIPID PANEL: CPT

## 2025-01-23 PROCEDURE — 85025 COMPLETE CBC W/AUTO DIFF WBC: CPT

## 2025-01-23 PROCEDURE — 84443 ASSAY THYROID STIM HORMONE: CPT

## 2025-01-23 PROCEDURE — 84439 ASSAY OF FREE THYROXINE: CPT

## 2025-01-23 PROCEDURE — 80053 COMPREHEN METABOLIC PANEL: CPT

## 2025-01-27 ENCOUNTER — TELEPHONE (OUTPATIENT)
Dept: PRIMARY CARE | Facility: CLINIC | Age: 40
End: 2025-01-27

## 2025-01-27 ENCOUNTER — APPOINTMENT (OUTPATIENT)
Dept: PRIMARY CARE | Facility: CLINIC | Age: 40
End: 2025-01-27
Payer: COMMERCIAL

## 2025-01-27 VITALS
WEIGHT: 293 LBS | OXYGEN SATURATION: 98 % | BODY MASS INDEX: 47.09 KG/M2 | RESPIRATION RATE: 18 BRPM | HEART RATE: 78 BPM | SYSTOLIC BLOOD PRESSURE: 120 MMHG | DIASTOLIC BLOOD PRESSURE: 80 MMHG | HEIGHT: 66 IN

## 2025-01-27 DIAGNOSIS — J45.41 ASTHMA IN ADULT, MODERATE PERSISTENT, WITH ACUTE EXACERBATION (HHS-HCC): ICD-10-CM

## 2025-01-27 DIAGNOSIS — E66.01 CLASS 3 SEVERE OBESITY DUE TO EXCESS CALORIES WITH SERIOUS COMORBIDITY AND BODY MASS INDEX (BMI) OF 50.0 TO 59.9 IN ADULT: ICD-10-CM

## 2025-01-27 DIAGNOSIS — Z00.00 ANNUAL PHYSICAL EXAM: Primary | ICD-10-CM

## 2025-01-27 DIAGNOSIS — R79.89 ELEVATED LFTS: ICD-10-CM

## 2025-01-27 DIAGNOSIS — E27.49 ADRENAL SUPPRESSION (MULTI): ICD-10-CM

## 2025-01-27 DIAGNOSIS — G89.29 CHRONIC PAIN OF RIGHT HIP: ICD-10-CM

## 2025-01-27 DIAGNOSIS — F19.20 STEROID DEPENDENT (MULTI): ICD-10-CM

## 2025-01-27 DIAGNOSIS — E66.813 CLASS 3 SEVERE OBESITY DUE TO EXCESS CALORIES WITH SERIOUS COMORBIDITY AND BODY MASS INDEX (BMI) OF 50.0 TO 59.9 IN ADULT: ICD-10-CM

## 2025-01-27 DIAGNOSIS — J96.01 ACUTE RESPIRATORY FAILURE WITH HYPOXIA (MULTI): ICD-10-CM

## 2025-01-27 DIAGNOSIS — M25.551 CHRONIC PAIN OF RIGHT HIP: ICD-10-CM

## 2025-01-27 DIAGNOSIS — E03.9 HYPOTHYROIDISM, ADULT: ICD-10-CM

## 2025-01-27 DIAGNOSIS — F41.9 ANXIETY: ICD-10-CM

## 2025-01-27 DIAGNOSIS — M15.9 OSTEOARTHRITIS OF MULTIPLE JOINTS, UNSPECIFIED OSTEOARTHRITIS TYPE: ICD-10-CM

## 2025-01-27 DIAGNOSIS — J45.51 SEVERE PERSISTENT ASTHMA WITH ACUTE EXACERBATION (MULTI): ICD-10-CM

## 2025-01-27 DIAGNOSIS — E78.00 HYPERCHOLESTEROLEMIA: ICD-10-CM

## 2025-01-27 DIAGNOSIS — G40.A09 ABSENCE SEIZURE (MULTI): ICD-10-CM

## 2025-01-27 PROCEDURE — 1036F TOBACCO NON-USER: CPT | Performed by: FAMILY MEDICINE

## 2025-01-27 PROCEDURE — 99395 PREV VISIT EST AGE 18-39: CPT | Performed by: FAMILY MEDICINE

## 2025-01-27 PROCEDURE — 3008F BODY MASS INDEX DOCD: CPT | Performed by: FAMILY MEDICINE

## 2025-01-27 RX ORDER — TRAMADOL HYDROCHLORIDE 50 MG/1
50 TABLET ORAL EVERY 8 HOURS PRN
Qty: 90 TABLET | Refills: 1 | Status: SHIPPED | OUTPATIENT
Start: 2025-01-27 | End: 2025-03-28

## 2025-01-27 RX ORDER — LEVOTHYROXINE SODIUM 112 UG/1
112 TABLET ORAL DAILY
Qty: 90 TABLET | Refills: 1 | Status: SHIPPED | OUTPATIENT
Start: 2025-01-27 | End: 2025-07-26

## 2025-01-27 NOTE — PROGRESS NOTES
"Subjective   Patient ID: Sarahi Haley is a 39 y.o. female who presents for Annual Exam and Weight Management.  HPI    Patient presents in office today for an Annual physical. Last eye exam 2 years go, last dental exam 5 months ago, last pap 11/2024. No new family h/o of cancer or heart disease. Does not need paperwork filled out today.  Labs done 1/23/25    Patient presents in office today to talk about weight management options. States that she was taking wegovy 0.5 mg. Patient states that her copay for this medication is $700. Last dose for 0.5 mg wegovy was 1.5 weeks ago.     Taking current medications which were reviewed.  Problem list discussed.    Overall doing well.  Eating okay.  Staying active.    Has no other new problem /question.     ROS  Constitutional- No activity change. No appetite change.  Eyes- Denies vision changes.  Respiratory- No shortness of breath.  Cardiovascular- No palpitations. No chest pain.  GI- No nausea or vomiting. No diarrhea or constipation. Denies abdominal pain.  Musculoskeletal-right hip and back pain  Neurological- Denies headaches. Denies dizziness.  Psychiatric/Behavioral- Denies significant anxiety, or depressed mood.     Objective     /80   Pulse 78   Resp 18   Ht 1.676 m (5' 6\")   Wt 139 kg (307 lb)   SpO2 98%   BMI 49.55 kg/m²     Allergies   Allergen Reactions    Bee Venom Protein (Honey Bee) Shortness of breath    Diphenhydramine Rash     arythmia    Nsaids (Non-Steroidal Anti-Inflammatory Drug) Hives    Procaine Hives and Swelling    Ketorolac Other     facial redness       Constitutional-- Well-nourished.  No distress  Head- unremarkable.  Eyes- PERRL.  Conjunctiva normal.  Nose- Normal.  No rhinorrhea noted.  Throat- Oropharynx is clear and moist.  Neck- Supple with no thyromegaly.  No significant cervical adenopathy noted.  Pulmonary/Chest- Breath sounds normal with normal effort.  No wheezing.  Heart- Regular rate and rhythm.  No murmur.  Abdomen- " Soft and non-tender.  No masses noted.  Musculoskeletal-low back pain exacerbated with range of motion.  Right hip pain with walking  Extremities- No edema.   Neurological- Alert.  No noted deficits.  Skin- Warm.    Psychiatric/Behavioral- Mood and affect normal.  Behavior normal.     Assessment/Plan   1. Annual physical exam        2. Hypercholesterolemia        3. Hypothyroidism, adult        4. Anxiety        5. Asthma in adult, moderate persistent, with acute exacerbation ([facility])        6. Class 3 severe obesity due to excess calories with serious comorbidity and body mass index (BMI) of 50.0 to 59.9 in adult        7. Chronic pain of right hip  traMADol (Ultram) 50 mg tablet      8. Osteoarthritis of multiple joints, unspecified osteoarthritis type  traMADol (Ultram) 50 mg tablet      9. Elevated LFTs  Hepatic function panel    Hepatic function panel      10. Adrenal suppression (Multi)        11. Steroid dependent (Multi)        12. Absence seizure (Multi)        13. Acute respiratory failure with hypoxia (Multi)        14. Severe persistent asthma with acute exacerbation (Multi)               Long talk. Treatment options reviewed.  Patient had not taking 4000 g of Tylenol almost every day.  This explains her elevated liver function test.  Recent labs reviewed.  Will increase levothyroxine to 112 mcg daily.    Treatment options for weight loss reviewed again.  Risk versus benefits of semaglutide discussed.  Will use [facility] pharmacy.  Understands the cost and feels she can afford it.  Discussed importance of natural sources of nutrition.  Advised patient to consume vegetables, salads, fruits, nuts, and proteins such as fish and chicken.  Discussed portion control.      Treatment options for her chronic pain discussed.  Will start tramadol about 2 times a day to minimize Tylenol just 500 mg twice daily.  Discussed the importance of routine stretching and exercise.     Continue and take your  medications as prescribed.    Health Maintenance issues discussed.    Importance of healthy diet and regular exercise regimen discussed.  Repeat liver function tests in about 3 weeks.  We will contact you with any test results ordered. If you do not hear from us, please contact.    See me back in 2 to 3 months  Follow-up as instructed or sooner if any problems or symptoms do not resolve as expected.      Scribe Attestation  By signing my name below, IRitu Scribe   attest that this documentation has been prepared under the direction and in the presence of Gregory Valdez MD.

## 2025-01-27 NOTE — TELEPHONE ENCOUNTER
----- Message from Gregory Valdez sent at 1/25/2025  8:41 AM EST -----  Labs are within normal limits or stable except for more elevated liver function tests.  Thyroid levels are good.  Cholesterol levels better than last year.  Continue healthy diet but will repeat liver function tests in 1 month with diagnosis of elevated hepatic function tests.  Please let her know and arrange.  I believe she will have to  paper copies for this lab.  Please let her know and arrange

## 2025-01-28 NOTE — TELEPHONE ENCOUNTER
PATIENT RETURNED CALL - SHE SAW DR. THOMAS YESTERDAY AND WENT OVER THE LAB WORK AT THE APPOINTMENT.

## 2025-01-30 ENCOUNTER — HOSPITAL ENCOUNTER (EMERGENCY)
Facility: HOSPITAL | Age: 40
Discharge: HOME | End: 2025-01-30
Attending: STUDENT IN AN ORGANIZED HEALTH CARE EDUCATION/TRAINING PROGRAM
Payer: COMMERCIAL

## 2025-01-30 ENCOUNTER — APPOINTMENT (OUTPATIENT)
Dept: CARDIOLOGY | Facility: HOSPITAL | Age: 40
End: 2025-01-30
Payer: COMMERCIAL

## 2025-01-30 ENCOUNTER — APPOINTMENT (OUTPATIENT)
Dept: RADIOLOGY | Facility: HOSPITAL | Age: 40
End: 2025-01-30
Payer: COMMERCIAL

## 2025-01-30 VITALS
HEIGHT: 65 IN | HEART RATE: 84 BPM | SYSTOLIC BLOOD PRESSURE: 137 MMHG | WEIGHT: 293 LBS | OXYGEN SATURATION: 96 % | BODY MASS INDEX: 48.82 KG/M2 | TEMPERATURE: 96.8 F | DIASTOLIC BLOOD PRESSURE: 69 MMHG | RESPIRATION RATE: 18 BRPM

## 2025-01-30 DIAGNOSIS — J45.41 MODERATE PERSISTENT ASTHMA WITH ACUTE EXACERBATION (HHS-HCC): Primary | ICD-10-CM

## 2025-01-30 LAB
ALBUMIN SERPL BCP-MCNC: 4 G/DL (ref 3.4–5)
ALP SERPL-CCNC: 70 U/L (ref 33–110)
ALT SERPL W P-5'-P-CCNC: 63 U/L (ref 7–45)
ANION GAP BLDV CALCULATED.4IONS-SCNC: 7 MMOL/L (ref 10–25)
ANION GAP SERPL CALC-SCNC: 11 MMOL/L (ref 10–20)
AST SERPL W P-5'-P-CCNC: 31 U/L (ref 9–39)
ATRIAL RATE: 67 BPM
BASE EXCESS BLDV CALC-SCNC: 2.9 MMOL/L (ref -2–3)
BASOPHILS # BLD AUTO: 0.04 X10*3/UL (ref 0–0.1)
BASOPHILS NFR BLD AUTO: 0.5 %
BILIRUB SERPL-MCNC: 0.6 MG/DL (ref 0–1.2)
BNP SERPL-MCNC: 49 PG/ML (ref 0–99)
BODY TEMPERATURE: ABNORMAL
BUN SERPL-MCNC: 9 MG/DL (ref 6–23)
CA-I BLDV-SCNC: 1.14 MMOL/L (ref 1.1–1.33)
CALCIUM SERPL-MCNC: 8.8 MG/DL (ref 8.6–10.3)
CARDIAC TROPONIN I PNL SERPL HS: <3 NG/L (ref 0–13)
CARDIAC TROPONIN I PNL SERPL HS: <3 NG/L (ref 0–13)
CHLORIDE BLDV-SCNC: 107 MMOL/L (ref 98–107)
CHLORIDE SERPL-SCNC: 106 MMOL/L (ref 98–107)
CO2 SERPL-SCNC: 25 MMOL/L (ref 21–32)
CREAT SERPL-MCNC: 1.08 MG/DL (ref 0.5–1.05)
EGFRCR SERPLBLD CKD-EPI 2021: 68 ML/MIN/1.73M*2
EOSINOPHIL # BLD AUTO: 0 X10*3/UL (ref 0–0.7)
EOSINOPHIL NFR BLD AUTO: 0 %
ERYTHROCYTE [DISTWIDTH] IN BLOOD BY AUTOMATED COUNT: 12.1 % (ref 11.5–14.5)
FLUAV RNA RESP QL NAA+PROBE: NOT DETECTED
FLUBV RNA RESP QL NAA+PROBE: NOT DETECTED
GLUCOSE BLDV-MCNC: 99 MG/DL (ref 74–99)
GLUCOSE SERPL-MCNC: 100 MG/DL (ref 74–99)
HCO3 BLDV-SCNC: 26.1 MMOL/L (ref 22–26)
HCT VFR BLD AUTO: 40.2 % (ref 36–46)
HCT VFR BLD EST: 44 % (ref 36–46)
HGB BLD-MCNC: 14.2 G/DL (ref 12–16)
HGB BLDV-MCNC: 14.8 G/DL (ref 12–16)
IMM GRANULOCYTES # BLD AUTO: 0.02 X10*3/UL (ref 0–0.7)
IMM GRANULOCYTES NFR BLD AUTO: 0.3 % (ref 0–0.9)
INHALED O2 CONCENTRATION: 21 %
INR PPP: 1 (ref 0.9–1.1)
LACTATE BLDV-SCNC: 1.4 MMOL/L (ref 0.4–2)
LYMPHOCYTES # BLD AUTO: 2.58 X10*3/UL (ref 1.2–4.8)
LYMPHOCYTES NFR BLD AUTO: 34.7 %
MCH RBC QN AUTO: 30.2 PG (ref 26–34)
MCHC RBC AUTO-ENTMCNC: 35.3 G/DL (ref 32–36)
MCV RBC AUTO: 86 FL (ref 80–100)
MONOCYTES # BLD AUTO: 0.4 X10*3/UL (ref 0.1–1)
MONOCYTES NFR BLD AUTO: 5.4 %
NEUTROPHILS # BLD AUTO: 4.4 X10*3/UL (ref 1.2–7.7)
NEUTROPHILS NFR BLD AUTO: 59.1 %
NRBC BLD-RTO: 0 /100 WBCS (ref 0–0)
OXYHGB MFR BLDV: 79.9 % (ref 45–75)
P AXIS: 49 DEGREES
P OFFSET: 181 MS
P ONSET: 133 MS
PCO2 BLDV: 35 MM HG (ref 41–51)
PH BLDV: 7.48 PH (ref 7.33–7.43)
PLATELET # BLD AUTO: 320 X10*3/UL (ref 150–450)
PO2 BLDV: 46 MM HG (ref 35–45)
POTASSIUM BLDV-SCNC: 3.5 MMOL/L (ref 3.5–5.3)
POTASSIUM SERPL-SCNC: 3.4 MMOL/L (ref 3.5–5.3)
PR INTERVAL: 176 MS
PROT SERPL-MCNC: 6.4 G/DL (ref 6.4–8.2)
PROTHROMBIN TIME: 11.8 SECONDS (ref 9.8–12.8)
Q ONSET: 221 MS
QRS COUNT: 11 BEATS
QRS DURATION: 84 MS
QT INTERVAL: 448 MS
QTC CALCULATION(BAZETT): 473 MS
QTC FREDERICIA: 465 MS
R AXIS: -17 DEGREES
RBC # BLD AUTO: 4.7 X10*6/UL (ref 4–5.2)
RSV RNA RESP QL NAA+PROBE: NOT DETECTED
SAO2 % BLDV: 83 % (ref 45–75)
SARS-COV-2 RNA RESP QL NAA+PROBE: NOT DETECTED
SODIUM BLDV-SCNC: 137 MMOL/L (ref 136–145)
SODIUM SERPL-SCNC: 139 MMOL/L (ref 136–145)
T AXIS: -6 DEGREES
T OFFSET: 445 MS
VENTRICULAR RATE: 67 BPM
WBC # BLD AUTO: 7.4 X10*3/UL (ref 4.4–11.3)

## 2025-01-30 PROCEDURE — 2500000002 HC RX 250 W HCPCS SELF ADMINISTERED DRUGS (ALT 637 FOR MEDICARE OP, ALT 636 FOR OP/ED)

## 2025-01-30 PROCEDURE — 99285 EMERGENCY DEPT VISIT HI MDM: CPT | Performed by: STUDENT IN AN ORGANIZED HEALTH CARE EDUCATION/TRAINING PROGRAM

## 2025-01-30 PROCEDURE — 85018 HEMOGLOBIN: CPT | Mod: 59 | Performed by: PHYSICIAN ASSISTANT

## 2025-01-30 PROCEDURE — 36415 COLL VENOUS BLD VENIPUNCTURE: CPT | Performed by: PHYSICIAN ASSISTANT

## 2025-01-30 PROCEDURE — 96375 TX/PRO/DX INJ NEW DRUG ADDON: CPT

## 2025-01-30 PROCEDURE — 2500000002 HC RX 250 W HCPCS SELF ADMINISTERED DRUGS (ALT 637 FOR MEDICARE OP, ALT 636 FOR OP/ED): Performed by: PHYSICIAN ASSISTANT

## 2025-01-30 PROCEDURE — 93005 ELECTROCARDIOGRAM TRACING: CPT

## 2025-01-30 PROCEDURE — 96372 THER/PROPH/DIAG INJ SC/IM: CPT | Performed by: PHYSICIAN ASSISTANT

## 2025-01-30 PROCEDURE — 85610 PROTHROMBIN TIME: CPT | Performed by: PHYSICIAN ASSISTANT

## 2025-01-30 PROCEDURE — 2500000004 HC RX 250 GENERAL PHARMACY W/ HCPCS (ALT 636 FOR OP/ED): Performed by: PHYSICIAN ASSISTANT

## 2025-01-30 PROCEDURE — 85025 COMPLETE CBC W/AUTO DIFF WBC: CPT | Performed by: PHYSICIAN ASSISTANT

## 2025-01-30 PROCEDURE — 84484 ASSAY OF TROPONIN QUANT: CPT | Performed by: PHYSICIAN ASSISTANT

## 2025-01-30 PROCEDURE — 83880 ASSAY OF NATRIURETIC PEPTIDE: CPT | Performed by: PHYSICIAN ASSISTANT

## 2025-01-30 PROCEDURE — 96367 TX/PROPH/DG ADDL SEQ IV INF: CPT

## 2025-01-30 PROCEDURE — 71045 X-RAY EXAM CHEST 1 VIEW: CPT | Performed by: STUDENT IN AN ORGANIZED HEALTH CARE EDUCATION/TRAINING PROGRAM

## 2025-01-30 PROCEDURE — 71045 X-RAY EXAM CHEST 1 VIEW: CPT

## 2025-01-30 PROCEDURE — 82435 ASSAY OF BLOOD CHLORIDE: CPT | Mod: 59 | Performed by: PHYSICIAN ASSISTANT

## 2025-01-30 PROCEDURE — 87637 SARSCOV2&INF A&B&RSV AMP PRB: CPT | Performed by: PHYSICIAN ASSISTANT

## 2025-01-30 PROCEDURE — 94640 AIRWAY INHALATION TREATMENT: CPT

## 2025-01-30 PROCEDURE — 96365 THER/PROPH/DIAG IV INF INIT: CPT

## 2025-01-30 RX ORDER — MAGNESIUM SULFATE HEPTAHYDRATE 40 MG/ML
2 INJECTION, SOLUTION INTRAVENOUS ONCE
Status: COMPLETED | OUTPATIENT
Start: 2025-01-30 | End: 2025-01-30

## 2025-01-30 RX ORDER — IPRATROPIUM BROMIDE AND ALBUTEROL SULFATE 2.5; .5 MG/3ML; MG/3ML
SOLUTION RESPIRATORY (INHALATION)
Status: COMPLETED
Start: 2025-01-30 | End: 2025-01-30

## 2025-01-30 RX ORDER — EPINEPHRINE 1 MG/ML
0.3 INJECTION INTRAMUSCULAR; INTRAVENOUS; SUBCUTANEOUS ONCE
Status: COMPLETED | OUTPATIENT
Start: 2025-01-30 | End: 2025-01-30

## 2025-01-30 RX ORDER — ONDANSETRON HYDROCHLORIDE 2 MG/ML
4 INJECTION, SOLUTION INTRAVENOUS ONCE
Status: COMPLETED | OUTPATIENT
Start: 2025-01-30 | End: 2025-01-30

## 2025-01-30 RX ORDER — ACETAMINOPHEN 10 MG/ML
1000 INJECTION, SOLUTION INTRAVENOUS ONCE
Status: COMPLETED | OUTPATIENT
Start: 2025-01-30 | End: 2025-01-30

## 2025-01-30 RX ORDER — LORAZEPAM 2 MG/ML
1 INJECTION INTRAMUSCULAR ONCE
Status: COMPLETED | OUTPATIENT
Start: 2025-01-30 | End: 2025-01-30

## 2025-01-30 RX ORDER — PREDNISONE 50 MG/1
50 TABLET ORAL DAILY
Qty: 5 TABLET | Refills: 0 | Status: SHIPPED | OUTPATIENT
Start: 2025-01-30 | End: 2025-02-04

## 2025-01-30 RX ORDER — IPRATROPIUM BROMIDE AND ALBUTEROL SULFATE 2.5; .5 MG/3ML; MG/3ML
3 SOLUTION RESPIRATORY (INHALATION) EVERY 20 MIN
Status: COMPLETED | OUTPATIENT
Start: 2025-01-30 | End: 2025-01-30

## 2025-01-30 RX ADMIN — IPRATROPIUM BROMIDE AND ALBUTEROL SULFATE 3 ML: .5; 3 SOLUTION RESPIRATORY (INHALATION) at 01:39

## 2025-01-30 RX ADMIN — MAGNESIUM SULFATE HEPTAHYDRATE 2 G: 40 INJECTION, SOLUTION INTRAVENOUS at 01:50

## 2025-01-30 RX ADMIN — LORAZEPAM 1 MG: 2 INJECTION INTRAMUSCULAR; INTRAVENOUS at 01:48

## 2025-01-30 RX ADMIN — EPINEPHRINE 0.3 MG: 1 INJECTION INTRAMUSCULAR; INTRAVENOUS; SUBCUTANEOUS at 01:50

## 2025-01-30 RX ADMIN — ACETAMINOPHEN 1000 MG: 10 INJECTION, SOLUTION INTRAVENOUS at 02:24

## 2025-01-30 RX ADMIN — IPRATROPIUM BROMIDE AND ALBUTEROL SULFATE 3 ML: .5; 3 SOLUTION RESPIRATORY (INHALATION) at 01:33

## 2025-01-30 RX ADMIN — IPRATROPIUM BROMIDE AND ALBUTEROL SULFATE 3 ML: .5; 3 SOLUTION RESPIRATORY (INHALATION) at 01:38

## 2025-01-30 RX ADMIN — METHYLPREDNISOLONE SODIUM SUCCINATE 125 MG: 125 INJECTION, POWDER, FOR SOLUTION INTRAMUSCULAR; INTRAVENOUS at 01:48

## 2025-01-30 RX ADMIN — ONDANSETRON 4 MG: 2 INJECTION INTRAMUSCULAR; INTRAVENOUS at 01:49

## 2025-01-30 ASSESSMENT — PAIN SCALES - GENERAL
PAINLEVEL_OUTOF10: 0 - NO PAIN
PAINLEVEL_OUTOF10: 0 - NO PAIN
PAINLEVEL_OUTOF10: 3

## 2025-01-30 ASSESSMENT — PAIN - FUNCTIONAL ASSESSMENT
PAIN_FUNCTIONAL_ASSESSMENT: 0-10
PAIN_FUNCTIONAL_ASSESSMENT: 0-10

## 2025-01-30 ASSESSMENT — PAIN DESCRIPTION - DESCRIPTORS: DESCRIPTORS: HEADACHE

## 2025-01-30 ASSESSMENT — PAIN DESCRIPTION - PAIN TYPE: TYPE: ACUTE PAIN

## 2025-01-30 ASSESSMENT — PAIN DESCRIPTION - LOCATION: LOCATION: HEAD

## 2025-01-30 NOTE — ED PROVIDER NOTES
HPI   Chief Complaint   Patient presents with    Shortness of Breath     I am short of breath I did three treatments at home  (asthma)       This is a 39-year-old female with a long history of asthma presents with complaint of worsening shortness of breath that started at home earlier this evening.  The patient states this is her typical asthma.  She did use her inhaler nebulizer with no relief.  She drove herself to the hospital.  She denies any chest pain, palpitations.  She does report a dry cough with wheezing.  Denies any back pain or hemoptysis.  Past medical history includes moderate persistent asthma exacerbation, reactive airway, NAFLD, partial complex seizure, hypothyroidism, migraines, GERD, MYNOR on CPAP.      History provided by:  Patient and medical records          Patient History   Past Medical History:   Diagnosis Date    Acute cystitis without hematuria 08/08/2019    Acute cystitis without hematuria    Asthma     Disease of thyroid gland     Encounter for initial prescription of contraceptive pills 11/01/2019    Encounter for initial prescription of contraceptive pills    Nausea 02/05/2021    Nausea in adult    Other general symptoms and signs 12/05/2019    Forgetfulness    Parageusia 04/06/2020    Taste perversion    Pelvic and perineal pain     Pelvic pain in female    Personal history of other diseases of the respiratory system 04/06/2020    History of sinusitis    Personal history of other specified conditions 07/01/2020    History of vomiting    Personal history of other specified conditions 01/26/2021    History of headache    Personal history of other specified conditions 02/01/2021    History of diarrhea    Personal history of thrombophlebitis 06/03/2019    History of phlebitis    Personal history of urinary (tract) infections 01/16/2020    History of urinary tract infection    PTSD (post-traumatic stress disorder)      Past Surgical History:   Procedure Laterality Date    ABDOMINAL SURGERY       APPENDECTOMY      OTHER SURGICAL HISTORY  02/08/2019    Appendectomy    OTHER SURGICAL HISTORY  02/08/2019    Cholecystectomy    OTHER SURGICAL HISTORY  02/08/2019    Cystoscopy    OTHER SURGICAL HISTORY  02/08/2019    Ureteroscopy    OTHER SURGICAL HISTORY  02/08/2019    Shoulder surgery     Family History   Problem Relation Name Age of Onset    Hypertension Father      Other (Pulmonary Valve Stenosis) Sister      Heart disease Maternal Grandmother      Diabetes Other Grandparent     Lung cancer Other Grandparent     Anxiety disorder Sibling       Social History     Tobacco Use    Smoking status: Never     Passive exposure: Current    Smokeless tobacco: Never   Vaping Use    Vaping status: Never Used   Substance Use Topics    Alcohol use: Yes     Alcohol/week: 1.0 - 2.0 standard drink of alcohol     Types: 1 - 2 Glasses of wine per week     Comment: social    Drug use: Never       Physical Exam   ED Triage Vitals [01/30/25 0056]   Temperature Heart Rate Respirations BP   36 °C (96.8 °F) (!) 109 20 (!) 213/91      Pulse Ox Temp Source Heart Rate Source Patient Position   96 % Temporal Monitor Sitting      BP Location FiO2 (%)     Right arm --       Physical Exam  Vitals and nursing note reviewed. Exam conducted with a chaperone present.   Constitutional:       General: She is awake. She is in acute distress.      Appearance: She is well-developed and well-groomed. She is ill-appearing.   HENT:      Head: Normocephalic and atraumatic.      Right Ear: Tympanic membrane, ear canal and external ear normal.      Left Ear: Tympanic membrane, ear canal and external ear normal.      Nose: Nose normal.      Mouth/Throat:      Mouth: Mucous membranes are moist.      Pharynx: Oropharynx is clear.   Eyes:      Extraocular Movements: Extraocular movements intact.      Conjunctiva/sclera: Conjunctivae normal.      Pupils: Pupils are equal, round, and reactive to light.   Cardiovascular:      Rate and Rhythm: Normal rate and  regular rhythm.      Pulses: Normal pulses.      Heart sounds: Normal heart sounds.   Pulmonary:      Effort: Respiratory distress present.      Breath sounds: Examination of the right-upper field reveals decreased breath sounds and wheezing. Examination of the left-upper field reveals decreased breath sounds and wheezing. Examination of the right-middle field reveals decreased breath sounds and wheezing. Examination of the left-middle field reveals decreased breath sounds and wheezing. Examination of the right-lower field reveals decreased breath sounds and wheezing. Examination of the left-lower field reveals decreased breath sounds and wheezing. Decreased breath sounds and wheezing present. No rhonchi or rales.   Abdominal:      General: Abdomen is flat. Bowel sounds are normal.      Palpations: Abdomen is soft. There is no mass.      Tenderness: There is no abdominal tenderness. There is no guarding.   Musculoskeletal:         General: No swelling or tenderness. Normal range of motion.      Cervical back: Normal range of motion and neck supple.   Skin:     General: Skin is warm and dry.      Capillary Refill: Capillary refill takes less than 2 seconds.      Findings: No rash.   Neurological:      General: No focal deficit present.      Mental Status: She is alert and oriented to person, place, and time. Mental status is at baseline.      GCS: GCS eye subscore is 4. GCS verbal subscore is 5. GCS motor subscore is 6.      Sensory: Sensation is intact.      Motor: Motor function is intact.      Coordination: Coordination is intact.   Psychiatric:         Mood and Affect: Mood normal.         Behavior: Behavior normal. Behavior is cooperative.         Thought Content: Thought content normal.         Judgment: Judgment normal.           ED Course & MDM   Diagnoses as of 01/30/25 0335   Moderate persistent asthma with acute exacerbation ([facility])                 No data recorded                                 Medical  Decision Making  Temperature 36 heart rate 109 respirations 20 blood pressure 213/91 pulse ox is 96% on room air  Patient remains on monitor on oxygen, she was given 1 g of Ofirmev IV piggyback, 2 g magnesium sulfate IV piggyback, epi 0.3 mg IM, Zofran 4 mg IV push, Ativan 1 mg IV push, Solu-Medrol 125 mg IV push and 3 DuoNeb aerosol treatments.  Repeat blood pressure is 141/75, heart rate 99 respirations 1896% on room air  CBC white count 7.4, hemoglobin 14.2, medic at 40.2, platelet count was 320, chemistry glucose 100, potassium 3.4, creatinine 1.08, ALT of 63, first troponin was negative less than 3, repeat troponin less than 3, BNP 49, PT 11.8 INR 1.0.  Patient's portable chest x-ray shows no acute cardiopulmonary process.  0033 hrs. rounded on the patient she is feeling much better wheezing has improved and she is resting.  She feels comfortable going home.  The patient was discharged home with prescription for prednisone 50 mg once a day for 5 days.  Recommend close follow-up with her PCP return with any concerns or worsening of symptoms all questions answered prior to discharge        Procedure  Procedures     Salo Perez PA-C  01/30/25 [id number]

## 2025-02-05 ENCOUNTER — APPOINTMENT (OUTPATIENT)
Dept: DERMATOLOGY | Facility: CLINIC | Age: 40
End: 2025-02-05
Payer: COMMERCIAL

## 2025-02-05 DIAGNOSIS — L71.9 ROSACEA: Primary | ICD-10-CM

## 2025-02-05 PROCEDURE — 99204 OFFICE O/P NEW MOD 45 MIN: CPT | Performed by: DERMATOLOGY

## 2025-02-05 RX ORDER — METRONIDAZOLE 7.5 MG/G
CREAM TOPICAL
Qty: 45 G | Refills: 2 | Status: SHIPPED | OUTPATIENT
Start: 2025-02-05

## 2025-02-05 RX ORDER — METRONIDAZOLE 7.5 MG/G
1 CREAM TOPICAL DAILY
Qty: 45 G | Refills: 3 | Status: SHIPPED | OUTPATIENT
Start: 2025-02-05 | End: 2025-02-05

## 2025-02-05 NOTE — PROGRESS NOTES
Ivette Haley is a 39 y.o. female who presents for the following: Skin Discoloration (New - red cheeks, forehead, dry.  Morning routine - luke warm water, Ceravae Hydrating Foaming Oil Cleanser, Ceravae Moisturizer Cream. Patient states she was on oral steroids for 18 months and had thought she was flushed from the medication.  Patient at times uses an Aquaphor Stick for a moisturizer).     She's had redness and dryness of her face for about the past 18 months, hasn't noticed anything that makes it worse or better.    Review of Systems:  No other skin or systemic complaints other than what is documented elsewhere in the note.    The following portions of the chart were reviewed this encounter and updated as appropriate:          Skin Cancer History  No skin cancer on file.      Specialty Problems          Dermatology Problems    Chronic idiopathic urticaria     Formatting of this note might be different from the original. Continue home meds             Objective   Well appearing patient in no apparent distress; mood and affect are within normal limits.    A focused skin examination was performed of the face. All findings within normal limits unless otherwise noted below.    Assessment/Plan   1. Rosacea  Head - Anterior (Face)  Mid face erythema with telangiectasias and few scattered inflammatory papules.    The chronic and intermittently flaring nature of the skin condition was discussed with the patient today. Discussed common triggers associated with rosacea including sun exposure, spicy foods, alcohol, and stress. The various treatment options and risks and benefits reviewed.    START:  - daily gentle sunscreen use (samples provided)  - Metronidazole 0.75% cream - apply a pea sized amount to face daily in morning    Consider topical ivermectin compounded with oxymetazole vs. Sulfur was vs. Laser in future.     Related Procedures  Follow Up In Dermatology - Established Patient    Related  Medications  metroNIDAZOLE (Metrocream) 0.75 % cream  Apply to face 2x daily    Follow up in 2 months    Cresencio Truong MD  PGY-2, Dermatology    I saw and evaluated the patient. I personally obtained the key and critical portions of the history and physical exam or was physically present for key and critical portions performed by the resident/fellow. I reviewed the resident/fellow's documentation and discussed the patient with the resident/fellow. I agree with the resident/fellow's medical decision making as documented in the note.    Shadia Bryan, DO

## 2025-02-25 LAB
ATRIAL RATE: 67 BPM
P AXIS: 49 DEGREES
P OFFSET: 181 MS
P ONSET: 133 MS
PR INTERVAL: 176 MS
Q ONSET: 221 MS
QRS COUNT: 11 BEATS
QRS DURATION: 84 MS
QT INTERVAL: 448 MS
QTC CALCULATION(BAZETT): 473 MS
QTC FREDERICIA: 465 MS
R AXIS: -17 DEGREES
T AXIS: -6 DEGREES
T OFFSET: 445 MS
VENTRICULAR RATE: 67 BPM

## 2025-04-09 ENCOUNTER — APPOINTMENT (OUTPATIENT)
Dept: DERMATOLOGY | Facility: CLINIC | Age: 40
End: 2025-04-09
Payer: COMMERCIAL

## 2025-04-30 ENCOUNTER — APPOINTMENT (OUTPATIENT)
Dept: PRIMARY CARE | Facility: CLINIC | Age: 40
End: 2025-04-30
Payer: COMMERCIAL

## 2025-06-07 NOTE — NURSING NOTE
Patient arrived to [facility] from ER on RA. Patient attached to monitors.  
Patient transferred to 11S via wheelchair. All belongings sent with patient.  
Initial (On Arrival)
